# Patient Record
Sex: FEMALE | Race: WHITE | URBAN - METROPOLITAN AREA
[De-identification: names, ages, dates, MRNs, and addresses within clinical notes are randomized per-mention and may not be internally consistent; named-entity substitution may affect disease eponyms.]

---

## 2018-09-30 ENCOUNTER — EMERGENCY (EMERGENCY)
Facility: HOSPITAL | Age: 68
LOS: 0 days | Discharge: HOME | End: 2018-09-30
Attending: EMERGENCY MEDICINE | Admitting: EMERGENCY MEDICINE
Payer: MEDICARE

## 2018-09-30 VITALS
HEIGHT: 68 IN | OXYGEN SATURATION: 97 % | RESPIRATION RATE: 18 BRPM | TEMPERATURE: 98 F | HEART RATE: 82 BPM | WEIGHT: 291.89 LBS

## 2018-09-30 DIAGNOSIS — Z88.1 ALLERGY STATUS TO OTHER ANTIBIOTIC AGENTS STATUS: ICD-10-CM

## 2018-09-30 DIAGNOSIS — L03.032 CELLULITIS OF LEFT TOE: ICD-10-CM

## 2018-09-30 DIAGNOSIS — M79.676 PAIN IN UNSPECIFIED TOE(S): ICD-10-CM

## 2018-09-30 PROCEDURE — 99283 EMERGENCY DEPT VISIT LOW MDM: CPT

## 2018-09-30 RX ADMIN — Medication 300 MILLIGRAM(S): at 15:15

## 2018-09-30 NOTE — ED ADULT NURSE NOTE - NSIMPLEMENTINTERV_GEN_ALL_ED
Implemented All Universal Safety Interventions:  [place] to call system. Call bell, personal items and telephone within reach. Instruct patient to call for assistance. Room bathroom lighting operational. Non-slip footwear when patient is off stretcher. Physically safe environment: no spills, clutter or unnecessary equipment. Stretcher in lowest position, wheels locked, appropriate side rails in place.

## 2018-09-30 NOTE — ED STATDOCS - OBJECTIVE STATEMENT
66 yo pt with pain to left big toe. 66 yo pt with pain to left big toe.  Pt with pain 68 yo pt with pain to left big toe.  Pt with pain started several days ago.  pt noticed white head on toe.  No travel, no scik contact.  Pt has follow up with Dr. Dejesus tomorrow. PMH for cancer, no hx diabetes

## 2020-01-09 ENCOUNTER — NON-APPOINTMENT (OUTPATIENT)
Age: 70
End: 2020-01-09

## 2021-08-31 ENCOUNTER — APPOINTMENT (OUTPATIENT)
Dept: INFECTIOUS DISEASE | Facility: CLINIC | Age: 71
End: 2021-08-31
Payer: MEDICARE

## 2021-08-31 VITALS
HEIGHT: 68 IN | OXYGEN SATURATION: 98 % | TEMPERATURE: 98.7 F | SYSTOLIC BLOOD PRESSURE: 160 MMHG | HEART RATE: 90 BPM | DIASTOLIC BLOOD PRESSURE: 73 MMHG | RESPIRATION RATE: 16 BRPM

## 2021-08-31 PROCEDURE — 99203 OFFICE O/P NEW LOW 30 MIN: CPT

## 2021-09-02 NOTE — ASSESSMENT
[FreeTextEntry1] : 70 year old female PMH colon cancer and lung cancer s/p remote chemotherapy and chronic venous stasis who presents to ID office for evaluation of possible cellulitis. \par \par Lower extremity exam at this point most suggestive of chronic venous stasis\par I advised patient to stop her Doxycycline\par I advised her to continue her treatments for lymphedema\par I advised her to try leg elevation and wrapping to combat her edema\par I gave her a copy of my card and advised her to contact he if she develops chills, fevers or rapid (especially asymmetric erythema of her lower extremities, which would be more suggestive of cellulitis)\par \par Followup: PRN

## 2021-09-02 NOTE — HISTORY OF PRESENT ILLNESS
[FreeTextEntry1] : 70 year old female PMH colon cancer and lung cancer s/p remote chemotherapy and chronic venous stasis who presents to ID office for evaluation of possible cellulitis. Patient notes that > 2 weeks ago she develops bilateral LE redness and her physician initiated her on Doxycycline. Did not have any chills or fevers at the time. No significant pain with the erythema. Symptoms did not emily so her antibiotic was renewed after taking it for 2 weeks. At present denies pain at her LE. Denies chills or fevers. No N/V/D.\par \par PMH: History of Lung Cancer and Colon Cancer s/p remote chemotherapy, LE Lymphedema\par PSH: Resection for lung cancer\par Family History: Noncontributory\par Social Hx: Not current smoker. No EtOH use. No Drug use\par Allergies: Keflex (however, tolerated penicillins)\par

## 2021-09-02 NOTE — PHYSICAL EXAM
[General Appearance - Alert] : alert [General Appearance - In No Acute Distress] : in no acute distress [Sclera] : the sclera and conjunctiva were normal [Outer Ear] : the ears and nose were normal in appearance [Oropharynx] : the oropharynx was normal with no thrush [Neck Appearance] : the appearance of the neck was normal [Auscultation Breath Sounds / Voice Sounds] : lungs were clear to auscultation bilaterally [Heart Rate And Rhythm] : heart rate was normal and rhythm regular [Heart Sounds] : normal S1 and S2 [Heart Sounds Gallop] : no gallops [Murmurs] : no murmurs [Heart Sounds Pericardial Friction Rub] : no pericardial rub [Bowel Sounds] : normal bowel sounds [Abdomen Soft] : soft [Abdomen Tenderness] : non-tender [] : no hepato-splenomegaly [Abdomen Mass (___ Cm)] : no abdominal mass palpated [Musculoskeletal - Swelling] : no joint swelling [Range of Motion to Joints] : range of motion to joints [Nail Clubbing] : no clubbing  or cyanosis of the fingernails [Skin Color & Pigmentation] : normal skin color and pigmentation [No Focal Deficits] : no focal deficits [Affect] : the affect was normal [FreeTextEntry1] : +LE changes as above

## 2021-12-14 ENCOUNTER — OUTPATIENT (OUTPATIENT)
Dept: OUTPATIENT SERVICES | Facility: HOSPITAL | Age: 71
LOS: 1 days | End: 2021-12-14
Payer: MEDICARE

## 2021-12-14 ENCOUNTER — RESULT REVIEW (OUTPATIENT)
Age: 71
End: 2021-12-14

## 2021-12-14 DIAGNOSIS — L97.512 NON-PRESSURE CHRONIC ULCER OF OTHER PART OF RIGHT FOOT WITH FAT LAYER EXPOSED: ICD-10-CM

## 2021-12-14 DIAGNOSIS — Q82.0 HEREDITARY LYMPHEDEMA: ICD-10-CM

## 2021-12-14 DIAGNOSIS — L89.619 PRESSURE ULCER OF RIGHT HEEL, UNSPECIFIED STAGE: ICD-10-CM

## 2021-12-14 PROCEDURE — 99203 OFFICE O/P NEW LOW 30 MIN: CPT

## 2021-12-14 PROCEDURE — 29580 STRAPPING UNNA BOOT: CPT | Mod: 50

## 2021-12-14 PROCEDURE — 93923 UPR/LXTR ART STDY 3+ LVLS: CPT

## 2021-12-14 PROCEDURE — 93923 UPR/LXTR ART STDY 3+ LVLS: CPT | Mod: 26

## 2021-12-14 PROCEDURE — G0463: CPT | Mod: 25

## 2021-12-21 ENCOUNTER — OUTPATIENT (OUTPATIENT)
Dept: OUTPATIENT SERVICES | Facility: HOSPITAL | Age: 71
LOS: 1 days | End: 2021-12-21
Payer: MEDICARE

## 2021-12-21 DIAGNOSIS — L97.512 NON-PRESSURE CHRONIC ULCER OF OTHER PART OF RIGHT FOOT WITH FAT LAYER EXPOSED: ICD-10-CM

## 2021-12-21 DIAGNOSIS — Q82.0 HEREDITARY LYMPHEDEMA: ICD-10-CM

## 2021-12-21 PROCEDURE — G0463: CPT

## 2021-12-21 PROCEDURE — 99213 OFFICE O/P EST LOW 20 MIN: CPT

## 2021-12-28 ENCOUNTER — OUTPATIENT (OUTPATIENT)
Dept: OUTPATIENT SERVICES | Facility: HOSPITAL | Age: 71
LOS: 1 days | End: 2021-12-28
Payer: MEDICARE

## 2021-12-28 DIAGNOSIS — Q82.0 HEREDITARY LYMPHEDEMA: ICD-10-CM

## 2021-12-28 DIAGNOSIS — L97.512 NON-PRESSURE CHRONIC ULCER OF OTHER PART OF RIGHT FOOT WITH FAT LAYER EXPOSED: ICD-10-CM

## 2021-12-28 PROCEDURE — 99213 OFFICE O/P EST LOW 20 MIN: CPT

## 2021-12-28 PROCEDURE — G0463: CPT

## 2022-01-04 ENCOUNTER — OUTPATIENT (OUTPATIENT)
Dept: OUTPATIENT SERVICES | Facility: HOSPITAL | Age: 72
LOS: 1 days | End: 2022-01-04
Payer: MEDICARE

## 2022-01-04 DIAGNOSIS — L97.512 NON-PRESSURE CHRONIC ULCER OF OTHER PART OF RIGHT FOOT WITH FAT LAYER EXPOSED: ICD-10-CM

## 2022-01-04 DIAGNOSIS — Q82.0 HEREDITARY LYMPHEDEMA: ICD-10-CM

## 2022-01-04 PROCEDURE — 29580 STRAPPING UNNA BOOT: CPT | Mod: 50

## 2022-01-04 PROCEDURE — 99213 OFFICE O/P EST LOW 20 MIN: CPT

## 2022-01-04 PROCEDURE — G0463: CPT | Mod: 25

## 2022-01-11 ENCOUNTER — OUTPATIENT (OUTPATIENT)
Dept: OUTPATIENT SERVICES | Facility: HOSPITAL | Age: 72
LOS: 1 days | End: 2022-01-11
Payer: MEDICARE

## 2022-01-11 DIAGNOSIS — Q82.0 HEREDITARY LYMPHEDEMA: ICD-10-CM

## 2022-01-11 DIAGNOSIS — L81.9 DISORDER OF PIGMENTATION, UNSPECIFIED: ICD-10-CM

## 2022-01-11 DIAGNOSIS — L97.512 NON-PRESSURE CHRONIC ULCER OF OTHER PART OF RIGHT FOOT WITH FAT LAYER EXPOSED: ICD-10-CM

## 2022-01-11 DIAGNOSIS — I10 ESSENTIAL (PRIMARY) HYPERTENSION: ICD-10-CM

## 2022-01-11 DIAGNOSIS — I89.0 LYMPHEDEMA, NOT ELSEWHERE CLASSIFIED: ICD-10-CM

## 2022-01-11 DIAGNOSIS — G60.9 HEREDITARY AND IDIOPATHIC NEUROPATHY, UNSPECIFIED: ICD-10-CM

## 2022-01-11 PROCEDURE — 99213 OFFICE O/P EST LOW 20 MIN: CPT

## 2022-01-11 PROCEDURE — 29580 STRAPPING UNNA BOOT: CPT | Mod: RT

## 2022-01-14 ENCOUNTER — OUTPATIENT (OUTPATIENT)
Dept: OUTPATIENT SERVICES | Facility: HOSPITAL | Age: 72
LOS: 1 days | End: 2022-01-14
Payer: MEDICARE

## 2022-01-14 DIAGNOSIS — L97.512 NON-PRESSURE CHRONIC ULCER OF OTHER PART OF RIGHT FOOT WITH FAT LAYER EXPOSED: ICD-10-CM

## 2022-01-14 PROCEDURE — 29580 STRAPPING UNNA BOOT: CPT | Mod: LT

## 2022-01-18 ENCOUNTER — OUTPATIENT (OUTPATIENT)
Dept: OUTPATIENT SERVICES | Facility: HOSPITAL | Age: 72
LOS: 1 days | End: 2022-01-18
Payer: MEDICARE

## 2022-01-18 DIAGNOSIS — L97.512 NON-PRESSURE CHRONIC ULCER OF OTHER PART OF RIGHT FOOT WITH FAT LAYER EXPOSED: ICD-10-CM

## 2022-01-18 DIAGNOSIS — L81.9 DISORDER OF PIGMENTATION, UNSPECIFIED: ICD-10-CM

## 2022-01-18 DIAGNOSIS — I10 ESSENTIAL (PRIMARY) HYPERTENSION: ICD-10-CM

## 2022-01-18 DIAGNOSIS — Q82.0 HEREDITARY LYMPHEDEMA: ICD-10-CM

## 2022-01-18 DIAGNOSIS — G60.9 HEREDITARY AND IDIOPATHIC NEUROPATHY, UNSPECIFIED: ICD-10-CM

## 2022-01-18 PROCEDURE — G0463: CPT

## 2022-01-18 PROCEDURE — 99213 OFFICE O/P EST LOW 20 MIN: CPT

## 2022-01-18 PROCEDURE — 29580 STRAPPING UNNA BOOT: CPT | Mod: RT

## 2022-01-21 ENCOUNTER — OUTPATIENT (OUTPATIENT)
Dept: OUTPATIENT SERVICES | Facility: HOSPITAL | Age: 72
LOS: 1 days | End: 2022-01-21
Payer: MEDICARE

## 2022-01-21 DIAGNOSIS — L97.512 NON-PRESSURE CHRONIC ULCER OF OTHER PART OF RIGHT FOOT WITH FAT LAYER EXPOSED: ICD-10-CM

## 2022-01-21 PROCEDURE — 99212 OFFICE O/P EST SF 10 MIN: CPT

## 2022-01-21 PROCEDURE — G0463: CPT | Mod: 25

## 2022-01-21 PROCEDURE — 29580 STRAPPING UNNA BOOT: CPT | Mod: 50

## 2022-01-25 ENCOUNTER — OUTPATIENT (OUTPATIENT)
Dept: OUTPATIENT SERVICES | Facility: HOSPITAL | Age: 72
LOS: 1 days | End: 2022-01-25
Payer: MEDICARE

## 2022-01-25 DIAGNOSIS — L97.512 NON-PRESSURE CHRONIC ULCER OF OTHER PART OF RIGHT FOOT WITH FAT LAYER EXPOSED: ICD-10-CM

## 2022-01-25 DIAGNOSIS — Q82.0 HEREDITARY LYMPHEDEMA: ICD-10-CM

## 2022-01-25 PROCEDURE — 29580 STRAPPING UNNA BOOT: CPT | Mod: 50

## 2022-01-25 PROCEDURE — 99213 OFFICE O/P EST LOW 20 MIN: CPT

## 2022-01-26 ENCOUNTER — OUTPATIENT (OUTPATIENT)
Dept: OUTPATIENT SERVICES | Facility: HOSPITAL | Age: 72
LOS: 1 days | End: 2022-01-26
Payer: MEDICARE

## 2022-01-26 DIAGNOSIS — L97.512 NON-PRESSURE CHRONIC ULCER OF OTHER PART OF RIGHT FOOT WITH FAT LAYER EXPOSED: ICD-10-CM

## 2022-01-26 DIAGNOSIS — Q82.0 HEREDITARY LYMPHEDEMA: ICD-10-CM

## 2022-01-26 PROCEDURE — 29580 STRAPPING UNNA BOOT: CPT | Mod: 50

## 2022-01-26 PROCEDURE — G0463: CPT | Mod: 25

## 2022-02-01 ENCOUNTER — OUTPATIENT (OUTPATIENT)
Dept: OUTPATIENT SERVICES | Facility: HOSPITAL | Age: 72
LOS: 1 days | End: 2022-02-01
Payer: MEDICARE

## 2022-02-01 ENCOUNTER — TRANSCRIPTION ENCOUNTER (OUTPATIENT)
Age: 72
End: 2022-02-01

## 2022-02-01 DIAGNOSIS — Q82.0 HEREDITARY LYMPHEDEMA: ICD-10-CM

## 2022-02-01 DIAGNOSIS — L97.512 NON-PRESSURE CHRONIC ULCER OF OTHER PART OF RIGHT FOOT WITH FAT LAYER EXPOSED: ICD-10-CM

## 2022-02-01 PROCEDURE — G0463: CPT

## 2022-02-01 PROCEDURE — 99213 OFFICE O/P EST LOW 20 MIN: CPT

## 2022-02-04 ENCOUNTER — OUTPATIENT (OUTPATIENT)
Dept: OUTPATIENT SERVICES | Facility: HOSPITAL | Age: 72
LOS: 1 days | End: 2022-02-04
Payer: MEDICARE

## 2022-02-04 DIAGNOSIS — L97.512 NON-PRESSURE CHRONIC ULCER OF OTHER PART OF RIGHT FOOT WITH FAT LAYER EXPOSED: ICD-10-CM

## 2022-02-04 DIAGNOSIS — Q82.0 HEREDITARY LYMPHEDEMA: ICD-10-CM

## 2022-02-04 PROCEDURE — G0463: CPT | Mod: 25

## 2022-02-04 PROCEDURE — 29580 STRAPPING UNNA BOOT: CPT | Mod: 50

## 2022-02-08 ENCOUNTER — OUTPATIENT (OUTPATIENT)
Dept: OUTPATIENT SERVICES | Facility: HOSPITAL | Age: 72
LOS: 1 days | End: 2022-02-08
Payer: MEDICARE

## 2022-02-08 ENCOUNTER — APPOINTMENT (OUTPATIENT)
Dept: FAMILY MEDICINE | Facility: CLINIC | Age: 72
End: 2022-02-08
Payer: MEDICARE

## 2022-02-08 ENCOUNTER — NON-APPOINTMENT (OUTPATIENT)
Age: 72
End: 2022-02-08

## 2022-02-08 VITALS
HEIGHT: 68 IN | TEMPERATURE: 97.6 F | HEART RATE: 95 BPM | DIASTOLIC BLOOD PRESSURE: 92 MMHG | OXYGEN SATURATION: 98 % | SYSTOLIC BLOOD PRESSURE: 162 MMHG

## 2022-02-08 VITALS
WEIGHT: 293 LBS | DIASTOLIC BLOOD PRESSURE: 49 MMHG | SYSTOLIC BLOOD PRESSURE: 162 MMHG | HEIGHT: 68 IN | BODY MASS INDEX: 44.41 KG/M2

## 2022-02-08 DIAGNOSIS — Z78.9 OTHER SPECIFIED HEALTH STATUS: ICD-10-CM

## 2022-02-08 DIAGNOSIS — L97.512 NON-PRESSURE CHRONIC ULCER OF OTHER PART OF RIGHT FOOT WITH FAT LAYER EXPOSED: ICD-10-CM

## 2022-02-08 DIAGNOSIS — Q82.0 HEREDITARY LYMPHEDEMA: ICD-10-CM

## 2022-02-08 DIAGNOSIS — Z82.49 FAMILY HISTORY OF ISCHEMIC HEART DISEASE AND OTHER DISEASES OF THE CIRCULATORY SYSTEM: ICD-10-CM

## 2022-02-08 PROCEDURE — 99213 OFFICE O/P EST LOW 20 MIN: CPT

## 2022-02-08 PROCEDURE — 99204 OFFICE O/P NEW MOD 45 MIN: CPT

## 2022-02-08 PROCEDURE — 29580 STRAPPING UNNA BOOT: CPT

## 2022-02-08 RX ORDER — DOXYCYCLINE 100 MG/1
100 CAPSULE ORAL
Qty: 28 | Refills: 0 | Status: DISCONTINUED | COMMUNITY
Start: 2021-11-15

## 2022-02-08 RX ORDER — CLOTRIMAZOLE AND BETAMETHASONE DIPROPIONATE 10; .5 MG/G; MG/G
1-0.05 CREAM TOPICAL
Qty: 45 | Refills: 0 | Status: DISCONTINUED | COMMUNITY
Start: 2021-03-15

## 2022-02-08 RX ORDER — CHLORHEXIDINE GLUCONATE 213 G/1000ML
4 SOLUTION TOPICAL
Qty: 946 | Refills: 0 | Status: DISCONTINUED | COMMUNITY
Start: 2021-11-15

## 2022-02-08 RX ORDER — DOXYCYCLINE HYCLATE 100 MG/1
100 CAPSULE ORAL
Qty: 20 | Refills: 0 | Status: DISCONTINUED | COMMUNITY
Start: 2022-01-22

## 2022-02-08 RX ORDER — NYSTATIN 100000 [USP'U]/G
100000 POWDER TOPICAL
Qty: 60 | Refills: 0 | Status: DISCONTINUED | COMMUNITY
Start: 2021-09-01

## 2022-02-08 NOTE — HEALTH RISK ASSESSMENT
[Never] : Never [0-4] : 0-4 [1 or 2 (0 pts)] : 1 or 2 (0 points) [Never (0 pts)] : Never (0 points) [No] : In the past 12 months have you used drugs other than those required for medical reasons? No [No falls in past year] : Patient reported no falls in the past year [0] : 2) Feeling down, depressed, or hopeless: Not at all (0) [Audit-CScore] : 0 [[id number]] : 0

## 2022-02-08 NOTE — HISTORY OF PRESENT ILLNESS
[FreeTextEntry8] : New pt is here for med rx \par Going for lymphadema therapy. They are applying brandon boots

## 2022-02-11 ENCOUNTER — OUTPATIENT (OUTPATIENT)
Dept: OUTPATIENT SERVICES | Facility: HOSPITAL | Age: 72
LOS: 1 days | End: 2022-02-11
Payer: MEDICARE

## 2022-02-11 DIAGNOSIS — L97.512 NON-PRESSURE CHRONIC ULCER OF OTHER PART OF RIGHT FOOT WITH FAT LAYER EXPOSED: ICD-10-CM

## 2022-02-11 DIAGNOSIS — Q82.0 HEREDITARY LYMPHEDEMA: ICD-10-CM

## 2022-02-11 PROCEDURE — G0463: CPT

## 2022-02-11 PROCEDURE — 29580 STRAPPING UNNA BOOT: CPT | Mod: 50

## 2022-02-15 ENCOUNTER — OUTPATIENT (OUTPATIENT)
Dept: OUTPATIENT SERVICES | Facility: HOSPITAL | Age: 72
LOS: 1 days | End: 2022-02-15
Payer: MEDICARE

## 2022-02-15 DIAGNOSIS — L97.512 NON-PRESSURE CHRONIC ULCER OF OTHER PART OF RIGHT FOOT WITH FAT LAYER EXPOSED: ICD-10-CM

## 2022-02-15 PROCEDURE — G0463: CPT

## 2022-02-16 ENCOUNTER — LABORATORY RESULT (OUTPATIENT)
Age: 72
End: 2022-02-16

## 2022-02-16 ENCOUNTER — APPOINTMENT (OUTPATIENT)
Dept: FAMILY MEDICINE | Facility: CLINIC | Age: 72
End: 2022-02-16
Payer: MEDICARE

## 2022-02-16 VITALS
SYSTOLIC BLOOD PRESSURE: 169 MMHG | WEIGHT: 293 LBS | HEART RATE: 84 BPM | DIASTOLIC BLOOD PRESSURE: 71 MMHG | HEIGHT: 68 IN | OXYGEN SATURATION: 98 % | BODY MASS INDEX: 44.41 KG/M2 | TEMPERATURE: 97.9 F

## 2022-02-16 DIAGNOSIS — Q82.0 HEREDITARY LYMPHEDEMA: ICD-10-CM

## 2022-02-16 PROCEDURE — 36415 COLL VENOUS BLD VENIPUNCTURE: CPT

## 2022-02-16 PROCEDURE — 99212 OFFICE O/P EST SF 10 MIN: CPT | Mod: 25

## 2022-02-17 LAB
BASOPHILS # BLD AUTO: 0.04 K/UL
BASOPHILS NFR BLD AUTO: 1 %
CHOLEST SERPL-MCNC: 183 MG/DL
EOSINOPHIL # BLD AUTO: 0.14 K/UL
EOSINOPHIL NFR BLD AUTO: 3.4 %
HCT VFR BLD CALC: 42.3 %
HDLC SERPL-MCNC: 69 MG/DL
HGB BLD-MCNC: 13.5 G/DL
IMM GRANULOCYTES NFR BLD AUTO: 0.2 %
LDLC SERPL CALC-MCNC: 92 MG/DL
LYMPHOCYTES # BLD AUTO: 0.96 K/UL
LYMPHOCYTES NFR BLD AUTO: 23.1 %
MAN DIFF?: NORMAL
MCHC RBC-ENTMCNC: 27.8 PG
MCHC RBC-ENTMCNC: 31.9 GM/DL
MCV RBC AUTO: 87.2 FL
MONOCYTES # BLD AUTO: 0.51 K/UL
MONOCYTES NFR BLD AUTO: 12.3 %
NEUTROPHILS # BLD AUTO: 2.5 K/UL
NEUTROPHILS NFR BLD AUTO: 60 %
NONHDLC SERPL-MCNC: 114 MG/DL
PLATELET # BLD AUTO: 77 K/UL
RBC # BLD: 4.85 M/UL
RBC # FLD: 15.1 %
TRIGL SERPL-MCNC: 109 MG/DL
WBC # FLD AUTO: 4.16 K/UL

## 2022-02-18 ENCOUNTER — OUTPATIENT (OUTPATIENT)
Dept: OUTPATIENT SERVICES | Facility: HOSPITAL | Age: 72
LOS: 1 days | End: 2022-02-18
Payer: MEDICARE

## 2022-02-18 DIAGNOSIS — L97.512 NON-PRESSURE CHRONIC ULCER OF OTHER PART OF RIGHT FOOT WITH FAT LAYER EXPOSED: ICD-10-CM

## 2022-02-18 PROCEDURE — G0463: CPT

## 2022-02-22 ENCOUNTER — OUTPATIENT (OUTPATIENT)
Dept: OUTPATIENT SERVICES | Facility: HOSPITAL | Age: 72
LOS: 1 days | End: 2022-02-22
Payer: MEDICARE

## 2022-02-22 DIAGNOSIS — L97.512 NON-PRESSURE CHRONIC ULCER OF OTHER PART OF RIGHT FOOT WITH FAT LAYER EXPOSED: ICD-10-CM

## 2022-02-22 DIAGNOSIS — I89.0 LYMPHEDEMA, NOT ELSEWHERE CLASSIFIED: ICD-10-CM

## 2022-02-22 PROCEDURE — 29580 STRAPPING UNNA BOOT: CPT | Mod: 50

## 2022-02-22 PROCEDURE — G0463: CPT

## 2022-02-23 LAB
ALBUMIN SERPL ELPH-MCNC: 4.1 G/DL
ALP BLD-CCNC: 76 U/L
ALT SERPL-CCNC: 24 U/L
ANION GAP SERPL CALC-SCNC: 15 MMOL/L
AST SERPL-CCNC: 38 U/L
BILIRUB SERPL-MCNC: 1 MG/DL
BUN SERPL-MCNC: 7 MG/DL
CALCIUM SERPL-MCNC: 9.5 MG/DL
CHLORIDE SERPL-SCNC: 99 MMOL/L
CO2 SERPL-SCNC: 24 MMOL/L
CREAT SERPL-MCNC: 0.75 MG/DL
ESTIMATED AVERAGE GLUCOSE: 171 MG/DL
GLUCOSE SERPL-MCNC: 181 MG/DL
HBA1C MFR BLD HPLC: 7.6 %
POTASSIUM SERPL-SCNC: 4.1 MMOL/L
PROT SERPL-MCNC: 6.8 G/DL
SODIUM SERPL-SCNC: 139 MMOL/L

## 2022-02-25 ENCOUNTER — OUTPATIENT (OUTPATIENT)
Dept: OUTPATIENT SERVICES | Facility: HOSPITAL | Age: 72
LOS: 1 days | End: 2022-02-25
Payer: MEDICARE

## 2022-02-25 DIAGNOSIS — L97.512 NON-PRESSURE CHRONIC ULCER OF OTHER PART OF RIGHT FOOT WITH FAT LAYER EXPOSED: ICD-10-CM

## 2022-02-25 DIAGNOSIS — Q82.0 HEREDITARY LYMPHEDEMA: ICD-10-CM

## 2022-02-25 PROCEDURE — G0463: CPT

## 2022-02-25 PROCEDURE — 29580 STRAPPING UNNA BOOT: CPT | Mod: 50

## 2022-03-01 ENCOUNTER — OUTPATIENT (OUTPATIENT)
Dept: OUTPATIENT SERVICES | Facility: HOSPITAL | Age: 72
LOS: 1 days | End: 2022-03-01
Payer: MEDICARE

## 2022-03-01 DIAGNOSIS — L97.512 NON-PRESSURE CHRONIC ULCER OF OTHER PART OF RIGHT FOOT WITH FAT LAYER EXPOSED: ICD-10-CM

## 2022-03-01 DIAGNOSIS — Q82.0 HEREDITARY LYMPHEDEMA: ICD-10-CM

## 2022-03-01 PROCEDURE — 99213 OFFICE O/P EST LOW 20 MIN: CPT

## 2022-03-01 PROCEDURE — G0463: CPT | Mod: 25

## 2022-03-01 PROCEDURE — 29580 STRAPPING UNNA BOOT: CPT | Mod: 50

## 2022-03-04 ENCOUNTER — OUTPATIENT (OUTPATIENT)
Dept: OUTPATIENT SERVICES | Facility: HOSPITAL | Age: 72
LOS: 1 days | End: 2022-03-04
Payer: MEDICARE

## 2022-03-04 DIAGNOSIS — L97.512 NON-PRESSURE CHRONIC ULCER OF OTHER PART OF RIGHT FOOT WITH FAT LAYER EXPOSED: ICD-10-CM

## 2022-03-04 PROCEDURE — G0463: CPT

## 2022-03-08 ENCOUNTER — OUTPATIENT (OUTPATIENT)
Dept: OUTPATIENT SERVICES | Facility: HOSPITAL | Age: 72
LOS: 1 days | End: 2022-03-08
Payer: MEDICARE

## 2022-03-08 DIAGNOSIS — L97.512 NON-PRESSURE CHRONIC ULCER OF OTHER PART OF RIGHT FOOT WITH FAT LAYER EXPOSED: ICD-10-CM

## 2022-03-08 DIAGNOSIS — Q82.0 HEREDITARY LYMPHEDEMA: ICD-10-CM

## 2022-03-08 PROCEDURE — G0463: CPT

## 2022-03-08 PROCEDURE — 29580 STRAPPING UNNA BOOT: CPT | Mod: 50

## 2022-03-11 ENCOUNTER — OUTPATIENT (OUTPATIENT)
Dept: OUTPATIENT SERVICES | Facility: HOSPITAL | Age: 72
LOS: 1 days | End: 2022-03-11
Payer: MEDICARE

## 2022-03-11 DIAGNOSIS — L97.512 NON-PRESSURE CHRONIC ULCER OF OTHER PART OF RIGHT FOOT WITH FAT LAYER EXPOSED: ICD-10-CM

## 2022-03-11 PROCEDURE — 29580 STRAPPING UNNA BOOT: CPT | Mod: 50

## 2022-03-15 ENCOUNTER — OUTPATIENT (OUTPATIENT)
Dept: OUTPATIENT SERVICES | Facility: HOSPITAL | Age: 72
LOS: 1 days | End: 2022-03-15
Payer: MEDICARE

## 2022-03-15 DIAGNOSIS — L97.512 NON-PRESSURE CHRONIC ULCER OF OTHER PART OF RIGHT FOOT WITH FAT LAYER EXPOSED: ICD-10-CM

## 2022-03-15 PROCEDURE — 29580 STRAPPING UNNA BOOT: CPT | Mod: 50

## 2022-03-22 ENCOUNTER — OUTPATIENT (OUTPATIENT)
Dept: OUTPATIENT SERVICES | Facility: HOSPITAL | Age: 72
LOS: 1 days | End: 2022-03-22
Payer: MEDICARE

## 2022-03-22 DIAGNOSIS — L97.512 NON-PRESSURE CHRONIC ULCER OF OTHER PART OF RIGHT FOOT WITH FAT LAYER EXPOSED: ICD-10-CM

## 2022-03-22 PROCEDURE — 99213 OFFICE O/P EST LOW 20 MIN: CPT

## 2022-03-22 PROCEDURE — 29580 STRAPPING UNNA BOOT: CPT | Mod: 50

## 2022-03-29 ENCOUNTER — OUTPATIENT (OUTPATIENT)
Dept: OUTPATIENT SERVICES | Facility: HOSPITAL | Age: 72
LOS: 1 days | End: 2022-03-29
Payer: MEDICARE

## 2022-03-29 DIAGNOSIS — Q82.0 HEREDITARY LYMPHEDEMA: ICD-10-CM

## 2022-03-29 DIAGNOSIS — L97.512 NON-PRESSURE CHRONIC ULCER OF OTHER PART OF RIGHT FOOT WITH FAT LAYER EXPOSED: ICD-10-CM

## 2022-03-29 PROCEDURE — 29580 STRAPPING UNNA BOOT: CPT | Mod: 50

## 2022-03-29 PROCEDURE — 99213 OFFICE O/P EST LOW 20 MIN: CPT

## 2022-04-05 ENCOUNTER — OUTPATIENT (OUTPATIENT)
Dept: OUTPATIENT SERVICES | Facility: HOSPITAL | Age: 72
LOS: 1 days | End: 2022-04-05
Payer: MEDICARE

## 2022-04-05 DIAGNOSIS — L97.512 NON-PRESSURE CHRONIC ULCER OF OTHER PART OF RIGHT FOOT WITH FAT LAYER EXPOSED: ICD-10-CM

## 2022-04-05 PROCEDURE — 29580 STRAPPING UNNA BOOT: CPT

## 2022-04-05 PROCEDURE — G0463: CPT

## 2022-04-12 ENCOUNTER — OUTPATIENT (OUTPATIENT)
Dept: OUTPATIENT SERVICES | Facility: HOSPITAL | Age: 72
LOS: 1 days | End: 2022-04-12
Payer: MEDICARE

## 2022-04-12 DIAGNOSIS — Q82.0 HEREDITARY LYMPHEDEMA: ICD-10-CM

## 2022-04-12 DIAGNOSIS — L98.8 OTHER SPECIFIED DISORDERS OF THE SKIN AND SUBCUTANEOUS TISSUE: ICD-10-CM

## 2022-04-12 DIAGNOSIS — L97.509 NON-PRESSURE CHRONIC ULCER OF OTHER PART OF UNSPECIFIED FOOT WITH UNSPECIFIED SEVERITY: ICD-10-CM

## 2022-04-12 DIAGNOSIS — L97.512 NON-PRESSURE CHRONIC ULCER OF OTHER PART OF RIGHT FOOT WITH FAT LAYER EXPOSED: ICD-10-CM

## 2022-04-12 DIAGNOSIS — I89.0 LYMPHEDEMA, NOT ELSEWHERE CLASSIFIED: ICD-10-CM

## 2022-04-12 PROCEDURE — 29580 STRAPPING UNNA BOOT: CPT | Mod: 50

## 2022-04-12 PROCEDURE — G0463: CPT

## 2022-04-19 ENCOUNTER — OUTPATIENT (OUTPATIENT)
Dept: OUTPATIENT SERVICES | Facility: HOSPITAL | Age: 72
LOS: 1 days | End: 2022-04-19
Payer: MEDICARE

## 2022-04-19 DIAGNOSIS — L97.512 NON-PRESSURE CHRONIC ULCER OF OTHER PART OF RIGHT FOOT WITH FAT LAYER EXPOSED: ICD-10-CM

## 2022-04-19 DIAGNOSIS — L97.509 NON-PRESSURE CHRONIC ULCER OF OTHER PART OF UNSPECIFIED FOOT WITH UNSPECIFIED SEVERITY: ICD-10-CM

## 2022-04-19 DIAGNOSIS — Q82.0 HEREDITARY LYMPHEDEMA: ICD-10-CM

## 2022-04-19 PROCEDURE — 29580 STRAPPING UNNA BOOT: CPT | Mod: 50

## 2022-04-19 PROCEDURE — 99213 OFFICE O/P EST LOW 20 MIN: CPT

## 2022-04-26 ENCOUNTER — OUTPATIENT (OUTPATIENT)
Dept: OUTPATIENT SERVICES | Facility: HOSPITAL | Age: 72
LOS: 1 days | End: 2022-04-26
Payer: MEDICARE

## 2022-04-26 DIAGNOSIS — Q82.0 HEREDITARY LYMPHEDEMA: ICD-10-CM

## 2022-04-26 DIAGNOSIS — L97.509 NON-PRESSURE CHRONIC ULCER OF OTHER PART OF UNSPECIFIED FOOT WITH UNSPECIFIED SEVERITY: ICD-10-CM

## 2022-04-26 DIAGNOSIS — L97.512 NON-PRESSURE CHRONIC ULCER OF OTHER PART OF RIGHT FOOT WITH FAT LAYER EXPOSED: ICD-10-CM

## 2022-04-26 PROCEDURE — 29580 STRAPPING UNNA BOOT: CPT | Mod: 50

## 2022-04-26 PROCEDURE — 99213 OFFICE O/P EST LOW 20 MIN: CPT

## 2022-05-05 ENCOUNTER — OUTPATIENT (OUTPATIENT)
Dept: OUTPATIENT SERVICES | Facility: HOSPITAL | Age: 72
LOS: 1 days | End: 2022-05-05
Payer: MEDICARE

## 2022-05-05 DIAGNOSIS — L97.509 NON-PRESSURE CHRONIC ULCER OF OTHER PART OF UNSPECIFIED FOOT WITH UNSPECIFIED SEVERITY: ICD-10-CM

## 2022-05-05 DIAGNOSIS — Q82.0 HEREDITARY LYMPHEDEMA: ICD-10-CM

## 2022-05-05 DIAGNOSIS — I89.0 LYMPHEDEMA, NOT ELSEWHERE CLASSIFIED: ICD-10-CM

## 2022-05-05 DIAGNOSIS — L97.512 NON-PRESSURE CHRONIC ULCER OF OTHER PART OF RIGHT FOOT WITH FAT LAYER EXPOSED: ICD-10-CM

## 2022-05-05 DIAGNOSIS — L98.8 OTHER SPECIFIED DISORDERS OF THE SKIN AND SUBCUTANEOUS TISSUE: ICD-10-CM

## 2022-05-05 PROCEDURE — 99213 OFFICE O/P EST LOW 20 MIN: CPT

## 2022-05-05 PROCEDURE — 29580 STRAPPING UNNA BOOT: CPT | Mod: 50

## 2022-05-05 PROCEDURE — G0463: CPT

## 2022-05-10 ENCOUNTER — OUTPATIENT (OUTPATIENT)
Dept: OUTPATIENT SERVICES | Facility: HOSPITAL | Age: 72
LOS: 1 days | End: 2022-05-10
Payer: MEDICARE

## 2022-05-10 DIAGNOSIS — Q82.0 HEREDITARY LYMPHEDEMA: ICD-10-CM

## 2022-05-10 DIAGNOSIS — L97.512 NON-PRESSURE CHRONIC ULCER OF OTHER PART OF RIGHT FOOT WITH FAT LAYER EXPOSED: ICD-10-CM

## 2022-05-10 DIAGNOSIS — L97.509 NON-PRESSURE CHRONIC ULCER OF OTHER PART OF UNSPECIFIED FOOT WITH UNSPECIFIED SEVERITY: ICD-10-CM

## 2022-05-10 PROCEDURE — 29580 STRAPPING UNNA BOOT: CPT

## 2022-05-17 ENCOUNTER — OUTPATIENT (OUTPATIENT)
Dept: OUTPATIENT SERVICES | Facility: HOSPITAL | Age: 72
LOS: 1 days | End: 2022-05-17
Payer: MEDICARE

## 2022-05-17 DIAGNOSIS — Q82.0 HEREDITARY LYMPHEDEMA: ICD-10-CM

## 2022-05-17 DIAGNOSIS — L97.512 NON-PRESSURE CHRONIC ULCER OF OTHER PART OF RIGHT FOOT WITH FAT LAYER EXPOSED: ICD-10-CM

## 2022-05-17 DIAGNOSIS — L97.509 NON-PRESSURE CHRONIC ULCER OF OTHER PART OF UNSPECIFIED FOOT WITH UNSPECIFIED SEVERITY: ICD-10-CM

## 2022-05-17 PROCEDURE — 29580 STRAPPING UNNA BOOT: CPT | Mod: 50

## 2022-05-17 PROCEDURE — 99213 OFFICE O/P EST LOW 20 MIN: CPT

## 2022-05-20 DIAGNOSIS — L97.512 NON-PRESSURE CHRONIC ULCER OF OTHER PART OF RIGHT FOOT WITH FAT LAYER EXPOSED: ICD-10-CM

## 2022-05-26 ENCOUNTER — OUTPATIENT (OUTPATIENT)
Dept: OUTPATIENT SERVICES | Facility: HOSPITAL | Age: 72
LOS: 1 days | End: 2022-05-26
Payer: MEDICARE

## 2022-05-26 DIAGNOSIS — L97.509 NON-PRESSURE CHRONIC ULCER OF OTHER PART OF UNSPECIFIED FOOT WITH UNSPECIFIED SEVERITY: ICD-10-CM

## 2022-05-26 DIAGNOSIS — L97.512 NON-PRESSURE CHRONIC ULCER OF OTHER PART OF RIGHT FOOT WITH FAT LAYER EXPOSED: ICD-10-CM

## 2022-05-26 DIAGNOSIS — Q82.0 HEREDITARY LYMPHEDEMA: ICD-10-CM

## 2022-05-26 PROCEDURE — 29580 STRAPPING UNNA BOOT: CPT | Mod: 50

## 2022-05-26 PROCEDURE — 99212 OFFICE O/P EST SF 10 MIN: CPT

## 2022-05-31 ENCOUNTER — OUTPATIENT (OUTPATIENT)
Dept: OUTPATIENT SERVICES | Facility: HOSPITAL | Age: 72
LOS: 1 days | End: 2022-05-31
Payer: MEDICARE

## 2022-05-31 DIAGNOSIS — L97.512 NON-PRESSURE CHRONIC ULCER OF OTHER PART OF RIGHT FOOT WITH FAT LAYER EXPOSED: ICD-10-CM

## 2022-05-31 DIAGNOSIS — Q82.0 HEREDITARY LYMPHEDEMA: ICD-10-CM

## 2022-05-31 DIAGNOSIS — L97.509 NON-PRESSURE CHRONIC ULCER OF OTHER PART OF UNSPECIFIED FOOT WITH UNSPECIFIED SEVERITY: ICD-10-CM

## 2022-05-31 PROCEDURE — 29580 STRAPPING UNNA BOOT: CPT | Mod: 50

## 2022-05-31 PROCEDURE — 99213 OFFICE O/P EST LOW 20 MIN: CPT

## 2022-06-07 ENCOUNTER — OUTPATIENT (OUTPATIENT)
Dept: OUTPATIENT SERVICES | Facility: HOSPITAL | Age: 72
LOS: 1 days | End: 2022-06-07
Payer: MEDICARE

## 2022-06-07 DIAGNOSIS — I89.0 LYMPHEDEMA, NOT ELSEWHERE CLASSIFIED: ICD-10-CM

## 2022-06-07 DIAGNOSIS — L97.512 NON-PRESSURE CHRONIC ULCER OF OTHER PART OF RIGHT FOOT WITH FAT LAYER EXPOSED: ICD-10-CM

## 2022-06-07 DIAGNOSIS — L98.8 OTHER SPECIFIED DISORDERS OF THE SKIN AND SUBCUTANEOUS TISSUE: ICD-10-CM

## 2022-06-07 DIAGNOSIS — L97.509 NON-PRESSURE CHRONIC ULCER OF OTHER PART OF UNSPECIFIED FOOT WITH UNSPECIFIED SEVERITY: ICD-10-CM

## 2022-06-07 DIAGNOSIS — Q82.0 HEREDITARY LYMPHEDEMA: ICD-10-CM

## 2022-06-07 PROCEDURE — 29580 STRAPPING UNNA BOOT: CPT

## 2022-06-09 ENCOUNTER — APPOINTMENT (OUTPATIENT)
Dept: FAMILY MEDICINE | Facility: CLINIC | Age: 72
End: 2022-06-09
Payer: MEDICARE

## 2022-06-09 VITALS
TEMPERATURE: 97.2 F | SYSTOLIC BLOOD PRESSURE: 135 MMHG | OXYGEN SATURATION: 96 % | WEIGHT: 293 LBS | DIASTOLIC BLOOD PRESSURE: 64 MMHG | HEIGHT: 68 IN | BODY MASS INDEX: 44.41 KG/M2 | HEART RATE: 85 BPM

## 2022-06-09 PROCEDURE — 81003 URINALYSIS AUTO W/O SCOPE: CPT | Mod: QW

## 2022-06-09 PROCEDURE — 36415 COLL VENOUS BLD VENIPUNCTURE: CPT

## 2022-06-09 PROCEDURE — 99215 OFFICE O/P EST HI 40 MIN: CPT | Mod: 25

## 2022-06-09 NOTE — PLAN
[FreeTextEntry1] : Advised at length\par Follow diet\par Start jardiance\par f/u hematology for liver\par Continue metoprolol

## 2022-06-09 NOTE — PHYSICAL EXAM
[Normal] : normal rate, regular rhythm, normal S1 and S2 and no murmur heard [de-identified] : 5+ edema

## 2022-06-09 NOTE — HISTORY OF PRESENT ILLNESS
[FreeTextEntry8] : pt is here for Med consult and weight consult. Went to [facility] and saw hepatologist. No cancer but some lesions they are watching. Sent to [facility]. Needs an EGD\par Pt with chronic lymphedema which is interfering in quality of life\par Eating less and taking the metformin and glipizide for diabetes Last A1C was 7.6

## 2022-06-10 LAB
ALBUMIN SERPL ELPH-MCNC: 3.8 G/DL
ALP BLD-CCNC: 79 U/L
ALT SERPL-CCNC: 23 U/L
ANION GAP SERPL CALC-SCNC: 13 MMOL/L
AST SERPL-CCNC: 36 U/L
BILIRUB SERPL-MCNC: 0.7 MG/DL
BUN SERPL-MCNC: 13 MG/DL
CALCIUM SERPL-MCNC: 9.8 MG/DL
CHLORIDE SERPL-SCNC: 102 MMOL/L
CHOLEST SERPL-MCNC: 158 MG/DL
CO2 SERPL-SCNC: 25 MMOL/L
CREAT SERPL-MCNC: 0.71 MG/DL
EGFR: 91 ML/MIN/1.73M2
ESTIMATED AVERAGE GLUCOSE: 148 MG/DL
GLUCOSE SERPL-MCNC: 131 MG/DL
HBA1C MFR BLD HPLC: 6.8 %
HDLC SERPL-MCNC: 55 MG/DL
LDLC SERPL CALC-MCNC: 82 MG/DL
NONHDLC SERPL-MCNC: 103 MG/DL
POTASSIUM SERPL-SCNC: 3.9 MMOL/L
PROT SERPL-MCNC: 6.6 G/DL
SODIUM SERPL-SCNC: 140 MMOL/L
TRIGL SERPL-MCNC: 106 MG/DL

## 2022-06-14 ENCOUNTER — OUTPATIENT (OUTPATIENT)
Dept: OUTPATIENT SERVICES | Facility: HOSPITAL | Age: 72
LOS: 1 days | End: 2022-06-14
Payer: MEDICARE

## 2022-06-14 DIAGNOSIS — Q82.0 HEREDITARY LYMPHEDEMA: ICD-10-CM

## 2022-06-14 DIAGNOSIS — L97.512 NON-PRESSURE CHRONIC ULCER OF OTHER PART OF RIGHT FOOT WITH FAT LAYER EXPOSED: ICD-10-CM

## 2022-06-14 DIAGNOSIS — L97.509 NON-PRESSURE CHRONIC ULCER OF OTHER PART OF UNSPECIFIED FOOT WITH UNSPECIFIED SEVERITY: ICD-10-CM

## 2022-06-14 PROCEDURE — 29580 STRAPPING UNNA BOOT: CPT

## 2022-06-21 ENCOUNTER — OUTPATIENT (OUTPATIENT)
Dept: OUTPATIENT SERVICES | Facility: HOSPITAL | Age: 72
LOS: 1 days | End: 2022-06-21
Payer: MEDICARE

## 2022-06-21 DIAGNOSIS — L97.512 NON-PRESSURE CHRONIC ULCER OF OTHER PART OF RIGHT FOOT WITH FAT LAYER EXPOSED: ICD-10-CM

## 2022-06-21 DIAGNOSIS — L97.509 NON-PRESSURE CHRONIC ULCER OF OTHER PART OF UNSPECIFIED FOOT WITH UNSPECIFIED SEVERITY: ICD-10-CM

## 2022-06-21 DIAGNOSIS — Q82.0 HEREDITARY LYMPHEDEMA: ICD-10-CM

## 2022-06-21 PROCEDURE — 29580 STRAPPING UNNA BOOT: CPT

## 2022-06-21 PROCEDURE — 99213 OFFICE O/P EST LOW 20 MIN: CPT

## 2022-06-24 ENCOUNTER — APPOINTMENT (OUTPATIENT)
Dept: VASCULAR SURGERY | Facility: CLINIC | Age: 72
End: 2022-06-24
Payer: MEDICARE

## 2022-06-24 VITALS — BODY MASS INDEX: 44.41 KG/M2 | HEIGHT: 68 IN | OXYGEN SATURATION: 96 % | HEART RATE: 84 BPM | WEIGHT: 293 LBS

## 2022-06-24 PROCEDURE — 99213 OFFICE O/P EST LOW 20 MIN: CPT

## 2022-06-24 NOTE — HISTORY OF PRESENT ILLNESS
[FreeTextEntry1] : 70 yo female PMHx HTN, presents from wound care clinic for UNNA boot change. She has been having bilateral UNNA boots for the past year at [facility]. Her leg swelling has improved but recently she had increased weeping from her legs bilaterally. She is on abx and feels it has improved slightly. She denies any fever or chills. She has been complaint with the lymphedema massage pump.

## 2022-06-24 NOTE — ASSESSMENT
[FreeTextEntry1] : 70 yo female with severe lymphedema and weeping from legs bilaterally\par \par BL UNNA boots applied \par Pt counseled to continue to use lymphedema massage pumps 1 hour per day \par RTC in 1 week for bandage change \par \par A total of 30 minutes was spent with patient and coordinating care\par

## 2022-06-24 NOTE — PHYSICAL EXAM
[Ankle Swelling (On Exam)] : present [Varicose Veins Of Lower Extremities] : not present [] : present [Ankle Swelling Bilaterally] : severe [Skin Ulcer] : ulcer [Alert] : alert [Oriented to Person] : oriented to person [Oriented to Place] : oriented to place [Oriented to Time] : oriented to time [de-identified] : NAD. well appearing  [FreeTextEntry1] : DP and PT non-palpable due to severe edema and lipodermatosclerosis \par several areas on calves bilaterally wemirta

## 2022-06-27 ENCOUNTER — OUTPATIENT (OUTPATIENT)
Dept: OUTPATIENT SERVICES | Facility: HOSPITAL | Age: 72
LOS: 1 days | End: 2022-06-27
Payer: MEDICARE

## 2022-06-27 DIAGNOSIS — L97.509 NON-PRESSURE CHRONIC ULCER OF OTHER PART OF UNSPECIFIED FOOT WITH UNSPECIFIED SEVERITY: ICD-10-CM

## 2022-06-27 DIAGNOSIS — L97.512 NON-PRESSURE CHRONIC ULCER OF OTHER PART OF RIGHT FOOT WITH FAT LAYER EXPOSED: ICD-10-CM

## 2022-06-27 DIAGNOSIS — Q82.0 HEREDITARY LYMPHEDEMA: ICD-10-CM

## 2022-06-27 PROCEDURE — 29580 STRAPPING UNNA BOOT: CPT

## 2022-07-01 ENCOUNTER — APPOINTMENT (OUTPATIENT)
Dept: VASCULAR SURGERY | Facility: CLINIC | Age: 72
End: 2022-07-01

## 2022-07-01 VITALS
OXYGEN SATURATION: 95 % | DIASTOLIC BLOOD PRESSURE: 75 MMHG | BODY MASS INDEX: 44.41 KG/M2 | SYSTOLIC BLOOD PRESSURE: 169 MMHG | HEIGHT: 68 IN | HEART RATE: 83 BPM | WEIGHT: 293 LBS

## 2022-07-01 PROCEDURE — 99213 OFFICE O/P EST LOW 20 MIN: CPT

## 2022-07-01 NOTE — ASSESSMENT
[FreeTextEntry1] : 70 yo female with severe lymphedema and weeping from legs bilaterally\par \par BL UNNA boots applied \par Pt counseled to continue to use lymphedema massage pumps 1 hour per day \par Pt counseled to resume hydrochlorothiazide \par RTC in 1 week for bandage change \par \par A total of 20 minutes was spent with patient and coordinating care\par

## 2022-07-01 NOTE — PHYSICAL EXAM
[Ankle Swelling (On Exam)] : present [] : present [Ankle Swelling Bilaterally] : severe [Skin Ulcer] : ulcer [Alert] : alert [Oriented to Person] : oriented to person [Oriented to Place] : oriented to place [Oriented to Time] : oriented to time [Varicose Veins Of Lower Extremities] : not present [de-identified] : NAD. well appearing  [FreeTextEntry1] : DP and PT non-palpable due to severe edema and lipodermatosclerosis \par several areas on calves bilaterally wemirta

## 2022-07-01 NOTE — HISTORY OF PRESENT ILLNESS
[FreeTextEntry1] : 6/24/22: 72 yo female PMHx HTN, presents from wound care clinic for UNNA boot change. She has been having bilateral UNNA boots for the past year at [facility]. Her leg swelling has improved but recently she had increased weeping from her legs bilaterally. She is on abx and feels it has improved slightly. She denies any fever or chills. She has been complaint with the lymphedema massage pump. \par \par 7/1/22: Pt had UNNA boot change Monday at wound care [place]. She stopped her hydrochlorothiazide a few days ago. She has increased drainage from her legs bilaterally. She is still on abx. She denies any fever or chills. She feels the pump increases her drainage. She has not used the pump in the past 3 days.

## 2022-07-05 ENCOUNTER — OUTPATIENT (OUTPATIENT)
Dept: OUTPATIENT SERVICES | Facility: HOSPITAL | Age: 72
LOS: 1 days | End: 2022-07-05
Payer: MEDICARE

## 2022-07-05 DIAGNOSIS — L97.509 NON-PRESSURE CHRONIC ULCER OF OTHER PART OF UNSPECIFIED FOOT WITH UNSPECIFIED SEVERITY: ICD-10-CM

## 2022-07-05 DIAGNOSIS — L97.512 NON-PRESSURE CHRONIC ULCER OF OTHER PART OF RIGHT FOOT WITH FAT LAYER EXPOSED: ICD-10-CM

## 2022-07-05 PROCEDURE — 29580 STRAPPING UNNA BOOT: CPT

## 2022-07-08 ENCOUNTER — APPOINTMENT (OUTPATIENT)
Dept: VASCULAR SURGERY | Facility: CLINIC | Age: 72
End: 2022-07-08

## 2022-07-08 PROCEDURE — 99213 OFFICE O/P EST LOW 20 MIN: CPT

## 2022-07-08 NOTE — PHYSICAL EXAM
[Ankle Swelling (On Exam)] : present [] : present [Ankle Swelling Bilaterally] : severe [Skin Ulcer] : ulcer [Alert] : alert [Oriented to Person] : oriented to person [Oriented to Place] : oriented to place [Oriented to Time] : oriented to time [Varicose Veins Of Lower Extremities] : not present [de-identified] : NAD. well appearing  [FreeTextEntry1] : DP and PT non-palpable due to severe edema and lipodermatosclerosis \par several areas on calves bilaterally wemirta

## 2022-07-08 NOTE — HISTORY OF PRESENT ILLNESS
[FreeTextEntry1] : 6/24/22: 72 yo female PMHx HTN, presents from wound care clinic for UNNA boot change. She has been having bilateral UNNA boots for the past year at [facility]. Her leg swelling has improved but recently she had increased weeping from her legs bilaterally. She is on abx and feels it has improved slightly. She denies any fever or chills. She has been complaint with the lymphedema massage pump. \par \par 7/1/22: Pt had UNNA boot change Monday at wound care [place]. She stopped her hydrochlorothiazide a few days ago. She has increased drainage from her legs bilaterally. She is still on abx. She denies any fever or chills. She feels the pump increases her drainage. She has not used the pump in the past 3 days. \par \par 7/8/22: Pt doing well since last visit. She had UNNA boot changed Tuesday. She is doing well. She denies any fever or chills.

## 2022-07-12 ENCOUNTER — OUTPATIENT (OUTPATIENT)
Dept: OUTPATIENT SERVICES | Facility: HOSPITAL | Age: 72
LOS: 1 days | End: 2022-07-12
Payer: MEDICARE

## 2022-07-12 DIAGNOSIS — L97.509 NON-PRESSURE CHRONIC ULCER OF OTHER PART OF UNSPECIFIED FOOT WITH UNSPECIFIED SEVERITY: ICD-10-CM

## 2022-07-12 DIAGNOSIS — L97.512 NON-PRESSURE CHRONIC ULCER OF OTHER PART OF RIGHT FOOT WITH FAT LAYER EXPOSED: ICD-10-CM

## 2022-07-12 DIAGNOSIS — Q82.0 HEREDITARY LYMPHEDEMA: ICD-10-CM

## 2022-07-12 PROCEDURE — 29580 STRAPPING UNNA BOOT: CPT | Mod: 50

## 2022-07-15 ENCOUNTER — APPOINTMENT (OUTPATIENT)
Dept: VASCULAR SURGERY | Facility: CLINIC | Age: 72
End: 2022-07-15

## 2022-07-15 VITALS
OXYGEN SATURATION: 96 % | HEART RATE: 81 BPM | BODY MASS INDEX: 44.41 KG/M2 | HEIGHT: 68 IN | WEIGHT: 293 LBS | DIASTOLIC BLOOD PRESSURE: 68 MMHG | SYSTOLIC BLOOD PRESSURE: 167 MMHG

## 2022-07-15 PROCEDURE — 99213 OFFICE O/P EST LOW 20 MIN: CPT

## 2022-07-19 ENCOUNTER — OUTPATIENT (OUTPATIENT)
Dept: OUTPATIENT SERVICES | Facility: HOSPITAL | Age: 72
LOS: 1 days | End: 2022-07-19
Payer: MEDICARE

## 2022-07-19 DIAGNOSIS — L97.512 NON-PRESSURE CHRONIC ULCER OF OTHER PART OF RIGHT FOOT WITH FAT LAYER EXPOSED: ICD-10-CM

## 2022-07-19 DIAGNOSIS — L97.509 NON-PRESSURE CHRONIC ULCER OF OTHER PART OF UNSPECIFIED FOOT WITH UNSPECIFIED SEVERITY: ICD-10-CM

## 2022-07-19 PROCEDURE — 29580 STRAPPING UNNA BOOT: CPT | Mod: 50

## 2022-07-22 ENCOUNTER — APPOINTMENT (OUTPATIENT)
Dept: VASCULAR SURGERY | Facility: CLINIC | Age: 72
End: 2022-07-22

## 2022-07-22 VITALS
HEIGHT: 68 IN | DIASTOLIC BLOOD PRESSURE: 67 MMHG | WEIGHT: 293 LBS | OXYGEN SATURATION: 92 % | HEART RATE: 88 BPM | BODY MASS INDEX: 44.41 KG/M2 | SYSTOLIC BLOOD PRESSURE: 165 MMHG

## 2022-07-22 PROCEDURE — 99213 OFFICE O/P EST LOW 20 MIN: CPT

## 2022-07-22 NOTE — ASSESSMENT
[FreeTextEntry1] : 72 yo female with severe lymphedema and weeping from legs bilaterally\par \par BL UNNA boots applied \par Pt counseled to continue to use lymphedema massage pumps 1 hour per day \par Pt counseled to resume hydrochlorothiazide \par RTC in 1 week for bandage change \par \par A total of 20 minutes was spent with patient and coordinating care\par

## 2022-07-22 NOTE — PHYSICAL EXAM
[Ankle Swelling (On Exam)] : present [] : present [Ankle Swelling Bilaterally] : severe [Skin Ulcer] : ulcer [Alert] : alert [Oriented to Person] : oriented to person [Oriented to Place] : oriented to place [Oriented to Time] : oriented to time [Varicose Veins Of Lower Extremities] : not present [de-identified] : NAD. well appearing  [FreeTextEntry1] : DP and PT non-palpable due to severe edema and lipodermatosclerosis \par several areas on calves bilaterally wemirta

## 2022-07-22 NOTE — HISTORY OF PRESENT ILLNESS
[FreeTextEntry1] : 6/24/22: 70 yo female PMHx HTN, presents from wound care clinic for UNNA boot change. She has been having bilateral UNNA boots for the past year at [facility]. Her leg swelling has improved but recently she had increased weeping from her legs bilaterally. She is on abx and feels it has improved slightly. She denies any fever or chills. She has been complaint with the lymphedema massage pump. \par \par 7/1/22: Pt had UNNA boot change Monday at wound [facility]. She stopped her hydrochlorothiazide a few days ago. She has increased drainage from her legs bilaterally. She is still on abx. She denies any fever or chills. She feels the pump increases her drainage. She has not used the pump in the past 3 days. \par \par 7/8/22: Pt doing well since last visit. She had UNNA boot changed Tuesday. She is doing well. She denies any fever or chills. \par \par 7/22/22:  Pt doing well since last visit. She had UNNA boot changed Tuesday. She is doing well. She denies any fever or chills.

## 2022-07-26 ENCOUNTER — OUTPATIENT (OUTPATIENT)
Dept: OUTPATIENT SERVICES | Facility: HOSPITAL | Age: 72
LOS: 1 days | End: 2022-07-26
Payer: MEDICARE

## 2022-07-26 DIAGNOSIS — Q82.0 HEREDITARY LYMPHEDEMA: ICD-10-CM

## 2022-07-26 DIAGNOSIS — L97.509 NON-PRESSURE CHRONIC ULCER OF OTHER PART OF UNSPECIFIED FOOT WITH UNSPECIFIED SEVERITY: ICD-10-CM

## 2022-07-26 DIAGNOSIS — L97.512 NON-PRESSURE CHRONIC ULCER OF OTHER PART OF RIGHT FOOT WITH FAT LAYER EXPOSED: ICD-10-CM

## 2022-07-26 PROCEDURE — 29580 STRAPPING UNNA BOOT: CPT | Mod: 50

## 2022-07-29 ENCOUNTER — APPOINTMENT (OUTPATIENT)
Dept: VASCULAR SURGERY | Facility: CLINIC | Age: 72
End: 2022-07-29

## 2022-07-29 ENCOUNTER — APPOINTMENT (OUTPATIENT)
Dept: HEPATOLOGY | Facility: CLINIC | Age: 72
End: 2022-07-29

## 2022-07-29 PROCEDURE — 99213 OFFICE O/P EST LOW 20 MIN: CPT

## 2022-07-29 NOTE — PHYSICAL EXAM
[Ankle Swelling (On Exam)] : present [] : present [Ankle Swelling Bilaterally] : severe [Skin Ulcer] : ulcer [Alert] : alert [Oriented to Person] : oriented to person [Oriented to Place] : oriented to place [Oriented to Time] : oriented to time [Varicose Veins Of Lower Extremities] : not present [de-identified] : NAD. well appearing  [FreeTextEntry1] : DP and PT non-palpable due to severe edema and lipodermatosclerosis \par several areas on calves bilaterally wemirta

## 2022-07-29 NOTE — HISTORY OF PRESENT ILLNESS
[FreeTextEntry1] : 6/24/22: 70 yo female PMHx HTN, presents from wound care clinic for UNNA boot change. She has been having bilateral UNNA boots for the past year at [facility]. Her leg swelling has improved but recently she had increased weeping from her legs bilaterally. She is on abx and feels it has improved slightly. She denies any fever or chills. She has been complaint with the lymphedema massage pump. \par \par 7/1/22: Pt had UNNA boot change Monday at wound care [place]. She stopped her hydrochlorothiazide a few days ago. She has increased drainage from her legs bilaterally. She is still on abx. She denies any fever or chills. She feels the pump increases her drainage. She has not used the pump in the past 3 days. \par \par 7/8/22: Pt doing well since last visit. She had UNNA boot changed Tuesday. She is doing well. She denies any fever or chills. \par \par 7/22/22:  Pt doing well since last visit. She had UNNA boot changed Tuesday. She is doing well. She denies any fever or chills. \par \par 7/29/22: Pt doing well since last visit. She had UNNA boot changed Tuesday. She is doing well. She denies any fever or chills.

## 2022-08-02 ENCOUNTER — OUTPATIENT (OUTPATIENT)
Dept: OUTPATIENT SERVICES | Facility: HOSPITAL | Age: 72
LOS: 1 days | End: 2022-08-02
Payer: MEDICARE

## 2022-08-02 DIAGNOSIS — L97.512 NON-PRESSURE CHRONIC ULCER OF OTHER PART OF RIGHT FOOT WITH FAT LAYER EXPOSED: ICD-10-CM

## 2022-08-02 DIAGNOSIS — L97.509 NON-PRESSURE CHRONIC ULCER OF OTHER PART OF UNSPECIFIED FOOT WITH UNSPECIFIED SEVERITY: ICD-10-CM

## 2022-08-02 DIAGNOSIS — Q82.0 HEREDITARY LYMPHEDEMA: ICD-10-CM

## 2022-08-02 PROCEDURE — 99213 OFFICE O/P EST LOW 20 MIN: CPT

## 2022-08-02 PROCEDURE — 29580 STRAPPING UNNA BOOT: CPT

## 2022-08-05 ENCOUNTER — APPOINTMENT (OUTPATIENT)
Dept: VASCULAR SURGERY | Facility: CLINIC | Age: 72
End: 2022-08-05

## 2022-08-05 VITALS
HEIGHT: 68 IN | SYSTOLIC BLOOD PRESSURE: 145 MMHG | WEIGHT: 293 LBS | DIASTOLIC BLOOD PRESSURE: 74 MMHG | HEART RATE: 75 BPM | BODY MASS INDEX: 44.41 KG/M2 | OXYGEN SATURATION: 97 %

## 2022-08-05 PROCEDURE — 99213 OFFICE O/P EST LOW 20 MIN: CPT

## 2022-08-09 ENCOUNTER — OUTPATIENT (OUTPATIENT)
Dept: OUTPATIENT SERVICES | Facility: HOSPITAL | Age: 72
LOS: 1 days | End: 2022-08-09
Payer: MEDICARE

## 2022-08-09 DIAGNOSIS — L97.509 NON-PRESSURE CHRONIC ULCER OF OTHER PART OF UNSPECIFIED FOOT WITH UNSPECIFIED SEVERITY: ICD-10-CM

## 2022-08-09 DIAGNOSIS — Q82.0 HEREDITARY LYMPHEDEMA: ICD-10-CM

## 2022-08-09 PROCEDURE — 99213 OFFICE O/P EST LOW 20 MIN: CPT

## 2022-08-09 PROCEDURE — 29580 STRAPPING UNNA BOOT: CPT | Mod: 50

## 2022-08-12 ENCOUNTER — APPOINTMENT (OUTPATIENT)
Dept: VASCULAR SURGERY | Facility: CLINIC | Age: 72
End: 2022-08-12

## 2022-08-12 VITALS
HEIGHT: 68 IN | HEART RATE: 89 BPM | SYSTOLIC BLOOD PRESSURE: 164 MMHG | OXYGEN SATURATION: 94 % | DIASTOLIC BLOOD PRESSURE: 79 MMHG

## 2022-08-12 PROCEDURE — 99213 OFFICE O/P EST LOW 20 MIN: CPT

## 2022-08-12 NOTE — HISTORY OF PRESENT ILLNESS
[FreeTextEntry1] : 6/24/22: 70 yo female PMHx HTN, presents from wound care clinic for UNNA boot change. She has been having bilateral UNNA boots for the past year at [facility]. Her leg swelling has improved but recently she had increased weeping from her legs bilaterally. She is on abx and feels it has improved slightly. She denies any fever or chills. She has been complaint with the lymphedema massage pump. \par \par 7/1/22: Pt had UNNA boot change Monday at wound care [place]. She stopped her hydrochlorothiazide a few days ago. She has increased drainage from her legs bilaterally. She is still on abx. She denies any fever or chills. She feels the pump increases her drainage. She has not used the pump in the past 3 days. \par \par 7/8/22: Pt doing well since last visit. She had UNNA boot changed Tuesday. She is doing well. She denies any fever or chills. \par \par 7/22/22:  Pt doing well since last visit. She had UNNA boot changed Tuesday. She is doing well. She denies any fever or chills. \par \par 7/29/22: Pt doing well since last visit. She had UNNA boot changed Tuesday. She is doing well. She denies any fever or chills. \par \par 8/12/22: Pt doing well since last visit. She had UNNA boot changed Tuesday. She is doing well. She denies any fever or chills.

## 2022-08-12 NOTE — PHYSICAL EXAM
[Ankle Swelling (On Exam)] : present [] : present [Ankle Swelling Bilaterally] : severe [Skin Ulcer] : ulcer [Alert] : alert [Oriented to Person] : oriented to person [Oriented to Place] : oriented to place [Oriented to Time] : oriented to time [Varicose Veins Of Lower Extremities] : not present [de-identified] : NAD. well appearing  [FreeTextEntry1] : DP and PT non-palpable due to severe edema and lipodermatosclerosis \par several areas on calves bilaterally wemirta

## 2022-08-16 ENCOUNTER — OUTPATIENT (OUTPATIENT)
Dept: OUTPATIENT SERVICES | Facility: HOSPITAL | Age: 72
LOS: 1 days | End: 2022-08-16
Payer: MEDICARE

## 2022-08-16 DIAGNOSIS — Q82.0 HEREDITARY LYMPHEDEMA: ICD-10-CM

## 2022-08-16 DIAGNOSIS — L97.509 NON-PRESSURE CHRONIC ULCER OF OTHER PART OF UNSPECIFIED FOOT WITH UNSPECIFIED SEVERITY: ICD-10-CM

## 2022-08-16 PROCEDURE — 99213 OFFICE O/P EST LOW 20 MIN: CPT

## 2022-08-16 PROCEDURE — 29580 STRAPPING UNNA BOOT: CPT | Mod: 50

## 2022-08-19 ENCOUNTER — APPOINTMENT (OUTPATIENT)
Dept: VASCULAR SURGERY | Facility: CLINIC | Age: 72
End: 2022-08-19

## 2022-08-19 PROCEDURE — 99213 OFFICE O/P EST LOW 20 MIN: CPT

## 2022-08-19 NOTE — HISTORY OF PRESENT ILLNESS
[FreeTextEntry1] : 6/24/22: 72 yo female PMHx HTN, presents from wound care clinic for UNNA boot change. She has been having bilateral UNNA boots for the past year at [facility]. Her leg swelling has improved but recently she had increased weeping from her legs bilaterally. She is on abx and feels it has improved slightly. She denies any fever or chills. She has been complaint with the lymphedema massage pump. \par \par 7/1/22: Pt had UNNA boot change Monday at wound [facility]. She stopped her hydrochlorothiazide a few days ago. She has increased drainage from her legs bilaterally. She is still on abx. She denies any fever or chills. She feels the pump increases her drainage. She has not used the pump in the past 3 days. \par \par 7/8/22: Pt doing well since last visit. She had UNNA boot changed Tuesday. She is doing well. She denies any fever or chills. \par \par 7/22/22:  Pt doing well since last visit. She had UNNA boot changed Tuesday. She is doing well. She denies any fever or chills. \par \par 7/29/22: Pt doing well since last visit. She had UNNA boot changed Tuesday. She is doing well. She denies any fever or chills. \par \par 8/12/22: Pt doing well since last visit. She had UNNA boot changed Tuesday. She is doing well. She denies any fever or chills. \par \par 8/19/22: Pt doing well since last visit. She had UNNA boot changed Tuesday. She is doing well. She denies any fever or chills.

## 2022-08-19 NOTE — PHYSICAL EXAM
[Ankle Swelling (On Exam)] : present [] : present [Ankle Swelling Bilaterally] : severe [Skin Ulcer] : ulcer [Alert] : alert [Oriented to Person] : oriented to person [Oriented to Place] : oriented to place [Oriented to Time] : oriented to time [Varicose Veins Of Lower Extremities] : not present [de-identified] : NAD. well appearing  [FreeTextEntry1] : DP and PT non-palpable due to severe edema and lipodermatosclerosis \par several areas on calves bilaterally wemirta

## 2022-08-23 ENCOUNTER — OUTPATIENT (OUTPATIENT)
Dept: OUTPATIENT SERVICES | Facility: HOSPITAL | Age: 72
LOS: 1 days | End: 2022-08-23
Payer: MEDICARE

## 2022-08-23 DIAGNOSIS — L97.509 NON-PRESSURE CHRONIC ULCER OF OTHER PART OF UNSPECIFIED FOOT WITH UNSPECIFIED SEVERITY: ICD-10-CM

## 2022-08-23 DIAGNOSIS — Q82.0 HEREDITARY LYMPHEDEMA: ICD-10-CM

## 2022-08-23 PROCEDURE — 29580 STRAPPING UNNA BOOT: CPT | Mod: 50

## 2022-08-23 PROCEDURE — 99213 OFFICE O/P EST LOW 20 MIN: CPT

## 2022-08-26 ENCOUNTER — APPOINTMENT (OUTPATIENT)
Dept: VASCULAR SURGERY | Facility: CLINIC | Age: 72
End: 2022-08-26

## 2022-08-26 PROCEDURE — ZZZZZ: CPT

## 2022-08-30 ENCOUNTER — OUTPATIENT (OUTPATIENT)
Dept: OUTPATIENT SERVICES | Facility: HOSPITAL | Age: 72
LOS: 1 days | End: 2022-08-30
Payer: MEDICARE

## 2022-08-30 DIAGNOSIS — Q82.0 HEREDITARY LYMPHEDEMA: ICD-10-CM

## 2022-08-30 DIAGNOSIS — L97.509 NON-PRESSURE CHRONIC ULCER OF OTHER PART OF UNSPECIFIED FOOT WITH UNSPECIFIED SEVERITY: ICD-10-CM

## 2022-08-30 PROCEDURE — 99213 OFFICE O/P EST LOW 20 MIN: CPT

## 2022-08-30 PROCEDURE — 29580 STRAPPING UNNA BOOT: CPT | Mod: 50

## 2022-09-01 RX ORDER — CLOBETASOL PROPIONATE 0.5 MG/G
0.05 CREAM TOPICAL TWICE DAILY
Qty: 1 | Refills: 2 | Status: ACTIVE | COMMUNITY
Start: 2022-09-01 | End: 1900-01-01

## 2022-09-02 ENCOUNTER — APPOINTMENT (OUTPATIENT)
Dept: VASCULAR SURGERY | Facility: CLINIC | Age: 72
End: 2022-09-02

## 2022-09-02 VITALS
HEART RATE: 85 BPM | SYSTOLIC BLOOD PRESSURE: 123 MMHG | RESPIRATION RATE: 16 BRPM | HEIGHT: 68 IN | DIASTOLIC BLOOD PRESSURE: 76 MMHG | BODY MASS INDEX: 44.41 KG/M2 | WEIGHT: 293 LBS | OXYGEN SATURATION: 97 %

## 2022-09-02 PROCEDURE — 99213 OFFICE O/P EST LOW 20 MIN: CPT

## 2022-09-02 NOTE — HISTORY OF PRESENT ILLNESS
[FreeTextEntry1] : 6/24/22: 70 yo female PMHx HTN, presents from wound care clinic for UNNA boot change. She has been having bilateral UNNA boots for the past year at [facility]. Her leg swelling has improved but recently she had increased weeping from her legs bilaterally. She is on abx and feels it has improved slightly. She denies any fever or chills. She has been complaint with the lymphedema massage pump. \par \par 7/1/22: Pt had UNNA boot change Monday at wound care [place]. She stopped her hydrochlorothiazide a few days ago. She has increased drainage from her legs bilaterally. She is still on abx. She denies any fever or chills. She feels the pump increases her drainage. She has not used the pump in the past 3 days. \par \par 7/8/22: Pt doing well since last visit. She had UNNA boot changed Tuesday. She is doing well. She denies any fever or chills. \par \par 7/22/22:  Pt doing well since last visit. She had UNNA boot changed Tuesday. She is doing well. She denies any fever or chills. \par \par 7/29/22: Pt doing well since last visit. She had UNNA boot changed Tuesday. She is doing well. She denies any fever or chills. \par \par 8/12/22: Pt doing well since last visit. She had UNNA boot changed Tuesday. She is doing well. She denies any fever or chills. \par \par 8/19/22: Pt doing well since last visit. She had UNNA boot changed Tuesday. She is doing well. She denies any fever or chills. \par \par 9/2/22: Pt doing well since last visit. She had UNNA boot changed Tuesday. She was having pain in her right anterior shin but it has resolved. She is doing well. She denies any fever or chills.

## 2022-09-02 NOTE — PHYSICAL EXAM
[Ankle Swelling (On Exam)] : present [Varicose Veins Of Lower Extremities] : not present [] : present [Ankle Swelling Bilaterally] : severe [Skin Ulcer] : ulcer [Alert] : alert [Oriented to Person] : oriented to person [Oriented to Place] : oriented to place [Oriented to Time] : oriented to time [de-identified] : NAD. well appearing  [FreeTextEntry1] : DP and PT non-palpable due to severe edema and lipodermatosclerosis \par several areas on calves bilaterally wemirta

## 2022-09-06 ENCOUNTER — OUTPATIENT (OUTPATIENT)
Dept: OUTPATIENT SERVICES | Facility: HOSPITAL | Age: 72
LOS: 1 days | End: 2022-09-06
Payer: MEDICARE

## 2022-09-06 DIAGNOSIS — Q82.0 HEREDITARY LYMPHEDEMA: ICD-10-CM

## 2022-09-06 DIAGNOSIS — L97.509 NON-PRESSURE CHRONIC ULCER OF OTHER PART OF UNSPECIFIED FOOT WITH UNSPECIFIED SEVERITY: ICD-10-CM

## 2022-09-06 PROCEDURE — 99213 OFFICE O/P EST LOW 20 MIN: CPT

## 2022-09-06 PROCEDURE — 29580 STRAPPING UNNA BOOT: CPT | Mod: 50

## 2022-09-09 ENCOUNTER — APPOINTMENT (OUTPATIENT)
Dept: VASCULAR SURGERY | Facility: CLINIC | Age: 72
End: 2022-09-09

## 2022-09-09 ENCOUNTER — OUTPATIENT (OUTPATIENT)
Dept: OUTPATIENT SERVICES | Facility: HOSPITAL | Age: 72
LOS: 1 days | End: 2022-09-09
Payer: MEDICARE

## 2022-09-09 DIAGNOSIS — L97.509 NON-PRESSURE CHRONIC ULCER OF OTHER PART OF UNSPECIFIED FOOT WITH UNSPECIFIED SEVERITY: ICD-10-CM

## 2022-09-09 DIAGNOSIS — Q82.0 HEREDITARY LYMPHEDEMA: ICD-10-CM

## 2022-09-09 PROCEDURE — 29580 STRAPPING UNNA BOOT: CPT | Mod: 50

## 2022-09-13 ENCOUNTER — OUTPATIENT (OUTPATIENT)
Dept: OUTPATIENT SERVICES | Facility: HOSPITAL | Age: 72
LOS: 1 days | End: 2022-09-13
Payer: MEDICARE

## 2022-09-13 DIAGNOSIS — L97.509 NON-PRESSURE CHRONIC ULCER OF OTHER PART OF UNSPECIFIED FOOT WITH UNSPECIFIED SEVERITY: ICD-10-CM

## 2022-09-13 DIAGNOSIS — Q82.0 HEREDITARY LYMPHEDEMA: ICD-10-CM

## 2022-09-13 PROCEDURE — 99213 OFFICE O/P EST LOW 20 MIN: CPT

## 2022-09-13 PROCEDURE — 29580 STRAPPING UNNA BOOT: CPT | Mod: 50

## 2022-09-16 ENCOUNTER — APPOINTMENT (OUTPATIENT)
Dept: VASCULAR SURGERY | Facility: CLINIC | Age: 72
End: 2022-09-16

## 2022-09-16 VITALS
RESPIRATION RATE: 16 BRPM | BODY MASS INDEX: 44.41 KG/M2 | HEART RATE: 82 BPM | DIASTOLIC BLOOD PRESSURE: 67 MMHG | OXYGEN SATURATION: 97 % | WEIGHT: 293 LBS | SYSTOLIC BLOOD PRESSURE: 124 MMHG | HEIGHT: 68 IN

## 2022-09-16 PROCEDURE — ZZZZZ: CPT

## 2022-09-20 ENCOUNTER — OUTPATIENT (OUTPATIENT)
Dept: OUTPATIENT SERVICES | Facility: HOSPITAL | Age: 72
LOS: 1 days | End: 2022-09-20
Payer: MEDICARE

## 2022-09-20 DIAGNOSIS — L97.509 NON-PRESSURE CHRONIC ULCER OF OTHER PART OF UNSPECIFIED FOOT WITH UNSPECIFIED SEVERITY: ICD-10-CM

## 2022-09-20 DIAGNOSIS — Q82.0 HEREDITARY LYMPHEDEMA: ICD-10-CM

## 2022-09-20 PROCEDURE — 29580 STRAPPING UNNA BOOT: CPT | Mod: 50

## 2022-09-20 PROCEDURE — 99213 OFFICE O/P EST LOW 20 MIN: CPT

## 2022-09-23 ENCOUNTER — APPOINTMENT (OUTPATIENT)
Dept: VASCULAR SURGERY | Facility: CLINIC | Age: 72
End: 2022-09-23

## 2022-09-23 PROCEDURE — 99213 OFFICE O/P EST LOW 20 MIN: CPT

## 2022-09-23 NOTE — HISTORY OF PRESENT ILLNESS
[FreeTextEntry1] : 6/24/22: 70 yo female PMHx HTN, presents from wound care clinic for UNNA boot change. She has been having bilateral UNNA boots for the past year at [facility]. Her leg swelling has improved but recently she had increased weeping from her legs bilaterally. She is on abx and feels it has improved slightly. She denies any fever or chills. She has been complaint with the lymphedema massage pump. \par \par 7/1/22: Pt had UNNA boot change Monday at wound care [place]. She stopped her hydrochlorothiazide a few days ago. She has increased drainage from her legs bilaterally. She is still on abx. She denies any fever or chills. She feels the pump increases her drainage. She has not used the pump in the past 3 days. \par \par 7/8/22: Pt doing well since last visit. She had UNNA boot changed Tuesday. She is doing well. She denies any fever or chills. \par \par 7/22/22:  Pt doing well since last visit. She had UNNA boot changed Tuesday. She is doing well. She denies any fever or chills. \par \par 7/29/22: Pt doing well since last visit. She had UNNA boot changed Tuesday. She is doing well. She denies any fever or chills. \par \par 8/12/22: Pt doing well since last visit. She had UNNA boot changed Tuesday. She is doing well. She denies any fever or chills. \par \par 8/19/22: Pt doing well since last visit. She had UNNA boot changed Tuesday. She is doing well. She denies any fever or chills. \par \par 9/2/22: Pt doing well since last visit. She had UNNA boot changed Tuesday. She was having pain in her right anterior shin but it has resolved. She is doing well. She denies any fever or chills. \par \par 9/23/22: Pt doing well since last visit. She had UNNA boot changed Tuesday. She was having pain in her right anterior shin but it has resolved. She is doing well. She denies any fever or chills.

## 2022-09-23 NOTE — ASSESSMENT
[FreeTextEntry1] : 72 yo female with severe lymphedema and weeping from legs bilaterally\par \par Pt did not want UNNA boots today, Will take a week off of UNNA boot treatment \par Pt counseled to continue to use lymphedema massage pumps 1 hour per day \par RTC in 1 week to monitor wounds \par \par A total of 20 minutes was spent with patient and coordinating care\par

## 2022-09-23 NOTE — PHYSICAL EXAM
[Ankle Swelling (On Exam)] : present [] : present [Ankle Swelling Bilaterally] : severe [Skin Ulcer] : ulcer [Alert] : alert [Oriented to Person] : oriented to person [Oriented to Place] : oriented to place [Oriented to Time] : oriented to time [Varicose Veins Of Lower Extremities] : not present [de-identified] : NAD. well appearing  [FreeTextEntry1] : DP and PT non-palpable due to severe edema and lipodermatosclerosis \par several areas on calves bilaterally wemirta

## 2022-09-24 ENCOUNTER — INPATIENT (INPATIENT)
Facility: HOSPITAL | Age: 72
LOS: 3 days | Discharge: NURSING, REHAB OR LONG-TERM CARE | DRG: 683 | End: 2022-09-28
Attending: STUDENT IN AN ORGANIZED HEALTH CARE EDUCATION/TRAINING PROGRAM | Admitting: INTERNAL MEDICINE
Payer: MEDICARE

## 2022-09-24 VITALS
OXYGEN SATURATION: 99 % | HEIGHT: 68 IN | HEART RATE: 98 BPM | SYSTOLIC BLOOD PRESSURE: 156 MMHG | DIASTOLIC BLOOD PRESSURE: 63 MMHG | RESPIRATION RATE: 20 BRPM | TEMPERATURE: 98 F | WEIGHT: 279.99 LBS

## 2022-09-24 DIAGNOSIS — E87.5 HYPERKALEMIA: ICD-10-CM

## 2022-09-24 LAB
ALBUMIN SERPL ELPH-MCNC: 3.4 G/DL — SIGNIFICANT CHANGE UP (ref 3.3–5)
ALBUMIN SERPL ELPH-MCNC: 3.4 G/DL — SIGNIFICANT CHANGE UP (ref 3.3–5)
ALP SERPL-CCNC: 102 U/L — SIGNIFICANT CHANGE UP (ref 40–120)
ALP SERPL-CCNC: 104 U/L — SIGNIFICANT CHANGE UP (ref 40–120)
ALT FLD-CCNC: 45 U/L — SIGNIFICANT CHANGE UP (ref 12–78)
ALT FLD-CCNC: 46 U/L — SIGNIFICANT CHANGE UP (ref 12–78)
ANION GAP SERPL CALC-SCNC: 12 MMOL/L — SIGNIFICANT CHANGE UP (ref 5–17)
ANION GAP SERPL CALC-SCNC: 16 MMOL/L — SIGNIFICANT CHANGE UP (ref 5–17)
ANION GAP SERPL CALC-SCNC: 17 MMOL/L — SIGNIFICANT CHANGE UP (ref 5–17)
ANION GAP SERPL CALC-SCNC: 9 MMOL/L — SIGNIFICANT CHANGE UP (ref 5–17)
APPEARANCE UR: ABNORMAL
APTT BLD: 30.8 SEC — SIGNIFICANT CHANGE UP (ref 27.5–35.5)
AST SERPL-CCNC: 43 U/L — HIGH (ref 15–37)
AST SERPL-CCNC: 51 U/L — HIGH (ref 15–37)
BASE EXCESS BLDA CALC-SCNC: -15.5 MMOL/L — LOW (ref -2–3)
BASOPHILS # BLD AUTO: 0.04 K/UL — SIGNIFICANT CHANGE UP (ref 0–0.2)
BASOPHILS NFR BLD AUTO: 0.2 % — SIGNIFICANT CHANGE UP (ref 0–2)
BILIRUB SERPL-MCNC: 1.5 MG/DL — HIGH (ref 0.2–1.2)
BILIRUB SERPL-MCNC: 1.6 MG/DL — HIGH (ref 0.2–1.2)
BILIRUB UR-MCNC: NEGATIVE — SIGNIFICANT CHANGE UP
BUN SERPL-MCNC: 77 MG/DL — HIGH (ref 7–23)
BUN SERPL-MCNC: 79 MG/DL — HIGH (ref 7–23)
BUN SERPL-MCNC: 81 MG/DL — HIGH (ref 7–23)
BUN SERPL-MCNC: 82 MG/DL — HIGH (ref 7–23)
CALCIUM SERPL-MCNC: 11.2 MG/DL — HIGH (ref 8.5–10.1)
CALCIUM SERPL-MCNC: 12 MG/DL — HIGH (ref 8.5–10.1)
CALCIUM SERPL-MCNC: 12.1 MG/DL — HIGH (ref 8.5–10.1)
CALCIUM SERPL-MCNC: 12.1 MG/DL — HIGH (ref 8.5–10.1)
CHLORIDE SERPL-SCNC: 93 MMOL/L — LOW (ref 96–108)
CHLORIDE SERPL-SCNC: 94 MMOL/L — LOW (ref 96–108)
CHLORIDE SERPL-SCNC: 97 MMOL/L — SIGNIFICANT CHANGE UP (ref 96–108)
CHLORIDE SERPL-SCNC: 98 MMOL/L — SIGNIFICANT CHANGE UP (ref 96–108)
CO2 BLDA-SCNC: 9 MMOL/L — LOW (ref 19–24)
CO2 SERPL-SCNC: 10 MMOL/L — CRITICAL LOW (ref 22–31)
CO2 SERPL-SCNC: 11 MMOL/L — LOW (ref 22–31)
CO2 SERPL-SCNC: 14 MMOL/L — LOW (ref 22–31)
CO2 SERPL-SCNC: 16 MMOL/L — LOW (ref 22–31)
COLOR SPEC: YELLOW — SIGNIFICANT CHANGE UP
CREAT SERPL-MCNC: 2.78 MG/DL — HIGH (ref 0.5–1.3)
CREAT SERPL-MCNC: 3.19 MG/DL — HIGH (ref 0.5–1.3)
CREAT SERPL-MCNC: 3.24 MG/DL — HIGH (ref 0.5–1.3)
CREAT SERPL-MCNC: 3.32 MG/DL — HIGH (ref 0.5–1.3)
DIFF PNL FLD: ABNORMAL
EGFR: 14 ML/MIN/1.73M2 — LOW
EGFR: 15 ML/MIN/1.73M2 — LOW
EGFR: 15 ML/MIN/1.73M2 — LOW
EGFR: 18 ML/MIN/1.73M2 — LOW
EOSINOPHIL # BLD AUTO: 0.02 K/UL — SIGNIFICANT CHANGE UP (ref 0–0.5)
EOSINOPHIL NFR BLD AUTO: 0.1 % — SIGNIFICANT CHANGE UP (ref 0–6)
GAS PNL BLDA: SIGNIFICANT CHANGE UP
GLUCOSE SERPL-MCNC: 194 MG/DL — HIGH (ref 70–99)
GLUCOSE SERPL-MCNC: 232 MG/DL — HIGH (ref 70–99)
GLUCOSE SERPL-MCNC: 234 MG/DL — HIGH (ref 70–99)
GLUCOSE SERPL-MCNC: 236 MG/DL — HIGH (ref 70–99)
GLUCOSE UR QL: NEGATIVE — SIGNIFICANT CHANGE UP
HCO3 BLDA-SCNC: 9 MMOL/L — CRITICAL LOW (ref 21–28)
HCT VFR BLD CALC: 49.7 % — HIGH (ref 34.5–45)
HGB BLD-MCNC: 16.8 G/DL — HIGH (ref 11.5–15.5)
IMM GRANULOCYTES NFR BLD AUTO: 1 % — HIGH (ref 0–0.9)
INR BLD: 1.27 RATIO — HIGH (ref 0.88–1.16)
KETONES UR-MCNC: ABNORMAL
LEUKOCYTE ESTERASE UR-ACNC: ABNORMAL
LYMPHOCYTES # BLD AUTO: 0.99 K/UL — LOW (ref 1–3.3)
LYMPHOCYTES # BLD AUTO: 5.7 % — LOW (ref 13–44)
MCHC RBC-ENTMCNC: 28.1 PG — SIGNIFICANT CHANGE UP (ref 27–34)
MCHC RBC-ENTMCNC: 33.8 GM/DL — SIGNIFICANT CHANGE UP (ref 32–36)
MCV RBC AUTO: 83.1 FL — SIGNIFICANT CHANGE UP (ref 80–100)
MONOCYTES # BLD AUTO: 1.13 K/UL — HIGH (ref 0–0.9)
MONOCYTES NFR BLD AUTO: 6.5 % — SIGNIFICANT CHANGE UP (ref 2–14)
NEUTROPHILS # BLD AUTO: 15.15 K/UL — HIGH (ref 1.8–7.4)
NEUTROPHILS NFR BLD AUTO: 86.5 % — HIGH (ref 43–77)
NITRITE UR-MCNC: POSITIVE
NT-PROBNP SERPL-SCNC: 473 PG/ML — HIGH (ref 0–125)
PCO2 BLDA: 18 MMHG — LOW (ref 32–35)
PH BLDA: 7.29 — LOW (ref 7.35–7.45)
PH UR: 8 — SIGNIFICANT CHANGE UP (ref 5–8)
PLATELET # BLD AUTO: 290 K/UL — SIGNIFICANT CHANGE UP (ref 150–400)
PO2 BLDA: 129 MMHG — HIGH (ref 83–108)
POTASSIUM SERPL-MCNC: 6.8 MMOL/L — CRITICAL HIGH (ref 3.5–5.3)
POTASSIUM SERPL-MCNC: 7.9 MMOL/L — CRITICAL HIGH (ref 3.5–5.3)
POTASSIUM SERPL-MCNC: 8.3 MMOL/L — CRITICAL HIGH (ref 3.5–5.3)
POTASSIUM SERPL-MCNC: 8.4 MMOL/L — CRITICAL HIGH (ref 3.5–5.3)
POTASSIUM SERPL-SCNC: 6.8 MMOL/L — CRITICAL HIGH (ref 3.5–5.3)
POTASSIUM SERPL-SCNC: 7.9 MMOL/L — CRITICAL HIGH (ref 3.5–5.3)
POTASSIUM SERPL-SCNC: 8.3 MMOL/L — CRITICAL HIGH (ref 3.5–5.3)
POTASSIUM SERPL-SCNC: 8.4 MMOL/L — CRITICAL HIGH (ref 3.5–5.3)
PROT SERPL-MCNC: 8.1 GM/DL — SIGNIFICANT CHANGE UP (ref 6–8.3)
PROT SERPL-MCNC: 8.2 GM/DL — SIGNIFICANT CHANGE UP (ref 6–8.3)
PROT UR-MCNC: 30 MG/DL
PROTHROM AB SERPL-ACNC: 14.8 SEC — HIGH (ref 10.5–13.4)
RBC # BLD: 5.98 M/UL — HIGH (ref 3.8–5.2)
RBC # FLD: 15.8 % — HIGH (ref 10.3–14.5)
SAO2 % BLDA: 100 % — SIGNIFICANT CHANGE UP
SODIUM SERPL-SCNC: 120 MMOL/L — CRITICAL LOW (ref 135–145)
SODIUM SERPL-SCNC: 121 MMOL/L — LOW (ref 135–145)
SODIUM SERPL-SCNC: 123 MMOL/L — LOW (ref 135–145)
SODIUM SERPL-SCNC: 123 MMOL/L — LOW (ref 135–145)
SP GR SPEC: 1.01 — SIGNIFICANT CHANGE UP (ref 1.01–1.02)
TROPONIN I, HIGH SENSITIVITY RESULT: 134.51 NG/L — HIGH
UROBILINOGEN FLD QL: NEGATIVE — SIGNIFICANT CHANGE UP
WBC # BLD: 17.5 K/UL — HIGH (ref 3.8–10.5)
WBC # FLD AUTO: 17.5 K/UL — HIGH (ref 3.8–10.5)

## 2022-09-24 PROCEDURE — 87086 URINE CULTURE/COLONY COUNT: CPT

## 2022-09-24 PROCEDURE — 82803 BLOOD GASES ANY COMBINATION: CPT

## 2022-09-24 PROCEDURE — 87186 SC STD MICRODIL/AGAR DIL: CPT

## 2022-09-24 PROCEDURE — 81001 URINALYSIS AUTO W/SCOPE: CPT

## 2022-09-24 PROCEDURE — 99285 EMERGENCY DEPT VISIT HI MDM: CPT

## 2022-09-24 PROCEDURE — 97116 GAIT TRAINING THERAPY: CPT | Mod: GP

## 2022-09-24 PROCEDURE — 86803 HEPATITIS C AB TEST: CPT

## 2022-09-24 PROCEDURE — 74176 CT ABD & PELVIS W/O CONTRAST: CPT | Mod: 26

## 2022-09-24 PROCEDURE — 93970 EXTREMITY STUDY: CPT | Mod: 26

## 2022-09-24 PROCEDURE — 87077 CULTURE AEROBIC IDENTIFY: CPT

## 2022-09-24 PROCEDURE — 74176 CT ABD & PELVIS W/O CONTRAST: CPT | Mod: MB

## 2022-09-24 PROCEDURE — 85027 COMPLETE CBC AUTOMATED: CPT

## 2022-09-24 PROCEDURE — 80048 BASIC METABOLIC PNL TOTAL CA: CPT

## 2022-09-24 PROCEDURE — 83735 ASSAY OF MAGNESIUM: CPT

## 2022-09-24 PROCEDURE — 97530 THERAPEUTIC ACTIVITIES: CPT | Mod: GP

## 2022-09-24 PROCEDURE — 82962 GLUCOSE BLOOD TEST: CPT

## 2022-09-24 PROCEDURE — 71045 X-RAY EXAM CHEST 1 VIEW: CPT | Mod: 26

## 2022-09-24 PROCEDURE — 84550 ASSAY OF BLOOD/URIC ACID: CPT

## 2022-09-24 PROCEDURE — 36600 WITHDRAWAL OF ARTERIAL BLOOD: CPT

## 2022-09-24 PROCEDURE — 82550 ASSAY OF CK (CPK): CPT

## 2022-09-24 PROCEDURE — 84156 ASSAY OF PROTEIN URINE: CPT

## 2022-09-24 PROCEDURE — 99291 CRITICAL CARE FIRST HOUR: CPT

## 2022-09-24 PROCEDURE — 93010 ELECTROCARDIOGRAM REPORT: CPT

## 2022-09-24 PROCEDURE — 36415 COLL VENOUS BLD VENIPUNCTURE: CPT

## 2022-09-24 PROCEDURE — 99292 CRITICAL CARE ADDL 30 MIN: CPT

## 2022-09-24 PROCEDURE — 87635 SARS-COV-2 COVID-19 AMP PRB: CPT

## 2022-09-24 PROCEDURE — 83036 HEMOGLOBIN GLYCOSYLATED A1C: CPT

## 2022-09-24 PROCEDURE — 87040 BLOOD CULTURE FOR BACTERIA: CPT

## 2022-09-24 PROCEDURE — 86334 IMMUNOFIX E-PHORESIS SERUM: CPT

## 2022-09-24 PROCEDURE — 84100 ASSAY OF PHOSPHORUS: CPT

## 2022-09-24 PROCEDURE — 93970 EXTREMITY STUDY: CPT

## 2022-09-24 PROCEDURE — 83521 IG LIGHT CHAINS FREE EACH: CPT

## 2022-09-24 PROCEDURE — 82570 ASSAY OF URINE CREATININE: CPT

## 2022-09-24 PROCEDURE — 80053 COMPREHEN METABOLIC PANEL: CPT

## 2022-09-24 RX ORDER — ALBUTEROL 90 UG/1
10 AEROSOL, METERED ORAL ONCE
Refills: 0 | Status: DISCONTINUED | OUTPATIENT
Start: 2022-09-24 | End: 2022-09-24

## 2022-09-24 RX ORDER — DEXTROSE 50 % IN WATER 50 %
50 SYRINGE (ML) INTRAVENOUS ONCE
Refills: 0 | Status: COMPLETED | OUTPATIENT
Start: 2022-09-24 | End: 2022-09-24

## 2022-09-24 RX ORDER — HEPARIN SODIUM 5000 [USP'U]/ML
5000 INJECTION INTRAVENOUS; SUBCUTANEOUS EVERY 8 HOURS
Refills: 0 | Status: DISCONTINUED | OUTPATIENT
Start: 2022-09-24 | End: 2022-09-28

## 2022-09-24 RX ORDER — CIPROFLOXACIN LACTATE 400MG/40ML
200 VIAL (ML) INTRAVENOUS EVERY 12 HOURS
Refills: 0 | Status: DISCONTINUED | OUTPATIENT
Start: 2022-09-24 | End: 2022-09-28

## 2022-09-24 RX ORDER — INSULIN LISPRO 100/ML
VIAL (ML) SUBCUTANEOUS
Refills: 0 | Status: DISCONTINUED | OUTPATIENT
Start: 2022-09-24 | End: 2022-09-28

## 2022-09-24 RX ORDER — INFLUENZA VIRUS VACCINE 15; 15; 15; 15 UG/.5ML; UG/.5ML; UG/.5ML; UG/.5ML
0.7 SUSPENSION INTRAMUSCULAR ONCE
Refills: 0 | Status: DISCONTINUED | OUTPATIENT
Start: 2022-09-24 | End: 2022-09-28

## 2022-09-24 RX ORDER — SODIUM ZIRCONIUM CYCLOSILICATE 10 G/10G
10 POWDER, FOR SUSPENSION ORAL ONCE
Refills: 0 | Status: COMPLETED | OUTPATIENT
Start: 2022-09-24 | End: 2022-09-24

## 2022-09-24 RX ORDER — INSULIN GLARGINE 100 [IU]/ML
10 INJECTION, SOLUTION SUBCUTANEOUS AT BEDTIME
Refills: 0 | Status: DISCONTINUED | OUTPATIENT
Start: 2022-09-24 | End: 2022-09-24

## 2022-09-24 RX ORDER — SODIUM CHLORIDE 9 MG/ML
1000 INJECTION INTRAMUSCULAR; INTRAVENOUS; SUBCUTANEOUS
Refills: 0 | Status: DISCONTINUED | OUTPATIENT
Start: 2022-09-24 | End: 2022-09-24

## 2022-09-24 RX ORDER — DEXTROSE 50 % IN WATER 50 %
15 SYRINGE (ML) INTRAVENOUS ONCE
Refills: 0 | Status: DISCONTINUED | OUTPATIENT
Start: 2022-09-24 | End: 2022-09-28

## 2022-09-24 RX ORDER — DEXTROSE 50 % IN WATER 50 %
12.5 SYRINGE (ML) INTRAVENOUS ONCE
Refills: 0 | Status: DISCONTINUED | OUTPATIENT
Start: 2022-09-24 | End: 2022-09-28

## 2022-09-24 RX ORDER — SODIUM CHLORIDE 9 MG/ML
1000 INJECTION INTRAMUSCULAR; INTRAVENOUS; SUBCUTANEOUS ONCE
Refills: 0 | Status: COMPLETED | OUTPATIENT
Start: 2022-09-24 | End: 2022-09-24

## 2022-09-24 RX ORDER — INSULIN HUMAN 100 [IU]/ML
10 INJECTION, SOLUTION SUBCUTANEOUS ONCE
Refills: 0 | Status: COMPLETED | OUTPATIENT
Start: 2022-09-24 | End: 2022-09-24

## 2022-09-24 RX ORDER — DEXTROSE 50 % IN WATER 50 %
25 SYRINGE (ML) INTRAVENOUS ONCE
Refills: 0 | Status: DISCONTINUED | OUTPATIENT
Start: 2022-09-24 | End: 2022-09-28

## 2022-09-24 RX ORDER — CHLORHEXIDINE GLUCONATE 213 G/1000ML
1 SOLUTION TOPICAL
Refills: 0 | Status: DISCONTINUED | OUTPATIENT
Start: 2022-09-24 | End: 2022-09-28

## 2022-09-24 RX ORDER — SODIUM CHLORIDE 9 MG/ML
1000 INJECTION, SOLUTION INTRAVENOUS
Refills: 0 | Status: DISCONTINUED | OUTPATIENT
Start: 2022-09-24 | End: 2022-09-28

## 2022-09-24 RX ORDER — CALCIUM GLUCONATE 100 MG/ML
1 VIAL (ML) INTRAVENOUS ONCE
Refills: 0 | Status: COMPLETED | OUTPATIENT
Start: 2022-09-24 | End: 2022-09-24

## 2022-09-24 RX ORDER — SODIUM BICARBONATE 1 MEQ/ML
0.09 SYRINGE (ML) INTRAVENOUS
Qty: 150 | Refills: 0 | Status: DISCONTINUED | OUTPATIENT
Start: 2022-09-24 | End: 2022-09-25

## 2022-09-24 RX ORDER — INSULIN LISPRO 100/ML
10 VIAL (ML) SUBCUTANEOUS ONCE
Refills: 0 | Status: COMPLETED | OUTPATIENT
Start: 2022-09-24 | End: 2022-09-24

## 2022-09-24 RX ORDER — ACETAMINOPHEN 500 MG
650 TABLET ORAL ONCE
Refills: 0 | Status: COMPLETED | OUTPATIENT
Start: 2022-09-24 | End: 2022-09-24

## 2022-09-24 RX ORDER — GLUCAGON INJECTION, SOLUTION 0.5 MG/.1ML
1 INJECTION, SOLUTION SUBCUTANEOUS ONCE
Refills: 0 | Status: DISCONTINUED | OUTPATIENT
Start: 2022-09-24 | End: 2022-09-28

## 2022-09-24 RX ADMIN — HEPARIN SODIUM 5000 UNIT(S): 5000 INJECTION INTRAVENOUS; SUBCUTANEOUS at 16:01

## 2022-09-24 RX ADMIN — Medication 10 UNIT(S): at 23:17

## 2022-09-24 RX ADMIN — SODIUM ZIRCONIUM CYCLOSILICATE 10 GRAM(S): 10 POWDER, FOR SUSPENSION ORAL at 12:39

## 2022-09-24 RX ADMIN — Medication 50 GRAM(S): at 08:59

## 2022-09-24 RX ADMIN — SODIUM CHLORIDE 1000 MILLILITER(S): 9 INJECTION INTRAMUSCULAR; INTRAVENOUS; SUBCUTANEOUS at 09:25

## 2022-09-24 RX ADMIN — Medication 650 MILLIGRAM(S): at 11:09

## 2022-09-24 RX ADMIN — INSULIN HUMAN 10 UNIT(S): 100 INJECTION, SOLUTION SUBCUTANEOUS at 08:44

## 2022-09-24 RX ADMIN — Medication 75 MEQ/KG/HR: at 23:19

## 2022-09-24 RX ADMIN — HEPARIN SODIUM 5000 UNIT(S): 5000 INJECTION INTRAVENOUS; SUBCUTANEOUS at 22:03

## 2022-09-24 RX ADMIN — Medication 75 MEQ/KG/HR: at 12:02

## 2022-09-24 RX ADMIN — Medication 50 MILLILITER(S): at 08:45

## 2022-09-24 RX ADMIN — Medication 100 MILLIGRAM(S): at 22:05

## 2022-09-24 RX ADMIN — CHLORHEXIDINE GLUCONATE 1 APPLICATION(S): 213 SOLUTION TOPICAL at 17:50

## 2022-09-24 RX ADMIN — Medication 100 MILLIGRAM(S): at 17:50

## 2022-09-24 RX ADMIN — Medication 2: at 17:55

## 2022-09-24 RX ADMIN — SODIUM ZIRCONIUM CYCLOSILICATE 10 GRAM(S): 10 POWDER, FOR SUSPENSION ORAL at 23:18

## 2022-09-24 RX ADMIN — Medication 50 MILLILITER(S): at 23:18

## 2022-09-24 RX ADMIN — Medication 2: at 22:26

## 2022-09-24 NOTE — ED ADULT TRIAGE NOTE - CHIEF COMPLAINT QUOTE
Lung CA patient with chronic SOB and B/L LE edema. Seen by  wound care center. Noted worsened leg edema with discharge, saw wound care MD yesterday. States in the middle of the night she was unable to ambulate due to B/L leg pains, normally ambulates with cane. VS stable at triage, labored breathing noted but patient states it is baseline.

## 2022-09-24 NOTE — H&P ADULT - NSHPREVIEWOFSYSTEMS_GEN_ALL_CORE
Review of Systems:    CONSTITUTIONAL: No fever, chills, or fatigue.  EYES: No eye pain, visual disturbances, or discharge.  ENMT:  No difficulty hearing, tinnitus, or vertigo. No sinus or throat pain.  NECK: No pain or stiffness.  RESPIRATORY: No shortness of breath, cough, or wheezing.  CARDIOVASCULAR: No chest pain, palpitations, dizziness, or leg swelling.  GASTROINTESTINAL: No abdominal or epigastric pain. No nausea, vomiting,  diarrhea, or constipation. No hematemesis, melena, or hematochezia.  GENITOURINARY: No dysuria, frequency, hematuria, or incontinence.  NEUROLOGICAL: No headaches, memory loss, loss of strength, numbness, or tremors.  SKIN: No itching, burning, rashes, or lesions.  MUSCULOSKELETAL: No joint pain, BLLE weakness/ swelling; No muscle, back, or extremity pain.  PSYCHIATRIC: No depression, anxiety, mood swings, or difficulty sleeping.

## 2022-09-24 NOTE — H&P ADULT - NSHPLABSRESULTS_GEN_ALL_CORE
< from: Xray Chest 1 View-PORTABLE IMMEDIATE (Xray Chest 1 View-PORTABLE IMMEDIATE .) (09.24.22 @ 07:40) >    C: [id number] EXAM:  XR CHEST PORTABLE IMMED 1V                          PROCEDURE DATE:  09/24/2022          INTERPRETATION:  AP chest.    CLINICAL INDICATION: Leg swelling. Respiratory abnormality.    IMPRESSION: There is a Mediport appropriatelypositioned. The heart is   normal in size. Lungs are clear. There is degenerative change of the   bones.    --- End of Report ---            NADEGE WATT MD; Attending Radiologist  This document has been electronically signed. Sep 24 2022  9:19AM    < end of copied text >

## 2022-09-24 NOTE — H&P ADULT - HISTORY OF PRESENT ILLNESS
70 yo Female Pmhx HTN, DM2, Lung Ca/Colon Ca (9 years ago) presenting to  for difficulty walking 2/2 BLLE edema 2/2 chronic lymphedema.

## 2022-09-24 NOTE — PATIENT PROFILE ADULT - FALL HARM RISK - HARM RISK INTERVENTIONS

## 2022-09-24 NOTE — H&P ADULT - ASSESSMENT
70 yo Female Pmhx HTN, DM2, Lung Ca/Colon Ca (9 years ago) presenting to  for difficulty walking 2/2 BLLE edema 2/2 chronic lymphedema.    1. Hyperkalemia  2. Acute Renal Failure  3. Hyponatremia  4. Lymphedema    Neuro:  - Avoid Neuro Deliriogenic / sedative medications    CV:  - Maintain MAP >65    Pulm:  - Supplemental oxygen as needed to maintain SPO2>94%                  GI:  - Cont IV Protonix 40mg IVP Daily,   - NPO    Renal:  - Continue to monitor Bun/Cr and UOP, insert rasmussen, pt states sh has not urinated since yesterday  - Replacing electrolytes as needed with Goal K> 4, PO> 3, Mg> 2               - Strict I&O's  - Avoid Nephro toxic medication  - Renally dose meds  - Cr 6/10 0.71 now 3.2, Nephrology consulted   - K: 8.4 s/p insulin/dextrose, Calcium gluconate in ED. Repeat K: 8.3 STAT Insulin/Dextrose, Albuterol, Lokelma  - ABG showing HCO3 9: Bicarb 150meq gtt at 75  - CT A/P    Heme:  - Heparin for DVT prophylaxis    ID:  - WBC 17.50,  remains afebrile with no antipyretics administered   - Microbiology and Radiology reviewed   - trend CBC with diff, CMP  and fever curve    Endo:  - ISS for aggressive glycemic control to limit FS glucose to < 180mg/dl.    Critical Care Time (EXCLUSIVE of any non bundled procedures) :  40 minutes were spent assessing the patient's presenting problems of acute illness that pose a high probability of life threatening  deterioration or end organ damage / dysfunction.  Medical desicion making includes initiation / continuation of plan or care review data/ labwork/ radiographic study, direct patient care bedside ,  discussions with  consultants regarding care,  evaluation and interpretation of vital signs, any necessary ventilator management,   NIV or BIPAP changes  or initiation,    discussions with multidisipliary team,  am or pm rounds, discussions of goals of care with patient and family all non-inclusive of procedures.    Plan discussed with Dr Justice 70 yo Female Pmhx HTN, DM2, Lung Ca/Colon Ca (9 years ago) presenting to  for difficulty walking 2/2 BLLE edema 2/2 chronic lymphedema.    1. Hyperkalemia  2. Acute Renal Failure  3. Hyponatremia  4. Lymphedema  5. NAGMA    Neuro:  - Avoid Neuro Deliriogenic / sedative medications    CV:  - Maintain MAP >65    Pulm:  - Supplemental oxygen as needed to maintain SPO2>94%                  GI:  - Cont IV Protonix 40mg IVP Daily,   - NPO    Renal:  - Continue to monitor Bun/Cr and UOP, insert rasmussen, pt states sh has not urinated since yesterday  - Replacing electrolytes as needed with Goal K> 4, PO> 3, Mg> 2               - Strict I&O's  - Avoid Nephro toxic medication  - Renally dose meds  - Cr 6/10 0.71 now 3.2, Nephrology consulted   - K: 8.4 s/p insulin/dextrose, Calcium gluconate in ED. Repeat K: 8.3 STAT Insulin/Dextrose, Albuterol, Lokelma  - ABG showing HCO3 9: Bicarb 150meq gtt at 75  - CT A/P    Heme:  - Heparin for DVT prophylaxis    ID:  - WBC 17.50,  remains afebrile with no antipyretics administered   - Microbiology and Radiology reviewed   - trend CBC with diff, CMP  and fever curve    Endo:  - ISS for aggressive glycemic control to limit FS glucose to < 180mg/dl.    Critical Care Time (EXCLUSIVE of any non bundled procedures) :  40 minutes were spent assessing the patient's presenting problems of acute illness that pose a high probability of life threatening  deterioration or end organ damage / dysfunction.  Medical desicion making includes initiation / continuation of plan or care review data/ labwork/ radiographic study, direct patient care bedside ,  discussions with  consultants regarding care,  evaluation and interpretation of vital signs, any necessary ventilator management,   NIV or BIPAP changes  or initiation,    discussions with multidisipliary team,  am or pm rounds, discussions of goals of care with patient and family all non-inclusive of procedures.    Plan discussed with Dr Justice

## 2022-09-24 NOTE — ED PROVIDER NOTE - MUSCULOSKELETAL, MLM
Spine appears normal, range of motion is not limited, no muscle or joint tenderness .grossly swollen legs bilat, lymphedema

## 2022-09-24 NOTE — PROGRESS NOTE ADULT - SUBJECTIVE AND OBJECTIVE BOX
CC:  Patient seen in the ICU    HPI:  70 yo Female Pmhx HTN, DM2, Lung Ca/Colon Ca (9 years ago) presenting to  for difficulty walking 2/2 BLLE edema 2/2 chronic lymphedema. In the ED she was noted to be severly hyperkalemic.  Patient had not urinated since the prior evening.  In the E she recieved, 1L NS, Ca++ gluconate, Insulin, Dextrose, and a binding resin.            PAST MEDICAL & SURGICAL HISTORY:      FAMILY HISTORY:  No pertinent family history in first degree relatives        Social Hx:    Allergies    Keflex (Unknown)    Intolerances        Height (cm): 172.7 ( @ 06:34)  Weight (kg): 132.2 ( @ 11:45)  BMI (kg/m2): 44.3 ( @ 11:45)    ICU Vital Signs Last 24 Hrs  T(C): 36 (24 Sep 2022 11:45), Max: 36.8 (24 Sep 2022 06:34)  T(F): 96.8 (24 Sep 2022 11:45), Max: 98.3 (24 Sep 2022 06:34)  HR: 116 (24 Sep 2022 09:59) (98 - 116)  BP: 159/61 (24 Sep 2022 11:45) (135/63 - 159/61)  BP(mean): 80 (24 Sep 2022 11:45) (73 - 80)  ABP: --  ABP(mean): --  RR: 16 (24 Sep 2022 11:45) (16 - 20)  SpO2: 100% (24 Sep 2022 11:45) (97% - 100%)    O2 Parameters below as of 24 Sep 2022 09:59  Patient On (Oxygen Delivery Method): room air                I&O's Summary                            16.8   17.50 )-----------( 290      ( 24 Sep 2022 07:17 )             49.7       09-24    125<L>  |  98  |  84<H>  ----------------------------<  211<H>  7.6<HH>   |  17<L>  |  2.86<H>    Ca    11.4<H>      24 Sep 2022 14:44    TPro  8.1  /  Alb  3.4  /  TBili  1.5<H>  /  DBili  x   /  AST  43<H>  /  ALT  45  /  AlkPhos  104  09-            ABG - ( 24 Sep 2022 09:44 )  pH, Arterial: 7.29  pH, Blood: x     /  pCO2: 18    /  pO2: 129   / HCO3: 9     / Base Excess: -15.5 /  SaO2: 100                 Urinalysis Basic - ( 24 Sep 2022 09:53 )    Color: Yellow / Appearance: very cloudy / S.010 / pH: x  Gluc: x / Ketone: Trace  / Bili: Negative / Urobili: Negative   Blood: x / Protein: 30 mg/dL / Nitrite: Positive   Leuk Esterase: Moderate / RBC: 6-10 /HPF / WBC 6-10   Sq Epi: x / Non Sq Epi: Negative / Bacteria: Moderate        MEDICATIONS  (STANDING):  chlorhexidine 4% Liquid 1 Application(s) Topical <User Schedule>  ciprofloxacin   IVPB 200 milliGRAM(s) IV Intermittent every 12 hours  dextrose 5%. 1000 milliLiter(s) (50 mL/Hr) IV Continuous <Continuous>  dextrose 5%. 1000 milliLiter(s) (100 mL/Hr) IV Continuous <Continuous>  dextrose 50% Injectable 25 Gram(s) IV Push once  dextrose 50% Injectable 12.5 Gram(s) IV Push once  dextrose 50% Injectable 25 Gram(s) IV Push once  glucagon  Injectable 1 milliGRAM(s) IntraMuscular once  heparin   Injectable 5000 Unit(s) SubCutaneous every 8 hours  insulin lispro (ADMELOG) corrective regimen sliding scale   SubCutaneous Before meals and at bedtime  sodium bicarbonate  Infusion 0.089 mEq/kG/Hr (75 mL/Hr) IV Continuous <Continuous>    MEDICATIONS  (PRN):  dextrose Oral Gel 15 Gram(s) Oral once PRN Blood Glucose LESS THAN 70 milliGRAM(s)/deciliter      DVT Prophylaxis:    Advanced Directives:  Discussed with:    Visit Information: 90 min    ** Time is exclusive of billed procedures and/or teaching and/or routine family updates.

## 2022-09-24 NOTE — H&P ADULT - NSHPPHYSICALEXAM_GEN_ALL_CORE
General: Well appearing, lying in bed in NAD.    HEENT: Pupils equal, reactive to light. Symmetric. No scleral icterus or injection.    PULM: Clear to auscultation B/L. No wheezes, rales, or rhonchi appreciated. No significant sputum production or increased respiratory effort.    NECK: Supple, no lymphadenopathy, trachea midline.    CVS: Regular rate and rhythm, no murmurs appreciated, +s1/s2.    ABD: Soft, nondistended, nontender, normoactive bowel sounds.    EXT: BLLE edema, nontender.    SKIN: Warm and well perfused, no rashes noted.    NEURO: Alert, oriented, interactive, nonfocal.

## 2022-09-24 NOTE — ED PROVIDER NOTE - OBJECTIVE STATEMENT
Pt w leg swelling, worsening today, hx of cancer, saw her Doctor yesterday. No fever, no sweats, no chills, no cp.

## 2022-09-24 NOTE — ED ADULT NURSE NOTE - OBJECTIVE STATEMENT
Pt reports that she has had chronic B/L LE lymphedema with sores that she is following outpt. Pt reports that she has felt increasing weakness in B/L LE over weeks. Pt reports that normally she walks with a walker and lives alone.  Pt finding ambulation harder.  Denies fever at home. Denies urinary symptoms.  Denies any new injury to legs.

## 2022-09-25 LAB
A1C WITH ESTIMATED AVERAGE GLUCOSE RESULT: 7.3 % — HIGH (ref 4–5.6)
ADD ON TEST-SPECIMEN IN LAB: SIGNIFICANT CHANGE UP
ADD ON TEST-SPECIMEN IN LAB: SIGNIFICANT CHANGE UP
ANION GAP SERPL CALC-SCNC: 7 MMOL/L — SIGNIFICANT CHANGE UP (ref 5–17)
ANION GAP SERPL CALC-SCNC: 8 MMOL/L — SIGNIFICANT CHANGE UP (ref 5–17)
BUN SERPL-MCNC: 78 MG/DL — HIGH (ref 7–23)
BUN SERPL-MCNC: 78 MG/DL — HIGH (ref 7–23)
CALCIUM SERPL-MCNC: 10.6 MG/DL — HIGH (ref 8.5–10.1)
CALCIUM SERPL-MCNC: 10.7 MG/DL — HIGH (ref 8.5–10.1)
CHLORIDE SERPL-SCNC: 97 MMOL/L — SIGNIFICANT CHANGE UP (ref 96–108)
CHLORIDE SERPL-SCNC: 97 MMOL/L — SIGNIFICANT CHANGE UP (ref 96–108)
CO2 SERPL-SCNC: 21 MMOL/L — LOW (ref 22–31)
CO2 SERPL-SCNC: 23 MMOL/L — SIGNIFICANT CHANGE UP (ref 22–31)
CREAT SERPL-MCNC: 2.26 MG/DL — HIGH (ref 0.5–1.3)
CREAT SERPL-MCNC: 2.55 MG/DL — HIGH (ref 0.5–1.3)
EGFR: 20 ML/MIN/1.73M2 — LOW
EGFR: 23 ML/MIN/1.73M2 — LOW
ESTIMATED AVERAGE GLUCOSE: 163 MG/DL — HIGH (ref 68–114)
GLUCOSE SERPL-MCNC: 186 MG/DL — HIGH (ref 70–99)
GLUCOSE SERPL-MCNC: 221 MG/DL — HIGH (ref 70–99)
HCT VFR BLD CALC: 41.5 % — SIGNIFICANT CHANGE UP (ref 34.5–45)
HCV AB S/CO SERPL IA: 0.17 S/CO — SIGNIFICANT CHANGE UP (ref 0–0.99)
HCV AB SERPL-IMP: SIGNIFICANT CHANGE UP
HGB BLD-MCNC: 14.5 G/DL — SIGNIFICANT CHANGE UP (ref 11.5–15.5)
MAGNESIUM SERPL-MCNC: 2.7 MG/DL — HIGH (ref 1.6–2.6)
MCHC RBC-ENTMCNC: 28.5 PG — SIGNIFICANT CHANGE UP (ref 27–34)
MCHC RBC-ENTMCNC: 34.9 GM/DL — SIGNIFICANT CHANGE UP (ref 32–36)
MCV RBC AUTO: 81.5 FL — SIGNIFICANT CHANGE UP (ref 80–100)
PHOSPHATE SERPL-MCNC: 5.4 MG/DL — HIGH (ref 2.5–4.5)
PLATELET # BLD AUTO: 158 K/UL — SIGNIFICANT CHANGE UP (ref 150–400)
POTASSIUM SERPL-MCNC: 5.5 MMOL/L — HIGH (ref 3.5–5.3)
POTASSIUM SERPL-MCNC: 6 MMOL/L — HIGH (ref 3.5–5.3)
POTASSIUM SERPL-SCNC: 5.5 MMOL/L — HIGH (ref 3.5–5.3)
POTASSIUM SERPL-SCNC: 6 MMOL/L — HIGH (ref 3.5–5.3)
RBC # BLD: 5.09 M/UL — SIGNIFICANT CHANGE UP (ref 3.8–5.2)
RBC # FLD: 15.3 % — HIGH (ref 10.3–14.5)
SODIUM SERPL-SCNC: 125 MMOL/L — LOW (ref 135–145)
SODIUM SERPL-SCNC: 128 MMOL/L — LOW (ref 135–145)
WBC # BLD: 13.17 K/UL — HIGH (ref 3.8–10.5)
WBC # FLD AUTO: 13.17 K/UL — HIGH (ref 3.8–10.5)

## 2022-09-25 PROCEDURE — 99291 CRITICAL CARE FIRST HOUR: CPT

## 2022-09-25 RX ORDER — NYSTATIN CREAM 100000 [USP'U]/G
1 CREAM TOPICAL
Refills: 0 | Status: DISCONTINUED | OUTPATIENT
Start: 2022-09-25 | End: 2022-09-28

## 2022-09-25 RX ORDER — SODIUM CHLORIDE 9 MG/ML
1000 INJECTION INTRAMUSCULAR; INTRAVENOUS; SUBCUTANEOUS
Refills: 0 | Status: DISCONTINUED | OUTPATIENT
Start: 2022-09-25 | End: 2022-09-26

## 2022-09-25 RX ADMIN — Medication 4: at 13:28

## 2022-09-25 RX ADMIN — HEPARIN SODIUM 5000 UNIT(S): 5000 INJECTION INTRAVENOUS; SUBCUTANEOUS at 07:06

## 2022-09-25 RX ADMIN — Medication 4: at 17:58

## 2022-09-25 RX ADMIN — HEPARIN SODIUM 5000 UNIT(S): 5000 INJECTION INTRAVENOUS; SUBCUTANEOUS at 14:25

## 2022-09-25 RX ADMIN — Medication 100 MILLIGRAM(S): at 22:00

## 2022-09-25 RX ADMIN — Medication 2: at 08:24

## 2022-09-25 RX ADMIN — Medication 2: at 22:00

## 2022-09-25 RX ADMIN — HEPARIN SODIUM 5000 UNIT(S): 5000 INJECTION INTRAVENOUS; SUBCUTANEOUS at 22:00

## 2022-09-25 RX ADMIN — Medication 100 MILLIGRAM(S): at 11:15

## 2022-09-25 RX ADMIN — SODIUM CHLORIDE 100 MILLILITER(S): 9 INJECTION INTRAMUSCULAR; INTRAVENOUS; SUBCUTANEOUS at 11:15

## 2022-09-25 RX ADMIN — SODIUM CHLORIDE 100 MILLILITER(S): 9 INJECTION INTRAMUSCULAR; INTRAVENOUS; SUBCUTANEOUS at 22:04

## 2022-09-25 RX ADMIN — CHLORHEXIDINE GLUCONATE 1 APPLICATION(S): 213 SOLUTION TOPICAL at 08:24

## 2022-09-25 NOTE — CONSULT NOTE ADULT - ASSESSMENT
72 yo Female Pmhx HTN, DM2, Lung Ca/Colon Ca (9 years ago) presenting to  for difficulty walking due to progressive edema, renal evaluation of ADRIAN and Hyperkalemia.     ADRIAN/Hyperkalemia  -Multiple lines elevated (hgb, wbc, ca) suspect pre-renal state with poor intake  -IVF aggressive hydration, she is non oliguric  -Serial labs/lytes  -Long d/c with patient regarding HD, risks/benefit and alternatives. Consent obtained for HD and she is agreeable if K can not be controlled    Hyperkalemia  -Has not recieved a binder yet, lokelma when available   -Can repeat doses to further improve K  -Optimize bowel movements/bowel regimen  -BIcarb based IVF to antagonize K    Acidosis  -Bicarb gtt    thanks, will follow with you    d/c with RN staff, Dr Justice and ER team
70 yo Female Pmhx HTN, DM2, Lung Ca/Colon Ca (9 years ago) presenting to  for difficulty walking 2/2 BLE edema 2/2 chronic lymphedema. UA pyuria, concern for UTI raised given ciprofloxacin.     1. pyuria. possible UTI. keflex allergy. leukocytosis. b/l LE stasis dermatitis. chronic lymphedema  - imaging reviewed  - f/u urine cx, blood cx  - on ciprofloxacin [id number]   - continue with abx coverage  - tolerating abx well so far; no side effects noted  - reason for abx use and side effects reviewed with patient  - supportive care  - fu cbc    2. other issues - care per medicine

## 2022-09-25 NOTE — PROGRESS NOTE ADULT - SUBJECTIVE AND OBJECTIVE BOX
Patient is a 71y Female who reports no complaints as new, feels well. Good uop reported and + BMS         MEDICATIONS  (STANDING):  chlorhexidine 4% Liquid 1 Application(s) Topical <User Schedule>  ciprofloxacin   IVPB 200 milliGRAM(s) IV Intermittent every 12 hours  dextrose 5%. 1000 milliLiter(s) (50 mL/Hr) IV Continuous <Continuous>  dextrose 5%. 1000 milliLiter(s) (100 mL/Hr) IV Continuous <Continuous>  dextrose 50% Injectable 25 Gram(s) IV Push once  dextrose 50% Injectable 12.5 Gram(s) IV Push once  dextrose 50% Injectable 25 Gram(s) IV Push once  glucagon  Injectable 1 milliGRAM(s) IntraMuscular once  heparin   Injectable 5000 Unit(s) SubCutaneous every 8 hours  influenza  Vaccine (HIGH DOSE) 0.7 milliLiter(s) IntraMuscular once  insulin lispro (ADMELOG) corrective regimen sliding scale   SubCutaneous Before meals and at bedtime  sodium chloride 0.9%. 1000 milliLiter(s) (100 mL/Hr) IV Continuous <Continuous>    MEDICATIONS  (PRN):  dextrose Oral Gel 15 Gram(s) Oral once PRN Blood Glucose LESS THAN 70 milliGRAM(s)/deciliter        T(C): , Max: 36.9 (22 @ 22:00)  T(F): , Max: 98.4 (22 @ 22:00)  HR: 102 (22 @ 09:00)  BP: 105/45 (22 @ 09:00)  BP(mean): 59 (22 @ 09:00)  RR: 17 (22 @ 09:00)  SpO2: 99% (22 @ 09:00)  Wt(kg): --     @ 07:01  -   @ 07:00  --------------------------------------------------------  IN: 1991 mL / OUT: 1150 mL / NET: 841 mL        Weight (kg): 132.2 ( @ 11:45)    PHYSICAL EXAM:    Constitutional: NAD,    HEENT:  MM  dist  Cardiovascular: S1 and S2   Extremities: chr peripheral edema  Neurological: A/O x 3      Skin: Le chronic changes  Access: Not applicable        LABS:                        14.5   13.17 )-----------( 158      ( 25 Sep 2022 05:22 )             41.5     25 Sep 2022 05:22    125    |  97     |  78     ----------------------------<  186    6.0     |  21     |  2.55   24 Sep 2022 20:57    123    |  98     |  81     ----------------------------<  194    6.8     |  16     |  2.78   24 Sep 2022 14:44    125    |  98     |  84     ----------------------------<  211    7.6     |  17     |  2.86   24 Sep 2022 11:05    123    |  97     |  82     ----------------------------<  232    7.9     |  14     |  3.19   24 Sep 2022 08:19    121    |  94     |  79     ----------------------------<  236    8.3     |  11     |  3.32     Ca    10.7       25 Sep 2022 05:22  Ca    11.2       24 Sep 2022 20:57  Ca    11.4       24 Sep 2022 14:44  Ca    12.1       24 Sep 2022 11:05  Ca    12.1       24 Sep 2022 08:19  Phos  5.4       25 Sep 2022 05:22  Mg     2.7       25 Sep 2022 05:22    TPro  8.1    /  Alb  3.4    /  TBili  1.5    /  DBili  x      /  AST  43     /  ALT  45     /  AlkPhos  104    24 Sep 2022 08:19  TPro  8.2    /  Alb  3.4    /  TBili  1.6    /  DBili  x      /  AST  51     /  ALT  46     /  AlkPhos  102    24 Sep 2022 07:17      Uric Acid, Serum: 11.5 mg/dL *H* [2.5 - 7.0] ( @ 05:22)  Creatine Kinase, Serum: 57 U/L [26 - 192] ( @ 05:22)      Urine Studies:  Urinalysis Basic - ( 24 Sep 2022 09:53 )    Color: Yellow / Appearance: very cloudy / S.010 / pH: x  Gluc: x / Ketone: Trace  / Bili: Negative / Urobili: Negative   Blood: x / Protein: 30 mg/dL / Nitrite: Positive   Leuk Esterase: Moderate / RBC: 6-10 /HPF / WBC 6-10   Sq Epi: x / Non Sq Epi: Negative / Bacteria: Moderate            RADIOLOGY & ADDITIONAL STUDIES:

## 2022-09-25 NOTE — CONSULT NOTE ADULT - SUBJECTIVE AND OBJECTIVE BOX
72 yo Female Pmhx HTN, DM2, Lung Ca/Colon Ca (9 years ago) presenting to  for difficulty walking due to progressive edema. Patient reports seeing Dr Boyd at [facility] for LE chronic edema. Reports diuretics and potassium she held a few weeks ago due to inreased wheeping of legs with these therapies?. Reports recent 20 lb weight loss and poor intake but presents to ed due to increased leg swelling and more trouble walking. Reports BMs have been small recently.     PAST MEDICAL & SURGICAL HISTORY:      MEDICATIONS  (STANDING):  ALBUTerol    0.083% 10 milliGRAM(s) Nebulizer once  chlorhexidine 4% Liquid 1 Application(s) Topical <User Schedule>  dextrose 50% Injectable 50 milliLiter(s) IV Push once  heparin   Injectable 5000 Unit(s) SubCutaneous every 8 hours  insulin regular  human recombinant 10 Unit(s) IV Push once  sodium bicarbonate  Infusion 0.089 mEq/kG/Hr (75 mL/Hr) IV Continuous <Continuous>  sodium chloride 0.9%. 1000 milliLiter(s) (100 mL/Hr) IV Continuous <Continuous>    MEDICATIONS  (PRN):      Allergies    Keflex (Unknown)    Intolerances        SOCIAL HISTORY:    FAMILY HISTORY:  No pertinent family history in first degree relatives        REVIEW OF SYSTEMS:    CONSTITUTIONAL: stable weakness, no fevers or chills  EYES/ENT: No visual changes;  No vertigo or throat pain   NECK: No pain or stiffness  RESPIRATORY: No cough, wheezing, hemoptysis; No shortness of breath  CARDIOVASCULAR: No chest pain or palpitations  GASTROINTESTINAL: No abdominal or epigastric pain. No nausea, vomiting, or hematemesis; No diarrhea or constipation. No melena or hematochezia.  GENITOURINARY: No dysuria, frequency or hematuria  NEUROLOGICAL: No numbness or weakness  SKIN: No itching, burning, rashes, or lesions   All other review of systems is negative unless indicated above.      T(C): , Max: 36.8 (22 @ 06:34)  T(F): , Max: 98.3 (22 @ 06:34)  HR: 116 (22 @ 09:59)  BP: 159/61 (22 @ 11:45)  BP(mean): 80 (22 @ 11:45)  RR: 16 (22 @ 11:45)  SpO2: 100% (22 @ 11:45)  Wt(kg): --    Height (cm): 172.7 ( @ 06:34)  Weight (kg): 132.2 ( @ 11:45)  BMI (kg/m2): 44.3 ( @ 11:45)  BSA (m2): 2.4 ( @ 11:45)    PHYSICAL EXAM:    Constitutional: NAD, w   HEENT: Dry MM  Neck: No LAD, No JVD  Respiratory: dist  Cardiovascular: S1 and S2  Gastrointestinal: morbidly obese  Extremities: chronic peripheral edema  Neurological: A/O x 3, no focal deficits  Psychiatric: Normal mood, normal affect  : grady, 200cc        LABS:                        16.8   17.50 )-----------( 290      ( 24 Sep 2022 07:17 )             49.7     24 Sep 2022 11:05    123    |  97     |  82     ----------------------------<  232    7.9     |  14     |  3.19   24 Sep 2022 08:19    121    |  94     |  79     ----------------------------<  236    8.3     |  11     |  3.32   24 Sep 2022 07:17    120    |  93     |  77     ----------------------------<  234    8.4     |  10     |  3.24     Ca    12.1       24 Sep 2022 11:05  Ca    12.1       24 Sep 2022 08:19  Ca    12.0       24 Sep 2022 07:17    TPro  8.1    /  Alb  3.4    /  TBili  1.5    /  DBili  x      /  AST  43     /  ALT  45     /  AlkPhos  104    24 Sep 2022 08:19  TPro  8.2    /  Alb  3.4    /  TBili  1.6    /  DBili  x      /  AST  51     /  ALT  46     /  AlkPhos  102    24 Sep 2022 07:17          Urine Studies:  Urinalysis Basic - ( 24 Sep 2022 09:53 )    Color: Yellow / Appearance: very cloudy / S.010 / pH: x  Gluc: x / Ketone: Trace  / Bili: Negative / Urobili: Negative   Blood: x / Protein: 30 mg/dL / Nitrite: Positive   Leuk Esterase: Moderate / RBC: 6-10 /HPF / WBC 6-10   Sq Epi: x / Non Sq Epi: Negative / Bacteria: Moderate            RADIOLOGY & ADDITIONAL STUDIES:                
  HPI:  70 yo Female Pmhx HTN, DM2, Lung Ca/Colon Ca (9 years ago) presenting to  for difficulty walking 2/2 BLE edema 2/2 chronic lymphedema. UA pyuria, concern for UTI raised given ciprofloxacin.       PMH: as above  PSH: as above  Meds: per reconciliation sheet, noted below  MEDICATIONS  (STANDING):  chlorhexidine 4% Liquid 1 Application(s) Topical <User Schedule>  ciprofloxacin   IVPB 200 milliGRAM(s) IV Intermittent every 12 hours  dextrose 5%. 1000 milliLiter(s) (50 mL/Hr) IV Continuous <Continuous>  dextrose 5%. 1000 milliLiter(s) (100 mL/Hr) IV Continuous <Continuous>  dextrose 50% Injectable 25 Gram(s) IV Push once  dextrose 50% Injectable 12.5 Gram(s) IV Push once  dextrose 50% Injectable 25 Gram(s) IV Push once  glucagon  Injectable 1 milliGRAM(s) IntraMuscular once  heparin   Injectable 5000 Unit(s) SubCutaneous every 8 hours  influenza  Vaccine (HIGH DOSE) 0.7 milliLiter(s) IntraMuscular once  insulin lispro (ADMELOG) corrective regimen sliding scale   SubCutaneous Before meals and at bedtime  sodium chloride 0.9%. 1000 milliLiter(s) (100 mL/Hr) IV Continuous <Continuous>        Allergies    Keflex (Unknown)    Intolerances      Social: no smoking, no alcohol, no illegal drugs; no recent travel, no exposure to TB  FAMILY HISTORY:  No pertinent family history in first degree relatives       no history of premature cardiovascular disease in first degree relatives    ROS: the patient denies fever, no chills, no HA, no dizziness, no sore throat, no blurry vision, no CP, no palpitations, no SOB, no cough, no abdominal pain, no diarrhea, no N/V, no leg pain, no claudication, no rash, no joint aches, no rectal pain or bleeding, no night sweats    All other systems reviewed and are negative    Vital Signs Last 24 Hrs  T(C): 36.7 (25 Sep 2022 05:00), Max: 36.9 (24 Sep 2022 22:00)  T(F): 98 (25 Sep 2022 05:00), Max: 98.4 (24 Sep 2022 22:00)  HR: 102 (25 Sep 2022 09:00) (92 - 111)  BP: 105/45 (25 Sep 2022 09:00) (88/66 - 159/61)  BP(mean): 59 (25 Sep 2022 09:00) (51 - 84)  RR: 17 (25 Sep 2022 09:00) (12 - 21)  SpO2: 99% (25 Sep 2022 09:00) (95% - 100%)    Parameters below as of 25 Sep 2022 09:00  Patient On (Oxygen Delivery Method): nasal cannula  O2 Flow (L/min): 2    Daily     Daily     PE:  Constitutional: NAD   HEENT: NC/AT, EOMI, PERRLA, conjunctivae clear; ears and nose atraumatic; pharynx benign  Neck: supple; thyroid not palpable  Back: no tenderness  Respiratory: respiratory effort normal; clear to auscultation  Cardiovascular: S1S2 regular, no murmurs  Abdomen: soft, not tender, not distended, positive BS; liver and spleen WNL  Genitourinary: no suprapubic tenderness  Lymphatic: no LN palpable  Musculoskeletal: no muscle tenderness, no joint swelling or tenderness  Extremities:  b/l LE lymphedema, stasis dermatitis   Neurological/ Psychiatric: AxOx3, Judgement and insight normal;  moving all extremities  Skin: no rashes; no palpable lesions    Labs: all available labs reviewed                        14.5   13.17 )-----------( 158      ( 25 Sep 2022 05:22 )             41.5         125<L>  |  97  |  78<H>  ----------------------------<  186<H>  6.0<H>   |  21<L>  |  2.55<H>    Ca    10.7<H>      25 Sep 2022 05:22  Phos  5.4       Mg     2.7         TPro  8.1  /  Alb  3.4  /  TBili  1.5<H>  /  DBili  x   /  AST  43<H>  /  ALT  45  /  AlkPhos  104       LIVER FUNCTIONS - ( 24 Sep 2022 08:19 )  Alb: 3.4 g/dL / Pro: 8.1 gm/dL / ALK PHOS: 104 U/L / ALT: 45 U/L / AST: 43 U/L / GGT: x           Urinalysis Basic - ( 24 Sep 2022 09:53 )    Color: Yellow / Appearance: very cloudy / S.010 / pH: x  Gluc: x / Ketone: Trace  / Bili: Negative / Urobili: Negative   Blood: x / Protein: 30 mg/dL / Nitrite: Positive   Leuk Esterase: Moderate / RBC: 6-10 /HPF / WBC 6-10   Sq Epi: x / Non Sq Epi: Negative / Bacteria: Moderate          Radiology: all available radiological tests reviewed  < from: US Duplex Venous Lower Ext Complete, Bilateral (22 @ 10:17) >  ACC: [id number] EXAM:  US DPLX LWR EXT VEINS COMPL BI                          PROCEDURE DATE:  2022          INTERPRETATION:  CLINICAL INFORMATION: Lower extremity swelling and edema   bilaterally    COMPARISON: None available.    TECHNIQUE: Duplex sonography of the BILATERAL LOWER extremity veins with   color and spectral Doppler, with and without compression.    FINDINGS:    RIGHT:  Normal compressibility of the RIGHT common femoral, femoral and popliteal   veins.  Doppler examination shows normal spontaneous and phasic flow.  Limited evaluation of the calf veins due to edema. Gastrocnemius vein is   patent.    LEFT:  Normal compressibility of the LEFT common femoral, femoral and popliteal   veins.  Doppler examination shows normal spontaneous and phasic flow.  Limited evaluation of the calf veins due to edema. Gastrocnemius vein is   patent.    IMPRESSION:  No visualized femoropopliteal deep venous thrombosis in either lower   extremity. Limited evaluation of the calf veins. Consider short-term   repeat evaluation if clinical concern persists.        Advanced directives addressed: full resuscitation

## 2022-09-26 LAB
ANION GAP SERPL CALC-SCNC: 6 MMOL/L — SIGNIFICANT CHANGE UP (ref 5–17)
BUN SERPL-MCNC: 68 MG/DL — HIGH (ref 7–23)
CALCIUM SERPL-MCNC: 9.3 MG/DL — SIGNIFICANT CHANGE UP (ref 8.5–10.1)
CHLORIDE SERPL-SCNC: 103 MMOL/L — SIGNIFICANT CHANGE UP (ref 96–108)
CO2 SERPL-SCNC: 23 MMOL/L — SIGNIFICANT CHANGE UP (ref 22–31)
CREAT ?TM UR-MCNC: 90 MG/DL — SIGNIFICANT CHANGE UP
CREAT SERPL-MCNC: 1.61 MG/DL — HIGH (ref 0.5–1.3)
EGFR: 34 ML/MIN/1.73M2 — LOW
GLUCOSE BLDC GLUCOMTR-MCNC: 182 MG/DL — HIGH (ref 70–99)
GLUCOSE BLDC GLUCOMTR-MCNC: 212 MG/DL — HIGH (ref 70–99)
GLUCOSE SERPL-MCNC: 149 MG/DL — HIGH (ref 70–99)
HCT VFR BLD CALC: 36.9 % — SIGNIFICANT CHANGE UP (ref 34.5–45)
HGB BLD-MCNC: 12.5 G/DL — SIGNIFICANT CHANGE UP (ref 11.5–15.5)
KAPPA LC SER QL IFE: 2.54 MG/DL — HIGH (ref 0.33–1.94)
KAPPA/LAMBDA FREE LIGHT CHAIN RATIO, SERUM: 1.01 RATIO — SIGNIFICANT CHANGE UP (ref 0.26–1.65)
LAMBDA LC SER QL IFE: 2.51 MG/DL — SIGNIFICANT CHANGE UP (ref 0.57–2.63)
MAGNESIUM SERPL-MCNC: 2.4 MG/DL — SIGNIFICANT CHANGE UP (ref 1.6–2.6)
MCHC RBC-ENTMCNC: 28.3 PG — SIGNIFICANT CHANGE UP (ref 27–34)
MCHC RBC-ENTMCNC: 33.9 GM/DL — SIGNIFICANT CHANGE UP (ref 32–36)
MCV RBC AUTO: 83.7 FL — SIGNIFICANT CHANGE UP (ref 80–100)
PHOSPHATE SERPL-MCNC: 3.5 MG/DL — SIGNIFICANT CHANGE UP (ref 2.5–4.5)
PLATELET # BLD AUTO: 121 K/UL — LOW (ref 150–400)
POTASSIUM SERPL-MCNC: 4.9 MMOL/L — SIGNIFICANT CHANGE UP (ref 3.5–5.3)
POTASSIUM SERPL-SCNC: 4.9 MMOL/L — SIGNIFICANT CHANGE UP (ref 3.5–5.3)
PROT ?TM UR-MCNC: 18 MG/DL — HIGH (ref 0–12)
PROT/CREAT UR-RTO: 0.2 RATIO — SIGNIFICANT CHANGE UP (ref 0–0.2)
RBC # BLD: 4.41 M/UL — SIGNIFICANT CHANGE UP (ref 3.8–5.2)
RBC # FLD: 15.2 % — HIGH (ref 10.3–14.5)
SODIUM SERPL-SCNC: 132 MMOL/L — LOW (ref 135–145)
WBC # BLD: 7.08 K/UL — SIGNIFICANT CHANGE UP (ref 3.8–10.5)
WBC # FLD AUTO: 7.08 K/UL — SIGNIFICANT CHANGE UP (ref 3.8–10.5)

## 2022-09-26 PROCEDURE — 99233 SBSQ HOSP IP/OBS HIGH 50: CPT

## 2022-09-26 RX ADMIN — HEPARIN SODIUM 5000 UNIT(S): 5000 INJECTION INTRAVENOUS; SUBCUTANEOUS at 05:39

## 2022-09-26 RX ADMIN — Medication 4: at 17:41

## 2022-09-26 RX ADMIN — Medication 2: at 21:39

## 2022-09-26 RX ADMIN — HEPARIN SODIUM 5000 UNIT(S): 5000 INJECTION INTRAVENOUS; SUBCUTANEOUS at 15:40

## 2022-09-26 RX ADMIN — NYSTATIN CREAM 1 APPLICATION(S): 100000 CREAM TOPICAL at 00:49

## 2022-09-26 RX ADMIN — Medication 100 MILLIGRAM(S): at 10:56

## 2022-09-26 RX ADMIN — NYSTATIN CREAM 1 APPLICATION(S): 100000 CREAM TOPICAL at 21:39

## 2022-09-26 RX ADMIN — Medication 100 MILLIGRAM(S): at 21:38

## 2022-09-26 RX ADMIN — Medication 2: at 12:36

## 2022-09-26 RX ADMIN — NYSTATIN CREAM 1 APPLICATION(S): 100000 CREAM TOPICAL at 10:58

## 2022-09-26 RX ADMIN — CHLORHEXIDINE GLUCONATE 1 APPLICATION(S): 213 SOLUTION TOPICAL at 05:39

## 2022-09-26 RX ADMIN — HEPARIN SODIUM 5000 UNIT(S): 5000 INJECTION INTRAVENOUS; SUBCUTANEOUS at 21:38

## 2022-09-26 NOTE — PHYSICAL THERAPY INITIAL EVALUATION ADULT - MANUAL MUSCLE TESTING RESULTS, REHAB EVAL
Ear Star Wedge Flap Text: The defect edges were debeveled with a #15 blade scalpel.  Given the location of the defect and the proximity to free margins (helical rim) an ear star wedge flap was deemed most appropriate.  Using a sterile surgical marker, the appropriate flap was drawn incorporating the defect and placing the expected incisions between the helical rim and antihelix where possible.  The area thus outlined was incised through and through with a #15 scalpel blade. Strength is grossly at least 3+/5 throughout BUE, 3-/5 BLE./grossly assessed due to

## 2022-09-26 NOTE — PROGRESS NOTE ADULT - SUBJECTIVE AND OBJECTIVE BOX
Patient is a 71y Female who reports no c/o today. No cp, or sob.     PAST MEDICAL & SURGICAL HISTORY:      MEDICATIONS  (STANDING):  chlorhexidine 4% Liquid 1 Application(s) Topical <User Schedule>  ciprofloxacin   IVPB 200 milliGRAM(s) IV Intermittent every 12 hours  dextrose 5%. 1000 milliLiter(s) (50 mL/Hr) IV Continuous <Continuous>  dextrose 5%. 1000 milliLiter(s) (100 mL/Hr) IV Continuous <Continuous>  dextrose 50% Injectable 25 Gram(s) IV Push once  dextrose 50% Injectable 12.5 Gram(s) IV Push once  dextrose 50% Injectable 25 Gram(s) IV Push once  glucagon  Injectable 1 milliGRAM(s) IntraMuscular once  heparin   Injectable 5000 Unit(s) SubCutaneous every 8 hours  influenza  Vaccine (HIGH DOSE) 0.7 milliLiter(s) IntraMuscular once  insulin lispro (ADMELOG) corrective regimen sliding scale   SubCutaneous Before meals and at bedtime  nystatin Powder 1 Application(s) Topical two times a day    MEDICATIONS  (PRN):  dextrose Oral Gel 15 Gram(s) Oral once PRN Blood Glucose LESS THAN 70 milliGRAM(s)/deciliter      Allergies    Keflex (Unknown)    Intolerances        SOCIAL HISTORY:  no etoh/cigg    FAMILY HISTORY:  No pertinent family history in first degree relatives        REVIEW OF SYSTEMS:    CONSTITUTIONAL: No weakness, fevers or chills  EYES/ENT: No visual changes;  No vertigo or throat pain   NECK: No pain or stiffness  RESPIRATORY: No cough, wheezing, hemoptysis; No shortness of breath  CARDIOVASCULAR: No chest pain or palpitations  GASTROINTESTINAL: No abdominal or epigastric pain. No nausea, vomiting, or hematemesis; No diarrhea or constipation. No melena or hematochezia.  GENITOURINARY: No dysuria, frequency or hematuria  NEUROLOGICAL: No numbness or weakness  SKIN: No itching, burning, rashes, or lesions   All other review of systems is negative unless indicated above.      T(C): , Max: 37.3 (09-26-22 @ 05:00)  T(F): , Max: 99.2 (09-26-22 @ 05:00)  HR: 94 (09-26-22 @ 20:00)  BP: 126/48 (09-26-22 @ 20:00)  BP(mean): 68 (09-26-22 @ 20:00)  RR: 17 (09-26-22 @ 20:00)  SpO2: 97% (09-26-22 @ 20:00)  Wt(kg): --    09-25 @ 07:01  -  09-26 @ 07:00  --------------------------------------------------------  IN: 2677 mL / OUT: 1250 mL / NET: 1427 mL    09-26 @ 07:01  -  09-26 @ 20:04  --------------------------------------------------------  IN: 0 mL / OUT: 850 mL / NET: -850 mL          PHYSICAL EXAM:    Constitutional: Well-groomed, well-developed  HEENT: MM  dist  Cardiovascular: S1 and S2  Extremities: No peripheral edema  Neurological: A/O x 3          LABS:                        12.5   7.08  )-----------( 121      ( 26 Sep 2022 05:37 )             36.9     26 Sep 2022 05:37    132    |  103    |  68     ----------------------------<  149    4.9     |  23     |  1.61   25 Sep 2022 12:46    128    |  97     |  78     ----------------------------<  221    5.5     |  23     |  2.26   25 Sep 2022 05:22    125    |  97     |  78     ----------------------------<  186    6.0     |  21     |  2.55   24 Sep 2022 20:57    123    |  98     |  81     ----------------------------<  194    6.8     |  16     |  2.78   24 Sep 2022 14:44    125    |  98     |  84     ----------------------------<  211    7.6     |  17     |  2.86     Ca    9.3        26 Sep 2022 05:37  Ca    10.6       25 Sep 2022 12:46  Ca    10.7       25 Sep 2022 05:22  Ca    11.2       24 Sep 2022 20:57  Ca    11.4       24 Sep 2022 14:44  Phos  3.5       26 Sep 2022 05:37  Phos  5.4       25 Sep 2022 05:22  Mg     2.4       26 Sep 2022 05:37  Mg     2.7       25 Sep 2022 05:22    TPro  8.1    /  Alb  3.4    /  TBili  1.5    /  DBili  x      /  AST  43     /  ALT  45     /  AlkPhos  104    24 Sep 2022 08:19  TPro  8.2    /  Alb  3.4    /  TBili  1.6    /  DBili  x      /  AST  51     /  ALT  46     /  AlkPhos  102    24 Sep 2022 07:17          Urine Studies:    Creatinine, Random Urine: 90 mg/dL (09-26 @ 17:45)  Protein/Creatinine Ratio Calculation: 0.2 Ratio (09-26 @ 17:45)        RADIOLOGY & ADDITIONAL STUDIES:

## 2022-09-26 NOTE — DIETITIAN NUTRITION RISK NOTIFICATION - FINDINGS BASED ON COMPREHENSIVE NUTRITION ASSESSMENT, CONSULTATION PERFORMED ON
Detail Level: Zone Initiate Treatment: Apply thin layer of eucrisa twice a day to scrotum and penis 26-Sep-2022 Plan: Discussed to avoid touching the area as much as possible to avoid further irritation.

## 2022-09-26 NOTE — DIETITIAN INITIAL EVALUATION ADULT - PERTINENT MEDS FT
MEDICATIONS  (STANDING):  chlorhexidine 4% Liquid 1 Application(s) Topical <User Schedule>  ciprofloxacin   IVPB 200 milliGRAM(s) IV Intermittent every 12 hours  dextrose 5%. 1000 milliLiter(s) (50 mL/Hr) IV Continuous <Continuous>  dextrose 5%. 1000 milliLiter(s) (100 mL/Hr) IV Continuous <Continuous>  dextrose 50% Injectable 25 Gram(s) IV Push once  dextrose 50% Injectable 12.5 Gram(s) IV Push once  dextrose 50% Injectable 25 Gram(s) IV Push once  glucagon  Injectable 1 milliGRAM(s) IntraMuscular once  heparin   Injectable 5000 Unit(s) SubCutaneous every 8 hours  influenza  Vaccine (HIGH DOSE) 0.7 milliLiter(s) IntraMuscular once  insulin lispro (ADMELOG) corrective regimen sliding scale   SubCutaneous Before meals and at bedtime  nystatin Powder 1 Application(s) Topical two times a day    MEDICATIONS  (PRN):  dextrose Oral Gel 15 Gram(s) Oral once PRN Blood Glucose LESS THAN 70 milliGRAM(s)/deciliter

## 2022-09-26 NOTE — PROGRESS NOTE ADULT - SUBJECTIVE AND OBJECTIVE BOX
Date of service: 22 @ 10:42    pt seen and examined  feels better  laying in bed, nad  afebrile    ROS: no fever or chills; denies dizziness, no HA, no SOB or cough, no abdominal pain, no diarrhea or constipation; no legs pain, no rashes    MEDICATIONS  (STANDING):  chlorhexidine 4% Liquid 1 Application(s) Topical <User Schedule>  ciprofloxacin   IVPB 200 milliGRAM(s) IV Intermittent every 12 hours  dextrose 5%. 1000 milliLiter(s) (50 mL/Hr) IV Continuous <Continuous>  dextrose 5%. 1000 milliLiter(s) (100 mL/Hr) IV Continuous <Continuous>  dextrose 50% Injectable 25 Gram(s) IV Push once  dextrose 50% Injectable 12.5 Gram(s) IV Push once  dextrose 50% Injectable 25 Gram(s) IV Push once  glucagon  Injectable 1 milliGRAM(s) IntraMuscular once  heparin   Injectable 5000 Unit(s) SubCutaneous every 8 hours  influenza  Vaccine (HIGH DOSE) 0.7 milliLiter(s) IntraMuscular once  insulin lispro (ADMELOG) corrective regimen sliding scale   SubCutaneous Before meals and at bedtime  nystatin Powder 1 Application(s) Topical two times a day    Vital Signs Last 24 Hrs  T(C): 37.2 (26 Sep 2022 09:00), Max: 37.4 (25 Sep 2022 20:00)  T(F): 98.9 (26 Sep 2022 09:00), Max: 99.4 (25 Sep 2022 20:00)  HR: 102 (26 Sep 2022 10:00) (92 - 112)  BP: 112/48 (26 Sep 2022 10:00) (93/43 - 125/54)  BP(mean): 64 (26 Sep 2022 10:00) (55 - 80)  RR: 20 (26 Sep 2022 10:00) (13 - 21)  SpO2: 98% (26 Sep 2022 10:00) (93% - 100%)    Parameters below as of 26 Sep 2022 10:00  Patient On (Oxygen Delivery Method): nasal cannula  O2 Flow (L/min): 2          PE:  Constitutional: NAD   HEENT: NC/AT, EOMI, PERRLA, conjunctivae clear; ears and nose atraumatic; pharynx benign  Neck: supple; thyroid not palpable  Back: no tenderness  Respiratory: respiratory effort normal; clear to auscultation  Cardiovascular: S1S2 regular, no murmurs  Abdomen: soft, not tender, not distended, positive BS; liver and spleen WNL  Genitourinary: no suprapubic tenderness  Lymphatic: no LN palpable  Musculoskeletal: no muscle tenderness, no joint swelling or tenderness  Extremities:  b/l LE lymphedema, stasis dermatitis   Neurological/ Psychiatric: AxOx3, Judgement and insight normal;  moving all extremities  Skin: no rashes; no palpable lesions    Labs: all available labs reviewed                        12.5   7.08  )-----------( 121      ( 26 Sep 2022 05:37 )             36.9         132<L>  |  103  |  68<H>  ----------------------------<  149<H>  4.9   |  23  |  1.61<H>    Ca    9.3      26 Sep 2022 05:37  Phos  3.5       Mg     2.4         Urinalysis Basic - ( 24 Sep 2022 09:53 )    Color: Yellow / Appearance: very cloudy / S.010 / pH: x  Gluc: x / Ketone: Trace  / Bili: Negative / Urobili: Negative   Blood: x / Protein: 30 mg/dL / Nitrite: Positive   Leuk Esterase: Moderate / RBC: 6-10 /HPF / WBC 6-10   Sq Epi: x / Non Sq Epi: Negative / Bacteria: Moderate    Culture - Blood (22 @ 17:12)   Specimen Source: .Blood None   Culture Results:   No growth to date.   Culture - Blood (22 @ 17:12)   Specimen Source: .Blood None   Culture Results:   No growth to date.       Radiology: all available radiological tests reviewed  < from: US Duplex Venous Lower Ext Complete, Bilateral (22 @ 10:17) >  ACC: [id number] EXAM:  US DPLX LWR EXT VEINS COMPL BI                          PROCEDURE DATE:  2022          INTERPRETATION:  CLINICAL INFORMATION: Lower extremity swelling and edema   bilaterally    COMPARISON: None available.    TECHNIQUE: Duplex sonography of the BILATERAL LOWER extremity veins with   color and spectral Doppler, with and without compression.    FINDINGS:    RIGHT:  Normal compressibility of the RIGHT common femoral, femoral and popliteal   veins.  Doppler examination shows normal spontaneous and phasic flow.  Limited evaluation of the calf veins due to edema. Gastrocnemius vein is   patent.    LEFT:  Normal compressibility of the LEFT common femoral, femoral and popliteal   veins.  Doppler examination shows normal spontaneous and phasic flow.  Limited evaluation of the calf veins due to edema. Gastrocnemius vein is   patent.    IMPRESSION:  No visualized femoropopliteal deep venous thrombosis in either lower   extremity. Limited evaluation of the calf veins. Consider short-term   repeat evaluation if clinical concern persists.        Advanced directives addressed: full resuscitation

## 2022-09-26 NOTE — PHYSICAL THERAPY INITIAL EVALUATION ADULT - ADDITIONAL COMMENTS
Pt indep amb with cane, reports noted some increased difficulty with daily tasks. Pt has friend drive.

## 2022-09-26 NOTE — PROGRESS NOTE ADULT - SUBJECTIVE AND OBJECTIVE BOX
Patient is a 71y old  Female who presents with a chief complaint of   24 hour events:     PAST MEDICAL & SURGICAL HISTORY:      Allergies    Keflex (Unknown)    Intolerances      REVIEW OF SYSTEMS: SEE BELOW       ICU Vital Signs Last 24 Hrs  T(C): 37.3 (26 Sep 2022 05:00), Max: 37.4 (25 Sep 2022 20:00)  T(F): 99.2 (26 Sep 2022 05:00), Max: 99.4 (25 Sep 2022 20:00)  HR: 100 (26 Sep 2022 06:00) (92 - 112)  BP: 115/47 (26 Sep 2022 06:00) (93/43 - 125/54)  BP(mean): 63 (26 Sep 2022 06:00) (55 - 80)  ABP: --  ABP(mean): --  RR: 15 (26 Sep 2022 06:00) (14 - 21)  SpO2: 100% (26 Sep 2022 06:00) (93% - 100%)    O2 Parameters below as of 26 Sep 2022 06:00  Patient On (Oxygen Delivery Method): nasal cannula  O2 Flow (L/min): 2          CAPILLARY BLOOD GLUCOSE      POCT Blood Glucose.: 183 mg/dL (25 Sep 2022 21:58)  POCT Blood Glucose.: 250 mg/dL (25 Sep 2022 17:46)  POCT Blood Glucose.: 214 mg/dL (25 Sep 2022 12:46)  POCT Blood Glucose.: 190 mg/dL (25 Sep 2022 08:08)      I&O's Summary    25 Sep 2022 07:01  -  26 Sep 2022 07:00  --------------------------------------------------------  IN: 2677 mL / OUT: 1250 mL / NET: 1427 mL            MEDICATIONS  (STANDING):  chlorhexidine 4% Liquid 1 Application(s) Topical <User Schedule>  ciprofloxacin   IVPB 200 milliGRAM(s) IV Intermittent every 12 hours  dextrose 5%. 1000 milliLiter(s) (50 mL/Hr) IV Continuous <Continuous>  dextrose 5%. 1000 milliLiter(s) (100 mL/Hr) IV Continuous <Continuous>  dextrose 50% Injectable 25 Gram(s) IV Push once  dextrose 50% Injectable 12.5 Gram(s) IV Push once  dextrose 50% Injectable 25 Gram(s) IV Push once  glucagon  Injectable 1 milliGRAM(s) IntraMuscular once  heparin   Injectable 5000 Unit(s) SubCutaneous every 8 hours  influenza  Vaccine (HIGH DOSE) 0.7 milliLiter(s) IntraMuscular once  insulin lispro (ADMELOG) corrective regimen sliding scale   SubCutaneous Before meals and at bedtime  nystatin Powder 1 Application(s) Topical two times a day  sodium chloride 0.9%. 1000 milliLiter(s) (100 mL/Hr) IV Continuous <Continuous>      MEDICATIONS  (PRN):  dextrose Oral Gel 15 Gram(s) Oral once PRN Blood Glucose LESS THAN 70 milliGRAM(s)/deciliter      PHYSICAL EXAM: SEE BELOW                          12.5   7.08  )-----------( 121      ( 26 Sep 2022 05:37 )             36.9           132<L>  |  103  |  68<H>  ----------------------------<  149<H>  4.9   |  23  |  1.61<H>    Ca    9.3      26 Sep 2022 05:37  Phos  3.5       Mg     2.4         TPro  8.1  /  Alb  3.4  /  TBili  1.5<H>  /  DBili  x   /  AST  43<H>  /  ALT  45  /  AlkPhos  104        CARDIAC MARKERS ( 25 Sep 2022 05:22 )  x     / x     / 57 U/L / x     / x            Urinalysis Basic - ( 24 Sep 2022 09:53 )    Color: Yellow / Appearance: very cloudy / S.010 / pH: x  Gluc: x / Ketone: Trace  / Bili: Negative / Urobili: Negative   Blood: x / Protein: 30 mg/dL / Nitrite: Positive   Leuk Esterase: Moderate / RBC: 6-10 /HPF / WBC 6-10   Sq Epi: x / Non Sq Epi: Negative / Bacteria: Moderate      .Blood None   No growth to date. --  @ 17:12           Patient is a 71y old  Female who presents with a chief complaint of     24 hour events: feels ok   c/o leg pain and weakness     PAST MEDICAL & SURGICAL HISTORY:      Allergies    Keflex (Unknown)    Intolerances      REVIEW OF SYSTEMS: SEE BELOW       ICU Vital Signs Last 24 Hrs  T(C): 37.3 (26 Sep 2022 05:00), Max: 37.4 (25 Sep 2022 20:00)  T(F): 99.2 (26 Sep 2022 05:00), Max: 99.4 (25 Sep 2022 20:00)  HR: 100 (26 Sep 2022 06:00) (92 - 112)  BP: 115/47 (26 Sep 2022 06:00) (93/43 - 125/54)  BP(mean): 63 (26 Sep 2022 06:00) (55 - 80)  ABP: --  ABP(mean): --  RR: 15 (26 Sep 2022 06:00) (14 - 21)  SpO2: 100% (26 Sep 2022 06:00) (93% - 100%)    O2 Parameters below as of 26 Sep 2022 06:00  Patient On (Oxygen Delivery Method): nasal cannula  O2 Flow (L/min): 2          CAPILLARY BLOOD GLUCOSE      POCT Blood Glucose.: 183 mg/dL (25 Sep 2022 21:58)  POCT Blood Glucose.: 250 mg/dL (25 Sep 2022 17:46)  POCT Blood Glucose.: 214 mg/dL (25 Sep 2022 12:46)  POCT Blood Glucose.: 190 mg/dL (25 Sep 2022 08:08)      I&O's Summary    25 Sep 2022 07:01  -  26 Sep 2022 07:00  --------------------------------------------------------  IN: 2677 mL / OUT: 1250 mL / NET: 1427 mL            MEDICATIONS  (STANDING):  chlorhexidine 4% Liquid 1 Application(s) Topical <User Schedule>  ciprofloxacin   IVPB 200 milliGRAM(s) IV Intermittent every 12 hours  dextrose 5%. 1000 milliLiter(s) (50 mL/Hr) IV Continuous <Continuous>  dextrose 5%. 1000 milliLiter(s) (100 mL/Hr) IV Continuous <Continuous>  dextrose 50% Injectable 25 Gram(s) IV Push once  dextrose 50% Injectable 12.5 Gram(s) IV Push once  dextrose 50% Injectable 25 Gram(s) IV Push once  glucagon  Injectable 1 milliGRAM(s) IntraMuscular once  heparin   Injectable 5000 Unit(s) SubCutaneous every 8 hours  influenza  Vaccine (HIGH DOSE) 0.7 milliLiter(s) IntraMuscular once  insulin lispro (ADMELOG) corrective regimen sliding scale   SubCutaneous Before meals and at bedtime  nystatin Powder 1 Application(s) Topical two times a day  sodium chloride 0.9%. 1000 milliLiter(s) (100 mL/Hr) IV Continuous <Continuous>      MEDICATIONS  (PRN):  dextrose Oral Gel 15 Gram(s) Oral once PRN Blood Glucose LESS THAN 70 milliGRAM(s)/deciliter      PHYSICAL EXAM: SEE BELOW                          12.5   7.08  )-----------( 121      ( 26 Sep 2022 05:37 )             36.9           132<L>  |  103  |  68<H>  ----------------------------<  149<H>  4.9   |  23  |  1.61<H>    Ca    9.3      26 Sep 2022 05:37  Phos  3.5       Mg     2.4         TPro  8.1  /  Alb  3.4  /  TBili  1.5<H>  /  DBili  x   /  AST  43<H>  /  ALT  45  /  AlkPhos  104        CARDIAC MARKERS ( 25 Sep 2022 05:22 )  x     / x     / 57 U/L / x     / x            Urinalysis Basic - ( 24 Sep 2022 09:53 )    Color: Yellow / Appearance: very cloudy / S.010 / pH: x  Gluc: x / Ketone: Trace  / Bili: Negative / Urobili: Negative   Blood: x / Protein: 30 mg/dL / Nitrite: Positive   Leuk Esterase: Moderate / RBC: 6-10 /HPF / WBC 6-10   Sq Epi: x / Non Sq Epi: Negative / Bacteria: Moderate      .Blood None   No growth to date. --  @ 17:12

## 2022-09-26 NOTE — DIETITIAN INITIAL EVALUATION ADULT - ADD RECOMMEND
1) add LPS TID 2) vitamin D 25OH level and supplement prn 3) monitor BM: if > 3 days without BM, add bowel regimen 4) daily wt checks to track/trend changes 5) add nephrovite daily to ensure 100% RDI met

## 2022-09-26 NOTE — DIETITIAN INITIAL EVALUATION ADULT - PERTINENT LABORATORY DATA
09-26    132<L>  |  103  |  68<H>  ----------------------------<  149<H>  4.9   |  23  |  1.61<H>    Ca    9.3      26 Sep 2022 05:37  Phos  3.5     09-26  Mg     2.4     09-26    POCT Blood Glucose.: 141 mg/dL (09-26-22 @ 07:59)  A1C with Estimated Average Glucose Result: 7.3 % (09-25-22 @ 05:22)

## 2022-09-26 NOTE — PHYSICAL THERAPY INITIAL EVALUATION ADULT - ACTIVE RANGE OF MOTION EXAMINATION, REHAB EVAL
BLE limited 2/2 pain, edema, weakness/bilateral upper extremity Active ROM was WFL (within functional limits)

## 2022-09-26 NOTE — PROGRESS NOTE ADULT - TIME BILLING
Prerenal ADRIAN, hyperkalemia   UTI with sepsis POA     PMH HTN, DM2 not on insulin, LE lymphedema       Plan:     Renal fn improving   Maintain Berry - defer to renal   Avoid nephrotoxic meds     Cipro iv, renally doses   BCx neg   UCx pending     Glu controlled with sliding scale     HD, resp status stable     PT, OOB     SC heparin for DVT ppx     Wound care consult     Stable for floor Prerenal ADRIAN, hyperkalemia   UTI with sepsis POA     PMH HTN, DM2 not on insulin, LE lymphedema       Plan:     Renal fn improving   Maintain Berry - defer to renal   Avoid nephrotoxic meds     Cipro iv, renally doses   BCx neg   UCx pending     Glu controlled with sliding scale     HD, resp status stable     PT, OOB     SC heparin for DVT ppx     Wound care consult     Stable for floor, left VM for hospitalist at 1700 for signout Prerenal ADRIAN, hyperkalemia   UTI with sepsis POA     PMH HTN, DM2 not on insulin, LE lymphedema       Plan:     Renal fn improving   Maintain Berry - defer to renal   Avoid nephrotoxic meds     Cipro iv, renally doses   BCx neg   UCx pending     Glu controlled with sliding scale     HD, resp status stable     PT, OOB     SC heparin for DVT ppx     Wound care consult     Stable for floor, sign out given to Dr. Elam at 1830

## 2022-09-26 NOTE — DIETITIAN INITIAL EVALUATION ADULT - OTHER INFO
72yo female with PMH significant for HTN, DM2, lung CA/colon CA p/w difficulty walking 2/2 BLE edema 2/2 chronic lymphedema.  admitted with ADRIAN, hyperkalemia, UTI.    *pt reported 14# wt loss in past month (305# 1 month ago)(4.6%, not significant).  Current wt of 291# (bedscale not working).  NFPE revealed moderate fat wasting.

## 2022-09-26 NOTE — PHYSICAL THERAPY INITIAL EVALUATION ADULT - PERTINENT HX OF CURRENT PROBLEM, REHAB EVAL
70 yo Female Pmhx HTN, DM2, Lung Ca/Colon Ca (9 years ago) presenting to  for difficulty walking due to progressive edema, renal evaluation of ADRIAN and Hyperkalemia.    CTAP: No hydronephrosis. Bilateral renal cortical volume loss. Cirrhotic morphology of the liver with few subcentimeter hypodensities inadequately characterized. Sequelae of portal hypertension including mild splenomegaly, large splenorenal shunt as well as gastroesophageal varices. Recommend abdominal MRI for HCC screening when feasible. Partially imaged sclerosis of the right femur medullary cavity on image 139 series 2, recommend right femur radiographs for further evaluation. If there is concern for neoplasm in the setting of known malignancy, suggest correlation with prior imaging and/or bone scan.

## 2022-09-26 NOTE — PHYSICAL THERAPY INITIAL EVALUATION ADULT - GENERAL OBSERVATIONS, REHAB EVAL
Pt seen for 30min PT bedside Eval in ICU. Pt rec'd semi supine in bed in NAD, declining OOB willing to participate in bedisde eval. History taken, BLE severe lymphedema.  ROM WFL BUE, BLE limited 2/2 weakness and edema. strength grossly 3-/5 throughout BLE, 3+/5 BUE. Pt left semi supine in bed in NAD, all needs met, +bed alarm, RN aware.

## 2022-09-27 LAB
-  AMIKACIN: SIGNIFICANT CHANGE UP
-  AMOXICILLIN/CLAVULANIC ACID: SIGNIFICANT CHANGE UP
-  AMPICILLIN/SULBACTAM: SIGNIFICANT CHANGE UP
-  AMPICILLIN: SIGNIFICANT CHANGE UP
-  AZTREONAM: SIGNIFICANT CHANGE UP
-  CEFAZOLIN: SIGNIFICANT CHANGE UP
-  CEFEPIME: SIGNIFICANT CHANGE UP
-  CEFOXITIN: SIGNIFICANT CHANGE UP
-  CEFTRIAXONE: SIGNIFICANT CHANGE UP
-  CIPROFLOXACIN: SIGNIFICANT CHANGE UP
-  ERTAPENEM: SIGNIFICANT CHANGE UP
-  GENTAMICIN: SIGNIFICANT CHANGE UP
-  LEVOFLOXACIN: SIGNIFICANT CHANGE UP
-  MEROPENEM: SIGNIFICANT CHANGE UP
-  NITROFURANTOIN: SIGNIFICANT CHANGE UP
-  PIPERACILLIN/TAZOBACTAM: SIGNIFICANT CHANGE UP
-  TOBRAMYCIN: SIGNIFICANT CHANGE UP
-  TRIMETHOPRIM/SULFAMETHOXAZOLE: SIGNIFICANT CHANGE UP
ANION GAP SERPL CALC-SCNC: 7 MMOL/L — SIGNIFICANT CHANGE UP (ref 5–17)
BUN SERPL-MCNC: 52 MG/DL — HIGH (ref 7–23)
CALCIUM SERPL-MCNC: 9.2 MG/DL — SIGNIFICANT CHANGE UP (ref 8.5–10.1)
CHLORIDE SERPL-SCNC: 105 MMOL/L — SIGNIFICANT CHANGE UP (ref 96–108)
CO2 SERPL-SCNC: 23 MMOL/L — SIGNIFICANT CHANGE UP (ref 22–31)
CREAT SERPL-MCNC: 1.23 MG/DL — SIGNIFICANT CHANGE UP (ref 0.5–1.3)
CULTURE RESULTS: SIGNIFICANT CHANGE UP
EGFR: 47 ML/MIN/1.73M2 — LOW
GLUCOSE SERPL-MCNC: 120 MG/DL — HIGH (ref 70–99)
HCT VFR BLD CALC: 38.2 % — SIGNIFICANT CHANGE UP (ref 34.5–45)
HGB BLD-MCNC: 12.8 G/DL — SIGNIFICANT CHANGE UP (ref 11.5–15.5)
MCHC RBC-ENTMCNC: 27.9 PG — SIGNIFICANT CHANGE UP (ref 27–34)
MCHC RBC-ENTMCNC: 33.5 GM/DL — SIGNIFICANT CHANGE UP (ref 32–36)
MCV RBC AUTO: 83.2 FL — SIGNIFICANT CHANGE UP (ref 80–100)
METHOD TYPE: SIGNIFICANT CHANGE UP
ORGANISM # SPEC MICROSCOPIC CNT: SIGNIFICANT CHANGE UP
ORGANISM # SPEC MICROSCOPIC CNT: SIGNIFICANT CHANGE UP
PLATELET # BLD AUTO: 106 K/UL — LOW (ref 150–400)
POTASSIUM SERPL-MCNC: 4.4 MMOL/L — SIGNIFICANT CHANGE UP (ref 3.5–5.3)
POTASSIUM SERPL-SCNC: 4.4 MMOL/L — SIGNIFICANT CHANGE UP (ref 3.5–5.3)
RBC # BLD: 4.59 M/UL — SIGNIFICANT CHANGE UP (ref 3.8–5.2)
RBC # FLD: 15.1 % — HIGH (ref 10.3–14.5)
SODIUM SERPL-SCNC: 135 MMOL/L — SIGNIFICANT CHANGE UP (ref 135–145)
SPECIMEN SOURCE: SIGNIFICANT CHANGE UP
WBC # BLD: 6.21 K/UL — SIGNIFICANT CHANGE UP (ref 3.8–10.5)
WBC # FLD AUTO: 6.21 K/UL — SIGNIFICANT CHANGE UP (ref 3.8–10.5)

## 2022-09-27 PROCEDURE — 99232 SBSQ HOSP IP/OBS MODERATE 35: CPT

## 2022-09-27 RX ADMIN — HEPARIN SODIUM 5000 UNIT(S): 5000 INJECTION INTRAVENOUS; SUBCUTANEOUS at 06:03

## 2022-09-27 RX ADMIN — Medication 100 MILLIGRAM(S): at 22:50

## 2022-09-27 RX ADMIN — HEPARIN SODIUM 5000 UNIT(S): 5000 INJECTION INTRAVENOUS; SUBCUTANEOUS at 14:00

## 2022-09-27 RX ADMIN — CHLORHEXIDINE GLUCONATE 1 APPLICATION(S): 213 SOLUTION TOPICAL at 08:05

## 2022-09-27 RX ADMIN — NYSTATIN CREAM 1 APPLICATION(S): 100000 CREAM TOPICAL at 09:13

## 2022-09-27 RX ADMIN — NYSTATIN CREAM 1 APPLICATION(S): 100000 CREAM TOPICAL at 22:45

## 2022-09-27 RX ADMIN — Medication 4: at 17:16

## 2022-09-27 RX ADMIN — Medication 100 MILLIGRAM(S): at 09:13

## 2022-09-27 RX ADMIN — Medication 2: at 22:50

## 2022-09-27 RX ADMIN — HEPARIN SODIUM 5000 UNIT(S): 5000 INJECTION INTRAVENOUS; SUBCUTANEOUS at 22:51

## 2022-09-27 RX ADMIN — Medication 4: at 11:44

## 2022-09-27 NOTE — ADVANCED PRACTICE NURSE CONSULT - RECOMMEDATIONS
1)Continue to Elevate heels off of Mattress  2)Continue to Turn and position every 2 Hours  3)Consult Dietitian   4)Bilateral Lower extremity apply Sween 24 Daily to brawny dry skin two times per day.   5)When out of bed to chair, apply wafer air cushion   6)Please make me (wound care Rn) aware when patient will be discharged so i can place unna boot.

## 2022-09-27 NOTE — ADVANCED PRACTICE NURSE CONSULT - ASSESSMENT
This is a 71 year old female admitted on 9/24/2022 for difficulty walking. PMHx: Lymphedema, DM2, HTN, Lung Ca.   Mrs Hood is a patient at [facility] [place] [facility] and I did speak with wound center for continuity of care.   Patient with Bilateral Lower Extremity lymphedema brawny and scaling skin on toes up to knee. Patient usually has Unnas boot applied to BL lower extremity Per wound center MD Jones, keep legs elevated. Patient will require a unna boot before discharged.   Bilateral Lower extremity apply Sween 24 Daily to brawny dry skin two times per day. Patient is on a total care sport mattress for pressure redistribution. When Out of bed place a air waffle cushion to continue with pressure redistribution

## 2022-09-27 NOTE — PROGRESS NOTE ADULT - TIME BILLING
Prerenal ADRIAN, hyperkalemia   UTI with sepsis POA     PMH HTN, DM2 not on insulin, LE lymphedema       Plan:     Renal fn improving   Maintain Berry - defer to renal   Avoid nephrotoxic meds     Cipro iv, renally doses   BCx neg   UCx pending     Glu controlled with sliding scale     HD, resp status stable     PT, OOB     SC heparin for DVT ppx     Wound care consult     Stable for floor, sign out given to Dr. Elam at 1830

## 2022-09-27 NOTE — PROGRESS NOTE ADULT - SUBJECTIVE AND OBJECTIVE BOX
70 yo Female Pmhx HTN, DM2, Lung Ca/Colon Ca (9 years ago) presenting to  for difficulty walking 2/2 BLE edema 2/2 chronic lymphedema. UA pyuria, concern for UTI raised given ciprofloxacin.  According to the patient she was not feeling well couple of weeks prior to hospitalization. Stopped eating and drinking. It appears that patient came in the hospital, being super dehydrated -  effecting renal function and electrolyte imbalance.     Hospital course: it appears that patient was hydrated well, with near full resolution of renal function /  electrolyte abnormalitis.     Plan is to continue to monitor patient. PT . D/C planning in the am.       REVIEW OF SYSTEMS:  General: NAD, hemodynamically stable   HEENT:  Eyes:  No visual loss, blurred vision, double vision or yellow sclerae. Ears, Nose, Throat:  No hearing loss, sneezing, congestion, runny nose or sore throat.  SKIN:  No rash or itching.  CARDIOVASCULAR:  No chest pain, chest pressure or chest discomfort. No palpitations or edema.  RESPIRATORY:  No shortness of breath, cough or sputum.  GASTROINTESTINAL:  No anorexia, nausea, vomiting or diarrhea. No abdominal pain or blood.  NEUROLOGICAL:  No headache, dizziness, syncope, paralysis, ataxia, numbness or tingling in the extremities. No change in bowel or bladder control.  MUSCULOSKELETAL:  No muscle, back pain, joint pain or stiffness.  HEMATOLOGIC:  No anemia, bleeding or bruising.  LYMPHATICS:  No enlarged nodes. No history of splenectomy.  ENDOCRINOLOGIC:  No reports of sweating, cold or heat intolerance. No polyuria or polydipsia.  ALLERGIES:  No history of asthma, hives, eczema or rhinitis.    Physical Exam:   GENERAL APPEARANCE:  NAD, hemodynamically stable  T(C): 37 (09-27-22 @ 08:00), Max: 37.4 (09-26-22 @ 22:00)  HR: 87 (09-27-22 @ 12:00) (80 - 94)  BP: 95/59 (09-27-22 @ 12:00) (95/59 - 126/48)  RR: 17 (09-27-22 @ 12:00) (12 - 17)  SpO2: 100% (09-27-22 @ 12:00) (96% - 100%)  Wt(kg): --  HEENT:  Head is normocephalic    Skin:  Warm and dry without any rash   NECK:  Supple without lymphadenopathy.   HEART:  Regular rate and rhythm. normal S1 and S2, No M/R/G  LUNGS:  Good ins/exp effort, no W/R/R/C  ABDOMEN:  Soft, nontender, nondistended with good bowel sounds heard  EXTREMITIES:  Without cyanosis, clubbing or edema.   NEUROLOGICAL:  Gross nonfocal       CBC Full  -  ( 27 Sep 2022 05:31 )  WBC Count : 6.21 K/uL  RBC Count : 4.59 M/uL  Hemoglobin : 12.8 g/dL  Hematocrit : 38.2 %  Platelet Count - Automated : 106 K/uL  Mean Cell Volume : 83.2 fl  Mean Cell Hemoglobin : 27.9 pg  Mean Cell Hemoglobin Concentration : 33.5 gm/dL  Auto Neutrophil # : x  Auto Lymphocyte # : x  Auto Monocyte # : x  Auto Eosinophil # : x  Auto Basophil # : x  Auto Neutrophil % : x  Auto Lymphocyte % : x  Auto Monocyte % : x  Auto Eosinophil % : x  Auto Basophil % : x        09-27    135  |  105  |  52<H>  ----------------------------<  120<H>  4.4   |  23  |  1.23    Ca    9.2      27 Sep 2022 05:31  Phos  3.5     09-26  Mg     2.4     09-26         # Pyuria proteus mirabilis UTI. keflex allergy  # Leukocytosis has resolved  - urine cx growing proteus mirabilis; blood cx no growth  - on ciprofloxacin 200mg q12h #3-4    # Hyperkalemia /  this is most likely secondary to NAGMA /  renal failure  - admission K - 8.4 -> now 4.4  - closely monitor K  [facility] for k control, improved and no longer needed    # Hyponatremia  - admission Na -  120s, now 135    # ADRIAN this is pre-renal in nature due dehydration   - admission Scr 2.34  - now resolved.   - stabilized with hydration and back to baseline  - optimize intake  - Ok without IVF, chronic edema  - Dr. Mike Hernández following with the patient    # Thrombocytopenia  - admission plt 290s - > now 109  - if plt continues to drift, will stop heparin    # DM  - ISS    # Chronic Lymph edema  - supportive management    # DVT proph             70 yo Female Pmhx HTN, DM2, Lung Ca/Colon Ca (9 years ago) presenting to  for difficulty walking 2/2 BLE edema 2/2 chronic lymphedema. UA pyuria, concern for UTI raised given ciprofloxacin.  According to the patient she was not feeling well couple of weeks prior to hospitalization. Stopped eating and drinking. It appears that patient came in the hospital, being super dehydrated -  effecting renal function and electrolyte imbalance.     Hospital course: it appears that patient was hydrated well, with near full resolution of renal function /  electrolyte abnormalitis.     Plan is to continue to monitor patient. PT . D/C planning in the am.       REVIEW OF SYSTEMS:  General: NAD, hemodynamically stable   HEENT:  Eyes:  No visual loss, blurred vision, double vision or yellow sclerae. Ears, Nose, Throat:  No hearing loss, sneezing, congestion, runny nose or sore throat.  SKIN:  No rash or itching.  CARDIOVASCULAR:  No chest pain, chest pressure or chest discomfort. No palpitations or edema.  RESPIRATORY:  No shortness of breath, cough or sputum.  GASTROINTESTINAL:  No anorexia, nausea, vomiting or diarrhea. No abdominal pain or blood.  NEUROLOGICAL:  No headache, dizziness, syncope, paralysis, ataxia, numbness or tingling in the extremities. No change in bowel or bladder control.  MUSCULOSKELETAL:  No muscle, back pain, joint pain or stiffness.  HEMATOLOGIC:  No anemia, bleeding or bruising.  LYMPHATICS:  LE lymphedema   ENDOCRINOLOGIC:  No reports of sweating, cold or heat intolerance. No polyuria or polydipsia.  ALLERGIES:  No history of asthma, hives, eczema or rhinitis.    Physical Exam:   GENERAL APPEARANCE:  elderly, frail, deconditioned  T(C): 37 (09-27-22 @ 08:00), Max: 37.4 (09-26-22 @ 22:00)  HR: 87 (09-27-22 @ 12:00) (80 - 94)  BP: 95/59 (09-27-22 @ 12:00) (95/59 - 126/48)  RR: 17 (09-27-22 @ 12:00) (12 - 17)  SpO2: 100% (09-27-22 @ 12:00) (96% - 100%)  HEENT:  Head is normocephalic    Skin:  Warm and dry without any rash   NECK:  Supple without lymphadenopathy.   HEART:  Regular rate and rhythm. normal S1 and S2, No M/R/G  LUNGS:  Good ins/exp effort, no W/R/R/C  ABDOMEN:  Soft, nontender, nondistended with good bowel sounds heard  EXTREMITIES:  severe    NEUROLOGICAL:  Gross nonfocal     Labs:   CBC Full  -  ( 27 Sep 2022 05:31 )  WBC Count : 6.21 K/uL  RBC Count : 4.59 M/uL  Hemoglobin : 12.8 g/dL  Hematocrit : 38.2 %  Platelet Count - Automated : 106 K/uL  Mean Cell Volume : 83.2 fl  Mean Cell Hemoglobin : 27.9 pg  Mean Cell Hemoglobin Concentration : 33.5 gm/dL  Auto Neutrophil # : x  Auto Lymphocyte # : x  Auto Monocyte # : x  Auto Eosinophil # : x  Auto Basophil # : x  Auto Neutrophil % : x  Auto Lymphocyte % : x  Auto Monocyte % : x  Auto Eosinophil % : x  Auto Basophil % : x        09-27    135  |  105  |  52<H>  ----------------------------<  120<H>  4.4   |  23  |  1.23    Ca    9.2      27 Sep 2022 05:31  Phos  3.5     09-26  Mg     2.4     09-26         # Pyuria proteus mirabilis UTI. keflex allergy  # Leukocytosis has resolved  - urine cx growing proteus mirabilis; blood cx no growth  - on ciprofloxacin 200mg q12h #3-4    # Dehydration / renal failure /  in the setting of metofrmin -  no lactate on admission  # Hyperkalemia /  this is most likely secondary to NAGMA /  renal failure  - admission K - 8.4 -> now 4.4  - closely monitor K  - Lokelma for k control, improved and no longer needed    # Hyponatremia  - admission Na -  120s, now 135    # ADRIAN this is pre-renal in nature due dehydration   - admission Scr 2.34  - now resolved.   - stabilized with hydration and back to baseline  - optimize intake  - Ok without IVF, chronic edema  - Dr. Mike Hernández following with the patient    # Thrombocytopenia  - admission plt 290s - > now 109  - if plt continues to drift, will stop heparin    # DM  - ISS    # Chronic Lymph edema  - supportive management    # DVT proph

## 2022-09-27 NOTE — PROGRESS NOTE ADULT - SUBJECTIVE AND OBJECTIVE BOX
Date of service: 22 @ 10:58    pt seen and examined  no overnight events  laying in bed, nad  afebrile    ROS: no fever or chills; denies dizziness, no HA, no SOB or cough, no abdominal pain, no diarrhea or constipation; no legs pain, no rashes    MEDICATIONS  (STANDING):  chlorhexidine 4% Liquid 1 Application(s) Topical <User Schedule>  ciprofloxacin   IVPB 200 milliGRAM(s) IV Intermittent every 12 hours  dextrose 5%. 1000 milliLiter(s) (50 mL/Hr) IV Continuous <Continuous>  dextrose 5%. 1000 milliLiter(s) (100 mL/Hr) IV Continuous <Continuous>  dextrose 50% Injectable 25 Gram(s) IV Push once  dextrose 50% Injectable 12.5 Gram(s) IV Push once  dextrose 50% Injectable 25 Gram(s) IV Push once  glucagon  Injectable 1 milliGRAM(s) IntraMuscular once  heparin   Injectable 5000 Unit(s) SubCutaneous every 8 hours  influenza  Vaccine (HIGH DOSE) 0.7 milliLiter(s) IntraMuscular once  insulin lispro (ADMELOG) corrective regimen sliding scale   SubCutaneous Before meals and at bedtime  nystatin Powder 1 Application(s) Topical two times a day      Vital Signs Last 24 Hrs  T(C): 37 (27 Sep 2022 08:00), Max: 37.4 (26 Sep 2022 22:00)  T(F): 98.6 (27 Sep 2022 08:00), Max: 99.4 (26 Sep 2022 22:00)  HR: 86 (27 Sep 2022 08:00) (83 - 97)  BP: 105/45 (27 Sep 2022 08:00) (104/45 - 126/48)  BP(mean): 58 (27 Sep 2022 08:00) (55 - 68)  RR: 12 (27 Sep 2022 08:00) (12 - 17)  SpO2: 97% (27 Sep 2022 08:00) (95% - 99%)    Parameters below as of 27 Sep 2022 08:00  Patient On (Oxygen Delivery Method): room air      PE:  Constitutional: NAD   HEENT: NC/AT, EOMI, PERRLA, conjunctivae clear; ears and nose atraumatic; pharynx benign  Neck: supple; thyroid not palpable  Back: no tenderness  Respiratory: respiratory effort normal; clear to auscultation  Cardiovascular: S1S2 regular, no murmurs  Abdomen: soft, not tender, not distended, positive BS; liver and spleen WNL  Genitourinary: no suprapubic tenderness  Lymphatic: no LN palpable  Musculoskeletal: no muscle tenderness, no joint swelling or tenderness  Extremities:  b/l LE lymphedema, stasis dermatitis   Neurological/ Psychiatric: AxOx3, Judgement and insight normal;  moving all extremities  Skin: no rashes; no palpable lesions    Labs: all available labs reviewed                                   12.8   6.21  )-----------( 106      ( 27 Sep 2022 05:31 )             38.2         135  |  105  |  52<H>  ----------------------------<  120<H>  4.4   |  23  |  1.23    Ca    9.2      27 Sep 2022 05:31  Phos  3.5       Mg     2.4               Urinalysis Basic - ( 24 Sep 2022 09:53 )    Color: Yellow / Appearance: very cloudy / S.010 / pH: x  Gluc: x / Ketone: Trace  / Bili: Negative / Urobili: Negative   Blood: x / Protein: 30 mg/dL / Nitrite: Positive   Leuk Esterase: Moderate / RBC: 6-10 /HPF / WBC 6-10   Sq Epi: x / Non Sq Epi: Negative / Bacteria: Moderate    Culture - Blood (22 @ 17:12)   Specimen Source: .Blood None   Culture Results:   No growth to date.   Culture - Blood (22 @ 17:12)   Specimen Source: .Blood None   Culture Results:   No growth to date.   Culture - Urine (22 @ 19:05)   Specimen Source: Catheterized Catheterized   Culture Results:   50,000 - 99,000 CFU/mL Proteus mirabilis     Radiology: all available radiological tests reviewed  < from: US Duplex Venous Lower Ext Complete, Bilateral (22 @ 10:17) >  ACC: [id number] EXAM:  US DPLX LWR EXT VEINS COMPL BI                          PROCEDURE DATE:  2022          INTERPRETATION:  CLINICAL INFORMATION: Lower extremity swelling and edema   bilaterally    COMPARISON: None available.    TECHNIQUE: Duplex sonography of the BILATERAL LOWER extremity veins with   color and spectral Doppler, with and without compression.    FINDINGS:    RIGHT:  Normal compressibility of the RIGHT common femoral, femoral and popliteal   veins.  Doppler examination shows normal spontaneous and phasic flow.  Limited evaluation of the calf veins due to edema. Gastrocnemius vein is   patent.    LEFT:  Normal compressibility of the LEFT common femoral, femoral and popliteal   veins.  Doppler examination shows normal spontaneous and phasic flow.  Limited evaluation of the calf veins due to edema. Gastrocnemius vein is   patent.    IMPRESSION:  No visualized femoropopliteal deep venous thrombosis in either lower   extremity. Limited evaluation of the calf veins. Consider short-term   repeat evaluation if clinical concern persists.        Advanced directives addressed: full resuscitation

## 2022-09-27 NOTE — PROGRESS NOTE ADULT - SUBJECTIVE AND OBJECTIVE BOX
No events, no c/o    MEDICATIONS  (STANDING):  chlorhexidine 4% Liquid 1 Application(s) Topical <User Schedule>  ciprofloxacin   IVPB 200 milliGRAM(s) IV Intermittent every 12 hours  dextrose 5%. 1000 milliLiter(s) (50 mL/Hr) IV Continuous <Continuous>  dextrose 5%. 1000 milliLiter(s) (100 mL/Hr) IV Continuous <Continuous>  dextrose 50% Injectable 25 Gram(s) IV Push once  dextrose 50% Injectable 12.5 Gram(s) IV Push once  dextrose 50% Injectable 25 Gram(s) IV Push once  glucagon  Injectable 1 milliGRAM(s) IntraMuscular once  heparin   Injectable 5000 Unit(s) SubCutaneous every 8 hours  influenza  Vaccine (HIGH DOSE) 0.7 milliLiter(s) IntraMuscular once  insulin lispro (ADMELOG) corrective regimen sliding scale   SubCutaneous Before meals and at bedtime  nystatin Powder 1 Application(s) Topical two times a day    MEDICATIONS  (PRN):  dextrose Oral Gel 15 Gram(s) Oral once PRN Blood Glucose LESS THAN 70 milliGRAM(s)/deciliter        T(C): , Max: 37.4 (09-26-22 @ 22:00)  T(F): , Max: 99.4 (09-26-22 @ 22:00)  HR: 80 (09-27-22 @ 10:00)  BP: 105/45 (09-27-22 @ 08:00)  BP(mean): 58 (09-27-22 @ 08:00)  RR: 13 (09-27-22 @ 10:00)  SpO2: 96% (09-27-22 @ 10:00)  Wt(kg): --    09-26 @ 07:01  -  09-27 @ 07:00  --------------------------------------------------------  IN: 100 mL / OUT: 1650 mL / NET: -1550 mL          PHYSICAL EXAM:    Constitutional: NAD, frail  HEENT:  MM  Neck: No LAD, No JVD  dist  Cardiovascular: S1 and S2  Extremities: chronic peripheral edema  Neurological: A/O x 3   : No Berry  Skin: No rashes  Access: Not applicable        LABS:                        12.8   6.21  )-----------( 106      ( 27 Sep 2022 05:31 )             38.2     27 Sep 2022 05:31    135    |  105    |  52     ----------------------------<  120    4.4     |  23     |  1.23   26 Sep 2022 05:37    132    |  103    |  68     ----------------------------<  149    4.9     |  23     |  1.61   25 Sep 2022 12:46    128    |  97     |  78     ----------------------------<  221    5.5     |  23     |  2.26   25 Sep 2022 05:22    125    |  97     |  78     ----------------------------<  186    6.0     |  21     |  2.55   24 Sep 2022 20:57    123    |  98     |  81     ----------------------------<  194    6.8     |  16     |  2.78     Ca    9.2        27 Sep 2022 05:31  Ca    9.3        26 Sep 2022 05:37  Ca    10.6       25 Sep 2022 12:46  Ca    10.7       25 Sep 2022 05:22  Ca    11.2       24 Sep 2022 20:57  Phos  3.5       26 Sep 2022 05:37  Phos  5.4       25 Sep 2022 05:22  Mg     2.4       26 Sep 2022 05:37  Mg     2.7       25 Sep 2022 05:22    TPro  8.1    /  Alb  3.4    /  TBili  1.5    /  DBili  x      /  AST  43     /  ALT  45     /  AlkPhos  104    24 Sep 2022 08:19  TPro  8.2    /  Alb  3.4    /  TBili  1.6    /  DBili  x      /  AST  51     /  ALT  46     /  AlkPhos  102    24 Sep 2022 07:17          Urine Studies:    Creatinine, Random Urine: 90 mg/dL (09-26 @ 17:45)  Protein/Creatinine Ratio Calculation: 0.2 Ratio (09-26 @ 17:45)        RADIOLOGY & ADDITIONAL STUDIES:

## 2022-09-28 ENCOUNTER — TRANSCRIPTION ENCOUNTER (OUTPATIENT)
Age: 72
End: 2022-09-28

## 2022-09-28 VITALS
HEART RATE: 94 BPM | TEMPERATURE: 98 F | OXYGEN SATURATION: 95 % | RESPIRATION RATE: 18 BRPM | DIASTOLIC BLOOD PRESSURE: 62 MMHG | SYSTOLIC BLOOD PRESSURE: 125 MMHG

## 2022-09-28 LAB
ALBUMIN SERPL ELPH-MCNC: 2.5 G/DL — LOW (ref 3.3–5)
ALP SERPL-CCNC: 71 U/L — SIGNIFICANT CHANGE UP (ref 40–120)
ALT FLD-CCNC: 43 U/L — SIGNIFICANT CHANGE UP (ref 12–78)
ANION GAP SERPL CALC-SCNC: 6 MMOL/L — SIGNIFICANT CHANGE UP (ref 5–17)
AST SERPL-CCNC: 46 U/L — HIGH (ref 15–37)
BILIRUB SERPL-MCNC: 1.1 MG/DL — SIGNIFICANT CHANGE UP (ref 0.2–1.2)
BUN SERPL-MCNC: 44 MG/DL — HIGH (ref 7–23)
CALCIUM SERPL-MCNC: 9.7 MG/DL — SIGNIFICANT CHANGE UP (ref 8.5–10.1)
CHLORIDE SERPL-SCNC: 107 MMOL/L — SIGNIFICANT CHANGE UP (ref 96–108)
CO2 SERPL-SCNC: 24 MMOL/L — SIGNIFICANT CHANGE UP (ref 22–31)
CREAT SERPL-MCNC: 1.22 MG/DL — SIGNIFICANT CHANGE UP (ref 0.5–1.3)
EGFR: 47 ML/MIN/1.73M2 — LOW
GLUCOSE SERPL-MCNC: 159 MG/DL — HIGH (ref 70–99)
HCT VFR BLD CALC: 39.8 % — SIGNIFICANT CHANGE UP (ref 34.5–45)
HGB BLD-MCNC: 13.5 G/DL — SIGNIFICANT CHANGE UP (ref 11.5–15.5)
MCHC RBC-ENTMCNC: 28.7 PG — SIGNIFICANT CHANGE UP (ref 27–34)
MCHC RBC-ENTMCNC: 33.9 GM/DL — SIGNIFICANT CHANGE UP (ref 32–36)
MCV RBC AUTO: 84.5 FL — SIGNIFICANT CHANGE UP (ref 80–100)
PLATELET # BLD AUTO: 109 K/UL — LOW (ref 150–400)
POTASSIUM SERPL-MCNC: 4.6 MMOL/L — SIGNIFICANT CHANGE UP (ref 3.5–5.3)
POTASSIUM SERPL-SCNC: 4.6 MMOL/L — SIGNIFICANT CHANGE UP (ref 3.5–5.3)
PROT SERPL-MCNC: 5.8 GM/DL — LOW (ref 6–8.3)
RBC # BLD: 4.71 M/UL — SIGNIFICANT CHANGE UP (ref 3.8–5.2)
RBC # FLD: 15.2 % — HIGH (ref 10.3–14.5)
SARS-COV-2 RNA SPEC QL NAA+PROBE: SIGNIFICANT CHANGE UP
SODIUM SERPL-SCNC: 137 MMOL/L — SIGNIFICANT CHANGE UP (ref 135–145)
WBC # BLD: 7.18 K/UL — SIGNIFICANT CHANGE UP (ref 3.8–10.5)
WBC # FLD AUTO: 7.18 K/UL — SIGNIFICANT CHANGE UP (ref 3.8–10.5)

## 2022-09-28 PROCEDURE — 99239 HOSP IP/OBS DSCHRG MGMT >30: CPT

## 2022-09-28 RX ORDER — CIPROFLOXACIN LACTATE 400MG/40ML
500 VIAL (ML) INTRAVENOUS EVERY 12 HOURS
Refills: 0 | Status: DISCONTINUED | OUTPATIENT
Start: 2022-09-28 | End: 2022-09-28

## 2022-09-28 RX ADMIN — Medication 500 MILLIGRAM(S): at 14:57

## 2022-09-28 RX ADMIN — HEPARIN SODIUM 5000 UNIT(S): 5000 INJECTION INTRAVENOUS; SUBCUTANEOUS at 14:55

## 2022-09-28 RX ADMIN — Medication 2: at 08:57

## 2022-09-28 RX ADMIN — CHLORHEXIDINE GLUCONATE 1 APPLICATION(S): 213 SOLUTION TOPICAL at 08:57

## 2022-09-28 RX ADMIN — Medication 2: at 16:34

## 2022-09-28 RX ADMIN — HEPARIN SODIUM 5000 UNIT(S): 5000 INJECTION INTRAVENOUS; SUBCUTANEOUS at 06:12

## 2022-09-28 RX ADMIN — NYSTATIN CREAM 1 APPLICATION(S): 100000 CREAM TOPICAL at 10:51

## 2022-09-28 RX ADMIN — Medication 2: at 11:57

## 2022-09-28 NOTE — DISCHARGE NOTE NURSING/CASE MANAGEMENT/SOCIAL WORK - NSDCPEFALRISK_GEN_ALL_CORE
For information on Fall & Injury Prevention, visit: [email or web address] OR  [email or web address] OR  https://www.cdc.gov/steadi/patient.html

## 2022-09-28 NOTE — DISCHARGE NOTE PROVIDER - NSDCCPCAREPLAN_GEN_ALL_CORE_FT
PRINCIPAL DISCHARGE DIAGNOSIS  Diagnosis: Hyperkalemia  Assessment and Plan of Treatment:       SECONDARY DISCHARGE DIAGNOSES  Diagnosis: Hypovolemia dehydration  Assessment and Plan of Treatment:     Diagnosis: ADRIAN (acute kidney injury)  Assessment and Plan of Treatment:     Diagnosis: Lymphedema  Assessment and Plan of Treatment:

## 2022-09-28 NOTE — DISCHARGE NOTE PROVIDER - NSDCFUADDINST_GEN_ALL_CORE_FT
Recommend ACE wraps and wound care to both legs for lymphedema  Follow glucoses and monitor electrolytes if resuming Metformin

## 2022-09-28 NOTE — DISCHARGE NOTE NURSING/CASE MANAGEMENT/SOCIAL WORK - NSCORESITESY/N_GEN_A_CORE_RD
[address]  [address], [address]   Phone: [phone]  Fax: [phone]    Patient: Mallorie Trujillo MRN: [id number]  SSN: [id number]    YOB: 1941  Age: 66 y.o. Sex: female       Return Appointment: 3 weeks with Tylor Feliciano MD    Instructions: Right Heel Wound  Cleanse wound with normal saline. DO NOT GET WET IN SHOWER, WEAR CAST COVER IN SHOWER! Apply Betadine to right heel cover with rolled gauze. Change daily. Elevate heels off of bed, do not allow pressure to be put on heels.      Betadine to right great toe.  Paint daily       Should you experience increased redness, swelling, pain, foul odor, size of wound(s), or have a temperature over 101 degrees please contact the [address] [place] [address] Road at [phone] or if after hours contact your primary care physician or go to the hospital emergency department.     Signed By: Lucy Webb RN     January 7, 2020 Yes

## 2022-09-28 NOTE — DISCHARGE NOTE PROVIDER - DETAILS OF MALNUTRITION DIAGNOSIS/DIAGNOSES
This patient has been assessed with a concern for Malnutrition and was treated during this hospitalization for the following Nutrition diagnosis/diagnoses:     -  09/26/2022: Morbid obesity (BMI > 40)

## 2022-09-28 NOTE — DISCHARGE NOTE PROVIDER - NSDCFUSCHEDAPPT_GEN_ALL_CORE_FT
[facility]  VASCULAR [address]  Scheduled Appointment: 09/30/2022     Moises Schrader  [facility]  HEPATOLOGY [address] [place] [address]   Scheduled Appointment: 01/05/2023

## 2022-09-28 NOTE — PROGRESS NOTE ADULT - RESPIRATORY
clear to auscultation bilaterally/no wheezes/no rales
clear to auscultation bilaterally/no wheezes/no respiratory distress/breath sounds equal/respirations non-labored
normal/clear to auscultation bilaterally/no wheezes/no rales/no rhonchi

## 2022-09-28 NOTE — DISCHARGE NOTE PROVIDER - NSDCMRMEDTOKEN_GEN_ALL_CORE_FT
glipiZIDE 5 mg oral tablet, extended release: 1 tab(s) orally once a day  metFORMIN 500 mg oral tablet: 2 tab(s) orally 2 times a day  metoprolol succinate 25 mg oral tablet, extended release: 1 tab(s) orally once a day  triamterene-hydrochlorothiazide 37.5 mg-25 mg oral capsule: 1 cap(s) orally once a day

## 2022-09-28 NOTE — DISCHARGE NOTE PROVIDER - CARE PROVIDER_API CALL
Elgin Dean)  Family Medicine  [address], [address], [place] [address]  Phone: [phone]  Fax: [phone]  Follow Up Time:

## 2022-09-28 NOTE — PROGRESS NOTE ADULT - SUBJECTIVE AND OBJECTIVE BOX
SUBJECTIVE:    CHIEF COMPLAINT:  Patient is a 71y old  Female who presents with a chief complaint of Hyperkalemia     (26 Sep 2022 10:43)      HPI:  72 yo Female Pmhx HTN, DM2, Lung Ca/Colon Ca (9 years ago) presenting to  for difficulty walking 2/2 BLLE edema 2/2 chronic lymphedema. (24 Sep 2022 09:48)      Interval HPI and Overnight Events: Pt feeling weak. Difficulty ambulating. Requesting rehab placement.    REVIEW OF SYSTEMS:  CONSTITUTIONAL: No weakness, fevers or chills  EYES/ENT: No visual changes;  No vertigo or throat pain   NECK: No pain or stiffness  RESPIRATORY: No cough, wheezing, hemoptysis; No shortness of breath  CARDIOVASCULAR: No chest pain or palpitations  GASTROINTESTINAL: No abdominal or epigastric pain. No nausea, vomiting, or hematemesis; No diarrhea or constipation. No melena or hematochezia.  GENITOURINARY: No dysuria, frequency or hematuria  NEUROLOGICAL: No numbness or weakness  SKIN: Severe lymphedema  All other review of systems is negative unless indicated above    OBJECTIVE    Vital Signs Last 24 Hrs  T(C): 36.7 (28 Sep 2022 08:25), Max: 37 (27 Sep 2022 21:28)  T(F): 98.1 (28 Sep 2022 08:25), Max: 98.6 (27 Sep 2022 21:28)  HR: 95 (28 Sep 2022 08:25) (80 - 95)  BP: 138/61 (28 Sep 2022 08:25) (95/59 - 138/61)  BP(mean): 78 (28 Sep 2022 05:49) (67 - 78)  RR: 19 (28 Sep 2022 08:25) (13 - 19)  SpO2: 98% (28 Sep 2022 08:25) (96% - 100%)    Parameters below as of 28 Sep 2022 08:25  Patient On (Oxygen Delivery Method): room air        MEDICATIONS  (STANDING):  chlorhexidine 4% Liquid 1 Application(s) Topical <User Schedule>  ciprofloxacin   IVPB 200 milliGRAM(s) IV Intermittent every 12 hours  dextrose 5%. 1000 milliLiter(s) (50 mL/Hr) IV Continuous <Continuous>  dextrose 5%. 1000 milliLiter(s) (100 mL/Hr) IV Continuous <Continuous>  dextrose 50% Injectable 25 Gram(s) IV Push once  dextrose 50% Injectable 12.5 Gram(s) IV Push once  dextrose 50% Injectable 25 Gram(s) IV Push once  glucagon  Injectable 1 milliGRAM(s) IntraMuscular once  heparin   Injectable 5000 Unit(s) SubCutaneous every 8 hours  influenza  Vaccine (HIGH DOSE) 0.7 milliLiter(s) IntraMuscular once  insulin lispro (ADMELOG) corrective regimen sliding scale   SubCutaneous Before meals and at bedtime  nystatin Powder 1 Application(s) Topical two times a day      LABS:                         13.5   7.18  )-----------( 109      ( 28 Sep 2022 06:44 )             39.8     09-28    137  |  107  |  44<H>  ----------------------------<  159<H>  4.6   |  24  |  1.22    Ca    9.7      28 Sep 2022 06:44    TPro  5.8<L>  /  Alb  2.5<L>  /  TBili  1.1  /  DBili  x   /  AST  46<H>  /  ALT  43  /  AlkPhos  71  09-28      Specimen Source Catheterized Catheterized   09-24 @ 19:05  Culture Results  50,000 - 99,000 CFU/mL Proteus mirabilis  Specimen Source .Blood None   09-24 @ 17:12  Culture Results  No growth to date.    CAPILLARY BLOOD GLUCOSE  POCT Blood Glucose.: 169 mg/dL (28 Sep 2022 08:21)  POCT Blood Glucose.: 143 mg/dL (28 Sep 2022 06:11)  POCT Blood Glucose.: 167 mg/dL (27 Sep 2022 22:49)  POCT Blood Glucose.: 216 mg/dL (27 Sep 2022 17:12)  POCT Blood Glucose.: 203 mg/dL (27 Sep 2022 11:41)

## 2022-09-28 NOTE — PROGRESS NOTE ADULT - ASSESSMENT
70 yo Female Pmhx HTN, DM2, Lung Ca/Colon Ca (9 years ago) presenting to  for difficulty walking due to progressive edema, renal evaluation of ADRIAN and Hyperkalemia.     ADRIAN/Hyperkalemia  -pre renal, stabilized with hydration and back to baseline  -optimize intake  -Ok without IVF, chronic edema    Hyperkalemia  -optimize bowels  -Lokelma for k control, improved and no longer needed    d/c with RN staff  Will sign off, call with any new questions or issues
70 yo Female Pmhx HTN, DM2, Lung Ca/Colon Ca (9 years ago) presenting to  for difficulty walking 2/2 BLE edema 2/2 chronic lymphedema. UA pyuria, concern for UTI raised given ciprofloxacin.     1. pyuria. possible UTI. keflex allergy. leukocytosis. b/l LE stasis dermatitis. chronic lymphedema  - imaging reviewed  - f/u urine cx, blood cx no growth  - on ciprofloxacin [id number] #2-3  - continue with abx coverage  - tolerating abx well so far; no side effects noted  - reason for abx use and side effects reviewed with patient  - supportive care  - fu cbc    2. other issues - care per medicine 
72 yo Female Pmhx HTN, DM2, Lung Ca/Colon Ca (9 years ago) presenting to  for difficulty walking 2/2 BLE edema 2/2 chronic lymphedema. UA pyuria, concern for UTI raised given ciprofloxacin.     1. pyuria. proteus mirabilis UTI. keflex allergy. b/l LE stasis dermatitis. chronic lymphedema  - imaging reviewed  - urine cx growing proteus mirabilis; blood cx no growth  - on ciprofloxacin [id number] #3-4  - continue with abx coverage  - 3-5 day course  - tolerating abx well so far; no side effects noted  - reason for abx use and side effects reviewed with patient  - supportive care  - fu cbc    2. other issues - care per medicine 
A/P: 71 female presenting with ADRIAN with hyperkalemia and acidosis  DM  Obesity  Chronic Lymph edema  HTN    Plan:  Admit to the ICU  Currently the hyperkalemia and acidosis are slowly improving  Continue Bicarb drip  Serial BMPs and the next one at 8PM  Start Lantus and RISS  Renal Diet  SQh DVT Prophylaxis  Start Cipro for a UTI  Send UC and BC  ID Consult  PT
    72 yo Female Pmhx HTN, DM2, Lung Ca/Colon Ca (9 years ago) presenting to  for difficulty walking due to progressive edema, renal evaluation of ADRIAN and Hyperkalemia.     ADRIAN/Hyperkalemia  -Multiple lines elevated on admit (hgb, wbc, ca) suspect pre-renal state with poor intake, stabilizing with IVF  -IVF hydration when IV line back in place  -Serial labs/lytes ongoing  -No acute need for HD  -Sent RACHEL, light chains with fabiola ct findings  -Urine studies    Hyperkalemia  -optimize bowels  -Lokelma for k control    d/c with RN staff, Dr Justice 
ADRIAN, hyperkalemia   UTI     PT, OOB   Cipro IV   UCx pending   Maintain Berry   IVF   Improved Cr, K  Didn't need dialysis   SC heparin   Wound care consult     Stable for floor 
    72 yo Female Pmhx HTN, DM2, Lung Ca/Colon Ca (9 years ago) presenting to  for difficulty walking due to progressive edema, renal evaluation of ADRIAN and Hyperkalemia.     ADRIAN/Hyperkalemia  -pre renal, stabilized with hydratoin  -Serial labs/lytes ongoing  -Sent RACHEL, light chains with fabiola ct findings  -stop ivf and monitor, chronic edema    Hyperkalemia  -optimize bowels  -Lokelma for k control, improved    d/c with RN staff, Dr Justice 
  # Pyuria proteus mirabilis UTI. keflex allergy  # Leukocytosis has resolved  - urine cx grew proteus mirabilis; blood cx no growth  - on ciprofloxacin 200mg q12h #4-5    # Dehydration / renal failure /  in the setting of metofrmin -  no lactate on admission  # Hyperkalemia /  this is most likely secondary to NAGMA /  renal failure  - admission K - 8.4 -> now 4.4  - closely monitor K  - Lokelma for k control, improved and no longer needed    # Hyponatremia  - admission Na -  120s, now 135    # ADRIAN this is pre-renal in nature due dehydration   - admission Scr 2.34  - now resolved.   - stabilized with hydration and back to baseline  - optimize intake  - Ok without IVF, chronic edema  - Discharge to rehab when bed available    # Thrombocytopenia  - admission plt 290s - > now 109  - if plt continues to drift, will stop heparin    # DM  - ISS    # Chronic Lymph edema  - supportive management  - start ACE Wraps

## 2022-09-28 NOTE — PROGRESS NOTE ADULT - NUTRITIONAL ASSESSMENT
This patient has been assessed with a concern for Malnutrition and has been determined to have a diagnosis/diagnoses of Morbid obesity (BMI > 40).    This patient is being managed with:   Diet Consistent Carbohydrate Renal w/Evening Snack-  Entered: Sep 24 2022  3:56PM    
This patient has been assessed with a concern for Malnutrition and has been determined to have a diagnosis/diagnoses of Morbid obesity (BMI > 40).    This patient is being managed with:   Diet Consistent Carbohydrate Renal w/Evening Snack-  Entered: Sep 24 2022  3:56PM

## 2022-09-28 NOTE — DISCHARGE NOTE PROVIDER - HOSPITAL COURSE
72 yo Female Pmhx HTN, DM2, Lung Ca/Colon Ca (9 years ago) presenting to  for difficulty walking 2/2 BLE edema 2/2 chronic lymphedema. UA pyuria, concern for UTI raised given ciprofloxacin.  According to the patient she was not feeling well couple of weeks prior to hospitalization. Stopped eating and drinking. It appears that patient came in the hospital, being super dehydrated -  effecting renal function and electrolyte imbalance.     Hospital course: Pt admitted to ICU. Seen by CCM, ID, and Nephrology. it appears that patient was hydrated well, with near full resolution of renal function /  electrolyte abnormalities.     Plan is to continue to monitor patient. PT and for transfer to SNF rehab 72 yo Female Pmhx HTN, DM2, Lung Ca/Colon Ca (9 years ago) presenting to  for difficulty walking 2/2 BLE edema 2/2 chronic lymphedema. UA pyuria, concern for UTI raised given ciprofloxacin.  According to the patient she was not feeling well couple of weeks prior to hospitalization. Stopped eating and drinking. It appears that patient came in the hospital, being super dehydrated -  effecting renal function and electrolyte imbalance.     Hospital course: Pt admitted to ICU. Seen by CCM, ID, and Nephrology. it appears that patient was hydrated well, with near full resolution of renal function /  electrolyte abnormalities.  No evidence  of sepsis on admission    Plan is to continue to monitor patient. PT and for transfer to SNF rehab

## 2022-09-28 NOTE — DISCHARGE NOTE NURSING/CASE MANAGEMENT/SOCIAL WORK - PATIENT PORTAL LINK FT
You can access the FollowMyHealth Patient Portal offered by [facility] by registering at the following website: [email or web address]. By joining [employer]’s FollowMyHealth portal, you will also be able to view your health information using other applications (apps) compatible with our system.

## 2022-09-28 NOTE — PROGRESS NOTE ADULT - PROVIDER SPECIALTY LIST ADULT
Hospitalist
Critical Care
Nephrology
Infectious Disease
Infectious Disease
Family Medicine
Critical Care

## 2022-09-29 ENCOUNTER — NON-APPOINTMENT (OUTPATIENT)
Age: 72
End: 2022-09-29

## 2022-09-30 ENCOUNTER — APPOINTMENT (OUTPATIENT)
Dept: VASCULAR SURGERY | Facility: CLINIC | Age: 72
End: 2022-09-30

## 2022-09-30 LAB
CULTURE RESULTS: SIGNIFICANT CHANGE UP
CULTURE RESULTS: SIGNIFICANT CHANGE UP
SPECIMEN SOURCE: SIGNIFICANT CHANGE UP
SPECIMEN SOURCE: SIGNIFICANT CHANGE UP

## 2022-10-03 LAB — INTERPRETATION SERPL IFE-IMP: SIGNIFICANT CHANGE UP

## 2022-10-04 NOTE — CDI QUERY NOTE - NSCDIOTHERTXTBX_GEN_ALL_CORE_HH
This patient is documented to have Sepsis.  (Medicine 9/27)    This patient does  appear to meet [facility]'s sepsis criteria using SIRS (Temp. >101F or <96.8F, HR>90bpm, RR>20/min or PaCO2<32mmHg, WBC>12,000 or <4000 or Bands>10% Unexplained altered mental status).    Can you please clarify the status of the sepsis, after review?    -Sepsis POA  ruled in  -Sepsis ruled out  -Other (please specify)  -Unable to determine    Chart Documentation/Evidence: VS POA -HR 98, RR 20  T 98.3 WBC 17     Medicine note 9/27-Prerenal ADRIAN, hyperkalemia-UTI with sepsis POA     DC summary-72 yo Female Pmhx HTN, DM2, Lung Ca/Colon Ca (9 years ago) presenting to  for difficulty walking 2/2 BLE edema 2/2 chronic lymphedema. UA pyuria, concern for UTI raised given ciprofloxacin.According to the patient she was not feeling well couple of weeks prior to hospitalization. Stopped eating and drinking. It appears that patient came in the hospital, being super dehydrated -  effecting renal function and electrolyte imbalance.   Hospital course: Pt admitted to ICU. Seen by CCM, ID, and Nephrology. it appears that patient was hydrated well, with near full resolution of renal function /  electrolyte abnormalities.   Discharge Diagnoses, Assessment and Plan of TreatmentPRINCIPAL DISCHARGE DIAGNOSISDiagnosis: Hyperkalemia  SECONDARY DISCHARGE DIAGNOSESDiagnosis: Hypovolemia dehydration ADRIAN (acute kidney injury)Lymphedema

## 2022-10-06 ENCOUNTER — NON-APPOINTMENT (OUTPATIENT)
Age: 72
End: 2022-10-06

## 2022-10-13 DIAGNOSIS — N17.9 ACUTE KIDNEY FAILURE, UNSPECIFIED: ICD-10-CM

## 2022-10-13 DIAGNOSIS — D69.6 THROMBOCYTOPENIA, UNSPECIFIED: ICD-10-CM

## 2022-10-13 DIAGNOSIS — E87.5 HYPERKALEMIA: ICD-10-CM

## 2022-10-13 DIAGNOSIS — Z88.1 ALLERGY STATUS TO OTHER ANTIBIOTIC AGENTS STATUS: ICD-10-CM

## 2022-10-13 DIAGNOSIS — B96.4 PROTEUS (MIRABILIS) (MORGANII) AS THE CAUSE OF DISEASES CLASSIFIED ELSEWHERE: ICD-10-CM

## 2022-10-13 DIAGNOSIS — I10 ESSENTIAL (PRIMARY) HYPERTENSION: ICD-10-CM

## 2022-10-13 DIAGNOSIS — E86.1 HYPOVOLEMIA: ICD-10-CM

## 2022-10-13 DIAGNOSIS — E86.0 DEHYDRATION: ICD-10-CM

## 2022-10-13 DIAGNOSIS — I89.0 LYMPHEDEMA, NOT ELSEWHERE CLASSIFIED: ICD-10-CM

## 2022-10-13 DIAGNOSIS — E87.1 HYPO-OSMOLALITY AND HYPONATREMIA: ICD-10-CM

## 2022-10-13 DIAGNOSIS — N39.0 URINARY TRACT INFECTION, SITE NOT SPECIFIED: ICD-10-CM

## 2022-10-13 DIAGNOSIS — I87.2 VENOUS INSUFFICIENCY (CHRONIC) (PERIPHERAL): ICD-10-CM

## 2022-10-13 DIAGNOSIS — Z85.038 PERSONAL HISTORY OF OTHER MALIGNANT NEOPLASM OF LARGE INTESTINE: ICD-10-CM

## 2022-10-13 DIAGNOSIS — Z85.118 PERSONAL HISTORY OF OTHER MALIGNANT NEOPLASM OF BRONCHUS AND LUNG: ICD-10-CM

## 2022-10-13 DIAGNOSIS — E87.2 ACIDOSIS: ICD-10-CM

## 2022-10-13 DIAGNOSIS — R82.81 PYURIA: ICD-10-CM

## 2022-10-13 DIAGNOSIS — E66.01 MORBID (SEVERE) OBESITY DUE TO EXCESS CALORIES: ICD-10-CM

## 2022-10-13 DIAGNOSIS — E11.9 TYPE 2 DIABETES MELLITUS WITHOUT COMPLICATIONS: ICD-10-CM

## 2022-10-27 NOTE — HISTORY OF PRESENT ILLNESS
[FreeTextEntry1] : 6/24/22: 72 yo female PMHx HTN, presents from wound care clinic for UNNA boot change. She has been having bilateral UNNA boots for the past year at [facility]. Her leg swelling has improved but recently she had increased weeping from her legs bilaterally. She is on abx and feels it has improved slightly. She denies any fever or chills. She has been complaint with the lymphedema massage pump. \par \par 7/1/22: Pt had UNNA boot change Monday at wound care [place]. She stopped her hydrochlorothiazide a few days ago. She has increased drainage from her legs bilaterally. She is still on abx. She denies any fever or chills. She feels the pump increases her drainage. She has not used the pump in the past 3 days. \par \par 7/8/22: Pt doing well since last visit. She had UNNA boot changed Tuesday. She is doing well. She denies any fever or chills.

## 2022-10-27 NOTE — PHYSICAL EXAM
[Ankle Swelling (On Exam)] : present [Varicose Veins Of Lower Extremities] : not present [] : present [Ankle Swelling Bilaterally] : severe [Skin Ulcer] : ulcer [Alert] : alert [Oriented to Person] : oriented to person [Oriented to Place] : oriented to place [Oriented to Time] : oriented to time [de-identified] : NAD. well appearing  [FreeTextEntry1] : DP and PT non-palpable due to severe edema and lipodermatosclerosis \par several areas on calves bilaterally wemirta

## 2022-10-28 NOTE — ASSESSMENT
[FreeTextEntry1] : 70 yo female with severe lymphedema and weeping from legs bilaterally\par \par Pt did not want UNNA boots today, Will take a week off of UNNA boot treatment \par Pt counseled to continue to use lymphedema massage pumps 1 hour per day \par RTC in 1 week to monitor wounds \par \par A total of 20 minutes was spent with patient and coordinating care\par

## 2022-10-28 NOTE — HISTORY OF PRESENT ILLNESS
[FreeTextEntry1] : 6/24/22: 70 yo female PMHx HTN, presents from wound care clinic for UNNA boot change. She has been having bilateral UNNA boots for the past year at [facility]. Her leg swelling has improved but recently she had increased weeping from her legs bilaterally. She is on abx and feels it has improved slightly. She denies any fever or chills. She has been complaint with the lymphedema massage pump. \par \par 7/1/22: Pt had UNNA boot change Monday at wound care [place]. She stopped her hydrochlorothiazide a few days ago. She has increased drainage from her legs bilaterally. She is still on abx. She denies any fever or chills. She feels the pump increases her drainage. She has not used the pump in the past 3 days. \par \par 7/8/22: Pt doing well since last visit. She had UNNA boot changed Tuesday. She is doing well. She denies any fever or chills. \par \par 7/22/22:  Pt doing well since last visit. She had UNNA boot changed Tuesday. She is doing well. She denies any fever or chills. \par \par 7/29/22: Pt doing well since last visit. She had UNNA boot changed Tuesday. She is doing well. She denies any fever or chills. \par \par 8/12/22: Pt doing well since last visit. She had UNNA boot changed Tuesday. She is doing well. She denies any fever or chills. \par \par 8/19/22: Pt doing well since last visit. She had UNNA boot changed Tuesday. She is doing well. She denies any fever or chills. \par \par 9/2/22: Pt doing well since last visit. She had UNNA boot changed Tuesday. She was having pain in her right anterior shin but it has resolved. She is doing well. She denies any fever or chills. \par \par 9/23/22: Pt doing well since last visit. She had UNNA boot changed Tuesday. She was having pain in her right anterior shin but it has resolved. She is doing well. She denies any fever or chills.

## 2022-10-28 NOTE — PHYSICAL EXAM
[Ankle Swelling (On Exam)] : present [Varicose Veins Of Lower Extremities] : not present [] : present [Ankle Swelling Bilaterally] : severe [Skin Ulcer] : ulcer [Alert] : alert [Oriented to Person] : oriented to person [Oriented to Place] : oriented to place [Oriented to Time] : oriented to time [de-identified] : NAD. well appearing  [FreeTextEntry1] : DP and PT non-palpable due to severe edema and lipodermatosclerosis \par several areas on calves bilaterally wemirta

## 2022-11-01 ENCOUNTER — OUTPATIENT (OUTPATIENT)
Dept: OUTPATIENT SERVICES | Facility: HOSPITAL | Age: 72
LOS: 1 days | End: 2022-11-01
Payer: MEDICARE

## 2022-11-01 DIAGNOSIS — L97.509 NON-PRESSURE CHRONIC ULCER OF OTHER PART OF UNSPECIFIED FOOT WITH UNSPECIFIED SEVERITY: ICD-10-CM

## 2022-11-01 DIAGNOSIS — I89.0 LYMPHEDEMA, NOT ELSEWHERE CLASSIFIED: ICD-10-CM

## 2022-11-01 DIAGNOSIS — E11.69 TYPE 2 DIABETES MELLITUS WITH OTHER SPECIFIED COMPLICATION: ICD-10-CM

## 2022-11-01 DIAGNOSIS — I73.9 PERIPHERAL VASCULAR DISEASE, UNSPECIFIED: ICD-10-CM

## 2022-11-01 PROCEDURE — 99213 OFFICE O/P EST LOW 20 MIN: CPT

## 2022-11-08 ENCOUNTER — APPOINTMENT (OUTPATIENT)
Dept: FAMILY MEDICINE | Facility: CLINIC | Age: 72
End: 2022-11-08

## 2022-11-08 VITALS
HEIGHT: 68 IN | TEMPERATURE: 97.2 F | SYSTOLIC BLOOD PRESSURE: 130 MMHG | HEART RATE: 88 BPM | DIASTOLIC BLOOD PRESSURE: 40 MMHG | OXYGEN SATURATION: 95 % | BODY MASS INDEX: 44.41 KG/M2 | WEIGHT: 293 LBS

## 2022-11-08 PROCEDURE — 36415 COLL VENOUS BLD VENIPUNCTURE: CPT

## 2022-11-08 PROCEDURE — 99213 OFFICE O/P EST LOW 20 MIN: CPT | Mod: 25

## 2022-11-11 ENCOUNTER — NON-APPOINTMENT (OUTPATIENT)
Age: 72
End: 2022-11-11

## 2022-11-13 LAB
ALBUMIN SERPL ELPH-MCNC: 3.5 G/DL
ALP BLD-CCNC: 81 U/L
ALT SERPL-CCNC: 17 U/L
ANION GAP SERPL CALC-SCNC: 14 MMOL/L
AST SERPL-CCNC: 32 U/L
BILIRUB SERPL-MCNC: 0.6 MG/DL
BUN SERPL-MCNC: 14 MG/DL
CALCIUM SERPL-MCNC: 9.5 MG/DL
CHLORIDE SERPL-SCNC: 105 MMOL/L
CO2 SERPL-SCNC: 24 MMOL/L
CREAT SERPL-MCNC: 0.91 MG/DL
EGFR: 67 ML/MIN/1.73M2
ESTIMATED AVERAGE GLUCOSE: 131 MG/DL
GLUCOSE SERPL-MCNC: 96 MG/DL
HBA1C MFR BLD HPLC: 6.2 %
POTASSIUM SERPL-SCNC: 4.1 MMOL/L
PROT SERPL-MCNC: 5.9 G/DL
SODIUM SERPL-SCNC: 142 MMOL/L

## 2022-11-15 ENCOUNTER — OUTPATIENT (OUTPATIENT)
Dept: OUTPATIENT SERVICES | Facility: HOSPITAL | Age: 72
LOS: 1 days | End: 2022-11-15
Payer: MEDICARE

## 2022-11-15 DIAGNOSIS — L97.509 NON-PRESSURE CHRONIC ULCER OF OTHER PART OF UNSPECIFIED FOOT WITH UNSPECIFIED SEVERITY: ICD-10-CM

## 2022-11-15 DIAGNOSIS — E11.69 TYPE 2 DIABETES MELLITUS WITH OTHER SPECIFIED COMPLICATION: ICD-10-CM

## 2022-11-15 DIAGNOSIS — I73.9 PERIPHERAL VASCULAR DISEASE, UNSPECIFIED: ICD-10-CM

## 2022-11-15 DIAGNOSIS — I89.0 LYMPHEDEMA, NOT ELSEWHERE CLASSIFIED: ICD-10-CM

## 2022-11-15 PROCEDURE — 99213 OFFICE O/P EST LOW 20 MIN: CPT

## 2022-11-27 NOTE — PLAN
[FreeTextEntry1] : Follow up for routine medical management. \par Chart/meds reviewed. Meds renewed. \par Blood work done in office today. Will follow up on results with patient.\par

## 2022-11-27 NOTE — HISTORY OF PRESENT ILLNESS
[Post-hospitalization from ___ Hospital] : Post-hospitalization from [unfilled] Hospital [Admitted on: ___] : The patient was admitted on [unfilled] [Discharged on ___] : discharged on [unfilled] [Discharge Summary] : discharge summary [Pertinent Labs] : pertinent labs [Radiology Findings] : radiology findings [Discharge Med List] : discharge medication list [Med Reconciliation] : medication reconciliation has been completed [Patient Contacted By: ____] : and contacted by [unfilled] [FreeTextEntry2] : VIKA NOBLE is a 72 year old female with PMH of HTN, DM2, Lung Ca/Colon Ca (9 years ago) presenting for hospital f/u. She presented to the ER due to difficulty walking 2/2 BLE edema 2/2 chronic lymphedema. She was admitted with dehydration, electrolyte imbalance, possible UTI. Treated with Cipro. Transferred to SNF once stable.

## 2022-11-27 NOTE — PHYSICAL EXAM
[Normal] : normal rate, regular rhythm, normal S1 and S2 and no murmur heard [de-identified] : lymphedema BLE

## 2022-12-06 ENCOUNTER — OUTPATIENT (OUTPATIENT)
Dept: OUTPATIENT SERVICES | Facility: HOSPITAL | Age: 72
LOS: 1 days | End: 2022-12-06
Payer: MEDICARE

## 2022-12-06 DIAGNOSIS — M79.605 PAIN IN LEFT LEG: ICD-10-CM

## 2022-12-06 PROCEDURE — 29580 STRAPPING UNNA BOOT: CPT | Mod: 50

## 2022-12-06 PROCEDURE — 99213 OFFICE O/P EST LOW 20 MIN: CPT

## 2022-12-07 ENCOUNTER — APPOINTMENT (OUTPATIENT)
Dept: FAMILY MEDICINE | Facility: CLINIC | Age: 72
End: 2022-12-07
Payer: MEDICARE

## 2022-12-07 VITALS
OXYGEN SATURATION: 94 % | HEART RATE: 92 BPM | SYSTOLIC BLOOD PRESSURE: 170 MMHG | WEIGHT: 293 LBS | DIASTOLIC BLOOD PRESSURE: 60 MMHG | HEIGHT: 68 IN | BODY MASS INDEX: 44.41 KG/M2 | TEMPERATURE: 98.1 F

## 2022-12-07 PROCEDURE — 99214 OFFICE O/P EST MOD 30 MIN: CPT

## 2022-12-07 RX ORDER — CLOBETASOL PROPIONATE 0.5 MG/G
0.05 CREAM TOPICAL TWICE DAILY
Qty: 1 | Refills: 2 | Status: DISCONTINUED | COMMUNITY
Start: 2022-08-02 | End: 2022-12-07

## 2022-12-07 RX ORDER — CLOBETASOL PROPIONATE 0.5 MG/G
0.05 CREAM TOPICAL TWICE DAILY
Qty: 1 | Refills: 2 | Status: DISCONTINUED | COMMUNITY
Start: 2022-04-12 | End: 2022-12-07

## 2022-12-07 RX ORDER — DOXYCYCLINE HYCLATE 100 MG/1
100 CAPSULE ORAL
Qty: 60 | Refills: 0 | Status: DISCONTINUED | COMMUNITY
Start: 2022-06-14 | End: 2022-12-07

## 2022-12-07 RX ORDER — EMPAGLIFLOZIN 10 MG/1
10 TABLET, FILM COATED ORAL DAILY
Qty: 90 | Refills: 0 | Status: DISCONTINUED | COMMUNITY
Start: 2022-06-09 | End: 2022-12-07

## 2022-12-07 RX ORDER — CLOBETASOL PROPIONATE 0.5 MG/G
0.05 CREAM TOPICAL TWICE DAILY
Qty: 1 | Refills: 2 | Status: DISCONTINUED | COMMUNITY
Start: 2022-02-22 | End: 2022-12-07

## 2022-12-07 RX ORDER — CLOBETASOL PROPIONATE 0.5 MG/G
0.05 CREAM TOPICAL TWICE DAILY
Qty: 60 | Refills: 2 | Status: DISCONTINUED | COMMUNITY
Start: 2021-12-29 | End: 2022-12-07

## 2022-12-07 RX ORDER — DOXYCYCLINE HYCLATE 100 MG/1
100 CAPSULE ORAL
Qty: 60 | Refills: 0 | Status: DISCONTINUED | COMMUNITY
Start: 2022-08-02 | End: 2022-12-07

## 2022-12-07 RX ORDER — TERBINAFINE HYDROCHLORIDE 250 MG/1
250 TABLET ORAL DAILY
Qty: 30 | Refills: 1 | Status: DISCONTINUED | COMMUNITY
Start: 2022-05-26 | End: 2022-12-07

## 2022-12-07 RX ORDER — GLIPIZIDE 2.5 MG/1
2.5 TABLET, FILM COATED, EXTENDED RELEASE ORAL
Qty: 90 | Refills: 0 | Status: DISCONTINUED | COMMUNITY
Start: 2022-02-08 | End: 2022-12-07

## 2022-12-07 NOTE — PLAN
[FreeTextEntry1] : Advised at length\par May hold off on the jardiance for now since last A 1 C was good\par Recheck BW in Feb and if needed will add back the jardiance\par \par

## 2022-12-07 NOTE — HISTORY OF PRESENT ILLNESS
[FreeTextEntry1] : Pt is here for one month follow up.\par Had elevatated potassium . Was in rehab [de-identified] : Pt has not been taking the jardiance but her last A1A level was normal\par

## 2022-12-13 ENCOUNTER — OUTPATIENT (OUTPATIENT)
Dept: OUTPATIENT SERVICES | Facility: HOSPITAL | Age: 72
LOS: 1 days | End: 2022-12-13
Payer: MEDICARE

## 2022-12-13 DIAGNOSIS — M79.605 PAIN IN LEFT LEG: ICD-10-CM

## 2022-12-13 PROCEDURE — 29580 STRAPPING UNNA BOOT: CPT | Mod: 50

## 2022-12-13 PROCEDURE — 29580 STRAPPING UNNA BOOT: CPT

## 2022-12-15 DIAGNOSIS — E11.69 TYPE 2 DIABETES MELLITUS WITH OTHER SPECIFIED COMPLICATION: ICD-10-CM

## 2022-12-15 DIAGNOSIS — I73.9 PERIPHERAL VASCULAR DISEASE, UNSPECIFIED: ICD-10-CM

## 2022-12-15 DIAGNOSIS — I89.0 LYMPHEDEMA, NOT ELSEWHERE CLASSIFIED: ICD-10-CM

## 2022-12-16 DIAGNOSIS — E11.69 TYPE 2 DIABETES MELLITUS WITH OTHER SPECIFIED COMPLICATION: ICD-10-CM

## 2022-12-16 DIAGNOSIS — I89.0 LYMPHEDEMA, NOT ELSEWHERE CLASSIFIED: ICD-10-CM

## 2022-12-16 DIAGNOSIS — I73.9 PERIPHERAL VASCULAR DISEASE, UNSPECIFIED: ICD-10-CM

## 2022-12-20 ENCOUNTER — OUTPATIENT (OUTPATIENT)
Dept: OUTPATIENT SERVICES | Facility: HOSPITAL | Age: 72
LOS: 1 days | End: 2022-12-20
Payer: MEDICARE

## 2022-12-20 DIAGNOSIS — L97.509 NON-PRESSURE CHRONIC ULCER OF OTHER PART OF UNSPECIFIED FOOT WITH UNSPECIFIED SEVERITY: ICD-10-CM

## 2022-12-20 PROCEDURE — 29580 STRAPPING UNNA BOOT: CPT | Mod: 50

## 2022-12-20 PROCEDURE — 29580 STRAPPING UNNA BOOT: CPT

## 2022-12-23 DIAGNOSIS — I89.0 LYMPHEDEMA, NOT ELSEWHERE CLASSIFIED: ICD-10-CM

## 2022-12-23 DIAGNOSIS — E11.69 TYPE 2 DIABETES MELLITUS WITH OTHER SPECIFIED COMPLICATION: ICD-10-CM

## 2022-12-23 DIAGNOSIS — I73.9 PERIPHERAL VASCULAR DISEASE, UNSPECIFIED: ICD-10-CM

## 2022-12-27 RX ORDER — LEVOFLOXACIN 5 MG/ML
1 INJECTION, SOLUTION INTRAVENOUS
Qty: 0 | Refills: 0 | DISCHARGE
Start: 2022-12-27

## 2022-12-31 ENCOUNTER — INPATIENT (INPATIENT)
Facility: HOSPITAL | Age: 72
LOS: 7 days | Discharge: HOME HEALTH | DRG: 603 | End: 2023-01-08
Attending: INTERNAL MEDICINE | Admitting: INTERNAL MEDICINE
Payer: MEDICARE

## 2022-12-31 VITALS
RESPIRATION RATE: 20 BRPM | TEMPERATURE: 99 F | HEIGHT: 68 IN | SYSTOLIC BLOOD PRESSURE: 127 MMHG | HEART RATE: 94 BPM | DIASTOLIC BLOOD PRESSURE: 50 MMHG | WEIGHT: 293 LBS | OXYGEN SATURATION: 100 %

## 2022-12-31 DIAGNOSIS — L03.115 CELLULITIS OF RIGHT LOWER LIMB: ICD-10-CM

## 2022-12-31 LAB
ADD ON TEST-SPECIMEN IN LAB: SIGNIFICANT CHANGE UP
ALBUMIN SERPL ELPH-MCNC: 2.9 G/DL — LOW (ref 3.3–5)
ALP SERPL-CCNC: 83 U/L — SIGNIFICANT CHANGE UP (ref 40–120)
ALT FLD-CCNC: 29 U/L — SIGNIFICANT CHANGE UP (ref 12–78)
ANION GAP SERPL CALC-SCNC: 12 MMOL/L — SIGNIFICANT CHANGE UP (ref 5–17)
APPEARANCE UR: CLEAR — SIGNIFICANT CHANGE UP
APTT BLD: 29.1 SEC — SIGNIFICANT CHANGE UP (ref 27.5–35.5)
AST SERPL-CCNC: 45 U/L — HIGH (ref 15–37)
BACTERIA # UR AUTO: ABNORMAL
BASOPHILS # BLD AUTO: 0.07 K/UL — SIGNIFICANT CHANGE UP (ref 0–0.2)
BASOPHILS NFR BLD AUTO: 0.8 % — SIGNIFICANT CHANGE UP (ref 0–2)
BILIRUB SERPL-MCNC: 0.7 MG/DL — SIGNIFICANT CHANGE UP (ref 0.2–1.2)
BILIRUB UR-MCNC: NEGATIVE — SIGNIFICANT CHANGE UP
BUN SERPL-MCNC: 41 MG/DL — HIGH (ref 7–23)
CALCIUM SERPL-MCNC: 9.5 MG/DL — SIGNIFICANT CHANGE UP (ref 8.5–10.1)
CHLORIDE SERPL-SCNC: 101 MMOL/L — SIGNIFICANT CHANGE UP (ref 96–108)
CO2 SERPL-SCNC: 24 MMOL/L — SIGNIFICANT CHANGE UP (ref 22–31)
COLOR SPEC: YELLOW — SIGNIFICANT CHANGE UP
CREAT SERPL-MCNC: 1.73 MG/DL — HIGH (ref 0.5–1.3)
DIFF PNL FLD: NEGATIVE — SIGNIFICANT CHANGE UP
EGFR: 31 ML/MIN/1.73M2 — LOW
EOSINOPHIL # BLD AUTO: 0.15 K/UL — SIGNIFICANT CHANGE UP (ref 0–0.5)
EOSINOPHIL NFR BLD AUTO: 1.7 % — SIGNIFICANT CHANGE UP (ref 0–6)
EPI CELLS # UR: SIGNIFICANT CHANGE UP
FLUAV AG NPH QL: SIGNIFICANT CHANGE UP
FLUBV AG NPH QL: SIGNIFICANT CHANGE UP
GLUCOSE BLDC GLUCOMTR-MCNC: 75 MG/DL — SIGNIFICANT CHANGE UP (ref 70–99)
GLUCOSE SERPL-MCNC: 87 MG/DL — SIGNIFICANT CHANGE UP (ref 70–99)
GLUCOSE UR QL: NEGATIVE — SIGNIFICANT CHANGE UP
HCT VFR BLD CALC: 38.7 % — SIGNIFICANT CHANGE UP (ref 34.5–45)
HGB BLD-MCNC: 13 G/DL — SIGNIFICANT CHANGE UP (ref 11.5–15.5)
HYALINE CASTS # UR AUTO: ABNORMAL /LPF
IMM GRANULOCYTES NFR BLD AUTO: 1 % — HIGH (ref 0–0.9)
INR BLD: 1.32 RATIO — HIGH (ref 0.88–1.16)
KETONES UR-MCNC: NEGATIVE — SIGNIFICANT CHANGE UP
LACTATE SERPL-SCNC: 3.7 MMOL/L — HIGH (ref 0.7–2)
LACTATE SERPL-SCNC: 5 MMOL/L — CRITICAL HIGH (ref 0.7–2)
LEUKOCYTE ESTERASE UR-ACNC: ABNORMAL
LYMPHOCYTES # BLD AUTO: 1.59 K/UL — SIGNIFICANT CHANGE UP (ref 1–3.3)
LYMPHOCYTES # BLD AUTO: 18.2 % — SIGNIFICANT CHANGE UP (ref 13–44)
MAGNESIUM SERPL-MCNC: 2.4 MG/DL — SIGNIFICANT CHANGE UP (ref 1.6–2.6)
MCHC RBC-ENTMCNC: 27.9 PG — SIGNIFICANT CHANGE UP (ref 27–34)
MCHC RBC-ENTMCNC: 33.6 GM/DL — SIGNIFICANT CHANGE UP (ref 32–36)
MCV RBC AUTO: 83 FL — SIGNIFICANT CHANGE UP (ref 80–100)
MONOCYTES # BLD AUTO: 0.94 K/UL — HIGH (ref 0–0.9)
MONOCYTES NFR BLD AUTO: 10.7 % — SIGNIFICANT CHANGE UP (ref 2–14)
NEUTROPHILS # BLD AUTO: 5.91 K/UL — SIGNIFICANT CHANGE UP (ref 1.8–7.4)
NEUTROPHILS NFR BLD AUTO: 67.6 % — SIGNIFICANT CHANGE UP (ref 43–77)
NITRITE UR-MCNC: NEGATIVE — SIGNIFICANT CHANGE UP
NT-PROBNP SERPL-SCNC: 117 PG/ML — SIGNIFICANT CHANGE UP (ref 0–125)
PH UR: 5 — SIGNIFICANT CHANGE UP (ref 5–8)
PLATELET # BLD AUTO: 170 K/UL — SIGNIFICANT CHANGE UP (ref 150–400)
POTASSIUM SERPL-MCNC: 3.3 MMOL/L — LOW (ref 3.5–5.3)
POTASSIUM SERPL-SCNC: 3.3 MMOL/L — LOW (ref 3.5–5.3)
PROT SERPL-MCNC: 7.5 GM/DL — SIGNIFICANT CHANGE UP (ref 6–8.3)
PROT UR-MCNC: NEGATIVE — SIGNIFICANT CHANGE UP
PROTHROM AB SERPL-ACNC: 15.4 SEC — HIGH (ref 10.5–13.4)
RBC # BLD: 4.66 M/UL — SIGNIFICANT CHANGE UP (ref 3.8–5.2)
RBC # FLD: 14.2 % — SIGNIFICANT CHANGE UP (ref 10.3–14.5)
RBC CASTS # UR COMP ASSIST: NEGATIVE /HPF — SIGNIFICANT CHANGE UP (ref 0–4)
RSV RNA NPH QL NAA+NON-PROBE: SIGNIFICANT CHANGE UP
SARS-COV-2 RNA SPEC QL NAA+PROBE: SIGNIFICANT CHANGE UP
SODIUM SERPL-SCNC: 137 MMOL/L — SIGNIFICANT CHANGE UP (ref 135–145)
SP GR SPEC: 1.02 — SIGNIFICANT CHANGE UP (ref 1.01–1.02)
UROBILINOGEN FLD QL: NEGATIVE — SIGNIFICANT CHANGE UP
WBC # BLD: 8.75 K/UL — SIGNIFICANT CHANGE UP (ref 3.8–10.5)
WBC # FLD AUTO: 8.75 K/UL — SIGNIFICANT CHANGE UP (ref 3.8–10.5)
WBC UR QL: SIGNIFICANT CHANGE UP /HPF (ref 0–5)

## 2022-12-31 PROCEDURE — 81001 URINALYSIS AUTO W/SCOPE: CPT

## 2022-12-31 PROCEDURE — 80053 COMPREHEN METABOLIC PANEL: CPT

## 2022-12-31 PROCEDURE — 83036 HEMOGLOBIN GLYCOSYLATED A1C: CPT

## 2022-12-31 PROCEDURE — 97162 PT EVAL MOD COMPLEX 30 MIN: CPT | Mod: GP

## 2022-12-31 PROCEDURE — 86022 PLATELET ANTIBODIES: CPT

## 2022-12-31 PROCEDURE — 87086 URINE CULTURE/COLONY COUNT: CPT

## 2022-12-31 PROCEDURE — 99223 1ST HOSP IP/OBS HIGH 75: CPT

## 2022-12-31 PROCEDURE — 36415 COLL VENOUS BLD VENIPUNCTURE: CPT

## 2022-12-31 PROCEDURE — 97116 GAIT TRAINING THERAPY: CPT | Mod: GP

## 2022-12-31 PROCEDURE — 97530 THERAPEUTIC ACTIVITIES: CPT | Mod: GP

## 2022-12-31 PROCEDURE — 99285 EMERGENCY DEPT VISIT HI MDM: CPT

## 2022-12-31 PROCEDURE — 71045 X-RAY EXAM CHEST 1 VIEW: CPT | Mod: 26

## 2022-12-31 PROCEDURE — 80048 BASIC METABOLIC PNL TOTAL CA: CPT

## 2022-12-31 PROCEDURE — 85027 COMPLETE CBC AUTOMATED: CPT

## 2022-12-31 PROCEDURE — 85049 AUTOMATED PLATELET COUNT: CPT

## 2022-12-31 PROCEDURE — 82962 GLUCOSE BLOOD TEST: CPT

## 2022-12-31 RX ORDER — ACETAMINOPHEN 500 MG
650 TABLET ORAL EVERY 6 HOURS
Refills: 0 | Status: DISCONTINUED | OUTPATIENT
Start: 2022-12-31 | End: 2023-01-03

## 2022-12-31 RX ORDER — GLUCAGON INJECTION, SOLUTION 0.5 MG/.1ML
1 INJECTION, SOLUTION SUBCUTANEOUS ONCE
Refills: 0 | Status: DISCONTINUED | OUTPATIENT
Start: 2022-12-31 | End: 2023-01-08

## 2022-12-31 RX ORDER — SODIUM CHLORIDE 9 MG/ML
1000 INJECTION, SOLUTION INTRAVENOUS
Refills: 0 | Status: DISCONTINUED | OUTPATIENT
Start: 2022-12-31 | End: 2023-01-08

## 2022-12-31 RX ORDER — METOPROLOL TARTRATE 50 MG
25 TABLET ORAL DAILY
Refills: 0 | Status: DISCONTINUED | OUTPATIENT
Start: 2022-12-31 | End: 2023-01-08

## 2022-12-31 RX ORDER — INSULIN LISPRO 100/ML
VIAL (ML) SUBCUTANEOUS
Refills: 0 | Status: DISCONTINUED | OUTPATIENT
Start: 2022-12-31 | End: 2023-01-01

## 2022-12-31 RX ORDER — DEXTROSE 50 % IN WATER 50 %
25 SYRINGE (ML) INTRAVENOUS ONCE
Refills: 0 | Status: DISCONTINUED | OUTPATIENT
Start: 2022-12-31 | End: 2023-01-08

## 2022-12-31 RX ORDER — SODIUM CHLORIDE 9 MG/ML
3 INJECTION INTRAMUSCULAR; INTRAVENOUS; SUBCUTANEOUS ONCE
Refills: 0 | Status: COMPLETED | OUTPATIENT
Start: 2022-12-31 | End: 2022-12-31

## 2022-12-31 RX ORDER — POTASSIUM CHLORIDE 20 MEQ
40 PACKET (EA) ORAL ONCE
Refills: 0 | Status: COMPLETED | OUTPATIENT
Start: 2022-12-31 | End: 2022-12-31

## 2022-12-31 RX ORDER — LANOLIN ALCOHOL/MO/W.PET/CERES
5 CREAM (GRAM) TOPICAL AT BEDTIME
Refills: 0 | Status: DISCONTINUED | OUTPATIENT
Start: 2022-12-31 | End: 2023-01-08

## 2022-12-31 RX ORDER — HEPARIN SODIUM 5000 [USP'U]/ML
5000 INJECTION INTRAVENOUS; SUBCUTANEOUS EVERY 8 HOURS
Refills: 0 | Status: DISCONTINUED | OUTPATIENT
Start: 2022-12-31 | End: 2023-01-06

## 2022-12-31 RX ORDER — DEXTROSE 50 % IN WATER 50 %
12.5 SYRINGE (ML) INTRAVENOUS ONCE
Refills: 0 | Status: DISCONTINUED | OUTPATIENT
Start: 2022-12-31 | End: 2023-01-08

## 2022-12-31 RX ORDER — VANCOMYCIN HCL 1 G
2000 VIAL (EA) INTRAVENOUS ONCE
Refills: 0 | Status: COMPLETED | OUTPATIENT
Start: 2022-12-31 | End: 2022-12-31

## 2022-12-31 RX ORDER — SODIUM CHLORIDE 9 MG/ML
1000 INJECTION, SOLUTION INTRAVENOUS
Refills: 0 | Status: DISCONTINUED | OUTPATIENT
Start: 2022-12-31 | End: 2023-01-01

## 2022-12-31 RX ORDER — ONDANSETRON 8 MG/1
4 TABLET, FILM COATED ORAL EVERY 8 HOURS
Refills: 0 | Status: DISCONTINUED | OUTPATIENT
Start: 2022-12-31 | End: 2023-01-08

## 2022-12-31 RX ORDER — DEXTROSE 50 % IN WATER 50 %
15 SYRINGE (ML) INTRAVENOUS ONCE
Refills: 0 | Status: DISCONTINUED | OUTPATIENT
Start: 2022-12-31 | End: 2023-01-08

## 2022-12-31 RX ORDER — INSULIN LISPRO 100/ML
VIAL (ML) SUBCUTANEOUS AT BEDTIME
Refills: 0 | Status: DISCONTINUED | OUTPATIENT
Start: 2022-12-31 | End: 2023-01-08

## 2022-12-31 RX ORDER — ACETAMINOPHEN 500 MG
650 TABLET ORAL ONCE
Refills: 0 | Status: COMPLETED | OUTPATIENT
Start: 2022-12-31 | End: 2022-12-31

## 2022-12-31 RX ORDER — SODIUM CHLORIDE 9 MG/ML
2000 INJECTION INTRAMUSCULAR; INTRAVENOUS; SUBCUTANEOUS ONCE
Refills: 0 | Status: COMPLETED | OUTPATIENT
Start: 2022-12-31 | End: 2022-12-31

## 2022-12-31 RX ADMIN — Medication 40 MILLIEQUIVALENT(S): at 17:37

## 2022-12-31 RX ADMIN — Medication 650 MILLIGRAM(S): at 23:32

## 2022-12-31 RX ADMIN — SODIUM CHLORIDE 75 MILLILITER(S): 9 INJECTION, SOLUTION INTRAVENOUS at 23:45

## 2022-12-31 RX ADMIN — SODIUM CHLORIDE 1333.33 MILLILITER(S): 9 INJECTION INTRAMUSCULAR; INTRAVENOUS; SUBCUTANEOUS at 17:34

## 2022-12-31 RX ADMIN — SODIUM CHLORIDE 3 MILLILITER(S): 9 INJECTION INTRAMUSCULAR; INTRAVENOUS; SUBCUTANEOUS at 15:09

## 2022-12-31 NOTE — CONSULT NOTE ADULT - PROBLEM/RECOMMENDATION-1
Reason for Endocrinology Consult: Diabetes    HPI: 70y Female            PAST MEDICAL & SURGICAL HISTORY:    FAMILY HISTORY:      Home Medications:      Current (Non-Endocrine) Meds:  aspirin  chewable 81 milliGRAM(s) Oral daily  chlorhexidine 4% Liquid 1 Application(s) Topical <User Schedule>  dextrose 5%. 1000 milliLiter(s) IV Continuous <Continuous>  dextrose 5%. 1000 milliLiter(s) IV Continuous <Continuous>  heparin   Injectable 5000 Unit(s) SubCutaneous every 12 hours  lisinopril 10 milliGRAM(s) Oral daily  metoclopramide 10 milliGRAM(s) Oral every 6 hours PRN  pantoprazole    Tablet 40 milliGRAM(s) Oral before breakfast  traMADol 25 milliGRAM(s) Oral four times a day PRN      Current Endocrine Meds:   dextrose 40% Gel 15 Gram(s) Oral once  dextrose 50% Injectable 25 Gram(s) IV Push once  dextrose 50% Injectable 12.5 Gram(s) IV Push once  dextrose 50% Injectable 25 Gram(s) IV Push once  glucagon  Injectable 1 milliGRAM(s) IntraMuscular once  insulin glargine Injectable (LANTUS) 9 Unit(s) SubCutaneous every morning  insulin lispro (ADMELOG) corrective regimen sliding scale   SubCutaneous three times a day before meals  insulin lispro Injectable (ADMELOG) 3 Unit(s) SubCutaneous three times a day before meals      Allergies:  No Known Allergies      ROS:  Denies the following except as indicated.    General: weight loss/weight gain, decreased appetite, fatigue, fever  Eyes: blurry vision, double vision  ENT: neck swelling, dysphagia, voice changes   CV: palpitations, SOB, chest pain, cough  GI: nausea, vomiting, diarrhea, constipation, abdominal pain  : nocturia,  polyuria, dysuria  Endo: decreased libido, heat/cold intolerance, jitteriness  MSK: arthralgias, myalgias  Skin: rash, dryness, diaphoresis  Neuro: pedal numbness,pedal paresthesias, pedal pain    Height (cm): 160 (09-03 @ 23:46)  Weight (kg): 89.811 (09-03 @ 23:46)  BMI (kg/m2): 35.1 (09-03 @ 23:46)    Vital Signs Last 24 Hrs  T(C): 36.2 (06 Sep 2021 05:38), Max: 36.5 (05 Sep 2021 20:19)  T(F): 97.2 (06 Sep 2021 05:38), Max: 97.7 (05 Sep 2021 20:19)  HR: 57 (06 Sep 2021 05:38) (50 - 57)  BP: 130/63 (06 Sep 2021 05:38) (130/63 - 164/72)  BP(mean): --  RR: 18 (06 Sep 2021 05:38) (18 - 18)  SpO2: --  Constitutional: WN/WD in NAD.   Neck: no thyromegaly or palpable thyroid nodules   Respiratory: lungs CTAB.  Cardiovascular: regular rate and rhythm, normal S1 and S2, no audible murmurs  GI: soft, NT/ND, no masses/HSM appreciated.  Ext: no edema, no ulcers, pedal pulses palpable bilaterally  Neurology: no tremor, monofilament sensation intact in feet  Psychiatric: A&O x 3, normal affect/mood.        LABS:                        15.0   9.04  )-----------( 254      ( 06 Sep 2021 05:25 )             45.4     09-06    137  |  98  |  13  ----------------------------<  166<H>  4.5   |  28  |  0.6<L>    Ca    9.0      06 Sep 2021 05:25  Mg     2.1     09-06    TPro  6.5  /  Alb  3.9  /  TBili  8.2<H>  /  DBili  x   /  AST  343<H>  /  ALT  587<H>  /  AlkPhos  444<H>  09-06                                       RADIOLOGY & ADDITIONAL STUDIES:    A/P:70yFemale    1.  DM  For now:  Glargine-    units at bedtime  Lispro-    units three times a day before meals   Will continue to monitor     At discharge, recommend:      Pt can follow up at discharge with:    Above discussed with resident. DISPLAY PLAN FREE TEXT

## 2022-12-31 NOTE — H&P ADULT - NSHPREVIEWOFSYSTEMS_GEN_ALL_CORE
REVIEW OF SYSTEMS:  CONSTITUTIONAL: (+) generalize malaise, (-) fevers, (-) chills  EYES/ENT: (-) visual changes,  (-) vertigo,  (-) throat pain   NECK:  (-) pain, (-) stiffness  RESPIRATORY:  (-) shortness of breath, (-) cough,  (-) wheezing,  (-) hemoptysis   CARDIOVASCULAR:  (-) chest pain, (-) palpitations  GASTROINTESTINAL:  (-) abdominal or epigastric pain, (-) nausea, (-) vomiting, (-) diarrhea, (-) constipation, (-) melena,  (-) hematemesis,  (-) hematochezia  GENITOURINARY: (-) dysuria, (-) frequency, (-) hematuria  NEUROLOGICAL: (-) numbness, (-) weakness  SKIN: (+) itching, (+)redness

## 2022-12-31 NOTE — ED ADULT NURSE NOTE - OBJECTIVE STATEMENT
Patient presents to the emergency room with complaints of wound. Patient sent in by Dr. Jones for worsening bilateral lower extremity edema. Patient with lymphedema, wrapped in ace bandages with orthopedic shoes. Patient states her legs have been reddened and she has pus draining from wound and pus from toes. Patient complains of 3-4/10 pain and states she is on Levaquin but did not take it yet today. Patient with history of colon and lung ca with right chest wall port not accessed.

## 2022-12-31 NOTE — ED PROVIDER NOTE - NS ED MD DISPO ADMITTING SERVICE
Staging Info: By selecting yes to the question above you will include information on AJCC 8 tumor staging in your Mohs note. Information on tumor staging will be automatically added for SCCs on the head and neck. AJCC 8 includes tumor size, tumor depth, perineural involvement and bone invasion. MED

## 2022-12-31 NOTE — ED PROVIDER NOTE - CLINICAL SUMMARY MEDICAL DECISION MAKING FREE TEXT BOX
71 yo white female multiple PMHx including b/l LE lymphedema with weeping edema recently started on PO Levaquin x4days for concern of cellulitis BIBA from home after home visiting nurse noted right lower lef lesion worse than 2 days earlier with open wound discharge concern for worsening infection and not responding to PO therapy. Pt does not meet sepsis criteria. Plan: infection workup: labs including pan culture, lactate, wound swabs of active discharge, EKG, CXR, vascular consult via surgery resident, observe, reassess. 71 yo white female multiple PMHx including b/l LE lymphedema with weeping edema recently started on PO Levaquin x4days for concern of cellulitis BIBA from home after home visiting nurse noted right lower lef lesion worse than 2 days earlier with open wound discharge concern for worsening infection and not responding to PO therapy. Pt does not meet sepsis criteria.   Plan: infection workup: labs including pan culture, lactate, wound swabs of active discharge, EKG, CXR, vascular consult via surgery resident, observe, reassess.    Addendum:  IV Abx started.  Surgery resident aware of vascular consult.  Wound swab of R lower leg ulcer drainage site sent for C+S.  Dr. oLzano aware of admission.

## 2022-12-31 NOTE — CONSULT NOTE ADULT - ATTENDING COMMENTS
Patient evaluated at bedside  Severe lymphedema with chronic skin changes and lymphorrhea  Foul odor and blanching erythema over both legs from knees to toes  No purulent discharge  Maceration of skin  Tender to palpation  Pt on IV abx   Cultures sent and pending    Continue IV abx  Local wound care  When pt tolerates, re-wrap legs with kerlex and ACE wrap versus Unna boot if infection resolves  Pt may ambulate with rolling walker No purulent discharge  Maceration of skin  Tender to palpation  Pt on IV abx   Cultures sent and pending    Continue IV abx  Local wound care  When pt tolerates, re-wrap legs with kerlex and ACE wrap versus Unna boot if infection resolves

## 2022-12-31 NOTE — CONSULT NOTE ADULT - SUBJECTIVE AND OBJECTIVE BOX
71 yo female w/PMHx of HTN, DM2, Lung Ca/Colon Ca (9 years ago) presents to the ED sent from OP wound center by Dr. Jones for worsening b/l leg edema. Pt is currently on PO Levaquin 750mg once a day x4days ago for leg weeping wounds, denies any fevers. Pt has a home health aid to help with wrapping of the wounds 3x a week. This morning, her aid called the doctor because she said the wound was worse from Thursday on the right leg on the inner side calf, +weeping. Pt endorses discomfort at the side, achy and occasional sharp pain. Pt usually ambulates with a walker. Pt has generalized weakness gradually worsening over the past week. Denies CP, cough, SOB. Pt reports KEILA but attributes that to Levaquin. Pt is allergic to keflex : anaphylactic shock.   Vascular surgery called to evaluate the patient.  Pt seen and examined at bedside. Denies any complaints other than feeling weak and bilateral lower extremity swelling/seeping from wounds.    Vital Signs Last 24 Hrs  T(C): 37.1 (31 Dec 2022 12:23), Max: 37.1 (31 Dec 2022 12:23)  T(F): 98.8 (31 Dec 2022 12:23), Max: 98.8 (31 Dec 2022 12:23)  HR: 94 (31 Dec 2022 12:23) (94 - 94)  BP: 127/50 (31 Dec 2022 12:23) (127/50 - 127/50)  BP(mean): --  RR: 20 (31 Dec 2022 12:23) (20 - 20)  SpO2: 100% (31 Dec 2022 12:23) (100% - 100%)    Parameters below as of 31 Dec 2022 12:23  Patient On (Oxygen Delivery Method): room air                            13.0   8.75  )-----------( 170      ( 31 Dec 2022 15:04 )             38.7     12-31    137  |  101  |  41<H>  ----------------------------<  87  3.3<L>   |  24  |  1.73<H>    Ca    9.5      31 Dec 2022 15:04    TPro  7.5  /  Alb  2.9<L>  /  TBili  0.7  /  DBili  x   /  AST  45<H>  /  ALT  29  /  AlkPhos  83  12-31    I&O's Summary      PHYSICAL EXAM:   · CONSTITUTIONAL: Elderly, white female, alert, nontoxic, no respiratory distress  · ENMT: oropharynx clear, mucous membranes mildly dry  · EYES: Clear bilaterally, pupils equal, round and reactive to light. EOMI  · CARDIAC: Normal rate, regular rhythm.  Heart sounds S1, S2.  No murmurs, rubs or gallops. normal radial pulse  · RESPIRATORY: Breath sounds clear and equal bilaterally. normal respirations  · GASTROINTESTINAL: Abdomen soft, non-tender, no guarding. +BS, hypoactive, normal pitch  · MUSCULOSKELETAL: Spine appears normal, range of motion is not limited, no muscle or joint tenderness. b/l arms nontender, full rom. b/l LE severe lymphedema with weeping wounds and dressings in place, sensorimotor intact  · NEUROLOGICAL: Alert and oriented, no focal deficits, no motor or sensory deficits.  · SKIN: Skin normal color for race, warm, dry and intact. R lower leg + macerated skin, + erythema.  R anteromedial aspect + open cutaneous ulceration with + active mild drainage.

## 2022-12-31 NOTE — ED ADULT NURSE REASSESSMENT NOTE - NS ED NURSE REASSESS COMMENT FT1
LVM on admit phone requesting pain medication for pt. No interventions ordered at this time. Ongoing assessment.

## 2022-12-31 NOTE — ED PROVIDER NOTE - PROGRESS NOTE DETAILS
JANES Zambrano MD:  Surgery resident aware of vascular consult.  Wound swab of R lower leg ulcer drainage site sent for C+S.  Dr. Lozano aware of admission.

## 2022-12-31 NOTE — ED PROVIDER NOTE - OBJECTIVE STATEMENT
73 yo female w/PMHx of HTN, DM2, Lung Ca/Colon Ca (9 years ago) presents to the ED sent from [facility] wound center by Dr. Jones for worsening b/l leg edema. Pt is currently on PO Levaquin 750mg once a day x4days ago for leg weeping wounds, denies any fevers. Pt has a home health aid to help with wrapping of the wounds 3x a week. This morning, her aid called the doctor because she said the wound was worse from Thursday on the right leg on the inner side calf, +weeping. Pt endorses discomfort at the side, achy and occasional sharp pain. Pt usually ambulates with a walker. Pt has generalized weakness gradually worsening over the past week. Denies CP, cough, SOB. Pt reports KEILA but attributes that to Levaquin. Pt is allergic to keflex : anaphylactic shock.   Vascular: Dr. Jones  Nephrologist: Dr. Hernández 71 yo female w/PMHx of HTN, DM2, Lung Ca/Colon Ca (9 years ago) presents to the ED sent from OP wound center by Dr. Jones for worsening b/l leg edema. Pt is currently on PO Levaquin 750mg once a day x 4 days ago for leg weeping wounds, denies any fevers. Pt has a home health aid to help with wrapping of the wounds 3x a week. This morning, her aid called the doctor because she said the wound was worse from Thursday on the right leg on the inner side calf, +weeping. Pt endorses discomfort at the side, achy and occasional sharp pain. Pt usually ambulates with a walker. Pt has generalized weakness gradually worsening over the past week. Denies CP, cough, SOB. Pt reports KEILA but attributes that to Levaquin. Pt is allergic to keflex : anaphylactic shock.   Vascular: Dr. Jones  Nephrologist: Dr. Hernández 73 yo female w/PMHx of HTN, DM2, Lung Ca/Colon Ca (9 years ago) presents to the ED sent from [facility] wound center by Dr. Jones for worsening b/l leg edema. Pt is currently on PO Levaquin 750mg once a day x 4 days ago for R leg weeping wounds, denies any fevers. Pt has a home health aid to help with wrapping of the wounds 3x a week. This morning, her aid called the doctor because she said the wound was worse from Thursday on the right leg on the inner side calf, +weeping. Pt endorses discomfort at the side, achy and occasional sharp pain. Pt usually ambulates with a walker. Pt has generalized weakness gradually worsening over the past week. Denies CP, cough, SOB. Pt reports KEILA but attributes that to Levaquin. Pt is allergic to keflex : anaphylactic shock.   Vascular: Dr. Jones  Nephrologist: Dr. Hernández

## 2022-12-31 NOTE — ED PROVIDER NOTE - MUSCULOSKELETAL, MLM
Spine appears normal, range of motion is not limited, no muscle or joint tenderness. b/l arms nontender move well. b/l LE severe lymphedema, not able to SLR but can move the leg

## 2022-12-31 NOTE — ED PROVIDER NOTE - SKIN, MLM
Skin normal color for race, warm, dry and intact. R loweeer leg + macerated skin, + erythema.  R anteromedial aspect + open cutaneous ulceration with + active mild drainage. Skin normal color for race, warm, dry and intact. R lower leg + macerated skin, + erythema.  R anteromedial aspect + open cutaneous ulceration with + active mild drainage.

## 2022-12-31 NOTE — ED PROVIDER NOTE - CARE PLAN
Principal Discharge DX:	Cellulitis of right anterior lower leg  Secondary Diagnosis:	Sepsis, unspecified organism  Secondary Diagnosis:	Lymphedema of leg   1

## 2022-12-31 NOTE — H&P ADULT - HISTORY OF PRESENT ILLNESS
71 yo female w/PMHx of HTN, DM2, Lung Ca/Colon Ca (9 years ago), chronic lymphedema presents to  for worsening RLE redness. Patient states she has home visiting nurse who came in today and noticed that wound has been worsening since 2 days ago. Patient reports of increased right leg redness, reports feeling weak. States she was started on Levaquin PO 750mg x14 days and has been taking it for 4 days. Denies Hx of MRSA, trauma. Walks with aid of walker.      PMHx   HTN  DM   Hx of Lung and colon cancer   chronic lymphadema       Surgical History  History of Cholecystectomy  History of Hysterectomy   History of Laminectomy Lumbar  History of Lung lobectomy  History of Partial Colectomy  History of Rotator Cuff Repair    Family History  Family history of Bladder carcinoma : Mother  Family history of coronary artery disease (V17.3) (Z82.49) : Father    Social History  Denies alcohol consumption   Does not use illicit drugs   Never smoked cigarettes       Allergies   -Cephalosporins (specifically Keflex: reports anaphylaxis, approx 35 yrs ago)  71 yo female w/PMHx of HTN, DM2, Lung Ca/Colon Ca (9 years ago), chronic lymphedema presents to  for worsening RLE redness. Patient states she has home visiting nurse who came in today and noticed that wound has been worsening since 2 days ago. Patient reports of increased weeping from the wound, reports of generalized malaise for few days.  States she was started on Levaquin PO 750mg x14 days and has been taking it for 4 days. Denies Hx of MRSA, trauma. Walks with aid of walker.      PMHx   HTN  DM   Hx of Lung and colon cancer   chronic lymphedema       Surgical History  History of Cholecystectomy  History of Hysterectomy   History of Laminectomy Lumbar  History of Lung lobectomy  History of Partial Colectomy  History of Rotator Cuff Repair    Family History  Family history of Bladder carcinoma : Mother  Family history of coronary artery disease (V17.3) (Z82.49) : Father    Social History  Denies alcohol consumption   Does not use illicit drugs   Never smoked cigarettes       Allergies   -Cephalosporins (specifically Keflex: reports anaphylaxis, approx 35 yrs ago)

## 2022-12-31 NOTE — H&P ADULT - ASSESSMENT
71 yo female w/PMHx of HTN, DM2, Lung Ca/Colon Ca (9 years ago), chronic lymphedema presents to  for worsening RLE redness.    #ADMIT: MED/SURG     #RLE Cellulitis   -Start Vanc + Levaquin   -reports anaphylaxis to cephalosporins (Keflex: 35 yrs ago)   -pain management   -duplex LE   -wound care consult   -ID consult   -vascular surgery consult and recommendations noted     #ADRIAN on CKD   -hold Triamterene and HCTZ  -baseline Cr: 1.2  -gentle IV hydration   -strict i/o's  -hold nephrotoxic medications    #HTN  -BP well controlled   -continue to hold Triamterene and HCTZ, reassess in AM     #DM   -on Metformin 1000mg BID and Glipizide 5  -hold both   -ISS       #Lactic Acidosis   -likely Type B as pt is currently on Metformin 1000mg BID with GFR of 31  -will likely need dose adjusted based on renal function   -SIRS criteria: 0      #DVT Prophylaxis   -Heparin SubQ        71 yo female w/PMHx of HTN, DM2, Lung Ca/Colon Ca (9 years ago), chronic lymphedema presents to  for worsening RLE redness.    #ADMIT: MED/SURG     #RLE Cellulitis   -on Levaquin 750mg PO as outpt, will continue for now  -given 1x dose of Vanc in ED, will defer further abx to ID   -reports anaphylaxis to cephalosporins (Keflex: 35 yrs ago)   -pain management   -duplex LE   -wound care consult   -ID consult   -vascular surgery consult and recommendations noted     #Lactic Acidosis   -SIRS criteria: 0  -likely Type B as pt is currently on Metformin 1000mg BID with GFR of 31  -will likely need dose adjusted based on renal function       #ADRIAN on CKD   -hold Triamterene and HCTZ  -baseline Cr: 1.2  -gentle IV hydration   -strict i/o's  -hold nephrotoxic medications    #HTN  -BP well controlled for now  -continue to hold Triamterene and HCTZ, due to ADRIAN, reassess in AM     #DM   -on Metformin 1000mg BID and Glipizide 5  -hold both   -ISS     #DVT Prophylaxis   -Heparin SubQ        73 yo female w/PMHx of HTN, DM2, Lung Ca/Colon Ca (9 years ago), chronic lymphedema presents to  for worsening RLE redness.    #ADMIT: MED/SURG     #RLE Cellulitis   -on Levaquin 750mg PO as outpt, will continue for now  -given 1x dose of Vanc in ED, will defer further abx to ID   -reports anaphylaxis to cephalosporins (Keflex: 35 yrs ago)   -pain management   -duplex LE   -wound care consult   -ID consult   -vascular surgery consult and recommendations noted     #Lactic Acidosis   -SIRS criteria: 0  -likely Type B as pt is currently on Metformin 1000mg BID with GFR of 31  -will likely need dose adjusted based on renal function       #ADRIAN on CKD   -hold Triamterene and HCTZ  -baseline Cr: 1.2  -gentle IV hydration   -strict i/o's  -hold nephrotoxic medications    #Hypokalemia   -check Mg2+  -replace and trend     #Transaminitis   -mild   -trend       #HTN  -BP well controlled for now  -continue to hold Triamterene and HCTZ, due to ADRIAN, reassess in AM     #DM   -on Metformin 1000mg BID and Glipizide 5mg   -hold both   -ISS     #Protein-Calorie Malnutrition   -Nutrition eval     #DVT Prophylaxis   -Heparin SubQ        73 yo female w/PMHx of HTN, DM2, Lung Ca/Colon Ca (9 years ago), chronic lymphedema presents to  for worsening RLE redness.    #ADMIT: MED/SURG     #RLE Cellulitis   -on Levaquin 750mg PO as outpt  -given 1x dose of Vanc and 1x dose of Levaquin in ED   -reports anaphylaxis to cephalosporins (Keflex: 35 yrs ago)   -will defer further abx to ID as pt w/o leukocytosis, fever   -pain management   -duplex LE   -wound care consult   -ID consult   -vascular surgery consult and recommendations noted     #Lactic Acidosis   -SIRS criteria: 0  -likely Type B as pt is currently on Metformin 1000mg BID with GFR of 31  -will likely need dose adjusted based on renal function     #Generalized Malaise   -?due to Metformin toxicity, ?due fluoroquinolone use   -monitor     #ADRIAN on CKD   -hold Triamterene and HCTZ  -baseline Cr: 1.2  -gentle IV hydration   -strict i/o's  -hold nephrotoxic medications    #Hypokalemia   -check Mg2+  -replace and trend     #Transaminitis   -mild   -trend       #HTN  -BP well controlled for now  -continue to hold Triamterene and HCTZ, due to ADRIAN, reassess in AM     #DM   -on Metformin 1000mg BID and Glipizide 5mg   -hold both   -ISS     #Protein-Calorie Malnutrition   -Nutrition eval     #DVT Prophylaxis   -Heparin SubQ        73 yo female w/PMHx of HTN, DM2, Lung Ca/Colon Ca (9 years ago), chronic lymphedema presents to  for worsening RLE redness.    #ADMIT: MED/SURG     #RLE Wound, Hx of lymphedema, chronic severe venous stasis, ?Cellulitis    -on Levaquin 750mg PO as outpt. Took 4 days  -given 1x dose of Vanc and 1x dose of Levaquin in ED   -reports anaphylaxis to cephalosporins (Keflex: 35 yrs ago)   -will defer further abx to ID as pt w/o leukocytosis, fever   -pain management   -f/u on wound cultures (taken in ED) and blood cultures   -duplex LE   -wound care consult   -ID consult   -vascular surgery consult and recommendations noted     #Lactic Acidosis   -SIRS criteria: 0  -likely Type B as pt is currently on Metformin 1000mg BID with GFR of 31  -will likely need dose adjusted based on renal function     #Generalized Malaise   -?due to Metformin toxicity, ?due fluoroquinolone use   -monitor     #ADRIAN on CKD   -hold Triamterene and HCTZ  -baseline Cr: 1.2  -gentle IV hydration   -strict i/o's  -hold nephrotoxic medications    #Hypokalemia   -check Mg2+  -replace and trend     #Transaminitis   -mild   -trend       #HTN  -BP well controlled for now  -continue to hold Triamterene and HCTZ, due to ADRIAN, reassess in AM     #DM   -on Metformin 1000mg BID and Glipizide 5mg   -hold both   -ISS     #Protein-Calorie Malnutrition   -Nutrition eval     #DVT Prophylaxis   -Heparin SubQ        71 yo female w/PMHx of HTN, DM2, Lung Ca/Colon Ca (9 years ago), chronic lymphedema presents to  for worsening RLE redness.    #ADMIT: MED/SURG     #RLE Wound, Hx of lymphedema, chronic severe venous stasis, Cellulitis    -on Levaquin 750mg PO as outpt. Took 4 days  -given 1x dose of Vanc and 1x dose of Levaquin in ED   -reports anaphylaxis to cephalosporins (Keflex: 35 yrs ago)   -will defer further abx to ID as pt w/o leukocytosis, fever   -pain management   -f/u on wound cultures (taken in ED) and blood cultures   -duplex LE   -wound care consult   -ID consult   -vascular surgery consult and recommendations noted     #Lactic Acidosis   -SIRS criteria: 0  -likely Type B as pt is currently on Metformin 1000mg BID with GFR of 31  -will likely need dose adjusted based on renal function     #Generalized Malaise   -?due to Metformin toxicity, ?due fluoroquinolone use   -monitor     #ADRIAN on CKD   -hold Triamterene and HCTZ  -baseline Cr: 1.2  -gentle IV hydration   -strict i/o's  -hold nephrotoxic medications    #Hypokalemia   -check Mg2+  -replace and trend     #Transaminitis   -mild   -trend       #HTN  -BP well controlled for now  -continue to hold Triamterene and HCTZ, due to ADRIAN, reassess in AM     #DM   -on Metformin 1000mg BID and Glipizide 5mg   -hold both   -ISS     #Protein-Calorie Malnutrition   -Nutrition eval     #DVT Prophylaxis   -Heparin SubQ

## 2022-12-31 NOTE — ED ADULT TRIAGE NOTE - CHIEF COMPLAINT QUOTE
Pt sent to ED by Dr. Jones from wound center for worsening b/l LE edema. On Levaquin for chronic weeping wounds. Denies fevers.

## 2022-12-31 NOTE — ED ADULT NURSE NOTE - NSIMPLEMENTINTERV_GEN_ALL_ED
Implemented All Fall with Harm Risk Interventions:  [place] to call system. Call bell, personal items and telephone within reach. Instruct patient to call for assistance. Room bathroom lighting operational. Non-slip footwear when patient is off stretcher. Physically safe environment: no spills, clutter or unnecessary equipment. Stretcher in lowest position, wheels locked, appropriate side rails in place. Provide visual cue, wrist band, yellow gown, etc. Monitor gait and stability. Monitor for mental status changes and reorient to person, place, and time. Review medications for side effects contributing to fall risk. Reinforce activity limits and safety measures with patient and family. Provide visual clues: red socks.

## 2022-12-31 NOTE — CONSULT NOTE ADULT - ASSESSMENT
72F with bilateral LE lymphedema with weeping wounds  new onset ADRIAN/weakness  lactic acidosis    recommend medical admission for IV abx and workup/mgmt of ADRIAN/new onset lethargy/weakness  local wound care for bilateral LE  trend lactate, fluid resuscitation  wound care nurse consult in AM  please have patient follow up in wound care clinic as an outpatient after discharge  no vascular surgery intervention at this time, please reconsult if necessary    Plan discussed with Dr. Mayer

## 2022-12-31 NOTE — H&P ADULT - NSHPPHYSICALEXAM_GEN_ALL_CORE
Vitals  T(F): 98.1 (12-31-22 @ 19:10), Max: 98.8 (12-31-22 @ 12:23)  HR: 87 (12-31-22 @ 19:10) (87 - 94)  BP: 132/50 (12-31-22 @ 19:10) (127/50 - 132/50)  RR: 20 (12-31-22 @ 19:10) (20 - 20)  SpO2: 100% (12-31-22 @ 19:10) (100% - 100%)    PHYSICAL EXAM   Gen: NAD, comfortable, AA&Ox4  HEENT: head atrumatic and normocephalic, PEERLA, EOMI,  no gross abnormalities of ears, mucous membranes moist, no oral lesions, neck supple without masses/goiter/lymphadenopathy, no JVD  CVS: +s1, s2, regular rate and rhythm, no murmurs, rubs or gallops, no thrill, normal PMI  Pulmonary: normal respiratory effort, clear to auscultation b/l, no wheezes/crackles/rhonchi  Abdomen: soft, non-tender, non-distended, +bowel sounds in all 4 quadrants, no masses noted, no guarding or rigidity   Back: no scoliosis, lordosis, or kyphosis, no point tenderness, no CVA tenderness   Extremities: no pedal edema, pedal pulses palpable, <2 sec. cap refill   Skin: nl warm and dry, no wounds   Neuro: answering questions appropriately, face symmetric, sensation equal bilaterally in face, tongue midline, no dysarthria, 5/5 strength in upper and lower extremities bilaterally, sensation intact in upper and lower extremities bilaterally, nl finger to nose, and nl heel to shin Vitals  T(F): 98.1 (12-31-22 @ 19:10), Max: 98.8 (12-31-22 @ 12:23)  HR: 87 (12-31-22 @ 19:10) (87 - 94)  BP: 132/50 (12-31-22 @ 19:10) (127/50 - 132/50)  RR: 20 (12-31-22 @ 19:10) (20 - 20)  SpO2: 100% (12-31-22 @ 19:10) (100% - 100%)    PHYSICAL EXAM   Gen: NAD, comfortable, AA&Ox4  HEENT: head atraumatic and normocephalic, PEERLA, EOMI,  no gross abnormalities of ears, mucous membranes moist, no oral lesions, neck supple without masses/goiter/lymphadenopathy, no JVD  CVS: +s1, s2, regular rate and rhythm, no murmurs, rubs or gallops, no thrill, normal PMI  Pulmonary: normal respiratory effort, clear to auscultation b/l, no wheezes/crackles/rhonchi  Abdomen: soft, non-tender, non-distended, +bowel sounds in all 4 quadrants, no masses noted, no guarding or rigidity   Back: no scoliosis, lordosis, or kyphosis, no point tenderness, no CVA tenderness   Extremities: no pedal edema, pedal pulses palpable, <2 sec. cap refill   Skin: nl warm and dry, no wounds   Neuro: answering questions appropriately, face symmetric, sensation equal bilaterally in face, tongue midline, no dysarthria, 5/5 strength in upper and lower extremities bilaterally, sensation intact in upper and lower extremities bilaterally, nl finger to nose, and nl heel to shin Vitals  T(F): 98.1 (12-31-22 @ 19:10), Max: 98.8 (12-31-22 @ 12:23)  HR: 87 (12-31-22 @ 19:10) (87 - 94)  BP: 132/50 (12-31-22 @ 19:10) (127/50 - 132/50)  RR: 20 (12-31-22 @ 19:10) (20 - 20)  SpO2: 100% (12-31-22 @ 19:10) (100% - 100%)    PHYSICAL EXAM   Gen: NAD, AA&Ox4, +morbidly obese   HEENT: head atraumatic and normocephalic, PEERLA, EOMI,  no gross abnormalities of ears, mucous membranes moist, no oral lesions, neck supple without masses/goiter/lymphadenopathy, no JVD  CVS: +s1, s2, regular rate and rhythm, no murmurs, rubs or gallops, no thrill, normal PMI  Pulmonary: normal respiratory effort, clear to auscultation b/l, no wheezes/crackles/rhonchi  Abdomen: soft, non-tender, non-distended, +bowel sounds in all 4 quadrants, no masses noted, no guarding or rigidity   Skin: +chronic lymphedema with chronic hyperkeratotic changes, LLE: +in ACE, RLE: +war, +erythematous, +macerated, +open cutaneous ulceration over the anteromedial aspect with foul smelling drainage Vitals  T(F): 98.1 (12-31-22 @ 19:10), Max: 98.8 (12-31-22 @ 12:23)  HR: 87 (12-31-22 @ 19:10) (87 - 94)  BP: 132/50 (12-31-22 @ 19:10) (127/50 - 132/50)  RR: 20 (12-31-22 @ 19:10) (20 - 20)  SpO2: 100% (12-31-22 @ 19:10) (100% - 100%)    PHYSICAL EXAM   Gen: NAD, AA&Ox4, +morbidly obese   HEENT: head atraumatic and normocephalic, PEERLA, EOMI,  no gross abnormalities of ears, mucous membranes moist, no oral lesions, neck supple without masses/goiter/lymphadenopathy, no JVD  CVS: +s1, s2, regular rate and rhythm, no murmurs, rubs or gallops, no thrill, normal PMI  Pulmonary: normal respiratory effort, clear to auscultation b/l, no wheezes/crackles/rhonchi  Abdomen: soft, non-tender, non-distended, +bowel sounds in all 4 quadrants, no masses noted, no guarding or rigidity   Skin: +chronic lymphedema with chronic hyperkeratotic changes, LLE: +in ACE, RLE:+mild erythema, +macerated, +open cutaneous ulceration over the anteromedial aspect with discharge

## 2023-01-01 DIAGNOSIS — L03.90 CELLULITIS, UNSPECIFIED: ICD-10-CM

## 2023-01-01 LAB
ALBUMIN SERPL ELPH-MCNC: 2.6 G/DL — LOW (ref 3.3–5)
ALP SERPL-CCNC: 67 U/L — SIGNIFICANT CHANGE UP (ref 40–120)
ALT FLD-CCNC: 24 U/L — SIGNIFICANT CHANGE UP (ref 12–78)
ANION GAP SERPL CALC-SCNC: 8 MMOL/L — SIGNIFICANT CHANGE UP (ref 5–17)
AST SERPL-CCNC: 37 U/L — SIGNIFICANT CHANGE UP (ref 15–37)
BILIRUB SERPL-MCNC: 0.8 MG/DL — SIGNIFICANT CHANGE UP (ref 0.2–1.2)
BUN SERPL-MCNC: 36 MG/DL — HIGH (ref 7–23)
CALCIUM SERPL-MCNC: 9.1 MG/DL — SIGNIFICANT CHANGE UP (ref 8.5–10.1)
CHLORIDE SERPL-SCNC: 105 MMOL/L — SIGNIFICANT CHANGE UP (ref 96–108)
CO2 SERPL-SCNC: 25 MMOL/L — SIGNIFICANT CHANGE UP (ref 22–31)
CREAT SERPL-MCNC: 1.37 MG/DL — HIGH (ref 0.5–1.3)
CULTURE RESULTS: SIGNIFICANT CHANGE UP
EGFR: 41 ML/MIN/1.73M2 — LOW
GLUCOSE BLDC GLUCOMTR-MCNC: 133 MG/DL — HIGH (ref 70–99)
GLUCOSE BLDC GLUCOMTR-MCNC: 73 MG/DL — SIGNIFICANT CHANGE UP (ref 70–99)
GLUCOSE BLDC GLUCOMTR-MCNC: 78 MG/DL — SIGNIFICANT CHANGE UP (ref 70–99)
GLUCOSE SERPL-MCNC: 75 MG/DL — SIGNIFICANT CHANGE UP (ref 70–99)
HCT VFR BLD CALC: 35 % — SIGNIFICANT CHANGE UP (ref 34.5–45)
HGB BLD-MCNC: 11.3 G/DL — LOW (ref 11.5–15.5)
MCHC RBC-ENTMCNC: 27.4 PG — SIGNIFICANT CHANGE UP (ref 27–34)
MCHC RBC-ENTMCNC: 32.3 GM/DL — SIGNIFICANT CHANGE UP (ref 32–36)
MCV RBC AUTO: 85 FL — SIGNIFICANT CHANGE UP (ref 80–100)
PLATELET # BLD AUTO: 125 K/UL — LOW (ref 150–400)
POTASSIUM SERPL-MCNC: 3.9 MMOL/L — SIGNIFICANT CHANGE UP (ref 3.5–5.3)
POTASSIUM SERPL-SCNC: 3.9 MMOL/L — SIGNIFICANT CHANGE UP (ref 3.5–5.3)
PROT SERPL-MCNC: 6.4 GM/DL — SIGNIFICANT CHANGE UP (ref 6–8.3)
RBC # BLD: 4.12 M/UL — SIGNIFICANT CHANGE UP (ref 3.8–5.2)
RBC # FLD: 14.4 % — SIGNIFICANT CHANGE UP (ref 10.3–14.5)
SODIUM SERPL-SCNC: 138 MMOL/L — SIGNIFICANT CHANGE UP (ref 135–145)
SPECIMEN SOURCE: SIGNIFICANT CHANGE UP
WBC # BLD: 5.7 K/UL — SIGNIFICANT CHANGE UP (ref 3.8–10.5)
WBC # FLD AUTO: 5.7 K/UL — SIGNIFICANT CHANGE UP (ref 3.8–10.5)

## 2023-01-01 PROCEDURE — 99221 1ST HOSP IP/OBS SF/LOW 40: CPT

## 2023-01-01 PROCEDURE — 99233 SBSQ HOSP IP/OBS HIGH 50: CPT

## 2023-01-01 RX ORDER — TIGECYCLINE 50 MG/5ML
100 INJECTION, POWDER, LYOPHILIZED, FOR SOLUTION INTRAVENOUS ONCE
Refills: 0 | Status: COMPLETED | OUTPATIENT
Start: 2023-01-01 | End: 2023-01-01

## 2023-01-01 RX ORDER — TIGECYCLINE 50 MG/5ML
50 INJECTION, POWDER, LYOPHILIZED, FOR SOLUTION INTRAVENOUS EVERY 12 HOURS
Refills: 0 | Status: DISCONTINUED | OUTPATIENT
Start: 2023-01-01 | End: 2023-01-08

## 2023-01-01 RX ORDER — TIGECYCLINE 50 MG/5ML
INJECTION, POWDER, LYOPHILIZED, FOR SOLUTION INTRAVENOUS
Refills: 0 | Status: DISCONTINUED | OUTPATIENT
Start: 2023-01-01 | End: 2023-01-08

## 2023-01-01 RX ADMIN — HEPARIN SODIUM 5000 UNIT(S): 5000 INJECTION INTRAVENOUS; SUBCUTANEOUS at 05:28

## 2023-01-01 RX ADMIN — TIGECYCLINE 110 MILLIGRAM(S): 50 INJECTION, POWDER, LYOPHILIZED, FOR SOLUTION INTRAVENOUS at 14:04

## 2023-01-01 RX ADMIN — Medication 650 MILLIGRAM(S): at 20:22

## 2023-01-01 RX ADMIN — Medication 650 MILLIGRAM(S): at 00:40

## 2023-01-01 RX ADMIN — HEPARIN SODIUM 5000 UNIT(S): 5000 INJECTION INTRAVENOUS; SUBCUTANEOUS at 14:03

## 2023-01-01 RX ADMIN — HEPARIN SODIUM 5000 UNIT(S): 5000 INJECTION INTRAVENOUS; SUBCUTANEOUS at 21:02

## 2023-01-01 RX ADMIN — SODIUM CHLORIDE 75 MILLILITER(S): 9 INJECTION, SOLUTION INTRAVENOUS at 14:04

## 2023-01-01 RX ADMIN — Medication 250 MILLIGRAM(S): at 00:47

## 2023-01-01 RX ADMIN — Medication 650 MILLIGRAM(S): at 19:52

## 2023-01-01 RX ADMIN — TIGECYCLINE 105 MILLIGRAM(S): 50 INJECTION, POWDER, LYOPHILIZED, FOR SOLUTION INTRAVENOUS at 20:51

## 2023-01-01 NOTE — CONSULT NOTE ADULT - ASSESSMENT
71 y/o female with h/o HTN, DM type 2, Lung Ca/Colon Ca (9 years ago), chronic lymphedema with lower leg wounds was admitted on 12/31 for worsening RLE redness. Patient states she has home visiting nurse who came in on the day of admission and noticed that wound has been worsening since 2 days PTA. Patient reports of increased weeping from the wound, reports of generalized malaise for few days. States she was started on Levaquin PO 750mg x last 4 days. Denies Hx of MRSA, trauma. Walks with aid of walker. In ER she received levofloxacin.     1. RLE cellulitis. B/l LE chronic stasis dermatitis. CRF stage 4. Allergy to keflex with anaphylaxis  -obtain BC x 2     71 y/o female with h/o HTN, DM type 2, Lung Ca/Colon Ca (9 years ago), chronic lymphedema with lower leg wounds was admitted on 12/31 for worsening RLE redness. Patient states she has home visiting nurse who came in on the day of admission and noticed that wound has been worsening since 2 days PTA. Patient reports of increased weeping from the wound, reports of generalized malaise for few days. States she was started on Levaquin PO 750mg x last 4 days. Denies Hx of MRSA, trauma. Walks with aid of walker. In ER she received levofloxacin and vancomycin IV.     1. RLE cellulitis. B/l LE chronic stasis dermatitis. CRF stage 3. Allergy to keflex with anaphylaxis  -obtain BC x 2  -s/p vancomycin IV in ER  -abx choices d/w patient in tram of prior abx anaphylactic reaction  -start tigecycline 100 mgIV x 1, then 50 mg IV q12h  -reason for abx use and side effects reviewed with patient; monitor BMP   -monitor closely in tram of PCN allergy history  -vascular evaluation appreciated  -local wounds care  -old chart reviewed to assess prior cultures  -monitor temps  -f/u CBC  -supportive care  2. Other issues:   -care per medicine

## 2023-01-01 NOTE — PATIENT PROFILE ADULT - FALL HARM RISK - HARM RISK INTERVENTIONS

## 2023-01-01 NOTE — PROGRESS NOTE ADULT - ASSESSMENT
72F with severe bilateral lower extremity lymphedema with overlying cellulitis on RLE    recommend c/w IV abx  pain control prn  elevate bilateral LE while in bed  wound care consult for dressing changes    Plan discussed with

## 2023-01-01 NOTE — CONSULT NOTE ADULT - SUBJECTIVE AND OBJECTIVE BOX
Patient is a 72y old  Female who presents with a chief complaint of sent in by MD    HPI:  71 y/o female with h/o HTN, DM type 2, Lung Ca/Colon Ca (9 years ago), chronic lymphedema with lower leg wounds was admitted on  for worsening RLE redness. Patient states she has home visiting nurse who came in on the day of admission and noticed that wound has been worsening since 2 days PTA. Patient reports of increased weeping from the wound, reports of generalized malaise for few days. States she was started on Levaquin PO 750mg x last 4 days. Denies Hx of MRSA, trauma. Walks with aid of walker. In ER she received levofloxacin.     PMHx   HTN  DM   Hx of Lung and colon cancer   chronic lymphedema       Surgical History  History of Cholecystectomy  History of Hysterectomy   History of Laminectomy Lumbar  History of Lung lobectomy  History of Partial Colectomy  History of Rotator Cuff Repair    Allergy -Cephalosporins (specifically Keflex: reports anaphylaxis, approx 35 yrs ago)     Meds: per reconciliation sheet, noted below  MEDICATIONS  (STANDING):  dextrose 5%. 1000 milliLiter(s) (50 mL/Hr) IV Continuous <Continuous>  dextrose 5%. 1000 milliLiter(s) (100 mL/Hr) IV Continuous <Continuous>  dextrose 50% Injectable 25 Gram(s) IV Push once  dextrose 50% Injectable 12.5 Gram(s) IV Push once  dextrose 50% Injectable 25 Gram(s) IV Push once  glucagon  Injectable 1 milliGRAM(s) IntraMuscular once  heparin   Injectable 5000 Unit(s) SubCutaneous every 8 hours  insulin lispro (ADMELOG) corrective regimen sliding scale   SubCutaneous three times a day before meals  insulin lispro (ADMELOG) corrective regimen sliding scale   SubCutaneous at bedtime  lactated ringers. 1000 milliLiter(s) (75 mL/Hr) IV Continuous <Continuous>  metoprolol succinate ER 25 milliGRAM(s) Oral daily    MEDICATIONS  (PRN):  acetaminophen     Tablet .. 650 milliGRAM(s) Oral every 6 hours PRN Temp greater or equal to 38C (100.4F), Mild Pain (1 - 3)  aluminum hydroxide/magnesium hydroxide/simethicone Suspension 30 milliLiter(s) Oral every 4 hours PRN Dyspepsia  dextrose Oral Gel 15 Gram(s) Oral once PRN Blood Glucose LESS THAN 70 milliGRAM(s)/deciliter  melatonin 5 milliGRAM(s) Oral at bedtime PRN Insomnia  ondansetron Injectable 4 milliGRAM(s) IV Push every 8 hours PRN Nausea and/or Vomiting    Allergies    Keflex (Anaphylaxis)    Intolerances      Social: no smoking, no alcohol, no illegal drugs; no recent travel, no exposure to TB  FAMILY HISTORY:    no history of premature cardiovascular disease in first degree relatives    ROS: the patient denies fever, no chills, no HA, no seizures, no dizziness, no sore throat, no nasal congestion, no blurry vision, no CP, no palpitations, no SOB, no cough, no abdominal pain, no diarrhea, no N/V, no dysuria, has lower leg pain and redness with open wound, no claudication, no other rash, no joint aches, no rectal pain or bleeding, no night sweats  All other systems reviewed and are negative    Vital Signs Last 24 Hrs  T(C): 36.7 (2023 07:49), Max: 37.1 (31 Dec 2022 12:23)  T(F): 98 (2023 07:49), Max: 98.8 (31 Dec 2022 12:23)  HR: 81 (2023 07:49) (81 - 94)  BP: 107/45 (2023 07:49) (107/45 - 132/50)  BP(mean): 64 (31 Dec 2022 19:10) (64 - 64)  RR: 18 (2023 07:49) (18 - 20)  SpO2: 99% (2023 07:49) (99% - 100%)    Parameters below as of 31 Dec 2022 22:53  Patient On (Oxygen Delivery Method): room air      Daily Height in cm: 172.72 (31 Dec 2022 12:23)    Daily     PE:    Constitutional:  No acute distress  HEENT: NC/AT, EOMI, PERRLA, conjunctivae clear; ears and nose atraumatic; pharynx benign  Neck: supple; thyroid not palpable  Back: no tenderness  Respiratory: respiratory effort normal; clear to auscultation  Cardiovascular: S1S2 regular, no murmurs  Abdomen: soft, not tender, not distended, positive BS; no liver or spleen organomegaly  Genitourinary: no suprapubic tenderness  Lymphatic: no LN palpable  Musculoskeletal: no muscle tenderness, no joint swelling or tenderness  Extremities: no pedal edema  RLE erythema, edema, warmth and tenderness  open wound  Neurological/ Psychiatric: AxOx3, judgement and insight normal; moving all extremities  Skin: no rashes; no palpable lesions    Labs: all available labs reviewed                        11.3   5.70  )-----------( 125      ( 2023 05:47 )             35.0         138  |  105  |  36<H>  ----------------------------<  75  3.9   |  25  |  1.37<H>    Ca    9.1      2023 05:47  Mg     2.4     12-31    TPro  6.4  /  Alb  2.6<L>  /  TBili  0.8  /  DBili  x   /  AST  37  /  ALT  24  /  AlkPhos  67       LIVER FUNCTIONS - ( 2023 05:47 )  Alb: 2.6 g/dL / Pro: 6.4 gm/dL / ALK PHOS: 67 U/L / ALT: 24 U/L / AST: 37 U/L / GGT: x           Urinalysis Basic - ( 31 Dec 2022 19:45 )    Color: Yellow / Appearance: Clear / S.020 / pH: x  Gluc: x / Ketone: Negative  / Bili: Negative / Urobili: Negative   Blood: x / Protein: Negative / Nitrite: Negative   Leuk Esterase: Trace / RBC: Negative /HPF / WBC 3-5 /HPF   Sq Epi: x / Non Sq Epi: Few / Bacteria: Occasional    Radiology: all available radiological tests reviewed    Advanced directives addressed: full resuscitation Patient is a 72y old  Female who presents with a chief complaint of sent in by MD    HPI:  73 y/o female with h/o HTN, DM type 2, Lung Ca/Colon Ca (9 years ago), chronic lymphedema with lower leg wounds was admitted on  for worsening RLE redness. Patient states she has home visiting nurse who came in on the day of admission and noticed that wound has been worsening since 2 days PTA. Patient reports of increased weeping from the wound, reports of generalized malaise for few days. States she was started on Levaquin PO 750mg x last 4 days. Denies Hx of MRSA, trauma. Walks with aid of walker. In ER she received levofloxacin.     PMHx   HTN  DM   Hx of Lung and colon cancer   chronic lymphedema       Surgical History  History of Cholecystectomy  History of Hysterectomy   History of Laminectomy Lumbar  History of Lung lobectomy  History of Partial Colectomy  History of Rotator Cuff Repair    Allergy -Cephalosporins (specifically Keflex: reports anaphylaxis, approx 35 yrs ago)     Meds: per reconciliation sheet, noted below  MEDICATIONS  (STANDING):  dextrose 5%. 1000 milliLiter(s) (50 mL/Hr) IV Continuous <Continuous>  dextrose 5%. 1000 milliLiter(s) (100 mL/Hr) IV Continuous <Continuous>  dextrose 50% Injectable 25 Gram(s) IV Push once  dextrose 50% Injectable 12.5 Gram(s) IV Push once  dextrose 50% Injectable 25 Gram(s) IV Push once  glucagon  Injectable 1 milliGRAM(s) IntraMuscular once  heparin   Injectable 5000 Unit(s) SubCutaneous every 8 hours  insulin lispro (ADMELOG) corrective regimen sliding scale   SubCutaneous three times a day before meals  insulin lispro (ADMELOG) corrective regimen sliding scale   SubCutaneous at bedtime  lactated ringers. 1000 milliLiter(s) (75 mL/Hr) IV Continuous <Continuous>  metoprolol succinate ER 25 milliGRAM(s) Oral daily    MEDICATIONS  (PRN):  acetaminophen     Tablet .. 650 milliGRAM(s) Oral every 6 hours PRN Temp greater or equal to 38C (100.4F), Mild Pain (1 - 3)  aluminum hydroxide/magnesium hydroxide/simethicone Suspension 30 milliLiter(s) Oral every 4 hours PRN Dyspepsia  dextrose Oral Gel 15 Gram(s) Oral once PRN Blood Glucose LESS THAN 70 milliGRAM(s)/deciliter  melatonin 5 milliGRAM(s) Oral at bedtime PRN Insomnia  ondansetron Injectable 4 milliGRAM(s) IV Push every 8 hours PRN Nausea and/or Vomiting    Allergies    Keflex (Anaphylaxis)    Intolerances      Social: no smoking, no alcohol, no illegal drugs; no recent travel, no exposure to TB  FAMILY HISTORY:    no history of premature cardiovascular disease in first degree relatives    ROS: the patient denies fever, no chills, no HA, no seizures, no dizziness, no sore throat, no nasal congestion, no blurry vision, no CP, no palpitations, no SOB, no cough, no abdominal pain, no diarrhea, no N/V, no dysuria, has lower leg pain and redness with open wound, no claudication, no other rash, no joint aches, no rectal pain or bleeding, no night sweats  All other systems reviewed and are negative    Vital Signs Last 24 Hrs  T(C): 36.7 (2023 07:49), Max: 37.1 (31 Dec 2022 12:23)  T(F): 98 (2023 07:49), Max: 98.8 (31 Dec 2022 12:23)  HR: 81 (2023 07:49) (81 - 94)  BP: 107/45 (2023 07:49) (107/45 - 132/50)  BP(mean): 64 (31 Dec 2022 19:10) (64 - 64)  RR: 18 (2023 07:49) (18 - 20)  SpO2: 99% (2023 07:49) (99% - 100%)    Parameters below as of 31 Dec 2022 22:53  Patient On (Oxygen Delivery Method): room air      Daily Height in cm: 172.72 (31 Dec 2022 12:23)    Daily     PE:    Constitutional:  No acute distress  HEENT: NC/AT, EOMI, PERRLA, conjunctivae clear; ears and nose atraumatic; pharynx benign  Neck: supple; thyroid not palpable  Back: no tenderness  Respiratory: respiratory effort normal; clear to auscultation  Cardiovascular: S1S2 regular, no murmurs  Abdomen: soft, not tender, not distended, positive BS; no liver or spleen organomegaly  Genitourinary: no suprapubic tenderness  Lymphatic: no LN palpable  Musculoskeletal: no muscle tenderness, no joint swelling or tenderness  Extremities: 2+ pedal edema with chronic lower legs skin changes  RLE erythema, edema, warmth and tenderness; open wound with serous discharge   open wound  Neurological/ Psychiatric: AxOx3, judgement and insight normal; moving all extremities  Skin: no rashes; no palpable lesions    Labs: all available labs reviewed                        11.3   5.70  )-----------( 125      ( 2023 05:47 )             35.0         138  |  105  |  36<H>  ----------------------------<  75  3.9   |  25  |  1.37<H>    Ca    9.1      2023 05:47  Mg     2.4     12-    TPro  6.4  /  Alb  2.6<L>  /  TBili  0.8  /  DBili  x   /  AST  37  /  ALT  24  /  AlkPhos  67       LIVER FUNCTIONS - ( 2023 05:47 )  Alb: 2.6 g/dL / Pro: 6.4 gm/dL / ALK PHOS: 67 U/L / ALT: 24 U/L / AST: 37 U/L / GGT: x           Urinalysis Basic - ( 31 Dec 2022 19:45 )    Color: Yellow / Appearance: Clear / S.020 / pH: x  Gluc: x / Ketone: Negative  / Bili: Negative / Urobili: Negative   Blood: x / Protein: Negative / Nitrite: Negative   Leuk Esterase: Trace / RBC: Negative /HPF / WBC 3-5 /HPF   Sq Epi: x / Non Sq Epi: Few / Bacteria: Occasional    Radiology: all available radiological tests reviewed    Advanced directives addressed: full resuscitation

## 2023-01-01 NOTE — PATIENT PROFILE ADULT - FUNCTIONAL ASSESSMENT - BASIC MOBILITY 6.
2-calculated by average/Not able to assess (calculate score using [facility] averaging method)

## 2023-01-01 NOTE — PROGRESS NOTE ADULT - ASSESSMENT
71 yo female w/PMHx of HTN, DM2, Lung Ca/Colon Ca (9 years ago), chronic lymphedema presents to  for worsening RLE redness.    #Cellulitis/ bilateral LE wounds with hx of chronic severe lymphedema/ venous stasis:    Cont IV ABX.    Appreciate ID input.  Tigacycline.    Foul smelling.    F/u blood cultures.    S/p vanco in ER.    No hx of MRSA.    Wound care.    Vascular f/u.      #DM:    BGMs low -- 70's.    Stop insulin.    Was on orals at home.    Follow.    Check A1C.      #Lactic Acidosis without sepsis:    May have been related to metformin use.    Trending down.      #ADRIAN:    Cr on admit was 1.7-- down to 1.3 with IVF.    Trend.    Hx of CKD stage III.    Hold diuretics.    Stop further IVF for now.      #DVT Prophylaxis   -Heparin SubQ

## 2023-01-02 LAB
A1C WITH ESTIMATED AVERAGE GLUCOSE RESULT: 5.9 % — HIGH (ref 4–5.6)
ANION GAP SERPL CALC-SCNC: 9 MMOL/L — SIGNIFICANT CHANGE UP (ref 5–17)
BUN SERPL-MCNC: 31 MG/DL — HIGH (ref 7–23)
CALCIUM SERPL-MCNC: 8.7 MG/DL — SIGNIFICANT CHANGE UP (ref 8.5–10.1)
CHLORIDE SERPL-SCNC: 107 MMOL/L — SIGNIFICANT CHANGE UP (ref 96–108)
CO2 SERPL-SCNC: 25 MMOL/L — SIGNIFICANT CHANGE UP (ref 22–31)
CREAT SERPL-MCNC: 1.08 MG/DL — SIGNIFICANT CHANGE UP (ref 0.5–1.3)
EGFR: 55 ML/MIN/1.73M2 — LOW
ESTIMATED AVERAGE GLUCOSE: 123 MG/DL — HIGH (ref 68–114)
GLUCOSE BLDC GLUCOMTR-MCNC: 107 MG/DL — HIGH (ref 70–99)
GLUCOSE BLDC GLUCOMTR-MCNC: 209 MG/DL — HIGH (ref 70–99)
GLUCOSE SERPL-MCNC: 106 MG/DL — HIGH (ref 70–99)
POTASSIUM SERPL-MCNC: 3.1 MMOL/L — LOW (ref 3.5–5.3)
POTASSIUM SERPL-SCNC: 3.1 MMOL/L — LOW (ref 3.5–5.3)
SODIUM SERPL-SCNC: 141 MMOL/L — SIGNIFICANT CHANGE UP (ref 135–145)

## 2023-01-02 PROCEDURE — 99232 SBSQ HOSP IP/OBS MODERATE 35: CPT

## 2023-01-02 RX ORDER — POLYETHYLENE GLYCOL 3350 17 G/17G
17 POWDER, FOR SOLUTION ORAL DAILY
Refills: 0 | Status: DISCONTINUED | OUTPATIENT
Start: 2023-01-02 | End: 2023-01-08

## 2023-01-02 RX ORDER — POTASSIUM CHLORIDE 20 MEQ
40 PACKET (EA) ORAL EVERY 4 HOURS
Refills: 0 | Status: COMPLETED | OUTPATIENT
Start: 2023-01-02 | End: 2023-01-02

## 2023-01-02 RX ADMIN — HEPARIN SODIUM 5000 UNIT(S): 5000 INJECTION INTRAVENOUS; SUBCUTANEOUS at 22:22

## 2023-01-02 RX ADMIN — TIGECYCLINE 105 MILLIGRAM(S): 50 INJECTION, POWDER, LYOPHILIZED, FOR SOLUTION INTRAVENOUS at 20:18

## 2023-01-02 RX ADMIN — TIGECYCLINE 105 MILLIGRAM(S): 50 INJECTION, POWDER, LYOPHILIZED, FOR SOLUTION INTRAVENOUS at 10:11

## 2023-01-02 RX ADMIN — HEPARIN SODIUM 5000 UNIT(S): 5000 INJECTION INTRAVENOUS; SUBCUTANEOUS at 04:44

## 2023-01-02 RX ADMIN — Medication 25 MILLIGRAM(S): at 10:12

## 2023-01-02 RX ADMIN — HEPARIN SODIUM 5000 UNIT(S): 5000 INJECTION INTRAVENOUS; SUBCUTANEOUS at 13:58

## 2023-01-02 RX ADMIN — Medication 40 MILLIEQUIVALENT(S): at 13:58

## 2023-01-02 RX ADMIN — Medication 40 MILLIEQUIVALENT(S): at 10:12

## 2023-01-02 RX ADMIN — POLYETHYLENE GLYCOL 3350 17 GRAM(S): 17 POWDER, FOR SOLUTION ORAL at 22:21

## 2023-01-02 NOTE — PROGRESS NOTE ADULT - ASSESSMENT
71 y/o female with h/o HTN, DM type 2, Lung Ca/Colon Ca (9 years ago), chronic lymphedema with lower leg wounds was admitted on 12/31 for worsening RLE redness. Patient states she has home visiting nurse who came in on the day of admission and noticed that wound has been worsening since 2 days PTA. Patient reports of increased weeping from the wound, reports of generalized malaise for few days. States she was started on Levaquin PO 750mg x last 4 days. Denies Hx of MRSA, trauma. Walks with aid of walker. In ER she received levofloxacin and vancomycin IV.     1. RLE cellulitis. B/l LE chronic stasis dermatitis. CRF stage 3. Allergy to keflex with anaphylaxis  -obtain BC x 2  -s/p vancomycin IV in ER  -abx choices d/w patient in tram of prior abx anaphylactic reaction  -on tigecycline 50 mg IV q12h # 2  -tolerating abx well so far; no side effects noted   -monitor closely in tram of PCN allergy history  -vascular evaluation appreciated  -local wounds care  -elevate legs  -continue abx coverage   -monitor temps  -f/u CBC  -supportive care  2. Other issues:   -care per medicine

## 2023-01-02 NOTE — PROGRESS NOTE ADULT - ASSESSMENT
73 yo female w/PMHx of HTN, DM2, Lung Ca/Colon Ca (9 years ago), chronic lymphedema presents to  for worsening RLE redness.    #Cellulitis/ bilateral LE wounds with hx of chronic severe lymphedema/ venous stasis:    Cont IV ABX.    Appreciate ID input.  Tigacycline.  Day #2.    Wound cx with Alcaligenes faecalis.    Blood cultures negative.    Foul smelling.    S/p vanco in ER.    CEPH allergy.    No hx of MRSA.    Wound care.    Vascular f/u.      #DM:    BGMs low -- 70's.    Stop insulin.    Was on orals at home.    Follow.    A1c low-- stop orals @ d/c.      #Lactic Acidosis without sepsis:    May have been related to metformin use.    Trending down.      #ADRIAN:    Cr on admit was 1.7.  Improved to 1.0.    Trend.    Hx of CKD stage III.    Hold diuretics.    Stop further IVF for now.      #DVT Prophylaxis   -Heparin SubQ        73 yo female w/PMHx of HTN, DM2, Lung Ca/Colon Ca (9 years ago), chronic lymphedema presents to  for worsening RLE redness.    #Cellulitis/ bilateral LE wounds with hx of chronic severe lymphedema/ venous stasis:    Cont IV ABX.    Appreciate ID input.  Tigacycline.  Day #2.    Wound cx with Alcaligenes faecalis-- f/u sensitivities.      Blood cultures negative.    Foul smelling.    CEPH allergy.    Wound care.    Vascular f/u.      #Hypokalemia:    Replete 40 PO BID.   Repeat labs in am.      #DM:    BGMs low -- 70's.    Stop insulin.    Was on orals at home.    Likely d/c sulfonylurea @ d/c.      #Lactic Acidosis without sepsis:    May have been related to metformin use.    Trending down.      #ADRIAN:    Cr on admit was 1.7.  Improved to 1.0.    Hx of CKD stage III.    Hold diuretics for today.      #DVT Prophylaxis   -Heparin SubQ

## 2023-01-03 LAB
-  AMIKACIN: SIGNIFICANT CHANGE UP
-  AZTREONAM: SIGNIFICANT CHANGE UP
-  CEFEPIME: SIGNIFICANT CHANGE UP
-  CEFTRIAXONE: SIGNIFICANT CHANGE UP
-  CIPROFLOXACIN: SIGNIFICANT CHANGE UP
-  GENTAMICIN: SIGNIFICANT CHANGE UP
-  LEVOFLOXACIN: SIGNIFICANT CHANGE UP
-  MEROPENEM: SIGNIFICANT CHANGE UP
-  PIPERACILLIN/TAZOBACTAM: SIGNIFICANT CHANGE UP
-  TOBRAMYCIN: SIGNIFICANT CHANGE UP
-  TRIMETHOPRIM/SULFAMETHOXAZOLE: SIGNIFICANT CHANGE UP
ANION GAP SERPL CALC-SCNC: 7 MMOL/L — SIGNIFICANT CHANGE UP (ref 5–17)
BUN SERPL-MCNC: 33 MG/DL — HIGH (ref 7–23)
CALCIUM SERPL-MCNC: 9 MG/DL — SIGNIFICANT CHANGE UP (ref 8.5–10.1)
CHLORIDE SERPL-SCNC: 109 MMOL/L — HIGH (ref 96–108)
CO2 SERPL-SCNC: 25 MMOL/L — SIGNIFICANT CHANGE UP (ref 22–31)
CREAT SERPL-MCNC: 1 MG/DL — SIGNIFICANT CHANGE UP (ref 0.5–1.3)
EGFR: 60 ML/MIN/1.73M2 — SIGNIFICANT CHANGE UP
GLUCOSE BLDC GLUCOMTR-MCNC: 101 MG/DL — HIGH (ref 70–99)
GLUCOSE BLDC GLUCOMTR-MCNC: 120 MG/DL — HIGH (ref 70–99)
GLUCOSE BLDC GLUCOMTR-MCNC: 151 MG/DL — HIGH (ref 70–99)
GLUCOSE BLDC GLUCOMTR-MCNC: 157 MG/DL — HIGH (ref 70–99)
GLUCOSE SERPL-MCNC: 116 MG/DL — HIGH (ref 70–99)
METHOD TYPE: SIGNIFICANT CHANGE UP
POTASSIUM SERPL-MCNC: 3.6 MMOL/L — SIGNIFICANT CHANGE UP (ref 3.5–5.3)
POTASSIUM SERPL-SCNC: 3.6 MMOL/L — SIGNIFICANT CHANGE UP (ref 3.5–5.3)
SODIUM SERPL-SCNC: 141 MMOL/L — SIGNIFICANT CHANGE UP (ref 135–145)

## 2023-01-03 PROCEDURE — 99232 SBSQ HOSP IP/OBS MODERATE 35: CPT

## 2023-01-03 RX ORDER — ACETAMINOPHEN 500 MG
650 TABLET ORAL EVERY 4 HOURS
Refills: 0 | Status: DISCONTINUED | OUTPATIENT
Start: 2023-01-03 | End: 2023-01-08

## 2023-01-03 RX ADMIN — HEPARIN SODIUM 5000 UNIT(S): 5000 INJECTION INTRAVENOUS; SUBCUTANEOUS at 13:45

## 2023-01-03 RX ADMIN — HEPARIN SODIUM 5000 UNIT(S): 5000 INJECTION INTRAVENOUS; SUBCUTANEOUS at 21:50

## 2023-01-03 RX ADMIN — HEPARIN SODIUM 5000 UNIT(S): 5000 INJECTION INTRAVENOUS; SUBCUTANEOUS at 06:25

## 2023-01-03 RX ADMIN — TIGECYCLINE 105 MILLIGRAM(S): 50 INJECTION, POWDER, LYOPHILIZED, FOR SOLUTION INTRAVENOUS at 09:47

## 2023-01-03 RX ADMIN — TIGECYCLINE 105 MILLIGRAM(S): 50 INJECTION, POWDER, LYOPHILIZED, FOR SOLUTION INTRAVENOUS at 22:41

## 2023-01-03 NOTE — PROGRESS NOTE ADULT - ASSESSMENT
73 yo female w/PMHx of HTN, DM2, Lung Ca/Colon Ca (9 years ago), chronic lymphedema presents to  for worsening RLE redness.    #Cellulitis/ bilateral LE wounds with hx of chronic severe lymphedema/ venous stasis:    Cont IV ABX.    Appreciate ID input.  Tigacycline.  Day #2.    Wound cx with Alcaligenes faecalis-- f/u sensitivities.      Blood cultures negative.    Foul smelling.    CEPH allergy.    Wound care.    Vascular f/u.      #Hypokalemia:    Replete 40 PO BID.   Repeat labs in am.      #DM:    BGMs low -- 70's.    Stop insulin.    Was on orals at home.    Likely d/c sulfonylurea @ d/c.      #Lactic Acidosis without sepsis:    May have been related to metformin use.    Trending down.      #ADRIAN:    Cr on admit was 1.7.  Improved to 1.0.    Hx of CKD stage III.    Hold diuretics for today.      #DVT Prophylaxis   -Heparin SubQ

## 2023-01-03 NOTE — PROGRESS NOTE ADULT - ASSESSMENT
71 y/o female with h/o HTN, DM type 2, Lung Ca/Colon Ca (9 years ago), chronic lymphedema with lower leg wounds was admitted on 12/31 for worsening RLE redness. Patient states she has home visiting nurse who came in on the day of admission and noticed that wound has been worsening since 2 days PTA. Patient reports of increased weeping from the wound, reports of generalized malaise for few days. States she was started on Levaquin PO 750mg x last 4 days. Denies Hx of MRSA, trauma. Walks with aid of walker. In ER she received levofloxacin and vancomycin IV.     1. RLE cellulitis with CHINEDU. B/l LE chronic stasis dermatitis. CRF stage 3. Allergy to keflex with anaphylaxis  -BC x 2 noted  -would c/s noted  -s/p vancomycin IV in ER  -abx choices d/w patient in tram of prior abx anaphylactic reaction  -on tigecycline 50 mg IV q12h # 3  -tolerating abx well so far; no side effects noted   -monitor closely in tram of PCN allergy history  -vascular evaluation appreciated  -f/u cultures  -local wounds care  -elevate legs  -continue abx coverage   -monitor temps  -f/u CBC  -supportive care  2. Other issues:   -care per medicine

## 2023-01-04 LAB
-  AMPICILLIN: SIGNIFICANT CHANGE UP
-  TETRACYCLINE: SIGNIFICANT CHANGE UP
-  VANCOMYCIN: SIGNIFICANT CHANGE UP
CULTURE RESULTS: SIGNIFICANT CHANGE UP
GLUCOSE BLDC GLUCOMTR-MCNC: 138 MG/DL — HIGH (ref 70–99)
METHOD TYPE: SIGNIFICANT CHANGE UP
ORGANISM # SPEC MICROSCOPIC CNT: SIGNIFICANT CHANGE UP
SPECIMEN SOURCE: SIGNIFICANT CHANGE UP

## 2023-01-04 PROCEDURE — 99232 SBSQ HOSP IP/OBS MODERATE 35: CPT

## 2023-01-04 RX ADMIN — HEPARIN SODIUM 5000 UNIT(S): 5000 INJECTION INTRAVENOUS; SUBCUTANEOUS at 22:43

## 2023-01-04 RX ADMIN — HEPARIN SODIUM 5000 UNIT(S): 5000 INJECTION INTRAVENOUS; SUBCUTANEOUS at 05:59

## 2023-01-04 RX ADMIN — Medication 650 MILLIGRAM(S): at 23:10

## 2023-01-04 RX ADMIN — HEPARIN SODIUM 5000 UNIT(S): 5000 INJECTION INTRAVENOUS; SUBCUTANEOUS at 15:12

## 2023-01-04 RX ADMIN — Medication 650 MILLIGRAM(S): at 23:30

## 2023-01-04 RX ADMIN — TIGECYCLINE 105 MILLIGRAM(S): 50 INJECTION, POWDER, LYOPHILIZED, FOR SOLUTION INTRAVENOUS at 10:18

## 2023-01-04 RX ADMIN — TIGECYCLINE 105 MILLIGRAM(S): 50 INJECTION, POWDER, LYOPHILIZED, FOR SOLUTION INTRAVENOUS at 22:43

## 2023-01-04 NOTE — CDI QUERY NOTE - NSCDIOTHERTXTBX_GEN_ALL_CORE_HH
Please document the relationship between the following conditions:  - Cellulitis is related to diabetes  - Cellulitis is not related to diabetes  - Unable to determine  - Other ( Please specify)     SUPPORTING DOCUMENTATION AND/OR CLINICAL EVIDENCE:    Hospitalist progress note on 1/4/2023:    #Cellulitis/ bilateral LE wounds with hx of chronic severe lymphedema/ venous stasis:      #DM:    BGMs low -- 70's.    Stop insulin.    Was on orals at home.    Likely d/c sulfonylurea @ d/c.

## 2023-01-04 NOTE — ADVANCED PRACTICE NURSE CONSULT - RECOMMEDATIONS
1)Continue to Elevate heels off of Mattress  2)Continue to Turn and position every 2 Hours  3)Consult Dietitian   5)When out of bed to chair, apply waffle air cushion   6)Right Lower extremity medial: Small open wound Apply SilverCel to wound Daily, periwound apply Triad cream and cover with a Foam or dry dressing.   7)Bilateral Lower Extremities: irrigate lower extremities with Chlorhexidine (Use a 1/2 cap of chlorhexidine in peribottle and irrigated bilateral lower legs) after irrigating clean off with saline daily. Then Pat dry and apply Sween 24 to brawny dry skin two times per day.   8)Follow up with Dr Jones for out patient care.

## 2023-01-04 NOTE — PROGRESS NOTE ADULT - ASSESSMENT
71 y/o female with h/o HTN, DM type 2, Lung Ca/Colon Ca (9 years ago), chronic lymphedema with lower leg wounds was admitted on 12/31 for worsening RLE redness. Patient states she has home visiting nurse who came in on the day of admission and noticed that wound has been worsening since 2 days PTA. Patient reports of increased weeping from the wound, reports of generalized malaise for few days. States she was started on Levaquin PO 750mg x last 4 days. Denies Hx of MRSA, trauma. Walks with aid of walker. In ER she received levofloxacin and vancomycin IV.     1. RLE cellulitis with CHINEDU and ENFA. B/l LE chronic stasis dermatitis. CRF stage 3. Allergy to keflex with anaphylaxis  -leg is improving slowly  -BC x 2 noted  -would c/s noted  -s/p vancomycin IV in ER  -abx choices d/w patient in tram of prior abx anaphylactic reaction  -on tigecycline 50 mg IV q12h # 4  -tolerating abx well so far; no side effects noted   -monitor closely in tram of PCN allergy history  -vascular evaluation appreciated  -f/u cultures  -local wounds care  -elevate legs  -continue abx coverage   -monitor temps  -f/u CBC  -supportive care  2. Other issues:   -care per medicine

## 2023-01-04 NOTE — ADVANCED PRACTICE NURSE CONSULT - ASSESSMENT
This is a 71 year old female admitted on  12/31/2022 increase in drainage and edema to lower extremity .PMHx: Lymphedema, DM2, HTN, Lung Ca.   Mrs Hood is a patient at [facility] and a patient of Dr Jones.   Patient with Bilateral Lower Extremity lymphedema brawny and scaling skin on toes up to knee. Patient usually has Unnas boot applied to BL lower extremity Per wound center MD Jones.     Noted to the right Lower extremity medial with a 0.3cm x 0.4cm x 0.2cm with 100% slough and clear drainage noted. Applied Silver Aruna to wound, periwound with Triad cream and cover with dry dressing. Keep legs elevated.   Bilateral Lower extremity irrigate with Chlorhexidine (Use a 1/2 cap of chlorhexidine in peribottle and irrigated bilateral lower legs) after irrigating clean off with saline. Pat dry and   apply Sween 24 Daily to brawny dry skin two times per day. Patient is on [facility] Low air loss t mattress for pressure redistribution. When Out of bed place a air waffle cushion to continue with pressure redistribution

## 2023-01-05 ENCOUNTER — APPOINTMENT (OUTPATIENT)
Dept: HEPATOLOGY | Facility: CLINIC | Age: 73
End: 2023-01-05

## 2023-01-05 LAB
GLUCOSE BLDC GLUCOMTR-MCNC: 114 MG/DL — HIGH (ref 70–99)
GLUCOSE BLDC GLUCOMTR-MCNC: 121 MG/DL — HIGH (ref 70–99)
GLUCOSE BLDC GLUCOMTR-MCNC: 166 MG/DL — HIGH (ref 70–99)
GLUCOSE BLDC GLUCOMTR-MCNC: 177 MG/DL — HIGH (ref 70–99)
HCT VFR BLD CALC: 36 % — SIGNIFICANT CHANGE UP (ref 34.5–45)
HGB BLD-MCNC: 11.7 G/DL — SIGNIFICANT CHANGE UP (ref 11.5–15.5)
MCHC RBC-ENTMCNC: 27.9 PG — SIGNIFICANT CHANGE UP (ref 27–34)
MCHC RBC-ENTMCNC: 32.5 GM/DL — SIGNIFICANT CHANGE UP (ref 32–36)
MCV RBC AUTO: 85.7 FL — SIGNIFICANT CHANGE UP (ref 80–100)
PLATELET # BLD AUTO: 109 K/UL — LOW (ref 150–400)
RBC # BLD: 4.2 M/UL — SIGNIFICANT CHANGE UP (ref 3.8–5.2)
RBC # FLD: 14.2 % — SIGNIFICANT CHANGE UP (ref 10.3–14.5)
WBC # BLD: 4.49 K/UL — SIGNIFICANT CHANGE UP (ref 3.8–10.5)
WBC # FLD AUTO: 4.49 K/UL — SIGNIFICANT CHANGE UP (ref 3.8–10.5)

## 2023-01-05 PROCEDURE — 99232 SBSQ HOSP IP/OBS MODERATE 35: CPT

## 2023-01-05 RX ADMIN — Medication 25 MILLIGRAM(S): at 09:21

## 2023-01-05 RX ADMIN — Medication 650 MILLIGRAM(S): at 21:24

## 2023-01-05 RX ADMIN — HEPARIN SODIUM 5000 UNIT(S): 5000 INJECTION INTRAVENOUS; SUBCUTANEOUS at 21:24

## 2023-01-05 RX ADMIN — TIGECYCLINE 105 MILLIGRAM(S): 50 INJECTION, POWDER, LYOPHILIZED, FOR SOLUTION INTRAVENOUS at 09:21

## 2023-01-05 RX ADMIN — HEPARIN SODIUM 5000 UNIT(S): 5000 INJECTION INTRAVENOUS; SUBCUTANEOUS at 13:26

## 2023-01-05 RX ADMIN — HEPARIN SODIUM 5000 UNIT(S): 5000 INJECTION INTRAVENOUS; SUBCUTANEOUS at 05:50

## 2023-01-05 RX ADMIN — TIGECYCLINE 105 MILLIGRAM(S): 50 INJECTION, POWDER, LYOPHILIZED, FOR SOLUTION INTRAVENOUS at 20:18

## 2023-01-05 RX ADMIN — Medication 650 MILLIGRAM(S): at 22:54

## 2023-01-05 NOTE — PROGRESS NOTE ADULT - ASSESSMENT
73 yo female w/PMHx of HTN, DM2, Lung Ca/Colon Ca (9 years ago), chronic lymphedema presents to  for worsening RLE redness.    #Cellulitis/ bilateral LE wounds with hx of chronic severe lymphedema/ venous stasis:    Cont IV ABX.    Appreciate ID input.  Tigacycline.  Day #3   Wound cx with Alcaligenes faecalis-- f/u sensitivities.      Blood cultures negative.    Foul smelling.    CEPH allergy.    Wound care.    Vascular f/u.        #DM:    BGMs low -- 70's.    Stop insulin.    Was on orals at home.    Likely d/c sulfonylurea @ d/c.      #Lactic Acidosis without sepsis:    May have been related to metformin use.    Trending down.      #ADRIAN:    Cr on admit was 1.7.  Improved to 1.0.    Hx of CKD stage III.    Hold diuretics for today.      #DVT Prophylaxis   -Heparin SubQ     prepare for dc

## 2023-01-05 NOTE — PROGRESS NOTE ADULT - ASSESSMENT
73 y/o female with h/o HTN, DM type 2, Lung Ca/Colon Ca (9 years ago), chronic lymphedema with lower leg wounds was admitted on 12/31 for worsening RLE redness. Patient states she has home visiting nurse who came in on the day of admission and noticed that wound has been worsening since 2 days PTA. Patient reports of increased weeping from the wound, reports of generalized malaise for few days. States she was started on Levaquin PO 750mg x last 4 days. Denies Hx of MRSA, trauma. Walks with aid of walker. In ER she received levofloxacin and vancomycin IV.     1. RLE cellulitis with CHINEDU and ENFA. B/l LE chronic stasis dermatitis. CRF stage 3. Allergy to keflex with anaphylaxis  -leg is improving slowly  -BC x 2 noted  -would c/s noted  -abx choices d/w patient in tram of prior abx anaphylactic reaction  -on tigecycline 50 mg IV q12h # 5  -tolerating abx well so far; no side effects noted   -monitor closely in tram of PCN allergy history  -vascular evaluation appreciated  -f/u cultures  -local wounds care  -elevate legs  -continue abx coverage   -plan to change to oral doxy when she is ready for discharge   -monitor temps  -f/u CBC  -supportive care  2. Other issues:   -care per medicine

## 2023-01-06 LAB
CULTURE RESULTS: SIGNIFICANT CHANGE UP
CULTURE RESULTS: SIGNIFICANT CHANGE UP
GLUCOSE BLDC GLUCOMTR-MCNC: 128 MG/DL — HIGH (ref 70–99)
GLUCOSE BLDC GLUCOMTR-MCNC: 155 MG/DL — HIGH (ref 70–99)
GLUCOSE BLDC GLUCOMTR-MCNC: 179 MG/DL — HIGH (ref 70–99)
HCT VFR BLD CALC: 38.8 % — SIGNIFICANT CHANGE UP (ref 34.5–45)
HEPARIN-PF4 AB RESULT: <0.6 U/ML — SIGNIFICANT CHANGE UP (ref 0–0.9)
HGB BLD-MCNC: 12.7 G/DL — SIGNIFICANT CHANGE UP (ref 11.5–15.5)
MCHC RBC-ENTMCNC: 28.2 PG — SIGNIFICANT CHANGE UP (ref 27–34)
MCHC RBC-ENTMCNC: 32.7 GM/DL — SIGNIFICANT CHANGE UP (ref 32–36)
MCV RBC AUTO: 86 FL — SIGNIFICANT CHANGE UP (ref 80–100)
PF4 HEPARIN CMPLX AB SER-ACNC: NEGATIVE — SIGNIFICANT CHANGE UP
PLATELET # BLD AUTO: 120 K/UL — LOW (ref 150–400)
PLATELET # BLD AUTO: 94 K/UL — LOW (ref 150–400)
RBC # BLD: 4.51 M/UL — SIGNIFICANT CHANGE UP (ref 3.8–5.2)
RBC # FLD: 14.2 % — SIGNIFICANT CHANGE UP (ref 10.3–14.5)
SPECIMEN SOURCE: SIGNIFICANT CHANGE UP
SPECIMEN SOURCE: SIGNIFICANT CHANGE UP
WBC # BLD: 4.65 K/UL — SIGNIFICANT CHANGE UP (ref 3.8–10.5)
WBC # FLD AUTO: 4.65 K/UL — SIGNIFICANT CHANGE UP (ref 3.8–10.5)

## 2023-01-06 PROCEDURE — 99232 SBSQ HOSP IP/OBS MODERATE 35: CPT

## 2023-01-06 RX ORDER — FONDAPARINUX SODIUM 2.5 MG/.5ML
10 INJECTION, SOLUTION SUBCUTANEOUS DAILY
Refills: 0 | Status: DISCONTINUED | OUTPATIENT
Start: 2023-01-06 | End: 2023-01-06

## 2023-01-06 RX ORDER — ENOXAPARIN SODIUM 100 MG/ML
40 INJECTION SUBCUTANEOUS EVERY 24 HOURS
Refills: 0 | Status: DISCONTINUED | OUTPATIENT
Start: 2023-01-06 | End: 2023-01-06

## 2023-01-06 RX ORDER — ENOXAPARIN SODIUM 100 MG/ML
40 INJECTION SUBCUTANEOUS EVERY 24 HOURS
Refills: 0 | Status: DISCONTINUED | OUTPATIENT
Start: 2023-01-07 | End: 2023-01-08

## 2023-01-06 RX ADMIN — HEPARIN SODIUM 5000 UNIT(S): 5000 INJECTION INTRAVENOUS; SUBCUTANEOUS at 05:11

## 2023-01-06 RX ADMIN — TIGECYCLINE 105 MILLIGRAM(S): 50 INJECTION, POWDER, LYOPHILIZED, FOR SOLUTION INTRAVENOUS at 21:18

## 2023-01-06 RX ADMIN — TIGECYCLINE 105 MILLIGRAM(S): 50 INJECTION, POWDER, LYOPHILIZED, FOR SOLUTION INTRAVENOUS at 08:50

## 2023-01-06 NOTE — PHYSICAL THERAPY INITIAL EVALUATION ADULT - GENERAL OBSERVATIONS, REHAB EVAL
Pt seen for 45min PT Eval. Pt rec'd semi supine in bed in NAD, b/l edema. use of surgical shoes for amb. Pt requires SB-CGA for all mobility, Pt amb 75ft with RW. Pt dem dec step length, dec endurance. Pt left sitting at EOB, OK as per RN, all needs met, RN aware.

## 2023-01-06 NOTE — PROGRESS NOTE ADULT - ASSESSMENT
71 y/o female with h/o HTN, DM type 2, Lung Ca/Colon Ca (9 years ago), chronic lymphedema with lower leg wounds was admitted on 12/31 for worsening RLE redness. Patient states she has home visiting nurse who came in on the day of admission and noticed that wound has been worsening since 2 days PTA. Patient reports of increased weeping from the wound, reports of generalized malaise for few days. States she was started on Levaquin PO 750mg x last 4 days. Denies Hx of MRSA, trauma. Walks with aid of walker. In ER she received levofloxacin and vancomycin IV.     1. RLE cellulitis with CHINEDU and ENFA. B/l LE chronic stasis dermatitis. CRF stage 3. Allergy to keflex with anaphylaxis  -leg is improving slowly  -BC x 2 noted  -would c/s noted  -abx choices d/w patient in tram of prior abx anaphylactic reaction  -on tigecycline 50 mg IV q12h # 6  -tolerating abx well so far; no side effects noted   -monitor closely in tram of PCN allergy history  -vascular evaluation appreciated  -leg is improving on tigecycline  -may change abx to doxycycline 100 mg PO q12h when ready for discharge   -f/u cultures  -local wounds care  -elevate legs  -continue abx coverage   -plan to change to oral doxy when she is ready for discharge   -monitor temps  -f/u CBC  -supportive care  2. Other issues:   -care per medicine

## 2023-01-06 NOTE — CONSULT NOTE ADULT - ASSESSMENT
71 y/o F w/PMHx of HTN, DM2, Lung Ca/Colon Ca (9 years ago), chronic lymphedema presents to  for worsening RLE redness. Heme/Onc consulted for developing thrombocytopenia.      # Thrombocytopenia    - Currently being treated with RLE cellulitis with abx  - Was exposed to Heparin 12/31/2022 which was d/c'ed today 01/06/2023  - Both of these factors likely contributory, can check Heparin induced platelet antibodies   - Plt down to 94K as of today, down from 170K (12/31/2022)  - Continue supportive measures  - Continue to trend serial CBCs  - Transfuse Plts if < 20K or evidence of bleeding 73 y/o F w/PMHx of HTN, DM2, Lung Ca/Colon Ca (9 years ago), chronic lymphedema presents to  for worsening RLE redness. Heme/Onc consulted for developing thrombocytopenia.      # Thrombocytopenia    - Currently being treated with RLE cellulitis with abx ---> consumption  - Was exposed to Heparin 12/31/2022 which was d/c'ed today 01/06/2023 ---> HIT  - Both of these factors likely contributory, can check Heparin induced platelet antibodies   - Plt down to 94K as of today, down from 170K (12/31/2022)  - Continue supportive measures  - Continue to trend serial CBCs  - Transfuse Plts if < 20K or evidence of bleeding

## 2023-01-06 NOTE — PHYSICAL THERAPY INITIAL EVALUATION ADULT - ADDITIONAL COMMENTS
Pt indep amb with cane, reports noted some increased difficulty with daily tasks. Pt has friend drive. Pt indep amb with andrew RAMON, reports noted some increased difficulty with daily tasks. Pt has friend drive.    Pt plans on hiring private [facility]HA upon DC.

## 2023-01-06 NOTE — PROGRESS NOTE ADULT - ASSESSMENT
71 yo female w/PMHx of HTN, DM2, Lung Ca/Colon Ca (9 years ago), chronic lymphedema presents to  for worsening RLE redness.    #Cellulitis/ bilateral LE wounds with hx of chronic severe lymphedema/ venous stasis:    Cont IV ABX.    Appreciate ID input.  Tigacycline.  Day #3   Wound cx with Alcaligenes faecalis-- f/u sensitivities.      Blood cultures negative.    Foul smelling.    CEPH allergy.    Wound care.    Vascular f/u.      # Thrombocytopenia  dc UFH  consult Heme    #DM:    BGMs low -- 70's.    Stop insulin.    Was on orals at home.    Likely d/c sulfonylurea @ d/c.      #Lactic Acidosis without sepsis:    May have been related to metformin use.    Trending down.      #ADRIAN:    Cr on admit was 1.7.  Improved to 1.0.    Hx of CKD stage III.    Hold diuretics for today.      #DVT Prophylaxis   -Heparin SubQ     prepare for dc

## 2023-01-06 NOTE — PHYSICAL THERAPY INITIAL EVALUATION ADULT - PERTINENT HX OF CURRENT PROBLEM, REHAB EVAL
73 y/o female with h/o HTN, DM type 2, Lung Ca/Colon Ca (9 years ago), chronic lymphedema with lower leg wounds was admitted on 12/31 for worsening RLE redness.

## 2023-01-06 NOTE — CONSULT NOTE ADULT - SUBJECTIVE AND OBJECTIVE BOX
HPI:    71 y/o F w/PMHx of HTN, DM2, Lung Ca/Colon Ca (9 years ago), chronic lymphedema presents to  for worsening RLE redness. Patient states she has home visiting nurse who came in today and noticed that wound has been worsening since 2 days ago. Patient reports of increased weeping from the wound, reports of generalized malaise for few days.  States she was started on Levaquin PO 750mg x14 days and has been taking it for 4 days. Denies Hx of MRSA, trauma. Walks with aid of walker. (31 Dec 2022 22:03)      01/06/2023:      PAST MEDICAL & SURGICAL HISTORY:    HTN  DM   Hx of Lung and colon cancer   chronic lymphedema     History of Cholecystectomy  History of Hysterectomy   History of Laminectomy Lumbar  History of Lung lobectomy  History of Partial Colectomy  History of Rotator Cuff Repair        MEDICATIONS  (STANDING):  dextrose 5%. 1000 milliLiter(s) (100 mL/Hr) IV Continuous <Continuous>  dextrose 5%. 1000 milliLiter(s) (50 mL/Hr) IV Continuous <Continuous>  dextrose 50% Injectable 25 Gram(s) IV Push once  dextrose 50% Injectable 12.5 Gram(s) IV Push once  dextrose 50% Injectable 25 Gram(s) IV Push once  glucagon  Injectable 1 milliGRAM(s) IntraMuscular once  insulin lispro (ADMELOG) corrective regimen sliding scale   SubCutaneous at bedtime  metoprolol succinate ER 25 milliGRAM(s) Oral daily  polyethylene glycol 3350 17 Gram(s) Oral daily  tigecycline IVPB 50 milliGRAM(s) IV Intermittent every 12 hours  tigecycline IVPB          MEDICATIONS  (PRN):    acetaminophen     Tablet .. 650 milliGRAM(s) Oral every 4 hours PRN Temp greater or equal to 38.5C (101.3F), Moderate Pain (4 - 6)  aluminum hydroxide/magnesium hydroxide/simethicone Suspension 30 milliLiter(s) Oral every 4 hours PRN Dyspepsia  dextrose Oral Gel 15 Gram(s) Oral once PRN Blood Glucose LESS THAN 70 milliGRAM(s)/deciliter  melatonin 5 milliGRAM(s) Oral at bedtime PRN Insomnia  ondansetron Injectable 4 milliGRAM(s) IV Push every 8 hours PRN Nausea and/or Vomiting      ALLERGIES:    Keflex (Anaphylaxis)        FAMILY HISTORY:    Bladder carcinoma : Mother  coronary artery disease (V17.3) (Z82.49) : Father    SOCIAL HISTORY:    No smoking/EtOH/illicit drugs      REVIEW OF SYSTEMS:    Constitutional, Eyes, ENT, Cardiovascular, Respiratory, Gastrointestinal, Genitourinary, Musculoskeletal, Integumentary, Neurological, Psychiatric, Endocrine, Heme/Lymph and Allergic/Immunologic review of systems are otherwise negative except as noted in HPI.       VITALS:    T(C): 36.7 (06 Jan 2023 08:02), Max: 36.9 (05 Jan 2023 15:33)  T(F): 98 (06 Jan 2023 08:02), Max: 98.4 (05 Jan 2023 15:33)  HR: 73 (06 Jan 2023 08:57) (71 - 74)  BP: 133/54 (06 Jan 2023 08:57) (127/51 - 135/43)  BP(mean): --  RR: 17 (06 Jan 2023 08:57) (16 - 17)  SpO2: 98% (06 Jan 2023 08:57) (98% - 99%)    Parameters below as of 06 Jan 2023 08:02  Patient On (Oxygen Delivery Method): room air        PHYSICAL EXAM: (pending)    Constitutional: no acute distress  Eyes: PERRL, EOMI, no conjunctival infection, anicteric.   ENT: pharynx is unremarkable, moist mucus membrane, no oral lesions.   Neck: supple without JVD, no thyromegaly or masses appreciated.   Pulmonary: clear to auscultation bilaterally, no dullness, no wheezing.   Cardiac: RRR, normal S1S2, no murmurs, rubs, gallops.   Vascular: no JVD, no calf tenderness, venous stasis changes, varices.   Abdomen: normoactive bowel sounds, soft and nontender, no hepatosplenomegaly or masses appreciated.   Lymphatic: no peripheral adenopathy appreciated.   Musculoskeletal: full range of motion and no deformities appreciated.   Skin: normal appearance, no rash, nodules, vesicles, ulcers, erythema.   Neurology: CNII - XII intact with no obvious deficits  Psychiatric: affect/mood appropriate, A&Ox3 (person, place, time)      LABS:    CBC Full  -  ( 06 Jan 2023 06:14 )  WBC Count : 4.65 K/uL  RBC Count : 4.51 M/uL  Hemoglobin : 12.7 g/dL  Hematocrit : 38.8 %  Platelet Count - Automated : 94 K/uL  Mean Cell Volume : 86.0 fl  Mean Cell Hemoglobin : 28.2 pg  Mean Cell Hemoglobin Concentration : 32.7 gm/dL  Auto Neutrophil # : x  Auto Lymphocyte # : x  Auto Monocyte # : x  Auto Eosinophil # : x  Auto Basophil # : x  Auto Neutrophil % : x  Auto Lymphocyte % : x  Auto Monocyte % : x  Auto Eosinophil % : x  Auto Basophil % : x                          12.7   4.65  )-----------( 94       ( 06 Jan 2023 06:14 )             38.8            RADIOLOGY & ADDITIONAL STUDIES:      Xray Chest 1 View- PORTABLE-Urgent (Xray Chest 1 View- PORTABLE-Urgent .) (12.31.22 @ 14:19)    INTERPRETATION:  AP chest on December 31, 2022 at 2:06 PM. Patient is   admitted with right leg cellulitis. There is history of lung and colon   cancer.    Heart magnified by technique. Right MediPort again noted.    Slight hazy density in the lingular area is again seen.    Chest is similar to September 24 this year.    IMPRESSION: Stable findings as above.

## 2023-01-07 LAB
ADD ON TEST-SPECIMEN IN LAB: SIGNIFICANT CHANGE UP
ANION GAP SERPL CALC-SCNC: 7 MMOL/L — SIGNIFICANT CHANGE UP (ref 5–17)
BUN SERPL-MCNC: 31 MG/DL — HIGH (ref 7–23)
CALCIUM SERPL-MCNC: 9.2 MG/DL — SIGNIFICANT CHANGE UP (ref 8.5–10.1)
CHLORIDE SERPL-SCNC: 110 MMOL/L — HIGH (ref 96–108)
CO2 SERPL-SCNC: 22 MMOL/L — SIGNIFICANT CHANGE UP (ref 22–31)
CREAT SERPL-MCNC: 0.86 MG/DL — SIGNIFICANT CHANGE UP (ref 0.5–1.3)
EGFR: 72 ML/MIN/1.73M2 — SIGNIFICANT CHANGE UP
GLUCOSE BLDC GLUCOMTR-MCNC: 111 MG/DL — HIGH (ref 70–99)
GLUCOSE BLDC GLUCOMTR-MCNC: 143 MG/DL — HIGH (ref 70–99)
GLUCOSE BLDC GLUCOMTR-MCNC: 165 MG/DL — HIGH (ref 70–99)
GLUCOSE BLDC GLUCOMTR-MCNC: 178 MG/DL — HIGH (ref 70–99)
GLUCOSE SERPL-MCNC: 115 MG/DL — HIGH (ref 70–99)
HCT VFR BLD CALC: 39.9 % — SIGNIFICANT CHANGE UP (ref 34.5–45)
HGB BLD-MCNC: 13.3 G/DL — SIGNIFICANT CHANGE UP (ref 11.5–15.5)
MCHC RBC-ENTMCNC: 28.4 PG — SIGNIFICANT CHANGE UP (ref 27–34)
MCHC RBC-ENTMCNC: 33.3 GM/DL — SIGNIFICANT CHANGE UP (ref 32–36)
MCV RBC AUTO: 85.1 FL — SIGNIFICANT CHANGE UP (ref 80–100)
PLATELET # BLD AUTO: 100 K/UL — LOW (ref 150–400)
POTASSIUM SERPL-MCNC: 3.7 MMOL/L — SIGNIFICANT CHANGE UP (ref 3.5–5.3)
POTASSIUM SERPL-SCNC: 3.7 MMOL/L — SIGNIFICANT CHANGE UP (ref 3.5–5.3)
RBC # BLD: 4.69 M/UL — SIGNIFICANT CHANGE UP (ref 3.8–5.2)
RBC # FLD: 14.3 % — SIGNIFICANT CHANGE UP (ref 10.3–14.5)
SODIUM SERPL-SCNC: 139 MMOL/L — SIGNIFICANT CHANGE UP (ref 135–145)
WBC # BLD: 6.88 K/UL — SIGNIFICANT CHANGE UP (ref 3.8–10.5)
WBC # FLD AUTO: 6.88 K/UL — SIGNIFICANT CHANGE UP (ref 3.8–10.5)

## 2023-01-07 PROCEDURE — 99232 SBSQ HOSP IP/OBS MODERATE 35: CPT

## 2023-01-07 RX ADMIN — TIGECYCLINE 105 MILLIGRAM(S): 50 INJECTION, POWDER, LYOPHILIZED, FOR SOLUTION INTRAVENOUS at 20:30

## 2023-01-07 RX ADMIN — Medication 650 MILLIGRAM(S): at 22:07

## 2023-01-07 RX ADMIN — Medication 25 MILLIGRAM(S): at 09:21

## 2023-01-07 RX ADMIN — TIGECYCLINE 105 MILLIGRAM(S): 50 INJECTION, POWDER, LYOPHILIZED, FOR SOLUTION INTRAVENOUS at 09:21

## 2023-01-07 NOTE — PROGRESS NOTE ADULT - ASSESSMENT
73 yo female w/PMHx of HTN, DM2, Lung Ca/Colon Ca (9 years ago), chronic lymphedema presents to  for worsening RLE redness.    #Cellulitis/ bilateral LE wounds with hx of chronic severe lymphedema/ venous stasis:    Cont IV ABX.    Appreciate ID input.  Tigacycline.    Wound cx with Alcaligenes faecalis--   Blood cultures negative.    Foul smelling.    CEPH allergy.    Wound care.    Vascular f/u.      # Thrombocytopenia  dc UFH  improving   bl wk results pending doubt HIT    #DM:    BGMs low -- 70's.    Stop insulin.    Was on orals at home.    Likely d/c sulfonylurea @ d/c.      #Lactic Acidosis without sepsis:    May have been related to metformin use.    Trending down.      #ADRIAN:    Cr on admit was 1.7.  Improved to 1.0.    Hx of CKD stage III.    Hold diuretics for today.      #DVT Prophylaxis   -Heparin SubQ     prepare for dc 1/8  Left message for brother [phone]

## 2023-01-07 NOTE — PROGRESS NOTE ADULT - PROVIDER SPECIALTY LIST ADULT
Hospitalist
Hospitalist
Infectious Disease
Hospitalist
Infectious Disease
Infectious Disease
Vascular Surgery
Hospitalist

## 2023-01-07 NOTE — PROGRESS NOTE ADULT - REASON FOR ADMISSION
sent in by MD

## 2023-01-07 NOTE — PROGRESS NOTE ADULT - SUBJECTIVE AND OBJECTIVE BOX
Date of service: 23 @ 10:36    Lying in bed in NAD  Her right lower leg feels less tender  No fever  Scant discharge from one small ulcer on RLE    ROS: no fever or chills; denies dizziness, no HA, no SOB or cough, no abdominal pain, no diarrhea or constipation; no dysuria    MEDICATIONS  (STANDING):  dextrose 5%. 1000 milliLiter(s) (50 mL/Hr) IV Continuous <Continuous>  dextrose 5%. 1000 milliLiter(s) (100 mL/Hr) IV Continuous <Continuous>  dextrose 50% Injectable 25 Gram(s) IV Push once  dextrose 50% Injectable 12.5 Gram(s) IV Push once  dextrose 50% Injectable 25 Gram(s) IV Push once  glucagon  Injectable 1 milliGRAM(s) IntraMuscular once  heparin   Injectable 5000 Unit(s) SubCutaneous every 8 hours  insulin lispro (ADMELOG) corrective regimen sliding scale   SubCutaneous at bedtime  metoprolol succinate ER 25 milliGRAM(s) Oral daily  polyethylene glycol 3350 17 Gram(s) Oral daily  tigecycline IVPB      tigecycline IVPB 50 milliGRAM(s) IV Intermittent every 12 hours    Vital Signs Last 24 Hrs  T(C): 36.6 (2023 07:29), Max: 36.9 (2023 15:17)  T(F): 97.8 (2023 07:29), Max: 98.4 (2023 15:17)  HR: 82 (2023 07:29) (72 - 82)  BP: 138/42 (2023 07:29) (123/47 - 138/42)  BP(mean): --  RR: 18 (2023 07:29) (18 - 19)  SpO2: 100% (2023 07:29) (100% - 100%)    Parameters below as of 2023 07:29  Patient On (Oxygen Delivery Method): room air     Physical exam:    Constitutional:  No acute distress  HEENT: NC/AT, EOMI, PERRLA, conjunctivae clear; ears and nose atraumatic  Neck: supple; thyroid not palpable  Back: no tenderness  Respiratory: respiratory effort normal; clear to auscultation  Cardiovascular: S1S2 regular, no murmurs  Abdomen: soft, not tender, not distended, positive B  Genitourinary: no suprapubic tenderness  Lymphatic: no LN palpable  Musculoskeletal: no muscle tenderness, no joint swelling or tenderness  Extremities: 2+ pedal edema with chronic lower legs skin changes  RLE erythema, edema, warmth and tenderness; open wound with serous discharge - improving slowly  small open wound with scant serous discharge  Neurological/ Psychiatric: AxOx3, judgement and insight normal; moving all extremities  Skin: no rashes; no palpable lesions    Labs: reviewed        141  |  109<H>  |  33<H>  ----------------------------<  116<H>  3.6   |  25  |  1.00    Ca    9.0      2023 06:55                        11.3   5.70  )-----------( 125      ( 2023 05:47 )             35.0         141  |  107  |  31<H>  ----------------------------<  106<H>  3.1<L>   |  25  |  1.08    Ca    8.7      2023 05:25  Mg     2.4     -    TPro  6.4  /  Alb  2.6<L>  /  TBili  0.8  /  DBili  x   /  AST  37  /  ALT  24  /  AlkPhos  67                  LIVER FUNCTIONS - ( 2023 05:47 )  Alb: 2.6 g/dL / Pro: 6.4 gm/dL / ALK PHOS: 67 U/L / ALT: 24 U/L / AST: 37 U/L / GGT: x           Urinalysis Basic - ( 31 Dec 2022 19:45 )    Color: Yellow / Appearance: Clear / S.020 / pH: x  Gluc: x / Ketone: Negative  / Bili: Negative / Urobili: Negative   Blood: x / Protein: Negative / Nitrite: Negative   Leuk Esterase: Trace / RBC: Negative /HPF / WBC 3-5 /HPF   Sq Epi: x / Non Sq Epi: Few / Bacteria: Occasional      Culture - Urine (collected 31 Dec 2022 19:45)  Source: Clean Catch None  Final Report (2023 20:50):    <10,000 CFU/mL Normal Urogenital Jamarcus    Culture - Other (collected 31 Dec 2022 17:41)  Source: Wound None  Preliminary Report (2023 13:45):    Numerous Alcaligenes faecalis    Numerous Enterococcus faecalis Susceptibility to follow.    Normal jamarcus isolated  Organism: Alcaligenes faecalis (2023 10:28)  Organism: Alcaligenes faecalis (2023 10:28)      -  Amikacin: S <=16      -  Aztreonam: S 8      -  Cefepime: S 4      -  Ceftriaxone: S <=1      -  Ciprofloxacin: R >2      -  Gentamicin: S <=2      -  Levofloxacin: R >4      -  Meropenem: S <=1      -  Piperacillin/Tazobactam: S <=8      -  Tobramycin: S <=2      -  Trimethoprim/Sulfamethoxazole: S <=0.5/9.5      Method Type: SANNA    Culture - Blood (collected 31 Dec 2022 15:15)  Source: .Blood None  Preliminary Report (2023 02:03):    No growth to date.    Culture - Blood (collected 31 Dec 2022 15:04)  Source: .Blood None  Preliminary Report (2023 02:03):    No growth to date.        Radiology: all available radiological tests reviewed    Advanced directives addressed: full resuscitation
72y old  Female who presents with a chief complaint of leg wounds    HPI:  71 yo female w/PMHx of HTN, DM2, Lung Ca/Colon Ca (9 years ago), chronic lymphedema presents to  for worsening RLE redness and weeping. Patient states she has home visiting nurse who came in today and noticed that wound has been worsening since 2 days ago. Patient reports of increased weeping from the wound, reports of generalized malaise for few days.  States she was started on Levaquin PO 750mg x14 days and has been taking it for 4 days. Denies Hx of MRSA, trauma. Walks with aid of walker.      :  Pt seen.  No new complaints.  Ongoing weeping of legs.  No fever.    :  Pt seen.  No new issues.  Less pain in legs.      Review of system- All 10 systems reviewed and is as per HPI otherwise negative.     Vital Signs Last 24 Hrs  T(C): 37.1 (2023 16:26), Max: 37.1 (2023 16:26)  T(F): 98.8 (2023 16:26), Max: 98.8 (2023 16:26)  HR: 76 (2023 16:26) (76 - 88)  BP: 127/43 (2023 16:26) (106/77 - 137/48)  BP(mean): --  RR: 18 (2023 16:26) (18 - 18)  SpO2: 99% (2023 16:26) (99% - 99%)    Parameters below as of 2023 16:26  Patient On (Oxygen Delivery Method): room air    PHYSICAL EXAM:  GENERAL: Comfortable, no acute distress  HEAD:  Atraumatic, Normocephalic  EYES: EOMI, PERRLA  HEENT: Moist mucous membranes  NECK: Supple, No JVD  NERVOUS SYSTEM:  Alert & Oriented X3, Motor Strength 5/5 B/L upper and lower extremities  CHEST/LUNG: Clear to auscultation bilaterally  HEART: Regular rate and rhythm; No murmurs, rubs, or gallops  ABDOMEN: Soft, Nontender, Nondistended; Bowel sounds present  GENITOURINARY- Voiding, no palpable bladder  EXTREMITIES: severe lymphedema bilateral legs.  weeping of multiple wounds b/l lower legs.  Left ankle.  Right shin.    MUSCULOSKELTAL- No muscle tenderness, No joint tenderness  SKIN as above    Culture Results:   Numerous Alcaligenes faecalis (12.31.22 @ 17:41)         141  |  107  |  31<H>  ----------------------------<  106<H>  3.1<L>   |  25  |  1.08    Ca    8.7      2023 05:25    TPro  6.4  /  Alb  2.6<L>  /  TBili  0.8  /  DBili  x   /  AST  37  /  ALT  24  /  AlkPhos  67                          11.3   5.70  )-----------( 125      ( 2023 05:47 )             35.0       LIVER FUNCTIONS - ( 2023 05:47 )  Alb: 2.6 g/dL / Pro: 6.4 gm/dL / ALK PHOS: 67 U/L / ALT: 24 U/L / AST: 37 U/L / GGT: x           Urinalysis Basic - ( 31 Dec 2022 19:45 )  Color: Yellow / Appearance: Clear / S.020 / pH: x  Gluc: x / Ketone: Negative  / Bili: Negative / Urobili: Negative   Blood: x / Protein: Negative / Nitrite: Negative   Leuk Esterase: Trace / RBC: Negative /HPF / WBC 3-5 /HPF   Sq Epi: x / Non Sq Epi: Few / Bacteria: Occasional      MEDS  acetaminophen     Tablet .. 650 milliGRAM(s) Oral every 6 hours PRN  aluminum hydroxide/magnesium hydroxide/simethicone Suspension 30 milliLiter(s) Oral every 4 hours PRN  dextrose 5%. 1000 milliLiter(s) IV Continuous <Continuous>  dextrose 5%. 1000 milliLiter(s) IV Continuous <Continuous>  dextrose 50% Injectable 25 Gram(s) IV Push once  dextrose 50% Injectable 12.5 Gram(s) IV Push once  dextrose 50% Injectable 25 Gram(s) IV Push once  dextrose Oral Gel 15 Gram(s) Oral once PRN  glucagon  Injectable 1 milliGRAM(s) IntraMuscular once  heparin   Injectable 5000 Unit(s) SubCutaneous every 8 hours  insulin lispro (ADMELOG) corrective regimen sliding scale   SubCutaneous at bedtime  lactated ringers. 1000 milliLiter(s) IV Continuous <Continuous>  melatonin 5 milliGRAM(s) Oral at bedtime PRN  metoprolol succinate ER 25 milliGRAM(s) Oral daily  ondansetron Injectable 4 milliGRAM(s) IV Push every 8 hours PRN  tigecycline IVPB      tigecycline IVPB 50 milliGRAM(s) IV Intermittent every 12 hours              
72y old  Female who presents with a chief complaint of leg wounds    HPI:  71 yo female w/PMHx of HTN, DM2, Lung Ca/Colon Ca (9 years ago), chronic lymphedema presents to  for worsening RLE redness and weeping. Patient states she has home visiting nurse who came in today and noticed that wound has been worsening since 2 days ago. Patient reports of increased weeping from the wound, reports of generalized malaise for few days.  States she was started on Levaquin PO 750mg x14 days and has been taking it for 4 days. Denies Hx of MRSA, trauma. Walks with aid of walker.      Vital Signs Last 24 Hrs  T(C): 36.9 (2023 09:43), Max: 37.1 (2023 16:26)  T(F): 98.5 (2023 09:43), Max: 98.8 (2023 16:26)  HR: 74 (2023 09:43) (74 - 76)  BP: 139/49 (2023 09:43) (127/43 - 139/49)  BP(mean): --  RR: 18 (2023 09:43) (18 - 18)  SpO2: 99% (2023 09:43) (99% - 99%)    Parameters below as of 2023 09:43  Patient On (Oxygen Delivery Method): room air        PHYSICAL EXAM:  GENERAL: Comfortable, no acute distress  HEAD:  Atraumatic, Normocephalic  EYES: EOMI, PERRLA  HEENT: Moist mucous membranes  NECK: Supple, No JVD  NERVOUS SYSTEM:  Alert & Oriented X3, Motor Strength 5/5 B/L upper and lower extremities  CHEST/LUNG: Clear to auscultation bilaterally  HEART: Regular rate and rhythm; No murmurs, rubs, or gallops  ABDOMEN: Soft, Nontender, Nondistended; Bowel sounds present  GENITOURINARY- Voiding, no palpable bladder  EXTREMITIES: severe lymphedema bilateral legs.  weeping of multiple wounds b/l lower legs.  Left ankle.  Right shin.    MUSCULOSKELTAL- No muscle tenderness, No joint tenderness  SKIN as above    Culture Results:   Numerous Alcaligenes faecalis (12.31.22 @ 17:41)     -    141  |  107  |  31<H>  ----------------------------<  106<H>  3.1<L>   |  25  |  1.08    Ca    8.7      2023 05:25    TPro  6.4  /  Alb  2.6<L>  /  TBili  0.8  /  DBili  x   /  AST  37  /  ALT  24  /  AlkPhos  67  -                        11.3   5.70  )-----------( 125      ( 2023 05:47 )             35.0       LIVER FUNCTIONS - ( 2023 05:47 )  Alb: 2.6 g/dL / Pro: 6.4 gm/dL / ALK PHOS: 67 U/L / ALT: 24 U/L / AST: 37 U/L / GGT: x           Urinalysis Basic - ( 31 Dec 2022 19:45 )  Color: Yellow / Appearance: Clear / S.020 / pH: x  Gluc: x / Ketone: Negative  / Bili: Negative / Urobili: Negative   Blood: x / Protein: Negative / Nitrite: Negative   Leuk Esterase: Trace / RBC: Negative /HPF / WBC 3-5 /HPF   Sq Epi: x / Non Sq Epi: Few / Bacteria: Occasional    MEDICATIONS  (STANDING):  dextrose 5%. 1000 milliLiter(s) (50 mL/Hr) IV Continuous <Continuous>  dextrose 5%. 1000 milliLiter(s) (100 mL/Hr) IV Continuous <Continuous>  dextrose 50% Injectable 25 Gram(s) IV Push once  dextrose 50% Injectable 12.5 Gram(s) IV Push once  dextrose 50% Injectable 25 Gram(s) IV Push once  glucagon  Injectable 1 milliGRAM(s) IntraMuscular once  heparin   Injectable 5000 Unit(s) SubCutaneous every 8 hours  insulin lispro (ADMELOG) corrective regimen sliding scale   SubCutaneous at bedtime  metoprolol succinate ER 25 milliGRAM(s) Oral daily  polyethylene glycol 3350 17 Gram(s) Oral daily  tigecycline IVPB      tigecycline IVPB 50 milliGRAM(s) IV Intermittent every 12 hours                
72y old  Female who presents with a chief complaint of leg wounds    HPI:  73 yo female w/PMHx of HTN, DM2, Lung Ca/Colon Ca (9 years ago), chronic lymphedema presents to  for worsening RLE redness and weeping. Patient states she has home visiting nurse who came in today and noticed that wound has been worsening since 2 days ago. Patient reports of increased weeping from the wound, reports of generalized malaise for few days.  States she was started on Levaquin PO 750mg x14 days and has been taking it for 4 days. Denies Hx of MRSA, trauma. Walks with aid of walker.      :  Pt seen.  No new complaints.  Ongoing weeping of legs.  No fever.      Review of system- All 10 systems reviewed and is as per HPI otherwise negative.     T(C): 37.2 (23 @ 16:39), Max: 37.2 (23 @ 16:39)  HR: 84 (23 @ 16:39) (81 - 87)  BP: 131/41 (23 @ 16:39) (107/45 - 131/41)  RR: 19 (23 @ 16:39) (17 - 20)  SpO2: 98% (23 @ 16:39) (98% - 100%)    PHYSICAL EXAM:  GENERAL: Comfortable, no acute distress  HEAD:  Atraumatic, Normocephalic  EYES: EOMI, PERRLA  HEENT: Moist mucous membranes  NECK: Supple, No JVD  NERVOUS SYSTEM:  Alert & Oriented X3, Motor Strength 5/5 B/L upper and lower extremities  CHEST/LUNG: Clear to auscultation bilaterally  HEART: Regular rate and rhythm; No murmurs, rubs, or gallops  ABDOMEN: Soft, Nontender, Nondistended; Bowel sounds present  GENITOURINARY- Voiding, no palpable bladder  EXTREMITIES: severe lymphedema bilateral legs.  weeping of multiple wounds b/l lower legs.  Left ankle.  Right shin.    MUSCULOSKELTAL- No muscle tenderness, No joint tenderness  SKIN as above      LABS:                        11.3   5.70  )-----------( 125      ( 2023 05:47 )             35.0         138  |  105  |  36<H>  ----------------------------<  75  3.9   |  25  |  1.37<H>    Ca    9.1      2023 05:47  Mg     2.4     12-    TPro  6.4  /  Alb  2.6<L>  /  TBili  0.8  /  DBili  x   /  AST  37  /  ALT  24  /  AlkPhos  67      PT/INR - ( 31 Dec 2022 15:04 )   PT: 15.4 sec;   INR: 1.32 ratio         PTT - ( 31 Dec 2022 15:04 )  PTT:29.1 sec  Urinalysis Basic - ( 31 Dec 2022 19:45 )    Color: Yellow / Appearance: Clear / S.020 / pH: x  Gluc: x / Ketone: Negative  / Bili: Negative / Urobili: Negative   Blood: x / Protein: Negative / Nitrite: Negative   Leuk Esterase: Trace / RBC: Negative /HPF / WBC 3-5 /HPF   Sq Epi: x / Non Sq Epi: Few / Bacteria: Occasional      MEDS  acetaminophen     Tablet .. 650 milliGRAM(s) Oral every 6 hours PRN  aluminum hydroxide/magnesium hydroxide/simethicone Suspension 30 milliLiter(s) Oral every 4 hours PRN  dextrose 5%. 1000 milliLiter(s) IV Continuous <Continuous>  dextrose 5%. 1000 milliLiter(s) IV Continuous <Continuous>  dextrose 50% Injectable 25 Gram(s) IV Push once  dextrose 50% Injectable 12.5 Gram(s) IV Push once  dextrose 50% Injectable 25 Gram(s) IV Push once  dextrose Oral Gel 15 Gram(s) Oral once PRN  glucagon  Injectable 1 milliGRAM(s) IntraMuscular once  heparin   Injectable 5000 Unit(s) SubCutaneous every 8 hours  insulin lispro (ADMELOG) corrective regimen sliding scale   SubCutaneous at bedtime  lactated ringers. 1000 milliLiter(s) IV Continuous <Continuous>  melatonin 5 milliGRAM(s) Oral at bedtime PRN  metoprolol succinate ER 25 milliGRAM(s) Oral daily  ondansetron Injectable 4 milliGRAM(s) IV Push every 8 hours PRN  tigecycline IVPB      tigecycline IVPB 50 milliGRAM(s) IV Intermittent every 12 hours              
time spent 35min plus  reviewing chart, labs, consultant    72y old  Female who presents with a chief complaint of leg wounds    HPI:  73 yo female w/PMHx of HTN, DM2, Lung Ca/Colon Ca (9 years ago), chronic lymphedema presents to  for worsening RLE redness and weeping. Patient states she has home visiting nurse who came in today and noticed that wound has been worsening since 2 days ago. Patient reports of increased weeping from the wound, reports of generalized malaise for few days.  States she was started on Levaquin PO 750mg x14 days and has been taking it for 4 days. Denies Hx of MRSA, trauma. Walks with aid of walker.      No change in her status, legs with cellulitis, rough skin, some oozing    Vital Signs Last 24 Hrs  T(C): 36.8 (2023 08:11), Max: 37.6 (2023 21:57)  T(F): 98.2 (2023 08:11), Max: 99.7 (2023 21:57)  HR: 83 (2023 08:11) (76 - 86)  BP: 140/60 (2023 08:11) (126/46 - 140/60)  BP(mean): --  RR: 16 (2023 08:11) (16 - 19)  SpO2: 98% (2023 08:11) (96% - 99%)    Parameters below as of 2023 08:11  Patient On (Oxygen Delivery Method): room air            PHYSICAL EXAM:  GENERAL: Comfortable, no acute distress  HEAD:  Atraumatic, Normocephalic  EYES: EOMI, PERRLA  HEENT: Moist mucous membranes  NECK: Supple, No JVD  NERVOUS SYSTEM:  Alert & Oriented X3, Motor Strength 5/5 B/L upper and lower extremities  CHEST/LUNG: Clear to auscultation bilaterally  HEART: Regular rate and rhythm; No murmurs, rubs, or gallops  ABDOMEN: Soft, Nontender, Nondistended; Bowel sounds present  GENITOURINARY- Voiding, no palpable bladder  EXTREMITIES: severe lymphedema bilateral legs.   Weeping of multiple wounds b/l lower legs.  Left ankle.  Right shin.    MUSCULOSKELTAL- No muscle tenderness, No joint tenderness  SKIN as above    Culture Results:   Numerous Alcaligenes faecalis (12.31.22 @ 17:41)         141  |  107  |  31<H>  ----------------------------<  106<H>  3.1<L>   |  25  |  1.08    Ca    8.7      2023 05:25    TPro  6.4  /  Alb  2.6<L>  /  TBili  0.8  /  DBili  x   /  AST  37  /  ALT  24  /  AlkPhos  67                          11.3   5.70  )-----------( 125      ( 2023 05:47 )             35.0       LIVER FUNCTIONS - ( 2023 05:47 )  Alb: 2.6 g/dL / Pro: 6.4 gm/dL / ALK PHOS: 67 U/L / ALT: 24 U/L / AST: 37 U/L / GGT: x           Urinalysis Basic - ( 31 Dec 2022 19:45 )  Color: Yellow / Appearance: Clear / S.020 / pH: x  Gluc: x / Ketone: Negative  / Bili: Negative / Urobili: Negative   Blood: x / Protein: Negative / Nitrite: Negative   Leuk Esterase: Trace / RBC: Negative /HPF / WBC 3-5 /HPF   Sq Epi: x / Non Sq Epi: Few / Bacteria: Occasional    MEDICATIONS  (STANDING):  dextrose 5%. 1000 milliLiter(s) (50 mL/Hr) IV Continuous <Continuous>  dextrose 5%. 1000 milliLiter(s) (100 mL/Hr) IV Continuous <Continuous>  dextrose 50% Injectable 25 Gram(s) IV Push once  dextrose 50% Injectable 12.5 Gram(s) IV Push once  dextrose 50% Injectable 25 Gram(s) IV Push once  glucagon  Injectable 1 milliGRAM(s) IntraMuscular once  heparin   Injectable 5000 Unit(s) SubCutaneous every 8 hours  insulin lispro (ADMELOG) corrective regimen sliding scale   SubCutaneous at bedtime  metoprolol succinate ER 25 milliGRAM(s) Oral daily  polyethylene glycol 3350 17 Gram(s) Oral daily  tigecycline IVPB      tigecycline IVPB 50 milliGRAM(s) IV Intermittent every 12 hours                
Date of service: 23 @ 09:28    Lying in bed in NAD  Her right leg feels less swollen  Able to move leg better  Has local tenderness    ROS: no fever or chills; denies dizziness, no HA, no SOB or cough, no abdominal pain, no diarrhea or constipation; no dysuria    MEDICATIONS  (STANDING):  dextrose 5%. 1000 milliLiter(s) (50 mL/Hr) IV Continuous <Continuous>  dextrose 5%. 1000 milliLiter(s) (100 mL/Hr) IV Continuous <Continuous>  dextrose 50% Injectable 25 Gram(s) IV Push once  dextrose 50% Injectable 12.5 Gram(s) IV Push once  dextrose 50% Injectable 25 Gram(s) IV Push once  glucagon  Injectable 1 milliGRAM(s) IntraMuscular once  heparin   Injectable 5000 Unit(s) SubCutaneous every 8 hours  insulin lispro (ADMELOG) corrective regimen sliding scale   SubCutaneous at bedtime  metoprolol succinate ER 25 milliGRAM(s) Oral daily  polyethylene glycol 3350 17 Gram(s) Oral daily  tigecycline IVPB      tigecycline IVPB 50 milliGRAM(s) IV Intermittent every 12 hours    Vital Signs Last 24 Hrs  T(C): 37.1 (2023 16:26), Max: 37.1 (2023 16:26)  T(F): 98.8 (2023 16:26), Max: 98.8 (2023 16:26)  HR: 76 (2023 16:26) (76 - 76)  BP: 127/43 (2023 16:26) (127/43 - 127/43)  BP(mean): --  RR: 18 (2023 16:26) (18 - 18)  SpO2: 99% (2023 16:26) (99% - 99%)    Parameters below as of 2023 16:26  Patient On (Oxygen Delivery Method): room air     Physical exam:    Constitutional:  No acute distress  HEENT: NC/AT, EOMI, PERRLA, conjunctivae clear; ears and nose atraumatic  Neck: supple; thyroid not palpable  Back: no tenderness  Respiratory: respiratory effort normal; clear to auscultation  Cardiovascular: S1S2 regular, no murmurs  Abdomen: soft, not tender, not distended, positive B  Genitourinary: no suprapubic tenderness  Lymphatic: no LN palpable  Musculoskeletal: no muscle tenderness, no joint swelling or tenderness  Extremities: 2+ pedal edema with chronic lower legs skin changes  RLE erythema, edema, warmth and tenderness; open wound with serous discharge - improving slowly  open wound  Neurological/ Psychiatric: AxOx3, judgement and insight normal; moving all extremities  Skin: no rashes; no palpable lesions    Labs: reviewed        141  |  109<H>  |  33<H>  ----------------------------<  116<H>  3.6   |  25  |  1.00    Ca    9.0      2023 06:55                        11.3   5.70  )-----------( 125      ( 2023 05:47 )             35.0         141  |  107  |  31<H>  ----------------------------<  106<H>  3.1<L>   |  25  |  1.08    Ca    8.7      2023 05:25  Mg     2.4         TPro  6.4  /  Alb  2.6<L>  /  TBili  0.8  /  DBili  x   /  AST  37  /  ALT  24  /  AlkPhos  67                  LIVER FUNCTIONS - ( 2023 05:47 )  Alb: 2.6 g/dL / Pro: 6.4 gm/dL / ALK PHOS: 67 U/L / ALT: 24 U/L / AST: 37 U/L / GGT: x           Urinalysis Basic - ( 31 Dec 2022 19:45 )    Color: Yellow / Appearance: Clear / S.020 / pH: x  Gluc: x / Ketone: Negative  / Bili: Negative / Urobili: Negative   Blood: x / Protein: Negative / Nitrite: Negative   Leuk Esterase: Trace / RBC: Negative /HPF / WBC 3-5 /HPF   Sq Epi: x / Non Sq Epi: Few / Bacteria: Occasional      Culture - Urine (collected 31 Dec 2022 19:45)  Source: Clean Catch None  Final Report (2023 20:50):    <10,000 CFU/mL Normal Urogenital Christine    Culture - Other (collected 31 Dec 2022 17:41)  Source: Wound None  Preliminary Report (2023 13:09):    Numerous Alcaligenes faecalis    Culture - Blood (collected 31 Dec 2022 15:15)  Source: .Blood None  Preliminary Report (2023 02:03):    No growth to date.    Culture - Blood (collected 31 Dec 2022 15:04)  Source: .Blood None  Preliminary Report (2023 02:03):    No growth to date.    Radiology: all available radiological tests reviewed    Advanced directives addressed: full resuscitation
Date of service: 23 @ 09:46    Lying in bed in NAD  Lower legs are swollen  Scant serous discharge from right leg    ROS: no fever or chills; denies dizziness, no HA, no SOB or cough, no abdominal pain, no diarrhea or constipation; no dysuria,    MEDICATIONS  (STANDING):  dextrose 5%. 1000 milliLiter(s) (100 mL/Hr) IV Continuous <Continuous>  dextrose 5%. 1000 milliLiter(s) (50 mL/Hr) IV Continuous <Continuous>  dextrose 50% Injectable 25 Gram(s) IV Push once  dextrose 50% Injectable 12.5 Gram(s) IV Push once  dextrose 50% Injectable 25 Gram(s) IV Push once  glucagon  Injectable 1 milliGRAM(s) IntraMuscular once  heparin   Injectable 5000 Unit(s) SubCutaneous every 8 hours  insulin lispro (ADMELOG) corrective regimen sliding scale   SubCutaneous at bedtime  metoprolol succinate ER 25 milliGRAM(s) Oral daily  polyethylene glycol 3350 17 Gram(s) Oral daily  tigecycline IVPB 50 milliGRAM(s) IV Intermittent every 12 hours  tigecycline IVPB        Vital Signs Last 24 Hrs  T(C): 36.7 (2023 08:02), Max: 36.9 (2023 15:33)  T(F): 98 (2023 08:02), Max: 98.4 (2023 15:33)  HR: 73 (2023 08:57) (71 - 74)  BP: 133/54 (2023 08:57) (127/51 - 135/43)  BP(mean): --  RR: 17 (2023 08:57) (16 - 17)  SpO2: 98% (2023 08:57) (98% - 99%)    Parameters below as of 2023 08:02  Patient On (Oxygen Delivery Method): room air     Physical exam:    Constitutional:  No acute distress  HEENT: NC/AT, EOMI, PERRLA, conjunctivae clear; ears and nose atraumatic  Neck: supple; thyroid not palpable  Back: no tenderness  Respiratory: respiratory effort normal; clear to auscultation  Cardiovascular: S1S2 regular, no murmurs  Abdomen: soft, not tender, not distended, positive B  Genitourinary: no suprapubic tenderness  Lymphatic: no LN palpable  Musculoskeletal: no muscle tenderness, no joint swelling or tenderness  Extremities: 2+ pedal edema with chronic lower legs skin changes  RLE erythema, edema, warmth and tenderness; open wound with serous discharge - improving slowly  small open wound with scant serous discharge is persistent  Neurological/ Psychiatric: AxOx3, judgement and insight normal; moving all extremities  Skin: no rashes; no palpable lesions    Labs: reviewed                        12.7   4.65  )-----------( 94       ( 2023 06:14 )             38.8                         11.7   4.49  )-----------( 109      ( 2023 05:36 )             36.0     01-    141  |  109<H>  |  33<H>  ----------------------------<  116<H>  3.6   |  25  |  1.00    Ca    9.0      2023 06:55                        11.3   5.70  )-----------( 125      ( 2023 05:47 )             35.0     01-    141  |  107  |  31<H>  ----------------------------<  106<H>  3.1<L>   |  25  |  1.08    Ca    8.7      2023 05:25  Mg     2.4         TPro  6.4  /  Alb  2.6<L>  /  TBili  0.8  /  DBili  x   /  AST  37  /  ALT  24  /  AlkPhos  67  01-01                LIVER FUNCTIONS - ( 2023 05:47 )  Alb: 2.6 g/dL / Pro: 6.4 gm/dL / ALK PHOS: 67 U/L / ALT: 24 U/L / AST: 37 U/L / GGT: x           Urinalysis Basic - ( 31 Dec 2022 19:45 )    Color: Yellow / Appearance: Clear / S.020 / pH: x  Gluc: x / Ketone: Negative  / Bili: Negative / Urobili: Negative   Blood: x / Protein: Negative / Nitrite: Negative   Leuk Esterase: Trace / RBC: Negative /HPF / WBC 3-5 /HPF   Sq Epi: x / Non Sq Epi: Few / Bacteria: Occasional      Culture - Urine (collected 31 Dec 2022 19:45)  Source: Clean Catch None  Final Report (2023 20:50):    <10,000 CFU/mL Normal Urogenital Jamarcus    Culture - Other (collected 31 Dec 2022 17:41)  Source: Wound None  Preliminary Report (2023 13:45):    Numerous Alcaligenes faecalis    Numerous Enterococcus faecalis Susceptibility to follow.    Normal jamarcus isolated  Organism: Alcaligenes faecalis (2023 10:28)  Organism: Alcaligenes faecalis (2023 10:28)      -  Amikacin: S <=16      -  Aztreonam: S 8      -  Cefepime: S 4      -  Ceftriaxone: S <=1      -  Ciprofloxacin: R >2      -  Gentamicin: S <=2      -  Levofloxacin: R >4      -  Meropenem: S <=1      -  Piperacillin/Tazobactam: S <=8      -  Tobramycin: S <=2      -  Trimethoprim/Sulfamethoxazole: S <=0.5/9.5      Method Type: SANNA    Culture - Blood (collected 31 Dec 2022 15:15)  Source: .Blood None  Preliminary Report (2023 02:03):    No growth to date.    Culture - Blood (collected 31 Dec 2022 15:04)  Source: .Blood None  Preliminary Report (2023 02:03):    No growth to date.        Radiology: all available radiological tests reviewed    Advanced directives addressed: full resuscitation
Date of service: 23 @ 10:32    Lying in bed in NAD  Has right lower serous discharge  Has low grade fever    ROS: denies dizziness, no HA, no SOB or cough, no abdominal pain, no diarrhea or constipation; no dysuria    MEDICATIONS  (STANDING):  dextrose 5%. 1000 milliLiter(s) (100 mL/Hr) IV Continuous <Continuous>  dextrose 5%. 1000 milliLiter(s) (50 mL/Hr) IV Continuous <Continuous>  dextrose 50% Injectable 25 Gram(s) IV Push once  dextrose 50% Injectable 12.5 Gram(s) IV Push once  dextrose 50% Injectable 25 Gram(s) IV Push once  glucagon  Injectable 1 milliGRAM(s) IntraMuscular once  heparin   Injectable 5000 Unit(s) SubCutaneous every 8 hours  insulin lispro (ADMELOG) corrective regimen sliding scale   SubCutaneous at bedtime  metoprolol succinate ER 25 milliGRAM(s) Oral daily  polyethylene glycol 3350 17 Gram(s) Oral daily  tigecycline IVPB 50 milliGRAM(s) IV Intermittent every 12 hours  tigecycline IVPB        Vital Signs Last 24 Hrs  T(C): 36.8 (2023 08:11), Max: 37.6 (2023 21:57)  T(F): 98.2 (2023 08:11), Max: 99.7 (2023 21:57)  HR: 83 (2023 08:11) (76 - 86)  BP: 140/60 (2023 08:11) (126/46 - 140/60)  BP(mean): --  RR: 16 (2023 08:11) (16 - 19)  SpO2: 98% (2023 08:11) (96% - 99%)    Parameters below as of 2023 08:11  Patient On (Oxygen Delivery Method): room air     Physical exam:    Constitutional:  No acute distress  HEENT: NC/AT, EOMI, PERRLA, conjunctivae clear; ears and nose atraumatic  Neck: supple; thyroid not palpable  Back: no tenderness  Respiratory: respiratory effort normal; clear to auscultation  Cardiovascular: S1S2 regular, no murmurs  Abdomen: soft, not tender, not distended, positive B  Genitourinary: no suprapubic tenderness  Lymphatic: no LN palpable  Musculoskeletal: no muscle tenderness, no joint swelling or tenderness  Extremities: 2+ pedal edema with chronic lower legs skin changes  RLE erythema, edema, warmth and tenderness; open wound with serous discharge - improving slowly  small open wound with scant serous discharge is persistent  Neurological/ Psychiatric: AxOx3, judgement and insight normal; moving all extremities  Skin: no rashes; no palpable lesions    Labs: reviewed                        11.7   4.49  )-----------( 109      ( 2023 05:36 )             36.0     01-    141  |  109<H>  |  33<H>  ----------------------------<  116<H>  3.6   |  25  |  1.00    Ca    9.0      2023 06:55                        11.3   5.70  )-----------( 125      ( 2023 05:47 )             35.0     01-02    141  |  107  |  31<H>  ----------------------------<  106<H>  3.1<L>   |  25  |  1.08    Ca    8.7      2023 05:25  Mg     2.4     12-    TPro  6.4  /  Alb  2.6<L>  /  TBili  0.8  /  DBili  x   /  AST  37  /  ALT  24  /  AlkPhos  67  01-                LIVER FUNCTIONS - ( 2023 05:47 )  Alb: 2.6 g/dL / Pro: 6.4 gm/dL / ALK PHOS: 67 U/L / ALT: 24 U/L / AST: 37 U/L / GGT: x           Urinalysis Basic - ( 31 Dec 2022 19:45 )    Color: Yellow / Appearance: Clear / S.020 / pH: x  Gluc: x / Ketone: Negative  / Bili: Negative / Urobili: Negative   Blood: x / Protein: Negative / Nitrite: Negative   Leuk Esterase: Trace / RBC: Negative /HPF / WBC 3-5 /HPF   Sq Epi: x / Non Sq Epi: Few / Bacteria: Occasional      Culture - Urine (collected 31 Dec 2022 19:45)  Source: Clean Catch None  Final Report (2023 20:50):    <10,000 CFU/mL Normal Urogenital Jamarcus    Culture - Other (collected 31 Dec 2022 17:41)  Source: Wound None  Preliminary Report (2023 13:45):    Numerous Alcaligenes faecalis    Numerous Enterococcus faecalis Susceptibility to follow.    Normal jamarcus isolated  Organism: Alcaligenes faecalis (2023 10:28)  Organism: Alcaligenes faecalis (2023 10:28)      -  Amikacin: S <=16      -  Aztreonam: S 8      -  Cefepime: S 4      -  Ceftriaxone: S <=1      -  Ciprofloxacin: R >2      -  Gentamicin: S <=2      -  Levofloxacin: R >4      -  Meropenem: S <=1      -  Piperacillin/Tazobactam: S <=8      -  Tobramycin: S <=2      -  Trimethoprim/Sulfamethoxazole: S <=0.5/9.5      Method Type: SANNA    Culture - Blood (collected 31 Dec 2022 15:15)  Source: .Blood None  Preliminary Report (2023 02:03):    No growth to date.    Culture - Blood (collected 31 Dec 2022 15:04)  Source: .Blood None  Preliminary Report (2023 02:03):    No growth to date.        Radiology: all available radiological tests reviewed    Advanced directives addressed: full resuscitation
Date of service: 23 @ 10:58    Lying in bed in NAD  Has lower legs tenderness  Has right lower leg redness and swelling    ROS: no fever or chills; denies dizziness, no HA, no SOB or cough, no abdominal pain, no diarrhea or constipation; no dysuria    MEDICATIONS  (STANDING):  dextrose 5%. 1000 milliLiter(s) (50 mL/Hr) IV Continuous <Continuous>  dextrose 5%. 1000 milliLiter(s) (100 mL/Hr) IV Continuous <Continuous>  dextrose 50% Injectable 25 Gram(s) IV Push once  dextrose 50% Injectable 12.5 Gram(s) IV Push once  dextrose 50% Injectable 25 Gram(s) IV Push once  glucagon  Injectable 1 milliGRAM(s) IntraMuscular once  heparin   Injectable 5000 Unit(s) SubCutaneous every 8 hours  insulin lispro (ADMELOG) corrective regimen sliding scale   SubCutaneous at bedtime  metoprolol succinate ER 25 milliGRAM(s) Oral daily  potassium chloride    Tablet ER 40 milliEquivalent(s) Oral every 4 hours  tigecycline IVPB      tigecycline IVPB 50 milliGRAM(s) IV Intermittent every 12 hours    Vital Signs Last 24 Hrs  T(C): 36.8 (2023 08:12), Max: 37.2 (2023 16:39)  T(F): 98.2 (2023 08:12), Max: 98.9 (2023 16:39)  HR: 83 (2023 08:12) (83 - 88)  BP: 106/77 (2023 08:12) (106/77 - 137/48)  BP(mean): --  RR: 18 (2023 08:12) (18 - 19)  SpO2: 99% (2023 08:12) (98% - 99%)    Parameters below as of 2023 08:12  Patient On (Oxygen Delivery Method): room air     Physical exam:    Constitutional:  No acute distress  HEENT: NC/AT, EOMI, PERRLA, conjunctivae clear; ears and nose atraumatic  Neck: supple; thyroid not palpable  Back: no tenderness  Respiratory: respiratory effort normal; clear to auscultation  Cardiovascular: S1S2 regular, no murmurs  Abdomen: soft, not tender, not distended, positive B  Genitourinary: no suprapubic tenderness  Lymphatic: no LN palpable  Musculoskeletal: no muscle tenderness, no joint swelling or tenderness  Extremities: 2+ pedal edema with chronic lower legs skin changes  RLE erythema, edema, warmth and tenderness; open wound with serous discharge - slightly improved  open wound  Neurological/ Psychiatric: AxOx3, judgement and insight normal; moving all extremities  Skin: no rashes; no palpable lesions    Labs: reviewed                        11.3   5.70  )-----------( 125      ( 2023 05:47 )             35.0         141  |  107  |  31<H>  ----------------------------<  106<H>  3.1<L>   |  25  |  1.08    Ca    8.7      2023 05:25  Mg     2.4         TPro  6.4  /  Alb  2.6<L>  /  TBili  0.8  /  DBili  x   /  AST  37  /  ALT  24  /  AlkPhos  67                  LIVER FUNCTIONS - ( 2023 05:47 )  Alb: 2.6 g/dL / Pro: 6.4 gm/dL / ALK PHOS: 67 U/L / ALT: 24 U/L / AST: 37 U/L / GGT: x           Urinalysis Basic - ( 31 Dec 2022 19:45 )    Color: Yellow / Appearance: Clear / S.020 / pH: x  Gluc: x / Ketone: Negative  / Bili: Negative / Urobili: Negative   Blood: x / Protein: Negative / Nitrite: Negative   Leuk Esterase: Trace / RBC: Negative /HPF / WBC 3-5 /HPF   Sq Epi: x / Non Sq Epi: Few / Bacteria: Occasional    Radiology: all available radiological tests reviewed    Advanced directives addressed: full resuscitation
Pt seen and examined at bedside, no acute events. Pt had no complaints. Denied fever, chills, nausea, vomiting or SOB overnight.     Vital Signs Last 24 Hrs  T(C): 36.7 (01 Jan 2023 07:49), Max: 37.1 (31 Dec 2022 12:23)  T(F): 98 (01 Jan 2023 07:49), Max: 98.8 (31 Dec 2022 12:23)  HR: 81 (01 Jan 2023 07:49) (81 - 94)  BP: 107/45 (01 Jan 2023 07:49) (107/45 - 132/50)  BP(mean): 64 (31 Dec 2022 19:10) (64 - 64)  RR: 18 (01 Jan 2023 07:49) (18 - 20)  SpO2: 99% (01 Jan 2023 07:49) (99% - 100%)    Parameters below as of 31 Dec 2022 22:53  Patient On (Oxygen Delivery Method): room air                            11.3   5.70  )-----------( 125      ( 01 Jan 2023 05:47 )             35.0     01-01    138  |  105  |  36<H>  ----------------------------<  75  3.9   |  25  |  1.37<H>    Ca    9.1      01 Jan 2023 05:47  Mg     2.4     12-31    TPro  6.4  /  Alb  2.6<L>  /  TBili  0.8  /  DBili  x   /  AST  37  /  ALT  24  /  AlkPhos  67  01-01    I&O's Summary        Physical Exam:  Pt is AAOx3  General: Well developed, in no acute distress.   Chest: Lungs clear, no rales, no rhonchi, no wheezes.   Heart: RR, no murmurs, no rubs, no gallops.   Abdomen: Soft, no tenderness, nondistended, no masses, BS normal.    Back: Normal curvature, no tenderness.   Neuro: Physiological, no localizing findings.   Skin: Normal, no rashes, no lesions noted.   Extremities: Warm, well perfused, bilateral LE severe lymphedema, cellulitis, warmth, mild tenderness present on RLE with weeping of serous fluid. no discrete ulcerations noted
time spent 35min plus  reviewing chart, labs, consultant    72y old  Female who presents with a chief complaint of leg wounds    HPI:  71 yo female w/PMHx of HTN, DM2, Lung Ca/Colon Ca (9 years ago), chronic lymphedema presents to  for worsening RLE redness and weeping. Patient states she has home visiting nurse who came in today and noticed that wound has been worsening since 2 days ago. Patient reports of increased weeping from the wound, reports of generalized malaise for few days.  States she was started on Levaquin PO 750mg x14 days and has been taking it for 4 days. Denies Hx of MRSA, trauma. Walks with aid of walker.      No change in her status, legs with cellulitis, rough skin, some oozing    Vital Signs Last 24 Hrs  T(C): 36.6 (2023 07:29), Max: 36.9 (2023 15:17)  T(F): 97.8 (2023 07:29), Max: 98.4 (2023 15:17)  HR: 82 (2023 07:29) (72 - 82)  BP: 138/42 (2023 07:29) (123/47 - 138/42)  BP(mean): --  RR: 18 (2023 07:29) (18 - 19)  SpO2: 100% (2023 07:29) (100% - 100%)    Parameters below as of 2023 07:29  Patient On (Oxygen Delivery Method): room air        PHYSICAL EXAM:  GENERAL: Comfortable, no acute distress  HEAD:  Atraumatic, Normocephalic  EYES: EOMI, PERRLA  HEENT: Moist mucous membranes  NECK: Supple, No JVD  NERVOUS SYSTEM:  Alert & Oriented X3, Motor Strength 5/5 B/L upper and lower extremities  CHEST/LUNG: Clear to auscultation bilaterally  HEART: Regular rate and rhythm; No murmurs, rubs, or gallops  ABDOMEN: Soft, Nontender, Nondistended; Bowel sounds present  GENITOURINARY- Voiding, no palpable bladder  EXTREMITIES: severe lymphedema bilateral legs.   Weeping of multiple wounds b/l lower legs.  Left ankle.  Right shin.    MUSCULOSKELTAL- No muscle tenderness, No joint tenderness  SKIN as above    Culture Results:   Numerous Alcaligenes faecalis (12.31.22 @ 17:41)         141  |  107  |  31<H>  ----------------------------<  106<H>  3.1<L>   |  25  |  1.08    Ca    8.7      2023 05:25    TPro  6.4  /  Alb  2.6<L>  /  TBili  0.8  /  DBili  x   /  AST  37  /  ALT  24  /  AlkPhos  67                          11.3   5.70  )-----------( 125      ( 2023 05:47 )             35.0       LIVER FUNCTIONS - ( 2023 05:47 )  Alb: 2.6 g/dL / Pro: 6.4 gm/dL / ALK PHOS: 67 U/L / ALT: 24 U/L / AST: 37 U/L / GGT: x           Urinalysis Basic - ( 31 Dec 2022 19:45 )  Color: Yellow / Appearance: Clear / S.020 / pH: x  Gluc: x / Ketone: Negative  / Bili: Negative / Urobili: Negative   Blood: x / Protein: Negative / Nitrite: Negative   Leuk Esterase: Trace / RBC: Negative /HPF / WBC 3-5 /HPF   Sq Epi: x / Non Sq Epi: Few / Bacteria: Occasional    MEDICATIONS  (STANDING):  dextrose 5%. 1000 milliLiter(s) (50 mL/Hr) IV Continuous <Continuous>  dextrose 5%. 1000 milliLiter(s) (100 mL/Hr) IV Continuous <Continuous>  dextrose 50% Injectable 25 Gram(s) IV Push once  dextrose 50% Injectable 12.5 Gram(s) IV Push once  dextrose 50% Injectable 25 Gram(s) IV Push once  glucagon  Injectable 1 milliGRAM(s) IntraMuscular once  heparin   Injectable 5000 Unit(s) SubCutaneous every 8 hours  insulin lispro (ADMELOG) corrective regimen sliding scale   SubCutaneous at bedtime  metoprolol succinate ER 25 milliGRAM(s) Oral daily  polyethylene glycol 3350 17 Gram(s) Oral daily  tigecycline IVPB      tigecycline IVPB 50 milliGRAM(s) IV Intermittent every 12 hours                
time spent 35min plus  reviewing chart, labs, consultant    72y old  Female who presents with a chief complaint of leg wounds    HPI:  71 yo female w/PMHx of HTN, DM2, Lung Ca/Colon Ca (9 years ago), chronic lymphedema presents to  for worsening RLE redness and weeping. Patient states she has home visiting nurse who came in today and noticed that wound has been worsening since 2 days ago. Patient reports of increased weeping from the wound, reports of generalized malaise for few days.  States she was started on Levaquin PO 750mg x14 days and has been taking it for 4 days. Denies Hx of MRSA, trauma. Walks with aid of walker.      No change in her status, legs with cellulitis, rough skin, some oozing  Platelets improving slowly       Vital Signs Last 24 Hrs  T(C): 36.5 (2023 08:28), Max: 37.1 (2023 21:32)  T(F): 97.7 (2023 08:28), Max: 98.8 (2023 21:32)  HR: 82 (2023 08:28) (77 - 82)  BP: 129/47 (2023 08:28) (118/65 - 129/47)  BP(mean): --  RR: 16 (2023 08:28) (16 - 17)  SpO2: 99% (2023 08:28) (99% - 99%)    Parameters below as of 2023 08:28  Patient On (Oxygen Delivery Method): room air          PHYSICAL EXAM:  GENERAL: Comfortable, no acute distress  HEAD:  Atraumatic, Normocephalic  EYES: EOMI, PERRLA  HEENT: Moist mucous membranes  NECK: Supple, No JVD  NERVOUS SYSTEM:  Alert & Oriented X3, Motor Strength 5/5 B/L upper and lower extremities  CHEST/LUNG: Clear to auscultation bilaterally  HEART: Regular rate and rhythm; No murmurs, rubs, or gallops  ABDOMEN: Soft, Nontender, Nondistended; Bowel sounds present  GENITOURINARY- Voiding, no palpable bladder  EXTREMITIES: severe lymphedema bilateral legs.   Weeping of multiple wounds b/l lower legs.  Left ankle.  Right shin.    MUSCULOSKELTAL- No muscle tenderness, No joint tenderness  SKIN as above    Culture Results:   Numerous Alcaligenes faecalis (12.31.22 @ 17:41)                                      13.3   6.88  )-----------( 100      ( 2023 06:03 )             39.9       LIVER FUNCTIONS - ( 2023 05:47 )  Alb: 2.6 g/dL / Pro: 6.4 gm/dL / ALK PHOS: 67 U/L / ALT: 24 U/L / AST: 37 U/L / GGT: x           Urinalysis Basic - ( 31 Dec 2022 19:45 )  Color: Yellow / Appearance: Clear / S.020 / pH: x  Gluc: x / Ketone: Negative  / Bili: Negative / Urobili: Negative   Blood: x / Protein: Negative / Nitrite: Negative   Leuk Esterase: Trace / RBC: Negative /HPF / WBC 3-5 /HPF   Sq Epi: x / Non Sq Epi: Few / Bacteria: Occasional    MEDICATIONS  (STANDING):  dextrose 5%. 1000 milliLiter(s) (50 mL/Hr) IV Continuous <Continuous>  dextrose 5%. 1000 milliLiter(s) (100 mL/Hr) IV Continuous <Continuous>  dextrose 50% Injectable 25 Gram(s) IV Push once  dextrose 50% Injectable 12.5 Gram(s) IV Push once  dextrose 50% Injectable 25 Gram(s) IV Push once  glucagon  Injectable 1 milliGRAM(s) IntraMuscular once  heparin   Injectable 5000 Unit(s) SubCutaneous every 8 hours  insulin lispro (ADMELOG) corrective regimen sliding scale   SubCutaneous at bedtime  metoprolol succinate ER 25 milliGRAM(s) Oral daily  polyethylene glycol 3350 17 Gram(s) Oral daily  tigecycline IVPB      tigecycline IVPB 50 milliGRAM(s) IV Intermittent every 12 hours                
time spent 35min plus  reviewing chart, labs, consultant    72y old  Female who presents with a chief complaint of leg wounds    HPI:  73 yo female w/PMHx of HTN, DM2, Lung Ca/Colon Ca (9 years ago), chronic lymphedema presents to  for worsening RLE redness and weeping. Patient states she has home visiting nurse who came in today and noticed that wound has been worsening since 2 days ago. Patient reports of increased weeping from the wound, reports of generalized malaise for few days.  States she was started on Levaquin PO 750mg x14 days and has been taking it for 4 days. Denies Hx of MRSA, trauma. Walks with aid of walker.      No change in her status, legs with cellulitis, rough skin, some oozing  Platelets dropped      Vital Signs Last 24 Hrs  T(C): 36.7 (2023 08:02), Max: 36.9 (2023 15:33)  T(F): 98 (2023 08:02), Max: 98.4 (2023 15:33)  HR: 73 (2023 08:57) (71 - 74)  BP: 133/54 (2023 08:57) (127/51 - 135/43)  BP(mean): --  RR: 17 (2023 08:57) (16 - 17)  SpO2: 98% (2023 08:57) (98% - 99%)    Parameters below as of 2023 08:02  Patient On (Oxygen Delivery Method): room air          PHYSICAL EXAM:  GENERAL: Comfortable, no acute distress  HEAD:  Atraumatic, Normocephalic  EYES: EOMI, PERRLA  HEENT: Moist mucous membranes  NECK: Supple, No JVD  NERVOUS SYSTEM:  Alert & Oriented X3, Motor Strength 5/5 B/L upper and lower extremities  CHEST/LUNG: Clear to auscultation bilaterally  HEART: Regular rate and rhythm; No murmurs, rubs, or gallops  ABDOMEN: Soft, Nontender, Nondistended; Bowel sounds present  GENITOURINARY- Voiding, no palpable bladder  EXTREMITIES: severe lymphedema bilateral legs.   Weeping of multiple wounds b/l lower legs.  Left ankle.  Right shin.    MUSCULOSKELTAL- No muscle tenderness, No joint tenderness  SKIN as above    Culture Results:   Numerous Alcaligenes faecalis (12.31.22 @ 17:41)                           12.7   4.65  )-----------( 94       ( 2023 06:14 )             38.8                35.0       LIVER FUNCTIONS - ( 2023 05:47 )  Alb: 2.6 g/dL / Pro: 6.4 gm/dL / ALK PHOS: 67 U/L / ALT: 24 U/L / AST: 37 U/L / GGT: x           Urinalysis Basic - ( 31 Dec 2022 19:45 )  Color: Yellow / Appearance: Clear / S.020 / pH: x  Gluc: x / Ketone: Negative  / Bili: Negative / Urobili: Negative   Blood: x / Protein: Negative / Nitrite: Negative   Leuk Esterase: Trace / RBC: Negative /HPF / WBC 3-5 /HPF   Sq Epi: x / Non Sq Epi: Few / Bacteria: Occasional    MEDICATIONS  (STANDING):  dextrose 5%. 1000 milliLiter(s) (50 mL/Hr) IV Continuous <Continuous>  dextrose 5%. 1000 milliLiter(s) (100 mL/Hr) IV Continuous <Continuous>  dextrose 50% Injectable 25 Gram(s) IV Push once  dextrose 50% Injectable 12.5 Gram(s) IV Push once  dextrose 50% Injectable 25 Gram(s) IV Push once  glucagon  Injectable 1 milliGRAM(s) IntraMuscular once  heparin   Injectable 5000 Unit(s) SubCutaneous every 8 hours  insulin lispro (ADMELOG) corrective regimen sliding scale   SubCutaneous at bedtime  metoprolol succinate ER 25 milliGRAM(s) Oral daily  polyethylene glycol 3350 17 Gram(s) Oral daily  tigecycline IVPB      tigecycline IVPB 50 milliGRAM(s) IV Intermittent every 12 hours

## 2023-01-08 ENCOUNTER — TRANSCRIPTION ENCOUNTER (OUTPATIENT)
Age: 73
End: 2023-01-08

## 2023-01-08 VITALS
DIASTOLIC BLOOD PRESSURE: 47 MMHG | OXYGEN SATURATION: 99 % | SYSTOLIC BLOOD PRESSURE: 124 MMHG | RESPIRATION RATE: 18 BRPM | HEART RATE: 71 BPM | TEMPERATURE: 98 F

## 2023-01-08 LAB
GLUCOSE BLDC GLUCOMTR-MCNC: 102 MG/DL — HIGH (ref 70–99)
GLUCOSE BLDC GLUCOMTR-MCNC: 166 MG/DL — HIGH (ref 70–99)
HCT VFR BLD CALC: 38 % — SIGNIFICANT CHANGE UP (ref 34.5–45)
HGB BLD-MCNC: 12.3 G/DL — SIGNIFICANT CHANGE UP (ref 11.5–15.5)
MCHC RBC-ENTMCNC: 28 PG — SIGNIFICANT CHANGE UP (ref 27–34)
MCHC RBC-ENTMCNC: 32.4 GM/DL — SIGNIFICANT CHANGE UP (ref 32–36)
MCV RBC AUTO: 86.4 FL — SIGNIFICANT CHANGE UP (ref 80–100)
PLATELET # BLD AUTO: 82 K/UL — LOW (ref 150–400)
RBC # BLD: 4.4 M/UL — SIGNIFICANT CHANGE UP (ref 3.8–5.2)
RBC # FLD: 14.3 % — SIGNIFICANT CHANGE UP (ref 10.3–14.5)
WBC # BLD: 4.68 K/UL — SIGNIFICANT CHANGE UP (ref 3.8–10.5)
WBC # FLD AUTO: 4.68 K/UL — SIGNIFICANT CHANGE UP (ref 3.8–10.5)

## 2023-01-08 PROCEDURE — 99239 HOSP IP/OBS DSCHRG MGMT >30: CPT

## 2023-01-08 RX ORDER — TRIAMTERENE/HYDROCHLOROTHIAZID 75 MG-50MG
1 TABLET ORAL
Qty: 0 | Refills: 0 | DISCHARGE

## 2023-01-08 RX ORDER — METFORMIN HYDROCHLORIDE 850 MG/1
2 TABLET ORAL
Qty: 0 | Refills: 0 | DISCHARGE

## 2023-01-08 RX ADMIN — Medication 25 MILLIGRAM(S): at 09:24

## 2023-01-08 RX ADMIN — TIGECYCLINE 105 MILLIGRAM(S): 50 INJECTION, POWDER, LYOPHILIZED, FOR SOLUTION INTRAVENOUS at 09:24

## 2023-01-08 NOTE — DISCHARGE NOTE NURSING/CASE MANAGEMENT/SOCIAL WORK - PATIENT PORTAL LINK FT
You can access the FollowMyHealth Patient Portal offered by [facility] by registering at the following website: [email or web address]. By joining [facility]’s FollowMyHealth portal, you will also be able to view your health information using other applications (apps) compatible with our system.

## 2023-01-08 NOTE — DISCHARGE NOTE PROVIDER - NSDCMRMEDTOKEN_GEN_ALL_CORE_FT
doxycycline hyclate 100 mg oral tablet: 1 tab(s) orally 2 times a day   metoprolol succinate 25 mg oral tablet, extended release: 1 tab(s) orally once a day

## 2023-01-08 NOTE — DISCHARGE NOTE PROVIDER - CARE PROVIDER_API CALL
Elgin Dean)  Family Medicine  [address], [address] [address]  Phone: [phone]  Fax: [phone]  Follow Up Time: 1-3 days

## 2023-01-08 NOTE — DISCHARGE NOTE PROVIDER - NSDCCPCAREPLAN_GEN_ALL_CORE_FT
PRINCIPAL DISCHARGE DIAGNOSIS  Diagnosis: Cellulitis of right anterior lower leg  Assessment and Plan of Treatment: take Doxy; platelet count will be f/up at [facility] or by Dr Dean- aware. Stop diabetic pills and triamterene. Dr Dean wants you to bring discharge papers when you see him- very important      SECONDARY DISCHARGE DIAGNOSES  Diagnosis: Sepsis, unspecified organism  Assessment and Plan of Treatment:     Diagnosis: Lymphedema of leg  Assessment and Plan of Treatment:

## 2023-01-08 NOTE — DISCHARGE NOTE PROVIDER - HOSPITAL COURSE
73 yo female w/PMHx of HTN, DM2, Lung Ca/Colon Ca (9 years ago), chronic lymphedema presents to  for worsening RLE redness and weeping. Patient states she has home visiting nurse who came in today and noticed that wound has been worsening since 2 days ago. Patient reports of increased weeping from the wound, reports of generalized malaise for few days.  States she was started on Levaquin PO 750mg x14 days and has been taking it for 4 days. Denies Hx of MRSA, trauma. Walks with aid of walker.      No change in her status, legs with cellulitis, rough skin, some oozing  Platelets at 82 today    Vital Signs Last 24 Hrs  T(C): 36.7 (08 Jan 2023 08:41), Max: 36.9 (07 Jan 2023 15:19)  T(F): 98 (08 Jan 2023 08:41), Max: 98.5 (07 Jan 2023 15:19)  HR: 67 (08 Jan 2023 08:41) (67 - 75)  BP: 129/61 (08 Jan 2023 08:41) (127/59 - 129/61)  BP(mean): --  RR: 18 (08 Jan 2023 08:41) (18 - 19)  SpO2: 99% (08 Jan 2023 08:41) (98% - 99%)    Parameters below as of 08 Jan 2023 08:41  Patient On (Oxygen Delivery Method): room air      Cellulitis/ bilateral LE wounds with hx of chronic severe lymphedema/ venous stasis:    Cont IV ABX.    Appreciate ID input.  Tigacycline.    Wound cx with Alcaligenes faecalis--       # Thrombocytopenia  neg HIT suspect Tigacyl causing it    #DM:    BGMs low -- 70's.    Stop insulin.    Was on orals at home.    Likely d/c sulfonylurea @ d/c.        prepare for dc 1/8  Left message for brother [phone]    PCP Dr Dean aware; pt aware she needs f/up blood work for platelets

## 2023-01-10 ENCOUNTER — NON-APPOINTMENT (OUTPATIENT)
Age: 73
End: 2023-01-10

## 2023-01-10 ENCOUNTER — OUTPATIENT (OUTPATIENT)
Dept: OUTPATIENT SERVICES | Facility: HOSPITAL | Age: 73
LOS: 1 days | End: 2023-01-10
Payer: MEDICARE

## 2023-01-10 DIAGNOSIS — E11.69 TYPE 2 DIABETES MELLITUS WITH OTHER SPECIFIED COMPLICATION: ICD-10-CM

## 2023-01-10 DIAGNOSIS — I89.0 LYMPHEDEMA, NOT ELSEWHERE CLASSIFIED: ICD-10-CM

## 2023-01-10 DIAGNOSIS — L97.509 NON-PRESSURE CHRONIC ULCER OF OTHER PART OF UNSPECIFIED FOOT WITH UNSPECIFIED SEVERITY: ICD-10-CM

## 2023-01-10 DIAGNOSIS — I73.9 PERIPHERAL VASCULAR DISEASE, UNSPECIFIED: ICD-10-CM

## 2023-01-10 PROCEDURE — 29580 STRAPPING UNNA BOOT: CPT

## 2023-01-10 PROCEDURE — 29580 STRAPPING UNNA BOOT: CPT | Mod: 50

## 2023-01-10 PROCEDURE — 99203 OFFICE O/P NEW LOW 30 MIN: CPT

## 2023-01-11 ENCOUNTER — APPOINTMENT (OUTPATIENT)
Dept: FAMILY MEDICINE | Facility: CLINIC | Age: 73
End: 2023-01-11
Payer: MEDICARE

## 2023-01-11 VITALS
HEIGHT: 68 IN | HEART RATE: 84 BPM | WEIGHT: 293 LBS | SYSTOLIC BLOOD PRESSURE: 120 MMHG | BODY MASS INDEX: 44.41 KG/M2 | DIASTOLIC BLOOD PRESSURE: 60 MMHG | OXYGEN SATURATION: 96 % | TEMPERATURE: 96.6 F

## 2023-01-11 PROCEDURE — 36415 COLL VENOUS BLD VENIPUNCTURE: CPT

## 2023-01-11 PROCEDURE — 99496 TRANSJ CARE MGMT HIGH F2F 7D: CPT | Mod: 25

## 2023-01-11 RX ORDER — TRIAMTERENE AND HYDROCHLOROTHIAZIDE 37.5; 25 MG/1; MG/1
37.5-25 CAPSULE ORAL
Qty: 90 | Refills: 0 | Status: DISCONTINUED | COMMUNITY
Start: 2021-11-08 | End: 2023-01-11

## 2023-01-11 RX ORDER — LEVOFLOXACIN 750 MG/1
750 TABLET, FILM COATED ORAL DAILY
Qty: 14 | Refills: 0 | Status: DISCONTINUED | COMMUNITY
Start: 2022-12-20 | End: 2023-01-11

## 2023-01-11 RX ORDER — GLIPIZIDE 5 MG/1
5 TABLET, FILM COATED, EXTENDED RELEASE ORAL
Qty: 90 | Refills: 0 | Status: DISCONTINUED | COMMUNITY
Start: 2021-11-08 | End: 2023-01-11

## 2023-01-11 NOTE — PHYSICAL EXAM
[Normal] : soft, non-tender, non-distended, no masses palpated, no HSM and normal bowel sounds [de-identified] : Legs wrapped at wound center yesterday

## 2023-01-11 NOTE — PLAN
[FreeTextEntry1] : Reviewed records, hospital labs. Last plt 82\par continue antibiotics\par Await lab results from today

## 2023-01-11 NOTE — HISTORY OF PRESENT ILLNESS
[Post-hospitalization from ___ Hospital] : Post-hospitalization from [unfilled] Hospital [Admitted on: ___] : The patient was admitted on [unfilled] [Discharged on ___] : discharged on [unfilled] [Discharge Summary] : discharge summary [Pertinent Labs] : pertinent labs [Discharge Med List] : discharge medication list [Med Reconciliation] : medication reconciliation has been completed [FreeTextEntry2] : Pt is here for a follow up for cellulitis of right lower leg.\par Treated with IV antibiotics, then discharge home on Doxycycline for 7 daus\par Pt was on heparin. Mild thrombocytopenia\par They held triampterine, glipizide, and metformin\par Glucoses in the hospital were fine.

## 2023-01-12 DIAGNOSIS — I12.9 HYPERTENSIVE CHRONIC KIDNEY DISEASE WITH STAGE 1 THROUGH STAGE 4 CHRONIC KIDNEY DISEASE, OR UNSPECIFIED CHRONIC KIDNEY DISEASE: ICD-10-CM

## 2023-01-12 DIAGNOSIS — Z20.822 CONTACT WITH AND (SUSPECTED) EXPOSURE TO COVID-19: ICD-10-CM

## 2023-01-12 DIAGNOSIS — I89.0 LYMPHEDEMA, NOT ELSEWHERE CLASSIFIED: ICD-10-CM

## 2023-01-12 DIAGNOSIS — Z85.118 PERSONAL HISTORY OF OTHER MALIGNANT NEOPLASM OF BRONCHUS AND LUNG: ICD-10-CM

## 2023-01-12 DIAGNOSIS — Z79.84 LONG TERM (CURRENT) USE OF ORAL HYPOGLYCEMIC DRUGS: ICD-10-CM

## 2023-01-12 DIAGNOSIS — R53.81 OTHER MALAISE: ICD-10-CM

## 2023-01-12 DIAGNOSIS — R74.01 ELEVATION OF LEVELS OF LIVER TRANSAMINASE LEVELS: ICD-10-CM

## 2023-01-12 DIAGNOSIS — I87.2 VENOUS INSUFFICIENCY (CHRONIC) (PERIPHERAL): ICD-10-CM

## 2023-01-12 DIAGNOSIS — E87.20 ACIDOSIS, UNSPECIFIED: ICD-10-CM

## 2023-01-12 DIAGNOSIS — Z90.710 ACQUIRED ABSENCE OF BOTH CERVIX AND UTERUS: ICD-10-CM

## 2023-01-12 DIAGNOSIS — Z85.038 PERSONAL HISTORY OF OTHER MALIGNANT NEOPLASM OF LARGE INTESTINE: ICD-10-CM

## 2023-01-12 DIAGNOSIS — Z90.2 ACQUIRED ABSENCE OF LUNG [PART OF]: ICD-10-CM

## 2023-01-12 DIAGNOSIS — L03.115 CELLULITIS OF RIGHT LOWER LIMB: ICD-10-CM

## 2023-01-12 DIAGNOSIS — D69.6 THROMBOCYTOPENIA, UNSPECIFIED: ICD-10-CM

## 2023-01-12 DIAGNOSIS — Z90.49 ACQUIRED ABSENCE OF OTHER SPECIFIED PARTS OF DIGESTIVE TRACT: ICD-10-CM

## 2023-01-12 DIAGNOSIS — B96.89 OTHER SPECIFIED BACTERIAL AGENTS AS THE CAUSE OF DISEASES CLASSIFIED ELSEWHERE: ICD-10-CM

## 2023-01-12 DIAGNOSIS — L03.116 CELLULITIS OF LEFT LOWER LIMB: ICD-10-CM

## 2023-01-12 DIAGNOSIS — Z79.899 OTHER LONG TERM (CURRENT) DRUG THERAPY: ICD-10-CM

## 2023-01-12 DIAGNOSIS — N17.9 ACUTE KIDNEY FAILURE, UNSPECIFIED: ICD-10-CM

## 2023-01-12 DIAGNOSIS — N18.30 CHRONIC KIDNEY DISEASE, STAGE 3 UNSPECIFIED: ICD-10-CM

## 2023-01-12 DIAGNOSIS — Z88.1 ALLERGY STATUS TO OTHER ANTIBIOTIC AGENTS STATUS: ICD-10-CM

## 2023-01-12 DIAGNOSIS — I87.8 OTHER SPECIFIED DISORDERS OF VEINS: ICD-10-CM

## 2023-01-12 DIAGNOSIS — E87.6 HYPOKALEMIA: ICD-10-CM

## 2023-01-12 DIAGNOSIS — E11.22 TYPE 2 DIABETES MELLITUS WITH DIABETIC CHRONIC KIDNEY DISEASE: ICD-10-CM

## 2023-01-12 LAB
ALBUMIN SERPL ELPH-MCNC: 3.4 G/DL
ALP BLD-CCNC: 104 U/L
ALT SERPL-CCNC: 25 U/L
ANION GAP SERPL CALC-SCNC: 16 MMOL/L
AST SERPL-CCNC: 55 U/L
BASOPHILS # BLD AUTO: 0.08 K/UL
BASOPHILS NFR BLD AUTO: 1.4 %
BILIRUB SERPL-MCNC: 1.1 MG/DL
BUN SERPL-MCNC: 21 MG/DL
CALCIUM SERPL-MCNC: 9.5 MG/DL
CHLORIDE SERPL-SCNC: 104 MMOL/L
CO2 SERPL-SCNC: 19 MMOL/L
CREAT SERPL-MCNC: 1 MG/DL
EGFR: 60 ML/MIN/1.73M2
EOSINOPHIL # BLD AUTO: 0.16 K/UL
EOSINOPHIL NFR BLD AUTO: 2.8 %
ESTIMATED AVERAGE GLUCOSE: 123 MG/DL
GLUCOSE SERPL-MCNC: 147 MG/DL
HBA1C MFR BLD HPLC: 5.9 %
HCT VFR BLD CALC: 41.3 %
HGB BLD-MCNC: 13.3 G/DL
IMM GRANULOCYTES NFR BLD AUTO: 0.2 %
LYMPHOCYTES # BLD AUTO: 1.42 K/UL
LYMPHOCYTES NFR BLD AUTO: 25 %
MAN DIFF?: NORMAL
MCHC RBC-ENTMCNC: 28.4 PG
MCHC RBC-ENTMCNC: 32.2 GM/DL
MCV RBC AUTO: 88.1 FL
MONOCYTES # BLD AUTO: 0.5 K/UL
MONOCYTES NFR BLD AUTO: 8.8 %
NEUTROPHILS # BLD AUTO: 3.52 K/UL
NEUTROPHILS NFR BLD AUTO: 61.8 %
PLATELET # BLD AUTO: 106 K/UL
POTASSIUM SERPL-SCNC: 4.2 MMOL/L
PROT SERPL-MCNC: 6.3 G/DL
RBC # BLD: 4.69 M/UL
RBC # FLD: 14.8 %
SODIUM SERPL-SCNC: 139 MMOL/L
WBC # FLD AUTO: 5.69 K/UL

## 2023-01-13 NOTE — ED PROVIDER NOTE - CHPI ED SYMPTOMS POS
Alyce Hernandez (proxy for David, Jorge WALTER)  [facility] Nurse Msg Pool (supporting Gena Perez MD) 30 minutes ago (9:15 AM)     This message is being sent by Alyce Hernandez on behalf of Jorge Hernandez.     Leon,  Thank you for the information and talking with Dr. Perez.    On a different note, can I get a refill for my son Abad's albuertol inhaler? I can not find it at our house and like to have it on hand just in case. Thank you      DRAINAGE

## 2023-01-17 ENCOUNTER — OUTPATIENT (OUTPATIENT)
Dept: OUTPATIENT SERVICES | Facility: HOSPITAL | Age: 73
LOS: 1 days | End: 2023-01-17
Payer: MEDICARE

## 2023-01-17 DIAGNOSIS — I89.0 LYMPHEDEMA, NOT ELSEWHERE CLASSIFIED: ICD-10-CM

## 2023-01-17 DIAGNOSIS — E11.69 TYPE 2 DIABETES MELLITUS WITH OTHER SPECIFIED COMPLICATION: ICD-10-CM

## 2023-01-17 DIAGNOSIS — L97.509 NON-PRESSURE CHRONIC ULCER OF OTHER PART OF UNSPECIFIED FOOT WITH UNSPECIFIED SEVERITY: ICD-10-CM

## 2023-01-17 DIAGNOSIS — I73.9 PERIPHERAL VASCULAR DISEASE, UNSPECIFIED: ICD-10-CM

## 2023-01-17 PROCEDURE — 29580 STRAPPING UNNA BOOT: CPT

## 2023-01-17 PROCEDURE — 29580 STRAPPING UNNA BOOT: CPT | Mod: 50

## 2023-01-24 ENCOUNTER — OUTPATIENT (OUTPATIENT)
Dept: OUTPATIENT SERVICES | Facility: HOSPITAL | Age: 73
LOS: 1 days | End: 2023-01-24
Payer: MEDICARE

## 2023-01-24 DIAGNOSIS — L97.509 NON-PRESSURE CHRONIC ULCER OF OTHER PART OF UNSPECIFIED FOOT WITH UNSPECIFIED SEVERITY: ICD-10-CM

## 2023-01-24 DIAGNOSIS — I89.0 LYMPHEDEMA, NOT ELSEWHERE CLASSIFIED: ICD-10-CM

## 2023-01-24 DIAGNOSIS — I73.9 PERIPHERAL VASCULAR DISEASE, UNSPECIFIED: ICD-10-CM

## 2023-01-24 DIAGNOSIS — E11.69 TYPE 2 DIABETES MELLITUS WITH OTHER SPECIFIED COMPLICATION: ICD-10-CM

## 2023-01-24 PROCEDURE — 29580 STRAPPING UNNA BOOT: CPT

## 2023-01-24 PROCEDURE — 29580 STRAPPING UNNA BOOT: CPT | Mod: 50

## 2023-01-31 ENCOUNTER — OUTPATIENT (OUTPATIENT)
Dept: OUTPATIENT SERVICES | Facility: HOSPITAL | Age: 73
LOS: 1 days | End: 2023-01-31
Payer: MEDICARE

## 2023-01-31 DIAGNOSIS — I89.0 LYMPHEDEMA, NOT ELSEWHERE CLASSIFIED: ICD-10-CM

## 2023-01-31 DIAGNOSIS — E11.69 TYPE 2 DIABETES MELLITUS WITH OTHER SPECIFIED COMPLICATION: ICD-10-CM

## 2023-01-31 DIAGNOSIS — I73.9 PERIPHERAL VASCULAR DISEASE, UNSPECIFIED: ICD-10-CM

## 2023-01-31 DIAGNOSIS — L97.509 NON-PRESSURE CHRONIC ULCER OF OTHER PART OF UNSPECIFIED FOOT WITH UNSPECIFIED SEVERITY: ICD-10-CM

## 2023-01-31 PROCEDURE — 29580 STRAPPING UNNA BOOT: CPT | Mod: 50

## 2023-01-31 PROCEDURE — 99212 OFFICE O/P EST SF 10 MIN: CPT

## 2023-02-07 ENCOUNTER — OUTPATIENT (OUTPATIENT)
Dept: OUTPATIENT SERVICES | Facility: HOSPITAL | Age: 73
LOS: 1 days | End: 2023-02-07
Payer: MEDICARE

## 2023-02-07 DIAGNOSIS — I73.9 PERIPHERAL VASCULAR DISEASE, UNSPECIFIED: ICD-10-CM

## 2023-02-07 DIAGNOSIS — L97.509 NON-PRESSURE CHRONIC ULCER OF OTHER PART OF UNSPECIFIED FOOT WITH UNSPECIFIED SEVERITY: ICD-10-CM

## 2023-02-07 DIAGNOSIS — E11.69 TYPE 2 DIABETES MELLITUS WITH OTHER SPECIFIED COMPLICATION: ICD-10-CM

## 2023-02-07 DIAGNOSIS — I89.0 LYMPHEDEMA, NOT ELSEWHERE CLASSIFIED: ICD-10-CM

## 2023-02-07 PROCEDURE — 29580 STRAPPING UNNA BOOT: CPT | Mod: 50

## 2023-02-07 PROCEDURE — 99212 OFFICE O/P EST SF 10 MIN: CPT

## 2023-02-07 PROCEDURE — 99214 OFFICE O/P EST MOD 30 MIN: CPT

## 2023-02-08 ENCOUNTER — APPOINTMENT (OUTPATIENT)
Dept: FAMILY MEDICINE | Facility: CLINIC | Age: 73
End: 2023-02-08
Payer: MEDICARE

## 2023-02-08 VITALS
TEMPERATURE: 98.6 F | OXYGEN SATURATION: 98 % | SYSTOLIC BLOOD PRESSURE: 122 MMHG | HEART RATE: 100 BPM | HEIGHT: 68 IN | BODY MASS INDEX: 44.41 KG/M2 | DIASTOLIC BLOOD PRESSURE: 50 MMHG | WEIGHT: 293 LBS

## 2023-02-08 DIAGNOSIS — L03.115 CELLULITIS OF RIGHT LOWER LIMB: ICD-10-CM

## 2023-02-08 DIAGNOSIS — C18.9 MALIGNANT NEOPLASM OF COLON, UNSPECIFIED: ICD-10-CM

## 2023-02-08 DIAGNOSIS — C34.12 MALIGNANT NEOPLASM OF UPPER LOBE, LEFT BRONCHUS OR LUNG: ICD-10-CM

## 2023-02-08 LAB
BILIRUB UR QL STRIP: ABNORMAL
CLARITY UR: CLEAR
COLLECTION METHOD: NORMAL
GLUCOSE UR-MCNC: NORMAL
HCG UR QL: 0.2 EU/DL
HGB UR QL STRIP.AUTO: NORMAL
KETONES UR-MCNC: ABNORMAL
LEUKOCYTE ESTERASE UR QL STRIP: ABNORMAL
NITRITE UR QL STRIP: NORMAL
PH UR STRIP: 5
PROT UR STRIP-MCNC: ABNORMAL
SP GR UR STRIP: 1.03

## 2023-02-08 PROCEDURE — 36415 COLL VENOUS BLD VENIPUNCTURE: CPT

## 2023-02-08 PROCEDURE — 81003 URINALYSIS AUTO W/O SCOPE: CPT | Mod: QW

## 2023-02-08 PROCEDURE — G0009: CPT

## 2023-02-08 PROCEDURE — 99214 OFFICE O/P EST MOD 30 MIN: CPT | Mod: 25

## 2023-02-08 PROCEDURE — 90677 PCV20 VACCINE IM: CPT

## 2023-02-08 PROCEDURE — 90715 TDAP VACCINE 7 YRS/> IM: CPT | Mod: GY

## 2023-02-08 PROCEDURE — 90472 IMMUNIZATION ADMIN EACH ADD: CPT

## 2023-02-09 LAB
ALBUMIN SERPL ELPH-MCNC: 3.8 G/DL
ALP BLD-CCNC: 91 U/L
ALT SERPL-CCNC: 19 U/L
ANION GAP SERPL CALC-SCNC: 11 MMOL/L
AST SERPL-CCNC: 36 U/L
BILIRUB SERPL-MCNC: 0.8 MG/DL
BUN SERPL-MCNC: 15 MG/DL
CALCIUM SERPL-MCNC: 9.9 MG/DL
CHLORIDE SERPL-SCNC: 106 MMOL/L
CO2 SERPL-SCNC: 21 MMOL/L
CREAT SERPL-MCNC: 1.04 MG/DL
EGFR: 57 ML/MIN/1.73M2
GLUCOSE SERPL-MCNC: 161 MG/DL
POTASSIUM SERPL-SCNC: 4.7 MMOL/L
PROT SERPL-MCNC: 6.6 G/DL
SODIUM SERPL-SCNC: 138 MMOL/L

## 2023-02-10 NOTE — PLAN
[FreeTextEntry1] : Advised at length\par Pt requires bariatric wheelchair due to severe lymphedema which interferes with MRADL\par Can not be achieved with a walker and has sufficient upper body strength to propel \par \par 1. Prevents the patient from accomplishing an MRADL entirely, or within a reasonable period of time and the use \par of the wheelchair will significantly improve the patient’s ability to participate in “MRADLs” in the home. \par 2. There is a reasonable risk of morbity or mortality secondary to attempts to perform MRADLs.\par 3. Patient home provides adequate access for the use of the chair in the home \par 4. The patient’s mobility limitation cannot be resolved by use of a fitted walker or walker \par 5. The patient has sufficient upper extremity function and other physical and mental capabilities needed to safely \par self-propel the manual wheelchair that is provided in the home during a typical day. Limitations of strength, \par endurance, range of motion, coordination, and presence of pain, deformity or absence of one or both upper \par extremities are relevant to the assessment of upper extremity function. \par 9. The patient can self-propel in wheelchair while engaging in frequent activities in the home that cannot be \par performed in a standard or lightweight wheelchair\par 10. The patient requires a seat width, depth, or height that cannot be accommodated in a standard, lightweight or \par judie-height wheelchair, and spends at least 2 hours a day in the wheelchair

## 2023-02-10 NOTE — HISTORY OF PRESENT ILLNESS
Writer called patient and conveyed message from PCP. Pt aware refills have been sent. Pt to return call with new, worsening or persistent symptoms. Patient verbalizes understanding and has no further questions at this time. [Diabetes Mellitus] : Diabetes Mellitus [No episodes] : No hypoglycemic episodes since the last visit. [Understanding of foot care] : Patient expressed understanding of foot care [Most Recent A1C: ___] : Most recent A1C was [unfilled] [Target A1C:  ___] : Target A1C is [unfilled] [Does not check BP] : The patient is not checking blood pressure [<140/90] : Target blood pressure is <140/90 [Target goal met] : BP target goal met [FreeTextEntry6] : pt is here for dm check. [EyeExamDate] : 1/11/23

## 2023-02-14 ENCOUNTER — OUTPATIENT (OUTPATIENT)
Dept: OUTPATIENT SERVICES | Facility: HOSPITAL | Age: 73
LOS: 1 days | End: 2023-02-14
Payer: MEDICARE
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
## 2023-02-14 DIAGNOSIS — L97.509 NON-PRESSURE CHRONIC ULCER OF OTHER PART OF UNSPECIFIED FOOT WITH UNSPECIFIED SEVERITY: ICD-10-CM
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
## 2023-02-14 PROCEDURE — 99212 OFFICE O/P EST SF 10 MIN: CPT

## 2023-02-14 PROCEDURE — 29580 STRAPPING UNNA BOOT: CPT | Mod: 50

## 2023-02-16 ENCOUNTER — NON-APPOINTMENT (OUTPATIENT)
Age: 73
End: 2023-02-16

## 2023-02-16 DIAGNOSIS — I89.0 LYMPHEDEMA, NOT ELSEWHERE CLASSIFIED: ICD-10-CM

## 2023-02-16 DIAGNOSIS — S81.802A UNSPECIFIED OPEN WOUND, LEFT LOWER LEG, INITIAL ENCOUNTER: ICD-10-CM

## 2023-02-16 DIAGNOSIS — I73.9 PERIPHERAL VASCULAR DISEASE, UNSPECIFIED: ICD-10-CM
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
## 2023-02-16 DIAGNOSIS — S81.801A UNSPECIFIED OPEN WOUND, RIGHT LOWER LEG, INITIAL ENCOUNTER: ICD-10-CM

## 2023-02-16 LAB
ESTIMATED AVERAGE GLUCOSE: 157 MG/DL
HBA1C MFR BLD HPLC: 7.1 %

## 2023-02-21 ENCOUNTER — OUTPATIENT (OUTPATIENT)
Dept: OUTPATIENT SERVICES | Facility: HOSPITAL | Age: 73
LOS: 1 days | End: 2023-02-21
Payer: MEDICARE

## 2023-02-21 DIAGNOSIS — S81.802A UNSPECIFIED OPEN WOUND, LEFT LOWER LEG, INITIAL ENCOUNTER: ICD-10-CM
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
## 2023-02-21 DIAGNOSIS — I73.9 PERIPHERAL VASCULAR DISEASE, UNSPECIFIED: ICD-10-CM
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
## 2023-02-21 DIAGNOSIS — I10 ESSENTIAL (PRIMARY) HYPERTENSION: ICD-10-CM
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
## 2023-02-21 DIAGNOSIS — S81.801A UNSPECIFIED OPEN WOUND, RIGHT LOWER LEG, INITIAL ENCOUNTER: ICD-10-CM

## 2023-02-21 DIAGNOSIS — E11.69 TYPE 2 DIABETES MELLITUS WITH OTHER SPECIFIED COMPLICATION: ICD-10-CM

## 2023-02-21 PROCEDURE — 99212 OFFICE O/P EST SF 10 MIN: CPT

## 2023-02-21 PROCEDURE — 29580 STRAPPING UNNA BOOT: CPT | Mod: 50

## 2023-02-28 ENCOUNTER — OUTPATIENT (OUTPATIENT)
Dept: OUTPATIENT SERVICES | Facility: HOSPITAL | Age: 73
LOS: 1 days | End: 2023-02-28
Payer: MEDICARE

## 2023-02-28 DIAGNOSIS — S81.802A UNSPECIFIED OPEN WOUND, LEFT LOWER LEG, INITIAL ENCOUNTER: ICD-10-CM

## 2023-02-28 PROCEDURE — 99212 OFFICE O/P EST SF 10 MIN: CPT

## 2023-02-28 PROCEDURE — 29580 STRAPPING UNNA BOOT: CPT | Mod: 50

## 2023-03-09 ENCOUNTER — OUTPATIENT (OUTPATIENT)
Dept: OUTPATIENT SERVICES | Facility: HOSPITAL | Age: 73
LOS: 1 days | End: 2023-03-09
Payer: MEDICARE

## 2023-03-09 DIAGNOSIS — S81.802A UNSPECIFIED OPEN WOUND, LEFT LOWER LEG, INITIAL ENCOUNTER: ICD-10-CM

## 2023-03-09 PROCEDURE — 99215 OFFICE O/P EST HI 40 MIN: CPT

## 2023-03-09 PROCEDURE — 99213 OFFICE O/P EST LOW 20 MIN: CPT

## 2023-03-13 DIAGNOSIS — I89.0 LYMPHEDEMA, NOT ELSEWHERE CLASSIFIED: ICD-10-CM

## 2023-03-13 DIAGNOSIS — I73.9 PERIPHERAL VASCULAR DISEASE, UNSPECIFIED: ICD-10-CM

## 2023-03-13 DIAGNOSIS — E11.69 TYPE 2 DIABETES MELLITUS WITH OTHER SPECIFIED COMPLICATION: ICD-10-CM

## 2023-03-13 NOTE — PHYSICAL THERAPY INITIAL EVALUATION ADULT - DID THE PATIENT HAVE SURGERY?
Rosetta at 's office wants to know why her Metformin was discontinued by  at last office visit. Seen in the office today and her Hgb A1c was 11.3. Please advise.  
Rosetta notified of message above from .   
n/a

## 2023-03-16 ENCOUNTER — OUTPATIENT (OUTPATIENT)
Dept: OUTPATIENT SERVICES | Facility: HOSPITAL | Age: 73
LOS: 1 days | End: 2023-03-16
Payer: MEDICARE

## 2023-03-16 DIAGNOSIS — C34.12 MALIGNANT NEOPLASM OF UPPER LOBE, LEFT BRONCHUS OR LUNG: ICD-10-CM

## 2023-03-16 DIAGNOSIS — I89.0 LYMPHEDEMA, NOT ELSEWHERE CLASSIFIED: ICD-10-CM

## 2023-03-16 DIAGNOSIS — S81.802A UNSPECIFIED OPEN WOUND, LEFT LOWER LEG, INITIAL ENCOUNTER: ICD-10-CM

## 2023-03-16 DIAGNOSIS — I73.9 PERIPHERAL VASCULAR DISEASE, UNSPECIFIED: ICD-10-CM

## 2023-03-16 DIAGNOSIS — E11.69 TYPE 2 DIABETES MELLITUS WITH OTHER SPECIFIED COMPLICATION: ICD-10-CM

## 2023-03-16 PROCEDURE — 29580 STRAPPING UNNA BOOT: CPT | Mod: 50

## 2023-03-16 PROCEDURE — 99214 OFFICE O/P EST MOD 30 MIN: CPT

## 2023-03-17 DIAGNOSIS — I10 ESSENTIAL (PRIMARY) HYPERTENSION: ICD-10-CM

## 2023-03-17 DIAGNOSIS — E11.69 TYPE 2 DIABETES MELLITUS WITH OTHER SPECIFIED COMPLICATION: ICD-10-CM

## 2023-03-17 DIAGNOSIS — I89.0 LYMPHEDEMA, NOT ELSEWHERE CLASSIFIED: ICD-10-CM

## 2023-03-17 DIAGNOSIS — I73.9 PERIPHERAL VASCULAR DISEASE, UNSPECIFIED: ICD-10-CM

## 2023-03-23 ENCOUNTER — OUTPATIENT (OUTPATIENT)
Dept: OUTPATIENT SERVICES | Facility: HOSPITAL | Age: 73
LOS: 1 days | End: 2023-03-23
Payer: MEDICARE

## 2023-03-23 DIAGNOSIS — I89.0 LYMPHEDEMA, NOT ELSEWHERE CLASSIFIED: ICD-10-CM

## 2023-03-23 PROCEDURE — 29580 STRAPPING UNNA BOOT: CPT | Mod: 50

## 2023-03-23 PROCEDURE — 99214 OFFICE O/P EST MOD 30 MIN: CPT

## 2023-03-24 DIAGNOSIS — I73.9 PERIPHERAL VASCULAR DISEASE, UNSPECIFIED: ICD-10-CM

## 2023-03-24 DIAGNOSIS — E11.9 TYPE 2 DIABETES MELLITUS WITHOUT COMPLICATIONS: ICD-10-CM

## 2023-03-24 DIAGNOSIS — I10 ESSENTIAL (PRIMARY) HYPERTENSION: ICD-10-CM

## 2023-03-24 DIAGNOSIS — I89.0 LYMPHEDEMA, NOT ELSEWHERE CLASSIFIED: ICD-10-CM

## 2023-03-24 DIAGNOSIS — E11.69 TYPE 2 DIABETES MELLITUS WITH OTHER SPECIFIED COMPLICATION: ICD-10-CM

## 2023-03-30 ENCOUNTER — OUTPATIENT (OUTPATIENT)
Dept: OUTPATIENT SERVICES | Facility: HOSPITAL | Age: 73
LOS: 1 days | End: 2023-03-30
Payer: MEDICARE

## 2023-03-30 DIAGNOSIS — E11.69 TYPE 2 DIABETES MELLITUS WITH OTHER SPECIFIED COMPLICATION: ICD-10-CM

## 2023-03-30 DIAGNOSIS — Z92.21 PERSONAL HISTORY OF ANTINEOPLASTIC CHEMOTHERAPY: ICD-10-CM

## 2023-03-30 DIAGNOSIS — I89.0 LYMPHEDEMA, NOT ELSEWHERE CLASSIFIED: ICD-10-CM

## 2023-03-30 DIAGNOSIS — I73.9 PERIPHERAL VASCULAR DISEASE, UNSPECIFIED: ICD-10-CM

## 2023-03-30 DIAGNOSIS — C34.12 MALIGNANT NEOPLASM OF UPPER LOBE, LEFT BRONCHUS OR LUNG: ICD-10-CM

## 2023-03-30 DIAGNOSIS — E11.9 TYPE 2 DIABETES MELLITUS WITHOUT COMPLICATIONS: ICD-10-CM

## 2023-03-30 DIAGNOSIS — I10 ESSENTIAL (PRIMARY) HYPERTENSION: ICD-10-CM

## 2023-03-30 PROCEDURE — 29580 STRAPPING UNNA BOOT: CPT | Mod: 50

## 2023-03-30 PROCEDURE — 99214 OFFICE O/P EST MOD 30 MIN: CPT

## 2023-04-02 ENCOUNTER — INPATIENT (INPATIENT)
Facility: HOSPITAL | Age: 73
LOS: 7 days | Discharge: NURSING, REHAB OR LONG-TERM CARE | DRG: 872 | End: 2023-04-10
Attending: FAMILY MEDICINE | Admitting: FAMILY MEDICINE
Payer: MEDICARE

## 2023-04-02 VITALS
RESPIRATION RATE: 18 BRPM | TEMPERATURE: 99 F | DIASTOLIC BLOOD PRESSURE: 105 MMHG | SYSTOLIC BLOOD PRESSURE: 152 MMHG | OXYGEN SATURATION: 100 % | HEART RATE: 99 BPM | WEIGHT: 289.91 LBS

## 2023-04-02 DIAGNOSIS — R06.00 DYSPNEA, UNSPECIFIED: ICD-10-CM

## 2023-04-02 LAB
ALBUMIN SERPL ELPH-MCNC: 3.1 G/DL — LOW (ref 3.3–5)
ALP SERPL-CCNC: 118 U/L — SIGNIFICANT CHANGE UP (ref 40–120)
ALT FLD-CCNC: 48 U/L — SIGNIFICANT CHANGE UP (ref 12–78)
ANION GAP SERPL CALC-SCNC: 10 MMOL/L — SIGNIFICANT CHANGE UP (ref 5–17)
ANION GAP SERPL CALC-SCNC: 7 MMOL/L — SIGNIFICANT CHANGE UP (ref 5–17)
APTT BLD: 30.1 SEC — SIGNIFICANT CHANGE UP (ref 27.5–35.5)
AST SERPL-CCNC: 245 U/L — HIGH (ref 15–37)
BASOPHILS # BLD AUTO: 0.05 K/UL — SIGNIFICANT CHANGE UP (ref 0–0.2)
BASOPHILS NFR BLD AUTO: 0.2 % — SIGNIFICANT CHANGE UP (ref 0–2)
BILIRUB SERPL-MCNC: 1.7 MG/DL — HIGH (ref 0.2–1.2)
BUN SERPL-MCNC: 42 MG/DL — HIGH (ref 7–23)
BUN SERPL-MCNC: 43 MG/DL — HIGH (ref 7–23)
CALCIUM SERPL-MCNC: 10 MG/DL — SIGNIFICANT CHANGE UP (ref 8.5–10.1)
CALCIUM SERPL-MCNC: 9.1 MG/DL — SIGNIFICANT CHANGE UP (ref 8.5–10.1)
CHLORIDE SERPL-SCNC: 101 MMOL/L — SIGNIFICANT CHANGE UP (ref 96–108)
CHLORIDE SERPL-SCNC: 98 MMOL/L — SIGNIFICANT CHANGE UP (ref 96–108)
CO2 SERPL-SCNC: 18 MMOL/L — LOW (ref 22–31)
CO2 SERPL-SCNC: 19 MMOL/L — LOW (ref 22–31)
CREAT SERPL-MCNC: 1.74 MG/DL — HIGH (ref 0.5–1.3)
CREAT SERPL-MCNC: 2.08 MG/DL — HIGH (ref 0.5–1.3)
EGFR: 25 ML/MIN/1.73M2 — LOW
EGFR: 31 ML/MIN/1.73M2 — LOW
EOSINOPHIL # BLD AUTO: 0.02 K/UL — SIGNIFICANT CHANGE UP (ref 0–0.5)
EOSINOPHIL NFR BLD AUTO: 0.1 % — SIGNIFICANT CHANGE UP (ref 0–6)
FLUAV AG NPH QL: SIGNIFICANT CHANGE UP
FLUBV AG NPH QL: SIGNIFICANT CHANGE UP
GLUCOSE BLDC GLUCOMTR-MCNC: 257 MG/DL — HIGH (ref 70–99)
GLUCOSE BLDC GLUCOMTR-MCNC: 308 MG/DL — HIGH (ref 70–99)
GLUCOSE BLDC GLUCOMTR-MCNC: 308 MG/DL — HIGH (ref 70–99)
GLUCOSE SERPL-MCNC: 352 MG/DL — HIGH (ref 70–99)
GLUCOSE SERPL-MCNC: 358 MG/DL — HIGH (ref 70–99)
HCT VFR BLD CALC: 38.5 % — SIGNIFICANT CHANGE UP (ref 34.5–45)
HGB BLD-MCNC: 13.4 G/DL — SIGNIFICANT CHANGE UP (ref 11.5–15.5)
IMM GRANULOCYTES NFR BLD AUTO: 1.4 % — HIGH (ref 0–0.9)
INR BLD: 1.31 RATIO — HIGH (ref 0.88–1.16)
LACTATE SERPL-SCNC: 2.8 MMOL/L — HIGH (ref 0.7–2)
LACTATE SERPL-SCNC: 4.3 MMOL/L — CRITICAL HIGH (ref 0.7–2)
LYMPHOCYTES # BLD AUTO: 0.81 K/UL — LOW (ref 1–3.3)
LYMPHOCYTES # BLD AUTO: 4 % — LOW (ref 13–44)
MCHC RBC-ENTMCNC: 28.2 PG — SIGNIFICANT CHANGE UP (ref 27–34)
MCHC RBC-ENTMCNC: 34.8 GM/DL — SIGNIFICANT CHANGE UP (ref 32–36)
MCV RBC AUTO: 80.9 FL — SIGNIFICANT CHANGE UP (ref 80–100)
MONOCYTES # BLD AUTO: 1.73 K/UL — HIGH (ref 0–0.9)
MONOCYTES NFR BLD AUTO: 8.5 % — SIGNIFICANT CHANGE UP (ref 2–14)
NEUTROPHILS # BLD AUTO: 17.45 K/UL — HIGH (ref 1.8–7.4)
NEUTROPHILS NFR BLD AUTO: 85.8 % — HIGH (ref 43–77)
PLATELET # BLD AUTO: 150 K/UL — SIGNIFICANT CHANGE UP (ref 150–400)
POTASSIUM SERPL-MCNC: 4.8 MMOL/L — SIGNIFICANT CHANGE UP (ref 3.5–5.3)
POTASSIUM SERPL-MCNC: 5.1 MMOL/L — SIGNIFICANT CHANGE UP (ref 3.5–5.3)
POTASSIUM SERPL-SCNC: 4.8 MMOL/L — SIGNIFICANT CHANGE UP (ref 3.5–5.3)
POTASSIUM SERPL-SCNC: 5.1 MMOL/L — SIGNIFICANT CHANGE UP (ref 3.5–5.3)
PROT SERPL-MCNC: 7.5 GM/DL — SIGNIFICANT CHANGE UP (ref 6–8.3)
PROTHROM AB SERPL-ACNC: 15.2 SEC — HIGH (ref 10.5–13.4)
RBC # BLD: 4.76 M/UL — SIGNIFICANT CHANGE UP (ref 3.8–5.2)
RBC # FLD: 14.3 % — SIGNIFICANT CHANGE UP (ref 10.3–14.5)
RSV RNA NPH QL NAA+NON-PROBE: SIGNIFICANT CHANGE UP
SARS-COV-2 RNA SPEC QL NAA+PROBE: SIGNIFICANT CHANGE UP
SODIUM SERPL-SCNC: 126 MMOL/L — LOW (ref 135–145)
SODIUM SERPL-SCNC: 127 MMOL/L — LOW (ref 135–145)
WBC # BLD: 20.35 K/UL — HIGH (ref 3.8–10.5)
WBC # FLD AUTO: 20.35 K/UL — HIGH (ref 3.8–10.5)

## 2023-04-02 PROCEDURE — 99285 EMERGENCY DEPT VISIT HI MDM: CPT

## 2023-04-02 PROCEDURE — 85027 COMPLETE CBC AUTOMATED: CPT

## 2023-04-02 PROCEDURE — 87040 BLOOD CULTURE FOR BACTERIA: CPT

## 2023-04-02 PROCEDURE — 83605 ASSAY OF LACTIC ACID: CPT

## 2023-04-02 PROCEDURE — 80048 BASIC METABOLIC PNL TOTAL CA: CPT

## 2023-04-02 PROCEDURE — 71045 X-RAY EXAM CHEST 1 VIEW: CPT | Mod: 26

## 2023-04-02 PROCEDURE — C1751: CPT

## 2023-04-02 PROCEDURE — 36415 COLL VENOUS BLD VENIPUNCTURE: CPT

## 2023-04-02 PROCEDURE — 93010 ELECTROCARDIOGRAM REPORT: CPT

## 2023-04-02 PROCEDURE — 97530 THERAPEUTIC ACTIVITIES: CPT | Mod: GP

## 2023-04-02 PROCEDURE — 80076 HEPATIC FUNCTION PANEL: CPT

## 2023-04-02 PROCEDURE — 82962 GLUCOSE BLOOD TEST: CPT

## 2023-04-02 PROCEDURE — 99223 1ST HOSP IP/OBS HIGH 75: CPT

## 2023-04-02 PROCEDURE — 87635 SARS-COV-2 COVID-19 AMP PRB: CPT

## 2023-04-02 PROCEDURE — 83036 HEMOGLOBIN GLYCOSYLATED A1C: CPT

## 2023-04-02 PROCEDURE — 97162 PT EVAL MOD COMPLEX 30 MIN: CPT | Mod: GP

## 2023-04-02 RX ORDER — ENOXAPARIN SODIUM 100 MG/ML
30 INJECTION SUBCUTANEOUS EVERY 12 HOURS
Refills: 0 | Status: DISCONTINUED | OUTPATIENT
Start: 2023-04-02 | End: 2023-04-03

## 2023-04-02 RX ORDER — TIGECYCLINE 50 MG/5ML
INJECTION, POWDER, LYOPHILIZED, FOR SOLUTION INTRAVENOUS
Refills: 0 | Status: DISCONTINUED | OUTPATIENT
Start: 2023-04-02 | End: 2023-04-03

## 2023-04-02 RX ORDER — ACETAMINOPHEN 500 MG
1000 TABLET ORAL EVERY 8 HOURS
Refills: 0 | Status: DISCONTINUED | OUTPATIENT
Start: 2023-04-02 | End: 2023-04-02

## 2023-04-02 RX ORDER — INSULIN GLARGINE 100 [IU]/ML
5 INJECTION, SOLUTION SUBCUTANEOUS AT BEDTIME
Refills: 0 | Status: DISCONTINUED | OUTPATIENT
Start: 2023-04-02 | End: 2023-04-05

## 2023-04-02 RX ORDER — DEXTROSE 50 % IN WATER 50 %
25 SYRINGE (ML) INTRAVENOUS ONCE
Refills: 0 | Status: DISCONTINUED | OUTPATIENT
Start: 2023-04-02 | End: 2023-04-10

## 2023-04-02 RX ORDER — INSULIN LISPRO 100/ML
3 VIAL (ML) SUBCUTANEOUS
Refills: 0 | Status: DISCONTINUED | OUTPATIENT
Start: 2023-04-02 | End: 2023-04-10

## 2023-04-02 RX ORDER — TIGECYCLINE 50 MG/5ML
50 INJECTION, POWDER, LYOPHILIZED, FOR SOLUTION INTRAVENOUS EVERY 12 HOURS
Refills: 0 | Status: DISCONTINUED | OUTPATIENT
Start: 2023-04-03 | End: 2023-04-03

## 2023-04-02 RX ORDER — VANCOMYCIN HCL 1 G
2000 VIAL (EA) INTRAVENOUS ONCE
Refills: 0 | Status: COMPLETED | OUTPATIENT
Start: 2023-04-02 | End: 2023-04-02

## 2023-04-02 RX ORDER — VANCOMYCIN HCL 1 G
1000 VIAL (EA) INTRAVENOUS ONCE
Refills: 0 | Status: DISCONTINUED | OUTPATIENT
Start: 2023-04-02 | End: 2023-04-02

## 2023-04-02 RX ORDER — ACETAMINOPHEN 500 MG
1000 TABLET ORAL EVERY 8 HOURS
Refills: 0 | Status: DISCONTINUED | OUTPATIENT
Start: 2023-04-02 | End: 2023-04-10

## 2023-04-02 RX ORDER — LANOLIN ALCOHOL/MO/W.PET/CERES
3 CREAM (GRAM) TOPICAL AT BEDTIME
Refills: 0 | Status: DISCONTINUED | OUTPATIENT
Start: 2023-04-02 | End: 2023-04-10

## 2023-04-02 RX ORDER — DEXTROSE 50 % IN WATER 50 %
12.5 SYRINGE (ML) INTRAVENOUS ONCE
Refills: 0 | Status: DISCONTINUED | OUTPATIENT
Start: 2023-04-02 | End: 2023-04-10

## 2023-04-02 RX ORDER — TRAMADOL HYDROCHLORIDE 50 MG/1
50 TABLET ORAL EVERY 6 HOURS
Refills: 0 | Status: DISCONTINUED | OUTPATIENT
Start: 2023-04-02 | End: 2023-04-09

## 2023-04-02 RX ORDER — METOPROLOL TARTRATE 50 MG
1 TABLET ORAL
Qty: 0 | Refills: 0 | DISCHARGE

## 2023-04-02 RX ORDER — TIGECYCLINE 50 MG/5ML
100 INJECTION, POWDER, LYOPHILIZED, FOR SOLUTION INTRAVENOUS ONCE
Refills: 0 | Status: COMPLETED | OUTPATIENT
Start: 2023-04-02 | End: 2023-04-02

## 2023-04-02 RX ORDER — SODIUM CHLORIDE 9 MG/ML
1950 INJECTION INTRAMUSCULAR; INTRAVENOUS; SUBCUTANEOUS ONCE
Refills: 0 | Status: COMPLETED | OUTPATIENT
Start: 2023-04-02 | End: 2023-04-02

## 2023-04-02 RX ORDER — INSULIN LISPRO 100/ML
VIAL (ML) SUBCUTANEOUS
Refills: 0 | Status: DISCONTINUED | OUTPATIENT
Start: 2023-04-02 | End: 2023-04-10

## 2023-04-02 RX ORDER — GLUCAGON INJECTION, SOLUTION 0.5 MG/.1ML
1 INJECTION, SOLUTION SUBCUTANEOUS ONCE
Refills: 0 | Status: DISCONTINUED | OUTPATIENT
Start: 2023-04-02 | End: 2023-04-10

## 2023-04-02 RX ORDER — SODIUM CHLORIDE 9 MG/ML
1000 INJECTION, SOLUTION INTRAVENOUS
Refills: 0 | Status: DISCONTINUED | OUTPATIENT
Start: 2023-04-02 | End: 2023-04-10

## 2023-04-02 RX ORDER — DEXTROSE 50 % IN WATER 50 %
15 SYRINGE (ML) INTRAVENOUS ONCE
Refills: 0 | Status: DISCONTINUED | OUTPATIENT
Start: 2023-04-02 | End: 2023-04-10

## 2023-04-02 RX ORDER — ACETAMINOPHEN 500 MG
650 TABLET ORAL EVERY 6 HOURS
Refills: 0 | Status: DISCONTINUED | OUTPATIENT
Start: 2023-04-02 | End: 2023-04-02

## 2023-04-02 RX ORDER — ONDANSETRON 8 MG/1
4 TABLET, FILM COATED ORAL EVERY 8 HOURS
Refills: 0 | Status: DISCONTINUED | OUTPATIENT
Start: 2023-04-02 | End: 2023-04-10

## 2023-04-02 RX ORDER — METOPROLOL TARTRATE 50 MG
25 TABLET ORAL DAILY
Refills: 0 | Status: DISCONTINUED | OUTPATIENT
Start: 2023-04-02 | End: 2023-04-10

## 2023-04-02 RX ADMIN — Medication 4: at 17:39

## 2023-04-02 RX ADMIN — Medication 250 MILLIGRAM(S): at 13:01

## 2023-04-02 RX ADMIN — Medication 1000 MILLIGRAM(S): at 18:16

## 2023-04-02 RX ADMIN — Medication 1000 MILLIGRAM(S): at 17:43

## 2023-04-02 RX ADMIN — SODIUM CHLORIDE 1950 MILLILITER(S): 9 INJECTION INTRAMUSCULAR; INTRAVENOUS; SUBCUTANEOUS at 11:23

## 2023-04-02 RX ADMIN — TIGECYCLINE 110 MILLIGRAM(S): 50 INJECTION, POWDER, LYOPHILIZED, FOR SOLUTION INTRAVENOUS at 17:45

## 2023-04-02 RX ADMIN — ENOXAPARIN SODIUM 30 MILLIGRAM(S): 100 INJECTION SUBCUTANEOUS at 21:43

## 2023-04-02 RX ADMIN — Medication 3 UNIT(S): at 17:40

## 2023-04-02 RX ADMIN — INSULIN GLARGINE 5 UNIT(S): 100 INJECTION, SOLUTION SUBCUTANEOUS at 21:43

## 2023-04-02 NOTE — H&P ADULT - HISTORY OF PRESENT ILLNESS
73 yo female w/PMHx of HTN, DM2, Lung Ca/Colon Ca (9 years ago), chronic lymphedema presents to  for worsening RLE redness. Patient states she has home visiting nurse who came in today and noticed that wound has been worsening since 2 days ago. Patient reports of increased weeping from the wound, reports of generalized malaise for few days.  Visited Dr. Garcia last Thursday and said that her legs looked fine. Pt has also been visited by home care that visits her weekly. States that she ambulated to the bathroom and sat on the toilet but was unable to get up afterwards. No fall or injuries. Denies fever or chills. Possible increased drainage.

## 2023-04-02 NOTE — CONSULT NOTE ADULT - ASSESSMENT
73 y/o female with h/o HTN, DM2, Lung Ca/Colon Ca (9 years ago), chronic lymphedema was admitted on 4/2 for worsening RLE redness. Patient states she has home visiting nurse who came in today and noticed that wound has been worsening since 2 days PTA. Patient reports of increased weeping from the wound, reports of generalized malaise for few days. Visited Dr. Garcia last Thursday and said that her legs looked fine. Pt has also been visited by home care that visits her weekly. States that she ambulated to the bathroom and sat on the toilet but was unable to get up afterwards. No fall or injuries. Denies fever or chills but has increased drainage. In ER she received levofloxacin.     1. Low grade fever. Leg cellulitis. LE chronic lymphedema. CRF stage 3. Allergy to keflex with anaphylaxis.   -leukocytosis   73 y/o female with h/o HTN, DM2, Lung Ca/Colon Ca (9 years ago), chronic lymphedema was admitted on 4/2 for worsening RLE redness. Patient states she has home visiting nurse who came in today and noticed that wound has been worsening since 2 days PTA. Patient reports of increased weeping from the wound, reports of generalized malaise for few days. Visited Dr. Garcia last Thursday and said that her legs looked fine. Pt has also been visited by home care that visits her weekly. States that she ambulated to the bathroom and sat on the toilet but was unable to get up afterwards. No fall or injuries. Denies fever or chills but has increased drainage. In ER she received levofloxacin.     1. Low grade fever. Left leg extensive cellulitis. LE chronic lymphedema. CRF stage 3. Allergy to keflex with anaphylaxis.   -leukocytosis  -obtain BC x 2  -start tigecycline 100 mg IV 1, then 50 mg IV q12h  -reason for abx use and side effects reviewed with patient; monitor BMP   -elevate legs  -local wounds care  -wound care consultation  -old chart reviewed to assess prior cultures  -monitor temps  -f/u CBC  -supportive care  2. Other issues:   -care per medicine

## 2023-04-02 NOTE — ED PROVIDER NOTE - PROGRESS NOTE DETAILS
WBC 20K, lactate 4. D/W Jose -- will see in hospital. Covering MD for Gross states admit hospitalist.  Given sepsis fluids, abx. Admit med/surg WBC 20K, lactate 4, creat 2.1 (new). D/W Jose -- will see in hospital. Covering MD for Gross states admit hospitalist.  Given sepsis fluids, abx. Admit med/surg

## 2023-04-02 NOTE — ED ADULT TRIAGE NOTE - CHIEF COMPLAINT QUOTE
pt presents to ED from home for generalized weakness. hx lymphedema bilateral legs. today ambulated with walker to bathroom and sat on toilet. was unable to get up. denies fall or injury.

## 2023-04-02 NOTE — H&P ADULT - NSHPPHYSICALEXAM_GEN_ALL_CORE
Vital Signs Last 24 Hrs  T(C): 37.4 (02 Apr 2023 10:46), Max: 37.4 (02 Apr 2023 10:46)  T(F): 99.3 (02 Apr 2023 10:46), Max: 99.3 (02 Apr 2023 10:46)  HR: 102 (02 Apr 2023 10:46) (99 - 102)  BP: 153/55 (02 Apr 2023 10:46) (152/105 - 153/55)  BP(mean): 82 (02 Apr 2023 10:46) (82 - 82)  RR: 20 (02 Apr 2023 10:46) (18 - 20)  SpO2: 100% (02 Apr 2023 10:46) (100% - 100%)    Parameters below as of 02 Apr 2023 10:46  Patient On (Oxygen Delivery Method): room air        PHYSICAL EXAM   Gen: NAD, AA&Ox4, +morbidly obese   HEENT: head atraumatic and normocephalic, PEERLA, EOMI,  no gross abnormalities of ears, mucous membranes moist, no oral lesions, neck supple without masses/goiter/lymphadenopathy, no JVD  CVS: +s1, s2, regular rate and rhythm, no murmurs, rubs or gallops, no thrill, normal PMI  Pulmonary: normal respiratory effort, clear to auscultation b/l, no wheezes/crackles/rhonchi  Abdomen: soft, non-tender, non-distended, +bowel sounds in all 4 quadrants, no masses noted, no guarding or rigidity   Skin: +chronic lymphedema with chronic hyperkeratotic changes, LLE: +in ACE, RLE:+mild erythema, +macerated, +open cutaneous ulceration over the anteromedial aspect with discharge

## 2023-04-02 NOTE — PATIENT PROFILE ADULT - FUNCTIONAL ASSESSMENT - BASIC MOBILITY 6.
2-calculated by average/Not able to assess (calculate score using [facility] averaging method)

## 2023-04-02 NOTE — H&P ADULT - ASSESSMENT
73 yo female w/PMHx of HTN, DM2, Lung Ca/Colon Ca (9 years ago), chronic lymphedema presents to  for worsening RLE redness.        #Sepsis sec to RLE Wound, Hx of lymphedema, chronic severe venous stasis, Cellulitis    -given 1x dose of Vanc and 1x dose of Levaquin in ED   -reports anaphylaxis to cephalosporins (Keflex: 35 yrs ago)   -will defer further abx to ID as pt w/o leukocytosis,  -pain management   -wound care consult   -vascular surgery consult     #Lactic Acidosis in the past without sepsis   pt is on Metformin; further f/up with Dr Dean    # Hyponatremia pt used to be on torsemide it is most likely diuretic related  repeat Na    #ADRIAN on CKD     # Elevated AST  repeat LFT    #DM uncontrolled  sliding scale    # Pt has a Port- should it be removed?  Med rec done by [facility] had only Metformin taking pt takes more than that I will resume prev hosp meds  Dr Dean aware will reume care in am

## 2023-04-02 NOTE — ED ADULT NURSE NOTE - OBJECTIVE STATEMENT
pt presents to ed for evaluation of worsening weakness in bilateral lower extremities and difficulty walking. pt has cellulitis to bilat lower extrem with edema and drainage. pt follows with wound center, afebrile, vss , a&ox4

## 2023-04-02 NOTE — ED PROVIDER NOTE - MUSCULOSKELETAL, MLM
Severe bilateral lower extremity lymphedema and erythema. No serious drainage. Dressing removed. No stuart puss.

## 2023-04-02 NOTE — ED PROVIDER NOTE - OBJECTIVE STATEMENT
73 y/o female with PMHx of HTN, DM, colon CA, chronic lymphedema being followed by Dr. Magnus Garcia (vascular) presents to the ED for increased difficulty walking and generalized weakness over the last 24 hours. Visited Dr. Garcia last Thursday and said that her legs looked fine. Pt has also been visited by home care that visits her weekly. States that she ambulated to the bathroom and sat on the toilet but was unable to get up afterwards. No fall or injuries. Denies fever or chills. Possible increased drainage.

## 2023-04-02 NOTE — ED PROVIDER NOTE - CONSTITUTIONAL, MLM
Chronically ill-appearing, awake, alert, oriented to person, place, time/situation and in no apparent distress. normal...

## 2023-04-02 NOTE — ED ADULT NURSE NOTE - BREATHING, MLM
LM for pt to call back and change apt  
Left VM notifyin patient that we need to switch his appointment to a telephone or video visit. To give us a call back.   
Spontaneous, unlabored and symmetrical

## 2023-04-02 NOTE — CONSULT NOTE ADULT - SUBJECTIVE AND OBJECTIVE BOX
Patient is a 72y old  Female who presents with a chief complaint of sent in by MD     HPI:  73 y/o female with h/o HTN, DM2, Lung Ca/Colon Ca (9 years ago), chronic lymphedema was admitted on 4/2 for worsening RLE redness. Patient states she has home visiting nurse who came in today and noticed that wound has been worsening since 2 days PTA. Patient reports of increased weeping from the wound, reports of generalized malaise for few days. Visited Dr. Garcia last Thursday and said that her legs looked fine. Pt has also been visited by home care that visits her weekly. States that she ambulated to the bathroom and sat on the toilet but was unable to get up afterwards. No fall or injuries. Denies fever or chills but has increased drainage. In ER she received levofloxacin.       PMH: as above  PSH: as above  Meds: per reconciliation sheet, noted below  MEDICATIONS  (STANDING):  dextrose 5%. 1000 milliLiter(s) (50 mL/Hr) IV Continuous <Continuous>  dextrose 5%. 1000 milliLiter(s) (100 mL/Hr) IV Continuous <Continuous>  dextrose 50% Injectable 25 Gram(s) IV Push once  dextrose 50% Injectable 12.5 Gram(s) IV Push once  dextrose 50% Injectable 25 Gram(s) IV Push once  enoxaparin Injectable 30 milliGRAM(s) SubCutaneous every 12 hours  glucagon  Injectable 1 milliGRAM(s) IntraMuscular once  insulin lispro (ADMELOG) corrective regimen sliding scale   SubCutaneous three times a day before meals  levoFLOXacin IVPB      metoprolol succinate ER 25 milliGRAM(s) Oral daily    MEDICATIONS  (PRN):  acetaminophen     Tablet .. 650 milliGRAM(s) Oral every 6 hours PRN Mild Pain (1 - 3)  aluminum hydroxide/magnesium hydroxide/simethicone Suspension 30 milliLiter(s) Oral every 4 hours PRN Dyspepsia  dextrose Oral Gel 15 Gram(s) Oral once PRN Blood Glucose LESS THAN 70 milliGRAM(s)/deciliter  melatonin 3 milliGRAM(s) Oral at bedtime PRN Insomnia  ondansetron Injectable 4 milliGRAM(s) IV Push every 8 hours PRN Nausea and/or Vomiting  traMADol 50 milliGRAM(s) Oral every 6 hours PRN Moderate Pain (4 - 6)    Allergies    Keflex (Anaphylaxis)    Intolerances    Social: no smoking, no alcohol, no illegal drugs; no recent travel, no exposure to TB  FAMILY HISTORY:    no history of premature cardiovascular disease in first degree relatives    ROS: the patient denies fever, no chills, no HA, no seizures, no dizziness, no sore throat, no nasal congestion, no blurry vision, no CP, no palpitations, no SOB, no cough, no abdominal pain, no diarrhea, no N/V, no dysuria, has leg swelling and pain and open wound, no claudication, no rash, no joint aches, no rectal pain or bleeding, no night sweats  All other systems reviewed and are negative    Vital Signs Last 24 Hrs  T(C): 37.2 (02 Apr 2023 14:26), Max: 37.4 (02 Apr 2023 10:46)  T(F): 99 (02 Apr 2023 14:26), Max: 99.3 (02 Apr 2023 10:46)  HR: 100 (02 Apr 2023 14:26) (99 - 102)  BP: 116/73 (02 Apr 2023 14:26) (116/73 - 153/55)  BP(mean): 82 (02 Apr 2023 14:26) (82 - 82)  RR: 20 (02 Apr 2023 14:26) (18 - 20)  SpO2: 99% (02 Apr 2023 14:26) (99% - 100%)    Parameters below as of 02 Apr 2023 14:26  Patient On (Oxygen Delivery Method): room air    PE:    Constitutional:  No acute distress  HEENT: NC/AT, EOMI, PERRLA, conjunctivae clear; ears and nose atraumatic; pharynx benign  Neck: supple; thyroid not palpable  Back: no tenderness  Respiratory: respiratory effort normal; clear to auscultation  Cardiovascular: S1S2 regular, no murmurs  Abdomen: soft, not tender, not distended, positive BS; no liver or spleen organomegaly  Genitourinary: no suprapubic tenderness  Lymphatic: no LN palpable  Musculoskeletal: no muscle tenderness, no joint swelling or tenderness  Extremities: no pedal edema  Neurological/ Psychiatric: AxOx3, judgement and insight normal; moving all extremities  Skin: no rashes; no palpable lesions    Labs: all available labs reviewed                        13.4   20.35 )-----------( 150      ( 02 Apr 2023 09:55 )             38.5     04-02    127<L>  |  101  |  43<H>  ----------------------------<  358<H>  5.1   |  19<L>  |  1.74<H>    Ca    9.1      02 Apr 2023 14:07    TPro  7.5  /  Alb  3.1<L>  /  TBili  1.7<H>  /  DBili  x   /  AST  245<H>  /  ALT  48  /  AlkPhos  118  04-02     LIVER FUNCTIONS - ( 02 Apr 2023 09:55 )  Alb: 3.1 g/dL / Pro: 7.5 gm/dL / ALK PHOS: 118 U/L / ALT: 48 U/L / AST: 245 U/L / GGT: x             Radiology: all available radiological tests reviewed    < from: Xray Chest 1 View- PORTABLE-Routine (Xray Chest 1 View- PORTABLE-Routine .) (04.02.23 @ 10:45) >  Lines and tubes as above.  Mild pulmonary vascular congestion.  < end of copied text >      Advanced directives addressed: full resuscitation Patient is a 72y old  Female who presents with a chief complaint of sent in by MD     HPI:  73 y/o female with h/o HTN, DM2, Lung Ca/Colon Ca (9 years ago), chronic lymphedema was admitted on 4/2 for worsening RLE redness. Patient states she has home visiting nurse who came in today and noticed that wound has been worsening since 2 days PTA. Patient reports of increased weeping from the wound, reports of generalized malaise for few days. Visited Dr. Garcia last Thursday and said that her legs looked fine. Pt has also been visited by home care that visits her weekly. States that she ambulated to the bathroom and sat on the toilet but was unable to get up afterwards. No fall or injuries. Denies fever or chills but has increased drainage. In ER she received levofloxacin.       PMH: as above  PSH: as above  Meds: per reconciliation sheet, noted below  MEDICATIONS  (STANDING):  dextrose 5%. 1000 milliLiter(s) (50 mL/Hr) IV Continuous <Continuous>  dextrose 5%. 1000 milliLiter(s) (100 mL/Hr) IV Continuous <Continuous>  dextrose 50% Injectable 25 Gram(s) IV Push once  dextrose 50% Injectable 12.5 Gram(s) IV Push once  dextrose 50% Injectable 25 Gram(s) IV Push once  enoxaparin Injectable 30 milliGRAM(s) SubCutaneous every 12 hours  glucagon  Injectable 1 milliGRAM(s) IntraMuscular once  insulin lispro (ADMELOG) corrective regimen sliding scale   SubCutaneous three times a day before meals  levoFLOXacin IVPB      metoprolol succinate ER 25 milliGRAM(s) Oral daily    MEDICATIONS  (PRN):  acetaminophen     Tablet .. 650 milliGRAM(s) Oral every 6 hours PRN Mild Pain (1 - 3)  aluminum hydroxide/magnesium hydroxide/simethicone Suspension 30 milliLiter(s) Oral every 4 hours PRN Dyspepsia  dextrose Oral Gel 15 Gram(s) Oral once PRN Blood Glucose LESS THAN 70 milliGRAM(s)/deciliter  melatonin 3 milliGRAM(s) Oral at bedtime PRN Insomnia  ondansetron Injectable 4 milliGRAM(s) IV Push every 8 hours PRN Nausea and/or Vomiting  traMADol 50 milliGRAM(s) Oral every 6 hours PRN Moderate Pain (4 - 6)    Allergies    Keflex (Anaphylaxis)    Intolerances    Social: no smoking, no alcohol, no illegal drugs; no recent travel, no exposure to TB  FAMILY HISTORY:    no history of premature cardiovascular disease in first degree relatives    ROS: the patient denies fever, no chills, no HA, no seizures, no dizziness, no sore throat, no nasal congestion, no blurry vision, no CP, no palpitations, no SOB, no cough, no abdominal pain, no diarrhea, no N/V, no dysuria, has leg swelling and pain and open wounds, no joint aches, no rectal pain or bleeding, no night sweats  All other systems reviewed and are negative    Vital Signs Last 24 Hrs  T(C): 37.2 (02 Apr 2023 14:26), Max: 37.4 (02 Apr 2023 10:46)  T(F): 99 (02 Apr 2023 14:26), Max: 99.3 (02 Apr 2023 10:46)  HR: 100 (02 Apr 2023 14:26) (99 - 102)  BP: 116/73 (02 Apr 2023 14:26) (116/73 - 153/55)  BP(mean): 82 (02 Apr 2023 14:26) (82 - 82)  RR: 20 (02 Apr 2023 14:26) (18 - 20)  SpO2: 99% (02 Apr 2023 14:26) (99% - 100%)    Parameters below as of 02 Apr 2023 14:26  Patient On (Oxygen Delivery Method): room air    PE:    Constitutional:  No acute distress  HEENT: NC/AT, EOMI, PERRLA, conjunctivae clear; ears and nose atraumatic; pharynx benign  Neck: supple; thyroid not palpable  Back: no tenderness  Respiratory: respiratory effort normal; clear to auscultation  Cardiovascular: S1S2 regular, no murmurs  Abdomen: soft, not tender, not distended, positive BS; no liver or spleen organomegaly  Genitourinary: no suprapubic tenderness  Lymphatic: no LN palpable  Musculoskeletal: no muscle tenderness, no joint swelling or tenderness  Extremities: 2+ pedal edema with chronic lower legs skin changes  LLE erythema, edema, warmth and tenderness; open wounds with scant serous discharge   Neurological/ Psychiatric: AxOx3, judgement and insight normal; moving all extremities  Skin: no rashes; no palpable lesions    Labs: all available labs reviewed                        13.4   20.35 )-----------( 150      ( 02 Apr 2023 09:55 )             38.5     04-02    127<L>  |  101  |  43<H>  ----------------------------<  358<H>  5.1   |  19<L>  |  1.74<H>    Ca    9.1      02 Apr 2023 14:07    TPro  7.5  /  Alb  3.1<L>  /  TBili  1.7<H>  /  DBili  x   /  AST  245<H>  /  ALT  48  /  AlkPhos  118  04-02     LIVER FUNCTIONS - ( 02 Apr 2023 09:55 )  Alb: 3.1 g/dL / Pro: 7.5 gm/dL / ALK PHOS: 118 U/L / ALT: 48 U/L / AST: 245 U/L / GGT: x             Radiology: all available radiological tests reviewed    < from: Xray Chest 1 View- PORTABLE-Routine (Xray Chest 1 View- PORTABLE-Routine .) (04.02.23 @ 10:45) >  Lines and tubes as above.  Mild pulmonary vascular congestion.  < end of copied text >      Advanced directives addressed: full resuscitation

## 2023-04-02 NOTE — H&P ADULT - NSHPLABSRESULTS_GEN_ALL_CORE
13.4   20.35 )-----------( 150      ( 02 Apr 2023 09:55 )             38.5   04-02    126<L>  |  98  |  42<H>  ----------------------------<  352<H>  4.8   |  18<L>  |  2.08<H>    Ca    10.0      02 Apr 2023 09:55    TPro  7.5  /  Alb  3.1<L>  /  TBili  1.7<H>  /  DBili  x   /  AST  245<H>  /  ALT  48  /  AlkPhos  118  04-02    Lactate 4    < from: Xray Chest 1 View- PORTABLE-Routine (Xray Chest 1 View- PORTABLE-Routine .) (04.02.23 @ 10:45) >    Lines and tubes as above.  Mild pulmonary vascular congestion.      < end of copied text >    < from: Xray Chest 1 View- PORTABLE-Routine (Xray Chest 1 View- PORTABLE-Routine .) (04.02.23 @ 10:45) >    Right-sided chest port is in place.

## 2023-04-03 LAB
A1C WITH ESTIMATED AVERAGE GLUCOSE RESULT: 8.8 % — HIGH (ref 4–5.6)
ALBUMIN SERPL ELPH-MCNC: 2.2 G/DL — LOW (ref 3.3–5)
ALP SERPL-CCNC: 91 U/L — SIGNIFICANT CHANGE UP (ref 40–120)
ALT FLD-CCNC: 40 U/L — SIGNIFICANT CHANGE UP (ref 12–78)
ANION GAP SERPL CALC-SCNC: 7 MMOL/L — SIGNIFICANT CHANGE UP (ref 5–17)
AST SERPL-CCNC: 150 U/L — HIGH (ref 15–37)
BILIRUB DIRECT SERPL-MCNC: 0.5 MG/DL — HIGH (ref 0–0.3)
BILIRUB INDIRECT FLD-MCNC: 0.7 MG/DL — SIGNIFICANT CHANGE UP (ref 0.2–1)
BILIRUB SERPL-MCNC: 1.2 MG/DL — SIGNIFICANT CHANGE UP (ref 0.2–1.2)
BUN SERPL-MCNC: 43 MG/DL — HIGH (ref 7–23)
CALCIUM SERPL-MCNC: 9.5 MG/DL — SIGNIFICANT CHANGE UP (ref 8.5–10.1)
CHLORIDE SERPL-SCNC: 107 MMOL/L — SIGNIFICANT CHANGE UP (ref 96–108)
CO2 SERPL-SCNC: 19 MMOL/L — LOW (ref 22–31)
CREAT SERPL-MCNC: 1.26 MG/DL — SIGNIFICANT CHANGE UP (ref 0.5–1.3)
EGFR: 45 ML/MIN/1.73M2 — LOW
ESTIMATED AVERAGE GLUCOSE: 206 MG/DL — HIGH (ref 68–114)
GLUCOSE BLDC GLUCOMTR-MCNC: 187 MG/DL — HIGH (ref 70–99)
GLUCOSE BLDC GLUCOMTR-MCNC: 189 MG/DL — HIGH (ref 70–99)
GLUCOSE BLDC GLUCOMTR-MCNC: 197 MG/DL — HIGH (ref 70–99)
GLUCOSE BLDC GLUCOMTR-MCNC: 205 MG/DL — HIGH (ref 70–99)
GLUCOSE SERPL-MCNC: 202 MG/DL — HIGH (ref 70–99)
GRAM STN FLD: SIGNIFICANT CHANGE UP
HCT VFR BLD CALC: 34.5 % — SIGNIFICANT CHANGE UP (ref 34.5–45)
HGB BLD-MCNC: 11.5 G/DL — SIGNIFICANT CHANGE UP (ref 11.5–15.5)
MCHC RBC-ENTMCNC: 27.7 PG — SIGNIFICANT CHANGE UP (ref 27–34)
MCHC RBC-ENTMCNC: 33.3 GM/DL — SIGNIFICANT CHANGE UP (ref 32–36)
MCV RBC AUTO: 83.1 FL — SIGNIFICANT CHANGE UP (ref 80–100)
METHOD TYPE: SIGNIFICANT CHANGE UP
P AERUGINOSA DNA BLD POS NAA+NON-PROBE: SIGNIFICANT CHANGE UP
PLATELET # BLD AUTO: 111 K/UL — LOW (ref 150–400)
POTASSIUM SERPL-MCNC: 4.7 MMOL/L — SIGNIFICANT CHANGE UP (ref 3.5–5.3)
POTASSIUM SERPL-SCNC: 4.7 MMOL/L — SIGNIFICANT CHANGE UP (ref 3.5–5.3)
PROT SERPL-MCNC: 6 GM/DL — SIGNIFICANT CHANGE UP (ref 6–8.3)
RBC # BLD: 4.15 M/UL — SIGNIFICANT CHANGE UP (ref 3.8–5.2)
RBC # FLD: 14.3 % — SIGNIFICANT CHANGE UP (ref 10.3–14.5)
SODIUM SERPL-SCNC: 133 MMOL/L — LOW (ref 135–145)
SPECIMEN SOURCE: SIGNIFICANT CHANGE UP
WBC # BLD: 11.82 K/UL — HIGH (ref 3.8–10.5)
WBC # FLD AUTO: 11.82 K/UL — HIGH (ref 3.8–10.5)

## 2023-04-03 PROCEDURE — 99221 1ST HOSP IP/OBS SF/LOW 40: CPT

## 2023-04-03 PROCEDURE — 99233 SBSQ HOSP IP/OBS HIGH 50: CPT

## 2023-04-03 RX ORDER — MEROPENEM 1 G/30ML
1000 INJECTION INTRAVENOUS EVERY 8 HOURS
Refills: 0 | Status: DISCONTINUED | OUTPATIENT
Start: 2023-04-03 | End: 2023-04-10

## 2023-04-03 RX ADMIN — Medication 1: at 11:50

## 2023-04-03 RX ADMIN — Medication 3 UNIT(S): at 17:09

## 2023-04-03 RX ADMIN — TIGECYCLINE 105 MILLIGRAM(S): 50 INJECTION, POWDER, LYOPHILIZED, FOR SOLUTION INTRAVENOUS at 05:35

## 2023-04-03 RX ADMIN — Medication 1: at 08:03

## 2023-04-03 RX ADMIN — Medication 1000 MILLIGRAM(S): at 18:30

## 2023-04-03 RX ADMIN — Medication 3 UNIT(S): at 11:51

## 2023-04-03 RX ADMIN — MEROPENEM 100 MILLIGRAM(S): 1 INJECTION INTRAVENOUS at 22:07

## 2023-04-03 RX ADMIN — Medication 2: at 17:09

## 2023-04-03 RX ADMIN — Medication 3 UNIT(S): at 08:04

## 2023-04-03 RX ADMIN — INSULIN GLARGINE 5 UNIT(S): 100 INJECTION, SOLUTION SUBCUTANEOUS at 21:53

## 2023-04-03 NOTE — PROGRESS NOTE ADULT - ASSESSMENT
73 yo female w/PMHx of HTN, DM2, Lung Ca/Colon Ca (9 years ago), chronic lymphedema presents to  for worsening RLE redness.    Assessment:  Sepsis sec to RLE Wound  lymphedema  chronic severe venous stasis dermatitis  Cellulitis    Gram Negative Bacteremia  Hyponatremia  Uncontrolled DM  Elevated LFT's  ADRIAN    Plan  given 1x dose of Vanc and 1x dose of Levaquin in ED  tigecycline 100 mg IV 1, then 50 mg IV q12h  ID Following  Await Culture ID  reports anaphylaxis to cephalosporins (Keflex: 35 yrs ago)   pain management   Vascular Consult appreciated  wound care consult   Hold Metformin  Insulin  repeat Na  repeat LFT  Pt has a Port- should it be removed?   71 yo female w/PMHx of HTN, DM2, Lung Ca/Colon Ca (9 years ago), chronic lymphedema presents to  for worsening RLE redness.    Assessment:  Sepsis sec to RLE Wound  lymphedema  chronic severe venous stasis dermatitis  Cellulitis    Gram Negative Bacteremia  Hyponatremia - improved  Uncontrolled DM  Elevated LFT's  ADRIAN    Plan  given 1x dose of Vanc and 1x dose of Levaquin in ED  tigecycline 100 mg IV 1, then 50 mg IV q12h  ID Following  Await Culture ID  reports anaphylaxis to cephalosporins (Keflex: 35 yrs ago)   pain management   Vascular Consult appreciated  wound care consult   Hold Metformin  Insulin  follow Na  1200 cc po fluid restriction]  Pt has a Port- should it be removed?

## 2023-04-03 NOTE — PROGRESS NOTE ADULT - SUBJECTIVE AND OBJECTIVE BOX
SUBJECTIVE:    CHIEF COMPLAINT:  Patient is a 72y old  Female who presents with a chief complaint of sent in by MD (03 Apr 2023 07:10)      HPI:  71 yo female w/PMHx of HTN, DM2, Lung Ca/Colon Ca (9 years ago), chronic lymphedema presents to  for worsening RLE redness. Patient states she has home visiting nurse who came in today and noticed that wound has been worsening since 2 days ago. Patient reports of increased weeping from the wound, reports of generalized malaise for few days.  Visited Dr. Garcia last Thursday and said that her legs looked fine. Pt has also been visited by home care that visits her weekly. States that she ambulated to the bathroom and sat on the toilet but was unable to get up afterwards. No fall or injuries. Denies fever or chills. Possible increased drainage. (02 Apr 2023 12:47)      Interval HPI and Overnight Events: Pt states legs getting a little better. Denies chills or fever. No N/V/D    REVIEW OF SYSTEMS:  CONSTITUTIONAL: No weakness, fevers or chills  EYES/ENT: No visual changes;  No vertigo or throat pain   NECK: No pain or stiffness  RESPIRATORY: No cough, wheezing, hemoptysis; No shortness of breath  CARDIOVASCULAR: No chest pain or palpitations  GASTROINTESTINAL: No abdominal or epigastric pain. No nausea, vomiting, or hematemesis; No diarrhea or constipation. No melena or hematochezia.  GENITOURINARY: No dysuria, frequency or hematuria  NEUROLOGICAL: No numbness or weakness  SKIN: No itching, burning, rashes, or lesions   All other review of systems is negative unless indicated above    OBJECTIVE    Vital Signs Last 24 Hrs  T(C): 36.6 (03 Apr 2023 08:52), Max: 37.4 (02 Apr 2023 10:46)  T(F): 97.8 (03 Apr 2023 08:52), Max: 99.3 (02 Apr 2023 10:46)  HR: 85 (03 Apr 2023 08:52) (85 - 102)  BP: 107/39 (03 Apr 2023 08:52) (107/39 - 153/55)  BP(mean): 82 (02 Apr 2023 14:26) (82 - 82)  RR: 18 (03 Apr 2023 08:52) (18 - 20)  SpO2: 100% (03 Apr 2023 08:52) (99% - 100%)    Parameters below as of 03 Apr 2023 08:52  Patient On (Oxygen Delivery Method): room air    MEDICATIONS  (STANDING):  dextrose 5%. 1000 milliLiter(s) (50 mL/Hr) IV Continuous <Continuous>  dextrose 5%. 1000 milliLiter(s) (100 mL/Hr) IV Continuous <Continuous>  dextrose 50% Injectable 25 Gram(s) IV Push once  dextrose 50% Injectable 12.5 Gram(s) IV Push once  dextrose 50% Injectable 25 Gram(s) IV Push once  enoxaparin Injectable 30 milliGRAM(s) SubCutaneous every 12 hours  glucagon  Injectable 1 milliGRAM(s) IntraMuscular once  insulin glargine Injectable (LANTUS) 5 Unit(s) SubCutaneous at bedtime  insulin lispro (ADMELOG) corrective regimen sliding scale   SubCutaneous three times a day before meals  insulin lispro Injectable (ADMELOG) 3 Unit(s) SubCutaneous three times a day before meals  metoprolol succinate ER 25 milliGRAM(s) Oral daily  tigecycline IVPB 50 milliGRAM(s) IV Intermittent every 12 hours  tigecycline IVPB          LABS:                         11.5   11.82 )-----------( 111      ( 03 Apr 2023 07:33 )             34.5       133<L>  |  107  |  43<H>  ----------------------------<  202<H>  4.7   |  19<L>  |  1.26    Ca    9.5      03 Apr 2023 07:33    TPro  6.0  /  Alb  2.2<L>  /  TBili  1.2  /  DBili  0.5<H>  /  AST  150<H>  /  ALT  40  /  AlkPhos  91  04-03    PT/INR - ( 02 Apr 2023 09:55 )   PT: 15.2 sec;   INR: 1.31 ratio    PTT - ( 02 Apr 2023 09:55 )  PTT:30.1 sec    laLactate, Blood: 2.8 mmol/L (04.02.23 @ 14:07)        Specimen Source .Blood Blood-Peripheral   04-02 @ 09:56  Culture Results  Growth in aerobic bottle: Gram Negative Rods  ***Blood Panel PCR results on this specimen are available  approximately 3 hours after the Gram stain result.***  Gram stain, PCR, and/or culture results may not always  correspond due to difference in methodologies.  ************************************************************  This PCR assay was performed by multiplex PCR. This  Assay tests for 66 bacterial and resistance gene targets.  Please refer to the [facility] test directory  at [email or web address] for details.    URINE CULTURE    04-02 @ 09:56    CULTURE RESULTS   Growth in aerobic bottle: Gram Negative Rods  ***Blood Panel PCR results on this specimen are available  approximately 3 hours after the Gram stain result.***  Gram stain, PCR, and/or culture results may not always  correspond due to difference in methodologies.  ************************************************************  This PCR assay was performed by multiplex PCR. This  Assay tests for 66 bacterial and resistance gene targets.  Please refer to the [facility] test directory  at [email or web address] for details.          CAPILLARY BLOOD GLUCOSE  POCT Blood Glucose.: 189 mg/dL (03 Apr 2023 07:55)  POCT Blood Glucose.: 257 mg/dL (02 Apr 2023 21:32)  POCT Blood Glucose.: 308 mg/dL (02 Apr 2023 17:32)  POCT Blood Glucose.: 308 mg/dL (02 Apr 2023 16:16)   SUBJECTIVE:    CHIEF COMPLAINT:  Patient is a 72y old  Female who presents with a chief complaint of sent in by MD (03 Apr 2023 07:10)      HPI:  73 yo female w/PMHx of HTN, DM2, Lung Ca/Colon Ca (9 years ago), chronic lymphedema presents to  for worsening RLE redness. Patient states she has home visiting nurse who came in today and noticed that wound has been worsening since 2 days ago. Patient reports of increased weeping from the wound, reports of generalized malaise for few days.  Visited Dr. Garcia last Thursday and said that her legs looked fine. Pt has also been visited by home care that visits her weekly. States that she ambulated to the bathroom and sat on the toilet but was unable to get up afterwards. No fall or injuries. Denies fever or chills. Possible increased drainage. (02 Apr 2023 12:47)      Interval HPI and Overnight Events: Pt states legs getting a little better. Denies chills or fever. No N/V/D    REVIEW OF SYSTEMS:  CONSTITUTIONAL: No weakness, fevers or chills  EYES/ENT: No visual changes;  No vertigo or throat pain   NECK: No pain or stiffness  RESPIRATORY: No cough, wheezing, hemoptysis; No shortness of breath  CARDIOVASCULAR: No chest pain or palpitations  GASTROINTESTINAL: No abdominal or epigastric pain. No nausea, vomiting, or hematemesis; No diarrhea or constipation. No melena or hematochezia.  GENITOURINARY: No dysuria, frequency or hematuria  NEUROLOGICAL: No numbness or weakness  SKIN: No itching, burning, rashes, or lesions   All other review of systems is negative unless indicated above    OBJECTIVE    Vital Signs Last 24 Hrs  T(C): 36.6 (03 Apr 2023 08:52), Max: 37.4 (02 Apr 2023 10:46)  T(F): 97.8 (03 Apr 2023 08:52), Max: 99.3 (02 Apr 2023 10:46)  HR: 85 (03 Apr 2023 08:52) (85 - 102)  BP: 107/39 (03 Apr 2023 08:52) (107/39 - 153/55)  BP(mean): 82 (02 Apr 2023 14:26) (82 - 82)  RR: 18 (03 Apr 2023 08:52) (18 - 20)  SpO2: 100% (03 Apr 2023 08:52) (99% - 100%)    Parameters below as of 03 Apr 2023 08:52  Patient On (Oxygen Delivery Method): room air    MEDICATIONS  (STANDING):  dextrose 5%. 1000 milliLiter(s) (50 mL/Hr) IV Continuous <Continuous>  dextrose 5%. 1000 milliLiter(s) (100 mL/Hr) IV Continuous <Continuous>  dextrose 50% Injectable 25 Gram(s) IV Push once  dextrose 50% Injectable 12.5 Gram(s) IV Push once  dextrose 50% Injectable 25 Gram(s) IV Push once  enoxaparin Injectable 30 milliGRAM(s) SubCutaneous every 12 hours  glucagon  Injectable 1 milliGRAM(s) IntraMuscular once  insulin glargine Injectable (LANTUS) 5 Unit(s) SubCutaneous at bedtime  insulin lispro (ADMELOG) corrective regimen sliding scale   SubCutaneous three times a day before meals  insulin lispro Injectable (ADMELOG) 3 Unit(s) SubCutaneous three times a day before meals  metoprolol succinate ER 25 milliGRAM(s) Oral daily  tigecycline IVPB 50 milliGRAM(s) IV Intermittent every 12 hours  tigecycline IVPB          LABS:                         11.5   11.82 )-----------( 111      ( 03 Apr 2023 07:33 )             34.5       133<L>  |  107  |  43<H>  ----------------------------<  202<H>  4.7   |  19<L>  |  1.26    Ca    9.5      03 Apr 2023 07:33    TPro  6.0  /  Alb  2.2<L>  /  TBili  1.2  /  DBili  0.5<H>  /  AST  150<H>  /  ALT  40  /  AlkPhos  91  04-03    PT/INR - ( 02 Apr 2023 09:55 )   PT: 15.2 sec;   INR: 1.31 ratio    PTT - ( 02 Apr 2023 09:55 )  PTT:30.1 sec    laLactate, Blood: 4.3 ---> 2.8 mmol/L (04.02.23 @ 14:07)        Specimen Source .Blood Blood-Peripheral   04-02 @ 09:56  Culture Results  Growth in aerobic bottle: Gram Negative Rods  ***Blood Panel PCR results on this specimen are available  approximately 3 hours after the Gram stain result.***  Gram stain, PCR, and/or culture results may not always  correspond due to difference in methodologies.  ************************************************************  This PCR assay was performed by multiplex PCR. This  Assay tests for 66 bacterial and resistance gene targets.  Please refer to the [facility] [employer] test directory  at [email or web address] for details.    URINE CULTURE    04-02 @ 09:56    CULTURE RESULTS   Growth in aerobic bottle: Gram Negative Rods  ***Blood Panel PCR results on this specimen are available  approximately 3 hours after the Gram stain result.***  Gram stain, PCR, and/or culture results may not always  correspond due to difference in methodologies.  ************************************************************  This PCR assay was performed by multiplex PCR. This  Assay tests for 66 bacterial and resistance gene targets.  Please refer to the [facility] [employer] test directory  at [email or web address] for details.          CAPILLARY BLOOD GLUCOSE  POCT Blood Glucose.: 189 mg/dL (03 Apr 2023 07:55)  POCT Blood Glucose.: 257 mg/dL (02 Apr 2023 21:32)  POCT Blood Glucose.: 308 mg/dL (02 Apr 2023 17:32)  POCT Blood Glucose.: 308 mg/dL (02 Apr 2023 16:16)

## 2023-04-03 NOTE — CONSULT NOTE ADULT - SUBJECTIVE AND OBJECTIVE BOX
History of Present Illness:  Reason for Admission: sent in by MD  History of Present Illness:   73 yo female w/PMHx of HTN, DM2, Lung Ca/Colon Ca (9 years ago), chronic lymphedema presents to  for worsening RLE redness. Patient states she has home visiting nurse who came in today and noticed that wound has been worsening since 2 days ago. Patient reports of increased weeping from the wound, reports of generalized malaise for few days.  Visited Dr. Garcia last Thursday and said that her legs looked fine. Pt has also been visited by home care that visits her weekly. States that she ambulated to the bathroom and sat on the toilet but was unable to get up afterwards. No fall or injuries. Denies fever or chills. Possible increased drainage.    [facility]  Her limbs were about the same: lymphangitis with ulcers but no cellulitis; and, no fever or chills.  She again denies symptoms of sepsis such as fever or chills but notes weakness.       Review of Systems:  Review of Systems: REVIEW OF SYSTEMS:  CONSTITUTIONAL: (+) generalize malaise, (-) fevers, (-) chills  EYES/ENT: (-) visual changes,  (-) vertigo,  (-) throat pain   NECK:  (-) pain, (-) stiffness  RESPIRATORY:  (-) shortness of breath, (-) cough,  (-) wheezing,  (-) hemoptysis   CARDIOVASCULAR:  (-) chest pain, (-) palpitations  GASTROINTESTINAL:  (-) abdominal or epigastric pain, (-) nausea, (-) vomiting, (-) diarrhea, (-) constipation, (-) melena,  (-) hematemesis,  (-) hematochezia  GENITOURINARY: (-) dysuria, (-) frequency, (-) hematuria  NEUROLOGICAL: (-) numbness, (-) weakness  SKIN: (+) itching, (+)redness      Allergies and Intolerances:        Allergies:  	Keflex: Drug, Anaphylaxis, Originally Entered as [Unknown] reaction to [KEFLEX]    Home Medications:   * Patient Currently Takes Medications as of 02-Apr-2023 10:57 documented in Structured Notes  · 	metFORMIN 500 mg oral tablet: Last Dose Taken:  , 1 tab(s) orally 2 times a day    .    Patient History:    Social History:  · Substance use	No     Tobacco Screening:  · Core Measure Site	Yes  · Has the patient used tobacco in the past 30 days?	No    Risk Assessment:    Present on Admission:  Deep Venous Thrombosis	no  Pulmonary Embolus	no     HIV Screening:  · In accordance with [place] State law, we offer every patient who comes to our ED an HIV test. Would you like to be tested today?	Opt out

## 2023-04-03 NOTE — PROGRESS NOTE ADULT - ASSESSMENT
71 y/o female with h/o HTN, DM2, Lung Ca/Colon Ca (9 years ago), chronic lymphedema was admitted on 4/2 for worsening RLE redness. Patient states she has home visiting nurse who came in today and noticed that wound has been worsening since 2 days PTA. Patient reports of increased weeping from the wound, reports of generalized malaise for few days. Visited Dr. Garcia last Thursday and said that her legs looked fine. Pt has also been visited by home care that visits her weekly. States that she ambulated to the bathroom and sat on the toilet but was unable to get up afterwards. No fall or injuries. Denies fever or chills but has increased drainage. In ER she received levofloxacin.     1. Sepsis with PSAE. Low grade fever improivng. Left leg extensive cellulitis. LE chronic lymphedema. CRF stage 3. Allergy to keflex with anaphylaxis.   -leukocytosis  -BC x 2 noted  -on tigecycline 100 mg IV 1, then 50 mg IV q12h # 1  -tolerating abx well so far; no side effects noted  -new bacteremia with PSAE  -abx options d/w in tram of Keflex allergy - she had anaphylaxis 32 years ago, risks and benefits addressed  -start meropenem 1 gm IV q8h  -reason for abx use and side effects reviewed with patient; monitor BMP   -monitor closely in tram of PCN allergy history  -elevate legs  -local wounds care  -wound care consultation  -continue abx therapy  -monitor temps  -f/u CBC  -supportive care  2. Other issues:   -care per medicine

## 2023-04-03 NOTE — PROGRESS NOTE ADULT - SUBJECTIVE AND OBJECTIVE BOX
Date of service: 04-03-23 @ 15:55    Lying in bed in NAD  Reported with new bacteremia  No fever    ROS: denies dizziness, no HA, no SOB or cough, no abdominal pain, no diarrhea or constipation; no dysuria, no legs pain, no rashes    MEDICATIONS  (STANDING):  dextrose 5%. 1000 milliLiter(s) (50 mL/Hr) IV Continuous <Continuous>  dextrose 5%. 1000 milliLiter(s) (100 mL/Hr) IV Continuous <Continuous>  dextrose 50% Injectable 25 Gram(s) IV Push once  dextrose 50% Injectable 12.5 Gram(s) IV Push once  dextrose 50% Injectable 25 Gram(s) IV Push once  glucagon  Injectable 1 milliGRAM(s) IntraMuscular once  insulin glargine Injectable (LANTUS) 5 Unit(s) SubCutaneous at bedtime  insulin lispro (ADMELOG) corrective regimen sliding scale   SubCutaneous three times a day before meals  insulin lispro Injectable (ADMELOG) 3 Unit(s) SubCutaneous three times a day before meals  meropenem  IVPB 1000 milliGRAM(s) IV Intermittent every 8 hours  metoprolol succinate ER 25 milliGRAM(s) Oral daily    Vital Signs Last 24 Hrs  T(C): 36.6 (03 Apr 2023 08:52), Max: 36.9 (02 Apr 2023 23:09)  T(F): 97.8 (03 Apr 2023 08:52), Max: 98.5 (02 Apr 2023 23:09)  HR: 85 (03 Apr 2023 08:52) (85 - 91)  BP: 107/39 (03 Apr 2023 08:52) (107/39 - 127/53)  BP(mean): --  RR: 18 (03 Apr 2023 08:52) (18 - 18)  SpO2: 100% (03 Apr 2023 08:52) (100% - 100%)    Parameters below as of 03 Apr 2023 08:52  Patient On (Oxygen Delivery Method): room air     Physical exam:    Constitutional:  No acute distress  HEENT: NC/AT, EOMI, PERRLA, conjunctivae clear; ears and nose atraumatic  Neck: supple; thyroid not palpable  Back: no tenderness  Respiratory: respiratory effort normal; clear to auscultation  Cardiovascular: S1S2 regular, no murmurs  Abdomen: soft, not tender, not distended, positive BS; no liver or spleen organomegaly  Genitourinary: no suprapubic tenderness  Lymphatic: no LN palpable  Musculoskeletal: no muscle tenderness, no joint swelling or tenderness  Extremities: 2+ pedal edema with chronic lower legs skin changes  LLE erythema, edema, warmth and tenderness; open wounds with scant serous discharge   Neurological/ Psychiatric: AxOx3, judgement and insight normal; moving all extremities  Skin: no rashes; no palpable lesions    Labs: reviewed                        11.5   11.82 )-----------( 111      ( 03 Apr 2023 07:33 )             34.5     04-03    133<L>  |  107  |  43<H>  ----------------------------<  202<H>  4.7   |  19<L>  |  1.26    Ca    9.5      03 Apr 2023 07:33    TPro  6.0  /  Alb  2.2<L>  /  TBili  1.2  /  DBili  0.5<H>  /  AST  150<H>  /  ALT  40  /  AlkPhos  91  04-03                        13.4   20.35 )-----------( 150      ( 02 Apr 2023 09:55 )             38.5     04-02    127<L>  |  101  |  43<H>  ----------------------------<  358<H>  5.1   |  19<L>  |  1.74<H>    Ca    9.1      02 Apr 2023 14:07    TPro  7.5  /  Alb  3.1<L>  /  TBili  1.7<H>  /  DBili  x   /  AST  245<H>  /  ALT  48  /  AlkPhos  118  04-02     LIVER FUNCTIONS - ( 02 Apr 2023 09:55 )  Alb: 3.1 g/dL / Pro: 7.5 gm/dL / ALK PHOS: 118 U/L / ALT: 48 U/L / AST: 245 U/L / GGT: x             Culture - Blood (collected 02 Apr 2023 09:56)  Source: .Blood Blood-Peripheral  Gram Stain (03 Apr 2023 08:27):    Growth in aerobic bottle: Gram Negative Rods  Preliminary Report (03 Apr 2023 08:28):    Growth in aerobic bottle: Gram Negative Rods  Organism: Blood Culture PCR (03 Apr 2023 09:58)      Method Type: PCR      -  Pseudomonas aeruginosa: Detec        Radiology: all available radiological tests reviewed    < from: Xray Chest 1 View- PORTABLE-Routine (Xray Chest 1 View- PORTABLE-Routine .) (04.02.23 @ 10:45) >  Lines and tubes as above.  Mild pulmonary vascular congestion.  < end of copied text >      Advanced directives addressed: full resuscitation

## 2023-04-03 NOTE — CONSULT NOTE ADULT - ASSESSMENT
Physical Exam:   Physical Exam: Vital Signs Last 24 Hrs  T(C): 37.4 (02 Apr 2023 10:46), Max: 37.4 (02 Apr 2023 10:46)  T(F): 99.3 (02 Apr 2023 10:46), Max: 99.3 (02 Apr 2023 10:46)  HR: 102 (02 Apr 2023 10:46) (99 - 102)  BP: 153/55 (02 Apr 2023 10:46) (152/105 - 153/55)  BP(mean): 82 (02 Apr 2023 10:46) (82 - 82)  RR: 20 (02 Apr 2023 10:46) (18 - 20)  SpO2: 100% (02 Apr 2023 10:46) (100% - 100%)    Parameters below as of 02 Apr 2023 10:46  Patient On (Oxygen Delivery Method): room air        PHYSICAL EXAM   Gen: NAD, AA&Ox4, +morbidly obese   HEENT: head atraumatic and normocephalic, PEERLA, EOMI,  no gross abnormalities of ears, mucous membranes moist, no oral lesions, neck supple without masses/goiter/lymphadenopathy, no JVD  CVS: +s1, s2, regular rate and rhythm, no murmurs, rubs or gallops, no thrill, normal PMI  Pulmonary: normal respiratory effort, clear to auscultation b/l, no wheezes/crackles/rhonchi  Abdomen: soft, non-tender, non-distended, +bowel sounds in all 4 quadrants, no masses noted, no guarding or rigidity   Skin: lymphedematous limbs with ulcerations bilaterally but no gross cellulitis or lymphangitis; no fluctuance       Labs and Results:  Labs, Radiology, Cardiology, and Other Results: 13.4   20.35 )-----------( 150      ( 02 Apr 2023 09:55 )             38.5   04-02    126<L>  |  98  |  42<H>  ----------------------------<  352<H>  4.8   |  18<L>  |  2.08<H>    Ca    10.0      02 Apr 2023 09:55    TPro  7.5  /  Alb  3.1<L>  /  TBili  1.7<H>  /  DBili  x   /  AST  245<H>  /  ALT  48  /  AlkPhos  118  04-02    Lactate 4    < from: Xray Chest 1 View- PORTABLE-Routine (Xray Chest 1 View- PORTABLE-Routine .) (04.02.23 @ 10:45) >    Lines and tubes as above.  Mild pulmonary vascular congestion.      < end of copied text >    < from: Xray Chest 1 View- PORTABLE-Routine (Xray Chest 1 View- PORTABLE-Routine .) (04.02.23 @ 10:45) >    Right-sided chest port is in place.    Assessment and Plan:   · VTE Risk Assessment	VTE Assessment already completed for this visit  · Completed VTE Risk Assessment(s)	Refer to the Assessment tab to view/cancel completed assessment.     Assessment:  · Assessment	  73 yo female w/PMHx of HTN, DM2, Lung Ca/Colon Ca (9 years ago), chronic lymphedema presents to  for worsening RLE redness.        #Sepsis sec to RLE Wound, Hx of lymphedema, chronic severe venous stasis, Cellulitis    -given 1x dose of Vanc and 1x dose of Levaquin in ED   -reports anaphylaxis to cephalosporins (Keflex: 35 yrs ago)   -will defer further abx to ID as pt w/o leukocytosis,  -pain management   -wound care consult   continue antibiotics as per ID  continue elevation  tomorrow begin local care with xeroform gauze, gauze, secure with ACE wrap once per day    #Lactic Acidosis in the past without sepsis   pt is on Metformin; further f/up with Dr Dean    # Hyponatremia pt used to be on torsemide it is most likely diuretic related  repeat Na    #ADRIAN on CKD     # Elevated AST  repeat LFT    #DM uncontrolled  sliding scale    # Pt has a Port- should it be removed?  Med rec done by [employer] had only Metformin taking pt takes more than that I will resume prev hosp meds  Dr Dean aware will reume care in am

## 2023-04-04 LAB
-  AMIKACIN: SIGNIFICANT CHANGE UP
-  AZTREONAM: SIGNIFICANT CHANGE UP
-  CEFEPIME: SIGNIFICANT CHANGE UP
-  CEFTAZIDIME: SIGNIFICANT CHANGE UP
-  CIPROFLOXACIN: SIGNIFICANT CHANGE UP
-  GENTAMICIN: SIGNIFICANT CHANGE UP
-  IMIPENEM: SIGNIFICANT CHANGE UP
-  LEVOFLOXACIN: SIGNIFICANT CHANGE UP
-  MEROPENEM: SIGNIFICANT CHANGE UP
-  PIPERACILLIN/TAZOBACTAM: SIGNIFICANT CHANGE UP
-  TOBRAMYCIN: SIGNIFICANT CHANGE UP
ANION GAP SERPL CALC-SCNC: 7 MMOL/L — SIGNIFICANT CHANGE UP (ref 5–17)
BUN SERPL-MCNC: 49 MG/DL — HIGH (ref 7–23)
CALCIUM SERPL-MCNC: 9.5 MG/DL — SIGNIFICANT CHANGE UP (ref 8.5–10.1)
CHLORIDE SERPL-SCNC: 109 MMOL/L — HIGH (ref 96–108)
CO2 SERPL-SCNC: 19 MMOL/L — LOW (ref 22–31)
CREAT SERPL-MCNC: 1.14 MG/DL — SIGNIFICANT CHANGE UP (ref 0.5–1.3)
CULTURE RESULTS: SIGNIFICANT CHANGE UP
EGFR: 51 ML/MIN/1.73M2 — LOW
GLUCOSE BLDC GLUCOMTR-MCNC: 151 MG/DL — HIGH (ref 70–99)
GLUCOSE BLDC GLUCOMTR-MCNC: 201 MG/DL — HIGH (ref 70–99)
GLUCOSE BLDC GLUCOMTR-MCNC: 210 MG/DL — HIGH (ref 70–99)
GLUCOSE BLDC GLUCOMTR-MCNC: 235 MG/DL — HIGH (ref 70–99)
GLUCOSE SERPL-MCNC: 164 MG/DL — HIGH (ref 70–99)
METHOD TYPE: SIGNIFICANT CHANGE UP
ORGANISM # SPEC MICROSCOPIC CNT: SIGNIFICANT CHANGE UP
POTASSIUM SERPL-MCNC: 4.7 MMOL/L — SIGNIFICANT CHANGE UP (ref 3.5–5.3)
POTASSIUM SERPL-SCNC: 4.7 MMOL/L — SIGNIFICANT CHANGE UP (ref 3.5–5.3)
SODIUM SERPL-SCNC: 135 MMOL/L — SIGNIFICANT CHANGE UP (ref 135–145)
SPECIMEN SOURCE: SIGNIFICANT CHANGE UP

## 2023-04-04 PROCEDURE — 99233 SBSQ HOSP IP/OBS HIGH 50: CPT

## 2023-04-04 RX ADMIN — Medication 1: at 08:16

## 2023-04-04 RX ADMIN — Medication 2: at 16:24

## 2023-04-04 RX ADMIN — MEROPENEM 100 MILLIGRAM(S): 1 INJECTION INTRAVENOUS at 21:55

## 2023-04-04 RX ADMIN — MEROPENEM 100 MILLIGRAM(S): 1 INJECTION INTRAVENOUS at 05:01

## 2023-04-04 RX ADMIN — Medication 3 UNIT(S): at 11:53

## 2023-04-04 RX ADMIN — Medication 1000 MILLIGRAM(S): at 21:57

## 2023-04-04 RX ADMIN — Medication 2: at 11:53

## 2023-04-04 RX ADMIN — Medication 3 MILLIGRAM(S): at 21:55

## 2023-04-04 RX ADMIN — Medication 3 UNIT(S): at 08:17

## 2023-04-04 RX ADMIN — INSULIN GLARGINE 5 UNIT(S): 100 INJECTION, SOLUTION SUBCUTANEOUS at 21:55

## 2023-04-04 RX ADMIN — Medication 1000 MILLIGRAM(S): at 09:20

## 2023-04-04 RX ADMIN — Medication 1000 MILLIGRAM(S): at 08:30

## 2023-04-04 RX ADMIN — MEROPENEM 100 MILLIGRAM(S): 1 INJECTION INTRAVENOUS at 14:56

## 2023-04-04 RX ADMIN — Medication 3 UNIT(S): at 16:25

## 2023-04-04 NOTE — PROGRESS NOTE ADULT - MUSCULOSKELETAL
severe lymphedema, statsis dermatitis, Mild erythema, legs oozing
Bilateral legs with swelling, edema, severe bilateral stasis dermatitis, lichenification, Less oozing, mild erythema extending beyond the stasis changes,

## 2023-04-04 NOTE — PROGRESS NOTE ADULT - ASSESSMENT
73 yo female w/PMHx of HTN, DM2, Lung Ca/Colon Ca (9 years ago), chronic lymphedema presents to  for worsening RLE redness.    Assessment:  Sepsis sec to Bilateral wounds  lymphedema  chronic severe venous stasis dermatitis  Cellulitis  Bilateral Right > Left  Pseudomonas Bacteremia  Hyponatremia - resolved  Uncontrolled DM - improved on Lantus and Admelog  Elevated LFT's  ADRIAN    Plan  S/p 1x dose of Vanco and 1x dose of Levaquin in ED, 1 day of tigecycline, and now on Meropenem   ID Following  Await Culture sensitivities  reports anaphylaxis to cephalosporins (Keflex: 35 yrs ago)   pain management   Vascular Surgery following   D/c Metformin  Lantus and Admelog  follow Na  1200 cc po fluid restriction]  Pt has a Port- should it be removed?  Add physical therapy

## 2023-04-04 NOTE — PROGRESS NOTE ADULT - SUBJECTIVE AND OBJECTIVE BOX
SUBJECTIVE:    CHIEF COMPLAINT:  Patient is a 72y old  Female who presents with a chief complaint of sent in by MD (03 Apr 2023 15:54)      HPI:  73 yo female w/PMHx of HTN, DM2, Lung Ca/Colon Ca (9 years ago), chronic lymphedema presents to  for worsening RLE redness. Patient states she has home visiting nurse who came in today and noticed that wound has been worsening since 2 days ago. Patient reports of increased weeping from the wound, reports of generalized malaise for few days.  Visited Dr. Garcia last Thursday and said that her legs looked fine. Pt has also been visited by home care that visits her weekly. States that she ambulated to the bathroom and sat on the toilet but was unable to get up afterwards. No fall or injuries. Denies fever or chills. Possible increased drainage. (02 Apr 2023 12:47)      Interval HPI and Overnight Events:    REVIEW OF SYSTEMS:  CONSTITUTIONAL: No weakness, fevers or chills  EYES/ENT: No visual changes;  No vertigo or throat pain   NECK: No pain or stiffness  RESPIRATORY: No cough, wheezing, hemoptysis; No shortness of breath  CARDIOVASCULAR: No chest pain or palpitations  GASTROINTESTINAL: No abdominal or epigastric pain. No nausea, vomiting, or hematemesis; No diarrhea or constipation. No melena or hematochezia.  GENITOURINARY: No dysuria, frequency or hematuria  NEUROLOGICAL: No numbness or weakness  SKIN: No itching, burning, rashes, or lesions   All other review of systems is negative unless indicated above    OBJECTIVE    Vital Signs Last 24 Hrs  T(C): 36.3 (04 Apr 2023 06:59), Max: 37.2 (03 Apr 2023 21:30)  T(F): 97.3 (04 Apr 2023 06:59), Max: 98.9 (03 Apr 2023 21:30)  HR: 92 (04 Apr 2023 06:59) (88 - 96)  BP: 143/48 (04 Apr 2023 06:59) (118/51 - 143/48)  BP(mean): 76 (04 Apr 2023 05:01) (76 - 76)  RR: 18 (04 Apr 2023 06:59) (18 - 18)  SpO2: 97% (04 Apr 2023 06:59) (97% - 99%)    Parameters below as of 04 Apr 2023 06:59  Patient On (Oxygen Delivery Method): room air        MEDICATIONS  (STANDING):  dextrose 5%. 1000 milliLiter(s) (100 mL/Hr) IV Continuous <Continuous>  dextrose 5%. 1000 milliLiter(s) (50 mL/Hr) IV Continuous <Continuous>  dextrose 50% Injectable 25 Gram(s) IV Push once  dextrose 50% Injectable 12.5 Gram(s) IV Push once  dextrose 50% Injectable 25 Gram(s) IV Push once  glucagon  Injectable 1 milliGRAM(s) IntraMuscular once  insulin glargine Injectable (LANTUS) 5 Unit(s) SubCutaneous at bedtime  insulin lispro (ADMELOG) corrective regimen sliding scale   SubCutaneous three times a day before meals  insulin lispro Injectable (ADMELOG) 3 Unit(s) SubCutaneous three times a day before meals  meropenem  IVPB 1000 milliGRAM(s) IV Intermittent every 8 hours  metoprolol succinate ER 25 milliGRAM(s) Oral daily      LABS:                         11.5   11.82 )-----------( 111      ( 03 Apr 2023 07:33 )             34.5     04-04    135  |  109<H>  |  49<H>  ----------------------------<  164<H>  4.7   |  19<L>  |  1.14    Ca    9.5      04 Apr 2023 07:34    TPro  6.0  /  Alb  2.2<L>  /  TBili  1.2  /  DBili  0.5<H>  /  AST  150<H>  /  ALT  40  /  AlkPhos  91  04-03    PT/INR - ( 02 Apr 2023 09:55 )   PT: 15.2 sec;   INR: 1.31 ratio    PTT - ( 02 Apr 2023 09:55 )  PTT:30.1 sec    Specimen Source .Blood Blood-Peripheral   04-02 @ 09:56  Culture Results  Growth in aerobic bottle: Pseudomonas aeruginosa  ***Blood Panel PCR results on this specimen are available  approximately 3 hours after the Gram stain result.***  Gram stain, PCR, and/or culture results may not always  correspond due to differencein methodologies.  ************************************************************  This PCR assay was performed by multiplex PCR. This  Assay tests for 66 bacterial and resistance gene targets.  Please refer to the [facility] test directory  at [email or web address] for details.    URINE CULTURE    04-02 @ 09:56    CULTURE RESULTS   Growth in aerobic bottle: Pseudomonas aeruginosa  ***Blood Panel PCR results on this specimen are available  approximately 3 hours after the Gram stain result.***  Gram stain, PCR, and/or culture results may not always  correspond due to differencein methodologies.  ************************************************************  This PCR assay was performed by multiplex PCR. This  Assay tests for 66 bacterial and resistance gene targets.  Please refer to the [facility] test directory  at [email or web address] for details.    CAPILLARY BLOOD GLUCOSE  POCT Blood Glucose.: 151 mg/dL (04 Apr 2023 07:59)  POCT Blood Glucose.: 197 mg/dL (03 Apr 2023 21:13)  POCT Blood Glucose.: 205 mg/dL (03 Apr 2023 16:23)  POCT Blood Glucose.: 187 mg/dL (03 Apr 2023 11:46)

## 2023-04-04 NOTE — PROGRESS NOTE ADULT - ASSESSMENT
71 y/o female with h/o HTN, DM2, Lung Ca/Colon Ca (9 years ago), chronic lymphedema was admitted on 4/2 for worsening RLE redness. Patient states she has home visiting nurse who came in today and noticed that wound has been worsening since 2 days PTA. Patient reports of increased weeping from the wound, reports of generalized malaise for few days. Visited Dr. Garcia last Thursday and said that her legs looked fine. Pt has also been visited by home care that visits her weekly. States that she ambulated to the bathroom and sat on the toilet but was unable to get up afterwards. No fall or injuries. Denies fever or chills but has increased drainage. In ER she received levofloxacin.     1. Sepsis with PSAE. Left leg extensive cellulitis. LE chronic lymphedema. CRF stage 3. Allergy to keflex with anaphylaxis.   -leukocytosis  -BC x 2 noted  -on tigecycline 100 mg IV 1, then 50 mg IV q12h # 1  -tolerating abx well so far; no side effects noted  -new bacteremia with PSAE  -on meropenem 1 gm IV q8h # 2  -tolerating abx well so far; no side effects noted  -monitor closely in tram of PCN allergy history  -elevate legs  -local wounds care  -f/u cultures  -continue abx therapy  -monitor temps  -f/u CBC  -supportive care  2. Other issues:   -care per medicine

## 2023-04-04 NOTE — PROGRESS NOTE ADULT - SUBJECTIVE AND OBJECTIVE BOX
Date of service: 04-04-23 @ 12:43    Lying in bed in NAD  Tolerated meropenem well overnight  Legs feel less swollen  Has local legs pain    ROS: no fever or chills; denies dizziness, no HA, no SOB or cough, no abdominal pain, no diarrhea or constipation; no dysuria    MEDICATIONS  (STANDING):  dextrose 5%. 1000 milliLiter(s) (50 mL/Hr) IV Continuous <Continuous>  dextrose 5%. 1000 milliLiter(s) (100 mL/Hr) IV Continuous <Continuous>  dextrose 50% Injectable 25 Gram(s) IV Push once  dextrose 50% Injectable 12.5 Gram(s) IV Push once  dextrose 50% Injectable 25 Gram(s) IV Push once  glucagon  Injectable 1 milliGRAM(s) IntraMuscular once  insulin glargine Injectable (LANTUS) 5 Unit(s) SubCutaneous at bedtime  insulin lispro (ADMELOG) corrective regimen sliding scale   SubCutaneous three times a day before meals  insulin lispro Injectable (ADMELOG) 3 Unit(s) SubCutaneous three times a day before meals  meropenem  IVPB 1000 milliGRAM(s) IV Intermittent every 8 hours  metoprolol succinate ER 25 milliGRAM(s) Oral daily    Vital Signs Last 24 Hrs  T(C): 36.3 (04 Apr 2023 06:59), Max: 37.2 (03 Apr 2023 21:30)  T(F): 97.3 (04 Apr 2023 06:59), Max: 98.9 (03 Apr 2023 21:30)  HR: 92 (04 Apr 2023 06:59) (88 - 96)  BP: 143/48 (04 Apr 2023 06:59) (118/51 - 143/48)  BP(mean): 76 (04 Apr 2023 05:01) (76 - 76)  RR: 18 (04 Apr 2023 06:59) (18 - 18)  SpO2: 97% (04 Apr 2023 06:59) (97% - 99%)    Parameters below as of 04 Apr 2023 06:59  Patient On (Oxygen Delivery Method): room air     Physical exam:    Constitutional:  No acute distress  HEENT: NC/AT, EOMI, PERRLA, conjunctivae clear; ears and nose atraumatic  Neck: supple; thyroid not palpable  Back: no tenderness  Respiratory: respiratory effort normal; clear to auscultation  Cardiovascular: S1S2 regular, no murmurs  Abdomen: soft, not tender, not distended, positive BS; no liver or spleen organomegaly  Genitourinary: no suprapubic tenderness  Lymphatic: no LN palpable  Musculoskeletal: no muscle tenderness, no joint swelling or tenderness  Extremities: 2+ pedal edema with chronic lower legs skin changes  LLE erythema, edema, warmth and tenderness; open wounds with scant serous discharge  - slightly better  Neurological/ Psychiatric: AxOx3, judgement and insight normal; moving all extremities  Skin: no rashes; no palpable lesions    Labs: reviewed                        11.5   11.82 )-----------( 111      ( 03 Apr 2023 07:33 )             34.5     04-03    133<L>  |  107  |  43<H>  ----------------------------<  202<H>  4.7   |  19<L>  |  1.26    Ca    9.5      03 Apr 2023 07:33    TPro  6.0  /  Alb  2.2<L>  /  TBili  1.2  /  DBili  0.5<H>  /  AST  150<H>  /  ALT  40  /  AlkPhos  91  04-03                        13.4   20.35 )-----------( 150      ( 02 Apr 2023 09:55 )             38.5     04-02    127<L>  |  101  |  43<H>  ----------------------------<  358<H>  5.1   |  19<L>  |  1.74<H>    Ca    9.1      02 Apr 2023 14:07    TPro  7.5  /  Alb  3.1<L>  /  TBili  1.7<H>  /  DBili  x   /  AST  245<H>  /  ALT  48  /  AlkPhos  118  04-02     LIVER FUNCTIONS - ( 02 Apr 2023 09:55 )  Alb: 3.1 g/dL / Pro: 7.5 gm/dL / ALK PHOS: 118 U/L / ALT: 48 U/L / AST: 245 U/L / GGT: x             Culture - Blood (collected 02 Apr 2023 09:56)  Source: .Blood Blood-Peripheral  Gram Stain (03 Apr 2023 08:27):    Growth in aerobic bottle: Gram Negative Rods  Preliminary Report (03 Apr 2023 08:28):    Growth in aerobic bottle: Gram Negative Rods  Organism: Blood Culture PCR (03 Apr 2023 09:58)      Method Type: PCR      -  Pseudomonas aeruginosa: Detec        Radiology: all available radiological tests reviewed    < from: Xray Chest 1 View- PORTABLE-Routine (Xray Chest 1 View- PORTABLE-Routine .) (04.02.23 @ 10:45) >  Lines and tubes as above.  Mild pulmonary vascular congestion.  < end of copied text >      Advanced directives addressed: full resuscitation

## 2023-04-05 LAB
GLUCOSE BLDC GLUCOMTR-MCNC: 157 MG/DL — HIGH (ref 70–99)
GLUCOSE BLDC GLUCOMTR-MCNC: 170 MG/DL — HIGH (ref 70–99)
GLUCOSE BLDC GLUCOMTR-MCNC: 200 MG/DL — HIGH (ref 70–99)
GLUCOSE BLDC GLUCOMTR-MCNC: 215 MG/DL — HIGH (ref 70–99)
HCT VFR BLD CALC: 34.6 % — SIGNIFICANT CHANGE UP (ref 34.5–45)
HGB BLD-MCNC: 11.4 G/DL — LOW (ref 11.5–15.5)
LACTATE SERPL-SCNC: 1.2 MMOL/L — SIGNIFICANT CHANGE UP (ref 0.7–2)
MCHC RBC-ENTMCNC: 27.7 PG — SIGNIFICANT CHANGE UP (ref 27–34)
MCHC RBC-ENTMCNC: 32.9 GM/DL — SIGNIFICANT CHANGE UP (ref 32–36)
MCV RBC AUTO: 84.2 FL — SIGNIFICANT CHANGE UP (ref 80–100)
PLATELET # BLD AUTO: 132 K/UL — LOW (ref 150–400)
RBC # BLD: 4.11 M/UL — SIGNIFICANT CHANGE UP (ref 3.8–5.2)
RBC # FLD: 14.5 % — SIGNIFICANT CHANGE UP (ref 10.3–14.5)
WBC # BLD: 7.45 K/UL — SIGNIFICANT CHANGE UP (ref 3.8–10.5)
WBC # FLD AUTO: 7.45 K/UL — SIGNIFICANT CHANGE UP (ref 3.8–10.5)

## 2023-04-05 PROCEDURE — 99233 SBSQ HOSP IP/OBS HIGH 50: CPT

## 2023-04-05 RX ORDER — INSULIN GLARGINE 100 [IU]/ML
6 INJECTION, SOLUTION SUBCUTANEOUS AT BEDTIME
Refills: 0 | Status: DISCONTINUED | OUTPATIENT
Start: 2023-04-05 | End: 2023-04-10

## 2023-04-05 RX ADMIN — MEROPENEM 100 MILLIGRAM(S): 1 INJECTION INTRAVENOUS at 05:42

## 2023-04-05 RX ADMIN — Medication 1000 MILLIGRAM(S): at 13:40

## 2023-04-05 RX ADMIN — MEROPENEM 100 MILLIGRAM(S): 1 INJECTION INTRAVENOUS at 21:08

## 2023-04-05 RX ADMIN — Medication 1: at 07:41

## 2023-04-05 RX ADMIN — Medication 3 UNIT(S): at 11:35

## 2023-04-05 RX ADMIN — INSULIN GLARGINE 6 UNIT(S): 100 INJECTION, SOLUTION SUBCUTANEOUS at 21:08

## 2023-04-05 RX ADMIN — TRAMADOL HYDROCHLORIDE 50 MILLIGRAM(S): 50 TABLET ORAL at 22:45

## 2023-04-05 RX ADMIN — TRAMADOL HYDROCHLORIDE 50 MILLIGRAM(S): 50 TABLET ORAL at 21:13

## 2023-04-05 RX ADMIN — Medication 1000 MILLIGRAM(S): at 13:07

## 2023-04-05 RX ADMIN — Medication 3 UNIT(S): at 07:41

## 2023-04-05 RX ADMIN — Medication 3 UNIT(S): at 16:39

## 2023-04-05 RX ADMIN — MEROPENEM 100 MILLIGRAM(S): 1 INJECTION INTRAVENOUS at 13:07

## 2023-04-05 RX ADMIN — Medication 1: at 11:34

## 2023-04-05 RX ADMIN — Medication 1: at 16:38

## 2023-04-05 NOTE — PROGRESS NOTE ADULT - SUBJECTIVE AND OBJECTIVE BOX
SUBJECTIVE:    CHIEF COMPLAINT:  Patient is a 72y old  Female who presents with a chief complaint of sent in by MD (04 Apr 2023 12:42)      HPI:  73 yo female w/PMHx of HTN, DM2, Lung Ca/Colon Ca (9 years ago), chronic lymphedema presents to  for worsening RLE redness. Patient states she has home visiting nurse who came in today and noticed that wound has been worsening since 2 days ago. Patient reports of increased weeping from the wound, reports of generalized malaise for few days.  Visited Dr. Garcia last Thursday and said that her legs looked fine. Pt has also been visited by home care that visits her weekly. States that she ambulated to the bathroom and sat on the toilet but was unable to get up afterwards. No fall or injuries. Denies fever or chills. Possible increased drainage. (02 Apr 2023 12:47)      Interval HPI and Overnight Events: Pt feeling better.    REVIEW OF SYSTEMS:  CONSTITUTIONAL: No weakness, fevers or chills  EYES/ENT: No visual changes;  No vertigo or throat pain   NECK: No pain or stiffness  RESPIRATORY: No cough, wheezing, hemoptysis; No shortness of breath  CARDIOVASCULAR: No chest pain or palpitations  GASTROINTESTINAL: No abdominal or epigastric pain. No nausea, vomiting, or hematemesis; No diarrhea or constipation. No melena or hematochezia.  GENITOURINARY: No dysuria, frequency or hematuria  NEUROLOGICAL: No numbness or weakness  SKIN: No itching, burning, rashes, or lesions   All other review of systems is negative unless indicated above    OBJECTIVE    Vital Signs Last 24 Hrs  T(C): 36.9 (05 Apr 2023 07:09), Max: 37.1 (04 Apr 2023 15:00)  T(F): 98.4 (05 Apr 2023 07:09), Max: 98.8 (04 Apr 2023 15:00)  HR: 81 (05 Apr 2023 07:09) (81 - 90)  BP: 108/39 (05 Apr 2023 07:09) (108/39 - 140/44)  BP(mean): --  RR: 18 (05 Apr 2023 07:09) (18 - 18)  SpO2: 98% (05 Apr 2023 07:09) (97% - 98%)    Parameters below as of 05 Apr 2023 07:09  Patient On (Oxygen Delivery Method): room air        MEDICATIONS  (STANDING):  dextrose 5%. 1000 milliLiter(s) (50 mL/Hr) IV Continuous <Continuous>  dextrose 5%. 1000 milliLiter(s) (100 mL/Hr) IV Continuous <Continuous>  dextrose 50% Injectable 25 Gram(s) IV Push once  dextrose 50% Injectable 12.5 Gram(s) IV Push once  dextrose 50% Injectable 25 Gram(s) IV Push once  glucagon  Injectable 1 milliGRAM(s) IntraMuscular once  insulin glargine Injectable (LANTUS) 6 Unit(s) SubCutaneous at bedtime  insulin lispro (ADMELOG) corrective regimen sliding scale   SubCutaneous three times a day before meals  insulin lispro Injectable (ADMELOG) 3 Unit(s) SubCutaneous three times a day before meals  meropenem  IVPB 1000 milliGRAM(s) IV Intermittent every 8 hours  metoprolol succinate ER 25 milliGRAM(s) Oral daily      LABS:     04-04    135  |  109<H>  |  49<H>  ----------------------------<  164<H>  4.7   |  19<L>  |  1.14    Ca    9.5      04 Apr 2023 07:34    Specimen Source .Blood Blood-Peripheral   04-02 @ 09:56  Culture Results  Growth in aerobic bottle: Pseudomonas aeruginosa  ***Blood Panel PCR results on this specimen are available  approximately 3 hours after the Gram stain result.***  Gram stain, PCR, and/or culture results may not always  correspond due to differencein methodologies.  ************************************************************  This PCR assay was performed by multiplex PCR. This  Assay tests for 66 bacterial and resistance gene targets.  Please refer to the [facility] test directory  at [email or web address] for details.    CAPILLARY BLOOD GLUCOSE  POCT Blood Glucose.: 157 mg/dL (05 Apr 2023 07:35)  POCT Blood Glucose.: 235 mg/dL (04 Apr 2023 21:18)  POCT Blood Glucose.: 201 mg/dL (04 Apr 2023 16:00)  POCT Blood Glucose.: 210 mg/dL (04 Apr 2023 11:27)

## 2023-04-05 NOTE — PROGRESS NOTE ADULT - ASSESSMENT
73 yo female w/PMHx of HTN, DM2, Lung Ca/Colon Ca (9 years ago), chronic lymphedema presents to  for worsening RLE redness.    Assessment:  Sepsis sec to Bilateral wounds improved  lymphedema  chronic severe venous stasis dermatitis  Cellulitis  Bilateral Right > Left - slightly better  Pseudomonas Bacteremia - sensitive to meropenem   Hyponatremia - resolved  Uncontrolled DM - improved on Lantus and Admelog  Elevated LFT's  ADRIAN  Thrombocytopenia    Plan  S/p 1x dose of Vanco and 1x dose of Levaquin in ED, 1 day of tigecycline, and now on Meropenem   ID Following  Await Culture sensitivities  reports anaphylaxis to cephalosporins (Keflex: 35 yrs ago)   pain management   Vascular Surgery following   Off Metformin  Increase dose of Lantus and continue Admelog  follow Na  1200 cc po fluid restriction]  Pt has a Port- ID states not need to remove at this time  Add physical therapy

## 2023-04-06 LAB
GLUCOSE BLDC GLUCOMTR-MCNC: 131 MG/DL — HIGH (ref 70–99)
GLUCOSE BLDC GLUCOMTR-MCNC: 137 MG/DL — HIGH (ref 70–99)
GLUCOSE BLDC GLUCOMTR-MCNC: 188 MG/DL — HIGH (ref 70–99)
GLUCOSE BLDC GLUCOMTR-MCNC: 210 MG/DL — HIGH (ref 70–99)

## 2023-04-06 PROCEDURE — 99233 SBSQ HOSP IP/OBS HIGH 50: CPT

## 2023-04-06 RX ADMIN — Medication 1000 MILLIGRAM(S): at 06:43

## 2023-04-06 RX ADMIN — Medication 1: at 11:32

## 2023-04-06 RX ADMIN — TRAMADOL HYDROCHLORIDE 50 MILLIGRAM(S): 50 TABLET ORAL at 21:03

## 2023-04-06 RX ADMIN — MEROPENEM 100 MILLIGRAM(S): 1 INJECTION INTRAVENOUS at 05:17

## 2023-04-06 RX ADMIN — Medication 3 UNIT(S): at 16:25

## 2023-04-06 RX ADMIN — Medication 3 UNIT(S): at 11:32

## 2023-04-06 RX ADMIN — INSULIN GLARGINE 6 UNIT(S): 100 INJECTION, SOLUTION SUBCUTANEOUS at 21:00

## 2023-04-06 RX ADMIN — Medication 1000 MILLIGRAM(S): at 06:08

## 2023-04-06 RX ADMIN — MEROPENEM 100 MILLIGRAM(S): 1 INJECTION INTRAVENOUS at 21:00

## 2023-04-06 RX ADMIN — MEROPENEM 100 MILLIGRAM(S): 1 INJECTION INTRAVENOUS at 13:10

## 2023-04-06 RX ADMIN — Medication 3 UNIT(S): at 08:33

## 2023-04-06 RX ADMIN — TRAMADOL HYDROCHLORIDE 50 MILLIGRAM(S): 50 TABLET ORAL at 22:09

## 2023-04-06 NOTE — PHYSICAL THERAPY INITIAL EVALUATION ADULT - RANGE OF MOTION EXAMINATION, REHAB EVAL
BLE slightly restricted d/t BLE edema/bilateral upper extremity ROM was WFL (within functional limits)/bilateral lower extremity ROM was WFL (within functional limits)

## 2023-04-06 NOTE — PHYSICAL THERAPY INITIAL EVALUATION ADULT - PATIENT PROFILE REVIEW, REHAB EVAL
PT evaluate and treat orders received: no formal activity orders. Consult with ALY OKEEFE, pt may participate in PT evaluation./yes

## 2023-04-06 NOTE — PROGRESS NOTE ADULT - ASSESSMENT
73 y/o female with h/o HTN, DM2, Lung Ca/Colon Ca (9 years ago), chronic lymphedema was admitted on 4/2 for worsening RLE redness. Patient states she has home visiting nurse who came in today and noticed that wound has been worsening since 2 days PTA. Patient reports of increased weeping from the wound, reports of generalized malaise for few days. Visited Dr. Garcia last Thursday and said that her legs looked fine. Pt has also been visited by home care that visits her weekly. States that she ambulated to the bathroom and sat on the toilet but was unable to get up afterwards. No fall or injuries. Denies fever or chills but has increased drainage. In ER she received levofloxacin.     1. Sepsis with PSAE. Left leg extensive cellulitis. LE chronic lymphedema. CRF stage 3. Allergy to keflex with anaphylaxis.   -leukocytosis  -BC x 2 noted  -s/p tigecycline 100 mg IV 1, then 50 mg IV q12h # 1  -on meropenem 1 gm IV q8h # 4  -tolerating abx well so far; no side effects noted  -monitor closely in tram of PCN allergy history  -repeat BC x 2  -elevate legs  -local wounds care  -f/u cultures  -continue abx therapy  -monitor temps  -f/u CBC  -supportive care  2. Other issues:   -care per medicine

## 2023-04-06 NOTE — PHYSICAL THERAPY INITIAL EVALUATION ADULT - GENERAL OBSERVATIONS, REHAB EVAL
Pt received semisupine in bed with +hovermat, +primafit, in NAD. Pt agreeable to participate in PT evaluation. Pt received semisupine in bed with +hovermat, +PIV, +primafit, in NAD. Pt agreeable to participate in PT evaluation.

## 2023-04-06 NOTE — PROGRESS NOTE ADULT - SUBJECTIVE AND OBJECTIVE BOX
SUBJECTIVE:    CHIEF COMPLAINT:  Patient is a 72y old  Female who presents with a chief complaint of sent in by MD (06 Apr 2023 11:17)      HPI:  71 yo female w/PMHx of HTN, DM2, Lung Ca/Colon Ca (9 years ago), chronic lymphedema presents to  for worsening RLE redness. Patient states she has home visiting nurse who came in today and noticed that wound has been worsening since 2 days ago. Patient reports of increased weeping from the wound, reports of generalized malaise for few days.  Visited Dr. Garcia last Thursday and said that her legs looked fine. Pt has also been visited by home care that visits her weekly. States that she ambulated to the bathroom and sat on the toilet but was unable to get up afterwards. No fall or injuries. Denies fever or chills. Possible increased drainage. (02 Apr 2023 12:47)      Interval HPI and Overnight Events: Pt doing well. Tolerating the antibiotics    REVIEW OF SYSTEMS:  CONSTITUTIONAL: No weakness, fevers or chills  EYES/ENT: No visual changes;  No vertigo or throat pain   NECK: No pain or stiffness  RESPIRATORY: No cough, wheezing, hemoptysis; No shortness of breath  CARDIOVASCULAR: No chest pain or palpitations  GASTROINTESTINAL: No abdominal or epigastric pain. No nausea, vomiting, or hematemesis; No diarrhea or constipation. No melena or hematochezia.  GENITOURINARY: No dysuria, frequency or hematuria  NEUROLOGICAL: No numbness or weakness  SKIN: No itching, burning, rashes, or lesions   All other review of systems is negative unless indicated above    OBJECTIVE    Vital Signs Last 24 Hrs  T(C): 36.6 (06 Apr 2023 09:35), Max: 36.9 (05 Apr 2023 23:02)  T(F): 97.9 (06 Apr 2023 09:35), Max: 98.5 (05 Apr 2023 23:02)  HR: 79 (06 Apr 2023 09:35) (79 - 85)  BP: 108/39 (06 Apr 2023 09:35) (108/39 - 136/49)  BP(mean): --  RR: 18 (06 Apr 2023 09:35) (16 - 18)  SpO2: 99% (06 Apr 2023 09:35) (97% - 99%)    Parameters below as of 06 Apr 2023 09:35  Patient On (Oxygen Delivery Method): room air        MEDICATIONS  (STANDING):  dextrose 5%. 1000 milliLiter(s) (50 mL/Hr) IV Continuous <Continuous>  dextrose 5%. 1000 milliLiter(s) (100 mL/Hr) IV Continuous <Continuous>  dextrose 50% Injectable 25 Gram(s) IV Push once  dextrose 50% Injectable 12.5 Gram(s) IV Push once  dextrose 50% Injectable 25 Gram(s) IV Push once  glucagon  Injectable 1 milliGRAM(s) IntraMuscular once  insulin glargine Injectable (LANTUS) 6 Unit(s) SubCutaneous at bedtime  insulin lispro (ADMELOG) corrective regimen sliding scale   SubCutaneous three times a day before meals  insulin lispro Injectable (ADMELOG) 3 Unit(s) SubCutaneous three times a day before meals  meropenem  IVPB 1000 milliGRAM(s) IV Intermittent every 8 hours  metoprolol succinate ER 25 milliGRAM(s) Oral daily      LABS:                         11.4   7.45  )-----------( 132      ( 05 Apr 2023 11:42 )             34.6     Specimen Source .Blood Blood-Peripheral   04-02 @ 09:56  Culture Results  Growth in aerobic bottle: Pseudomonas aeruginosa  ***Blood Panel PCR results on this specimen are available  approximately 3 hours after the Gram stain result.***  Gram stain, PCR, and/or culture results may not always  correspond due to differencein methodologies.  ************************************************************  This PCR assay was performed by multiplex PCR. This  Assay tests for 66 bacterial and resistance gene targets.  Please refer to the [facility] test directory  at [email or web address] for details.

## 2023-04-06 NOTE — PHYSICAL THERAPY INITIAL EVALUATION ADULT - PERTINENT HX OF CURRENT PROBLEM, REHAB EVAL
Pt is a 72 year old female presenting to  for worsening RLE redness. Admitted with sepsis, cellulitis. BLE chronic lymphedema. PMH listed below.

## 2023-04-06 NOTE — PROGRESS NOTE ADULT - SUBJECTIVE AND OBJECTIVE BOX
Date of service: 04-06-23 @ 11:18    Lying in bed in NAD  Legs feel less swollen  No fever  Legs feel tender    ROS: no fever or chills; denies dizziness, no HA, no SOB or cough, no abdominal pain, no diarrhea or constipation; no dysuria    MEDICATIONS  (STANDING):  dextrose 5%. 1000 milliLiter(s) (50 mL/Hr) IV Continuous <Continuous>  dextrose 5%. 1000 milliLiter(s) (100 mL/Hr) IV Continuous <Continuous>  dextrose 50% Injectable 25 Gram(s) IV Push once  dextrose 50% Injectable 12.5 Gram(s) IV Push once  dextrose 50% Injectable 25 Gram(s) IV Push once  glucagon  Injectable 1 milliGRAM(s) IntraMuscular once  insulin glargine Injectable (LANTUS) 6 Unit(s) SubCutaneous at bedtime  insulin lispro (ADMELOG) corrective regimen sliding scale   SubCutaneous three times a day before meals  insulin lispro Injectable (ADMELOG) 3 Unit(s) SubCutaneous three times a day before meals  meropenem  IVPB 1000 milliGRAM(s) IV Intermittent every 8 hours  metoprolol succinate ER 25 milliGRAM(s) Oral daily    Vital Signs Last 24 Hrs  T(C): 36.6 (06 Apr 2023 09:35), Max: 36.9 (05 Apr 2023 23:02)  T(F): 97.9 (06 Apr 2023 09:35), Max: 98.5 (05 Apr 2023 23:02)  HR: 79 (06 Apr 2023 09:35) (79 - 85)  BP: 108/39 (06 Apr 2023 09:35) (108/39 - 136/49)  BP(mean): --  RR: 18 (06 Apr 2023 09:35) (16 - 18)  SpO2: 99% (06 Apr 2023 09:35) (97% - 99%)    Parameters below as of 06 Apr 2023 09:35  Patient On (Oxygen Delivery Method): room air     Physical exam:    Constitutional:  No acute distress  HEENT: NC/AT, EOMI, PERRLA, conjunctivae clear; ears and nose atraumatic  Neck: supple; thyroid not palpable  Back: no tenderness  Respiratory: respiratory effort normal; clear to auscultation  Cardiovascular: S1S2 regular, no murmurs  Abdomen: soft, not tender, not distended, positive BS; no liver or spleen organomegaly  Genitourinary: no suprapubic tenderness  Lymphatic: no LN palpable  Musculoskeletal: no muscle tenderness, no joint swelling or tenderness  Extremities: 2+ pedal edema with chronic lower legs skin changes  LLE erythema, edema, warmth and tenderness; open wounds with scant serous discharge  - slightly better  Neurological/ Psychiatric: AxOx3, judgement and insight normal; moving all extremities  Skin: no rashes; no palpable lesions    Labs: reviewed                        11.4   7.45  )-----------( 132      ( 05 Apr 2023 11:42 )             34.6                         11.5   11.82 )-----------( 111      ( 03 Apr 2023 07:33 )             34.5     04-03    133<L>  |  107  |  43<H>  ----------------------------<  202<H>  4.7   |  19<L>  |  1.26    Ca    9.5      03 Apr 2023 07:33    TPro  6.0  /  Alb  2.2<L>  /  TBili  1.2  /  DBili  0.5<H>  /  AST  150<H>  /  ALT  40  /  AlkPhos  91  04-03                        13.4   20.35 )-----------( 150      ( 02 Apr 2023 09:55 )             38.5     04-02    127<L>  |  101  |  43<H>  ----------------------------<  358<H>  5.1   |  19<L>  |  1.74<H>    Ca    9.1      02 Apr 2023 14:07    TPro  7.5  /  Alb  3.1<L>  /  TBili  1.7<H>  /  DBili  x   /  AST  245<H>  /  ALT  48  /  AlkPhos  118  04-02     LIVER FUNCTIONS - ( 02 Apr 2023 09:55 )  Alb: 3.1 g/dL / Pro: 7.5 gm/dL / ALK PHOS: 118 U/L / ALT: 48 U/L / AST: 245 U/L / GGT: x             Culture - Blood (collected 02 Apr 2023 09:56)  Source: .Blood Blood-Peripheral  Gram Stain (03 Apr 2023 08:27):    Growth in aerobic bottle: Gram Negative Rods  Final Report (04 Apr 2023 15:46):    Growth in aerobic bottle: Pseudomonas aeruginosa  Organism: Blood Culture PCR  Pseudomonas aeruginosa (04 Apr 2023 15:46)  Organism: Pseudomonas aeruginosa (04 Apr 2023 15:46)      Method Type: SANNA      -  Amikacin: S <=16      -  Aztreonam: S 8      -  Cefepime: S <=2      -  Ceftazidime: S 4      -  Ciprofloxacin: S <=0.25      -  Gentamicin: S 4      -  Imipenem: S <=1      -  Levofloxacin: S <=0.5      -  Meropenem: S <=1      -  Piperacillin/Tazobactam: S <=8      -  Tobramycin: S <=2  Organism: Blood Culture PCR (04 Apr 2023 15:46)      Method Type: PCR      -  Pseudomonas aeruginosa: Detec        Radiology: all available radiological tests reviewed    < from: Xray Chest 1 View- PORTABLE-Routine (Xray Chest 1 View- PORTABLE-Routine .) (04.02.23 @ 10:45) >  Lines and tubes as above.  Mild pulmonary vascular congestion.  < end of copied text >      Advanced directives addressed: full resuscitation

## 2023-04-06 NOTE — PHYSICAL THERAPY INITIAL EVALUATION ADULT - ADDITIONAL COMMENTS
Pt lives in an apartment with 2 exterior steps to enter. Pt lives alone, has a home health aide 3x per week for 2-3 hours. At baseline pt endorses ambulating with a rolling walker or rollator independently. Pt carries out some ADLs independently such as showering (using a walk-in shower and grab bars). Otherwise, receives assist for ADLs.

## 2023-04-06 NOTE — PROGRESS NOTE ADULT - ASSESSMENT
71 yo female w/PMHx of HTN, DM2, Lung Ca/Colon Ca (9 years ago), chronic lymphedema presents to  for worsening RLE redness.    Assessment:  Sepsis sec to Bilateral wounds improved  lymphedema  chronic severe venous stasis dermatitis  Cellulitis  Bilateral Right > Left - slightly better  Pseudomonas Bacteremia - sensitive to meropenem   Hyponatremia - resolved  Uncontrolled DM - improved on Lantus and Admelog  Elevated LFT's  ADRIAN  Thrombocytopenia    Plan  S/p 1x dose of Vanco and 1x dose of Levaquin in ED, 1 day of tigecycline, and now on Meropenem   ID FollowingVascular Surgery following   Off Metformin  Increase dose of Lantus and continue Admelog  follow Na  1200 cc po fluid restriction]  Pt has a Port- ID states not need to remove at this time  Physical therapy

## 2023-04-06 NOTE — PHYSICAL THERAPY INITIAL EVALUATION ADULT - ORIENTATION, REHAB EVAL
Addended by: LESLEY PATEL on: 11/24/2020 03:31 PM     Modules accepted: Orders     oriented to person, place, time and situation

## 2023-04-07 LAB
GLUCOSE BLDC GLUCOMTR-MCNC: 132 MG/DL — HIGH (ref 70–99)
GLUCOSE BLDC GLUCOMTR-MCNC: 142 MG/DL — HIGH (ref 70–99)
GLUCOSE BLDC GLUCOMTR-MCNC: 178 MG/DL — HIGH (ref 70–99)
GLUCOSE BLDC GLUCOMTR-MCNC: 221 MG/DL — HIGH (ref 70–99)

## 2023-04-07 PROCEDURE — 99233 SBSQ HOSP IP/OBS HIGH 50: CPT

## 2023-04-07 RX ADMIN — Medication 3 UNIT(S): at 14:52

## 2023-04-07 RX ADMIN — TRAMADOL HYDROCHLORIDE 50 MILLIGRAM(S): 50 TABLET ORAL at 23:12

## 2023-04-07 RX ADMIN — MEROPENEM 100 MILLIGRAM(S): 1 INJECTION INTRAVENOUS at 18:30

## 2023-04-07 RX ADMIN — MEROPENEM 100 MILLIGRAM(S): 1 INJECTION INTRAVENOUS at 05:16

## 2023-04-07 RX ADMIN — TRAMADOL HYDROCHLORIDE 50 MILLIGRAM(S): 50 TABLET ORAL at 23:47

## 2023-04-07 RX ADMIN — TRAMADOL HYDROCHLORIDE 50 MILLIGRAM(S): 50 TABLET ORAL at 14:51

## 2023-04-07 RX ADMIN — Medication 3 UNIT(S): at 08:38

## 2023-04-07 RX ADMIN — Medication 3 UNIT(S): at 17:57

## 2023-04-07 RX ADMIN — Medication 1: at 17:57

## 2023-04-07 RX ADMIN — Medication 2: at 14:52

## 2023-04-07 RX ADMIN — INSULIN GLARGINE 6 UNIT(S): 100 INJECTION, SOLUTION SUBCUTANEOUS at 21:31

## 2023-04-07 RX ADMIN — TRAMADOL HYDROCHLORIDE 50 MILLIGRAM(S): 50 TABLET ORAL at 15:25

## 2023-04-07 RX ADMIN — Medication 1000 MILLIGRAM(S): at 10:57

## 2023-04-07 RX ADMIN — MEROPENEM 100 MILLIGRAM(S): 1 INJECTION INTRAVENOUS at 21:34

## 2023-04-07 NOTE — PROGRESS NOTE ADULT - SUBJECTIVE AND OBJECTIVE BOX
Date of service: 04-07-23 @ 11:43    Lying in bed in NAD  Fever is down  Has lower legs tenderness  legs feel less swollen    ROS: no fever or chills; denies dizziness, no HA, no SOB or cough, no abdominal pain, no diarrhea or constipation; no dysuria    MEDICATIONS  (STANDING):  dextrose 5%. 1000 milliLiter(s) (50 mL/Hr) IV Continuous <Continuous>  dextrose 5%. 1000 milliLiter(s) (100 mL/Hr) IV Continuous <Continuous>  dextrose 50% Injectable 25 Gram(s) IV Push once  dextrose 50% Injectable 12.5 Gram(s) IV Push once  dextrose 50% Injectable 25 Gram(s) IV Push once  glucagon  Injectable 1 milliGRAM(s) IntraMuscular once  insulin glargine Injectable (LANTUS) 6 Unit(s) SubCutaneous at bedtime  insulin lispro (ADMELOG) corrective regimen sliding scale   SubCutaneous three times a day before meals  insulin lispro Injectable (ADMELOG) 3 Unit(s) SubCutaneous three times a day before meals  meropenem  IVPB 1000 milliGRAM(s) IV Intermittent every 8 hours  metoprolol succinate ER 25 milliGRAM(s) Oral daily    Vital Signs Last 24 Hrs  T(C): 36.5 (07 Apr 2023 06:44), Max: 36.7 (06 Apr 2023 16:24)  T(F): 97.7 (07 Apr 2023 06:44), Max: 98.1 (06 Apr 2023 16:24)  HR: 80 (07 Apr 2023 06:44) (74 - 80)  BP: 132/49 (07 Apr 2023 06:44) (121/52 - 132/52)  BP(mean): --  RR: 18 (07 Apr 2023 06:44) (18 - 18)  SpO2: 100% (07 Apr 2023 06:44) (97% - 100%)    Parameters below as of 07 Apr 2023 06:44  Patient On (Oxygen Delivery Method): room air     Physical exam:    Constitutional:  No acute distress  HEENT: NC/AT, EOMI, PERRLA, conjunctivae clear; ears and nose atraumatic  Neck: supple; thyroid not palpable  Back: no tenderness  Respiratory: respiratory effort normal; clear to auscultation  Cardiovascular: S1S2 regular, no murmurs  Abdomen: soft, not tender, not distended, positive BS; no liver or spleen organomegaly  Genitourinary: no suprapubic tenderness  Lymphatic: no LN palpable  Musculoskeletal: no muscle tenderness, no joint swelling or tenderness  Extremities: 2+ pedal edema with chronic lower legs skin changes  LLE erythema, edema, warmth and tenderness; open wounds with scant serous discharge  - slightly better  Neurological/ Psychiatric: AxOx3, judgement and insight normal; moving all extremities  Skin: no rashes; no palpable lesions    Labs: reviewed                        11.4   7.45  )-----------( 132      ( 05 Apr 2023 11:42 )             34.6                         11.5   11.82 )-----------( 111      ( 03 Apr 2023 07:33 )             34.5     04-03    133<L>  |  107  |  43<H>  ----------------------------<  202<H>  4.7   |  19<L>  |  1.26    Ca    9.5      03 Apr 2023 07:33    TPro  6.0  /  Alb  2.2<L>  /  TBili  1.2  /  DBili  0.5<H>  /  AST  150<H>  /  ALT  40  /  AlkPhos  91  04-03                        13.4   20.35 )-----------( 150      ( 02 Apr 2023 09:55 )             38.5     04-02    127<L>  |  101  |  43<H>  ----------------------------<  358<H>  5.1   |  19<L>  |  1.74<H>    Ca    9.1      02 Apr 2023 14:07    TPro  7.5  /  Alb  3.1<L>  /  TBili  1.7<H>  /  DBili  x   /  AST  245<H>  /  ALT  48  /  AlkPhos  118  04-02     LIVER FUNCTIONS - ( 02 Apr 2023 09:55 )  Alb: 3.1 g/dL / Pro: 7.5 gm/dL / ALK PHOS: 118 U/L / ALT: 48 U/L / AST: 245 U/L / GGT: x             Culture - Blood (collected 02 Apr 2023 09:56)  Source: .Blood Blood-Peripheral  Gram Stain (03 Apr 2023 08:27):    Growth in aerobic bottle: Gram Negative Rods  Final Report (04 Apr 2023 15:46):    Growth in aerobic bottle: Pseudomonas aeruginosa  Organism: Blood Culture PCR  Pseudomonas aeruginosa (04 Apr 2023 15:46)  Organism: Pseudomonas aeruginosa (04 Apr 2023 15:46)      Method Type: SANNA      -  Amikacin: S <=16      -  Aztreonam: S 8      -  Cefepime: S <=2      -  Ceftazidime: S 4      -  Ciprofloxacin: S <=0.25      -  Gentamicin: S 4      -  Imipenem: S <=1      -  Levofloxacin: S <=0.5      -  Meropenem: S <=1      -  Piperacillin/Tazobactam: S <=8      -  Tobramycin: S <=2  Organism: Blood Culture PCR (04 Apr 2023 15:46)      Method Type: PCR      -  Pseudomonas aeruginosa: Detec        Radiology: all available radiological tests reviewed    < from: Xray Chest 1 View- PORTABLE-Routine (Xray Chest 1 View- PORTABLE-Routine .) (04.02.23 @ 10:45) >  Lines and tubes as above.  Mild pulmonary vascular congestion.  < end of copied text >      Advanced directives addressed: full resuscitation

## 2023-04-07 NOTE — PROGRESS NOTE ADULT - ASSESSMENT
71 yo female w/PMHx of HTN, DM2, Lung Ca/Colon Ca (9 years ago), chronic lymphedema presents to  for worsening RLE redness.    Assessment:  Sepsis sec to Bilateral wounds improved  lymphedema  chronic severe venous stasis dermatitis  Cellulitis  Bilateral Right > Left - slightly better  Pseudomonas Bacteremia - sensitive to meropenem   Hyponatremia - resolved  Uncontrolled DM - improved on Lantus and Admelog  Elevated LFT's  ADRIAN  Thrombocytopenia    Plan  S/p 1x dose of Vanco and 1x dose of Levaquin in ED, 1 day of tigecycline, and now on Meropenem   ID Following   Off Metformin  Tolerating Lantus and continue Admelog  1200 cc po fluid restriction]  Pt has a Port- ID states not need to remove at this time  Physical therapy  Ordered repeat blood cultures  Unable to tolerate heparin due to reactive thrombocytopenia, and unable to tolerate venodynes due to lymphedema and cellulitis

## 2023-04-07 NOTE — PROGRESS NOTE ADULT - ASSESSMENT
73 y/o female with h/o HTN, DM2, Lung Ca/Colon Ca (9 years ago), chronic lymphedema was admitted on 4/2 for worsening RLE redness. Patient states she has home visiting nurse who came in today and noticed that wound has been worsening since 2 days PTA. Patient reports of increased weeping from the wound, reports of generalized malaise for few days. Visited Dr. Garcia last Thursday and said that her legs looked fine. Pt has also been visited by home care that visits her weekly. States that she ambulated to the bathroom and sat on the toilet but was unable to get up afterwards. No fall or injuries. Denies fever or chills but has increased drainage. In ER she received levofloxacin.     1. Sepsis with PSAE. Left leg cellulitis. LE chronic lymphedema. CRF stage 3. Allergy to keflex with anaphylaxis.   -leg is improving slowly  -leukocytosis  -BC x 2 noted  -s/p tigecycline 100 mg IV 1, then 50 mg IV q12h # 1  -on meropenem 1 gm IV q8h # 5  -tolerating abx well so far; no side effects noted  -monitor closely in tram of PCN allergy history  -repeat BC x 2 collected  -elevate legs  -local wounds care  -f/u cultures  -continue abx therapy  -monitor temps  -f/u CBC  -supportive care  2. Other issues:   -care per medicine

## 2023-04-07 NOTE — PROGRESS NOTE ADULT - SUBJECTIVE AND OBJECTIVE BOX
---------------------  From: Kaylan Neville MD   To: [facility] Message [facility] ([id number] - [place]);     Sent: 1/21/2021 4:49:21 PM CST  Subject: General Message     please let patient know results of her estrogen are still too high, don't restart the estrogen yet, let's recheck in 1 more week        Results:  Date Result Name Ind Value   1/20/2021 12:59 PM Estradiol Level ((H)) 392 pg/mLPt notified and states understanding. Pt scheduled for recheck.   SUBJECTIVE:    CHIEF COMPLAINT:  Patient is a 72y old  Female who presents with a chief complaint of sent in by MD (06 Apr 2023 12:46)      HPI:  71 yo female w/PMHx of HTN, DM2, Lung Ca/Colon Ca (9 years ago), chronic lymphedema presents to  for worsening RLE redness. Patient states she has home visiting nurse who came in today and noticed that wound has been worsening since 2 days ago. Patient reports of increased weeping from the wound, reports of generalized malaise for few days.  Visited Dr. Garcia last Thursday and said that her legs looked fine. Pt has also been visited by home care that visits her weekly. States that she ambulated to the bathroom and sat on the toilet but was unable to get up afterwards. No fall or injuries. Denies fever or chills. Possible increased drainage. (02 Apr 2023 12:47)      Interval HPI and Overnight Events:    REVIEW OF SYSTEMS:  CONSTITUTIONAL: No weakness, fevers or chills  EYES/ENT: No visual changes;  No vertigo or throat pain   NECK: No pain or stiffness  RESPIRATORY: No cough, wheezing, hemoptysis; No shortness of breath  CARDIOVASCULAR: No chest pain or palpitations  GASTROINTESTINAL: No abdominal or epigastric pain. No nausea, vomiting, or hematemesis; No diarrhea or constipation. No melena or hematochezia.  GENITOURINARY: No dysuria, frequency or hematuria  NEUROLOGICAL: No numbness or weakness  SKIN: No itching, burning, rashes, or lesions   All other review of systems is negative unless indicated above    OBJECTIVE    Vital Signs Last 24 Hrs  T(C): 36.5 (07 Apr 2023 06:44), Max: 36.7 (06 Apr 2023 16:24)  T(F): 97.7 (07 Apr 2023 06:44), Max: 98.1 (06 Apr 2023 16:24)  HR: 80 (07 Apr 2023 06:44) (74 - 80)  BP: 132/49 (07 Apr 2023 06:44) (121/52 - 132/52)  BP(mean): --  RR: 18 (07 Apr 2023 06:44) (18 - 18)  SpO2: 100% (07 Apr 2023 06:44) (97% - 100%)    Parameters below as of 07 Apr 2023 06:44  Patient On (Oxygen Delivery Method): room air        MEDICATIONS  (STANDING):  dextrose 5%. 1000 milliLiter(s) (50 mL/Hr) IV Continuous <Continuous>  dextrose 5%. 1000 milliLiter(s) (100 mL/Hr) IV Continuous <Continuous>  dextrose 50% Injectable 25 Gram(s) IV Push once  dextrose 50% Injectable 12.5 Gram(s) IV Push once  dextrose 50% Injectable 25 Gram(s) IV Push once  glucagon  Injectable 1 milliGRAM(s) IntraMuscular once  insulin glargine Injectable (LANTUS) 6 Unit(s) SubCutaneous at bedtime  insulin lispro (ADMELOG) corrective regimen sliding scale   SubCutaneous three times a day before meals  insulin lispro Injectable (ADMELOG) 3 Unit(s) SubCutaneous three times a day before meals  meropenem  IVPB 1000 milliGRAM(s) IV Intermittent every 8 hours  metoprolol succinate ER 25 milliGRAM(s) Oral daily      LABS:                         11.4   7.45  )-----------( 132      ( 05 Apr 2023 11:42 )             34.6     CAPILLARY BLOOD GLUCOSE  POCT Blood Glucose.: 132 mg/dL (07 Apr 2023 08:13)  POCT Blood Glucose.: 210 mg/dL (06 Apr 2023 20:57)  POCT Blood Glucose.: 137 mg/dL (06 Apr 2023 16:18)  POCT Blood Glucose.: 188 mg/dL (06 Apr 2023 11:13)

## 2023-04-08 LAB
GLUCOSE BLDC GLUCOMTR-MCNC: 110 MG/DL — HIGH (ref 70–99)
GLUCOSE BLDC GLUCOMTR-MCNC: 132 MG/DL — HIGH (ref 70–99)
GLUCOSE BLDC GLUCOMTR-MCNC: 133 MG/DL — HIGH (ref 70–99)
GLUCOSE BLDC GLUCOMTR-MCNC: 222 MG/DL — HIGH (ref 70–99)

## 2023-04-08 PROCEDURE — 99232 SBSQ HOSP IP/OBS MODERATE 35: CPT

## 2023-04-08 RX ADMIN — Medication 3 UNIT(S): at 12:31

## 2023-04-08 RX ADMIN — TRAMADOL HYDROCHLORIDE 50 MILLIGRAM(S): 50 TABLET ORAL at 15:51

## 2023-04-08 RX ADMIN — Medication 3 UNIT(S): at 17:21

## 2023-04-08 RX ADMIN — MEROPENEM 100 MILLIGRAM(S): 1 INJECTION INTRAVENOUS at 14:38

## 2023-04-08 RX ADMIN — Medication 1000 MILLIGRAM(S): at 11:45

## 2023-04-08 RX ADMIN — Medication 3 UNIT(S): at 08:14

## 2023-04-08 RX ADMIN — INSULIN GLARGINE 6 UNIT(S): 100 INJECTION, SOLUTION SUBCUTANEOUS at 21:23

## 2023-04-08 RX ADMIN — TRAMADOL HYDROCHLORIDE 50 MILLIGRAM(S): 50 TABLET ORAL at 21:19

## 2023-04-08 RX ADMIN — TRAMADOL HYDROCHLORIDE 50 MILLIGRAM(S): 50 TABLET ORAL at 22:26

## 2023-04-08 RX ADMIN — MEROPENEM 100 MILLIGRAM(S): 1 INJECTION INTRAVENOUS at 05:23

## 2023-04-08 RX ADMIN — MEROPENEM 100 MILLIGRAM(S): 1 INJECTION INTRAVENOUS at 21:19

## 2023-04-08 NOTE — PROGRESS NOTE ADULT - SUBJECTIVE AND OBJECTIVE BOX
SUBJECTIVE:    CHIEF COMPLAINT:  Patient is a 72y old  Female who presents with a chief complaint of sent in by MD (07 Apr 2023 11:42)      HPI:  71 yo female w/PMHx of HTN, DM2, Lung Ca/Colon Ca (9 years ago), chronic lymphedema presents to  for worsening RLE redness. Patient states she has home visiting nurse who came in today and noticed that wound has been worsening since 2 days ago. Patient reports of increased weeping from the wound, reports of generalized malaise for few days.  Visited Dr. Garcia last Thursday and said that her legs looked fine. Pt has also been visited by home care that visits her weekly. States that she ambulated to the bathroom and sat on the toilet but was unable to get up afterwards. No fall or injuries. Denies fever or chills. Possible increased drainage. (02 Apr 2023 12:47)      Interval HPI and Overnight Events: Pt feels well    REVIEW OF SYSTEMS:  CONSTITUTIONAL: No weakness, fevers or chills  EYES/ENT: No visual changes;  No vertigo or throat pain   NECK: No pain or stiffness  RESPIRATORY: No cough, wheezing, hemoptysis; No shortness of breath  CARDIOVASCULAR: No chest pain or palpitations  GASTROINTESTINAL: No abdominal or epigastric pain. No nausea, vomiting, or hematemesis; No diarrhea or constipation. No melena or hematochezia.  GENITOURINARY: No dysuria, frequency or hematuria  NEUROLOGICAL: No numbness or weakness  SKIN: No itching, burning, rashes, or lesions   All other review of systems is negative unless indicated above    OBJECTIVE    Vital Signs Last 24 Hrs  T(C): 36.8 (08 Apr 2023 08:03), Max: 37 (07 Apr 2023 22:31)  T(F): 98.3 (08 Apr 2023 08:03), Max: 98.6 (07 Apr 2023 22:31)  HR: 70 (08 Apr 2023 08:03) (70 - 73)  BP: 120/52 (08 Apr 2023 08:03) (120/52 - 144/45)  BP(mean): --  RR: 18 (08 Apr 2023 08:03) (18 - 18)  SpO2: 99% (08 Apr 2023 08:03) (99% - 100%)    Parameters below as of 08 Apr 2023 08:03  Patient On (Oxygen Delivery Method): room air        MEDICATIONS  (STANDING):  dextrose 5%. 1000 milliLiter(s) (50 mL/Hr) IV Continuous <Continuous>  dextrose 5%. 1000 milliLiter(s) (100 mL/Hr) IV Continuous <Continuous>  dextrose 50% Injectable 25 Gram(s) IV Push once  dextrose 50% Injectable 12.5 Gram(s) IV Push once  dextrose 50% Injectable 25 Gram(s) IV Push once  glucagon  Injectable 1 milliGRAM(s) IntraMuscular once  insulin glargine Injectable (LANTUS) 6 Unit(s) SubCutaneous at bedtime  insulin lispro (ADMELOG) corrective regimen sliding scale   SubCutaneous three times a day before meals  insulin lispro Injectable (ADMELOG) 3 Unit(s) SubCutaneous three times a day before meals  meropenem  IVPB 1000 milliGRAM(s) IV Intermittent every 8 hours  metoprolol succinate ER 25 milliGRAM(s) Oral daily      LABS:   CAPILLARY BLOOD GLUCOSE  POCT Blood Glucose.: 110 mg/dL (08 Apr 2023 07:59)  POCT Blood Glucose.: 142 mg/dL (07 Apr 2023 21:22)  POCT Blood Glucose.: 178 mg/dL (07 Apr 2023 17:01)  POCT Blood Glucose.: 221 mg/dL (07 Apr 2023 14:47)

## 2023-04-08 NOTE — PROGRESS NOTE ADULT - SUBJECTIVE AND OBJECTIVE BOX
Date of service: 04-08-23 @ 11:47    Lying in bed in NAD  Lower legs feel less swollen  Has legs erythema    ROS: no fever or chills; denies dizziness, no HA, no SOB or cough, no abdominal pain, no diarrhea or constipation; no dysuria    MEDICATIONS  (STANDING):  dextrose 5%. 1000 milliLiter(s) (50 mL/Hr) IV Continuous <Continuous>  dextrose 5%. 1000 milliLiter(s) (100 mL/Hr) IV Continuous <Continuous>  dextrose 50% Injectable 25 Gram(s) IV Push once  dextrose 50% Injectable 12.5 Gram(s) IV Push once  dextrose 50% Injectable 25 Gram(s) IV Push once  glucagon  Injectable 1 milliGRAM(s) IntraMuscular once  insulin glargine Injectable (LANTUS) 6 Unit(s) SubCutaneous at bedtime  insulin lispro (ADMELOG) corrective regimen sliding scale   SubCutaneous three times a day before meals  insulin lispro Injectable (ADMELOG) 3 Unit(s) SubCutaneous three times a day before meals  meropenem  IVPB 1000 milliGRAM(s) IV Intermittent every 8 hours  metoprolol succinate ER 25 milliGRAM(s) Oral daily    Vital Signs Last 24 Hrs  T(C): 36.8 (08 Apr 2023 08:03), Max: 37 (07 Apr 2023 22:31)  T(F): 98.3 (08 Apr 2023 08:03), Max: 98.6 (07 Apr 2023 22:31)  HR: 70 (08 Apr 2023 08:03) (70 - 73)  BP: 120/52 (08 Apr 2023 08:03) (120/52 - 144/45)  BP(mean): --  RR: 18 (08 Apr 2023 08:03) (18 - 18)  SpO2: 99% (08 Apr 2023 08:03) (99% - 100%)    Parameters below as of 08 Apr 2023 08:03  Patient On (Oxygen Delivery Method): room air     Physical exam:    Constitutional:  No acute distress  HEENT: NC/AT, EOMI, PERRLA, conjunctivae clear; ears and nose atraumatic  Neck: supple; thyroid not palpable  Back: no tenderness  Respiratory: respiratory effort normal; clear to auscultation  Cardiovascular: S1S2 regular, no murmurs  Abdomen: soft, not tender, not distended, positive BS; no liver or spleen organomegaly  Genitourinary: no suprapubic tenderness  Lymphatic: no LN palpable  Musculoskeletal: no muscle tenderness, no joint swelling or tenderness  Extremities: 2+ pedal edema with chronic lower legs skin changes  LLE erythema, edema, warmth and tenderness; open wounds with scant serous discharge  - slightly better  Neurological/ Psychiatric: AxOx3, judgement and insight normal; moving all extremities  Skin: no rashes; no palpable lesions    Labs: reviewed                        11.4   7.45  )-----------( 132      ( 05 Apr 2023 11:42 )             34.6                         11.5   11.82 )-----------( 111      ( 03 Apr 2023 07:33 )             34.5     04-03    133<L>  |  107  |  43<H>  ----------------------------<  202<H>  4.7   |  19<L>  |  1.26    Ca    9.5      03 Apr 2023 07:33    TPro  6.0  /  Alb  2.2<L>  /  TBili  1.2  /  DBili  0.5<H>  /  AST  150<H>  /  ALT  40  /  AlkPhos  91  04-03                        13.4   20.35 )-----------( 150      ( 02 Apr 2023 09:55 )             38.5     04-02    127<L>  |  101  |  43<H>  ----------------------------<  358<H>  5.1   |  19<L>  |  1.74<H>    Ca    9.1      02 Apr 2023 14:07    TPro  7.5  /  Alb  3.1<L>  /  TBili  1.7<H>  /  DBili  x   /  AST  245<H>  /  ALT  48  /  AlkPhos  118  04-02     LIVER FUNCTIONS - ( 02 Apr 2023 09:55 )  Alb: 3.1 g/dL / Pro: 7.5 gm/dL / ALK PHOS: 118 U/L / ALT: 48 U/L / AST: 245 U/L / GGT: x             Culture - Blood (collected 02 Apr 2023 09:56)  Source: .Blood Blood-Peripheral  Gram Stain (03 Apr 2023 08:27):    Growth in aerobic bottle: Gram Negative Rods  Final Report (04 Apr 2023 15:46):    Growth in aerobic bottle: Pseudomonas aeruginosa  Organism: Blood Culture PCR  Pseudomonas aeruginosa (04 Apr 2023 15:46)  Organism: Pseudomonas aeruginosa (04 Apr 2023 15:46)      Method Type: SANNA      -  Amikacin: S <=16      -  Aztreonam: S 8      -  Cefepime: S <=2      -  Ceftazidime: S 4      -  Ciprofloxacin: S <=0.25      -  Gentamicin: S 4      -  Imipenem: S <=1      -  Levofloxacin: S <=0.5      -  Meropenem: S <=1      -  Piperacillin/Tazobactam: S <=8      -  Tobramycin: S <=2  Organism: Blood Culture PCR (04 Apr 2023 15:46)      Method Type: PCR      -  Pseudomonas aeruginosa: Detec    Radiology: all available radiological tests reviewed    < from: Xray Chest 1 View- PORTABLE-Routine (Xray Chest 1 View- PORTABLE-Routine .) (04.02.23 @ 10:45) >  Lines and tubes as above.  Mild pulmonary vascular congestion.  < end of copied text >      Advanced directives addressed: full resuscitation

## 2023-04-08 NOTE — PROGRESS NOTE ADULT - ASSESSMENT
73 y/o female with h/o HTN, DM2, Lung Ca/Colon Ca (9 years ago), chronic lymphedema was admitted on 4/2 for worsening RLE redness. Patient states she has home visiting nurse who came in today and noticed that wound has been worsening since 2 days PTA. Patient reports of increased weeping from the wound, reports of generalized malaise for few days. Visited Dr. Garcia last Thursday and said that her legs looked fine. Pt has also been visited by home care that visits her weekly. States that she ambulated to the bathroom and sat on the toilet but was unable to get up afterwards. No fall or injuries. Denies fever or chills but has increased drainage. In ER she received levofloxacin.     1. Sepsis with PSAE. Left leg cellulitis. LE chronic lymphedema. CRF stage 3. Allergy to keflex with anaphylaxis.   -leg is improving slowly  -leukocytosis  -BC x 2 noted  -s/p tigecycline 100 mg IV 1, then 50 mg IV q12h # 1  -on meropenem 1 gm IV q8h # 6  -tolerating abx well so far; no side effects noted  -monitor closely in tram of PCN allergy history  -repeat BC x 2 collected yesterday  -elevate legs  -local wounds care  -f/u cultures  -continue abx therapy  -monitor temps  -f/u CBC  -supportive care  2. Other issues:   -care per medicine

## 2023-04-08 NOTE — PROGRESS NOTE ADULT - ASSESSMENT
73 yo female w/PMHx of HTN, DM2, Lung Ca/Colon Ca (9 years ago), chronic lymphedema presents to  for worsening RLE redness.    Assessment:  Sepsis sec to Bilateral wounds improved  lymphedema  chronic severe venous stasis dermatitis  Cellulitis  Bilateral Right > Left - slowly resolving  Pseudomonas Bacteremia - sensitive to meropenem   Hyponatremia - resolved  Uncontrolled DM - improved on Lantus and Admelog  Elevated LFT's  ADRIAN  Thrombocytopenia    Plan  S/p 1x dose of Vanco and 1x dose of Levaquin in ED, 1 day of tigecycline, and now on Meropenem   ID Following   Off Metformin  Tolerating Lantus and continue Admelog  1200 cc po fluid restriction]  Pt has a Port- ID states not need to remove at this time  Physical therapy  Ordered repeat blood cultures - if neg, possible SNF rehab by Monday  Unable to tolerate heparin due to reactive thrombocytopenia, and unable to tolerate venodynes due to lymphedema and cellulitis

## 2023-04-09 LAB
GLUCOSE BLDC GLUCOMTR-MCNC: 133 MG/DL — HIGH (ref 70–99)
GLUCOSE BLDC GLUCOMTR-MCNC: 178 MG/DL — HIGH (ref 70–99)
GLUCOSE BLDC GLUCOMTR-MCNC: 200 MG/DL — HIGH (ref 70–99)
GLUCOSE BLDC GLUCOMTR-MCNC: 242 MG/DL — HIGH (ref 70–99)

## 2023-04-09 PROCEDURE — 99232 SBSQ HOSP IP/OBS MODERATE 35: CPT

## 2023-04-09 RX ORDER — POLYETHYLENE GLYCOL 3350 17 G/17G
17 POWDER, FOR SOLUTION ORAL DAILY
Refills: 0 | Status: DISCONTINUED | OUTPATIENT
Start: 2023-04-09 | End: 2023-04-10

## 2023-04-09 RX ADMIN — MEROPENEM 100 MILLIGRAM(S): 1 INJECTION INTRAVENOUS at 05:21

## 2023-04-09 RX ADMIN — Medication 3 UNIT(S): at 08:38

## 2023-04-09 RX ADMIN — Medication 2: at 19:04

## 2023-04-09 RX ADMIN — Medication 3 UNIT(S): at 11:56

## 2023-04-09 RX ADMIN — MEROPENEM 100 MILLIGRAM(S): 1 INJECTION INTRAVENOUS at 22:12

## 2023-04-09 RX ADMIN — TRAMADOL HYDROCHLORIDE 50 MILLIGRAM(S): 50 TABLET ORAL at 20:07

## 2023-04-09 RX ADMIN — POLYETHYLENE GLYCOL 3350 17 GRAM(S): 17 POWDER, FOR SOLUTION ORAL at 22:16

## 2023-04-09 RX ADMIN — TRAMADOL HYDROCHLORIDE 50 MILLIGRAM(S): 50 TABLET ORAL at 21:45

## 2023-04-09 RX ADMIN — MEROPENEM 100 MILLIGRAM(S): 1 INJECTION INTRAVENOUS at 15:05

## 2023-04-09 RX ADMIN — Medication 3 UNIT(S): at 19:04

## 2023-04-09 RX ADMIN — Medication 1: at 11:55

## 2023-04-09 RX ADMIN — INSULIN GLARGINE 6 UNIT(S): 100 INJECTION, SOLUTION SUBCUTANEOUS at 22:12

## 2023-04-09 NOTE — PROGRESS NOTE ADULT - ASSESSMENT
73 y/o female with h/o HTN, DM2, Lung Ca/Colon Ca (9 years ago), chronic lymphedema was admitted on 4/2 for worsening RLE redness. Patient states she has home visiting nurse who came in today and noticed that wound has been worsening since 2 days PTA. Patient reports of increased weeping from the wound, reports of generalized malaise for few days. Visited Dr. Garcia last Thursday and said that her legs looked fine. Pt has also been visited by home care that visits her weekly. States that she ambulated to the bathroom and sat on the toilet but was unable to get up afterwards. No fall or injuries. Denies fever or chills but has increased drainage. In ER she received levofloxacin.     1. Sepsis with PSAE resolving. Left leg cellulitis. LE chronic lymphedema. CRF stage 3. Allergy to keflex with anaphylaxis.   -leg is improving slowly  -leukocytosis  -BC x 2 noted  -s/p tigecycline 100 mg IV 1, then 50 mg IV q12h # 1  -on meropenem 1 gm IV q8h # 7  -tolerating abx well so far; no side effects noted  -monitor closely in tram of PCN allergy history  -repeat BC x 2 are negative t odate  -elevate legs  -local wounds care  -f/u cultures  -continue abx therapy  -plan for midline and outpatient abx therapy for one more week  -monitor temps  -f/u CBC  -supportive care  2. Other issues:   -care per medicine

## 2023-04-09 NOTE — PROGRESS NOTE ADULT - SUBJECTIVE AND OBJECTIVE BOX
Date of service: 04-09-23 @ 10:16    Lying in bed in NAD  Legs feel tender  Legs feel less swollen  No fever    ROS: no fever or chills; denies dizziness, no HA, no SOB or cough, no abdominal pain, no diarrhea or constipation; no dysuria    MEDICATIONS  (STANDING):  dextrose 5%. 1000 milliLiter(s) (50 mL/Hr) IV Continuous <Continuous>  dextrose 5%. 1000 milliLiter(s) (100 mL/Hr) IV Continuous <Continuous>  dextrose 50% Injectable 25 Gram(s) IV Push once  dextrose 50% Injectable 12.5 Gram(s) IV Push once  dextrose 50% Injectable 25 Gram(s) IV Push once  glucagon  Injectable 1 milliGRAM(s) IntraMuscular once  insulin glargine Injectable (LANTUS) 6 Unit(s) SubCutaneous at bedtime  insulin lispro (ADMELOG) corrective regimen sliding scale   SubCutaneous three times a day before meals  insulin lispro Injectable (ADMELOG) 3 Unit(s) SubCutaneous three times a day before meals  meropenem  IVPB 1000 milliGRAM(s) IV Intermittent every 8 hours  metoprolol succinate ER 25 milliGRAM(s) Oral daily    Vital Signs Last 24 Hrs  T(C): 36.8 (09 Apr 2023 08:29), Max: 37.1 (08 Apr 2023 15:45)  T(F): 98.2 (09 Apr 2023 08:29), Max: 98.8 (08 Apr 2023 15:45)  HR: 84 (09 Apr 2023 08:29) (75 - 84)  BP: 133/46 (09 Apr 2023 08:29) (123/56 - 134/50)  BP(mean): --  RR: 16 (08 Apr 2023 23:51) (16 - 18)  SpO2: 97% (09 Apr 2023 08:29) (94% - 98%)    Parameters below as of 09 Apr 2023 08:29  Patient On (Oxygen Delivery Method): room air     Physical exam:    Constitutional:  No acute distress  HEENT: NC/AT, EOMI, PERRLA, conjunctivae clear; ears and nose atraumatic  Neck: supple; thyroid not palpable  Back: no tenderness  Respiratory: respiratory effort normal; clear to auscultation  Cardiovascular: S1S2 regular, no murmurs  Abdomen: soft, not tender, not distended, positive BS; no liver or spleen organomegaly  Genitourinary: no suprapubic tenderness  Lymphatic: no LN palpable  Musculoskeletal: no muscle tenderness, no joint swelling or tenderness  Extremities: 2+ pedal edema with chronic lower legs skin changes  LLE erythema, edema, warmth and tenderness; open wounds with scant serous discharge - improving  Neurological/ Psychiatric: AxOx3, judgement and insight normal; moving all extremities  Skin: no rashes; no palpable lesions    Labs: reviewed                        11.4   7.45  )-----------( 132      ( 05 Apr 2023 11:42 )             34.6                         11.5   11.82 )-----------( 111      ( 03 Apr 2023 07:33 )             34.5     04-03    133<L>  |  107  |  43<H>  ----------------------------<  202<H>  4.7   |  19<L>  |  1.26    Ca    9.5      03 Apr 2023 07:33    TPro  6.0  /  Alb  2.2<L>  /  TBili  1.2  /  DBili  0.5<H>  /  AST  150<H>  /  ALT  40  /  AlkPhos  91  04-03                        13.4   20.35 )-----------( 150      ( 02 Apr 2023 09:55 )             38.5     04-02    127<L>  |  101  |  43<H>  ----------------------------<  358<H>  5.1   |  19<L>  |  1.74<H>    Ca    9.1      02 Apr 2023 14:07    TPro  7.5  /  Alb  3.1<L>  /  TBili  1.7<H>  /  DBili  x   /  AST  245<H>  /  ALT  48  /  AlkPhos  118  04-02     LIVER FUNCTIONS - ( 02 Apr 2023 09:55 )  Alb: 3.1 g/dL / Pro: 7.5 gm/dL / ALK PHOS: 118 U/L / ALT: 48 U/L / AST: 245 U/L / GGT: x             Culture - Blood (collected 02 Apr 2023 09:56)  Source: .Blood Blood-Peripheral  Gram Stain (03 Apr 2023 08:27):    Growth in aerobic bottle: Gram Negative Rods  Final Report (04 Apr 2023 15:46):    Growth in aerobic bottle: Pseudomonas aeruginosa  Organism: Blood Culture PCR  Pseudomonas aeruginosa (04 Apr 2023 15:46)  Organism: Pseudomonas aeruginosa (04 Apr 2023 15:46)      Method Type: SANNA      -  Amikacin: S <=16      -  Aztreonam: S 8      -  Cefepime: S <=2      -  Ceftazidime: S 4      -  Ciprofloxacin: S <=0.25      -  Gentamicin: S 4      -  Imipenem: S <=1      -  Levofloxacin: S <=0.5      -  Meropenem: S <=1      -  Piperacillin/Tazobactam: S <=8      -  Tobramycin: S <=2  Organism: Blood Culture PCR (04 Apr 2023 15:46)      Method Type: PCR      -  Pseudomonas aeruginosa: Detec    Culture - Blood (collected 07 Apr 2023 08:08)  Source: .Blood None  Preliminary Report (08 Apr 2023 12:01):    No growth to date.    Culture - Blood (collected 07 Apr 2023 08:08)  Source: .Blood None  Preliminary Report (08 Apr 2023 12:01):    No growth to date.    Radiology: all available radiological tests reviewed    < from: Xray Chest 1 View- PORTABLE-Routine (Xray Chest 1 View- PORTABLE-Routine .) (04.02.23 @ 10:45) >  Lines and tubes as above.  Mild pulmonary vascular congestion.  < end of copied text >      Advanced directives addressed: full resuscitation

## 2023-04-09 NOTE — PROGRESS NOTE ADULT - CARDIOVASCULAR
normal/regular rate and rhythm/S1 S2 present/no gallops/no rub/no murmur

## 2023-04-09 NOTE — PROGRESS NOTE ADULT - ASSESSMENT
71 yo female w/PMHx of HTN, DM2, Lung Ca/Colon Ca (9 years ago), chronic lymphedema presents to  for worsening RLE redness.    Assessment:  Sepsis sec to Bilateral wounds improved  lymphedema  chronic severe venous stasis dermatitis  Cellulitis  Bilateral Right > Left - slowly resolving  Pseudomonas Bacteremia - sensitive to meropenem   Hyponatremia - resolved  Uncontrolled DM - improved on Lantus and Admelog  Elevated LFT's  ADRIAN  Thrombocytopenia    Plan  S/p 1x dose of Vanco and 1x dose of Levaquin in ED, 1 day of tigecycline, and now on Meropenem   ID Following   Off Metformin  Tolerating Lantus and continue Admelog  1200 cc po fluid restriction]  Physical therapy  blood cultures - neg to date, possible SNF rehab tomorrow  Unable to tolerate heparin due to reactive thrombocytopenia, and unable to tolerate venodynes due to lymphedema and cellulitis

## 2023-04-09 NOTE — PROGRESS NOTE ADULT - SKIN
legs appear to be less swollen today
Legs with less ooze. Slightly less red
legs looking better. less swollen and less red. Severe stasis dermatitis persists/vesicles
Legs about the same today
no further oozing from legs, less red

## 2023-04-09 NOTE — PROGRESS NOTE ADULT - SUBJECTIVE AND OBJECTIVE BOX
SUBJECTIVE:    CHIEF COMPLAINT:  Patient is a 72y old  Female who presents with a chief complaint of sent in by MD (09 Apr 2023 10:15)      HPI:  71 yo female w/PMHx of HTN, DM2, Lung Ca/Colon Ca (9 years ago), chronic lymphedema presents to  for worsening RLE redness. Patient states she has home visiting nurse who came in today and noticed that wound has been worsening since 2 days ago. Patient reports of increased weeping from the wound, reports of generalized malaise for few days.  Visited Dr. Garcia last Thursday and said that her legs looked fine. Pt has also been visited by home care that visits her weekly. States that she ambulated to the bathroom and sat on the toilet but was unable to get up afterwards. No fall or injuries. Denies fever or chills. Possible increased drainage. (02 Apr 2023 12:47)      Interval HPI and Overnight Events: Pt feels well. Looking forward to rehab    REVIEW OF SYSTEMS:  CONSTITUTIONAL: No weakness, fevers or chills  EYES/ENT: No visual changes;  No vertigo or throat pain   NECK: No pain or stiffness  RESPIRATORY: No cough, wheezing, hemoptysis; No shortness of breath  CARDIOVASCULAR: No chest pain or palpitations  GASTROINTESTINAL: No abdominal or epigastric pain. No nausea, vomiting, or hematemesis; No diarrhea or constipation. No melena or hematochezia.  GENITOURINARY: No dysuria, frequency or hematuria  NEUROLOGICAL: No numbness or weakness  SKIN: No itching, burning, rashes, or lesions   All other review of systems is negative unless indicated above    OBJECTIVE    Vital Signs Last 24 Hrs  T(C): 36.8 (09 Apr 2023 08:29), Max: 37.1 (08 Apr 2023 15:45)  T(F): 98.2 (09 Apr 2023 08:29), Max: 98.8 (08 Apr 2023 15:45)  HR: 84 (09 Apr 2023 08:29) (75 - 84)  BP: 133/46 (09 Apr 2023 08:29) (123/56 - 134/50)  BP(mean): --  RR: 16 (08 Apr 2023 23:51) (16 - 18)  SpO2: 97% (09 Apr 2023 08:29) (94% - 98%)    Parameters below as of 09 Apr 2023 08:29  Patient On (Oxygen Delivery Method): room air        MEDICATIONS  (STANDING):  dextrose 5%. 1000 milliLiter(s) (50 mL/Hr) IV Continuous <Continuous>  dextrose 5%. 1000 milliLiter(s) (100 mL/Hr) IV Continuous <Continuous>  dextrose 50% Injectable 25 Gram(s) IV Push once  dextrose 50% Injectable 12.5 Gram(s) IV Push once  dextrose 50% Injectable 25 Gram(s) IV Push once  glucagon  Injectable 1 milliGRAM(s) IntraMuscular once  insulin glargine Injectable (LANTUS) 6 Unit(s) SubCutaneous at bedtime  insulin lispro (ADMELOG) corrective regimen sliding scale   SubCutaneous three times a day before meals  insulin lispro Injectable (ADMELOG) 3 Unit(s) SubCutaneous three times a day before meals  meropenem  IVPB 1000 milliGRAM(s) IV Intermittent every 8 hours  metoprolol succinate ER 25 milliGRAM(s) Oral daily      LABS:   Specimen Source .Blood None   04-07 @ 08:08  Culture Results  No growth to date.    CAPILLARY BLOOD GLUCOSE  POCT Blood Glucose.: 178 mg/dL (09 Apr 2023 11:52)  POCT Blood Glucose.: 133 mg/dL (09 Apr 2023 08:20)  POCT Blood Glucose.: 222 mg/dL (08 Apr 2023 21:19)  POCT Blood Glucose.: 132 mg/dL (08 Apr 2023 16:24)

## 2023-04-09 NOTE — PROGRESS NOTE ADULT - CONSTITUTIONAL
normal/well-groomed/no distress

## 2023-04-09 NOTE — PROGRESS NOTE ADULT - RESPIRATORY
normal/clear to auscultation bilaterally/no wheezes/no rales/no rhonchi

## 2023-04-10 ENCOUNTER — NON-APPOINTMENT (OUTPATIENT)
Age: 73
End: 2023-04-10

## 2023-04-10 ENCOUNTER — TRANSCRIPTION ENCOUNTER (OUTPATIENT)
Age: 73
End: 2023-04-10

## 2023-04-10 VITALS
SYSTOLIC BLOOD PRESSURE: 126 MMHG | OXYGEN SATURATION: 100 % | HEART RATE: 83 BPM | RESPIRATION RATE: 18 BRPM | DIASTOLIC BLOOD PRESSURE: 60 MMHG | TEMPERATURE: 99 F

## 2023-04-10 LAB
GLUCOSE BLDC GLUCOMTR-MCNC: 125 MG/DL — HIGH (ref 70–99)
GLUCOSE BLDC GLUCOMTR-MCNC: 182 MG/DL — HIGH (ref 70–99)
SARS-COV-2 RNA SPEC QL NAA+PROBE: SIGNIFICANT CHANGE UP

## 2023-04-10 PROCEDURE — 99239 HOSP IP/OBS DSCHRG MGMT >30: CPT

## 2023-04-10 RX ORDER — ACETAMINOPHEN 500 MG
2 TABLET ORAL
Qty: 0 | Refills: 0 | DISCHARGE
Start: 2023-04-10

## 2023-04-10 RX ORDER — METFORMIN HYDROCHLORIDE 850 MG/1
1 TABLET ORAL
Refills: 0 | DISCHARGE

## 2023-04-10 RX ORDER — MEROPENEM 1 G/30ML
1000 INJECTION INTRAVENOUS
Qty: 0 | Refills: 0 | DISCHARGE
Start: 2023-04-10

## 2023-04-10 RX ORDER — INSULIN GLARGINE 100 [IU]/ML
6 INJECTION, SOLUTION SUBCUTANEOUS
Qty: 0 | Refills: 0 | DISCHARGE
Start: 2023-04-10

## 2023-04-10 RX ORDER — INSULIN LISPRO 100/ML
1 VIAL (ML) SUBCUTANEOUS
Qty: 0 | Refills: 0 | DISCHARGE
Start: 2023-04-10

## 2023-04-10 RX ORDER — TRAMADOL HYDROCHLORIDE 50 MG/1
50 TABLET ORAL ONCE
Refills: 0 | Status: DISCONTINUED | OUTPATIENT
Start: 2023-04-10 | End: 2023-04-10

## 2023-04-10 RX ORDER — METOPROLOL TARTRATE 50 MG
1 TABLET ORAL
Qty: 0 | Refills: 0 | DISCHARGE
Start: 2023-04-10

## 2023-04-10 RX ADMIN — MEROPENEM 100 MILLIGRAM(S): 1 INJECTION INTRAVENOUS at 13:01

## 2023-04-10 RX ADMIN — Medication 3 UNIT(S): at 11:07

## 2023-04-10 RX ADMIN — MEROPENEM 100 MILLIGRAM(S): 1 INJECTION INTRAVENOUS at 05:14

## 2023-04-10 RX ADMIN — Medication 3 UNIT(S): at 07:58

## 2023-04-10 RX ADMIN — TRAMADOL HYDROCHLORIDE 50 MILLIGRAM(S): 50 TABLET ORAL at 15:43

## 2023-04-10 RX ADMIN — Medication 1: at 11:06

## 2023-04-10 NOTE — ADVANCED PRACTICE NURSE CONSULT - REASON FOR CONSULT
Patient received resting in bed, awake, alert, oriented x 4, pleasant and cooperative. Risks and benefits of midline catheter placement explained to patient, verbal consent obtained at bedside . Patient verbalized understanding via teach back method.  Time out and hand hygiene performed. Arrow Endurance Extended Dwell Peripheral Catheter System (LOT #[id number]) 18g, 8cm inserted to left brachial  vein via ultrasound guidance via sterile technique. Flushes well with 20cc of NS. Brisk blood return present. Dressing and end cap placed. Minimal blood loss. No chest x ray needed to verify placement. Report given to district RN.   Midline information/education given to patient.

## 2023-04-10 NOTE — PROGRESS NOTE ADULT - REASON FOR ADMISSION
sent in by MD

## 2023-04-10 NOTE — PROGRESS NOTE ADULT - ASSESSMENT
71 y/o female with h/o HTN, DM2, Lung Ca/Colon Ca (9 years ago), chronic lymphedema was admitted on 4/2 for worsening RLE redness. Patient states she has home visiting nurse who came in today and noticed that wound has been worsening since 2 days PTA. Patient reports of increased weeping from the wound, reports of generalized malaise for few days. Visited Dr. Garcia last Thursday and said that her legs looked fine. Pt has also been visited by home care that visits her weekly. States that she ambulated to the bathroom and sat on the toilet but was unable to get up afterwards. No fall or injuries. Denies fever or chills but has increased drainage. In ER she received levofloxacin.     1. Sepsis with PSAE resolved. Left leg cellulitis. LE chronic lymphedema. CRF stage 3. Allergy to keflex with anaphylaxis.   -leg is improving slowly  -leukocytosis  -BC x 2 noted  -s/p tigecycline 100 mg IV 1, then 50 mg IV q12h # 1  -on meropenem 1 gm IV q8h # 8  -tolerating abx well so far; no side effects noted  -monitor closely in tram of PCN allergy history  -repeat BC x 2 are negative to date  -elevate legs  -local wounds care  -f/u cultures  -midline  -plan for midline and outpatient abx therapy for one more week  -monitor temps  -f/u CBC  -supportive care  2. Other issues:   -care per medicine

## 2023-04-10 NOTE — PROGRESS NOTE ADULT - PROVIDER SPECIALTY LIST ADULT
Infectious Disease
Family Medicine
Family Medicine
Infectious Disease
Family Medicine
Family Medicine
Infectious Disease
Family Medicine

## 2023-04-10 NOTE — DISCHARGE NOTE NURSING/CASE MANAGEMENT/SOCIAL WORK - PATIENT PORTAL LINK FT
You can access the FollowMyHealth Patient Portal offered by [facility] by registering at the following website: [email or web address]. By joining [facility]’s FollowMyHealth portal, you will also be able to view your health information using other applications (apps) compatible with our system.

## 2023-04-10 NOTE — DISCHARGE NOTE PROVIDER - NSDCPNSUBOBJ_GEN_ALL_CORE
SUBJECTIVE:    CHIEF COMPLAINT:  Patient is a 72y old  Female who presents with a chief complaint of sent in by MD (09 Apr 2023 14:56)      HPI:  71 yo female w/PMHx of HTN, DM2, Lung Ca/Colon Ca (9 years ago), chronic lymphedema presents to  for worsening RLE redness. Patient states she has home visiting nurse who came in today and noticed that wound has been worsening since 2 days ago. Patient reports of increased weeping from the wound, reports of generalized malaise for few days.  Visited Dr. Garcia last Thursday and said that her legs looked fine. Pt has also been visited by home care that visits her weekly. States that she ambulated to the bathroom and sat on the toilet but was unable to get up afterwards. No fall or injuries. Denies fever or chills. Possible increased drainage. (02 Apr 2023 12:47)      Interval HPI and Overnight Events: Legs continue to improve. No fever, BC neg on repeat to date    REVIEW OF SYSTEMS:  CONSTITUTIONAL: No weakness, fevers or chills  EYES/ENT: No visual changes;  No vertigo or throat pain   NECK: No pain or stiffness  RESPIRATORY: No cough, wheezing, hemoptysis; No shortness of breath  CARDIOVASCULAR: No chest pain or palpitations  GASTROINTESTINAL: No abdominal or epigastric pain. No nausea, vomiting, or hematemesis; No diarrhea or constipation. No melena or hematochezia.  GENITOURINARY: No dysuria, frequency or hematuria  NEUROLOGICAL: No numbness or weakness  SKIN: No itching, burning, rashes, or lesions   All other review of systems is negative unless indicated above    OBJECTIVE    PHYSICAL EXAM:  Vital Signs Last 24 Hrs  T(C): 37 (10 Apr 2023 07:49), Max: 37.1 (09 Apr 2023 16:34)  T(F): 98.6 (10 Apr 2023 07:49), Max: 98.8 (09 Apr 2023 16:34)  HR: 74 (10 Apr 2023 07:49) (74 - 79)  BP: 126/56 (10 Apr 2023 07:49) (126/56 - 141/42)  BP(mean): --  RR: 18 (09 Apr 2023 23:32) (18 - 18)  SpO2: 97% (10 Apr 2023 07:49) (95% - 97%)    Parameters below as of 10 Apr 2023 07:49  Patient On (Oxygen Delivery Method): room air      Constitutional: NAD, awake and alert, well-developed  HEENT: PERR, EOMI, Normal Hearing, MMM  Neck: Soft and supple, No LAD, No JVD  Respiratory: Breath sounds are clear bilaterally, No wheezing, rales or rhonchi  Cardiovascular: S1 and S2, regular rate and rhythm, no Murmurs, gallops or rubs  Gastrointestinal: Bowel Sounds present, soft, nontender, nondistended, no guarding, no rebound  Vascular: 2+ peripheral pulses  Neurological: A/O x 3, no focal deficits  Skin: Severe lymphedema and stasis dermatitis.    MEDICATIONS  (STANDING):  dextrose 5%. 1000 milliLiter(s) (50 mL/Hr) IV Continuous <Continuous>  dextrose 5%. 1000 milliLiter(s) (100 mL/Hr) IV Continuous <Continuous>  dextrose 50% Injectable 25 Gram(s) IV Push once  dextrose 50% Injectable 12.5 Gram(s) IV Push once  dextrose 50% Injectable 25 Gram(s) IV Push once  glucagon  Injectable 1 milliGRAM(s) IntraMuscular once  insulin glargine Injectable (LANTUS) 6 Unit(s) SubCutaneous at bedtime  insulin lispro (ADMELOG) corrective regimen sliding scale   SubCutaneous three times a day before meals  insulin lispro Injectable (ADMELOG) 3 Unit(s) SubCutaneous three times a day before meals  meropenem  IVPB 1000 milliGRAM(s) IV Intermittent every 8 hours  metoprolol succinate ER 25 milliGRAM(s) Oral daily      LABS:                   Specimen Source .Blood None   04-07 @ 08:08  Culture Results  No growth to date.            CAPILLARY BLOOD GLUCOSE  POCT Blood Glucose.: 182 mg/dL (10 Apr 2023 10:48)  POCT Blood Glucose.: 125 mg/dL (10 Apr 2023 07:49)  POCT Blood Glucose.: 200 mg/dL (09 Apr 2023 21:36)  POCT Blood Glucose.: 242 mg/dL (09 Apr 2023 19:00)      Assessment and Plan:   · Assessment	  71 yo female w/PMHx of HTN, DM2, Lung Ca/Colon Ca (9 years ago), chronic lymphedema presents to  for worsening RLE redness.    Assessment:  Sepsis sec to Bilateral wounds improved  lymphedema  chronic severe venous stasis dermatitis  Cellulitis  Bilateral Right > Left - slowly resolving  Pseudomonas Bacteremia - sensitive to meropenem   Hyponatremia - resolved  Uncontrolled DM - improved on Lantus and Admelog  Elevated LFT's  ADRIAN  Thrombocytopenia    Plan  S/p 1x dose of Vanco and 1x dose of Levaquin in ED, 1 day of tigecycline, and now on Meropenem   ID Following   Off Metformin  Tolerating Lantus and continue Admelog  1200 cc po fluid restriction]  Physical therapy  blood cultures - neg to date,   SNF rehab today  Covid swab  Unable to tolerate heparin due to reactive thrombocytopenia, and unable to tolerate venodynes due to lymphedema and cellulitis

## 2023-04-10 NOTE — DISCHARGE NOTE PROVIDER - HOSPITAL COURSE
The patient was admitted with severe and long standing venous stasis dermatitis and chronic lymphedema and developed cellulitis. Blood cultures grew out pseudomonas. She was ultimately treated with IV meropenem. Follow up blood cultures are negative to day. She was seen by ID who felt she was stable to go to SNF rehab on an additional week of Iv meropenem. Her vascular surgeon is Dr. Garcia. She will ultimately require rewrapping of legs and compression devices for the lymphedema

## 2023-04-10 NOTE — DISCHARGE NOTE PROVIDER - CARE PROVIDER_API CALL
Elgin Dean)  Family Medicine  [address], [address] [address]  Phone: [phone]  Fax: [phone]  Follow Up Time:     Magnus Garcia)  Vascular Surgery  [address] [place] [address]  Phone: [phone]  Fax: [phone]  Follow Up Time:

## 2023-04-10 NOTE — DISCHARGE NOTE PROVIDER - NSDCCPCAREPLAN_GEN_ALL_CORE_FT
PRINCIPAL DISCHARGE DIAGNOSIS  Diagnosis: Cellulitis  Assessment and Plan of Treatment:       SECONDARY DISCHARGE DIAGNOSES  Diagnosis: Lymphedema  Assessment and Plan of Treatment:     Diagnosis: Diabetes mellitus, type 2  Assessment and Plan of Treatment:     Diagnosis: Stasis dermatitis  Assessment and Plan of Treatment:     Diagnosis: Hyponatremia  Assessment and Plan of Treatment:

## 2023-04-10 NOTE — PROGRESS NOTE ADULT - SUBJECTIVE AND OBJECTIVE BOX
Date of service: 04-10-23 @ 14:48    Lying in bed in NAD  Legs feel less tender  Serous dischange from right shin area from one small opening, no erythema    ROS: no fever or chills; denies dizziness, no HA, no SOB or cough, no abdominal pain, no diarrhea or constipation; no dysuria    MEDICATIONS  (STANDING):  dextrose 5%. 1000 milliLiter(s) (50 mL/Hr) IV Continuous <Continuous>  dextrose 5%. 1000 milliLiter(s) (100 mL/Hr) IV Continuous <Continuous>  dextrose 50% Injectable 25 Gram(s) IV Push once  dextrose 50% Injectable 12.5 Gram(s) IV Push once  dextrose 50% Injectable 25 Gram(s) IV Push once  glucagon  Injectable 1 milliGRAM(s) IntraMuscular once  insulin glargine Injectable (LANTUS) 6 Unit(s) SubCutaneous at bedtime  insulin lispro (ADMELOG) corrective regimen sliding scale   SubCutaneous three times a day before meals  insulin lispro Injectable (ADMELOG) 3 Unit(s) SubCutaneous three times a day before meals  meropenem  IVPB 1000 milliGRAM(s) IV Intermittent every 8 hours  metoprolol succinate ER 25 milliGRAM(s) Oral daily    Vital Signs Last 24 Hrs  T(C): 37 (10 Apr 2023 07:49), Max: 37.1 (09 Apr 2023 16:34)  T(F): 98.6 (10 Apr 2023 07:49), Max: 98.8 (09 Apr 2023 16:34)  HR: 74 (10 Apr 2023 07:49) (74 - 79)  BP: 126/56 (10 Apr 2023 07:49) (126/56 - 141/42)  BP(mean): --  RR: 18 (09 Apr 2023 23:32) (18 - 18)  SpO2: 97% (10 Apr 2023 07:49) (95% - 97%)    Parameters below as of 10 Apr 2023 07:49  Patient On (Oxygen Delivery Method): room air     Physical exam:    Constitutional:  No acute distress  HEENT: NC/AT, EOMI, PERRLA, conjunctivae clear; ears and nose atraumatic  Neck: supple; thyroid not palpable  Back: no tenderness  Respiratory: respiratory effort normal; clear to auscultation  Cardiovascular: S1S2 regular, no murmurs  Abdomen: soft, not tender, not distended, positive BS; no liver or spleen organomegaly  Genitourinary: no suprapubic tenderness  Lymphatic: no LN palpable  Musculoskeletal: no muscle tenderness, no joint swelling or tenderness  Extremities: 2+ pedal edema with chronic lower legs skin changes  LLE erythema, edema, warmth and tenderness; open wounds with scant serous discharge - improving  Neurological/ Psychiatric: AxOx3, judgement and insight normal; moving all extremities  Skin: no rashes; no palpable lesions    Labs: reviewed                        11.4   7.45  )-----------( 132      ( 05 Apr 2023 11:42 )             34.6                         11.5   11.82 )-----------( 111      ( 03 Apr 2023 07:33 )             34.5     04-03    133<L>  |  107  |  43<H>  ----------------------------<  202<H>  4.7   |  19<L>  |  1.26    Ca    9.5      03 Apr 2023 07:33    TPro  6.0  /  Alb  2.2<L>  /  TBili  1.2  /  DBili  0.5<H>  /  AST  150<H>  /  ALT  40  /  AlkPhos  91  04-03                        13.4   20.35 )-----------( 150      ( 02 Apr 2023 09:55 )             38.5     04-02    127<L>  |  101  |  43<H>  ----------------------------<  358<H>  5.1   |  19<L>  |  1.74<H>    Ca    9.1      02 Apr 2023 14:07    TPro  7.5  /  Alb  3.1<L>  /  TBili  1.7<H>  /  DBili  x   /  AST  245<H>  /  ALT  48  /  AlkPhos  118  04-02     LIVER FUNCTIONS - ( 02 Apr 2023 09:55 )  Alb: 3.1 g/dL / Pro: 7.5 gm/dL / ALK PHOS: 118 U/L / ALT: 48 U/L / AST: 245 U/L / GGT: x             Culture - Blood (collected 02 Apr 2023 09:56)  Source: .Blood Blood-Peripheral  Gram Stain (03 Apr 2023 08:27):    Growth in aerobic bottle: Gram Negative Rods  Final Report (04 Apr 2023 15:46):    Growth in aerobic bottle: Pseudomonas aeruginosa  Organism: Blood Culture PCR  Pseudomonas aeruginosa (04 Apr 2023 15:46)  Organism: Pseudomonas aeruginosa (04 Apr 2023 15:46)      Method Type: SANNA      -  Amikacin: S <=16      -  Aztreonam: S 8      -  Cefepime: S <=2      -  Ceftazidime: S 4      -  Ciprofloxacin: S <=0.25      -  Gentamicin: S 4      -  Imipenem: S <=1      -  Levofloxacin: S <=0.5      -  Meropenem: S <=1      -  Piperacillin/Tazobactam: S <=8      -  Tobramycin: S <=2  Organism: Blood Culture PCR (04 Apr 2023 15:46)      Method Type: PCR      -  Pseudomonas aeruginosa: Detec    Culture - Blood (collected 07 Apr 2023 08:08)  Source: .Blood None  Preliminary Report (08 Apr 2023 12:01):    No growth to date.    Culture - Blood (collected 07 Apr 2023 08:08)  Source: .Blood None  Preliminary Report (08 Apr 2023 12:01):    No growth to date.    Radiology: all available radiological tests reviewed    < from: Xray Chest 1 View- PORTABLE-Routine (Xray Chest 1 View- PORTABLE-Routine .) (04.02.23 @ 10:45) >  Lines and tubes as above.  Mild pulmonary vascular congestion.  < end of copied text >      Advanced directives addressed: full resuscitation

## 2023-04-17 DIAGNOSIS — E11.22 TYPE 2 DIABETES MELLITUS WITH DIABETIC CHRONIC KIDNEY DISEASE: ICD-10-CM

## 2023-04-17 DIAGNOSIS — L97.929 NON-PRESSURE CHRONIC ULCER OF UNSPECIFIED PART OF LEFT LOWER LEG WITH UNSPECIFIED SEVERITY: ICD-10-CM

## 2023-04-17 DIAGNOSIS — E11.622 TYPE 2 DIABETES MELLITUS WITH OTHER SKIN ULCER: ICD-10-CM

## 2023-04-17 DIAGNOSIS — I87.8 OTHER SPECIFIED DISORDERS OF VEINS: ICD-10-CM

## 2023-04-17 DIAGNOSIS — I12.9 HYPERTENSIVE CHRONIC KIDNEY DISEASE WITH STAGE 1 THROUGH STAGE 4 CHRONIC KIDNEY DISEASE, OR UNSPECIFIED CHRONIC KIDNEY DISEASE: ICD-10-CM

## 2023-04-17 DIAGNOSIS — L03.116 CELLULITIS OF LEFT LOWER LIMB: ICD-10-CM

## 2023-04-17 DIAGNOSIS — L03.115 CELLULITIS OF RIGHT LOWER LIMB: ICD-10-CM

## 2023-04-17 DIAGNOSIS — Z85.118 PERSONAL HISTORY OF OTHER MALIGNANT NEOPLASM OF BRONCHUS AND LUNG: ICD-10-CM

## 2023-04-17 DIAGNOSIS — E87.1 HYPO-OSMOLALITY AND HYPONATREMIA: ICD-10-CM

## 2023-04-17 DIAGNOSIS — Z95.828 PRESENCE OF OTHER VASCULAR IMPLANTS AND GRAFTS: ICD-10-CM

## 2023-04-17 DIAGNOSIS — Z85.038 PERSONAL HISTORY OF OTHER MALIGNANT NEOPLASM OF LARGE INTESTINE: ICD-10-CM

## 2023-04-17 DIAGNOSIS — E87.20 ACIDOSIS, UNSPECIFIED: ICD-10-CM

## 2023-04-17 DIAGNOSIS — N17.9 ACUTE KIDNEY FAILURE, UNSPECIFIED: ICD-10-CM

## 2023-04-17 DIAGNOSIS — Z79.4 LONG TERM (CURRENT) USE OF INSULIN: ICD-10-CM

## 2023-04-17 DIAGNOSIS — A41.52 SEPSIS DUE TO PSEUDOMONAS: ICD-10-CM

## 2023-04-17 DIAGNOSIS — Z20.822 CONTACT WITH AND (SUSPECTED) EXPOSURE TO COVID-19: ICD-10-CM

## 2023-04-17 DIAGNOSIS — L97.919 NON-PRESSURE CHRONIC ULCER OF UNSPECIFIED PART OF RIGHT LOWER LEG WITH UNSPECIFIED SEVERITY: ICD-10-CM

## 2023-04-17 DIAGNOSIS — Z88.1 ALLERGY STATUS TO OTHER ANTIBIOTIC AGENTS STATUS: ICD-10-CM

## 2023-04-17 DIAGNOSIS — D69.6 THROMBOCYTOPENIA, UNSPECIFIED: ICD-10-CM

## 2023-04-17 DIAGNOSIS — N18.30 CHRONIC KIDNEY DISEASE, STAGE 3 UNSPECIFIED: ICD-10-CM

## 2023-04-17 DIAGNOSIS — I89.0 LYMPHEDEMA, NOT ELSEWHERE CLASSIFIED: ICD-10-CM

## 2023-05-15 ENCOUNTER — APPOINTMENT (OUTPATIENT)
Dept: FAMILY MEDICINE | Facility: CLINIC | Age: 73
End: 2023-05-15
Payer: MEDICARE

## 2023-05-15 VITALS
BODY MASS INDEX: 47.14 KG/M2 | HEART RATE: 103 BPM | WEIGHT: 293 LBS | OXYGEN SATURATION: 97 % | TEMPERATURE: 98.2 F | SYSTOLIC BLOOD PRESSURE: 122 MMHG | DIASTOLIC BLOOD PRESSURE: 68 MMHG

## 2023-05-15 DIAGNOSIS — I89.0 LYMPHEDEMA, NOT ELSEWHERE CLASSIFIED: ICD-10-CM

## 2023-05-15 PROCEDURE — 36415 COLL VENOUS BLD VENIPUNCTURE: CPT

## 2023-05-15 PROCEDURE — 99496 TRANSJ CARE MGMT HIGH F2F 7D: CPT | Mod: 25

## 2023-05-15 NOTE — HISTORY OF PRESENT ILLNESS
[Post-hospitalization from ___ Hospital] : Post-hospitalization from [unfilled] Hospital [Admitted on: ___] : The patient was admitted on [unfilled] [Discharged on ___] : discharged on [unfilled] [FreeTextEntry2] : From  went directly to [facility]. Discharged on 5/11/23\par Admitted for cellulitis, venous insufficiency, lymphedema\par Saw wound care doctor. dressed wounds but no compression.\par Now walking with walker

## 2023-05-15 NOTE — HISTORY OF PRESENT ILLNESS
[Post-hospitalization from ___ Hospital] : Post-hospitalization from [unfilled] Hospital [Admitted on: ___] : The patient was admitted on [unfilled] [Discharged on ___] : discharged on [unfilled] [FreeTextEntry2] : From  went directly to [facility]. Discharged on 5/11/23\par Admitted for cellulitis, venous insufficiency, lymphedema\par Saw wound care doctor. dressed wounds but no compression.\par Now walking with walker

## 2023-05-15 NOTE — PHYSICAL EXAM
[Normal] : soft, non-tender, non-distended, no masses palpated, no HSM and normal bowel sounds [de-identified] : legs very large, stasis changes, not oozing. foul smelling [55992 - High Complexity requires an extensive number of possible diagnoses and/or the management options, extensive complexity of the medical data (tests, etc.) to be reviewed, and a high risk of significant complications, morbidity, and/or mortality as w] : High Complexity

## 2023-05-15 NOTE — PHYSICAL EXAM
[Normal] : soft, non-tender, non-distended, no masses palpated, no HSM and normal bowel sounds [de-identified] : legs very large, stasis changes, not oozing. foul smelling [[id number] - High Complexity requires an extensive number of possible diagnoses and/or the management options, extensive complexity of the medical data (tests, etc.) to be reviewed, and a high risk of significant complications, morbidity, and/or mortality as w] : High Complexity

## 2023-05-15 NOTE — PLAN
[FreeTextEntry1] : Advised\par Follow up with vascular surgery and ID\par Will need to begin compression ASAP

## 2023-05-16 LAB
ALBUMIN SERPL ELPH-MCNC: 3.8 G/DL
ALP BLD-CCNC: 90 U/L
ALT SERPL-CCNC: 18 U/L
ANION GAP SERPL CALC-SCNC: 14 MMOL/L
APTT BLD: 30.7 SEC
AST SERPL-CCNC: 31 U/L
BASOPHILS # BLD AUTO: 0.05 K/UL
BASOPHILS NFR BLD AUTO: 1.1 %
BILIRUB SERPL-MCNC: 0.7 MG/DL
BUN SERPL-MCNC: 15 MG/DL
CALCIUM SERPL-MCNC: 9.2 MG/DL
CHLORIDE SERPL-SCNC: 102 MMOL/L
CO2 SERPL-SCNC: 25 MMOL/L
CREAT SERPL-MCNC: 0.87 MG/DL
EGFR: 71 ML/MIN/1.73M2
EOSINOPHIL # BLD AUTO: 0.22 K/UL
EOSINOPHIL NFR BLD AUTO: 4.8 %
GLUCOSE SERPL-MCNC: 222 MG/DL
HCT VFR BLD CALC: 35.5 %
HGB BLD-MCNC: 11.4 G/DL
IMM GRANULOCYTES NFR BLD AUTO: 0 %
INR PPP: 1.14 RATIO
LYMPHOCYTES # BLD AUTO: 0.99 K/UL
LYMPHOCYTES NFR BLD AUTO: 21.7 %
MAN DIFF?: NORMAL
MCHC RBC-ENTMCNC: 27.7 PG
MCHC RBC-ENTMCNC: 32.1 GM/DL
MCV RBC AUTO: 86.4 FL
MONOCYTES # BLD AUTO: 0.51 K/UL
MONOCYTES NFR BLD AUTO: 11.2 %
NEUTROPHILS # BLD AUTO: 2.8 K/UL
NEUTROPHILS NFR BLD AUTO: 61.2 %
PLATELET # BLD AUTO: 80 K/UL
POTASSIUM SERPL-SCNC: 4 MMOL/L
PROT SERPL-MCNC: 6.4 G/DL
PT BLD: 13.2 SEC
RBC # BLD: 4.11 M/UL
RBC # FLD: 14.9 %
SODIUM SERPL-SCNC: 141 MMOL/L
WBC # FLD AUTO: 4.57 K/UL

## 2023-05-18 ENCOUNTER — OUTPATIENT (OUTPATIENT)
Dept: OUTPATIENT SERVICES | Facility: HOSPITAL | Age: 73
LOS: 1 days | End: 2023-05-18
Payer: MEDICARE

## 2023-05-18 DIAGNOSIS — E11.69 TYPE 2 DIABETES MELLITUS WITH OTHER SPECIFIED COMPLICATION: ICD-10-CM

## 2023-05-18 PROCEDURE — 99213 OFFICE O/P EST LOW 20 MIN: CPT

## 2023-05-23 ENCOUNTER — NON-APPOINTMENT (OUTPATIENT)
Age: 73
End: 2023-05-23

## 2023-05-25 ENCOUNTER — OUTPATIENT (OUTPATIENT)
Dept: OUTPATIENT SERVICES | Facility: HOSPITAL | Age: 73
LOS: 1 days | End: 2023-05-25
Payer: MEDICARE

## 2023-05-25 DIAGNOSIS — E11.69 TYPE 2 DIABETES MELLITUS WITH OTHER SPECIFIED COMPLICATION: ICD-10-CM

## 2023-05-25 DIAGNOSIS — I73.9 PERIPHERAL VASCULAR DISEASE, UNSPECIFIED: ICD-10-CM

## 2023-05-25 DIAGNOSIS — I89.0 LYMPHEDEMA, NOT ELSEWHERE CLASSIFIED: ICD-10-CM

## 2023-05-25 DIAGNOSIS — I10 ESSENTIAL (PRIMARY) HYPERTENSION: ICD-10-CM

## 2023-05-25 DIAGNOSIS — G60.9 HEREDITARY AND IDIOPATHIC NEUROPATHY, UNSPECIFIED: ICD-10-CM

## 2023-05-25 PROCEDURE — 99213 OFFICE O/P EST LOW 20 MIN: CPT

## 2023-05-25 PROCEDURE — 29580 STRAPPING UNNA BOOT: CPT | Mod: 50

## 2023-06-01 ENCOUNTER — OUTPATIENT (OUTPATIENT)
Dept: OUTPATIENT SERVICES | Facility: HOSPITAL | Age: 73
LOS: 1 days | End: 2023-06-01
Payer: MEDICARE

## 2023-06-01 DIAGNOSIS — E11.69 TYPE 2 DIABETES MELLITUS WITH OTHER SPECIFIED COMPLICATION: ICD-10-CM

## 2023-06-01 PROCEDURE — 29580 STRAPPING UNNA BOOT: CPT | Mod: 50

## 2023-06-01 PROCEDURE — 99213 OFFICE O/P EST LOW 20 MIN: CPT

## 2023-06-02 DIAGNOSIS — I89.0 LYMPHEDEMA, NOT ELSEWHERE CLASSIFIED: ICD-10-CM

## 2023-06-02 DIAGNOSIS — G60.9 HEREDITARY AND IDIOPATHIC NEUROPATHY, UNSPECIFIED: ICD-10-CM

## 2023-06-02 DIAGNOSIS — I73.9 PERIPHERAL VASCULAR DISEASE, UNSPECIFIED: ICD-10-CM

## 2023-06-02 DIAGNOSIS — I10 ESSENTIAL (PRIMARY) HYPERTENSION: ICD-10-CM

## 2023-06-08 ENCOUNTER — OUTPATIENT (OUTPATIENT)
Dept: OUTPATIENT SERVICES | Facility: HOSPITAL | Age: 73
LOS: 1 days | End: 2023-06-08
Payer: MEDICARE

## 2023-06-08 DIAGNOSIS — I73.9 PERIPHERAL VASCULAR DISEASE, UNSPECIFIED: ICD-10-CM

## 2023-06-08 PROCEDURE — 29580 STRAPPING UNNA BOOT: CPT | Mod: 50

## 2023-06-08 PROCEDURE — 99214 OFFICE O/P EST MOD 30 MIN: CPT

## 2023-06-15 ENCOUNTER — OUTPATIENT (OUTPATIENT)
Dept: OUTPATIENT SERVICES | Facility: HOSPITAL | Age: 73
LOS: 1 days | End: 2023-06-15
Payer: MEDICARE

## 2023-06-15 DIAGNOSIS — I73.9 PERIPHERAL VASCULAR DISEASE, UNSPECIFIED: ICD-10-CM

## 2023-06-15 PROCEDURE — 29580 STRAPPING UNNA BOOT: CPT | Mod: 50

## 2023-06-16 DIAGNOSIS — G60.9 HEREDITARY AND IDIOPATHIC NEUROPATHY, UNSPECIFIED: ICD-10-CM

## 2023-06-16 DIAGNOSIS — I73.9 PERIPHERAL VASCULAR DISEASE, UNSPECIFIED: ICD-10-CM

## 2023-06-16 DIAGNOSIS — I10 ESSENTIAL (PRIMARY) HYPERTENSION: ICD-10-CM

## 2023-06-16 DIAGNOSIS — I89.0 LYMPHEDEMA, NOT ELSEWHERE CLASSIFIED: ICD-10-CM

## 2023-06-22 ENCOUNTER — OUTPATIENT (OUTPATIENT)
Dept: OUTPATIENT SERVICES | Facility: HOSPITAL | Age: 73
LOS: 1 days | End: 2023-06-22
Payer: MEDICARE

## 2023-06-22 DIAGNOSIS — I73.9 PERIPHERAL VASCULAR DISEASE, UNSPECIFIED: ICD-10-CM

## 2023-06-22 PROCEDURE — 29580 STRAPPING UNNA BOOT: CPT | Mod: 50

## 2023-06-23 DIAGNOSIS — G60.9 HEREDITARY AND IDIOPATHIC NEUROPATHY, UNSPECIFIED: ICD-10-CM

## 2023-06-23 DIAGNOSIS — I89.0 LYMPHEDEMA, NOT ELSEWHERE CLASSIFIED: ICD-10-CM

## 2023-06-23 DIAGNOSIS — I10 ESSENTIAL (PRIMARY) HYPERTENSION: ICD-10-CM

## 2023-06-26 DIAGNOSIS — I89.0 LYMPHEDEMA, NOT ELSEWHERE CLASSIFIED: ICD-10-CM

## 2023-06-26 DIAGNOSIS — E11.69 TYPE 2 DIABETES MELLITUS WITH OTHER SPECIFIED COMPLICATION: ICD-10-CM

## 2023-06-26 DIAGNOSIS — E11.49 TYPE 2 DIABETES MELLITUS WITH OTHER DIABETIC NEUROLOGICAL COMPLICATION: ICD-10-CM

## 2023-06-26 DIAGNOSIS — I73.9 PERIPHERAL VASCULAR DISEASE, UNSPECIFIED: ICD-10-CM

## 2023-06-29 ENCOUNTER — OUTPATIENT (OUTPATIENT)
Dept: OUTPATIENT SERVICES | Facility: HOSPITAL | Age: 73
LOS: 1 days | End: 2023-06-29
Payer: MEDICARE

## 2023-06-29 DIAGNOSIS — I89.0 LYMPHEDEMA, NOT ELSEWHERE CLASSIFIED: ICD-10-CM

## 2023-06-29 PROCEDURE — 29580 STRAPPING UNNA BOOT: CPT | Mod: 50

## 2023-07-06 ENCOUNTER — OUTPATIENT (OUTPATIENT)
Dept: OUTPATIENT SERVICES | Facility: HOSPITAL | Age: 73
LOS: 1 days | End: 2023-07-06
Payer: MEDICARE

## 2023-07-06 DIAGNOSIS — L97.828 NON-PRESSURE CHRONIC ULCER OF OTHER PART OF LEFT LOWER LEG WITH OTHER SPECIFIED SEVERITY: ICD-10-CM

## 2023-07-06 DIAGNOSIS — G60.9 HEREDITARY AND IDIOPATHIC NEUROPATHY, UNSPECIFIED: ICD-10-CM

## 2023-07-06 DIAGNOSIS — I89.0 LYMPHEDEMA, NOT ELSEWHERE CLASSIFIED: ICD-10-CM

## 2023-07-06 DIAGNOSIS — L97.528 NON-PRESSURE CHRONIC ULCER OF OTHER PART OF LEFT FOOT WITH OTHER SPECIFIED SEVERITY: ICD-10-CM

## 2023-07-06 DIAGNOSIS — I73.9 PERIPHERAL VASCULAR DISEASE, UNSPECIFIED: ICD-10-CM

## 2023-07-06 DIAGNOSIS — I10 ESSENTIAL (PRIMARY) HYPERTENSION: ICD-10-CM

## 2023-07-06 PROCEDURE — 29580 STRAPPING UNNA BOOT: CPT | Mod: 50

## 2023-07-10 DIAGNOSIS — L97.528 NON-PRESSURE CHRONIC ULCER OF OTHER PART OF LEFT FOOT WITH OTHER SPECIFIED SEVERITY: ICD-10-CM

## 2023-07-10 DIAGNOSIS — I10 ESSENTIAL (PRIMARY) HYPERTENSION: ICD-10-CM

## 2023-07-10 DIAGNOSIS — L97.828 NON-PRESSURE CHRONIC ULCER OF OTHER PART OF LEFT LOWER LEG WITH OTHER SPECIFIED SEVERITY: ICD-10-CM

## 2023-07-10 DIAGNOSIS — I73.9 PERIPHERAL VASCULAR DISEASE, UNSPECIFIED: ICD-10-CM

## 2023-07-10 DIAGNOSIS — I89.0 LYMPHEDEMA, NOT ELSEWHERE CLASSIFIED: ICD-10-CM

## 2023-07-10 DIAGNOSIS — G60.9 HEREDITARY AND IDIOPATHIC NEUROPATHY, UNSPECIFIED: ICD-10-CM

## 2023-07-13 ENCOUNTER — OUTPATIENT (OUTPATIENT)
Dept: OUTPATIENT SERVICES | Facility: HOSPITAL | Age: 73
LOS: 1 days | End: 2023-07-13
Payer: MEDICARE

## 2023-07-13 DIAGNOSIS — I89.0 LYMPHEDEMA, NOT ELSEWHERE CLASSIFIED: ICD-10-CM

## 2023-07-13 PROCEDURE — 29580 STRAPPING UNNA BOOT: CPT | Mod: 50

## 2023-07-13 NOTE — PROGRESS NOTE ADULT - GASTROINTESTINAL
GI at bedside      Tamiko Katelin Mccoy  07/13/23 [id number]
normal/soft/nontender/nondistended/normal active bowel sounds

## 2023-07-15 NOTE — H&P ADULT - NSCORESITESY/N_GEN_A_CORE_RD
Yes - endorses poor fluid intake  - lab-work appears hemoconcentrated and patient w/ dry oral mucosa  - constipation resolved   - s/p IVF hydration   - encourage oral hydration  - f/u electrolytes

## 2023-07-17 DIAGNOSIS — L97.828 NON-PRESSURE CHRONIC ULCER OF OTHER PART OF LEFT LOWER LEG WITH OTHER SPECIFIED SEVERITY: ICD-10-CM

## 2023-07-17 DIAGNOSIS — L97.528 NON-PRESSURE CHRONIC ULCER OF OTHER PART OF LEFT FOOT WITH OTHER SPECIFIED SEVERITY: ICD-10-CM

## 2023-07-17 DIAGNOSIS — I73.9 PERIPHERAL VASCULAR DISEASE, UNSPECIFIED: ICD-10-CM

## 2023-07-17 DIAGNOSIS — I10 ESSENTIAL (PRIMARY) HYPERTENSION: ICD-10-CM

## 2023-07-17 DIAGNOSIS — I89.0 LYMPHEDEMA, NOT ELSEWHERE CLASSIFIED: ICD-10-CM

## 2023-07-17 DIAGNOSIS — L97.518 NON-PRESSURE CHRONIC ULCER OF OTHER PART OF RIGHT FOOT WITH OTHER SPECIFIED SEVERITY: ICD-10-CM

## 2023-07-17 DIAGNOSIS — G60.9 HEREDITARY AND IDIOPATHIC NEUROPATHY, UNSPECIFIED: ICD-10-CM

## 2023-07-27 ENCOUNTER — RX RENEWAL (OUTPATIENT)
Age: 73
End: 2023-07-27

## 2023-07-27 ENCOUNTER — OUTPATIENT (OUTPATIENT)
Dept: OUTPATIENT SERVICES | Facility: HOSPITAL | Age: 73
LOS: 1 days | End: 2023-07-27
Payer: MEDICARE

## 2023-07-27 DIAGNOSIS — I89.0 LYMPHEDEMA, NOT ELSEWHERE CLASSIFIED: ICD-10-CM

## 2023-07-27 PROCEDURE — 99213 OFFICE O/P EST LOW 20 MIN: CPT | Mod: 25

## 2023-07-27 PROCEDURE — 29580 STRAPPING UNNA BOOT: CPT | Mod: 59,50

## 2023-07-31 DIAGNOSIS — G60.9 HEREDITARY AND IDIOPATHIC NEUROPATHY, UNSPECIFIED: ICD-10-CM

## 2023-07-31 DIAGNOSIS — I10 ESSENTIAL (PRIMARY) HYPERTENSION: ICD-10-CM

## 2023-07-31 DIAGNOSIS — I73.9 PERIPHERAL VASCULAR DISEASE, UNSPECIFIED: ICD-10-CM

## 2023-07-31 DIAGNOSIS — L97.528 NON-PRESSURE CHRONIC ULCER OF OTHER PART OF LEFT FOOT WITH OTHER SPECIFIED SEVERITY: ICD-10-CM

## 2023-07-31 DIAGNOSIS — I89.0 LYMPHEDEMA, NOT ELSEWHERE CLASSIFIED: ICD-10-CM

## 2023-07-31 DIAGNOSIS — L97.828 NON-PRESSURE CHRONIC ULCER OF OTHER PART OF LEFT LOWER LEG WITH OTHER SPECIFIED SEVERITY: ICD-10-CM

## 2023-07-31 DIAGNOSIS — L97.518 NON-PRESSURE CHRONIC ULCER OF OTHER PART OF RIGHT FOOT WITH OTHER SPECIFIED SEVERITY: ICD-10-CM

## 2023-08-03 ENCOUNTER — OUTPATIENT (OUTPATIENT)
Dept: OUTPATIENT SERVICES | Facility: HOSPITAL | Age: 73
LOS: 1 days | End: 2023-08-03
Payer: MEDICARE

## 2023-08-03 DIAGNOSIS — I89.0 LYMPHEDEMA, NOT ELSEWHERE CLASSIFIED: ICD-10-CM

## 2023-08-03 PROCEDURE — 29580 STRAPPING UNNA BOOT: CPT | Mod: 59,LT

## 2023-08-03 PROCEDURE — 99212 OFFICE O/P EST SF 10 MIN: CPT | Mod: 25

## 2023-08-04 ENCOUNTER — APPOINTMENT (OUTPATIENT)
Dept: FAMILY MEDICINE | Facility: CLINIC | Age: 73
End: 2023-08-04
Payer: MEDICARE

## 2023-08-04 VITALS
HEIGHT: 68 IN | BODY MASS INDEX: 44.41 KG/M2 | OXYGEN SATURATION: 97 % | SYSTOLIC BLOOD PRESSURE: 124 MMHG | HEART RATE: 89 BPM | WEIGHT: 293 LBS | DIASTOLIC BLOOD PRESSURE: 50 MMHG | TEMPERATURE: 97.2 F

## 2023-08-04 PROCEDURE — 36415 COLL VENOUS BLD VENIPUNCTURE: CPT

## 2023-08-04 PROCEDURE — 99214 OFFICE O/P EST MOD 30 MIN: CPT | Mod: 25

## 2023-08-04 RX ORDER — TRAMADOL HYDROCHLORIDE 50 MG/1
50 TABLET, COATED ORAL TWICE DAILY
Qty: 14 | Refills: 0 | Status: DISCONTINUED | COMMUNITY
Start: 2023-05-18 | End: 2023-08-04

## 2023-08-04 RX ORDER — METOPROLOL SUCCINATE 25 MG/1
25 TABLET, EXTENDED RELEASE ORAL
Qty: 90 | Refills: 0 | Status: DISCONTINUED | COMMUNITY
Start: 2022-02-01 | End: 2023-08-04

## 2023-08-04 NOTE — HISTORY OF PRESENT ILLNESS
[Diabetes Mellitus] : Diabetes Mellitus [Hypertension] : Hypertension [FreeTextEntry6] : pt is here for blood work. and med recheck.

## 2023-08-05 ENCOUNTER — TRANSCRIPTION ENCOUNTER (OUTPATIENT)
Age: 73
End: 2023-08-05

## 2023-08-05 LAB
ALBUMIN SERPL ELPH-MCNC: 3.8 G/DL
ALP BLD-CCNC: 121 U/L
ALT SERPL-CCNC: 35 U/L
ANION GAP SERPL CALC-SCNC: 11 MMOL/L
AST SERPL-CCNC: 42 U/L
BILIRUB SERPL-MCNC: 0.6 MG/DL
BUN SERPL-MCNC: 51 MG/DL
CALCIUM SERPL-MCNC: 10.3 MG/DL
CHLORIDE SERPL-SCNC: 104 MMOL/L
CHOLEST SERPL-MCNC: 184 MG/DL
CO2 SERPL-SCNC: 17 MMOL/L
CREAT SERPL-MCNC: 1.29 MG/DL
EGFR: 44 ML/MIN/1.73M2
ESTIMATED AVERAGE GLUCOSE: 171 MG/DL
GLUCOSE SERPL-MCNC: 155 MG/DL
HBA1C MFR BLD HPLC: 7.6 %
HDLC SERPL-MCNC: 79 MG/DL
LDLC SERPL CALC-MCNC: 85 MG/DL
NONHDLC SERPL-MCNC: 106 MG/DL
POTASSIUM SERPL-SCNC: 7.1 MMOL/L
PROT SERPL-MCNC: 7.3 G/DL
SODIUM SERPL-SCNC: 132 MMOL/L
TRIGL SERPL-MCNC: 117 MG/DL

## 2023-08-09 ENCOUNTER — APPOINTMENT (OUTPATIENT)
Dept: FAMILY MEDICINE | Facility: CLINIC | Age: 73
End: 2023-08-09
Payer: MEDICARE

## 2023-08-09 ENCOUNTER — TRANSCRIPTION ENCOUNTER (OUTPATIENT)
Age: 73
End: 2023-08-09

## 2023-08-09 VITALS
BODY MASS INDEX: 44.41 KG/M2 | OXYGEN SATURATION: 98 % | SYSTOLIC BLOOD PRESSURE: 140 MMHG | WEIGHT: 293 LBS | HEIGHT: 68 IN | HEART RATE: 108 BPM | DIASTOLIC BLOOD PRESSURE: 60 MMHG | TEMPERATURE: 98.1 F

## 2023-08-09 DIAGNOSIS — S91.114A LACERATION WITHOUT FOREIGN BODY OF RIGHT LESSER TOE(S) WITHOUT DAMAGE TO NAIL, INITIAL ENCOUNTER: ICD-10-CM

## 2023-08-09 DIAGNOSIS — L97.828 NON-PRESSURE CHRONIC ULCER OF OTHER PART OF LEFT LOWER LEG WITH OTHER SPECIFIED SEVERITY: ICD-10-CM
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
## 2023-08-09 DIAGNOSIS — I73.9 PERIPHERAL VASCULAR DISEASE, UNSPECIFIED: ICD-10-CM
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
## 2023-08-09 DIAGNOSIS — G60.9 HEREDITARY AND IDIOPATHIC NEUROPATHY, UNSPECIFIED: ICD-10-CM
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
## 2023-08-09 DIAGNOSIS — I10 ESSENTIAL (PRIMARY) HYPERTENSION: ICD-10-CM

## 2023-08-09 DIAGNOSIS — L97.528 NON-PRESSURE CHRONIC ULCER OF OTHER PART OF LEFT FOOT WITH OTHER SPECIFIED SEVERITY: ICD-10-CM

## 2023-08-09 DIAGNOSIS — I89.0 LYMPHEDEMA, NOT ELSEWHERE CLASSIFIED: ICD-10-CM

## 2023-08-09 PROCEDURE — 93000 ELECTROCARDIOGRAM COMPLETE: CPT

## 2023-08-09 PROCEDURE — 81003 URINALYSIS AUTO W/O SCOPE: CPT | Mod: QW

## 2023-08-09 PROCEDURE — 99213 OFFICE O/P EST LOW 20 MIN: CPT | Mod: 25

## 2023-08-09 NOTE — HISTORY OF PRESENT ILLNESS
[FreeTextEntry6] : Pt is here for reapeat potassium levels and EKG. States she was drinking a lot of beet juice

## 2023-08-10 LAB
ANION GAP SERPL CALC-SCNC: 17 MMOL/L
BILIRUB UR QL STRIP: NORMAL
BUN SERPL-MCNC: 50 MG/DL
CALCIUM SERPL-MCNC: 10.3 MG/DL
CHLORIDE SERPL-SCNC: 102 MMOL/L
CLARITY UR: CLEAR
CO2 SERPL-SCNC: 13 MMOL/L
COLLECTION METHOD: NORMAL
CREAT SERPL-MCNC: 1.38 MG/DL
EGFR: 41 ML/MIN/1.73M2
GLUCOSE SERPL-MCNC: 151 MG/DL
GLUCOSE UR-MCNC: NORMAL
HCG UR QL: 0.2 EU/DL
HGB UR QL STRIP.AUTO: NORMAL
KETONES UR-MCNC: NORMAL
LEUKOCYTE ESTERASE UR QL STRIP: ABNORMAL
NITRITE UR QL STRIP: POSITIVE
PH UR STRIP: 5.5
POTASSIUM SERPL-SCNC: 6.2 MMOL/L
PROT UR STRIP-MCNC: NORMAL
SODIUM SERPL-SCNC: 132 MMOL/L
SP GR UR STRIP: 1.02

## 2023-08-12 ENCOUNTER — INPATIENT (INPATIENT)
Facility: HOSPITAL | Age: 73
LOS: 5 days | Discharge: HOME HEALTH | DRG: 872 | End: 2023-08-18
Attending: HOSPITALIST | Admitting: INTERNAL MEDICINE
Payer: MEDICARE

## 2023-08-12 VITALS
TEMPERATURE: 98 F | HEART RATE: 96 BPM | RESPIRATION RATE: 16 BRPM | OXYGEN SATURATION: 100 % | WEIGHT: 279.99 LBS | SYSTOLIC BLOOD PRESSURE: 145 MMHG | HEIGHT: 68 IN | DIASTOLIC BLOOD PRESSURE: 51 MMHG

## 2023-08-12 DIAGNOSIS — L03.90 CELLULITIS, UNSPECIFIED: ICD-10-CM

## 2023-08-12 LAB
BASOPHILS # BLD AUTO: 0.06 K/UL — SIGNIFICANT CHANGE UP (ref 0–0.2)
BASOPHILS NFR BLD AUTO: 0.4 % — SIGNIFICANT CHANGE UP (ref 0–2)
EOSINOPHIL # BLD AUTO: 0.05 K/UL — SIGNIFICANT CHANGE UP (ref 0–0.5)
EOSINOPHIL NFR BLD AUTO: 0.3 % — SIGNIFICANT CHANGE UP (ref 0–6)
GLUCOSE BLDC GLUCOMTR-MCNC: 194 MG/DL — HIGH (ref 70–99)
GLUCOSE BLDC GLUCOMTR-MCNC: 224 MG/DL — HIGH (ref 70–99)
HCT VFR BLD CALC: 42.3 % — SIGNIFICANT CHANGE UP (ref 34.5–45)
HGB BLD-MCNC: 14.6 G/DL — SIGNIFICANT CHANGE UP (ref 11.5–15.5)
IMM GRANULOCYTES NFR BLD AUTO: 0.4 % — SIGNIFICANT CHANGE UP (ref 0–0.9)
LACTATE SERPL-SCNC: 2.2 MMOL/L — HIGH (ref 0.7–2)
LYMPHOCYTES # BLD AUTO: 0.62 K/UL — LOW (ref 1–3.3)
LYMPHOCYTES # BLD AUTO: 4.1 % — LOW (ref 13–44)
MCHC RBC-ENTMCNC: 28 PG — SIGNIFICANT CHANGE UP (ref 27–34)
MCHC RBC-ENTMCNC: 34.5 GM/DL — SIGNIFICANT CHANGE UP (ref 32–36)
MCV RBC AUTO: 81 FL — SIGNIFICANT CHANGE UP (ref 80–100)
MONOCYTES # BLD AUTO: 0.9 K/UL — SIGNIFICANT CHANGE UP (ref 0–0.9)
MONOCYTES NFR BLD AUTO: 5.9 % — SIGNIFICANT CHANGE UP (ref 2–14)
NEUTROPHILS # BLD AUTO: 13.61 K/UL — HIGH (ref 1.8–7.4)
NEUTROPHILS NFR BLD AUTO: 88.9 % — HIGH (ref 43–77)
PLATELET # BLD AUTO: 160 K/UL — SIGNIFICANT CHANGE UP (ref 150–400)
RBC # BLD: 5.22 M/UL — HIGH (ref 3.8–5.2)
RBC # FLD: 14.4 % — SIGNIFICANT CHANGE UP (ref 10.3–14.5)
WBC # BLD: 15.3 K/UL — HIGH (ref 3.8–10.5)
WBC # FLD AUTO: 15.3 K/UL — HIGH (ref 3.8–10.5)

## 2023-08-12 PROCEDURE — 73590 X-RAY EXAM OF LOWER LEG: CPT | Mod: 26,RT

## 2023-08-12 PROCEDURE — 83605 ASSAY OF LACTIC ACID: CPT

## 2023-08-12 PROCEDURE — 73610 X-RAY EXAM OF ANKLE: CPT | Mod: 26,RT

## 2023-08-12 PROCEDURE — 82962 GLUCOSE BLOOD TEST: CPT

## 2023-08-12 PROCEDURE — 93010 ELECTROCARDIOGRAM REPORT: CPT

## 2023-08-12 PROCEDURE — 93970 EXTREMITY STUDY: CPT

## 2023-08-12 PROCEDURE — 85730 THROMBOPLASTIN TIME PARTIAL: CPT

## 2023-08-12 PROCEDURE — 80048 BASIC METABOLIC PNL TOTAL CA: CPT

## 2023-08-12 PROCEDURE — 85025 COMPLETE CBC W/AUTO DIFF WBC: CPT

## 2023-08-12 PROCEDURE — 80202 ASSAY OF VANCOMYCIN: CPT

## 2023-08-12 PROCEDURE — 99223 1ST HOSP IP/OBS HIGH 75: CPT

## 2023-08-12 PROCEDURE — 97116 GAIT TRAINING THERAPY: CPT | Mod: GP

## 2023-08-12 PROCEDURE — 80053 COMPREHEN METABOLIC PANEL: CPT

## 2023-08-12 PROCEDURE — 97162 PT EVAL MOD COMPLEX 30 MIN: CPT | Mod: GP

## 2023-08-12 PROCEDURE — 85610 PROTHROMBIN TIME: CPT

## 2023-08-12 PROCEDURE — 36415 COLL VENOUS BLD VENIPUNCTURE: CPT

## 2023-08-12 PROCEDURE — 87040 BLOOD CULTURE FOR BACTERIA: CPT

## 2023-08-12 PROCEDURE — 99285 EMERGENCY DEPT VISIT HI MDM: CPT

## 2023-08-12 PROCEDURE — 97530 THERAPEUTIC ACTIVITIES: CPT | Mod: GP

## 2023-08-12 PROCEDURE — 93005 ELECTROCARDIOGRAM TRACING: CPT

## 2023-08-12 PROCEDURE — 83036 HEMOGLOBIN GLYCOSYLATED A1C: CPT

## 2023-08-12 RX ORDER — ALBUTEROL 90 UG/1
2.5 AEROSOL, METERED ORAL ONCE
Refills: 0 | Status: COMPLETED | OUTPATIENT
Start: 2023-08-12 | End: 2023-08-12

## 2023-08-12 RX ORDER — VANCOMYCIN HCL 1 G
1250 VIAL (EA) INTRAVENOUS EVERY 24 HOURS
Refills: 0 | Status: DISCONTINUED | OUTPATIENT
Start: 2023-08-12 | End: 2023-08-15

## 2023-08-12 RX ORDER — GLUCAGON INJECTION, SOLUTION 0.5 MG/.1ML
1 INJECTION, SOLUTION SUBCUTANEOUS ONCE
Refills: 0 | Status: DISCONTINUED | OUTPATIENT
Start: 2023-08-12 | End: 2023-08-18

## 2023-08-12 RX ORDER — SODIUM CHLORIDE 9 MG/ML
1000 INJECTION, SOLUTION INTRAVENOUS
Refills: 0 | Status: DISCONTINUED | OUTPATIENT
Start: 2023-08-12 | End: 2023-08-15

## 2023-08-12 RX ORDER — SODIUM CHLORIDE 9 MG/ML
500 INJECTION INTRAMUSCULAR; INTRAVENOUS; SUBCUTANEOUS
Refills: 0 | Status: DISCONTINUED | OUTPATIENT
Start: 2023-08-12 | End: 2023-08-13

## 2023-08-12 RX ORDER — DEXTROSE 50 % IN WATER 50 %
25 SYRINGE (ML) INTRAVENOUS ONCE
Refills: 0 | Status: DISCONTINUED | OUTPATIENT
Start: 2023-08-12 | End: 2023-08-18

## 2023-08-12 RX ORDER — DEXTROSE 50 % IN WATER 50 %
12.5 SYRINGE (ML) INTRAVENOUS ONCE
Refills: 0 | Status: DISCONTINUED | OUTPATIENT
Start: 2023-08-12 | End: 2023-08-18

## 2023-08-12 RX ORDER — ACETAMINOPHEN 500 MG
650 TABLET ORAL EVERY 6 HOURS
Refills: 0 | Status: DISCONTINUED | OUTPATIENT
Start: 2023-08-12 | End: 2023-08-18

## 2023-08-12 RX ORDER — PIPERACILLIN AND TAZOBACTAM 4; .5 G/20ML; G/20ML
3.38 INJECTION, POWDER, LYOPHILIZED, FOR SOLUTION INTRAVENOUS ONCE
Refills: 0 | Status: COMPLETED | OUTPATIENT
Start: 2023-08-12 | End: 2023-08-12

## 2023-08-12 RX ORDER — VANCOMYCIN HCL 1 G
1000 VIAL (EA) INTRAVENOUS ONCE
Refills: 0 | Status: COMPLETED | OUTPATIENT
Start: 2023-08-12 | End: 2023-08-12

## 2023-08-12 RX ORDER — INSULIN LISPRO 100/ML
VIAL (ML) SUBCUTANEOUS AT BEDTIME
Refills: 0 | Status: DISCONTINUED | OUTPATIENT
Start: 2023-08-12 | End: 2023-08-18

## 2023-08-12 RX ORDER — PIPERACILLIN AND TAZOBACTAM 4; .5 G/20ML; G/20ML
3.38 INJECTION, POWDER, LYOPHILIZED, FOR SOLUTION INTRAVENOUS EVERY 8 HOURS
Refills: 0 | Status: DISCONTINUED | OUTPATIENT
Start: 2023-08-12 | End: 2023-08-14

## 2023-08-12 RX ORDER — DEXTROSE 50 % IN WATER 50 %
15 SYRINGE (ML) INTRAVENOUS ONCE
Refills: 0 | Status: DISCONTINUED | OUTPATIENT
Start: 2023-08-12 | End: 2023-08-18

## 2023-08-12 RX ORDER — INSULIN LISPRO 100/ML
VIAL (ML) SUBCUTANEOUS
Refills: 0 | Status: DISCONTINUED | OUTPATIENT
Start: 2023-08-12 | End: 2023-08-18

## 2023-08-12 RX ORDER — HEPARIN SODIUM 5000 [USP'U]/ML
5000 INJECTION INTRAVENOUS; SUBCUTANEOUS EVERY 12 HOURS
Refills: 0 | Status: DISCONTINUED | OUTPATIENT
Start: 2023-08-12 | End: 2023-08-13

## 2023-08-12 RX ORDER — INSULIN HUMAN 100 [IU]/ML
10 INJECTION, SOLUTION SUBCUTANEOUS ONCE
Refills: 0 | Status: DISCONTINUED | OUTPATIENT
Start: 2023-08-12 | End: 2023-08-12

## 2023-08-12 RX ORDER — SODIUM POLYSTYRENE SULFONATE 4.1 MEQ/G
15 POWDER, FOR SUSPENSION ORAL ONCE
Refills: 0 | Status: COMPLETED | OUTPATIENT
Start: 2023-08-12 | End: 2023-08-12

## 2023-08-12 RX ORDER — INSULIN HUMAN 100 [IU]/ML
10 INJECTION, SOLUTION SUBCUTANEOUS ONCE
Refills: 0 | Status: COMPLETED | OUTPATIENT
Start: 2023-08-12 | End: 2023-08-12

## 2023-08-12 RX ORDER — KETOROLAC TROMETHAMINE 30 MG/ML
15 SYRINGE (ML) INJECTION ONCE
Refills: 0 | Status: DISCONTINUED | OUTPATIENT
Start: 2023-08-12 | End: 2023-08-12

## 2023-08-12 RX ORDER — SODIUM ZIRCONIUM CYCLOSILICATE 10 G/10G
10 POWDER, FOR SUSPENSION ORAL ONCE
Refills: 0 | Status: COMPLETED | OUTPATIENT
Start: 2023-08-12 | End: 2023-08-12

## 2023-08-12 RX ADMIN — SODIUM POLYSTYRENE SULFONATE 15 GRAM(S): 4.1 POWDER, FOR SUSPENSION ORAL at 23:31

## 2023-08-12 RX ADMIN — INSULIN HUMAN 10 UNIT(S): 100 INJECTION, SOLUTION SUBCUTANEOUS at 17:01

## 2023-08-12 RX ADMIN — SODIUM ZIRCONIUM CYCLOSILICATE 10 GRAM(S): 10 POWDER, FOR SUSPENSION ORAL at 17:29

## 2023-08-12 RX ADMIN — ALBUTEROL 2.5 MILLIGRAM(S): 90 AEROSOL, METERED ORAL at 17:03

## 2023-08-12 RX ADMIN — Medication 15 MILLIGRAM(S): at 13:05

## 2023-08-12 RX ADMIN — PIPERACILLIN AND TAZOBACTAM 200 GRAM(S): 4; .5 INJECTION, POWDER, LYOPHILIZED, FOR SOLUTION INTRAVENOUS at 12:42

## 2023-08-12 RX ADMIN — Medication 250 MILLIGRAM(S): at 13:14

## 2023-08-12 RX ADMIN — ALBUTEROL 2.5 MILLIGRAM(S): 90 AEROSOL, METERED ORAL at 17:29

## 2023-08-12 RX ADMIN — SODIUM CHLORIDE 50 MILLILITER(S): 9 INJECTION INTRAMUSCULAR; INTRAVENOUS; SUBCUTANEOUS at 23:23

## 2023-08-12 RX ADMIN — ALBUTEROL 2.5 MILLIGRAM(S): 90 AEROSOL, METERED ORAL at 17:23

## 2023-08-12 NOTE — ED PROVIDER NOTE - CLINICAL SUMMARY MEDICAL DECISION MAKING FREE TEXT BOX
Patient with cellulitis in chronic lymphedema. Patient neurovascularly intact. No sxs symptoms. Elevated WBC count. Given IV abx in ED and dc with PO abx. patient has appointment with wound care on Wednesday. Ambulating independently in ED. Tolerating PO. Stable for dc. Patient understands return precautions and f/u. Patient with cellulitis in chronic lymphedema. Patient neurovascularly intact. No sxs symptoms. Given IV abx, blood cultures drawn. Elevated WBC count and hyperkalemia. Patient admitted to medicine in stable condition.

## 2023-08-12 NOTE — H&P ADULT - MUSCULOSKELETAL COMMENTS
+B/L LE lymphadema, RLE w/ superimposed cellulitis changes - erythema / warmth / drainage / tenderness

## 2023-08-12 NOTE — ED PROVIDER NOTE - OBJECTIVE STATEMENT
73 y/o female w/ a PMHx of chronic lymphedema and DM on Metformin presents to the ED c/o sudden onset RLE pain since last night. 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 Pt also c/o clear drainage from the left wound, denies pain to LLE. Pt endorses taking Tramadol at home w/ mild relief. Pt notes she is currently being worked up for elevated potassium levels. Pt goes wound care center every week. Pt normally ambulates No other complaints at this time. Allergies: Cephalexin, Keflex PCP: Dr. Dean.

## 2023-08-12 NOTE — ED ADULT NURSE NOTE - OBJECTIVE STATEMENT
71 y/o female presenting with right lower leg pain. pt has bilateral lower extremity swelling, discoloration and purulent malodorous drainage. endorsing RLE > LLE pain. follows with wound care and has [facility]

## 2023-08-12 NOTE — H&P ADULT - CONVERSATION DETAILS
Patient states HCP is her brother Suleiman. Patient denies having any limitations on resuscitation status for this admission

## 2023-08-12 NOTE — ED ADULT NURSE NOTE - NSFALLRISKINTERV_ED_ALL_ED
Assistance OOB with selected safe patient handling equipment if applicable/Assistance with ambulation/Communicate fall risk and risk factors to all staff, patient, and family/Monitor gait and stability/Provide visual cue: yellow wristband, yellow gown, etc/Reinforce activity limits and safety measures with patient and family/Call bell, personal items and telephone in reach/Instruct patient to call for assistance before getting out of bed/chair/stretcher/Non-slip footwear applied when patient is off stretcher/[place] to call system/Physically safe environment - no spills, clutter or unnecessary equipment/Purposeful Proactive Rounding/Room/bathroom lighting operational, light cord in reach

## 2023-08-12 NOTE — PATIENT PROFILE ADULT - FALL HARM RISK - HARM RISK INTERVENTIONS

## 2023-08-12 NOTE — H&P ADULT - HISTORY OF PRESENT ILLNESS
73 y/o F w/ PMH of chronic lymphadema, DM2, HTN, lung cancer / colon cancer, chronic lymphadema, p/w RLE pain. Patient states she has chronic lymphadema, but yesterday patient developed RLE pain and drainage from RLE. She walks with walker at baseline, and states that it was painful when bearing weight. Denies fever, chills, nausea, vomiting, abdominal pain, CP, SOB, abd pain, cough, runny nose, sore throat.     PSH: Hysterectomy     Social Hx: Denies tobacco / etoh / drugs     Family Hx: Denies

## 2023-08-12 NOTE — ED ADULT TRIAGE NOTE - CHIEF COMPLAINT QUOTE
Pt presents to ED c/p right lower leg pain- worse behind the knee since last night. Pt states "I have red wounds on both legs that a home health nurse dresses and cares for, she was going to change the dressing today but she wanted to make sure it wasn't infected. I took tramadol at home and that helped a bit with the pain."

## 2023-08-12 NOTE — ED PROVIDER NOTE - MUSCULOSKELETAL, MLM
Spine appears normal, range of motion is not limited, b/l LE edema, redness around right ankle, no puss.

## 2023-08-12 NOTE — PATIENT PROFILE ADULT - NSPROHMDIABETMGMTSTRAT_GEN_A_NUR
----- Message from Patti Roman NP sent at 11/19/2019 12:24 PM CST -----  [facility] patient-  Normal echo. Next visit in Dec.2019  
Informed pt of echo results. Will discuss event monitor at ov with dr matos 12-18-19. Pt verbalized understanding.  
medication therapy

## 2023-08-12 NOTE — H&P ADULT - ASSESSMENT
73 y/o F w/ PMH of chronic lymphadema, DM2, HTN, lung cancer / colon cancer, chronic lymphadema, p/w RLE pain    *Sepsis 2/2  RLE cellulitis w/ lymphadema   -Vanco / Zosyn  -F/u blood cx  -ID consult  -U/S of LEs  -Check lactate  -Gentle hydration 2/2 LE edema   -Xray: Patient has lymphedema and there is marked soft tissue swelling. No plain film evidence of osteomyelitis.    *Suspect ADRIAN + Hyperkalemia   -IVF and trend creatinine   -Avoid nephrotoxic agents   -Tele monitoring  -Nephro consult   -S/p IV insulin in ED  -Will give Kayexelate  -Recheck BMP  -EKG    *Hyponatremia  -Recheck in AM     *DM2  -Patient states she was taken off insulin, and states the only medication she currently takes at home is Metformin     *H/o HTN / lung / colon cancer   -C/w home meds    *DVT ppx   -Heparin SubQ      73 y/o F w/ PMH of chronic lymphadema, DM2, HTN, lung cancer / colon cancer, chronic lymphadema, p/w RLE pain    *Sepsis 2/2  RLE cellulitis w/ lymphadema   -Vanco / Zosyn  -F/u blood cx  -ID consult  -U/S of LEs  -Check lactate  -Gentle hydration 2/2 LE edema   -Xray: Patient has lymphedema and there is marked soft tissue swelling. No plain film evidence of osteomyelitis.  -Vascular consult     *Suspect ADRIAN + Hyperkalemia   -IVF and trend creatinine   -Avoid nephrotoxic agents   -Tele monitoring  -Nephro consult   -S/p IV insulin in ED  -Will give Kayexelate  -Recheck BMP  -EKG     *Hyponatremia  -Recheck in AM     *DM2  -Patient states she was taken off insulin, and states the only medication she currently takes at home is Metformin     *H/o HTN / lung / colon cancer   -C/w home meds    *DVT ppx   -Heparin SubQ

## 2023-08-12 NOTE — ED PROVIDER NOTE - NSFOLLOWUPINSTRUCTIONS_ED_ALL_ED_FT
1) Please follow-up with your primary care doctor in the next 5-7 days.  Please call tomorrow for an appointment.  If you cannot follow-up with your primary care doctor please return to the ED for any urgent issues.  2) You were given a copy of the tests performed today.  Please bring the results with you and review them with your primary care doctor.  3) If you have any worsening of symptoms or any other concerns please return to the ED immediately.  4) Please continue taking your home medications as directed.    Cellulitis    Cellulitis is a skin infection caused by bacteria. This condition occurs most often in the arms and lower legs but can occur anywhere over the body. Symptoms include redness, swelling, warm skin, tenderness, and chills/fever. If you were prescribed an antibiotic medicine, take it as told by your health care provider. Do not stop taking the antibiotic even if you start to feel better.    SEEK IMMEDIATE MEDICAL CARE IF YOU HAVE ANY OF THE FOLLOWING SYMPTOMS: worsening fever, red streaks coming from affected area, vomiting or diarrhea, or dizziness/lightheadedness.

## 2023-08-13 LAB
A1C WITH ESTIMATED AVERAGE GLUCOSE RESULT: 7.5 % — HIGH (ref 4–5.6)
ALBUMIN SERPL ELPH-MCNC: 2.5 G/DL — LOW (ref 3.3–5)
ALP SERPL-CCNC: 76 U/L — SIGNIFICANT CHANGE UP (ref 40–120)
ALT FLD-CCNC: 35 U/L — SIGNIFICANT CHANGE UP (ref 12–78)
ANION GAP SERPL CALC-SCNC: 7 MMOL/L — SIGNIFICANT CHANGE UP (ref 5–17)
APTT BLD: 33.1 SEC — SIGNIFICANT CHANGE UP (ref 24.5–35.6)
AST SERPL-CCNC: 34 U/L — SIGNIFICANT CHANGE UP (ref 15–37)
BASOPHILS # BLD AUTO: 0.04 K/UL — SIGNIFICANT CHANGE UP (ref 0–0.2)
BASOPHILS NFR BLD AUTO: 0.4 % — SIGNIFICANT CHANGE UP (ref 0–2)
BILIRUB SERPL-MCNC: 0.9 MG/DL — SIGNIFICANT CHANGE UP (ref 0.2–1.2)
BUN SERPL-MCNC: 59 MG/DL — HIGH (ref 7–23)
CALCIUM SERPL-MCNC: 9.4 MG/DL — SIGNIFICANT CHANGE UP (ref 8.5–10.1)
CHLORIDE SERPL-SCNC: 110 MMOL/L — HIGH (ref 96–108)
CO2 SERPL-SCNC: 16 MMOL/L — LOW (ref 22–31)
CREAT SERPL-MCNC: 1.38 MG/DL — HIGH (ref 0.5–1.3)
EGFR: 41 ML/MIN/1.73M2 — LOW
EOSINOPHIL # BLD AUTO: 0.17 K/UL — SIGNIFICANT CHANGE UP (ref 0–0.5)
EOSINOPHIL NFR BLD AUTO: 1.8 % — SIGNIFICANT CHANGE UP (ref 0–6)
ESTIMATED AVERAGE GLUCOSE: 169 MG/DL — HIGH (ref 68–114)
GLUCOSE BLDC GLUCOMTR-MCNC: 118 MG/DL — HIGH (ref 70–99)
GLUCOSE BLDC GLUCOMTR-MCNC: 159 MG/DL — HIGH (ref 70–99)
GLUCOSE BLDC GLUCOMTR-MCNC: 167 MG/DL — HIGH (ref 70–99)
GLUCOSE BLDC GLUCOMTR-MCNC: 186 MG/DL — HIGH (ref 70–99)
GLUCOSE SERPL-MCNC: 128 MG/DL — HIGH (ref 70–99)
HCT VFR BLD CALC: 36.8 % — SIGNIFICANT CHANGE UP (ref 34.5–45)
HGB BLD-MCNC: 12.3 G/DL — SIGNIFICANT CHANGE UP (ref 11.5–15.5)
IMM GRANULOCYTES NFR BLD AUTO: 0.3 % — SIGNIFICANT CHANGE UP (ref 0–0.9)
INR BLD: 1.17 RATIO — SIGNIFICANT CHANGE UP (ref 0.85–1.18)
LACTATE SERPL-SCNC: 2.1 MMOL/L — HIGH (ref 0.7–2)
LACTATE SERPL-SCNC: 2.3 MMOL/L — HIGH (ref 0.7–2)
LYMPHOCYTES # BLD AUTO: 0.86 K/UL — LOW (ref 1–3.3)
LYMPHOCYTES # BLD AUTO: 9 % — LOW (ref 13–44)
MCHC RBC-ENTMCNC: 28 PG — SIGNIFICANT CHANGE UP (ref 27–34)
MCHC RBC-ENTMCNC: 33.4 GM/DL — SIGNIFICANT CHANGE UP (ref 32–36)
MCV RBC AUTO: 83.6 FL — SIGNIFICANT CHANGE UP (ref 80–100)
MONOCYTES # BLD AUTO: 0.86 K/UL — SIGNIFICANT CHANGE UP (ref 0–0.9)
MONOCYTES NFR BLD AUTO: 9 % — SIGNIFICANT CHANGE UP (ref 2–14)
NEUTROPHILS # BLD AUTO: 7.61 K/UL — HIGH (ref 1.8–7.4)
NEUTROPHILS NFR BLD AUTO: 79.5 % — HIGH (ref 43–77)
PLATELET # BLD AUTO: 94 K/UL — LOW (ref 150–400)
POTASSIUM SERPL-MCNC: 5.2 MMOL/L — SIGNIFICANT CHANGE UP (ref 3.5–5.3)
POTASSIUM SERPL-SCNC: 5.2 MMOL/L — SIGNIFICANT CHANGE UP (ref 3.5–5.3)
PROT SERPL-MCNC: 6.2 GM/DL — SIGNIFICANT CHANGE UP (ref 6–8.3)
PROTHROM AB SERPL-ACNC: 13.2 SEC — HIGH (ref 9.5–13)
RBC # BLD: 4.4 M/UL — SIGNIFICANT CHANGE UP (ref 3.8–5.2)
RBC # FLD: 14.6 % — HIGH (ref 10.3–14.5)
SODIUM SERPL-SCNC: 133 MMOL/L — LOW (ref 135–145)
WBC # BLD: 9.57 K/UL — SIGNIFICANT CHANGE UP (ref 3.8–10.5)
WBC # FLD AUTO: 9.57 K/UL — SIGNIFICANT CHANGE UP (ref 3.8–10.5)

## 2023-08-13 PROCEDURE — 99233 SBSQ HOSP IP/OBS HIGH 50: CPT

## 2023-08-13 PROCEDURE — 99223 1ST HOSP IP/OBS HIGH 75: CPT

## 2023-08-13 PROCEDURE — 93970 EXTREMITY STUDY: CPT | Mod: 26

## 2023-08-13 PROCEDURE — 93010 ELECTROCARDIOGRAM REPORT: CPT

## 2023-08-13 RX ORDER — SODIUM CHLORIDE 9 MG/ML
1000 INJECTION, SOLUTION INTRAVENOUS
Refills: 0 | Status: DISCONTINUED | OUTPATIENT
Start: 2023-08-13 | End: 2023-08-14

## 2023-08-13 RX ORDER — SODIUM BICARBONATE 1 MEQ/ML
650 SYRINGE (ML) INTRAVENOUS THREE TIMES A DAY
Refills: 0 | Status: DISCONTINUED | OUTPATIENT
Start: 2023-08-13 | End: 2023-08-13

## 2023-08-13 RX ORDER — SODIUM CHLORIDE 9 MG/ML
1000 INJECTION INTRAMUSCULAR; INTRAVENOUS; SUBCUTANEOUS
Refills: 0 | Status: DISCONTINUED | OUTPATIENT
Start: 2023-08-13 | End: 2023-08-13

## 2023-08-13 RX ADMIN — Medication 650 MILLIGRAM(S): at 14:45

## 2023-08-13 RX ADMIN — PIPERACILLIN AND TAZOBACTAM 25 GRAM(S): 4; .5 INJECTION, POWDER, LYOPHILIZED, FOR SOLUTION INTRAVENOUS at 22:01

## 2023-08-13 RX ADMIN — PIPERACILLIN AND TAZOBACTAM 25 GRAM(S): 4; .5 INJECTION, POWDER, LYOPHILIZED, FOR SOLUTION INTRAVENOUS at 05:45

## 2023-08-13 RX ADMIN — PIPERACILLIN AND TAZOBACTAM 25 GRAM(S): 4; .5 INJECTION, POWDER, LYOPHILIZED, FOR SOLUTION INTRAVENOUS at 14:45

## 2023-08-13 RX ADMIN — Medication 1: at 17:14

## 2023-08-13 RX ADMIN — Medication 166.67 MILLIGRAM(S): at 00:04

## 2023-08-13 RX ADMIN — Medication 1: at 13:12

## 2023-08-13 NOTE — CONSULT NOTE ADULT - ASSESSMENT
73 y/o F w/ PMH of chronic lymphadema, DM2, HTN, lung cancer / colon cancer, chronic lymphadema, p/w RLE pain. Patient states she has chronic lymphadema, but yesterday patient developed RLE pain and drainage from RLE. She walks with walker at baseline, and states that it was painful when bearing weight. Denies fever, chills, nausea, vomiting, abdominal pain, CP, SOB, abd pain, cough, runny nose, sore throat.   Pt of Dr Hernández, admitted with LE edema and infection due to chronic lymphedema, increasing Pain  labs reviewed  on NS and PO bicarb  elevated K and low bicarb on admission improving  Will dc PO bicarb  Change IVF to include bicarb, w gentle hydration

## 2023-08-13 NOTE — CONSULT NOTE ADULT - ASSESSMENT
73 y/o F w/ PMH of chronic lymphadema, DM2, HTN, lung cancer / colon cancer, chronic lymphadema, p/w RLE pain. Patient states she has chronic lymphadema, but patient developed RLE pain and drainage from RLE. She walks with walker at baseline, and states that it was painful when bearing weight. Denies fever, chills, nausea, vomiting, abdominal pain, CP, SOB, abd pain, cough, runny nose, sore throat. Here noted with bilateral LE redness, swelling given IV vancomycin/zosyn for superimposed cellulitis.     1. Bilateral LE lymphedema. Stasis dermatitis. Superimposed cellulitis  - imaging reviewed  - agree with vancomycin [id number] check trough prior to 4th dose  - on zosyn [id number]  - continue with abx coverage  - fu cultures  - monitor temps  - tolerating abx well so far; no side effects noted  - reason for abx use and side effects reviewed with patient  - supportive care  - fu cbc    2. other issues - care per medicine

## 2023-08-13 NOTE — CONSULT NOTE ADULT - SUBJECTIVE AND OBJECTIVE BOX
VASCULAR SURGERY CONSULT NOTE  --------------------------------------------------------------------------------------------    Patient is a 72y old  Female who presents with a chief complaint of RLE pain (13 Aug 2023 11:24)      HPI:   73 yo F PMH of chronic lymphedema DM2, HTN, lung cancer / colon cancer is admitted to medical service for RLE pain and cellulitis. Vascular surgery consulted for b/l LE chronic lymphadema. Pt was seen at bedside, reports pain mostly Rt food with minimal drainage around her toes. Denies fever, chills, nausea, vomiting, abdominal pain, CP, SOB, abd pain, cough       ROS: 10-system review is otherwise negative except HPI above.      PAST MEDICAL & SURGICAL HISTORY:  No pertinent past medical history        FAMILY HISTORY:    [] Family history not pertinent as reviewed with the patient and family    SOCIAL HISTORY:  denies toxic     ALLERGIES: Keflex (Anaphylaxis)  cephalexin (Unknown)      HOME MEDICATIONS:  ***    CURRENT MEDICATIONS  MEDICATIONS (STANDING): dextrose 5%. 1000 milliLiter(s) IV Continuous <Continuous>  dextrose 5%. 1000 milliLiter(s) IV Continuous <Continuous>  dextrose 50% Injectable 12.5 Gram(s) IV Push once  dextrose 50% Injectable 25 Gram(s) IV Push once  dextrose 50% Injectable 25 Gram(s) IV Push once  glucagon  Injectable 1 milliGRAM(s) IntraMuscular once  insulin lispro (ADMELOG) corrective regimen sliding scale   SubCutaneous three times a day before meals  insulin lispro (ADMELOG) corrective regimen sliding scale   SubCutaneous at bedtime  piperacillin/tazobactam IVPB.. 3.375 Gram(s) IV Intermittent every 8 hours  sodium chloride 0.9%. 500 milliLiter(s) IV Continuous <Continuous>  vancomycin  IVPB 1250 milliGRAM(s) IV Intermittent every 24 hours    MEDICATIONS (PRN):acetaminophen     Tablet .. 650 milliGRAM(s) Oral every 6 hours PRN Temp greater or equal to 38C (100.4F), Mild Pain (1 - 3)  dextrose Oral Gel 15 Gram(s) Oral once PRN Blood Glucose LESS THAN 70 milliGRAM(s)/deciliter    --------------------------------------------------------------------------------------------    Vitals:   T(C): 36.5 (08-13-23 @ 08:33), Max: 36.8 (08-12-23 @ 23:08)  HR: 88 (08-13-23 @ 08:33) (88 - 100)  BP: 119/47 (08-13-23 @ 08:33) (101/52 - 119/47)  RR: 18 (08-13-23 @ 08:33) (18 - 18)  SpO2: 100% (08-13-23 @ 08:33) (98% - 100%)  CAPILLARY BLOOD GLUCOSE      POCT Blood Glucose.: 167 mg/dL (13 Aug 2023 12:43)  POCT Blood Glucose.: 118 mg/dL (13 Aug 2023 07:59)  POCT Blood Glucose.: 224 mg/dL (12 Aug 2023 23:14)  POCT Blood Glucose.: 194 mg/dL (12 Aug 2023 16:46)    CAPILLARY BLOOD GLUCOSE      POCT Blood Glucose.: 167 mg/dL (13 Aug 2023 12:43)  POCT Blood Glucose.: 118 mg/dL (13 Aug 2023 07:59)  POCT Blood Glucose.: 224 mg/dL (12 Aug 2023 23:14)  POCT Blood Glucose.: 194 mg/dL (12 Aug 2023 16:46)      Height (cm): 172.7 (08-12 @ 11:44)  Weight (kg): 127 (08-12 @ 11:44)  BMI (kg/m2): 42.6 (08-12 @ 11:44)  BSA (m2): 2.36 (08-12 @ 11:44)    PHYSICAL EXAM:  Pt is AAOx3  General: NAD, Lying in bed comfortably  Chest: Lungs clear, no rales, no rhonchi, no wheezes.   Heart: RR, no murmurs, no rubs, no gallops.   Abdomen: Soft, no tenderness, nondistended, no masses, BS normal.    Back: Normal curvature, no tenderness.   Neuro: Physiological, no localizing findings.   Skin: Normal, no rashes, no lesions noted.   Extremities: Warm, well perfused, bilateral LE severe lymphedema, cellulitis RLE, warmth, mild tenderness present on RLE with weeping of serous fluid. no discrete ulcerations noted    LABS  CBC (08-13 @ 05:16)                              12.3                           9.57    )----------------(  94<L>      79.5<H>% Neutrophils, 9.0<L>% Lymphocytes, ANC: 7.61<H>                              36.8    CBC (08-12 @ 12:39)                              14.6                           15.30<H>  )----------------(  160        88.9<H>% Neutrophils, 4.1<L>% Lymphocytes, ANC: 13.61<H>                              42.3      BMP (08-13 @ 05:16)             133<L>  |  110<H>  |  59<H> 		Ca++ --      Ca 9.4                ---------------------------------( 128<H>		Mg --                 5.2     |  16<L>   |  1.38<H>			Ph --      BMP (08-12 @ 13:53)             128<L>  |  105     |  59<H> 		Ca++ --      Ca 9.9                ---------------------------------( 208<H>		Mg --                 6.2<HH>  |  14<L>   |  1.58<H>			Ph --        LFTs (08-13 @ 05:16)      TPro 6.2 / Alb 2.5<L> / TBili 0.9 / DBili -- / AST 34 / ALT 35 / AlkPhos 76  LFTs (08-12 @ 13:53)      TPro 7.5 / Alb 3.1<L> / TBili 1.3<H> / DBili -- / AST 35 / ALT 44 / AlkPhos 94    Coags (08-13 @ 05:16)  aPTT 33.1 / INR 1.17 / PT 13.2<H>      ABG (08-13 @ 09:29)      /  /  /  /  / %     Lactate:  2.3<H>  ABG (08-13 @ 00:55)      /  /  /  /  / %     Lactate:  2.1<H>      --------------------------------------------------------------------------------------------    MICROBIOLOGY  Urinalysis (08-13 @ 05:16):     Color:  / Appearance:  / SG:  / pH:  / Gluc: 128<H> / Ketones:  / Bili:  / Urobili:  / Protein : / Nitrites:  / Leuk.Est:  / RBC:  / WBC:  / Sq Epi:  / Non Sq Epi:  / Bacteria          --------------------------------------------------------------------------------------------    IMAGING      US: neg for DVT
NEPHROLOGY CONSULT  HPI:  73 y/o F w/ PMH of chronic lymphadema, DM2, HTN, lung cancer / colon cancer, chronic lymphadema, p/w RLE pain. Patient states she has chronic lymphadema, but yesterday patient developed RLE pain and drainage from RLE. She walks with walker at baseline, and states that it was painful when bearing weight. Denies fever, chills, nausea, vomiting, abdominal pain, CP, SOB, abd pain, cough, runny nose, sore throat.     Nephrology:  Pt of Dr Hernández, admitted with LE edema and infection due to chronic lymphedema, increasing Pain  labs reviewed  on NS and PO bicarb  elevated K and low bicarb on admission improving      PSH: Hysterectomy     Social Hx: Denies tobacco / etoh / drugs     Family Hx: Denies    (12 Aug 2023 22:22)      PAST MEDICAL & SURGICAL HISTORY:  No pertinent past medical history          FAMILY HISTORY:      MEDICATIONS  (STANDING):  dextrose 5%. 1000 milliLiter(s) (100 mL/Hr) IV Continuous <Continuous>  dextrose 5%. 1000 milliLiter(s) (50 mL/Hr) IV Continuous <Continuous>  dextrose 50% Injectable 12.5 Gram(s) IV Push once  dextrose 50% Injectable 25 Gram(s) IV Push once  dextrose 50% Injectable 25 Gram(s) IV Push once  glucagon  Injectable 1 milliGRAM(s) IntraMuscular once  insulin lispro (ADMELOG) corrective regimen sliding scale   SubCutaneous at bedtime  insulin lispro (ADMELOG) corrective regimen sliding scale   SubCutaneous three times a day before meals  piperacillin/tazobactam IVPB.. 3.375 Gram(s) IV Intermittent every 8 hours  sodium bicarbonate 650 milliGRAM(s) Oral three times a day  sodium chloride 0.9%. 500 milliLiter(s) (50 mL/Hr) IV Continuous <Continuous>  sodium chloride 0.9%. 1000 milliLiter(s) (50 mL/Hr) IV Continuous <Continuous>  vancomycin  IVPB 1250 milliGRAM(s) IV Intermittent every 24 hours    MEDICATIONS  (PRN):  acetaminophen     Tablet .. 650 milliGRAM(s) Oral every 6 hours PRN Temp greater or equal to 38C (100.4F), Mild Pain (1 - 3)  dextrose Oral Gel 15 Gram(s) Oral once PRN Blood Glucose LESS THAN 70 milliGRAM(s)/deciliter      Allergies    Keflex (Anaphylaxis)  cephalexin (Unknown)    Intolerances        I&O's Summary        REVIEW OF SYSTEMS:      Vital Signs Last 24 Hrs  T(C): 37.1 (13 Aug 2023 15:35), Max: 37.1 (13 Aug 2023 15:35)  T(F): 98.7 (13 Aug 2023 15:35), Max: 98.7 (13 Aug 2023 15:35)  HR: 89 (13 Aug 2023 15:35) (88 - 100)  BP: 123/49 (13 Aug 2023 15:35) (101/52 - 123/49)  BP(mean): 763 (12 Aug 2023 17:44) (763 - 763)  RR: 18 (13 Aug 2023 15:35) (18 - 18)  SpO2: 100% (13 Aug 2023 15:35) (98% - 100%)    Parameters below as of 13 Aug 2023 15:35  Patient On (Oxygen Delivery Method): room air        Daily     Daily Weight in k (12 Aug 2023 18:08)    I&O's Summary      PHYSICAL EXAM:    General:  Alert, No acute distress.    Neuro:  Alert and oriented to person, place, and time. Able to communicate  well.      HEENT:  No JVD, no masses, Eyes anicteric, ,    Cardiovascular:  Regular rate and rhythm, with normal S1 and S2.    Lungs:  clear. no rales, no wheezing, .    Abdomen:  Normoactive bowel sounds. Soft, flat, non-tender, and non-distended.  positive bowel sounds    Skin:  Warm, dry    Extremities:  Chronic lymphedema    LABS:                        12.3   9.57  )-----------( 94       ( 13 Aug 2023 05:16 )             36.8       133    |  110    |  59     ----------------------------<  128       13 Aug 2023 05:16  5.2     |  16     |  1.38     128    |  105    |  59     ----------------------------<  208       12 Aug 2023 13:53  6.2     |  14     |  1.58     Ca    9.4        13 Aug 2023 05:16  Ca    9.9        12 Aug 2023 13:53            Color: x / Appearance: x / SG: x / pH: x  Gluc: 128 mg/dL / Ketone: x  / Bili: x / Urobili: x   Blood: x / Protein: x / Nitrite: x   Leuk Esterase: x / RBC: x / WBC x   Sq Epi: x / Non Sq Epi: x / Bacteria: x          
Patient is a 72y old  Female who presents with a chief complaint of RLE pain (12 Aug 2023 22:22)    HPI:  73 y/o F w/ PMH of chronic lymphadema, DM2, HTN, lung cancer / colon cancer, chronic lymphadema, p/w RLE pain. Patient states she has chronic lymphadema, but patient developed RLE pain and drainage from RLE. She walks with walker at baseline, and states that it was painful when bearing weight. Denies fever, chills, nausea, vomiting, abdominal pain, CP, SOB, abd pain, cough, runny nose, sore throat. Here noted with bilateral LE redness, swelling given IV vancomycin/zosyn for superimposed cellulitis.     PSH: Hysterectomy   PMH: as above    Meds: per reconciliation sheet, noted below  MEDICATIONS  (STANDING):  dextrose 5%. 1000 milliLiter(s) (100 mL/Hr) IV Continuous <Continuous>  dextrose 5%. 1000 milliLiter(s) (50 mL/Hr) IV Continuous <Continuous>  dextrose 50% Injectable 12.5 Gram(s) IV Push once  dextrose 50% Injectable 25 Gram(s) IV Push once  dextrose 50% Injectable 25 Gram(s) IV Push once  glucagon  Injectable 1 milliGRAM(s) IntraMuscular once  heparin   Injectable 5000 Unit(s) SubCutaneous every 12 hours  insulin lispro (ADMELOG) corrective regimen sliding scale   SubCutaneous three times a day before meals  insulin lispro (ADMELOG) corrective regimen sliding scale   SubCutaneous at bedtime  piperacillin/tazobactam IVPB.. 3.375 Gram(s) IV Intermittent every 8 hours  sodium chloride 0.9%. 500 milliLiter(s) (50 mL/Hr) IV Continuous <Continuous>  vancomycin  IVPB 1250 milliGRAM(s) IV Intermittent every 24 hours      Allergies    Keflex (Anaphylaxis)  cephalexin (Unknown)    Intolerances      Social: no smoking, no alcohol, no illegal drugs; no recent travel, no exposure to TB  FAMILY HISTORY:     no history of premature cardiovascular disease in first degree relatives    ROS: the patient denies fever, no chills, no HA, no dizziness, no sore throat, no blurry vision, no CP, no palpitations, no SOB, no cough, no abdominal pain, no diarrhea, no N/V, no dysuria, no leg pain, no claudication, no rash, no joint aches, no rectal pain or bleeding, no night sweats    All other systems reviewed and are negative    Vital Signs Last 24 Hrs  T(C): 36.5 (13 Aug 2023 08:33), Max: 36.8 (12 Aug 2023 23:08)  T(F): 97.7 (13 Aug 2023 08:33), Max: 98.2 (12 Aug 2023 23:08)  HR: 88 (13 Aug 2023 08:33) (88 - 100)  BP: 119/47 (13 Aug 2023 08:33) (101/52 - 145/51)  BP(mean): 763 (12 Aug 2023 17:44) (763 - 763)  RR: 18 (13 Aug 2023 08:33) (16 - 18)  SpO2: 100% (13 Aug 2023 08:33) (98% - 100%)    Parameters below as of 13 Aug 2023 08:33  Patient On (Oxygen Delivery Method): room air      Daily Height in cm: 172.72 (12 Aug 2023 11:44)    Daily Weight in k (12 Aug 2023 18:08)    PE:  Constitutional: frail looking  HEENT: NC/AT, EOMI, PERRLA, conjunctivae clear; ears and nose atraumatic; pharynx benign  Neck: supple; thyroid not palpable  Back: no tenderness  Respiratory: respiratory effort normal; clear to auscultation  Cardiovascular: S1S2 regular, no murmurs  Abdomen: soft, not tender, not distended, positive BS; liver and spleen WNL  Genitourinary: no suprapubic tenderness  Lymphatic: no LN palpable  Musculoskeletal: no muscle tenderness, no joint swelling or tenderness  Extremities: bilateral LE lymphedema, warmth, tenderness   Neurological/ Psychiatric:  moving all extremities  Skin: no rashes; no palpable lesions    Labs: all available labs reviewed                        12.3   9.57  )-----------( 94       ( 13 Aug 2023 05:16 )             36.8     08-13    133<L>  |  110<H>  |  59<H>  ----------------------------<  128<H>  5.2   |  16<L>  |  1.38<H>    Ca    9.4      13 Aug 2023 05:16    TPro  6.2  /  Alb  2.5<L>  /  TBili  0.9  /  DBili  x   /  AST  34  /  ALT  35  /  AlkPhos  76  08-13     LIVER FUNCTIONS - ( 13 Aug 2023 05:16 )  Alb: 2.5 g/dL / Pro: 6.2 gm/dL / ALK PHOS: 76 U/L / ALT: 35 U/L / AST: 34 U/L / GGT: x           Urinalysis Basic - ( 13 Aug 2023 05:16 )    Color: x / Appearance: x / SG: x / pH: x  Gluc: 128 mg/dL / Ketone: x  / Bili: x / Urobili: x   Blood: x / Protein: x / Nitrite: x   Leuk Esterase: x / RBC: x / WBC x   Sq Epi: x / Non Sq Epi: x / Bacteria: x          Radiology: all available radiological tests reviewed  < from: US Duplex Venous Lower Ext Complete, Bilateral (23 @ 09:09) >    ACC: [id number] EXAM:  US DPLX LWR EXT VEINS COMPL BI   ORDERED BY: TROY BROWN     PROCEDURE DATE:  2023          INTERPRETATION:  CLINICAL INFORMATION: Leg swelling.    COMPARISON: Venous Doppler dated 2022.    TECHNIQUE: Duplex sonography of the BILATERAL LOWER extremity veins with   color and spectral Doppler, with and without compression.    FINDINGS:    RIGHT:  Normal compressibility of the RIGHT common femoral, femoral and popliteal   veins.  Doppler examination shows normal spontaneous and phasic flow.  Limited visualized of the RIGHT calf veins. No RIGHT calf vein thrombosis   detected.    LEFT:  Normal compressibility of the LEFT common femoral, femoral and popliteal   veins.  Doppler examination shows normal spontaneous and phasic flow.  Limited visualized of the LEFT calf veins. No LEFT calf vein thrombosis   detected.    IMPRESSION:  No evidence of deep venous thrombosis in either lower extremity.    Limited visualization of the calf veins.        --- End of Report ---    < end of copied text >  < from: Xray Tibia + Fibula 2 Views, Right (23 @ 14:23) >  ACC: [id number] EXAM:  XR TIB FIB AP LAT 2 VIEWS RT   ORDERED BY: ISMAEL ESPANA     ACC: [id number] EXAM:  XR ANKLE COMP MIN 3 VIEWS RT   ORDERED BY: ISMAEL ESPANA     PROCEDURE DATE:  2023          INTERPRETATION:  XR ANKLE COMPLETE 3 VIEWS RIGHT, XR TIBIA FIBULA AP AND   LATERAL 2 VIEWS RIGHT    Clinical History: Infection    AP and crosstable lateral view of the right tibia and fibula.  AP, lateral and oblique view of the right ankle      FINDINGS:    Patient has lymphedema and there is marked soft tissue swelling. There is   no acute fracture, dislocation or radiopaque foreign body. No obvious   subcutaneous emphysema. No plain film evidence of osteomyelitis. Moderate   plantar and retrocalcaneal spurs. Calcification along the proximal   plantar fascia.    IMPRESSION:  1.  Patient has lymphedema and there is marked soft tissue swelling.  2.  No plain film evidence of osteomyelitis.      Advanced directives addressed: full resuscitation

## 2023-08-13 NOTE — CONSULT NOTE ADULT - ASSESSMENT
A/P:    72F with severe bilateral lower extremity lymphedema with overlying cellulitis on RLE    recommend c/w IV abx  pain control prn  elevate bilateral LE while in bed  wound care consult for dressing changes  Local wound care  When pt tolerates, re-wrap legs with kerlex and ACE wrap versus Unna boot if infection resolves  pt can f/u in the office after discharge    Plan discussed with

## 2023-08-14 LAB
ANION GAP SERPL CALC-SCNC: 4 MMOL/L — LOW (ref 5–17)
BUN SERPL-MCNC: 41 MG/DL — HIGH (ref 7–23)
CALCIUM SERPL-MCNC: 9.6 MG/DL — SIGNIFICANT CHANGE UP (ref 8.5–10.1)
CHLORIDE SERPL-SCNC: 112 MMOL/L — HIGH (ref 96–108)
CO2 SERPL-SCNC: 22 MMOL/L — SIGNIFICANT CHANGE UP (ref 22–31)
CREAT SERPL-MCNC: 1.25 MG/DL — SIGNIFICANT CHANGE UP (ref 0.5–1.3)
EGFR: 46 ML/MIN/1.73M2 — LOW
GLUCOSE BLDC GLUCOMTR-MCNC: 151 MG/DL — HIGH (ref 70–99)
GLUCOSE BLDC GLUCOMTR-MCNC: 177 MG/DL — HIGH (ref 70–99)
GLUCOSE BLDC GLUCOMTR-MCNC: 187 MG/DL — HIGH (ref 70–99)
GLUCOSE BLDC GLUCOMTR-MCNC: 228 MG/DL — HIGH (ref 70–99)
GLUCOSE SERPL-MCNC: 136 MG/DL — HIGH (ref 70–99)
POTASSIUM SERPL-MCNC: 4.8 MMOL/L — SIGNIFICANT CHANGE UP (ref 3.5–5.3)
POTASSIUM SERPL-SCNC: 4.8 MMOL/L — SIGNIFICANT CHANGE UP (ref 3.5–5.3)
SODIUM SERPL-SCNC: 138 MMOL/L — SIGNIFICANT CHANGE UP (ref 135–145)
VANCOMYCIN TROUGH SERPL-MCNC: 15.5 UG/ML — SIGNIFICANT CHANGE UP (ref 10–20)

## 2023-08-14 PROCEDURE — 99233 SBSQ HOSP IP/OBS HIGH 50: CPT

## 2023-08-14 RX ORDER — FONDAPARINUX SODIUM 2.5 MG/.5ML
2.5 INJECTION, SOLUTION SUBCUTANEOUS DAILY
Refills: 0 | Status: DISCONTINUED | OUTPATIENT
Start: 2023-08-14 | End: 2023-08-18

## 2023-08-14 RX ORDER — PIPERACILLIN AND TAZOBACTAM 4; .5 G/20ML; G/20ML
3.38 INJECTION, POWDER, LYOPHILIZED, FOR SOLUTION INTRAVENOUS EVERY 8 HOURS
Refills: 0 | Status: DISCONTINUED | OUTPATIENT
Start: 2023-08-14 | End: 2023-08-15

## 2023-08-14 RX ADMIN — PIPERACILLIN AND TAZOBACTAM 25 GRAM(S): 4; .5 INJECTION, POWDER, LYOPHILIZED, FOR SOLUTION INTRAVENOUS at 15:27

## 2023-08-14 RX ADMIN — Medication 1: at 18:25

## 2023-08-14 RX ADMIN — PIPERACILLIN AND TAZOBACTAM 25 GRAM(S): 4; .5 INJECTION, POWDER, LYOPHILIZED, FOR SOLUTION INTRAVENOUS at 22:22

## 2023-08-14 RX ADMIN — FONDAPARINUX SODIUM 2.5 MILLIGRAM(S): 2.5 INJECTION, SOLUTION SUBCUTANEOUS at 12:25

## 2023-08-14 RX ADMIN — Medication 1: at 08:25

## 2023-08-14 RX ADMIN — Medication 166.67 MILLIGRAM(S): at 01:56

## 2023-08-14 RX ADMIN — SODIUM CHLORIDE 60 MILLILITER(S): 9 INJECTION, SOLUTION INTRAVENOUS at 11:31

## 2023-08-14 RX ADMIN — PIPERACILLIN AND TAZOBACTAM 25 GRAM(S): 4; .5 INJECTION, POWDER, LYOPHILIZED, FOR SOLUTION INTRAVENOUS at 05:39

## 2023-08-14 RX ADMIN — Medication 2: at 13:26

## 2023-08-14 NOTE — DIETITIAN INITIAL EVALUATION ADULT - NSFNSGIIOFT_GEN_A_CORE
I&O's Detail    13 Aug 2023 07:01  -  14 Aug 2023 07:00  --------------------------------------------------------  IN:  Total IN: 0 mL    OUT:    Voided (mL): 1800 mL  Total OUT: 1800 mL    Total NET: -1800 mL

## 2023-08-14 NOTE — DIETITIAN INITIAL EVALUATION ADULT - OTHER INFO
73 y/o F w/ PMH of chronic lymphedema, DM2, HTN, lung cancer / colon cancer, chronic lymphedema, p/w RLE pain. Patient states she has chronic lymphedema, but yesterday patient developed RLE pain and drainage from RLE. She walks with walker at baseline, and states that it was painful when bearing weight.    Pt known to nutr services. On consistent CHO and DASH diet. K+ elevated however now downtrending. POCT variable since admission (224, 118, 167, 186, 159, 151), received 3 units sliding scale insulin; A1c 7.5% - good glycemic control for current condition, per chart pt takes metformin daily for DM. Denies changes in appetite since admission. Wt hx: 291# (EMR wt 8/14; severe edema noted). Bed scale wt of 255# taken by RD on 8/14 however severe edema is skewing current wt appearance. 36# wt loss/ 12.37% 11 months - no significant wt loss noted at this time. NFPE reveals no muscle/fat wasting at this time (severe edema is masking losses). Noted w/ chronic foot wound and stage II PI. Pt willing to trial bravo BID for wound healing; Bravo is intended to support tissue building and collagen formation in the dietary management of wounds, which has been clinically shown to support wound healing (including pressure injuries, diabetic foot ulcers, surgical incisions, burns, and other acute/chronic wounds) by enhancing collagen formation in as little as 2 weeks. See recommendations below.

## 2023-08-14 NOTE — DIETITIAN INITIAL EVALUATION ADULT - PERTINENT LABORATORY DATA
08-14    138  |  112<H>  |  41<H>  ----------------------------<  136<H>  4.8   |  22  |  1.25    Ca    9.6      14 Aug 2023 08:09    TPro  6.2  /  Alb  2.5<L>  /  TBili  0.9  /  DBili  x   /  AST  34  /  ALT  35  /  AlkPhos  76  08-13  POCT Blood Glucose.: 228 mg/dL (08-14-23 @ 12:49)  A1C with Estimated Average Glucose Result: 7.5 % (08-13-23 @ 05:16)  A1C with Estimated Average Glucose Result: 8.8 % (04-03-23 @ 07:33)  A1C with Estimated Average Glucose Result: 5.9 % (01-02-23 @ 05:25)

## 2023-08-14 NOTE — DIETITIAN INITIAL EVALUATION ADULT - NS FNS DIET ORDER
Diet, Regular:   Consistent Carbohydrate {Evening Snack} (CSTCHOSN)  DASH/TLC {Sodium & Cholesterol Restricted} (DASH) (08-12-23 @ 22:38) [Active]

## 2023-08-14 NOTE — DIETITIAN INITIAL EVALUATION ADULT - ORAL INTAKE PTA/DIET HISTORY
Lives at home by herself however has aides through [facility] to assist w/ ADLs and provide wound care. She also has neighbors come by and assist w/ other ADLs/ errands. Receives meals via "[employer] nutritional needs" meals get delivered and pt has someone put them away. Reports of good appetite PTA. Tries to consume foods rich in potassium as pt states her potassium is usually low.

## 2023-08-14 NOTE — DIETITIAN INITIAL EVALUATION ADULT - ADD RECOMMEND
1) C/w current PO diet. Encourage protein-rich foods, maximize food preferences   2) Will add Bravo BID (provides 90 kcal, 2.5 g collagen, 7 g L-Arginine, 7 g L-Glutamine/ 1 packet) to promote wound healing. Bravo is intended to support tissue building and collagen formation in the dietary management of wounds, which has been clinically shown to support wound healing (including pressure injuries, diabetic foot ulcers, surgical incisions, burns, and other acute/chronic wounds) by enhancing collagen formation in as little as 2 weeks.   3) Monitor bowel movements, if no BM for >3 days, consider implementing bowel regimen.   4) Recommend to add MVI w/minerals, Vit C 500 mg BID, add Zinc Sulfate 220 mg x 10 days to promote wound healing.   5) Monitor daily lytes/min and replete prn   6) Obtain weekly wt to track/trend changes   7) Confirm goals of care regarding nutrition support   RD will continue to monitor PO intake, labs, hydration, and wt prn.

## 2023-08-14 NOTE — DIETITIAN INITIAL EVALUATION ADULT - PERTINENT MEDS FT
MEDICATIONS  (STANDING):  dextrose 5%. 1000 milliLiter(s) (100 mL/Hr) IV Continuous <Continuous>  dextrose 5%. 1000 milliLiter(s) (50 mL/Hr) IV Continuous <Continuous>  dextrose 50% Injectable 12.5 Gram(s) IV Push once  dextrose 50% Injectable 25 Gram(s) IV Push once  dextrose 50% Injectable 25 Gram(s) IV Push once  fondaparinux Injectable 2.5 milliGRAM(s) SubCutaneous daily  glucagon  Injectable 1 milliGRAM(s) IntraMuscular once  insulin lispro (ADMELOG) corrective regimen sliding scale   SubCutaneous at bedtime  insulin lispro (ADMELOG) corrective regimen sliding scale   SubCutaneous three times a day before meals  piperacillin/tazobactam IVPB.. 3.375 Gram(s) IV Intermittent every 8 hours  sodium chloride 0.45% 1000 milliLiter(s) (60 mL/Hr) IV Continuous <Continuous>  vancomycin  IVPB 1250 milliGRAM(s) IV Intermittent every 24 hours    MEDICATIONS  (PRN):  acetaminophen     Tablet .. 650 milliGRAM(s) Oral every 6 hours PRN Temp greater or equal to 38C (100.4F), Mild Pain (1 - 3)  dextrose Oral Gel 15 Gram(s) Oral once PRN Blood Glucose LESS THAN 70 milliGRAM(s)/deciliter

## 2023-08-15 LAB
GLUCOSE BLDC GLUCOMTR-MCNC: 125 MG/DL — HIGH (ref 70–99)
GLUCOSE BLDC GLUCOMTR-MCNC: 170 MG/DL — HIGH (ref 70–99)
GLUCOSE BLDC GLUCOMTR-MCNC: 194 MG/DL — HIGH (ref 70–99)
GLUCOSE BLDC GLUCOMTR-MCNC: 202 MG/DL — HIGH (ref 70–99)

## 2023-08-15 PROCEDURE — 99233 SBSQ HOSP IP/OBS HIGH 50: CPT

## 2023-08-15 RX ADMIN — Medication 166.67 MILLIGRAM(S): at 02:39

## 2023-08-15 RX ADMIN — PIPERACILLIN AND TAZOBACTAM 25 GRAM(S): 4; .5 INJECTION, POWDER, LYOPHILIZED, FOR SOLUTION INTRAVENOUS at 05:47

## 2023-08-15 RX ADMIN — Medication 2: at 12:58

## 2023-08-15 RX ADMIN — Medication 1: at 17:57

## 2023-08-15 RX ADMIN — FONDAPARINUX SODIUM 2.5 MILLIGRAM(S): 2.5 INJECTION, SOLUTION SUBCUTANEOUS at 10:02

## 2023-08-15 RX ADMIN — Medication 100 MILLIGRAM(S): at 21:27

## 2023-08-15 NOTE — PHYSICAL THERAPY INITIAL EVALUATION ADULT - GAIT TRAINING, PT EVAL
Patient will be able to safely ambulate 300 feet with Rolling Walker by discharge from acute care setting.

## 2023-08-15 NOTE — PHYSICAL THERAPY INITIAL EVALUATION ADULT - NSACTIVITYREC_GEN_A_PT
Stair Training: Patient will be able to negotiate 4 steps with bilateral handrails independently by discharge.

## 2023-08-15 NOTE — PHARMACOTHERAPY INTERVENTION NOTE - COMMENTS
VTE prophylaxis recommended - 
Modified cephalexin allergy history to state that patient tolerated zosyn during this admission.

## 2023-08-15 NOTE — PHYSICAL THERAPY INITIAL EVALUATION ADULT - PERTINENT HX OF CURRENT PROBLEM, REHAB EVAL
73 y/o F w/ PMH of chronic lymphadema, DM2, HTN, lung cancer / colon cancer, chronic lymphadema, p/w RLE pain. Patient states she has chronic lymphadema, but patient developed RLE pain and drainage from RLE. She walks with walker at baseline, and states that it was painful when bearing weight. Denies fever, chills, nausea, vomiting, abdominal pain, CP, SOB, abd pain, cough, runny nose, sore throat. Here noted with bilateral LE redness, swelling given IV vancomycin/zosyn for superimposed cellulitis.

## 2023-08-15 NOTE — PHYSICAL THERAPY INITIAL EVALUATION ADULT - ADDITIONAL COMMENTS
RW   2 KRISHNA Pt has RW at home, 2 KRISHNA   Was receiving Home PT and Home Nursing services (3 times per week for wound care)

## 2023-08-15 NOTE — PHYSICAL THERAPY INITIAL EVALUATION ADULT - GENERAL OBSERVATIONS, REHAB EVAL
Pt found supine in bed on 3N +Primafit, +IV, in NAD, agreeable to participate in PT Evaluation. Pt was able to perform bed mobility and transfer with Julieth. Pt was able to safely ambulate 20 feet with RW and CGA to bathroom. Pt was able to perform all toileting activities independently and ambulate 20 feet with RW and CGA to return to chair. Pt left in bedside chair with chair alarm on, call bell and phone in reach, RN Chani is aware.

## 2023-08-15 NOTE — ADVANCED PRACTICE NURSE CONSULT - RECOMMEDATIONS
1)Continue to Elevate heels off of Mattress  2)Continue to Turn and position every 2 Hours  3)Consult Dietitian   5)When out of bed to chair, apply waffle air cushion   6)Right Lower extremity medial: Small open wound Apply Triad cream wound Daily and per Vascular Either Kerlix and Ace Wrap or Unna's boot when pain decreases.   7)Bilateral Lower Extremities: irrigate lower extremities with Chlorhexidine (Use a 1/2 cap of chlorhexidine in peribottle and irrigated bilateral lower legs) after irrigating clean off with saline daily. Then Pat dry and apply Sween 24 to brawny dry skin two times per day.   8)Follow up with [facility] for out patient care.     -Continue turning/positioning patient from side-to-side q2h while in bed, q1h when/if OOB chair, or in accordance w/ pt's plan of care. Utilize pillows and/or Spry positioner pillow to assist w/ turning/positioning. When/if OOB chair, utilize pillows or chair cushion to offload pressure.   -Continue to offload heels from bed surface with soft pillow under calfs or by applying offloading boots to BLEs.   -Continue utilizing one underpad underneath patient to contain incontinence episodes; change pad when saturated/soiled.   -Continue nutrition consult for optimal wound healing & nutritional status.   -Assess skin/wound qshift, report changes to primary provider.   -Reconsult Vascular team for Unna's Boot order if patient requires      Plan of Care: Primary RN made aware of above recs. No further needs/recs from [facility] service at this time. Staff RN to perform routine skin/wound assessment and manage wound care. Questions or concerns or if wound worsens and reconsult needed, please contact ON

## 2023-08-15 NOTE — PHYSICAL THERAPY INITIAL EVALUATION ADULT - RANGE OF MOTION EXAMINATION, REHAB EVAL
Right and left ankle DF ROM limited secondary to swelling/Left UE ROM was WNL (within normal limits)/Right UE ROM was WNL (within normal limits)/Left LE ROM was WNL (within normal limits)/Right LE ROM was WNL (within normal limits)

## 2023-08-15 NOTE — ADVANCED PRACTICE NURSE CONSULT - ASSESSMENT
This is a 71 year old female admitted on 8/12/2023 edema to lower extremity and pain to right .PMHx: Lymphedema, DM2, HTN, Lung Ca.   Mrs Hood is a patient at [place] [facility] and a patient of Dr Capellan.   Patient with Bilateral Lower Extremity lymphedema brawny and scaling skin on toes up to knee. Patient usually has Unnas boot applied to BL lower extremity Per wound center.     Vascular team did see patient and asked for Wound Care RN recommendation for dressing. Per Vascular note Dr Santos: "when patient tolerates, re-wrap with legs with kerlex and ACE wrap versus Unna boot if infection resolves"      Noted to the right Lower extremity medial with a 0.2cm x 0.2cm x 0.2cm minimal clear drainage noted. Apply Triad cream. Keep legs elevated. Patient reported pain on right lower extremity.   Bilateral Lower extremity irrigate with Chlorhexidine (Use a 1/2 cap of chlorhexidine in peribottle and irrigated bilateral lower legs) after irrigating clean off with saline. Pat dry and   apply Sween 24 Daily to brawny dry skin two times per day. Patient is on [facility] Low air loss t mattress for pressure redistribution. When Out of bed place a air waffle cushion to continue with pressure redistribution.   Agree with Vascular and if they would like Unna's Boot or Ace wrap patient will require their order.

## 2023-08-16 ENCOUNTER — TRANSCRIPTION ENCOUNTER (OUTPATIENT)
Age: 73
End: 2023-08-16

## 2023-08-16 LAB
ANION GAP SERPL CALC-SCNC: 3 MMOL/L — LOW (ref 5–17)
BUN SERPL-MCNC: 30 MG/DL — HIGH (ref 7–23)
CALCIUM SERPL-MCNC: 9.1 MG/DL — SIGNIFICANT CHANGE UP (ref 8.5–10.1)
CHLORIDE SERPL-SCNC: 116 MMOL/L — HIGH (ref 96–108)
CO2 SERPL-SCNC: 23 MMOL/L — SIGNIFICANT CHANGE UP (ref 22–31)
CREAT SERPL-MCNC: 0.89 MG/DL — SIGNIFICANT CHANGE UP (ref 0.5–1.3)
EGFR: 69 ML/MIN/1.73M2 — SIGNIFICANT CHANGE UP
GLUCOSE BLDC GLUCOMTR-MCNC: 120 MG/DL — HIGH (ref 70–99)
GLUCOSE BLDC GLUCOMTR-MCNC: 147 MG/DL — HIGH (ref 70–99)
GLUCOSE BLDC GLUCOMTR-MCNC: 213 MG/DL — HIGH (ref 70–99)
GLUCOSE BLDC GLUCOMTR-MCNC: 216 MG/DL — HIGH (ref 70–99)
GLUCOSE SERPL-MCNC: 119 MG/DL — HIGH (ref 70–99)
POTASSIUM SERPL-MCNC: 4.1 MMOL/L — SIGNIFICANT CHANGE UP (ref 3.5–5.3)
POTASSIUM SERPL-SCNC: 4.1 MMOL/L — SIGNIFICANT CHANGE UP (ref 3.5–5.3)
SODIUM SERPL-SCNC: 142 MMOL/L — SIGNIFICANT CHANGE UP (ref 135–145)

## 2023-08-16 PROCEDURE — 99233 SBSQ HOSP IP/OBS HIGH 50: CPT

## 2023-08-16 RX ADMIN — Medication 2: at 12:46

## 2023-08-16 RX ADMIN — Medication 100 MILLIGRAM(S): at 09:57

## 2023-08-16 RX ADMIN — Medication 2: at 17:31

## 2023-08-16 RX ADMIN — Medication 100 MILLIGRAM(S): at 21:05

## 2023-08-16 RX ADMIN — FONDAPARINUX SODIUM 2.5 MILLIGRAM(S): 2.5 INJECTION, SOLUTION SUBCUTANEOUS at 09:57

## 2023-08-16 NOTE — PROGRESS NOTE ADULT - SUBJECTIVE AND OBJECTIVE BOX
Patient is a 72y old  Female who presents with a chief complaint of RLE pain (13 Aug 2023 11:24)      SUBJECTIVE:   HPI:  73 y/o F w/ PMH of chronic lymphadema, DM2, HTN, lung cancer / colon cancer, chronic lymphadema, p/w RLE pain. Patient states she has chronic lymphadema, but yesterday patient developed RLE pain and drainage from RLE. She walks with walker at baseline, and states that it was painful when bearing weight. Denies fever, chills, nausea, vomiting, abdominal pain, CP, SOB, abd pain, cough, runny nose, sore throat.     sub: pleasant ; K improved. No tele events            ICU Vital Signs Last 24 Hrs  T(C): 36.5 (13 Aug 2023 08:33), Max: 36.8 (12 Aug 2023 23:08)  T(F): 97.7 (13 Aug 2023 08:33), Max: 98.2 (12 Aug 2023 23:08)  HR: 88 (13 Aug 2023 08:33) (88 - 100)  BP: 119/47 (13 Aug 2023 08:33) (101/52 - 119/47)  BP(mean): 763 (12 Aug 2023 17:44) (763 - 763)  ABP: --  ABP(mean): --  RR: 18 (13 Aug 2023 08:33) (18 - 18)  SpO2: 100% (13 Aug 2023 08:33) (98% - 100%)    O2 Parameters below as of 13 Aug 2023 08:33  Patient On (Oxygen Delivery Method): room air            PHYSICAL EXAM:    Constitutional: NAD, awake and alert,   HEENT: PERR, EOMI, Normal Hearing, MMM  Neck: Soft and supple, No LAD, No JVD  Respiratory: Breath sounds are clear bilaterally, No wheezing, rales or rhonchi  Cardiovascular: S1 and S2, regular rate and rhythm, no Murmurs, gallops or rubs  Gastrointestinal: Bowel Sounds present, soft, nontender, nondistended, no guarding, no rebound  Extremities: No peripheral edema  Vascular: 2+ peripheral pulses  Neurological: A/O x 3, no focal deficits  Musculoskeletal: 5/5 strength b/l upper and lower extremities  Skin: noteable b/l LE lymphedema, erythema and changes c/w dermatitis. ozzing and wheeping to dorsal surface of right foot    MEDICATIONS:  MEDICATIONS  (STANDING):  dextrose 5%. 1000 milliLiter(s) (100 mL/Hr) IV Continuous <Continuous>  dextrose 5%. 1000 milliLiter(s) (50 mL/Hr) IV Continuous <Continuous>  dextrose 50% Injectable 12.5 Gram(s) IV Push once  dextrose 50% Injectable 25 Gram(s) IV Push once  dextrose 50% Injectable 25 Gram(s) IV Push once  glucagon  Injectable 1 milliGRAM(s) IntraMuscular once  insulin lispro (ADMELOG) corrective regimen sliding scale   SubCutaneous three times a day before meals  insulin lispro (ADMELOG) corrective regimen sliding scale   SubCutaneous at bedtime  piperacillin/tazobactam IVPB.. 3.375 Gram(s) IV Intermittent every 8 hours  sodium chloride 0.9%. 500 milliLiter(s) (50 mL/Hr) IV Continuous <Continuous>  vancomycin  IVPB 1250 milliGRAM(s) IV Intermittent every 24 hours      LABS: All Labs Reviewed:                        12.3   9.57  )-----------( 94       ( 13 Aug 2023 05:16 )             36.8     08-13    133<L>  |  110<H>  |  59<H>  ----------------------------<  128<H>  5.2   |  16<L>  |  1.38<H>    Ca    9.4      13 Aug 2023 05:16    TPro  6.2  /  Alb  2.5<L>  /  TBili  0.9  /  DBili  x   /  AST  34  /  ALT  35  /  AlkPhos  76  08-13    PT/INR - ( 13 Aug 2023 05:16 )   PT: 13.2 sec;   INR: 1.17 ratio         PTT - ( 13 Aug 2023 05:16 )  PTT:33.1 sec          Blood Culture:     RADIOLOGY/EKG: reviewed        
SUBJECTIVE:    CHIEF COMPLAINT:  Patient is a 72y old  Female who presents with a chief complaint of RLE pain (13 Aug 2023 17:33)      HPI:  73 y/o F w/ PMH of chronic lymphadema, DM2, HTN, lung cancer / colon cancer, chronic lymphadema, p/w RLE pain. Patient states she has chronic lymphadema, day PTA patient developed RLE pain and drainage from RLE. She walks with walker at baseline, and states that it was painful when bearing weight. Denies fever, chills, nausea, vomiting, abdominal pain, CP, SOB, abd pain, cough, runny nose, sore throat.     PSH: Hysterectomy     Social Hx: Denies tobacco / etoh / drugs     Family Hx: Denies    (12 Aug 2023 22:22)      Interval HPI and Overnight Events: Legs less painful. Less discharge    REVIEW OF SYSTEMS:  CONSTITUTIONAL: No weakness, fevers or chills  EYES/ENT: No visual changes;  No vertigo or throat pain   NECK: No pain or stiffness  RESPIRATORY: No cough, wheezing, hemoptysis; No shortness of breath  CARDIOVASCULAR: No chest pain or palpitations  GASTROINTESTINAL: No abdominal or epigastric pain. No nausea, vomiting, or hematemesis; No diarrhea or constipation. No melena or hematochezia.  GENITOURINARY: No dysuria, frequency or hematuria  NEUROLOGICAL: No numbness or weakness     All other review of systems is negative unless indicated above    OBJECTIVE    Vital Signs Last 24 Hrs  T(C): 36.6 (14 Aug 2023 08:39), Max: 37.4 (13 Aug 2023 23:00)  T(F): 97.9 (14 Aug 2023 08:39), Max: 99.4 (13 Aug 2023 23:00)  HR: 90 (14 Aug 2023 08:39) (79 - 90)  BP: 104/49 (14 Aug 2023 08:39) (104/49 - 123/58)  BP(mean): --  RR: 18 (14 Aug 2023 08:39) (18 - 18)  SpO2: 99% (14 Aug 2023 08:39) (97% - 100%)    Parameters below as of 14 Aug 2023 08:39  Patient On (Oxygen Delivery Method): room air    MEDICATIONS  (STANDING):  dextrose 5%. 1000 milliLiter(s) (100 mL/Hr) IV Continuous <Continuous>  dextrose 5%. 1000 milliLiter(s) (50 mL/Hr) IV Continuous <Continuous>  dextrose 50% Injectable 12.5 Gram(s) IV Push once  dextrose 50% Injectable 25 Gram(s) IV Push once  dextrose 50% Injectable 25 Gram(s) IV Push once  glucagon  Injectable 1 milliGRAM(s) IntraMuscular once  insulin lispro (ADMELOG) corrective regimen sliding scale   SubCutaneous at bedtime  insulin lispro (ADMELOG) corrective regimen sliding scale   SubCutaneous three times a day before meals  piperacillin/tazobactam IVPB.. 3.375 Gram(s) IV Intermittent every 8 hours  sodium chloride 0.45% 1000 milliLiter(s) (60 mL/Hr) IV Continuous <Continuous>  vancomycin  IVPB 1250 milliGRAM(s) IV Intermittent every 24 hours      LABS:                         12.3   9.57  )-----------( 94       ( 13 Aug 2023 05:16 )             36.8     08-14    138  |  112<H>  |  41<H>  ----------------------------<  136<H>  4.8   |  22  |  1.25    Ca    9.6      14 Aug 2023 08:09    TPro  6.2  /  Alb  2.5<L>  /  TBili  0.9  /  DBili  x   /  AST  34  /  ALT  35  /  AlkPhos  76  08-13    Urinalysis Basic - ( 14 Aug 2023 08:09 )  Color: x / Appearance: x / SG: x / pH: x  Gluc: 136 mg/dL / Ketone: x  / Bili: x / Urobili: x   Blood: x / Protein: x / Nitrite: x   Leuk Esterase: x / RBC: x / WBC x   Sq Epi: x / Non Sq Epi: x / Bacteria: x    PT/INR - ( 13 Aug 2023 05:16 )   PT: 13.2 sec;   INR: 1.17 ratio    PTT - ( 13 Aug 2023 05:16 )  PTT:33.1 sec    Specimen Source .Blood None   08-12 @ 16:49  Culture Results  No growth at 24 hours    URINE CULTURE    08-12 @ 16:49    CULTURE RESULTS   No growth at 24 hours    CAPILLARY BLOOD GLUCOSE  POCT Blood Glucose.: 151 mg/dL (14 Aug 2023 08:22)  POCT Blood Glucose.: 159 mg/dL (13 Aug 2023 21:33)  POCT Blood Glucose.: 186 mg/dL (13 Aug 2023 17:06)  POCT Blood Glucose.: 167 mg/dL (13 Aug 2023 12:43)  
SUBJECTIVE:    CHIEF COMPLAINT:  Patient is a 72y old  Female who presents with a chief complaint of RLE pain (14 Aug 2023 19:26)      HPI:  71 y/o F w/ PMH of chronic lymphadema, DM2, HTN, lung cancer / colon cancer, chronic lymphadema, p/w RLE pain. Patient states she has chronic lymphadema, developed RLE pain and drainage from RLE. She walks with walker at baseline, and states that it was painful when bearing weight. Denies fever, chills, nausea, vomiting, abdominal pain, CP, SOB, abd pain, cough, runny nose, sore throat.     Interval HPI and Overnight Events: Doing better. Was OOB with PT. Legs less red and less oozing    REVIEW OF SYSTEMS:  CONSTITUTIONAL: No weakness, fevers or chills  EYES/ENT: No visual changes;  No vertigo or throat pain   NECK: No pain or stiffness  RESPIRATORY: No cough, wheezing, hemoptysis; No shortness of breath  CARDIOVASCULAR: No chest pain or palpitations  GASTROINTESTINAL: No abdominal or epigastric pain. No nausea, vomiting, or hematemesis; No diarrhea or constipation. No melena or hematochezia.  GENITOURINARY: No dysuria, frequency or hematuria  NEUROLOGICAL: No numbness or weakness  SKIN: No itching, burning, rashes, or lesions   All other review of systems is negative unless indicated above    OBJECTIVE    Vital Signs Last 24 Hrs  T(C): 36.8 (15 Aug 2023 08:50), Max: 36.9 (14 Aug 2023 21:27)  T(F): 98.2 (15 Aug 2023 08:50), Max: 98.5 (14 Aug 2023 21:27)  HR: 76 (15 Aug 2023 08:50) (66 - 90)  BP: 101/46 (15 Aug 2023 08:50) (101/46 - 121/47)  BP(mean): --  RR: 18 (15 Aug 2023 08:50) (18 - 18)  SpO2: 99% (15 Aug 2023 08:50) (99% - 100%)    Parameters below as of 15 Aug 2023 08:50  Patient On (Oxygen Delivery Method): room air    MEDICATIONS  (STANDING):  dextrose 5%. 1000 milliLiter(s) (50 mL/Hr) IV Continuous <Continuous>  dextrose 5%. 1000 milliLiter(s) (100 mL/Hr) IV Continuous <Continuous>  dextrose 50% Injectable 25 Gram(s) IV Push once  dextrose 50% Injectable 12.5 Gram(s) IV Push once  dextrose 50% Injectable 25 Gram(s) IV Push once  fondaparinux Injectable 2.5 milliGRAM(s) SubCutaneous daily  glucagon  Injectable 1 milliGRAM(s) IntraMuscular once  insulin lispro (ADMELOG) corrective regimen sliding scale   SubCutaneous three times a day before meals  insulin lispro (ADMELOG) corrective regimen sliding scale   SubCutaneous at bedtime  piperacillin/tazobactam IVPB.. 3.375 Gram(s) IV Intermittent every 8 hours  vancomycin  IVPB 1250 milliGRAM(s) IV Intermittent every 24 hours      LABS:     08-15    140  |  115<H>  |  32<H>  ----------------------------<  120<H>  3.9   |  20<L>  |  1.02    Ca    8.9      15 Aug 2023 07:43    TPro  5.6<L>  /  Alb  2.1<L>  /  TBili  0.6  /  DBili  x   /  AST  24  /  ALT  29  /  AlkPhos  65  08-15    Urinalysis Basic - ( 15 Aug 2023 07:43 )  Color: x / Appearance: x / SG: x / pH: x  Gluc: 120 mg/dL / Ketone: x  / Bili: x / Urobili: x   Blood: x / Protein: x / Nitrite: x   Leuk Esterase: x / RBC: x / WBC x   Sq Epi: x / Non Sq Epi: x / Bacteria: x    Specimen Source .Blood None   08-12 @ 16:49  Culture Results  No growth at 48 Hours    CAPILLARY BLOOD GLUCOSE  POCT Blood Glucose.: 125 mg/dL (15 Aug 2023 08:07)  POCT Blood Glucose.: 177 mg/dL (14 Aug 2023 22:18)  POCT Blood Glucose.: 187 mg/dL (14 Aug 2023 17:29)  POCT Blood Glucose.: 228 mg/dL (14 Aug 2023 12:49)    
  71 y/o F w/ PMH of chronic lymphadema, DM2, HTN, lung cancer / colon cancer, chronic lymphadema, p/w RLE pain. Patient states she has chronic lymphadema, but yesterday patient developed RLE pain and drainage from RLE. She walks with walker at baseline, and states that it was painful when bearing weight. Denies fever, chills, nausea, vomiting, abdominal pain, CP, SOB, abd pain, cough, runny nose, sore throat.     Nephrology:  Pt of Dr Hernández, admitted with LE edema and infection due to chronic lymphedema, increasing Pain  labs reviewed  on NS and PO bicarb  elevated K and low bicarb on admission improving      PSH: Hysterectomy     Social Hx: Denies tobacco / etoh / drugs     Family Hx: Denies    (12 Aug 2023 22:22)        Home Medications:  metFORMIN 500 mg oral tablet: 1 orally 2 times a day (12 Aug 2023 22:25)      MEDICATIONS  (STANDING):  dextrose 5%. 1000 milliLiter(s) (100 mL/Hr) IV Continuous <Continuous>  dextrose 5%. 1000 milliLiter(s) (50 mL/Hr) IV Continuous <Continuous>  dextrose 50% Injectable 12.5 Gram(s) IV Push once  dextrose 50% Injectable 25 Gram(s) IV Push once  dextrose 50% Injectable 25 Gram(s) IV Push once  fondaparinux Injectable 2.5 milliGRAM(s) SubCutaneous daily  glucagon  Injectable 1 milliGRAM(s) IntraMuscular once  insulin lispro (ADMELOG) corrective regimen sliding scale   SubCutaneous three times a day before meals  insulin lispro (ADMELOG) corrective regimen sliding scale   SubCutaneous at bedtime  piperacillin/tazobactam IVPB.. 3.375 Gram(s) IV Intermittent every 8 hours  sodium chloride 0.45% 1000 milliLiter(s) (60 mL/Hr) IV Continuous <Continuous>  vancomycin  IVPB 1250 milliGRAM(s) IV Intermittent every 24 hours      Allergies    Keflex (Anaphylaxis)  cephalexin (Unknown)    Intolerances        I&O's Summary    Vital Signs Last 24 Hrs  T(C): 36.9 (14 Aug 2023 21:27), Max: 37.4 (13 Aug 2023 23:00)  T(F): 98.5 (14 Aug 2023 21:27), Max: 99.4 (13 Aug 2023 23:00)  HR: 66 (14 Aug 2023 21:27) (66 - 90)  BP: 121/47 (14 Aug 2023 21:27) (104/49 - 123/58)  BP(mean): --  RR: 18 (14 Aug 2023 21:27) (18 - 18)  SpO2: 100% (14 Aug 2023 21:27) (97% - 100%)    Parameters below as of 14 Aug 2023 21:27  Patient On (Oxygen Delivery Method): room air    I&O's Detail    13 Aug 2023 07:01  -  14 Aug 2023 07:00  --------------------------------------------------------  IN:  Total IN: 0 mL    OUT:    Voided (mL): 1800 mL  Total OUT: 1800 mL    Total NET: -1800 mL            PHYSICAL EXAM:    General:  Alert, No acute distress.  Neuro: - moving all ext, AAO x 3  HEENT:No JVD, no masses, Eyes anicteric, ,  Cardiovascular: S1 S2   Lungs: CTA  Abdomen: soft, NT,   Extremities: ++  Chronic lymphedema    LABS:                            138    |  112    |  41     ----------------------------<  136       14 Aug 2023 08:09  4.8     |  22     |  1.25     133    |  110    |  59     ----------------------------<  128       13 Aug 2023 05:16  5.2     |  16     |  1.38     128    |  105    |  59     ----------------------------<  208       12 Aug 2023 13:53  6.2     |  14     |  1.58     Ca    9.6        14 Aug 2023 08:09  Ca    9.4        13 Aug 2023 05:16        TPro  6.2    /  Alb  2.5    /  TBili  0.9    /        13 Aug 2023 05:16  DBili  x      /  AST  34     /  ALT  35     /  AlkPhos  76       TPro  7.5    /  Alb  3.1    /  TBili  1.3    /        12 Aug 2023 13:53  DBili  x      /  AST  35     /  ALT  44     /  AlkPhos  94                    12.3   9.57  )-----------( 94       ( 13 Aug 2023 05:16 )             36.8   :53            Color: x / Appearance: x / SG: x / pH: x  Gluc: 128 mg/dL / Ketone: x  / Bili: x / Urobili: x   Blood: x / Protein: x / Nitrite: x   Leuk Esterase: x / RBC: x / WBC x   Sq Epi: x / Non Sq Epi: x / Bacteria: x          
Date of service: 08-15-23 @ 10:55    pt seen and examined   less leg discomfort  less redness, swelling  improving    ROS: no fever or chills; denies dizziness, no HA, no SOB or cough, no abdominal pain, no diarrhea or constipation; no dysuria, no urinary frequency    MEDICATIONS  (STANDING):  dextrose 50% Injectable 25 Gram(s) IV Push once  dextrose 50% Injectable 12.5 Gram(s) IV Push once  dextrose 50% Injectable 25 Gram(s) IV Push once  doxycycline monohydrate Capsule 100 milliGRAM(s) Oral every 12 hours  fondaparinux Injectable 2.5 milliGRAM(s) SubCutaneous daily  glucagon  Injectable 1 milliGRAM(s) IntraMuscular once  insulin lispro (ADMELOG) corrective regimen sliding scale   SubCutaneous three times a day before meals  insulin lispro (ADMELOG) corrective regimen sliding scale   SubCutaneous at bedtime    Vital Signs Last 24 Hrs  T(C): 36.8 (15 Aug 2023 08:50), Max: 36.9 (14 Aug 2023 21:27)  T(F): 98.2 (15 Aug 2023 08:50), Max: 98.5 (14 Aug 2023 21:27)  HR: 76 (15 Aug 2023 08:50) (66 - 90)  BP: 101/46 (15 Aug 2023 08:50) (101/46 - 121/47)  BP(mean): --  RR: 18 (15 Aug 2023 08:50) (18 - 18)  SpO2: 99% (15 Aug 2023 08:50) (99% - 100%)    Parameters below as of 15 Aug 2023 08:50  Patient On (Oxygen Delivery Method): room air          PE:  Constitutional: frail looking  HEENT: NC/AT, EOMI, PERRLA, conjunctivae clear; ears and nose atraumatic; pharynx benign  Neck: supple; thyroid not palpable  Back: no tenderness  Respiratory: respiratory effort normal; clear to auscultation  Cardiovascular: S1S2 regular, no murmurs  Abdomen: soft, not tender, not distended, positive BS; liver and spleen WNL  Genitourinary: no suprapubic tenderness  Lymphatic: no LN palpable  Musculoskeletal: no muscle tenderness, no joint swelling or tenderness  Extremities: bilateral LE lymphedema, warmth, tenderness   Neurological/ Psychiatric:  moving all extremities  Skin: no rashes; no palpable lesions    Labs: all available labs reviewed                          08-15    140  |  115<H>  |  32<H>  ----------------------------<  120<H>  3.9   |  20<L>  |  1.02    Ca    8.9      15 Aug 2023 07:43    TPro  5.6<L>  /  Alb  2.1<L>  /  TBili  0.6  /  DBili  x   /  AST  24  /  ALT  29  /  AlkPhos  65  08-15       Vancomycin Level, Trough: 15.5 ug/mL (08-14 @ 22:19)        Urinalysis Basic - ( 13 Aug 2023 05:16 )    Color: x / Appearance: x / SG: x / pH: x  Gluc: 128 mg/dL / Ketone: x  / Bili: x / Urobili: x   Blood: x / Protein: x / Nitrite: x   Leuk Esterase: x / RBC: x / WBC x   Sq Epi: x / Non Sq Epi: x / Bacteria: x    Culture - Blood (08.12.23 @ 16:49)   Specimen Source: .Blood None  Culture Results:   No growth at 24 hours  Culture - Blood (08.12.23 @ 16:49)   Specimen Source: .Blood None  Culture Results:   No growth at 24 hours    Radiology: all available radiological tests reviewed      ACC: [id number] EXAM:  US DPLX LWR EXT VEINS COMPL BI   ORDERED BY: TROY BROWN     PROCEDURE DATE:  08/13/2023          INTERPRETATION:  CLINICAL INFORMATION: Leg swelling.    COMPARISON: Venous Doppler dated 09/24/2022.    TECHNIQUE: Duplex sonography of the BILATERAL LOWER extremity veins with   color and spectral Doppler, with and without compression.    FINDINGS:    RIGHT:  Normal compressibility of the RIGHT common femoral, femoral and popliteal   veins.  Doppler examination shows normal spontaneous and phasic flow.  Limited visualized of the RIGHT calf veins. No RIGHT calf vein thrombosis   detected.    LEFT:  Normal compressibility of the LEFT common femoral, femoral and popliteal   veins.  Doppler examination shows normal spontaneous and phasic flow.  Limited visualized of the LEFT calf veins. No LEFT calf vein thrombosis   detected.    IMPRESSION:  No evidence of deep venous thrombosis in either lower extremity.    Limited visualization of the calf veins.        --- End of Report ---    < end of copied text >  < from: Xray Tibia + Fibula 2 Views, Right (08.12.23 @ 14:23) >  ACC: [id number] EXAM:  XR TIB FIB AP LAT 2 VIEWS RT   ORDERED BY: ISMAEL ESPANA     ACC: [id number] EXAM:  XR ANKLE COMP MIN 3 VIEWS RT   ORDERED BY: ISMAEL ESPANA     PROCEDURE DATE:  08/12/2023          INTERPRETATION:  XR ANKLE COMPLETE 3 VIEWS RIGHT, XR TIBIA FIBULA AP AND   LATERAL 2 VIEWS RIGHT    Clinical History: Infection    AP and crosstable lateral view of the right tibia and fibula.  AP, lateral and oblique view of the right ankle      FINDINGS:    Patient has lymphedema and there is marked soft tissue swelling. There is   no acute fracture, dislocation or radiopaque foreign body. No obvious   subcutaneous emphysema. No plain film evidence of osteomyelitis. Moderate   plantar and retrocalcaneal spurs. Calcification along the proximal   plantar fascia.    IMPRESSION:  1.  Patient has lymphedema and there is marked soft tissue swelling.  2.  No plain film evidence of osteomyelitis.      Advanced directives addressed: full resuscitation
Date of service: 08-14-23 @ 12:24    pt seen and examined   feels better  less leg discomfort  less redness, swelling    ROS: no fever or chills; denies dizziness, no HA, no SOB or cough, no abdominal pain, no diarrhea or constipation; no dysuria, no urinary frequency    MEDICATIONS  (STANDING):  dextrose 5%. 1000 milliLiter(s) (100 mL/Hr) IV Continuous <Continuous>  dextrose 5%. 1000 milliLiter(s) (50 mL/Hr) IV Continuous <Continuous>  dextrose 50% Injectable 12.5 Gram(s) IV Push once  dextrose 50% Injectable 25 Gram(s) IV Push once  dextrose 50% Injectable 25 Gram(s) IV Push once  fondaparinux Injectable 2.5 milliGRAM(s) SubCutaneous daily  glucagon  Injectable 1 milliGRAM(s) IntraMuscular once  insulin lispro (ADMELOG) corrective regimen sliding scale   SubCutaneous at bedtime  insulin lispro (ADMELOG) corrective regimen sliding scale   SubCutaneous three times a day before meals  piperacillin/tazobactam IVPB.. 3.375 Gram(s) IV Intermittent every 8 hours  sodium chloride 0.45% 1000 milliLiter(s) (60 mL/Hr) IV Continuous <Continuous>  vancomycin  IVPB 1250 milliGRAM(s) IV Intermittent every 24 hours    Vital Signs Last 24 Hrs  T(C): 36.6 (14 Aug 2023 08:39), Max: 37.4 (13 Aug 2023 23:00)  T(F): 97.9 (14 Aug 2023 08:39), Max: 99.4 (13 Aug 2023 23:00)  HR: 90 (14 Aug 2023 08:39) (79 - 90)  BP: 104/49 (14 Aug 2023 08:39) (104/49 - 123/58)  BP(mean): --  RR: 18 (14 Aug 2023 08:39) (18 - 18)  SpO2: 99% (14 Aug 2023 08:39) (97% - 100%)    Parameters below as of 14 Aug 2023 08:39  Patient On (Oxygen Delivery Method): room air      PE:  Constitutional: frail looking  HEENT: NC/AT, EOMI, PERRLA, conjunctivae clear; ears and nose atraumatic; pharynx benign  Neck: supple; thyroid not palpable  Back: no tenderness  Respiratory: respiratory effort normal; clear to auscultation  Cardiovascular: S1S2 regular, no murmurs  Abdomen: soft, not tender, not distended, positive BS; liver and spleen WNL  Genitourinary: no suprapubic tenderness  Lymphatic: no LN palpable  Musculoskeletal: no muscle tenderness, no joint swelling or tenderness  Extremities: bilateral LE lymphedema, warmth, tenderness   Neurological/ Psychiatric:  moving all extremities  Skin: no rashes; no palpable lesions    Labs: all available labs reviewed                                   12.3   9.57  )-----------( 94       ( 13 Aug 2023 05:16 )             36.8     08-14    138  |  112<H>  |  41<H>  ----------------------------<  136<H>  4.8   |  22  |  1.25    Ca    9.6      14 Aug 2023 08:09    TPro  6.2  /  Alb  2.5<L>  /  TBili  0.9  /  DBili  x   /  AST  34  /  ALT  35  /  AlkPhos  76  08-13             Urinalysis Basic - ( 13 Aug 2023 05:16 )    Color: x / Appearance: x / SG: x / pH: x  Gluc: 128 mg/dL / Ketone: x  / Bili: x / Urobili: x   Blood: x / Protein: x / Nitrite: x   Leuk Esterase: x / RBC: x / WBC x   Sq Epi: x / Non Sq Epi: x / Bacteria: x    Culture - Blood (08.12.23 @ 16:49)   Specimen Source: .Blood None  Culture Results:   No growth at 24 hours  Culture - Blood (08.12.23 @ 16:49)   Specimen Source: .Blood None  Culture Results:   No growth at 24 hours    Radiology: all available radiological tests reviewed      ACC: [id number] EXAM:  US DPLX LWR EXT VEINS COMPL BI   ORDERED BY: TROY BROWN     PROCEDURE DATE:  08/13/2023          INTERPRETATION:  CLINICAL INFORMATION: Leg swelling.    COMPARISON: Venous Doppler dated 09/24/2022.    TECHNIQUE: Duplex sonography of the BILATERAL LOWER extremity veins with   color and spectral Doppler, with and without compression.    FINDINGS:    RIGHT:  Normal compressibility of the RIGHT common femoral, femoral and popliteal   veins.  Doppler examination shows normal spontaneous and phasic flow.  Limited visualized of the RIGHT calf veins. No RIGHT calf vein thrombosis   detected.    LEFT:  Normal compressibility of the LEFT common femoral, femoral and popliteal   veins.  Doppler examination shows normal spontaneous and phasic flow.  Limited visualized of the LEFT calf veins. No LEFT calf vein thrombosis   detected.    IMPRESSION:  No evidence of deep venous thrombosis in either lower extremity.    Limited visualization of the calf veins.        --- End of Report ---    < end of copied text >  < from: Xray Tibia + Fibula 2 Views, Right (08.12.23 @ 14:23) >  ACC: [id number] EXAM:  XR TIB FIB AP LAT 2 VIEWS RT   ORDERED BY: ISMAEL ESPANA     ACC: [id number] EXAM:  XR ANKLE COMP MIN 3 VIEWS RT   ORDERED BY: ISMAEL ESPANA     PROCEDURE DATE:  08/12/2023          INTERPRETATION:  XR ANKLE COMPLETE 3 VIEWS RIGHT, XR TIBIA FIBULA AP AND   LATERAL 2 VIEWS RIGHT    Clinical History: Infection    AP and crosstable lateral view of the right tibia and fibula.  AP, lateral and oblique view of the right ankle      FINDINGS:    Patient has lymphedema and there is marked soft tissue swelling. There is   no acute fracture, dislocation or radiopaque foreign body. No obvious   subcutaneous emphysema. No plain film evidence of osteomyelitis. Moderate   plantar and retrocalcaneal spurs. Calcification along the proximal   plantar fascia.    IMPRESSION:  1.  Patient has lymphedema and there is marked soft tissue swelling.  2.  No plain film evidence of osteomyelitis.      Advanced directives addressed: full resuscitation
SUBJECTIVE:    CHIEF COMPLAINT:  Patient is a 72y old  Female who presents with a chief complaint of RLE pain (15 Aug 2023 10:54)    HPI:  73 y/o F w/ PMH of chronic lymphadema, DM2, HTN, lung cancer / colon cancer, chronic lymphadema, p/w RLE pain. Patient states she has chronic lymphadema, developed RLE pain and drainage from RLE. She walks with walker at baseline, and states that it was painful when bearing weight. Denies fever, chills, nausea, vomiting, abdominal pain, CP, SOB, abd pain, cough, runny nose, sore throat.     Interval HPI and Overnight Events: Pt tolerated PO antibiotics. OOB to chair yesterday. Legs better    REVIEW OF SYSTEMS:  CONSTITUTIONAL: No weakness, fevers or chills  EYES/ENT: No visual changes;  No vertigo or throat pain   NECK: No pain or stiffness  RESPIRATORY: No cough, wheezing, hemoptysis; No shortness of breath  CARDIOVASCULAR: No chest pain or palpitations  GASTROINTESTINAL: No abdominal or epigastric pain. No nausea, vomiting, or hematemesis; No diarrhea or constipation. No melena or hematochezia.  GENITOURINARY: No dysuria, frequency or hematuria  NEUROLOGICAL: No numbness or weakness  SKIN: No itching, burning, rashes, or lesions   All other review of systems is negative unless indicated above    OBJECTIVE    Vital Signs Last 24 Hrs  T(C): 36.6 (16 Aug 2023 08:35), Max: 36.8 (15 Aug 2023 21:27)  T(F): 97.9 (16 Aug 2023 08:35), Max: 98.3 (15 Aug 2023 21:27)  HR: 105 (16 Aug 2023 08:35) (83 - 105)  BP: 122/50 (16 Aug 2023 08:35) (107/40 - 122/50)  BP(mean): --  RR: 19 (16 Aug 2023 08:35) (18 - 19)  SpO2: 95% (16 Aug 2023 08:35) (95% - 100%)    Parameters below as of 16 Aug 2023 08:35  Patient On (Oxygen Delivery Method): room air        MEDICATIONS  (STANDING):  dextrose 50% Injectable 25 Gram(s) IV Push once  dextrose 50% Injectable 12.5 Gram(s) IV Push once  dextrose 50% Injectable 25 Gram(s) IV Push once  doxycycline monohydrate Capsule 100 milliGRAM(s) Oral every 12 hours  fondaparinux Injectable 2.5 milliGRAM(s) SubCutaneous daily  glucagon  Injectable 1 milliGRAM(s) IntraMuscular once  insulin lispro (ADMELOG) corrective regimen sliding scale   SubCutaneous three times a day before meals  insulin lispro (ADMELOG) corrective regimen sliding scale   SubCutaneous at bedtime      LABS:     08-16    142  |  116<H>  |  30<H>  ----------------------------<  119<H>  4.1   |  23  |  0.89    Ca    9.1      16 Aug 2023 08:07    TPro  5.6<L>  /  Alb  2.1<L>  /  TBili  0.6  /  DBili  x   /  AST  24  /  ALT  29  /  AlkPhos  65  08-15    Urinalysis Basic - ( 16 Aug 2023 08:07 )    Color: x / Appearance: x / SG: x / pH: x  Gluc: 119 mg/dL / Ketone: x  / Bili: x / Urobili: x   Blood: x / Protein: x / Nitrite: x   Leuk Esterase: x / RBC: x / WBC x   Sq Epi: x / Non Sq Epi: x / Bacteria: x    Specimen Source .Blood None   08-12 @ 16:49  Culture Results  No growth at 72 Hours    CAPILLARY BLOOD GLUCOSE  POCT Blood Glucose.: 120 mg/dL (16 Aug 2023 08:00)  POCT Blood Glucose.: 194 mg/dL (15 Aug 2023 21:25)  POCT Blood Glucose.: 170 mg/dL (15 Aug 2023 17:38)  POCT Blood Glucose.: 202 mg/dL (15 Aug 2023 12:36)

## 2023-08-16 NOTE — PROGRESS NOTE ADULT - ASSESSMENT
71 y/o F w/ PMH of chronic lymphadema, DM2, HTN, lung cancer / colon cancer, chronic lymphadema, p/w RLE pain    Assessment:  Sepsis 2/2  RLE cellulitisResolved  Chronic lymphedema  stasis dermatitis  Hyperkalemia resolve  Metabolic acidosis resolved  Hyponatremia - resolved  Type 2 DM    Plan:  Off of Vanco / Zosyn   Doxy Daty # 2 of 7  blood cx neg  ID following  Wrap legs today  PT today. Pt does not feel stable on feet for discharge today  If does well with PT home tomorrow, otherwise will need Rehab- Discussed with  today  Insulin Coverage - home on Metformin   DVT ppx - fondaparinux  
71 y/o F w/ PMH of chronic lymphadema, DM2, HTN, lung cancer / colon cancer, chronic lymphadema, p/w RLE pain. Patient states she has chronic lymphadema, but patient developed RLE pain and drainage from RLE. She walks with walker at baseline, and states that it was painful when bearing weight. Denies fever, chills, nausea, vomiting, abdominal pain, CP, SOB, abd pain, cough, runny nose, sore throat. Here noted with bilateral LE redness, swelling given IV vancomycin/zosyn for superimposed cellulitis.     1. Bilateral LE lymphedema. Stasis dermatitis. Superimposed cellulitis  - imaging reviewed  - on vancomycin [id number] check trough prior to 4th dose #3  - on zosyn 3.128enl6s #2-3  - continue with abx coverage  - fu cultures no growth  - continue with IV abx 1 more day switch to po doxycycline 100mg BID in am complete 7 day course   - wound care eval   - monitor temps  - tolerating abx well so far; no side effects noted  - reason for abx use and side effects reviewed with patient  - supportive care  - fu cbc    2. other issues - care per medicine 
  73 y/o F w/ PMH of chronic lymphadema, DM2, HTN, lung cancer / colon cancer, chronic lymphadema, p/w RLE pain    *Sepsis 2/2  RLE cellulitis w/ lymphedema / stasis dermatitis  -Vanco / Zosyn   -F/u blood cx  -ID consult appreciated  -U/S of LE pending  - cont gentle ivf  -Xray: Patient has lymphedema and there is marked soft tissue swelling. No plain film evidence of osteomyelitis.  -Vascular consult    *Suspect ADRIAN + Hyperkalemia / metabolic acidosis  -IVF and trend creatinine   -Avoid nephrotoxic agents   - potassium normalized  - start po bicarb  - no peaked t waves noted    *Hyponatremia  - improving    *DM2  -Patient states she was taken off insulin, and states the only medication she currently takes at home is Metformin     *H/o HTN / lung / colon cancer   -C/w home meds    *DVT ppx   -Heparin SubQ   
71 y/o F w/ PMH of chronic lymphadema, DM2, HTN, lung cancer / colon cancer, chronic lymphadema, p/w RLE pain. Patient states she has chronic lymphadema, but yesterday patient developed RLE pain and drainage from RLE. She walks with walker at baseline, and states that it was painful when bearing weight. Denies fever, chills, nausea, vomiting, abdominal pain, CP, SOB, abd pain, cough, runny nose, sore throat.   Pt of Dr Hernández, admitted with LE edema and infection due to chronic lymphedema, increasing Pain  labs reviewed    Hyperkalemia   resolved with IVF   low K diet      Met Acidosis   resolved   DC HCOI3 gtt    repeat labs in AM     * pt seen earlier, note now    Fup with Dr Hernández in office     
73 y/o F w/ PMH of chronic lymphadema, DM2, HTN, lung cancer / colon cancer, chronic lymphadema, p/w RLE pain. Patient states she has chronic lymphadema, but patient developed RLE pain and drainage from RLE. She walks with walker at baseline, and states that it was painful when bearing weight. Denies fever, chills, nausea, vomiting, abdominal pain, CP, SOB, abd pain, cough, runny nose, sore throat. Here noted with bilateral LE redness, swelling given IV vancomycin/zosyn for superimposed cellulitis.     1. Bilateral LE lymphedema. Stasis dermatitis. Superimposed cellulitis  - imaging reviewed, improving   - s/p vancomycin [id number] check trough prior to 4th dose #3  - s/p zosyn 3.418vms4r #2-3  - continue with abx coverage  - fu cultures no growth  - now on doxycycline 100mg BID-  complete 7 day course   - wound care eval   - monitor temps  - tolerating abx well so far; no side effects noted  - reason for abx use and side effects reviewed with patient  - supportive care  - fu cbc    2. other issues - care per medicine 
  71 y/o F w/ PMH of chronic lymphadema, DM2, HTN, lung cancer / colon cancer, chronic lymphadema, p/w RLE pain    *Sepsis 2/2  RLE cellulitis w/ lymphedema / stasis dermatitis  -Vanco / Zosyn  - D/c today   -Start PO Doxy today  -blood cx neg  -ID following  -D/c  ivf  -Vascular consult appreciated - wrap legs when cleared by ID    *Suspect ADRIAN + Hyperkalemia / metabolic acidosis - Resoled  -D/c IVF    -Avoid nephrotoxic agents   - potassium normalized with kayexalate and lokelma  - Hyponatremia - resolved  - If does well on Po Fuids, and po antibiotics, d/c tomorrow with  home PT    *DM2  -Patient states she was taken off insulin, and states the only medication she currently takes at home is Metformin   - Coverage in house and change back to metformin on d/c  *H/o HTN / lung / colon cancer   -C/w home meds      *DVT ppx   -Heparin SubQ   
  73 y/o F w/ PMH of chronic lymphadema, DM2, HTN, lung cancer / colon cancer, chronic lymphadema, p/w RLE pain    *Sepsis 2/2  RLE cellulitis w/ lymphedema / stasis dermatitis  -Vanco / Zosyn  - possible change to Rocephin today  -F/u blood cx  -ID following  - cont gentle ivf  -Xray: Patient has lymphedema and there is marked soft tissue swelling. No plain film evidence of osteomyelitis.  -Vascular consult appreciated - wrap legs when cleared by ID    *Suspect ADRIAN + Hyperkalemia / metabolic acidosis - Improved  -IVF and trend creatinine   -Avoid nephrotoxic agents   - potassium normalized with kayexalate and lokelma  - po bicarb  - no peaked t waves noted    *Hyponatremia - resolved    *DM2  -Patient states she was taken off insulin, and states the only medication she currently takes at home is Metformin     *H/o HTN / lung / colon cancer   -C/w home meds    *DVT ppx   -Heparin SubQ

## 2023-08-16 NOTE — DISCHARGE NOTE NURSING/CASE MANAGEMENT/SOCIAL WORK - PATIENT PORTAL LINK FT
You can access the FollowMyHealth Patient Portal offered by [facility] by registering at the following website: [email or web address]. By joining [facility]’s FollowMyHealth portal, you will also be able to view your health information using other applications (apps) compatible with our system.

## 2023-08-16 NOTE — PROGRESS NOTE ADULT - PROVIDER SPECIALTY LIST ADULT
Family Medicine
Hospitalist
Infectious Disease
Nephrology
Infectious Disease
Family Medicine
Family Medicine

## 2023-08-16 NOTE — PROGRESS NOTE ADULT - SKIN
Erythema to legs resolved. Now mostly stasis changes and lymphedema
Legs with severe chronic stasis changes. Less red. Still foul smelling. Minimal discharge

## 2023-08-17 ENCOUNTER — TRANSCRIPTION ENCOUNTER (OUTPATIENT)
Age: 73
End: 2023-08-17

## 2023-08-17 LAB
CULTURE RESULTS: SIGNIFICANT CHANGE UP
CULTURE RESULTS: SIGNIFICANT CHANGE UP
GLUCOSE BLDC GLUCOMTR-MCNC: 117 MG/DL — HIGH (ref 70–99)
GLUCOSE BLDC GLUCOMTR-MCNC: 182 MG/DL — HIGH (ref 70–99)
GLUCOSE BLDC GLUCOMTR-MCNC: 190 MG/DL — HIGH (ref 70–99)
GLUCOSE BLDC GLUCOMTR-MCNC: 194 MG/DL — HIGH (ref 70–99)
SPECIMEN SOURCE: SIGNIFICANT CHANGE UP
SPECIMEN SOURCE: SIGNIFICANT CHANGE UP

## 2023-08-17 RX ORDER — ACETAMINOPHEN 500 MG
2 TABLET ORAL
Qty: 0 | Refills: 0 | DISCHARGE
Start: 2023-08-17

## 2023-08-17 RX ADMIN — FONDAPARINUX SODIUM 2.5 MILLIGRAM(S): 2.5 INJECTION, SOLUTION SUBCUTANEOUS at 09:44

## 2023-08-17 RX ADMIN — Medication 100 MILLIGRAM(S): at 22:08

## 2023-08-17 RX ADMIN — Medication 100 MILLIGRAM(S): at 09:44

## 2023-08-17 RX ADMIN — Medication 1: at 16:53

## 2023-08-17 RX ADMIN — Medication 1: at 12:42

## 2023-08-17 NOTE — DISCHARGE NOTE PROVIDER - NSDCMRMEDTOKEN_GEN_ALL_CORE_FT
acetaminophen 325 mg oral tablet: 2 tab(s) orally every 6 hours As needed Temp greater or equal to 38C (100.4F), Mild Pain (1 - 3)  metFORMIN 500 mg oral tablet: 1 orally 2 times a day  Mondoxyne  mg oral capsule: 1 cap(s) orally 2 times a day

## 2023-08-17 NOTE — PROVIDER CONTACT NOTE (OTHER) - ASSESSMENT
Pt cleared for discharge by PT. PT recommends rehab, but pt adamantly refusing.  will set her up with home PT.

## 2023-08-17 NOTE — DISCHARGE NOTE PROVIDER - CARE PROVIDER_API CALL
Elgin Dean  [address], [address], [place] [address]  Phone: [phone]  Fax: [phone]  Follow Up Time:

## 2023-08-17 NOTE — DISCHARGE NOTE PROVIDER - NSDCCPCAREPLAN_GEN_ALL_CORE_FT
PRINCIPAL DISCHARGE DIAGNOSIS  Diagnosis: Cellulitis  Assessment and Plan of Treatment:       SECONDARY DISCHARGE DIAGNOSES  Diagnosis: Type 2 diabetes mellitus  Assessment and Plan of Treatment:     Diagnosis: Lymphedema, limb  Assessment and Plan of Treatment:     Diagnosis: Other sepsis  Assessment and Plan of Treatment:     Diagnosis: Hyperkalemia  Assessment and Plan of Treatment:

## 2023-08-17 NOTE — DISCHARGE NOTE PROVIDER - NSDCPNSUBOBJ_GEN_ALL_CORE
SUBJECTIVE:    CHIEF COMPLAINT:  Patient is a 72y old  Female who presents with a chief complaint of RLE pain (16 Aug 2023 09:54)    Interval HPI and Overnight Events: Did well with PT. Wound care completed and legs wrapped    REVIEW OF SYSTEMS:  CONSTITUTIONAL: No weakness, fevers or chills  EYES/ENT: No visual changes;  No vertigo or throat pain   NECK: No pain or stiffness  RESPIRATORY: No cough, wheezing, hemoptysis; No shortness of breath  CARDIOVASCULAR: No chest pain or palpitations  GASTROINTESTINAL: No abdominal or epigastric pain. No nausea, vomiting, or hematemesis; No diarrhea or constipation. No melena or hematochezia.  GENITOURINARY: No dysuria, frequency or hematuria  NEUROLOGICAL: No numbness or weakness  SKIN: No itching, burning, rashes, or lesions   All other review of systems is negative unless indicated above    OBJECTIVE    PHYSICAL EXAM:  Vital Signs Last 24 Hrs  T(C): 36.7 (17 Aug 2023 08:49), Max: 37 (16 Aug 2023 15:55)  T(F): 98.1 (17 Aug 2023 08:49), Max: 98.6 (16 Aug 2023 15:55)  HR: 84 (17 Aug 2023 08:49) (76 - 84)  BP: 103/53 (17 Aug 2023 08:49) (103/53 - 115/64)  BP(mean): --  RR: 17 (17 Aug 2023 08:49) (17 - 18)  SpO2: 99% (17 Aug 2023 08:49) (98% - 99%)    Parameters below as of 17 Aug 2023 08:49  Patient On (Oxygen Delivery Method): room air      Constitutional: NAD, awake and alert, well-developed  HEENT: PERR, EOMI, Normal Hearing, MMM  Neck: Soft and supple, No LAD, No JVD  Respiratory: Breath sounds are clear bilaterally, No wheezing, rales or rhonchi  Cardiovascular: S1 and S2, regular rate and rhythm, no Murmurs, gallops or rubs  Gastrointestinal: Bowel Sounds present, soft, nontender, nondistended, no guarding, no rebound  Extremities: Legs dressaed and ACE wrapped  Vascular: 2+ peripheral pulses  Neurological: A/O x 3, no focal deficits  Musculoskeletal: 5/5 strength b/l upper and lower extremities  Skin: No rashes    MEDICATIONS  (STANDING):  dextrose 50% Injectable 25 Gram(s) IV Push once  dextrose 50% Injectable 12.5 Gram(s) IV Push once  dextrose 50% Injectable 25 Gram(s) IV Push once  doxycycline monohydrate Capsule 100 milliGRAM(s) Oral every 12 hours  fondaparinux Injectable 2.5 milliGRAM(s) SubCutaneous daily  glucagon  Injectable 1 milliGRAM(s) IntraMuscular once  insulin lispro (ADMELOG) corrective regimen sliding scale   SubCutaneous three times a day before meals  insulin lispro (ADMELOG) corrective regimen sliding scale   SubCutaneous at bedtime      LABS:     08-16    142  |  116<H>  |  30<H>  ----------------------------<  119<H>  4.1   |  23  |  0.89    Ca    9.1      16 Aug 2023 08:07      Urinalysis Basic - ( 16 Aug 2023 08:07 )    Color: x / Appearance: x / SG: x / pH: x  Gluc: 119 mg/dL / Ketone: x  / Bili: x / Urobili: x   Blood: x / Protein: x / Nitrite: x   Leuk Esterase: x / RBC: x / WBC x   Sq Epi: x / Non Sq Epi: x / Bacteria: x    Specimen Source .Blood None   08-12 @ 16:49  Culture Results  No growth at 4 days    CAPILLARY BLOOD GLUCOSE    POCT Blood Glucose.: 190 mg/dL (17 Aug 2023 12:20)  POCT Blood Glucose.: 117 mg/dL (17 Aug 2023 08:24)  POCT Blood Glucose.: 147 mg/dL (16 Aug 2023 21:19)  POCT Blood Glucose.: 216 mg/dL (16 Aug 2023 17:18)

## 2023-08-17 NOTE — DISCHARGE NOTE PROVIDER - NSDCFUSCHEDAPPT_GEN_ALL_CORE_FT
[facility]  [facility] [address] [facility]  Scheduled Appointment: 11/03/2023

## 2023-08-17 NOTE — DISCHARGE NOTE PROVIDER - HOSPITAL COURSE
Pt admitted with Severe stasis dermaitits, lymphedema, cellulitis of both legs and Sepsis. Seen BY ID, vascular surgery, wound care. Treated with vanco and zosyn. Then changed to PO doxy. Wound care cntinued. Legs improved. Worked with PT. THey recommended rehab but pt. refused. Discharge to home with home PT

## 2023-08-18 VITALS
DIASTOLIC BLOOD PRESSURE: 55 MMHG | RESPIRATION RATE: 18 BRPM | OXYGEN SATURATION: 99 % | SYSTOLIC BLOOD PRESSURE: 125 MMHG | HEART RATE: 82 BPM | TEMPERATURE: 98 F

## 2023-08-18 LAB
GLUCOSE BLDC GLUCOMTR-MCNC: 141 MG/DL — HIGH (ref 70–99)
GLUCOSE BLDC GLUCOMTR-MCNC: 148 MG/DL — HIGH (ref 70–99)

## 2023-08-18 PROCEDURE — 99239 HOSP IP/OBS DSCHRG MGMT >30: CPT

## 2023-08-18 RX ADMIN — FONDAPARINUX SODIUM 2.5 MILLIGRAM(S): 2.5 INJECTION, SOLUTION SUBCUTANEOUS at 09:27

## 2023-08-18 RX ADMIN — Medication 100 MILLIGRAM(S): at 09:26

## 2023-08-21 ENCOUNTER — TRANSCRIPTION ENCOUNTER (OUTPATIENT)
Age: 73
End: 2023-08-21

## 2023-08-22 ENCOUNTER — NON-APPOINTMENT (OUTPATIENT)
Age: 73
End: 2023-08-22

## 2023-08-23 DIAGNOSIS — Z85.038 PERSONAL HISTORY OF OTHER MALIGNANT NEOPLASM OF LARGE INTESTINE: ICD-10-CM

## 2023-08-23 DIAGNOSIS — Z85.118 PERSONAL HISTORY OF OTHER MALIGNANT NEOPLASM OF BRONCHUS AND LUNG: ICD-10-CM

## 2023-08-23 DIAGNOSIS — E87.20 ACIDOSIS, UNSPECIFIED: ICD-10-CM

## 2023-08-23 DIAGNOSIS — A41.9 SEPSIS, UNSPECIFIED ORGANISM: ICD-10-CM

## 2023-08-23 DIAGNOSIS — I89.0 LYMPHEDEMA, NOT ELSEWHERE CLASSIFIED: ICD-10-CM

## 2023-08-23 DIAGNOSIS — L03.115 CELLULITIS OF RIGHT LOWER LIMB: ICD-10-CM

## 2023-08-23 DIAGNOSIS — E87.5 HYPERKALEMIA: ICD-10-CM

## 2023-08-23 DIAGNOSIS — Z79.84 LONG TERM (CURRENT) USE OF ORAL HYPOGLYCEMIC DRUGS: ICD-10-CM

## 2023-08-23 DIAGNOSIS — E11.9 TYPE 2 DIABETES MELLITUS WITHOUT COMPLICATIONS: ICD-10-CM

## 2023-08-23 DIAGNOSIS — Z86.79 PERSONAL HISTORY OF OTHER DISEASES OF THE CIRCULATORY SYSTEM: ICD-10-CM

## 2023-08-23 DIAGNOSIS — I87.2 VENOUS INSUFFICIENCY (CHRONIC) (PERIPHERAL): ICD-10-CM

## 2023-08-23 DIAGNOSIS — E87.1 HYPO-OSMOLALITY AND HYPONATREMIA: ICD-10-CM

## 2023-08-23 DIAGNOSIS — Z88.1 ALLERGY STATUS TO OTHER ANTIBIOTIC AGENTS STATUS: ICD-10-CM

## 2023-08-24 ENCOUNTER — OUTPATIENT (OUTPATIENT)
Dept: OUTPATIENT SERVICES | Facility: HOSPITAL | Age: 73
LOS: 1 days | End: 2023-08-24
Payer: MEDICARE

## 2023-08-24 DIAGNOSIS — I89.0 LYMPHEDEMA, NOT ELSEWHERE CLASSIFIED: ICD-10-CM

## 2023-08-24 DIAGNOSIS — S91.114A LACERATION WITHOUT FOREIGN BODY OF RIGHT LESSER TOE(S) WITHOUT DAMAGE TO NAIL, INITIAL ENCOUNTER: ICD-10-CM

## 2023-08-24 DIAGNOSIS — I73.9 PERIPHERAL VASCULAR DISEASE, UNSPECIFIED: ICD-10-CM
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
## 2023-08-24 DIAGNOSIS — L97.828 NON-PRESSURE CHRONIC ULCER OF OTHER PART OF LEFT LOWER LEG WITH OTHER SPECIFIED SEVERITY: ICD-10-CM

## 2023-08-24 DIAGNOSIS — G60.9 HEREDITARY AND IDIOPATHIC NEUROPATHY, UNSPECIFIED: ICD-10-CM
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
## 2023-08-24 DIAGNOSIS — I10 ESSENTIAL (PRIMARY) HYPERTENSION: ICD-10-CM
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
## 2023-08-24 DIAGNOSIS — L97.528 NON-PRESSURE CHRONIC ULCER OF OTHER PART OF LEFT FOOT WITH OTHER SPECIFIED SEVERITY: ICD-10-CM

## 2023-08-24 LAB — GLUCOSE BLDC GLUCOMTR-MCNC: 141 MG/DL — HIGH (ref 70–99)

## 2023-08-24 PROCEDURE — 82962 GLUCOSE BLOOD TEST: CPT

## 2023-08-24 PROCEDURE — 99215 OFFICE O/P EST HI 40 MIN: CPT

## 2023-08-25 ENCOUNTER — TRANSCRIPTION ENCOUNTER (OUTPATIENT)
Age: 73
End: 2023-08-25

## 2023-08-25 ENCOUNTER — INPATIENT (INPATIENT)
Facility: HOSPITAL | Age: 73
LOS: 2 days | Discharge: NURSING, REHAB OR LONG-TERM CARE | DRG: 683 | End: 2023-08-28
Attending: FAMILY MEDICINE | Admitting: FAMILY MEDICINE
Payer: MEDICARE

## 2023-08-25 VITALS
RESPIRATION RATE: 20 BRPM | HEART RATE: 86 BPM | HEIGHT: 68 IN | OXYGEN SATURATION: 97 % | TEMPERATURE: 98 F | SYSTOLIC BLOOD PRESSURE: 145 MMHG | DIASTOLIC BLOOD PRESSURE: 54 MMHG

## 2023-08-25 DIAGNOSIS — N17.9 ACUTE KIDNEY FAILURE, UNSPECIFIED: ICD-10-CM

## 2023-08-25 LAB
ALBUMIN SERPL ELPH-MCNC: 3.2 G/DL — LOW (ref 3.3–5)
ALP SERPL-CCNC: 88 U/L — SIGNIFICANT CHANGE UP (ref 40–120)
ALT FLD-CCNC: 36 U/L — SIGNIFICANT CHANGE UP (ref 12–78)
ANION GAP SERPL CALC-SCNC: 10 MMOL/L — SIGNIFICANT CHANGE UP (ref 5–17)
APPEARANCE UR: CLEAR — SIGNIFICANT CHANGE UP
APTT BLD: 34.5 SEC — SIGNIFICANT CHANGE UP (ref 24.5–35.6)
AST SERPL-CCNC: 29 U/L — SIGNIFICANT CHANGE UP (ref 15–37)
BACTERIA # UR AUTO: ABNORMAL
BASOPHILS # BLD AUTO: 0.08 K/UL — SIGNIFICANT CHANGE UP (ref 0–0.2)
BASOPHILS NFR BLD AUTO: 0.8 % — SIGNIFICANT CHANGE UP (ref 0–2)
BILIRUB SERPL-MCNC: 1 MG/DL — SIGNIFICANT CHANGE UP (ref 0.2–1.2)
BILIRUB UR-MCNC: NEGATIVE — SIGNIFICANT CHANGE UP
BUN SERPL-MCNC: 96 MG/DL — HIGH (ref 7–23)
CALCIUM SERPL-MCNC: 9.5 MG/DL — SIGNIFICANT CHANGE UP (ref 8.5–10.1)
CHLORIDE SERPL-SCNC: 104 MMOL/L — SIGNIFICANT CHANGE UP (ref 96–108)
CO2 SERPL-SCNC: 15 MMOL/L — LOW (ref 22–31)
COLOR SPEC: YELLOW — SIGNIFICANT CHANGE UP
CREAT SERPL-MCNC: 3.06 MG/DL — HIGH (ref 0.5–1.3)
DIFF PNL FLD: ABNORMAL
EGFR: 16 ML/MIN/1.73M2 — LOW
EOSINOPHIL # BLD AUTO: 0.2 K/UL — SIGNIFICANT CHANGE UP (ref 0–0.5)
EOSINOPHIL NFR BLD AUTO: 1.9 % — SIGNIFICANT CHANGE UP (ref 0–6)
EPI CELLS # UR: SIGNIFICANT CHANGE UP
GLUCOSE BLDC GLUCOMTR-MCNC: 120 MG/DL — HIGH (ref 70–99)
GLUCOSE SERPL-MCNC: 140 MG/DL — HIGH (ref 70–99)
GLUCOSE UR QL: NEGATIVE — SIGNIFICANT CHANGE UP
HCT VFR BLD CALC: 39.5 % — SIGNIFICANT CHANGE UP (ref 34.5–45)
HGB BLD-MCNC: 14.2 G/DL — SIGNIFICANT CHANGE UP (ref 11.5–15.5)
IMM GRANULOCYTES NFR BLD AUTO: 0.3 % — SIGNIFICANT CHANGE UP (ref 0–0.9)
INR BLD: 1.15 RATIO — SIGNIFICANT CHANGE UP (ref 0.85–1.18)
KETONES UR-MCNC: ABNORMAL
LACTATE SERPL-SCNC: 1.9 MMOL/L — SIGNIFICANT CHANGE UP (ref 0.7–2)
LACTATE SERPL-SCNC: 2.8 MMOL/L — HIGH (ref 0.7–2)
LEUKOCYTE ESTERASE UR-ACNC: ABNORMAL
LYMPHOCYTES # BLD AUTO: 1.66 K/UL — SIGNIFICANT CHANGE UP (ref 1–3.3)
LYMPHOCYTES # BLD AUTO: 15.6 % — SIGNIFICANT CHANGE UP (ref 13–44)
MCHC RBC-ENTMCNC: 28.4 PG — SIGNIFICANT CHANGE UP (ref 27–34)
MCHC RBC-ENTMCNC: 35.9 GM/DL — SIGNIFICANT CHANGE UP (ref 32–36)
MCV RBC AUTO: 79 FL — LOW (ref 80–100)
MONOCYTES # BLD AUTO: 0.98 K/UL — HIGH (ref 0–0.9)
MONOCYTES NFR BLD AUTO: 9.2 % — SIGNIFICANT CHANGE UP (ref 2–14)
NEUTROPHILS # BLD AUTO: 7.66 K/UL — HIGH (ref 1.8–7.4)
NEUTROPHILS NFR BLD AUTO: 72.2 % — SIGNIFICANT CHANGE UP (ref 43–77)
NITRITE UR-MCNC: POSITIVE
PH UR: 5 — SIGNIFICANT CHANGE UP (ref 5–8)
PLATELET # BLD AUTO: 193 K/UL — SIGNIFICANT CHANGE UP (ref 150–400)
POTASSIUM SERPL-MCNC: 4.4 MMOL/L — SIGNIFICANT CHANGE UP (ref 3.5–5.3)
POTASSIUM SERPL-SCNC: 4.4 MMOL/L — SIGNIFICANT CHANGE UP (ref 3.5–5.3)
PROT SERPL-MCNC: 7.2 GM/DL — SIGNIFICANT CHANGE UP (ref 6–8.3)
PROT UR-MCNC: 30 MG/DL
PROTHROM AB SERPL-ACNC: 12.9 SEC — SIGNIFICANT CHANGE UP (ref 9.5–13)
RBC # BLD: 5 M/UL — SIGNIFICANT CHANGE UP (ref 3.8–5.2)
RBC # FLD: 14.6 % — HIGH (ref 10.3–14.5)
RBC CASTS # UR COMP ASSIST: SIGNIFICANT CHANGE UP /HPF (ref 0–4)
SODIUM SERPL-SCNC: 129 MMOL/L — LOW (ref 135–145)
SP GR SPEC: 1.02 — SIGNIFICANT CHANGE UP (ref 1.01–1.02)
TROPONIN I, HIGH SENSITIVITY RESULT: 28 NG/L — SIGNIFICANT CHANGE UP
UROBILINOGEN FLD QL: NEGATIVE — SIGNIFICANT CHANGE UP
WBC # BLD: 10.61 K/UL — HIGH (ref 3.8–10.5)
WBC # FLD AUTO: 10.61 K/UL — HIGH (ref 3.8–10.5)
WBC UR QL: ABNORMAL /HPF (ref 0–5)

## 2023-08-25 PROCEDURE — 81001 URINALYSIS AUTO W/SCOPE: CPT

## 2023-08-25 PROCEDURE — 99223 1ST HOSP IP/OBS HIGH 75: CPT

## 2023-08-25 PROCEDURE — 97116 GAIT TRAINING THERAPY: CPT | Mod: GP

## 2023-08-25 PROCEDURE — 82962 GLUCOSE BLOOD TEST: CPT

## 2023-08-25 PROCEDURE — 99285 EMERGENCY DEPT VISIT HI MDM: CPT | Mod: FS

## 2023-08-25 PROCEDURE — 87077 CULTURE AEROBIC IDENTIFY: CPT

## 2023-08-25 PROCEDURE — 83605 ASSAY OF LACTIC ACID: CPT

## 2023-08-25 PROCEDURE — 85027 COMPLETE CBC AUTOMATED: CPT

## 2023-08-25 PROCEDURE — 71045 X-RAY EXAM CHEST 1 VIEW: CPT

## 2023-08-25 PROCEDURE — 97163 PT EVAL HIGH COMPLEX 45 MIN: CPT | Mod: GP

## 2023-08-25 PROCEDURE — 36415 COLL VENOUS BLD VENIPUNCTURE: CPT

## 2023-08-25 PROCEDURE — 87186 SC STD MICRODIL/AGAR DIL: CPT

## 2023-08-25 PROCEDURE — 87086 URINE CULTURE/COLONY COUNT: CPT

## 2023-08-25 PROCEDURE — 80048 BASIC METABOLIC PNL TOTAL CA: CPT

## 2023-08-25 PROCEDURE — 71045 X-RAY EXAM CHEST 1 VIEW: CPT | Mod: 26

## 2023-08-25 PROCEDURE — 93010 ELECTROCARDIOGRAM REPORT: CPT

## 2023-08-25 RX ORDER — TRAMADOL HYDROCHLORIDE 50 MG/1
50 TABLET ORAL EVERY 6 HOURS
Refills: 0 | Status: DISCONTINUED | OUTPATIENT
Start: 2023-08-25 | End: 2023-08-28

## 2023-08-25 RX ORDER — ACETAMINOPHEN 500 MG
650 TABLET ORAL EVERY 6 HOURS
Refills: 0 | Status: DISCONTINUED | OUTPATIENT
Start: 2023-08-25 | End: 2023-08-28

## 2023-08-25 RX ORDER — DEXTROSE 50 % IN WATER 50 %
15 SYRINGE (ML) INTRAVENOUS ONCE
Refills: 0 | Status: DISCONTINUED | OUTPATIENT
Start: 2023-08-25 | End: 2023-08-28

## 2023-08-25 RX ORDER — INSULIN LISPRO 100/ML
VIAL (ML) SUBCUTANEOUS
Refills: 0 | Status: DISCONTINUED | OUTPATIENT
Start: 2023-08-25 | End: 2023-08-28

## 2023-08-25 RX ORDER — SENNA PLUS 8.6 MG/1
2 TABLET ORAL AT BEDTIME
Refills: 0 | Status: DISCONTINUED | OUTPATIENT
Start: 2023-08-25 | End: 2023-08-28

## 2023-08-25 RX ORDER — SODIUM CHLORIDE 9 MG/ML
1000 INJECTION INTRAMUSCULAR; INTRAVENOUS; SUBCUTANEOUS
Refills: 0 | Status: DISCONTINUED | OUTPATIENT
Start: 2023-08-25 | End: 2023-08-27

## 2023-08-25 RX ORDER — SODIUM CHLORIDE 9 MG/ML
1000 INJECTION, SOLUTION INTRAVENOUS
Refills: 0 | Status: DISCONTINUED | OUTPATIENT
Start: 2023-08-25 | End: 2023-08-28

## 2023-08-25 RX ORDER — DEXTROSE 50 % IN WATER 50 %
25 SYRINGE (ML) INTRAVENOUS ONCE
Refills: 0 | Status: DISCONTINUED | OUTPATIENT
Start: 2023-08-25 | End: 2023-08-28

## 2023-08-25 RX ORDER — INSULIN GLARGINE 100 [IU]/ML
2 INJECTION, SOLUTION SUBCUTANEOUS AT BEDTIME
Refills: 0 | Status: DISCONTINUED | OUTPATIENT
Start: 2023-08-25 | End: 2023-08-28

## 2023-08-25 RX ORDER — LANOLIN ALCOHOL/MO/W.PET/CERES
3 CREAM (GRAM) TOPICAL AT BEDTIME
Refills: 0 | Status: DISCONTINUED | OUTPATIENT
Start: 2023-08-25 | End: 2023-08-28

## 2023-08-25 RX ORDER — DEXTROSE 50 % IN WATER 50 %
12.5 SYRINGE (ML) INTRAVENOUS ONCE
Refills: 0 | Status: DISCONTINUED | OUTPATIENT
Start: 2023-08-25 | End: 2023-08-28

## 2023-08-25 RX ORDER — ONDANSETRON 8 MG/1
4 TABLET, FILM COATED ORAL EVERY 8 HOURS
Refills: 0 | Status: DISCONTINUED | OUTPATIENT
Start: 2023-08-25 | End: 2023-08-28

## 2023-08-25 RX ORDER — FONDAPARINUX SODIUM 2.5 MG/.5ML
2.5 INJECTION, SOLUTION SUBCUTANEOUS DAILY
Refills: 0 | Status: DISCONTINUED | OUTPATIENT
Start: 2023-08-25 | End: 2023-08-28

## 2023-08-25 RX ORDER — CYCLOBENZAPRINE HYDROCHLORIDE 10 MG/1
5 TABLET, FILM COATED ORAL EVERY 8 HOURS
Refills: 0 | Status: DISCONTINUED | OUTPATIENT
Start: 2023-08-25 | End: 2023-08-28

## 2023-08-25 RX ORDER — SODIUM CHLORIDE 9 MG/ML
1000 INJECTION INTRAMUSCULAR; INTRAVENOUS; SUBCUTANEOUS ONCE
Refills: 0 | Status: DISCONTINUED | OUTPATIENT
Start: 2023-08-25 | End: 2023-08-25

## 2023-08-25 RX ORDER — GLUCAGON INJECTION, SOLUTION 0.5 MG/.1ML
1 INJECTION, SOLUTION SUBCUTANEOUS ONCE
Refills: 0 | Status: DISCONTINUED | OUTPATIENT
Start: 2023-08-25 | End: 2023-08-28

## 2023-08-25 RX ORDER — SODIUM CHLORIDE 9 MG/ML
1000 INJECTION INTRAMUSCULAR; INTRAVENOUS; SUBCUTANEOUS ONCE
Refills: 0 | Status: COMPLETED | OUTPATIENT
Start: 2023-08-25 | End: 2023-08-25

## 2023-08-25 RX ADMIN — INSULIN GLARGINE 2 UNIT(S): 100 INJECTION, SOLUTION SUBCUTANEOUS at 23:54

## 2023-08-25 RX ADMIN — SODIUM CHLORIDE 100 MILLILITER(S): 9 INJECTION INTRAMUSCULAR; INTRAVENOUS; SUBCUTANEOUS at 19:56

## 2023-08-25 RX ADMIN — TRAMADOL HYDROCHLORIDE 50 MILLIGRAM(S): 50 TABLET ORAL at 21:21

## 2023-08-25 RX ADMIN — TRAMADOL HYDROCHLORIDE 50 MILLIGRAM(S): 50 TABLET ORAL at 19:56

## 2023-08-25 RX ADMIN — SODIUM CHLORIDE 1000 MILLILITER(S): 9 INJECTION INTRAMUSCULAR; INTRAVENOUS; SUBCUTANEOUS at 15:47

## 2023-08-25 RX ADMIN — CYCLOBENZAPRINE HYDROCHLORIDE 5 MILLIGRAM(S): 10 TABLET, FILM COATED ORAL at 19:56

## 2023-08-25 NOTE — ED ADULT NURSE NOTE - OBJECTIVE STATEMENT
Pt arrives to ED from home complaining of generalized weakness, malaise, loose stools for a few days.  Pt just finished PO antibx for lower extremity wound cellulitis. pt also reports chills but denies known fevers.

## 2023-08-25 NOTE — ED ADULT NURSE REASSESSMENT NOTE - BP NONINVASIVE SYSTOLIC (MM HG)
Problem: Patient Care Overview  Goal: Plan of Care Review  Outcome: Ongoing (interventions implemented as appropriate)  Flowsheets  Taken 4/29/2020 2330  Progress: no change  Taken 4/30/2020 2001  Plan of Care Reviewed With: patient  Taken 4/30/2020 2111  Outcome Summary: Pt resting on this shift.  Vital signs stable at this time.  Pt reports back pain and headache as well as nausea.  PRN medication will be given.  Will continue to monitor this pt.      111

## 2023-08-25 NOTE — H&P ADULT - NSHPLABSRESULTS_GEN_ALL_CORE
.                     14.2   10.61 )-----------( 193      ( 25 Aug 2023 14:31 )             39.5       129<L>  |  104  |  96<H>  ----------------------------<  140<H>  4.4   |  15<L>  |  3.06<H>    Ca    9.5      25 Aug 2023 14:31    TPro  7.2  /  Alb  3.2<L>  /  TBili  1.0  /  DBili  x   /  AST  29  /  ALT  36  /  AlkPhos  88  08-25    Urinalysis Basic - ( 25 Aug 2023 14:31 )  Color: x / Appearance: x / SG: x / pH: x  Gluc: 140 mg/dL / Ketone: x  / Bili: x / Urobili: x   Blood: x / Protein: x / Nitrite: x   Leuk Esterase: x / RBC: x / WBC x   Sq Epi: x / Non Sq Epi: x / Bacteria: x    PT/INR - ( 25 Aug 2023 14:31 )   PT: 12.9 sec;   INR: 1.15 ratio    PTT - ( 25 Aug 2023 14:31 )  PTT:34.5 sec .                     14.2   10.61 )-----------( 193      ( 25 Aug 2023 14:31 )             39.5     Lactate, Blood (08.25.23 @ 14:31)   Lactate, Blood: 2.8 mmol/L    129<L>  |  104  |  96<H>  ----------------------------<  140<H>  4.4   |  15<L>  |  3.06<H>    Ca    9.5      25 Aug 2023 14:31    TPro  7.2  /  Alb  3.2<L>  /  TBili  1.0  /  DBili  x   /  AST  29  /  ALT  36  /  AlkPhos  88  08-25    Urinalysis Basic - ( 25 Aug 2023 14:31 )  Color: x / Appearance: x / SG: x / pH: x  Gluc: 140 mg/dL / Ketone: x  / Bili: x / Urobili: x   Blood: x / Protein: x / Nitrite: x   Leuk Esterase: x / RBC: x / WBC x   Sq Epi: x / Non Sq Epi: x / Bacteria: x    PT/INR - ( 25 Aug 2023 14:31 )   PT: 12.9 sec;   INR: 1.15 ratio    PTT - ( 25 Aug 2023 14:31 )  PTT:34.5 sec

## 2023-08-25 NOTE — ED ADULT TRIAGE NOTE - CHIEF COMPLAINT QUOTE
Pt arrives to ED from home complaining of generalized weakness, malaise, loose stools for a few days.  Pt just finished PO antibx for lower extremity wound.

## 2023-08-25 NOTE — H&P ADULT - HISTORY OF PRESENT ILLNESS
t arrives to ED from home complaining of generalized weakness, malaise, loose stools for a few days.  Pt just finished PO doxycycline  for lower extremity wound and cellulitis. Rehab was recommended bu pt. refused.       Active Problems  Chronic stasis dermatitis (454.1) (I87.2)  Colon cancer (153.9) (C18.9)  HTN (hypertension), benign (401.1) (I10)  Hyperkalemia (276.7) (E87.5)  Lymphedema of both lower extremities (457.1) (I89.0)  Malignant neoplasm of upper lobe of left lung (162.3) (C34.12)  Metabolic syndrome (277.7) (E88.81)  Obesity, morbid (more than 100 lbs over ideal weight or BMI > 40) (278.01) (E66.01)  Prophylactic vaccination against streptococcus pneumoniae and influenza (V06.6) (Z23)  Type 2 diabetes mellitus without complication, without long-term current use of insulin  (250.00) (E11.9)      Past Medical History  History of Cellulitis of right lower leg (682.6) (L03.115)           Surgical History  History of Cholecystectomy  History of Hysterectomy (V88.01)  History of Laminectomy Lumbar  History of Lung lobectomy  History of Partial Colectomy  History of Rotator Cuff Repair              Family History  Family history of Bladder carcinoma : Mother  Family history of coronary artery disease (V17.3) (Z82.49) : Father       Social History  Denies alcohol consumption (V49.89) (Z78.9)  Does not use illicit drugs (V49.89) (Z78.9)  Has no children  Never smoked cigarettes (V49.89) (Z78.9)  Single       Current Meds  Clobetasol Propionate 0.05 % External Cream; APPLY SPARINGLY TO AFFECTED  AREA(S) TWICE DAILY  metFORMIN HCl - 500 MG Oral Tablet; TAKE TWO TABLETS BY MOUTH TWICE A DAY           Allergies    Keflex TABS 71 y/o female with PMH chronic lymphadema, DM2, HTN, lung cancer / colon cancer, recent admission 8/12-8/17 for RLE cellulitis presents to ED c/o nonspecific, generalized weakness x 3 days. Stated she feels as if "my body is not connected." Denies any specific pain. Pt just finished PO doxycycline  for lower extremity wound and cellulitis. Discharged from . Rehab was recommended bu pt. refused.. Endorses loss of appetite and diarrhea since starting abx, still with loose stools. Daughter endorsed a mild productive cough in the morning. Pt denies fever, chills, cp ,sob, n/v/abd pain, dysuria, extremity numbness/tingling. No recent trauma or use of AC. Saw wound care specialist day PTA.       Active Problems  Chronic stasis dermatitis (454.1) (I87.2)  Colon cancer (153.9) (C18.9)  HTN (hypertension), benign (401.1) (I10)  Hyperkalemia (276.7) (E87.5)  Lymphedema of both lower extremities (457.1) (I89.0)  Malignant neoplasm of upper lobe of left lung (162.3) (C34.12)  Metabolic syndrome (277.7) (E88.81)  Obesity, morbid (more than 100 lbs over ideal weight or BMI > 40) (278.01) (E66.01)  Prophylactic vaccination against streptococcus pneumoniae and influenza (V06.6) (Z23)  Type 2 diabetes mellitus without complication, without long-term current use of insulin  (250.00) (E11.9)      Past Medical History  History of Cellulitis of right lower leg (682.6) (L03.115)           Surgical History  History of Cholecystectomy  History of Hysterectomy (V88.01)  History of Laminectomy Lumbar  History of Lung lobectomy  History of Partial Colectomy  History of Rotator Cuff Repair              Family History  Family history of Bladder carcinoma : Mother  Family history of coronary artery disease (V17.3) (Z82.49) : Father       Social History  Denies alcohol consumption (V49.89) (Z78.9)  Does not use illicit drugs (V49.89) (Z78.9)  Has no children  Never smoked cigarettes (V49.89) (Z78.9)  Single       Current Meds  Clobetasol Propionate 0.05 % External Cream; APPLY SPARINGLY TO AFFECTED  AREA(S) TWICE DAILY  metFORMIN HCl - 500 MG Oral Tablet; TAKE TWO TABLETS BY MOUTH TWICE A DAY           Allergies    Keflex TABS

## 2023-08-25 NOTE — H&P ADULT - ASSESSMENT
71 y/o female with PMH chronic lymphadema, DM2, HTN, lung cancer / colon cancer, recent admission 8/12-8/17 for RLE cellulitis presents to ED c/o nonspecific, generalized weakness x 3 days.     Assessment  ADRIAN  Pre-renal azotemia  Dehydration with lactic acidosis  Type 2 DM  HTN  Lymphedema  Severe Stasis Dermatitis  Hyponatremia    Plan:  Admit to medical bed  IVF  Wound care  follow lactate  Nephrology consult  Screen for c diff  PT  insulin coverage  D/c  metformin with tendency towards lactic acidosis  Will need rehab on d/c  fondaparinux for dvt prophylaxis  full code

## 2023-08-25 NOTE — ED PROVIDER NOTE - NSICDXPASTMEDICALHX_GEN_ALL_CORE_FT
PAST MEDICAL HISTORY:  Colon cancer     Hypertension     Lung cancer     Lymphatic edema     Type 2 diabetes mellitus

## 2023-08-25 NOTE — ED PROVIDER NOTE - CLINICAL SUMMARY MEDICAL DECISION MAKING FREE TEXT BOX
73 y/o recently admitted for cellulitis discharged 1 week ago presents with generalized weakness x 3 days a/w diarrhea and a mild cough. VSS, PE unremarkable. Plan for labs, EKG, CXR, urine, c-dif, will get in touch with PCP Dr. Dean. 71 y/o recently admitted for cellulitis discharged 1 week ago presents with generalized weakness x 3 days a/w diarrhea and a mild cough. VSS, PE unremarkable. Plan for labs, EKG, CXR, urine, c-dif, will get in touch with PCP Dr. Dean.    See Progress Notes for ED attdg MD note, case progression incl. labs review, d/w PCP & disposition

## 2023-08-25 NOTE — ED PROVIDER NOTE - OBJECTIVE STATEMENT
73 y/o female with PMH chronic lymphadema, DM2, HTN, lung cancer / colon cancer, recent admission 8/12-8/17 for RLE cellulitis presents to ED c/o nonspecific, generalized weakness x 3 days. States she feels as if "my body is not connected." Denies any specific pain. Today is her last day of PO doxycycline. Endorses loss of appetite and diarrhea since starting abx, still with loose stools. Daughter endorses a mild productive cough in the morning. Pt denies fever, chills, cp ,sob, n/v/abd pain, dysuria, extremity numbness/tingling. No recent trauma or use of AC. Saw wound care specialist yesterday.

## 2023-08-25 NOTE — PROVIDER CONTACT NOTE (CRITICAL VALUE NOTIFICATION) - NS PROVIDER READ BACK TO LAB
Pt's daughter, Beatris, was informed of pt's hb level and given lab appt for 2 weeks: 5/23/17 at 9am.    ----- Message from Tyson Arredondo MD sent at 5/9/2017 12:09 PM CDT -----  Hb is at an excellent level, 11.3. pls inform daughter    
yes

## 2023-08-25 NOTE — H&P ADULT - NSHPPHYSICALEXAM_GEN_ALL_CORE
Vital Signs Last 24 Hrs  T(C): 36.2 (25 Aug 2023 14:40), Max: 36.4 (25 Aug 2023 13:35)  T(F): 97.1 (25 Aug 2023 14:40), Max: 97.6 (25 Aug 2023 13:35)  HR: 86 (25 Aug 2023 13:35) (86 - 86)  BP: 145/54 (25 Aug 2023 13:35) (145/54 - 145/54)  BP(mean): --  RR: 20 (25 Aug 2023 13:35) (20 - 20)  SpO2: 97% (25 Aug 2023 13:35) (97% - 97%)    Parameters below as of 25 Aug 2023 13:35  Patient On (Oxygen Delivery Method): room air      Constitutional: NAD, awake and alert, well-developed  HEENT: PERRLA, EOMI, Pharynx Clear  Neck: Supple, No JVD, No Lymphadenopathy  Respiratory: Breath sounds are clear bilaterally, No wheezing, rales or rhonchi  Cardiovascular: S1 and S2, regular rate and rhythm, no Murmurs, rubs or gallops  Gastrointestinal: Bowel Sounds present, soft, nontender. No Hepatosplenomegaly. No masses  Extremities: severe lymphedema, stasis dermatitis   Vascular: 2+ peripheral pulses  Neurological: A/O x 3, no focal deficits  Musculoskeletal: FROM upper and lower extremities  Skin: No rashes

## 2023-08-25 NOTE — ED ADULT NURSE REASSESSMENT NOTE - NS ED NURSE REASSESS COMMENT FT1
pt placed on bed pan w/ 4 assist.
change of shift assessment. pt AAOX4, clear speech, VS stable, afebrile. pt reports 10/10 severe pain on bilateral lower extremities. B/L lower extremities +4 edema. L lower extremity wrapped with ACE band. R lower extremity reddened with wounds.

## 2023-08-25 NOTE — ED ADULT NURSE NOTE - NSFALLHARMRISKINTERV_ED_ALL_ED

## 2023-08-25 NOTE — ED PROVIDER NOTE - INPATIENT RECORD SUMMARY
recent  admission 8/12-8/17 for RLE cellulitis with sepsis treated with IV Vanco/Zosyn, d/c on po Doxycycline with outpatient wound care follow up

## 2023-08-25 NOTE — ED PROVIDER NOTE - PHYSICAL EXAMINATION
Constitutional: NAD AAOx3  Eyes: PERRLA EOMI  Head: Normocephalic atraumatic  Mouth: MMM  Cardiac: regular rate normal peripheral pulses no LE swelling  Resp: unlabored breathing clear b/l   GI: Abd s/nt/nd  MSK: dressings in place BLE  Neuro: grossly normal and intact  Skin: No visible rashes

## 2023-08-25 NOTE — ED PROVIDER NOTE - PROGRESS NOTE DETAILS
Zeb Hughes for attending Dr. Zambrano: 73 y/o female with a PMHx of colon cancer, DM2, HTN, lung cancer, lymphatic edema, recent  admission 8/12-8/17 for RLE cellulitis with sepsis treated with IV Vanco/Zosyn abd d/c with Doxycycline with outpatient wound care follow up presents to the ED c/o severe generalized weakness, failure to thrive. Pt stopped her Doxycyline on 8/22 which was 2 days prior to course completion due to nausea. Pt c/o malaise, generalized weakness and diarrhea. Pt with poor PO intake. Pt spoke with Dr. Dean and was advised to come to the ED for evaluation. On exam, mildly dry mucosa Cardiac: RRR, normal radial pulse. Respiratory: lungs clear, normal respirations. Abd: mild periumbilical tenderness, no guarding, rebound or mass. MSK: MAEx4, b/l LE wrapped in dressings (wound care eval yesterday pt informed ulcerations improving). Neuro intact. Skin: No tactile warmth. Labs reviewed, CXR pending. Slightly elevated WBC count, pt in prerenal ADRIAN with BUN 96, Creatinine 3.06 (from 0.89 on 8/16), elevated lactate 2.8. Fluids ordered, 2nd lactate ordered for 5:30pm. Discussed case with pt's PCP Dr. Dean. States that pt was supposed to go to rehab once discharged but refused. Pt needs rehab but should be admitted with ADRIAN and elevated lactate. Will come evaluate patient and admit. - Anu Martinez PA-C Zeb Hughes for attending Dr. Zambrano: 73 y/o female with a PMHx of colon cancer, DM2, HTN, lung cancer, lymphatic edema, recent  admission 8/12-8/17 for RLE cellulitis with sepsis treated with IV Vanco/Zosyn, d/c on po Doxycycline with outpatient wound care follow up, BIBA from home to the ED c/o severe generalized weakness, failure to thrive. Pt stopped her Doxycyline on 8/22 which was 2 days prior to course completion due to nausea. Pt c/o malaise, generalized weakness and diarrhea. Pt with poor PO intake. Pt spoke with PCP Dr. Dean and was advised to go to the ED for evaluation. On exam, + ill-appearing though non-toxic, no respiratory distress. ENT: O/P clear, mildly dry mucosa. Cardiac: RRR, normal radial pulse. Respiratory: lungs clear, normal respirations. Abd: mild periumbilical tenderness, no guarding, rebound nor mass. MSK: MAEx4, b/l LE wrapped in dressings (wound care eval yesterday: pt informed ulcerations improving). Neuro intact. Skin: No tactile warmth.

## 2023-08-25 NOTE — ED PROVIDER NOTE - NS ED ROS FT
Constitutional: No fever, no chills, +weakness  Eyes: No visual changes  HEENT: No throat pain  CV: No chest pain  Resp: No SOB, +cough  GI: No abdominal pain, no nausea, no vomiting  : No dysuria  MSK: No musculoskeletal pain  Skin: No rash  Neuro: No headache

## 2023-08-25 NOTE — ED PROVIDER NOTE - ATTENDING APP SHARED VISIT CONTRIBUTION OF CARE
JOAQUIM Zambrano MD, ED Attending physician:  This was a shared visit with GÓMEZ.  I reviewed and verified the documentation and independently performed the documented history/exam.

## 2023-08-26 LAB
ANION GAP SERPL CALC-SCNC: 10 MMOL/L — SIGNIFICANT CHANGE UP (ref 5–17)
BUN SERPL-MCNC: 88 MG/DL — HIGH (ref 7–23)
CALCIUM SERPL-MCNC: 8.5 MG/DL — SIGNIFICANT CHANGE UP (ref 8.5–10.1)
CHLORIDE SERPL-SCNC: 112 MMOL/L — HIGH (ref 96–108)
CO2 SERPL-SCNC: 14 MMOL/L — LOW (ref 22–31)
CREAT SERPL-MCNC: 2.2 MG/DL — HIGH (ref 0.5–1.3)
EGFR: 23 ML/MIN/1.73M2 — LOW
GLUCOSE BLDC GLUCOMTR-MCNC: 103 MG/DL — HIGH (ref 70–99)
GLUCOSE BLDC GLUCOMTR-MCNC: 135 MG/DL — HIGH (ref 70–99)
GLUCOSE BLDC GLUCOMTR-MCNC: 147 MG/DL — HIGH (ref 70–99)
GLUCOSE BLDC GLUCOMTR-MCNC: 158 MG/DL — HIGH (ref 70–99)
GLUCOSE SERPL-MCNC: 106 MG/DL — HIGH (ref 70–99)
HCT VFR BLD CALC: 32.3 % — LOW (ref 34.5–45)
HGB BLD-MCNC: 11.4 G/DL — LOW (ref 11.5–15.5)
MCHC RBC-ENTMCNC: 28.1 PG — SIGNIFICANT CHANGE UP (ref 27–34)
MCHC RBC-ENTMCNC: 35.3 GM/DL — SIGNIFICANT CHANGE UP (ref 32–36)
MCV RBC AUTO: 79.6 FL — LOW (ref 80–100)
PLATELET # BLD AUTO: 114 K/UL — LOW (ref 150–400)
POTASSIUM SERPL-MCNC: 3.7 MMOL/L — SIGNIFICANT CHANGE UP (ref 3.5–5.3)
POTASSIUM SERPL-SCNC: 3.7 MMOL/L — SIGNIFICANT CHANGE UP (ref 3.5–5.3)
RBC # BLD: 4.06 M/UL — SIGNIFICANT CHANGE UP (ref 3.8–5.2)
RBC # FLD: 14.5 % — SIGNIFICANT CHANGE UP (ref 10.3–14.5)
SODIUM SERPL-SCNC: 136 MMOL/L — SIGNIFICANT CHANGE UP (ref 135–145)
WBC # BLD: 6.5 K/UL — SIGNIFICANT CHANGE UP (ref 3.8–10.5)
WBC # FLD AUTO: 6.5 K/UL — SIGNIFICANT CHANGE UP (ref 3.8–10.5)

## 2023-08-26 PROCEDURE — 99233 SBSQ HOSP IP/OBS HIGH 50: CPT

## 2023-08-26 RX ADMIN — TRAMADOL HYDROCHLORIDE 50 MILLIGRAM(S): 50 TABLET ORAL at 11:27

## 2023-08-26 RX ADMIN — Medication 3 MILLIGRAM(S): at 21:10

## 2023-08-26 RX ADMIN — TRAMADOL HYDROCHLORIDE 50 MILLIGRAM(S): 50 TABLET ORAL at 11:12

## 2023-08-26 RX ADMIN — FONDAPARINUX SODIUM 2.5 MILLIGRAM(S): 2.5 INJECTION, SOLUTION SUBCUTANEOUS at 11:13

## 2023-08-26 RX ADMIN — SENNA PLUS 2 TABLET(S): 8.6 TABLET ORAL at 21:10

## 2023-08-26 RX ADMIN — INSULIN GLARGINE 2 UNIT(S): 100 INJECTION, SOLUTION SUBCUTANEOUS at 21:10

## 2023-08-26 RX ADMIN — Medication 1: at 16:44

## 2023-08-26 NOTE — CONSULT NOTE ADULT - ASSESSMENT
71 y/o F w/ PMH of chronic lymphadema, DM2, HTN, lung cancer / colon cancer, chronic lymphadema, p/w RLE pain. Patient states she has chronic lymphadema, but yesterday patient developed RLE pain and drainage from RLE. She walks with walker at baseline, and states that it was painful when bearing weight. Denies fever, chills, nausea, vomiting, abdominal pain, CP, SOB, abd pain, cough, runny nose, sore throat.   Pt of Dr Hernández, admitted with LE edema and infection due to chronic lymphedema, increasing Pain  labs reviewed    ADRIAN  -Appears to be pre renal w GI losses and poor intake, stabilizing w ivf  -Can convert to bicarb gtt w acidosis  -Keep even today  -Hold diuretics    Met Acidosis  IVF as above  BMP in AM    Diarrhea  -FU stool  consideration of GI    d/c with RN staff, team  Thanks, will follow

## 2023-08-26 NOTE — PATIENT PROFILE ADULT - FALL HARM RISK - HARM RISK INTERVENTIONS

## 2023-08-26 NOTE — PATIENT PROFILE ADULT - FUNCTIONAL ASSESSMENT - BASIC MOBILITY 6.
2-calculated by average/Not able to assess (calculate score using [facility] averaging method)

## 2023-08-26 NOTE — CONSULT NOTE ADULT - SUBJECTIVE AND OBJECTIVE BOX
72 chronic lymphadema, DM2, HTN, lung cancer / colon cancer, chronic lymphadema, p/w RLE pain with recent admit for RLE pain and drainage from RLE. She walks with walker at baseline, and states that it was painful when bearing weight. DC from here to home and now back with weakness and diarrhea. Reports feeling a little better today, reports completeing po abx she was dc with.    PAST MEDICAL & SURGICAL HISTORY:  Lymphatic edema      Hypertension      Type 2 diabetes mellitus      Lung cancer      Colon cancer          MEDICATIONS  (STANDING):  dextrose 5%. 1000 milliLiter(s) (50 mL/Hr) IV Continuous <Continuous>  dextrose 5%. 1000 milliLiter(s) (100 mL/Hr) IV Continuous <Continuous>  dextrose 50% Injectable 25 Gram(s) IV Push once  dextrose 50% Injectable 12.5 Gram(s) IV Push once  dextrose 50% Injectable 25 Gram(s) IV Push once  fondaparinux Injectable 2.5 milliGRAM(s) SubCutaneous daily  glucagon  Injectable 1 milliGRAM(s) IntraMuscular once  insulin glargine Injectable (LANTUS) 2 Unit(s) SubCutaneous at bedtime  insulin lispro (ADMELOG) corrective regimen sliding scale   SubCutaneous three times a day before meals  senna 2 Tablet(s) Oral at bedtime  sodium chloride 0.9%. 1000 milliLiter(s) (100 mL/Hr) IV Continuous <Continuous>    MEDICATIONS  (PRN):  acetaminophen     Tablet .. 650 milliGRAM(s) Oral every 6 hours PRN Temp greater or equal to 38C (100.4F), Mild Pain (1 - 3)  aluminum hydroxide/magnesium hydroxide/simethicone Suspension 30 milliLiter(s) Oral every 4 hours PRN Dyspepsia  cyclobenzaprine 5 milliGRAM(s) Oral every 8 hours PRN Spasm  dextrose Oral Gel 15 Gram(s) Oral once PRN Blood Glucose LESS THAN 70 milliGRAM(s)/deciliter  melatonin 3 milliGRAM(s) Oral at bedtime PRN Insomnia  ondansetron Injectable 4 milliGRAM(s) IV Push every 8 hours PRN Nausea and/or Vomiting  traMADol 50 milliGRAM(s) Oral every 6 hours PRN Moderate Pain (4 - 6)      Allergies    Keflex (Anaphylaxis)  cephalexin (Unknown)    Intolerances        SOCIAL HISTORY:  No etoh/cigg    FAMILY HISTORY:      REVIEW OF SYSTEMS:    CONSTITUTIONAL: stable weakness, fevers or chills  EYES/ENT: No visual changes;  No vertigo or throat pain   NECK: No pain or stiffness  RESPIRATORY: No cough, wheezing, hemoptysis; No shortness of breath  CARDIOVASCULAR: No chest pain or palpitations  GASTROINTESTINAL: No abdominal or epigastric pain. No nausea, vomiting, or hematemesis; No diarrhea or constipation. No melena or hematochezia.  GENITOURINARY: No dysuria, frequency or hematuria  NEUROLOGICAL: No numbness or weakness  SKIN: No itching, burning, rashes, or lesions   All other review of systems is negative unless indicated above.      T(C): , Max: 36.8 (08-25-23 @ 18:36)  T(F): , Max: 98.2 (08-25-23 @ 18:36)  HR: 77 (08-26-23 @ 08:00)  BP: 123/65 (08-26-23 @ 08:00)  BP(mean): 79 (08-26-23 @ 08:00)  RR: 16 (08-26-23 @ 08:00)  SpO2: 100% (08-26-23 @ 08:00)  Wt(kg): --    08-25 @ 07:01  -  08-26 @ 07:00  --------------------------------------------------------  IN: 0 mL / OUT: 500 mL / NET: -500 mL      Height (cm): 172.7 (08-25 @ 13:35)  Weight (kg): 113 (08-25 @ 18:36)  BMI (kg/m2): 37.9 (08-25 @ 18:36)  BSA (m2): 2.24 (08-25 @ 18:36)    PHYSICAL EXAM:    Constitutional: NAD, frail  HEENT:  MM  Neck: No LAD, No JVD  Respiratory: dist  Cardiovascular: S1 and S2  Extremities: LE Chronic changes, massive  Neurological: A/O x 3          LABS:                        11.4   6.50  )-----------( 114      ( 26 Aug 2023 05:40 )             32.3     26 Aug 2023 05:40    136    |  112    |  88     ----------------------------<  106    3.7     |  14     |  2.20   25 Aug 2023 14:31    129    |  104    |  96     ----------------------------<  140    4.4     |  15     |  3.06     Ca    8.5        26 Aug 2023 05:40  Ca    9.5        25 Aug 2023 14:31    TPro  7.2    /  Alb  3.2    /  TBili  1.0    /  DBili  x      /  AST  29     /  ALT  36     /  AlkPhos  88     25 Aug 2023 14:31          Urine Studies:  Urinalysis Basic - ( 26 Aug 2023 05:40 )    Color: x / Appearance: x / SG: x / pH: x  Gluc: 106 mg/dL / Ketone: x  / Bili: x / Urobili: x   Blood: x / Protein: x / Nitrite: x   Leuk Esterase: x / RBC: x / WBC x   Sq Epi: x / Non Sq Epi: x / Bacteria: x            RADIOLOGY & ADDITIONAL STUDIES:

## 2023-08-27 LAB
ANION GAP SERPL CALC-SCNC: 8 MMOL/L — SIGNIFICANT CHANGE UP (ref 5–17)
BUN SERPL-MCNC: 72 MG/DL — HIGH (ref 7–23)
CALCIUM SERPL-MCNC: 8.5 MG/DL — SIGNIFICANT CHANGE UP (ref 8.5–10.1)
CHLORIDE SERPL-SCNC: 118 MMOL/L — HIGH (ref 96–108)
CO2 SERPL-SCNC: 18 MMOL/L — LOW (ref 22–31)
CREAT SERPL-MCNC: 1.75 MG/DL — HIGH (ref 0.5–1.3)
EGFR: 31 ML/MIN/1.73M2 — LOW
GLUCOSE BLDC GLUCOMTR-MCNC: 128 MG/DL — HIGH (ref 70–99)
GLUCOSE BLDC GLUCOMTR-MCNC: 174 MG/DL — HIGH (ref 70–99)
GLUCOSE BLDC GLUCOMTR-MCNC: 175 MG/DL — HIGH (ref 70–99)
GLUCOSE BLDC GLUCOMTR-MCNC: 97 MG/DL — SIGNIFICANT CHANGE UP (ref 70–99)
GLUCOSE SERPL-MCNC: 107 MG/DL — HIGH (ref 70–99)
POTASSIUM SERPL-MCNC: 3.6 MMOL/L — SIGNIFICANT CHANGE UP (ref 3.5–5.3)
POTASSIUM SERPL-SCNC: 3.6 MMOL/L — SIGNIFICANT CHANGE UP (ref 3.5–5.3)
SODIUM SERPL-SCNC: 144 MMOL/L — SIGNIFICANT CHANGE UP (ref 135–145)

## 2023-08-27 PROCEDURE — 99232 SBSQ HOSP IP/OBS MODERATE 35: CPT

## 2023-08-27 RX ADMIN — Medication 1: at 12:59

## 2023-08-27 RX ADMIN — INSULIN GLARGINE 2 UNIT(S): 100 INJECTION, SOLUTION SUBCUTANEOUS at 21:08

## 2023-08-27 RX ADMIN — Medication 1: at 17:29

## 2023-08-27 RX ADMIN — FONDAPARINUX SODIUM 2.5 MILLIGRAM(S): 2.5 INJECTION, SOLUTION SUBCUTANEOUS at 11:06

## 2023-08-28 ENCOUNTER — TRANSCRIPTION ENCOUNTER (OUTPATIENT)
Age: 73
End: 2023-08-28

## 2023-08-28 VITALS
HEART RATE: 77 BPM | DIASTOLIC BLOOD PRESSURE: 48 MMHG | RESPIRATION RATE: 16 BRPM | SYSTOLIC BLOOD PRESSURE: 98 MMHG | OXYGEN SATURATION: 100 %

## 2023-08-28 LAB
-  AMIKACIN: SIGNIFICANT CHANGE UP
-  AMOXICILLIN/CLAVULANIC ACID: SIGNIFICANT CHANGE UP
-  AMPICILLIN/SULBACTAM: SIGNIFICANT CHANGE UP
-  AMPICILLIN: SIGNIFICANT CHANGE UP
-  AZTREONAM: SIGNIFICANT CHANGE UP
-  CEFAZOLIN: SIGNIFICANT CHANGE UP
-  CEFEPIME: SIGNIFICANT CHANGE UP
-  CEFOXITIN: SIGNIFICANT CHANGE UP
-  CEFTRIAXONE: SIGNIFICANT CHANGE UP
-  CEFUROXIME: SIGNIFICANT CHANGE UP
-  CIPROFLOXACIN: SIGNIFICANT CHANGE UP
-  ERTAPENEM: SIGNIFICANT CHANGE UP
-  GENTAMICIN: SIGNIFICANT CHANGE UP
-  IMIPENEM: SIGNIFICANT CHANGE UP
-  LEVOFLOXACIN: SIGNIFICANT CHANGE UP
-  MEROPENEM: SIGNIFICANT CHANGE UP
-  NITROFURANTOIN: SIGNIFICANT CHANGE UP
-  PIPERACILLIN/TAZOBACTAM: SIGNIFICANT CHANGE UP
-  TOBRAMYCIN: SIGNIFICANT CHANGE UP
-  TRIMETHOPRIM/SULFAMETHOXAZOLE: SIGNIFICANT CHANGE UP
ANION GAP SERPL CALC-SCNC: 9 MMOL/L — SIGNIFICANT CHANGE UP (ref 5–17)
BUN SERPL-MCNC: 61 MG/DL — HIGH (ref 7–23)
CALCIUM SERPL-MCNC: 8.4 MG/DL — LOW (ref 8.5–10.1)
CHLORIDE SERPL-SCNC: 117 MMOL/L — HIGH (ref 96–108)
CO2 SERPL-SCNC: 18 MMOL/L — LOW (ref 22–31)
CREAT SERPL-MCNC: 1.55 MG/DL — HIGH (ref 0.5–1.3)
CULTURE RESULTS: SIGNIFICANT CHANGE UP
EGFR: 35 ML/MIN/1.73M2 — LOW
GLUCOSE BLDC GLUCOMTR-MCNC: 206 MG/DL — HIGH (ref 70–99)
GLUCOSE SERPL-MCNC: 112 MG/DL — HIGH (ref 70–99)
METHOD TYPE: SIGNIFICANT CHANGE UP
ORGANISM # SPEC MICROSCOPIC CNT: SIGNIFICANT CHANGE UP
ORGANISM # SPEC MICROSCOPIC CNT: SIGNIFICANT CHANGE UP
POTASSIUM SERPL-MCNC: 4 MMOL/L — SIGNIFICANT CHANGE UP (ref 3.5–5.3)
POTASSIUM SERPL-SCNC: 4 MMOL/L — SIGNIFICANT CHANGE UP (ref 3.5–5.3)
SODIUM SERPL-SCNC: 144 MMOL/L — SIGNIFICANT CHANGE UP (ref 135–145)
SPECIMEN SOURCE: SIGNIFICANT CHANGE UP

## 2023-08-28 PROCEDURE — 99239 HOSP IP/OBS DSCHRG MGMT >30: CPT

## 2023-08-28 RX ORDER — TRAMADOL HYDROCHLORIDE 50 MG/1
1 TABLET ORAL
Qty: 0 | Refills: 0 | DISCHARGE
Start: 2023-08-28

## 2023-08-28 RX ORDER — INSULIN LISPRO 100/ML
3 VIAL (ML) SUBCUTANEOUS
Qty: 0 | Refills: 0 | DISCHARGE
Start: 2023-08-28

## 2023-08-28 RX ORDER — SENNA PLUS 8.6 MG/1
2 TABLET ORAL
Qty: 0 | Refills: 0 | DISCHARGE
Start: 2023-08-28

## 2023-08-28 RX ORDER — METFORMIN HYDROCHLORIDE 850 MG/1
1 TABLET ORAL
Refills: 0 | DISCHARGE

## 2023-08-28 RX ORDER — INSULIN GLARGINE 100 [IU]/ML
2 INJECTION, SOLUTION SUBCUTANEOUS
Qty: 0 | Refills: 0 | DISCHARGE
Start: 2023-08-28

## 2023-08-28 RX ADMIN — FONDAPARINUX SODIUM 2.5 MILLIGRAM(S): 2.5 INJECTION, SOLUTION SUBCUTANEOUS at 10:25

## 2023-08-28 RX ADMIN — Medication 2: at 11:59

## 2023-08-28 NOTE — DISCHARGE NOTE PROVIDER - NSDCFUSCHEDAPPT_GEN_ALL_CORE_FT
Elgin Dean Physician [facility] [address]  Scheduled Appointment: 09/14/2023    Elgin Dean  [place] [facility] [address]  Scheduled Appointment: 11/03/2023

## 2023-08-28 NOTE — PROGRESS NOTE ADULT - SUBJECTIVE AND OBJECTIVE BOX
SUBJECTIVE:    CHIEF COMPLAINT:  Patient is a 72y old  Female who presents with a chief complaint of weakness, malaise, loose stools (27 Aug 2023 07:44)      HPI:  71 y/o female with PMH chronic lymphadema, DM2, HTN, lung cancer / colon cancer, recent admission 8/12-8/17 for RLE cellulitis presents to ED c/o nonspecific, generalized weakness x 3 days. Stated she feels as if "my body is not connected." Denies any specific pain. Pt just finished PO doxycycline  for lower extremity wound and cellulitis. Discharged from . Rehab was recommended bu pt. refused.. Endorses loss of appetite and diarrhea since starting abx, still with loose stools. Daughter endorsed a mild productive cough in the morning. Pt denies fever, chills, cp ,sob, n/v/abd pain, dysuria, extremity numbness/tingling. No recent trauma or use of AC. Saw wound care specialist day PTA.       Active Problems  Chronic stasis dermatitis (454.1) (I87.2)  Colon cancer (153.9) (C18.9)  HTN (hypertension), benign (401.1) (I10)  Hyperkalemia (276.7) (E87.5)  Lymphedema of both lower extremities (457.1) (I89.0)  Malignant neoplasm of upper lobe of left lung (162.3) (C34.12)  Metabolic syndrome (277.7) (E88.81)  Obesity, morbid (more than 100 lbs over ideal weight or BMI > 40) (278.01) (E66.01)  Prophylactic vaccination against streptococcus pneumoniae and influenza (V06.6) (Z23)  Type 2 diabetes mellitus without complication, without long-term current use of insulin  (250.00) (E11.9)      Past Medical History  History of Cellulitis of right lower leg (682.6) (L03.115)           Surgical History  History of Cholecystectomy  History of Hysterectomy (V88.01)  History of Laminectomy Lumbar  History of Lung lobectomy  History of Partial Colectomy  History of Rotator Cuff Repair              Family History  Family history of Bladder carcinoma : Mother  Family history of coronary artery disease (V17.3) (Z82.49) : Father       Social History  Denies alcohol consumption (V49.89) (Z78.9)  Does not use illicit drugs (V49.89) (Z78.9)  Has no children  Never smoked cigarettes (V49.89) (Z78.9)  Single       Current Meds  Clobetasol Propionate 0.05 % External Cream; APPLY SPARINGLY TO AFFECTED  AREA(S) TWICE DAILY  metFORMIN HCl - 500 MG Oral Tablet; TAKE TWO TABLETS BY MOUTH TWICE A DAY           Allergies    Keflex TABS (25 Aug 2023 16:40)      Interval HPI and Overnight Events:    REVIEW OF SYSTEMS:  CONSTITUTIONAL: No weakness, fevers or chills  EYES/ENT: No visual changes;  No vertigo or throat pain   NECK: No pain or stiffness  RESPIRATORY: No cough, wheezing, hemoptysis; No shortness of breath  CARDIOVASCULAR: No chest pain or palpitations  GASTROINTESTINAL: No abdominal or epigastric pain. No nausea, vomiting, or hematemesis; No diarrhea or constipation. No melena or hematochezia.  GENITOURINARY: No dysuria, frequency or hematuria  NEUROLOGICAL: No numbness or weakness  SKIN: No itching, burning, rashes, or lesions   All other review of systems is negative unless indicated above    OBJECTIVE    Vital Signs Last 24 Hrs  T(C): 36.4 (27 Aug 2023 09:16), Max: 36.8 (27 Aug 2023 06:00)  T(F): 97.5 (27 Aug 2023 09:16), Max: 98.3 (27 Aug 2023 06:00)  HR: 75 (27 Aug 2023 14:00) (67 - 76)  BP: 113/52 (27 Aug 2023 14:00) (94/57 - 118/60)  BP(mean): 67 (27 Aug 2023 14:00) (57 - 72)  RR: 16 (27 Aug 2023 14:00) (13 - 20)  SpO2: 96% (27 Aug 2023 12:00) (96% - 100%)        MEDICATIONS  (STANDING):  dextrose 5%. 1000 milliLiter(s) (50 mL/Hr) IV Continuous <Continuous>  dextrose 5%. 1000 milliLiter(s) (100 mL/Hr) IV Continuous <Continuous>  dextrose 50% Injectable 25 Gram(s) IV Push once  dextrose 50% Injectable 12.5 Gram(s) IV Push once  dextrose 50% Injectable 25 Gram(s) IV Push once  fondaparinux Injectable 2.5 milliGRAM(s) SubCutaneous daily  glucagon  Injectable 1 milliGRAM(s) IntraMuscular once  insulin glargine Injectable (LANTUS) 2 Unit(s) SubCutaneous at bedtime  insulin lispro (ADMELOG) corrective regimen sliding scale   SubCutaneous three times a day before meals  senna 2 Tablet(s) Oral at bedtime      LABS:                         11.4   6.50  )-----------( 114      ( 26 Aug 2023 05:40 )             32.3     08-27    144  |  118<H>  |  72<H>  ----------------------------<  107<H>  3.6   |  18<L>  |  1.75<H>    Ca    8.5      27 Aug 2023 06:28    TPro  7.2  /  Alb  3.2<L>  /  TBili  1.0  /  DBili  x   /  AST  29  /  ALT  36  /  AlkPhos  88  08-25    Urinalysis Basic - ( 27 Aug 2023 06:28 )    Color: x / Appearance: x / SG: x / pH: x  Gluc: 107 mg/dL / Ketone: x  / Bili: x / Urobili: x   Blood: x / Protein: x / Nitrite: x   Leuk Esterase: x / RBC: x / WBC x   Sq Epi: x / Non Sq Epi: x / Bacteria: x      PT/INR - ( 25 Aug 2023 14:31 )   PT: 12.9 sec;   INR: 1.15 ratio     PTT - ( 25 Aug 2023 14:31 )  PTT:34.5 sec    Specimen Source .Blood None   08-25 @ 14:55  Culture Results  No growth at 24 hours  Specimen Source .Blood None   08-25 @ 14:31  Culture Results  No growth at 24 hours    URINE CULTURE    08-25 @ 14:55    CULTURE RESULTS   No growth at 24 hours  URINE CULTURE    08-25 @ 14:31    CULTURE RESULTS   No growth at 24 hours    CAPILLARY BLOOD GLUCOSE  POCT Blood Glucose.: 175 mg/dL (27 Aug 2023 12:57)  POCT Blood Glucose.: 97 mg/dL (27 Aug 2023 08:41)  POCT Blood Glucose.: 135 mg/dL (26 Aug 2023 22:21)  POCT Blood Glucose.: 158 mg/dL (26 Aug 2023 16:26)  
Patient is a 72y Female who reports no complaints as new, asleep but arousable.        MEDICATIONS  (STANDING):  dextrose 5%. 1000 milliLiter(s) (50 mL/Hr) IV Continuous <Continuous>  dextrose 5%. 1000 milliLiter(s) (100 mL/Hr) IV Continuous <Continuous>  dextrose 50% Injectable 25 Gram(s) IV Push once  dextrose 50% Injectable 12.5 Gram(s) IV Push once  dextrose 50% Injectable 25 Gram(s) IV Push once  fondaparinux Injectable 2.5 milliGRAM(s) SubCutaneous daily  glucagon  Injectable 1 milliGRAM(s) IntraMuscular once  insulin glargine Injectable (LANTUS) 2 Unit(s) SubCutaneous at bedtime  insulin lispro (ADMELOG) corrective regimen sliding scale   SubCutaneous three times a day before meals  senna 2 Tablet(s) Oral at bedtime  sodium chloride 0.9%. 1000 milliLiter(s) (100 mL/Hr) IV Continuous <Continuous>    MEDICATIONS  (PRN):  acetaminophen     Tablet .. 650 milliGRAM(s) Oral every 6 hours PRN Temp greater or equal to 38C (100.4F), Mild Pain (1 - 3)  aluminum hydroxide/magnesium hydroxide/simethicone Suspension 30 milliLiter(s) Oral every 4 hours PRN Dyspepsia  cyclobenzaprine 5 milliGRAM(s) Oral every 8 hours PRN Spasm  dextrose Oral Gel 15 Gram(s) Oral once PRN Blood Glucose LESS THAN 70 milliGRAM(s)/deciliter  melatonin 3 milliGRAM(s) Oral at bedtime PRN Insomnia  ondansetron Injectable 4 milliGRAM(s) IV Push every 8 hours PRN Nausea and/or Vomiting  traMADol 50 milliGRAM(s) Oral every 6 hours PRN Moderate Pain (4 - 6)        T(C): , Max: 36.8 (08-27-23 @ 06:00)  T(F): , Max: 98.3 (08-27-23 @ 06:00)  HR: 76 (08-27-23 @ 06:00)  BP: 115/43 (08-27-23 @ 06:00)  BP(mean): 63 (08-27-23 @ 06:00)  RR: 14 (08-27-23 @ 06:00)  SpO2: 100% (08-27-23 @ 06:00)  Wt(kg): --    08-26 @ 07:01  -  08-27 @ 07:00  --------------------------------------------------------  IN: 1150 mL / OUT: 1400 mL / NET: -250 mL          PHYSICAL EXAM:    Constitutional: NAD, frail  HEENT:  MM  Neck: No LAD, No JVD  Respiratory: dist  Cardiovascular: S1 and S2, RRR  Gastrointestinal: obese  Extremities: ++chronic changes  Neurological: Asleep but arousable        LABS:                        11.4   6.50  )-----------( 114      ( 26 Aug 2023 05:40 )             32.3     27 Aug 2023 06:28    144    |  118    |  72     ----------------------------<  107    3.6     |  18     |  1.75   26 Aug 2023 05:40    136    |  112    |  88     ----------------------------<  106    3.7     |  14     |  2.20   25 Aug 2023 14:31    129    |  104    |  96     ----------------------------<  140    4.4     |  15     |  3.06     Ca    8.5        27 Aug 2023 06:28  Ca    8.5        26 Aug 2023 05:40  Ca    9.5        25 Aug 2023 14:31    TPro  7.2    /  Alb  3.2    /  TBili  1.0    /  DBili  x      /  AST  29     /  ALT  36     /  AlkPhos  88     25 Aug 2023 14:31          Urine Studies:  Urinalysis Basic - ( 27 Aug 2023 06:28 )    Color: x / Appearance: x / SG: x / pH: x  Gluc: 107 mg/dL / Ketone: x  / Bili: x / Urobili: x   Blood: x / Protein: x / Nitrite: x   Leuk Esterase: x / RBC: x / WBC x   Sq Epi: x / Non Sq Epi: x / Bacteria: x            RADIOLOGY & ADDITIONAL STUDIES:              
Patient is a 72y Female who reports no complaints as new, taking some po well    MEDICATIONS  (STANDING):  dextrose 5%. 1000 milliLiter(s) (50 mL/Hr) IV Continuous <Continuous>  dextrose 5%. 1000 milliLiter(s) (100 mL/Hr) IV Continuous <Continuous>  dextrose 50% Injectable 25 Gram(s) IV Push once  dextrose 50% Injectable 12.5 Gram(s) IV Push once  dextrose 50% Injectable 25 Gram(s) IV Push once  fondaparinux Injectable 2.5 milliGRAM(s) SubCutaneous daily  glucagon  Injectable 1 milliGRAM(s) IntraMuscular once  insulin glargine Injectable (LANTUS) 2 Unit(s) SubCutaneous at bedtime  insulin lispro (ADMELOG) corrective regimen sliding scale   SubCutaneous three times a day before meals  senna 2 Tablet(s) Oral at bedtime    MEDICATIONS  (PRN):  acetaminophen     Tablet .. 650 milliGRAM(s) Oral every 6 hours PRN Temp greater or equal to 38C (100.4F), Mild Pain (1 - 3)  aluminum hydroxide/magnesium hydroxide/simethicone Suspension 30 milliLiter(s) Oral every 4 hours PRN Dyspepsia  cyclobenzaprine 5 milliGRAM(s) Oral every 8 hours PRN Spasm  dextrose Oral Gel 15 Gram(s) Oral once PRN Blood Glucose LESS THAN 70 milliGRAM(s)/deciliter  melatonin 3 milliGRAM(s) Oral at bedtime PRN Insomnia  ondansetron Injectable 4 milliGRAM(s) IV Push every 8 hours PRN Nausea and/or Vomiting  traMADol 50 milliGRAM(s) Oral every 6 hours PRN Moderate Pain (4 - 6)        T(C): , Max: 37 (08-27-23 @ 20:46)  T(F): , Max: 98.6 (08-27-23 @ 20:46)  HR: 75 (08-28-23 @ 04:00)  BP: 112/48 (08-28-23 @ 04:00)  BP(mean): 66 (08-28-23 @ 04:00)  RR: 14 (08-28-23 @ 04:00)  SpO2: 97% (08-28-23 @ 04:00)  Wt(kg): --    08-27 @ 07:01  -  08-28 @ 07:00  --------------------------------------------------------  IN: 0 mL / OUT: 1350 mL / NET: -1350 mL          PHYSICAL EXAM:    Constitutional: NAD, frail  HEENT:  MM  dist  Cardiovascular: S1 and S2   Extremities: chronic issues/changes  Neurological: A/O x 3   : No Berry  Skin: No rashes  Access: Not applicable        LABS:    28 Aug 2023 05:54    144    |  117    |  61     ----------------------------<  112    4.0     |  18     |  1.55   27 Aug 2023 06:28    144    |  118    |  72     ----------------------------<  107    3.6     |  18     |  1.75   26 Aug 2023 05:40    136    |  112    |  88     ----------------------------<  106    3.7     |  14     |  2.20   25 Aug 2023 14:31    129    |  104    |  96     ----------------------------<  140    4.4     |  15     |  3.06     Ca    8.4        28 Aug 2023 05:54  Ca    8.5        27 Aug 2023 06:28  Ca    8.5        26 Aug 2023 05:40  Ca    9.5        25 Aug 2023 14:31    TPro  7.2    /  Alb  3.2    /  TBili  1.0    /  DBili  x      /  AST  29     /  ALT  36     /  AlkPhos  88     25 Aug 2023 14:31          Urine Studies:  Urinalysis Basic - ( 28 Aug 2023 05:54 )    Color: x / Appearance: x / SG: x / pH: x  Gluc: 112 mg/dL / Ketone: x  / Bili: x / Urobili: x   Blood: x / Protein: x / Nitrite: x   Leuk Esterase: x / RBC: x / WBC x   Sq Epi: x / Non Sq Epi: x / Bacteria: x            RADIOLOGY & ADDITIONAL STUDIES:              
SUBJECTIVE:    CHIEF COMPLAINT:  Patient is a 72y old  Female who presents with a chief complaint of weakness, malaise, loose stools (26 Aug 2023 10:09)      HPI:  73 y/o female with PMH chronic lymphadema, DM2, HTN, lung cancer / colon cancer, recent admission 8/12-8/17 for RLE cellulitis presents to ED c/o nonspecific, generalized weakness x 3 days. Stated she feels as if "my body is not connected." Denies any specific pain. Pt just finished PO doxycycline  for lower extremity wound and cellulitis. Discharged from . Rehab was recommended bu pt. refused.. Endorses loss of appetite and diarrhea since starting abx, still with loose stools. Daughter endorsed a mild productive cough in the morning. Pt denies fever, chills, cp ,sob, n/v/abd pain, dysuria, extremity numbness/tingling. No recent trauma or use of AC. Saw wound care specialist day PTA.       Active Problems  Chronic stasis dermatitis (454.1) (I87.2)  Colon cancer (153.9) (C18.9)  HTN (hypertension), benign (401.1) (I10)  Hyperkalemia (276.7) (E87.5)  Lymphedema of both lower extremities (457.1) (I89.0)  Malignant neoplasm of upper lobe of left lung (162.3) (C34.12)  Metabolic syndrome (277.7) (E88.81)  Obesity, morbid (more than 100 lbs over ideal weight or BMI > 40) (278.01) (E66.01)  Prophylactic vaccination against streptococcus pneumoniae and influenza (V06.6) (Z23)  Type 2 diabetes mellitus without complication, without long-term current use of insulin  (250.00) (E11.9)      Past Medical History  History of Cellulitis of right lower leg (682.6) (L03.115)           Surgical History  History of Cholecystectomy  History of Hysterectomy (V88.01)  History of Laminectomy Lumbar  History of Lung lobectomy  History of Partial Colectomy  History of Rotator Cuff Repair              Family History  Family history of Bladder carcinoma : Mother  Family history of coronary artery disease (V17.3) (Z82.49) : Father       Social History  Denies alcohol consumption (V49.89) (Z78.9)  Does not use illicit drugs (V49.89) (Z78.9)  Has no children  Never smoked cigarettes (V49.89) (Z78.9)  Single       Current Meds  Clobetasol Propionate 0.05 % External Cream; APPLY SPARINGLY TO AFFECTED  AREA(S) TWICE DAILY  metFORMIN HCl - 500 MG Oral Tablet; TAKE TWO TABLETS BY MOUTH TWICE A DAY           Allergies    Keflex TABS (25 Aug 2023 16:40)      Interval HPI and Overnight Events: feels a little stronger    REVIEW OF SYSTEMS:  CONSTITUTIONAL: No weakness, fevers or chills  EYES/ENT: No visual changes;  No vertigo or throat pain   NECK: No pain or stiffness  RESPIRATORY: No cough, wheezing, hemoptysis; No shortness of breath  CARDIOVASCULAR: No chest pain or palpitations  GASTROINTESTINAL: No abdominal or epigastric pain. No nausea, vomiting, or hematemesis; No diarrhea or constipation. No melena or hematochezia.  GENITOURINARY: No dysuria, frequency or hematuria  NEUROLOGICAL: No numbness or weakness  SKIN: No itching, burning, rashes, or lesions   All other review of systems is negative unless indicated above    OBJECTIVE    Vital Signs Last 24 Hrs  T(C): 36.1 (26 Aug 2023 14:24), Max: 36.8 (25 Aug 2023 18:36)  T(F): 97 (26 Aug 2023 14:24), Max: 98.2 (25 Aug 2023 18:36)  HR: 67 (26 Aug 2023 14:00) (67 - 92)  BP: 94/38 (26 Aug 2023 14:00) (94/38 - 123/65)  BP(mean): 55 (26 Aug 2023 14:00) (55 - 79)  RR: 13 (26 Aug 2023 14:00) (11 - 20)  SpO2: 100% (26 Aug 2023 08:00) (96% - 100%)    Parameters below as of 25 Aug 2023 23:16  Patient On (Oxygen Delivery Method): room air        MEDICATIONS  (STANDING):  dextrose 5%. 1000 milliLiter(s) (50 mL/Hr) IV Continuous <Continuous>  dextrose 5%. 1000 milliLiter(s) (100 mL/Hr) IV Continuous <Continuous>  dextrose 50% Injectable 25 Gram(s) IV Push once  dextrose 50% Injectable 12.5 Gram(s) IV Push once  dextrose 50% Injectable 25 Gram(s) IV Push once  fondaparinux Injectable 2.5 milliGRAM(s) SubCutaneous daily  glucagon  Injectable 1 milliGRAM(s) IntraMuscular once  insulin glargine Injectable (LANTUS) 2 Unit(s) SubCutaneous at bedtime  insulin lispro (ADMELOG) corrective regimen sliding scale   SubCutaneous three times a day before meals  senna 2 Tablet(s) Oral at bedtime  sodium chloride 0.9%. 1000 milliLiter(s) (100 mL/Hr) IV Continuous <Continuous>      LABS:                         11.4   6.50  )-----------( 114      ( 26 Aug 2023 05:40 )             32.3     08-26    136  |  112<H>  |  88<H>  ----------------------------<  106<H>  3.7   |  14<L>  |  2.20<H>    Ca    8.5      26 Aug 2023 05:40    TPro  7.2  /  Alb  3.2<L>  /  TBili  1.0  /  DBili  x   /  AST  29  /  ALT  36  /  AlkPhos  88  08-25    Urinalysis Basic - ( 26 Aug 2023 05:40 )    Color: x / Appearance: x / SG: x / pH: x  Gluc: 106 mg/dL / Ketone: x  / Bili: x / Urobili: x   Blood: x / Protein: x / Nitrite: x   Leuk Esterase: x / RBC: x / WBC x   Sq Epi: x / Non Sq Epi: x / Bacteria: x      PT/INR - ( 25 Aug 2023 14:31 )   PT: 12.9 sec;   INR: 1.15 ratio    PTT - ( 25 Aug 2023 14:31 )  PTT:34.5 sec    CAPILLARY BLOOD GLUCOSE  POCT Blood Glucose.: 147 mg/dL (26 Aug 2023 11:56)  POCT Blood Glucose.: 103 mg/dL (26 Aug 2023 07:56)  POCT Blood Glucose.: 120 mg/dL (25 Aug 2023 23:53)    
SUBJECTIVE:    CHIEF COMPLAINT:  Patient is a 72y old  Female who presents with a chief complaint of weakness, malaise, loose stools (28 Aug 2023 09:37)      HPI:  73 y/o female with PMH chronic lymphadema, DM2, HTN, lung cancer / colon cancer, recent admission 8/12-8/17 for RLE cellulitis presents to ED c/o nonspecific, generalized weakness x 3 days. Stated she feels as if "my body is not connected." Denies any specific pain. Pt just finished PO doxycycline  for lower extremity wound and cellulitis. Discharged from . Rehab was recommended bu pt. refused.. Endorses loss of appetite and diarrhea since starting abx, still with loose stools. Daughter endorsed a mild productive cough in the morning. Pt denies fever, chills, cp ,sob, n/v/abd pain, dysuria, extremity numbness/tingling. No recent trauma or use of AC. Saw wound care specialist day PTA.       Active Problems  Chronic stasis dermatitis (454.1) (I87.2)  Colon cancer (153.9) (C18.9)  HTN (hypertension), benign (401.1) (I10)  Hyperkalemia (276.7) (E87.5)  Lymphedema of both lower extremities (457.1) (I89.0)  Malignant neoplasm of upper lobe of left lung (162.3) (C34.12)  Metabolic syndrome (277.7) (E88.81)  Obesity, morbid (more than 100 lbs over ideal weight or BMI > 40) (278.01) (E66.01)  Prophylactic vaccination against streptococcus pneumoniae and influenza (V06.6) (Z23)  Type 2 diabetes mellitus without complication, without long-term current use of insulin  (250.00) (E11.9)      Past Medical History  History of Cellulitis of right lower leg (682.6) (L03.115)           Surgical History  History of Cholecystectomy  History of Hysterectomy (V88.01)  History of Laminectomy Lumbar  History of Lung lobectomy  History of Partial Colectomy  History of Rotator Cuff Repair              Family History  Family history of Bladder carcinoma : Mother  Family history of coronary artery disease (V17.3) (Z82.49) : Father       Social History  Denies alcohol consumption (V49.89) (Z78.9)  Does not use illicit drugs (V49.89) (Z78.9)  Has no children  Never smoked cigarettes (V49.89) (Z78.9)  Single       Current Meds  Clobetasol Propionate 0.05 % External Cream; APPLY SPARINGLY TO AFFECTED  AREA(S) TWICE DAILY  metFORMIN HCl - 500 MG Oral Tablet; TAKE TWO TABLETS BY MOUTH TWICE A DAY           Allergies    Keflex TABS (25 Aug 2023 16:40)      Interval HPI and Overnight Events: No diarrhea. Ai OOB to chair and 3-4 steps    REVIEW OF SYSTEMS:  CONSTITUTIONAL: No weakness, fevers or chills  EYES/ENT: No visual changes;  No vertigo or throat pain   NECK: No pain or stiffness  RESPIRATORY: No cough, wheezing, hemoptysis; No shortness of breath  CARDIOVASCULAR: No chest pain or palpitations  GASTROINTESTINAL: No abdominal or epigastric pain. No nausea, vomiting, or hematemesis; No diarrhea or constipation. No melena or hematochezia.  GENITOURINARY: No dysuria, frequency or hematuria  NEUROLOGICAL: No numbness or weakness  SKIN: No itching, burning, rashes, or lesions   All other review of systems is negative unless indicated above    OBJECTIVE    Vital Signs Last 24 Hrs  T(C): 37 (27 Aug 2023 20:46), Max: 37 (27 Aug 2023 20:46)  T(F): 98.6 (27 Aug 2023 20:46), Max: 98.6 (27 Aug 2023 20:46)  HR: 75 (28 Aug 2023 04:00) (67 - 81)  BP: 112/48 (28 Aug 2023 04:00) (97/38 - 113/52)  BP(mean): 66 (28 Aug 2023 04:00) (57 - 69)  RR: 14 (28 Aug 2023 04:00) (14 - 16)  SpO2: 97% (28 Aug 2023 04:00) (96% - 99%)    Parameters below as of 28 Aug 2023 04:00  Patient On (Oxygen Delivery Method): room air        MEDICATIONS  (STANDING):  dextrose 5%. 1000 milliLiter(s) (50 mL/Hr) IV Continuous <Continuous>  dextrose 5%. 1000 milliLiter(s) (100 mL/Hr) IV Continuous <Continuous>  dextrose 50% Injectable 25 Gram(s) IV Push once  dextrose 50% Injectable 12.5 Gram(s) IV Push once  dextrose 50% Injectable 25 Gram(s) IV Push once  fondaparinux Injectable 2.5 milliGRAM(s) SubCutaneous daily  glucagon  Injectable 1 milliGRAM(s) IntraMuscular once  insulin glargine Injectable (LANTUS) 2 Unit(s) SubCutaneous at bedtime  insulin lispro (ADMELOG) corrective regimen sliding scale   SubCutaneous three times a day before meals  senna 2 Tablet(s) Oral at bedtime      LABS:     08-28    144  |  117<H>  |  61<H>  ----------------------------<  112<H>  4.0   |  18<L>  |  1.55<H>    Ca    8.4<L>      28 Aug 2023 05:54      Urinalysis Basic - ( 28 Aug 2023 05:54 )  Color: x / Appearance: x / SG: x / pH: x  Gluc: 112 mg/dL / Ketone: x  / Bili: x / Urobili: x   Blood: x / Protein: x / Nitrite: x   Leuk Esterase: x / RBC: x / WBC x   Sq Epi: x / Non Sq Epi: x / Bacteria: x    Specimen Source Clean Catch None   08-25 @ 18:43  Culture Results  >100,000 CFU/ml Klebsiella pneumoniae  Specimen Source .Blood None   08-25 @ 14:55  Culture Results  No growth at 48 Hours  Specimen Source .Blood None   08-25 @ 14:31  Culture Results  No growth at 48 Hours    CAPILLARY BLOOD GLUCOSE  POCT Blood Glucose.: 128 mg/dL (27 Aug 2023 21:07)  POCT Blood Glucose.: 174 mg/dL (27 Aug 2023 17:27)  POCT Blood Glucose.: 175 mg/dL (27 Aug 2023 12:57)

## 2023-08-28 NOTE — DIETITIAN INITIAL EVALUATION ADULT - NSFNSGIIOFT_GEN_A_CORE
I&O's Detail    27 Aug 2023 07:01  -  28 Aug 2023 07:00  --------------------------------------------------------  IN:  Total IN: 0 mL    OUT:    Incontinent per Collection Bag (mL): 450 mL    Voided (mL): 900 mL  Total OUT: 1350 mL    Total NET: -1350 mL

## 2023-08-28 NOTE — DISCHARGE NOTE PROVIDER - CARE PROVIDER_API CALL
Elgin Dean  [address], [address], [place] [address]  Phone: [phone]  Fax: [phone]  Follow Up Time:

## 2023-08-28 NOTE — PHYSICAL THERAPY INITIAL EVALUATION ADULT - MANUAL MUSCLE TESTING RESULTS, REHAB EVAL
except Bilat hip/knee flex 4- - 4/5; knee ext 4/5; R ankle PF 3+/5; DF 2-/5 (pt reports Hx of chronic R "foot drop")/no strength deficits were identified

## 2023-08-28 NOTE — DISCHARGE NOTE PROVIDER - HOSPITAL COURSE
Pt admitted with dehydration, diarrhea, and lactic acidosis. Seen by nephrology, Tx with IVF. Prerenal azotemia and ADRIAN improved. IVF discontinued. Observed renal function continue to improve. Recommed that the pt. stay off metformin. Could try Januvia after discharge. Now on Lantus with coversge

## 2023-08-28 NOTE — DISCHARGE NOTE NURSING/CASE MANAGEMENT/SOCIAL WORK - PATIENT PORTAL LINK FT
You can access the FollowMyHealth Patient Portal offered by [facility] by registering at the following website: [email or web address]. By joining [employer]’s FollowMyHealth portal, you will also be able to view your health information using other applications (apps) compatible with our system.

## 2023-08-28 NOTE — DISCHARGE NOTE PROVIDER - NSDCMRMEDTOKEN_GEN_ALL_CORE_FT
insulin glargine 100 units/mL subcutaneous solution: 2 unit(s) subcutaneous once a day (at bedtime)  insulin lispro 100 units/mL injectable solution: 3 injectable 3 times a day as needed per coverage scale  senna leaf extract oral tablet: 2 tab(s) orally once a day (at bedtime)  traMADol 50 mg oral tablet: 1 tab(s) orally every 6 hours As needed Moderate Pain (4 - 6)

## 2023-08-28 NOTE — DIETITIAN INITIAL EVALUATION ADULT - OTHER INFO
71 y/o F with a PMHx of chronic lymphedema, DM2, HTN, lung cancer / colon cancer, recent admission 8/12-8/17 for RLE cellulitis presented to ED c/o nonspecific, generalized weakness x 3 days. Stated she feels as if "my body is not connected." Denies any specific pain. Pt just finished PO doxycycline for lower extremity wound and cellulitis. Discharged from . Rehab was recommended bu pt. refused. Endorses loss of appetite and diarrhea since starting abx, still with loose stools. Daughter endorsed a mild productive cough in the morning. Admitted for ADRIAN, dehydration with lactic acidosis, and weakness. FULL CODE.    Reports an improving appetite/ intake; breakfast tray observed at bedside - 75% of tray consumed. States that her appetite is slowly coming back and this is the best she has eaten since admission. Pt reports that her ubw is 249# which has been a stable wt for her except for her lymphoedema. RD obtained bedscale wt on 8/28 - 263#; 4+ edema likely skewing weight. Weight hx reviewed: 255# w/ severe edema (taken by RD on 8/14/23), 291# w/ severe edema (taken by RD on 9/26/22). Pt overweight/ obese appearing. NFPE reveals no muscle/ fat wasting, pt does not meet criteria for PCM at this time. Body habitus and edema may be masking any wasting. Pt currently on Consistent Carbohydrate diet with BG WNL; received 2 units of Lantus and 2 units Admelog x 24 hours. HgbA1c 7.5% indicating good glycemic control pta. Recommend to change diet to Low Sodium, Consistent Carbohydrate diet 2/2 diabetes and edema. Encouraged high protein intake for wound healing, pt receptive to trialing LPS TID in effort to optimize protein intake. Please see additional recommendations below.

## 2023-08-28 NOTE — DIETITIAN INITIAL EVALUATION ADULT - PHYSCIAL ASSESSMENT
No muscle/ fat wasting noted Opioid Pregnancy And Lactation Text: These medications can lead to premature delivery and should be avoided during pregnancy. These medications are also present in breast milk in small amounts.

## 2023-08-28 NOTE — PROGRESS NOTE ADULT - REASON FOR ADMISSION
weakness, malaise, loose stools

## 2023-08-28 NOTE — DIETITIAN INITIAL EVALUATION ADULT - ORAL INTAKE PTA/DIET HISTORY
Reports a poor appetite/ intake x 1 week 2/2 taking doxycycline. States that her appetite was getting worse over the last week which is one reason why she came to the hospital. Lives at home by herself however has aides through [facility] to assist w/ ADLs and provide wound care. She also has neighbors come by and assist w/ other ADLs/ errands. Receives meals via "[employer] needs" meals get delivered and pt has someone put them away. Does not consume any ONS at home.

## 2023-08-28 NOTE — PHYSICAL THERAPY INITIAL EVALUATION ADULT - GENERAL OBSERVATIONS, REHAB EVAL
bladder suction; HM; BP cuff; pt rec'd in bed supine; HR 76; /67; O2 Sat 100%; pt denied pain; RN Catina present; LE's edematous / discolored / Bilat foot / ankles gauze wrapped

## 2023-08-28 NOTE — DIETITIAN INITIAL EVALUATION ADULT - PERTINENT LABORATORY DATA
08-28    144  |  117<H>  |  61<H>  ----------------------------<  112<H>  4.0   |  18<L>  |  1.55<H>    Ca    8.4<L>      28 Aug 2023 05:54    POCT Blood Glucose.: 206 mg/dL (08-28-23 @ 11:55)  A1C with Estimated Average Glucose Result: 7.5 % (08-13-23 @ 05:16)  A1C with Estimated Average Glucose Result: 8.8 % (04-03-23 @ 07:33)  A1C with Estimated Average Glucose Result: 5.9 % (01-02-23 @ 05:25)

## 2023-08-28 NOTE — PHYSICAL THERAPY INITIAL EVALUATION ADULT - MODALITIES TREATMENT COMMENTS
pt left seated in chair raised with pillow post Eval; chair alarm donned; all above lines / monitors intact; LE's elevated on stool/pillow; callbell in reach; pt instructed not to get up alone; call nursing for assist; martínez well; denied pain; RN Catina made aware pt OOB

## 2023-08-28 NOTE — PROGRESS NOTE ADULT - ASSESSMENT
71 y/o F w/ PMH of chronic lymphadema, DM2, HTN, lung cancer / colon cancer, chronic lymphedema with renal evaluation of ADRIAN.,    ADRIAN  -Appears to be pre renal w GI losses and poor intake, stabilizing w IVF  -Keep off IVF  -No nsaids/contrast    Met Acidosis  Stable    Diarrhea    d/c with RN staff, team   
71 y/o female with PMH chronic lymphadema, DM2, HTN, lung cancer / colon cancer, recent admission 8/12-8/17 for RLE cellulitis presents to ED c/o nonspecific, generalized weakness x 3 days.     Assessment  ADRIAN improving  Pre-renal azotemia  Dehydration with lactic acidosis  Type 2 DM  HTN  Lymphedema  Severe Stasis Dermatitis  Hyponatremia    Plan:  On surgical Stepdown  ContinueIVF  Wound care  repeat lactate WNL  Nephrology consult appreciated  Screen for c diff pending  PT  insulin coverage  D/c  metformin with tendency towards lactic acidosis  Will need rehab on d/c  fondaparinux for dvt prophylaxis  full code  
73 y/o female with PMH chronic lymphadema, DM2, HTN, lung cancer / colon cancer, recent admission 8/12-8/17 for RLE cellulitis presents to ED c/o nonspecific, generalized weakness x 3 days.     Assessment  ADRIAN improving  Pre-renal azotemia improved  Dehydration with lactic acidosis - resolved  Type 2 DM  HTN  Lymphedema  Severe Stasis Dermatitis  Hyponatremia - resolved    Plan:  On surgical Stepdown  Off IVF  Wound care  repeat lactate WNL  Nephrology following  PT  insulin coverage  D/c  metformin with tendency towards lactic acidosis  Start to look at rehab on d/c        DVT Prophylaxis:  fondaparinux for dvt prophylaxis    Advanced Directives:  full code  
73 y/o female with PMH chronic lymphadema, DM2, HTN, lung cancer / colon cancer, recent admission 8/12-8/17 for RLE cellulitis presents to ED c/o nonspecific, generalized weakness x 3 days.     Assessment  ADRIAN improving  Pre-renal azotemia improved  Dehydration with lactic acidosis - resolved  Type 2 DM  HTN  Lymphedema  Severe Stasis Dermatitis  Hyponatremia - ressolved    Plan:  On surgical Stepdown  d/c ContinueIVF  Wound care  repeat lactate WNL  Nephrology consult appreciated  Screen for c diff pending  PT  insulin coverage  D/c  metformin with tendency towards lactic acidosis  Will need rehab on d/c  remove fluid restriction      DVT Prophylaxis:  fondaparinux for dvt prophylaxis    Advanced Directives:  full code  
  73 y/o F w/ PMH of chronic lymphadema, DM2, HTN, lung cancer / colon cancer, chronic lymphadema with renal evaluation of ADRIAN.,    ADRIAN  -Appears to be pre renal w GI losses and poor intake, stabilizing w ivf  -Keep near even today, can stop IVF  -No nsaids/contrast    Met Acidosis  Stabilizing. Can consider bicarb infusion or oral T's if needed or decrement  BMP in AM    Diarrhea    d/c with RN staff, team

## 2023-08-28 NOTE — DIETITIAN INITIAL EVALUATION ADULT - ADD RECOMMEND
1) Change diet to Low Sodium, Consistent Carbohydrate diet  2) LPS TID (provides 100 kcal, 15 g protein/ serving) to optimize nutritional needs   3) Obtain vitamin D 25OH level to assess nutriture  4) Please obtain daily weights  5) Recommend to add MVI w/minerals, Vit C 500 mg BID, add Zinc Sulfate 220 mg x 10 days to promote wound healing.  6) Encourage protein-rich foods, maximize food preferences   7) Monitor bowel movements, if no BM for >3 days, consider implementing bowel regimen.  8) Monitor and optimize BG levels between 140-180 mg/dL by medical management   9) Confirm goals of care regarding nutrition support  RD will continue to monitor PO intake, labs, hydration, and wt prn.

## 2023-08-28 NOTE — DISCHARGE NOTE PROVIDER - NSDCCPCAREPLAN_GEN_ALL_CORE_FT
PRINCIPAL DISCHARGE DIAGNOSIS  Diagnosis: ADRAIN (acute kidney injury)  Assessment and Plan of Treatment:       SECONDARY DISCHARGE DIAGNOSES  Diagnosis: Acute prerenal failure  Assessment and Plan of Treatment:     Diagnosis: Type 2 diabetes mellitus  Assessment and Plan of Treatment:     Diagnosis: Lymphedema  Assessment and Plan of Treatment:     Diagnosis: Chronic stasis dermatitis  Assessment and Plan of Treatment:

## 2023-08-28 NOTE — DIETITIAN INITIAL EVALUATION ADULT - PERTINENT MEDS FT
MEDICATIONS  (STANDING):  dextrose 5%. 1000 milliLiter(s) (50 mL/Hr) IV Continuous <Continuous>  dextrose 5%. 1000 milliLiter(s) (100 mL/Hr) IV Continuous <Continuous>  dextrose 50% Injectable 25 Gram(s) IV Push once  dextrose 50% Injectable 12.5 Gram(s) IV Push once  dextrose 50% Injectable 25 Gram(s) IV Push once  fondaparinux Injectable 2.5 milliGRAM(s) SubCutaneous daily  glucagon  Injectable 1 milliGRAM(s) IntraMuscular once  insulin glargine Injectable (LANTUS) 2 Unit(s) SubCutaneous at bedtime  insulin lispro (ADMELOG) corrective regimen sliding scale   SubCutaneous three times a day before meals  senna 2 Tablet(s) Oral at bedtime    MEDICATIONS  (PRN):  acetaminophen     Tablet .. 650 milliGRAM(s) Oral every 6 hours PRN Temp greater or equal to 38C (100.4F), Mild Pain (1 - 3)  aluminum hydroxide/magnesium hydroxide/simethicone Suspension 30 milliLiter(s) Oral every 4 hours PRN Dyspepsia  cyclobenzaprine 5 milliGRAM(s) Oral every 8 hours PRN Spasm  dextrose Oral Gel 15 Gram(s) Oral once PRN Blood Glucose LESS THAN 70 milliGRAM(s)/deciliter  melatonin 3 milliGRAM(s) Oral at bedtime PRN Insomnia  ondansetron Injectable 4 milliGRAM(s) IV Push every 8 hours PRN Nausea and/or Vomiting  traMADol 50 milliGRAM(s) Oral every 6 hours PRN Moderate Pain (4 - 6)    Home Medications:  metFORMIN 500 mg oral tablet: 1 orally 2 times a day (25 Aug 2023 16:42)

## 2023-09-05 DIAGNOSIS — E87.20 ACIDOSIS, UNSPECIFIED: ICD-10-CM

## 2023-09-05 DIAGNOSIS — E78.5 HYPERLIPIDEMIA, UNSPECIFIED: ICD-10-CM

## 2023-09-05 DIAGNOSIS — E88.81 METABOLIC SYNDROME AND OTHER INSULIN RESISTANCE: ICD-10-CM

## 2023-09-05 DIAGNOSIS — C18.9 MALIGNANT NEOPLASM OF COLON, UNSPECIFIED: ICD-10-CM

## 2023-09-05 DIAGNOSIS — C34.12 MALIGNANT NEOPLASM OF UPPER LOBE, LEFT BRONCHUS OR LUNG: ICD-10-CM

## 2023-09-05 DIAGNOSIS — Z23 ENCOUNTER FOR IMMUNIZATION: ICD-10-CM

## 2023-09-05 DIAGNOSIS — N17.9 ACUTE KIDNEY FAILURE, UNSPECIFIED: ICD-10-CM

## 2023-09-05 DIAGNOSIS — I89.0 LYMPHEDEMA, NOT ELSEWHERE CLASSIFIED: ICD-10-CM

## 2023-09-05 DIAGNOSIS — R19.7 DIARRHEA, UNSPECIFIED: ICD-10-CM

## 2023-09-05 DIAGNOSIS — E11.9 TYPE 2 DIABETES MELLITUS WITHOUT COMPLICATIONS: ICD-10-CM

## 2023-09-05 DIAGNOSIS — I87.2 VENOUS INSUFFICIENCY (CHRONIC) (PERIPHERAL): ICD-10-CM

## 2023-09-05 DIAGNOSIS — E87.1 HYPO-OSMOLALITY AND HYPONATREMIA: ICD-10-CM

## 2023-09-05 DIAGNOSIS — I10 ESSENTIAL (PRIMARY) HYPERTENSION: ICD-10-CM

## 2023-09-14 ENCOUNTER — APPOINTMENT (OUTPATIENT)
Dept: FAMILY MEDICINE | Facility: CLINIC | Age: 73
End: 2023-09-14

## 2023-09-20 ENCOUNTER — TRANSCRIPTION ENCOUNTER (OUTPATIENT)
Age: 73
End: 2023-09-20

## 2023-09-22 PROBLEM — I10 ESSENTIAL (PRIMARY) HYPERTENSION: Chronic | Status: ACTIVE | Noted: 2023-08-25

## 2023-09-22 PROBLEM — E11.9 TYPE 2 DIABETES MELLITUS WITHOUT COMPLICATIONS: Chronic | Status: ACTIVE | Noted: 2023-08-25

## 2023-09-22 PROBLEM — I89.0 LYMPHEDEMA, NOT ELSEWHERE CLASSIFIED: Chronic | Status: ACTIVE | Noted: 2023-08-25

## 2023-09-22 PROBLEM — C34.90 MALIGNANT NEOPLASM OF UNSPECIFIED PART OF UNSPECIFIED BRONCHUS OR LUNG: Chronic | Status: ACTIVE | Noted: 2023-08-25

## 2023-09-22 PROBLEM — C18.9 MALIGNANT NEOPLASM OF COLON, UNSPECIFIED: Chronic | Status: ACTIVE | Noted: 2023-08-25

## 2023-09-27 ENCOUNTER — APPOINTMENT (OUTPATIENT)
Dept: FAMILY MEDICINE | Facility: CLINIC | Age: 73
End: 2023-09-27
Payer: MEDICARE

## 2023-09-27 DIAGNOSIS — Z23 ENCOUNTER FOR IMMUNIZATION: ICD-10-CM

## 2023-09-27 PROCEDURE — 99496 TRANSJ CARE MGMT HIGH F2F 7D: CPT | Mod: 95

## 2023-09-27 RX ORDER — METFORMIN HYDROCHLORIDE 500 MG/1
500 TABLET, COATED ORAL
Qty: 360 | Refills: 0 | Status: DISCONTINUED | COMMUNITY
Start: 2021-11-08 | End: 2023-09-27

## 2023-10-02 ENCOUNTER — OUTPATIENT (OUTPATIENT)
Dept: OUTPATIENT SERVICES | Facility: HOSPITAL | Age: 73
LOS: 1 days | End: 2023-10-02
Payer: MEDICARE

## 2023-10-02 DIAGNOSIS — I10 ESSENTIAL (PRIMARY) HYPERTENSION: ICD-10-CM

## 2023-10-02 DIAGNOSIS — I89.0 LYMPHEDEMA, NOT ELSEWHERE CLASSIFIED: ICD-10-CM

## 2023-10-02 DIAGNOSIS — E11.69 TYPE 2 DIABETES MELLITUS WITH OTHER SPECIFIED COMPLICATION: ICD-10-CM

## 2023-10-02 DIAGNOSIS — E11.622 TYPE 2 DIABETES MELLITUS WITH OTHER SKIN ULCER: ICD-10-CM

## 2023-10-02 DIAGNOSIS — M19.90 UNSPECIFIED OSTEOARTHRITIS, UNSPECIFIED SITE: ICD-10-CM

## 2023-10-02 DIAGNOSIS — L97.518 NON-PRESSURE CHRONIC ULCER OF OTHER PART OF RIGHT FOOT WITH OTHER SPECIFIED SEVERITY: ICD-10-CM

## 2023-10-02 DIAGNOSIS — I73.9 PERIPHERAL VASCULAR DISEASE, UNSPECIFIED: ICD-10-CM

## 2023-10-02 DIAGNOSIS — L97.528 NON-PRESSURE CHRONIC ULCER OF OTHER PART OF LEFT FOOT WITH OTHER SPECIFIED SEVERITY: ICD-10-CM

## 2023-10-02 DIAGNOSIS — Z79.84 LONG TERM (CURRENT) USE OF ORAL HYPOGLYCEMIC DRUGS: ICD-10-CM

## 2023-10-02 PROCEDURE — 29580 STRAPPING UNNA BOOT: CPT | Mod: 59,50

## 2023-10-02 PROCEDURE — 99214 OFFICE O/P EST MOD 30 MIN: CPT | Mod: 25

## 2023-10-09 ENCOUNTER — OUTPATIENT (OUTPATIENT)
Dept: OUTPATIENT SERVICES | Facility: HOSPITAL | Age: 73
LOS: 1 days | End: 2023-10-09
Payer: MEDICARE

## 2023-10-09 DIAGNOSIS — L13.9 BULLOUS DISORDER, UNSPECIFIED: ICD-10-CM

## 2023-10-09 DIAGNOSIS — I10 ESSENTIAL (PRIMARY) HYPERTENSION: ICD-10-CM

## 2023-10-09 DIAGNOSIS — M19.90 UNSPECIFIED OSTEOARTHRITIS, UNSPECIFIED SITE: ICD-10-CM

## 2023-10-09 DIAGNOSIS — I89.0 LYMPHEDEMA, NOT ELSEWHERE CLASSIFIED: ICD-10-CM

## 2023-10-09 DIAGNOSIS — E11.622 TYPE 2 DIABETES MELLITUS WITH OTHER SKIN ULCER: ICD-10-CM

## 2023-10-09 DIAGNOSIS — E11.69 TYPE 2 DIABETES MELLITUS WITH OTHER SPECIFIED COMPLICATION: ICD-10-CM

## 2023-10-09 DIAGNOSIS — L97.828 NON-PRESSURE CHRONIC ULCER OF OTHER PART OF LEFT LOWER LEG WITH OTHER SPECIFIED SEVERITY: ICD-10-CM

## 2023-10-09 DIAGNOSIS — I73.89 OTHER SPECIFIED PERIPHERAL VASCULAR DISEASES: ICD-10-CM

## 2023-10-09 DIAGNOSIS — Z79.84 LONG TERM (CURRENT) USE OF ORAL HYPOGLYCEMIC DRUGS: ICD-10-CM

## 2023-10-09 DIAGNOSIS — L97.518 NON-PRESSURE CHRONIC ULCER OF OTHER PART OF RIGHT FOOT WITH OTHER SPECIFIED SEVERITY: ICD-10-CM

## 2023-10-09 PROCEDURE — 99214 OFFICE O/P EST MOD 30 MIN: CPT

## 2023-10-10 ENCOUNTER — INPATIENT (INPATIENT)
Facility: HOSPITAL | Age: 73
LOS: 5 days | Discharge: HOME | DRG: 872 | End: 2023-10-16
Attending: HOSPITALIST | Admitting: INTERNAL MEDICINE
Payer: MEDICARE

## 2023-10-10 VITALS
DIASTOLIC BLOOD PRESSURE: 56 MMHG | HEIGHT: 68 IN | WEIGHT: 259.93 LBS | HEART RATE: 113 BPM | RESPIRATION RATE: 20 BRPM | OXYGEN SATURATION: 97 % | SYSTOLIC BLOOD PRESSURE: 117 MMHG | TEMPERATURE: 103 F

## 2023-10-10 DIAGNOSIS — L03.115 CELLULITIS OF RIGHT LOWER LIMB: ICD-10-CM

## 2023-10-10 LAB
ALBUMIN SERPL ELPH-MCNC: 2.6 G/DL — LOW (ref 3.3–5)
ALP SERPL-CCNC: 74 U/L — SIGNIFICANT CHANGE UP (ref 40–120)
ALT FLD-CCNC: 15 U/L — SIGNIFICANT CHANGE UP (ref 12–78)
ANION GAP SERPL CALC-SCNC: 5 MMOL/L — SIGNIFICANT CHANGE UP (ref 5–17)
ANISOCYTOSIS BLD QL: SLIGHT — SIGNIFICANT CHANGE UP
APTT BLD: 26.8 SEC — SIGNIFICANT CHANGE UP (ref 24.5–35.6)
AST SERPL-CCNC: 34 U/L — SIGNIFICANT CHANGE UP (ref 15–37)
BASOPHILS # BLD AUTO: 0 K/UL — SIGNIFICANT CHANGE UP (ref 0–0.2)
BASOPHILS NFR BLD AUTO: 0 % — SIGNIFICANT CHANGE UP (ref 0–2)
BILIRUB SERPL-MCNC: 1.3 MG/DL — HIGH (ref 0.2–1.2)
BUN SERPL-MCNC: 14 MG/DL — SIGNIFICANT CHANGE UP (ref 7–23)
CALCIUM SERPL-MCNC: 8.3 MG/DL — LOW (ref 8.5–10.1)
CHLORIDE SERPL-SCNC: 106 MMOL/L — SIGNIFICANT CHANGE UP (ref 96–108)
CO2 SERPL-SCNC: 22 MMOL/L — SIGNIFICANT CHANGE UP (ref 22–31)
CREAT SERPL-MCNC: 0.97 MG/DL — SIGNIFICANT CHANGE UP (ref 0.5–1.3)
EGFR: 62 ML/MIN/1.73M2 — SIGNIFICANT CHANGE UP
EOSINOPHIL # BLD AUTO: 0 K/UL — SIGNIFICANT CHANGE UP (ref 0–0.5)
EOSINOPHIL NFR BLD AUTO: 0 % — SIGNIFICANT CHANGE UP (ref 0–6)
GLUCOSE SERPL-MCNC: 166 MG/DL — HIGH (ref 70–99)
HCT VFR BLD CALC: 29.8 % — LOW (ref 34.5–45)
HGB BLD-MCNC: 9.9 G/DL — LOW (ref 11.5–15.5)
HYPOCHROMIA BLD QL: SLIGHT — SIGNIFICANT CHANGE UP
INR BLD: 1.17 RATIO — SIGNIFICANT CHANGE UP (ref 0.85–1.18)
LACTATE SERPL-SCNC: 1.7 MMOL/L — SIGNIFICANT CHANGE UP (ref 0.7–2)
LACTATE SERPL-SCNC: 2.4 MMOL/L — HIGH (ref 0.7–2)
LYMPHOCYTES # BLD AUTO: 0.16 K/UL — LOW (ref 1–3.3)
LYMPHOCYTES # BLD AUTO: 1 % — LOW (ref 13–44)
MANUAL SMEAR VERIFICATION: SIGNIFICANT CHANGE UP
MCHC RBC-ENTMCNC: 28.2 PG — SIGNIFICANT CHANGE UP (ref 27–34)
MCHC RBC-ENTMCNC: 33.2 GM/DL — SIGNIFICANT CHANGE UP (ref 32–36)
MCV RBC AUTO: 84.9 FL — SIGNIFICANT CHANGE UP (ref 80–100)
MICROCYTES BLD QL: SLIGHT — SIGNIFICANT CHANGE UP
MONOCYTES # BLD AUTO: 0.62 K/UL — SIGNIFICANT CHANGE UP (ref 0–0.9)
MONOCYTES NFR BLD AUTO: 4 % — SIGNIFICANT CHANGE UP (ref 2–14)
NEUTROPHILS # BLD AUTO: 14.75 K/UL — HIGH (ref 1.8–7.4)
NEUTROPHILS NFR BLD AUTO: 91 % — HIGH (ref 43–77)
NEUTS BAND # BLD: 4 % — SIGNIFICANT CHANGE UP (ref 0–8)
NRBC # BLD: 0 /100 — SIGNIFICANT CHANGE UP (ref 0–0)
NRBC # BLD: SIGNIFICANT CHANGE UP /100 WBCS (ref 0–0)
OVALOCYTES BLD QL SMEAR: SLIGHT — SIGNIFICANT CHANGE UP
PLAT MORPH BLD: NORMAL — SIGNIFICANT CHANGE UP
PLATELET # BLD AUTO: 91 K/UL — LOW (ref 150–400)
POIKILOCYTOSIS BLD QL AUTO: SLIGHT — SIGNIFICANT CHANGE UP
POTASSIUM SERPL-MCNC: 4.4 MMOL/L — SIGNIFICANT CHANGE UP (ref 3.5–5.3)
POTASSIUM SERPL-SCNC: 4.4 MMOL/L — SIGNIFICANT CHANGE UP (ref 3.5–5.3)
PROT SERPL-MCNC: 6.2 GM/DL — SIGNIFICANT CHANGE UP (ref 6–8.3)
PROTHROM AB SERPL-ACNC: 13.2 SEC — HIGH (ref 9.5–13)
RBC # BLD: 3.51 M/UL — LOW (ref 3.8–5.2)
RBC # FLD: 15.1 % — HIGH (ref 10.3–14.5)
RBC BLD AUTO: ABNORMAL
SODIUM SERPL-SCNC: 133 MMOL/L — LOW (ref 135–145)
WBC # BLD: 15.53 K/UL — HIGH (ref 3.8–10.5)
WBC # FLD AUTO: 15.53 K/UL — HIGH (ref 3.8–10.5)

## 2023-10-10 PROCEDURE — 71045 X-RAY EXAM CHEST 1 VIEW: CPT | Mod: 26

## 2023-10-10 PROCEDURE — 81001 URINALYSIS AUTO W/SCOPE: CPT

## 2023-10-10 PROCEDURE — 85025 COMPLETE CBC W/AUTO DIFF WBC: CPT

## 2023-10-10 PROCEDURE — 85027 COMPLETE CBC AUTOMATED: CPT

## 2023-10-10 PROCEDURE — 36415 COLL VENOUS BLD VENIPUNCTURE: CPT

## 2023-10-10 PROCEDURE — 83605 ASSAY OF LACTIC ACID: CPT

## 2023-10-10 PROCEDURE — 93010 ELECTROCARDIOGRAM REPORT: CPT

## 2023-10-10 PROCEDURE — 99285 EMERGENCY DEPT VISIT HI MDM: CPT

## 2023-10-10 PROCEDURE — 97163 PT EVAL HIGH COMPLEX 45 MIN: CPT | Mod: GP

## 2023-10-10 PROCEDURE — 80048 BASIC METABOLIC PNL TOTAL CA: CPT

## 2023-10-10 PROCEDURE — 82962 GLUCOSE BLOOD TEST: CPT

## 2023-10-10 PROCEDURE — 99223 1ST HOSP IP/OBS HIGH 75: CPT

## 2023-10-10 PROCEDURE — 87086 URINE CULTURE/COLONY COUNT: CPT

## 2023-10-10 RX ORDER — INSULIN LISPRO 100/ML
VIAL (ML) SUBCUTANEOUS AT BEDTIME
Refills: 0 | Status: DISCONTINUED | OUTPATIENT
Start: 2023-10-11 | End: 2023-10-16

## 2023-10-10 RX ORDER — ACETAMINOPHEN 500 MG
650 TABLET ORAL ONCE
Refills: 0 | Status: COMPLETED | OUTPATIENT
Start: 2023-10-10 | End: 2023-10-10

## 2023-10-10 RX ORDER — SITAGLIPTIN 50 MG/1
1 TABLET, FILM COATED ORAL
Refills: 0 | DISCHARGE

## 2023-10-10 RX ORDER — SODIUM CHLORIDE 9 MG/ML
2000 INJECTION INTRAMUSCULAR; INTRAVENOUS; SUBCUTANEOUS ONCE
Refills: 0 | Status: COMPLETED | OUTPATIENT
Start: 2023-10-10 | End: 2023-10-10

## 2023-10-10 RX ORDER — SODIUM CHLORIDE 9 MG/ML
1000 INJECTION, SOLUTION INTRAVENOUS
Refills: 0 | Status: DISCONTINUED | OUTPATIENT
Start: 2023-10-10 | End: 2023-10-16

## 2023-10-10 RX ORDER — ACETAMINOPHEN 500 MG
650 TABLET ORAL EVERY 6 HOURS
Refills: 0 | Status: DISCONTINUED | OUTPATIENT
Start: 2023-10-10 | End: 2023-10-16

## 2023-10-10 RX ORDER — VANCOMYCIN HCL 1 G
1750 VIAL (EA) INTRAVENOUS ONCE
Refills: 0 | Status: COMPLETED | OUTPATIENT
Start: 2023-10-10 | End: 2023-10-10

## 2023-10-10 RX ORDER — ACETAMINOPHEN 500 MG
1000 TABLET ORAL ONCE
Refills: 0 | Status: COMPLETED | OUTPATIENT
Start: 2023-10-10 | End: 2023-10-10

## 2023-10-10 RX ORDER — DEXTROSE 50 % IN WATER 50 %
12.5 SYRINGE (ML) INTRAVENOUS ONCE
Refills: 0 | Status: DISCONTINUED | OUTPATIENT
Start: 2023-10-10 | End: 2023-10-16

## 2023-10-10 RX ORDER — VANCOMYCIN HCL 1 G
2000 VIAL (EA) INTRAVENOUS EVERY 12 HOURS
Refills: 0 | Status: DISCONTINUED | OUTPATIENT
Start: 2023-10-10 | End: 2023-10-11

## 2023-10-10 RX ORDER — GLUCAGON INJECTION, SOLUTION 0.5 MG/.1ML
1 INJECTION, SOLUTION SUBCUTANEOUS ONCE
Refills: 0 | Status: DISCONTINUED | OUTPATIENT
Start: 2023-10-10 | End: 2023-10-16

## 2023-10-10 RX ORDER — ONDANSETRON 8 MG/1
4 TABLET, FILM COATED ORAL EVERY 8 HOURS
Refills: 0 | Status: DISCONTINUED | OUTPATIENT
Start: 2023-10-10 | End: 2023-10-16

## 2023-10-10 RX ORDER — DEXTROSE 50 % IN WATER 50 %
25 SYRINGE (ML) INTRAVENOUS ONCE
Refills: 0 | Status: DISCONTINUED | OUTPATIENT
Start: 2023-10-10 | End: 2023-10-16

## 2023-10-10 RX ORDER — INFLUENZA VIRUS VACCINE 15; 15; 15; 15 UG/.5ML; UG/.5ML; UG/.5ML; UG/.5ML
0.7 SUSPENSION INTRAMUSCULAR ONCE
Refills: 0 | Status: DISCONTINUED | OUTPATIENT
Start: 2023-10-10 | End: 2023-10-16

## 2023-10-10 RX ORDER — INSULIN LISPRO 100/ML
VIAL (ML) SUBCUTANEOUS
Refills: 0 | Status: DISCONTINUED | OUTPATIENT
Start: 2023-10-10 | End: 2023-10-16

## 2023-10-10 RX ORDER — SODIUM CHLORIDE 9 MG/ML
1000 INJECTION INTRAMUSCULAR; INTRAVENOUS; SUBCUTANEOUS
Refills: 0 | Status: DISCONTINUED | OUTPATIENT
Start: 2023-10-10 | End: 2023-10-12

## 2023-10-10 RX ORDER — LANOLIN ALCOHOL/MO/W.PET/CERES
3 CREAM (GRAM) TOPICAL AT BEDTIME
Refills: 0 | Status: DISCONTINUED | OUTPATIENT
Start: 2023-10-10 | End: 2023-10-16

## 2023-10-10 RX ORDER — DEXTROSE 50 % IN WATER 50 %
15 SYRINGE (ML) INTRAVENOUS ONCE
Refills: 0 | Status: DISCONTINUED | OUTPATIENT
Start: 2023-10-10 | End: 2023-10-16

## 2023-10-10 RX ADMIN — Medication 1000 MILLIGRAM(S): at 19:25

## 2023-10-10 RX ADMIN — SODIUM CHLORIDE 1333.33 MILLILITER(S): 9 INJECTION INTRAMUSCULAR; INTRAVENOUS; SUBCUTANEOUS at 16:00

## 2023-10-10 RX ADMIN — Medication 650 MILLIGRAM(S): at 21:01

## 2023-10-10 RX ADMIN — Medication 250 MILLIGRAM(S): at 17:56

## 2023-10-10 RX ADMIN — Medication 400 MILLIGRAM(S): at 16:19

## 2023-10-10 RX ADMIN — SODIUM CHLORIDE 125 MILLILITER(S): 9 INJECTION INTRAMUSCULAR; INTRAVENOUS; SUBCUTANEOUS at 23:30

## 2023-10-10 RX ADMIN — Medication 110 MILLIGRAM(S): at 16:00

## 2023-10-10 NOTE — PATIENT PROFILE ADULT - FUNCTIONAL ASSESSMENT - DAILY ACTIVITY SECTION LABEL
04/25/21 1501   NIV Type   Skin Assessment Clean, dry, & intact   Skin Protection for O2 Device Yes   Mode Bilevel   Mask Type Full face mask   Mask Size Small   Settings/Measurements   IPAP 16 cmH20   CPAP/EPAP 5 cmH2O   Rate Ordered 12   Resp 25   Insp Rise Time (%) 1 %   FiO2  40 %   I Time/ I Time % 0.9 s   Vt Exhaled 504 mL   Minute Volume 12.2 Liters   Mask Leak (lpm) 0 lpm   Comfort Level Fair   Using Accessory Muscles Yes   SpO2 94   Breath Sounds   Right Upper Lobe Expiratory Wheezes   Right Middle Lobe Expiratory Wheezes   Right Lower Lobe Expiratory Wheezes   Left Upper Lobe Expiratory Wheezes   Left Lower Lobe Expiratory Wheezes   Alarm Settings   Alarms On Y   Press Low Alarm 7 cmH2O   High Pressure Alarm 35 cmH2O   Apnea (secs) 20 secs   Resp Rate Low Alarm 10   High Respiratory Rate 40 br/min .

## 2023-10-10 NOTE — H&P ADULT - RESPIRATORY
clear to auscultation bilaterally/respirations non-labored/diminished breath sounds, L/diminished breath sounds, R

## 2023-10-10 NOTE — H&P ADULT - NSHPPHYSICALEXAM_GEN_ALL_CORE
Vital Signs Last 24 Hrs  T(C): 37.1 (10 Oct 2023 21:40), Max: 39.2 (10 Oct 2023 14:10)  T(F): 98.8 (10 Oct 2023 21:40), Max: 102.6 (10 Oct 2023 16:11)  HR: 62 (10 Oct 2023 21:40) (62 - 113)  BP: 111/46 (10 Oct 2023 21:40) (111/46 - 128/43)  BP(mean): 65 (10 Oct 2023 18:44) (65 - 71)  RR: 18 (10 Oct 2023 21:40) (18 - 20)  SpO2: 95% (10 Oct 2023 21:40) (95% - 99%)    Parameters below as of 10 Oct 2023 21:40  Patient On (Oxygen Delivery Method): room air

## 2023-10-10 NOTE — H&P ADULT - HISTORY OF PRESENT ILLNESS
1 y/o female with PMHx of Colon CA, lung CA, HTN, DM, chronic bilateral lymphedema, HH hospitalization for RLE cellulitis in August 2023 and 2 weeks later admitted again for dehydration with lactic acidosis due to diarrhea discharged then to [facility] for rehab, currently living at home for the past 2 weeks presents to the ED c/o fever. Pt follows at wound care center for bilateral lower extremity wounds due to lymphedema, was seen there yesterday and had normal treatment, states that they used a new wrap though. Pt states she woke up this morning feeling well and then throughout the day she has developed shaking chills. No cough, no SOB, no burning urination.    In ED /56        RR 20   T 102.5    97% sat RA          PAST MEDICAL HX  History of Cellulitis of right lower leg            PAST SURGICAL HX  History of Cholecystectomy  History of Hysterectomy (V88.01)  History of Laminectomy Lumbar  History of Lung lobectomy  History of Partial Colectomy  History of Rotator Cuff Repair              FAMILY HX  Family history of Bladder carcinoma : Mother  Family history of coronary artery disease (V17.3) (Z82.49) : Father       SOCIAL HX  Denies alcohol consumption   Does not use illicit drugs   Has no children  Never smoked cigarettes   Single   72 year old female w hx  Colon CA, SYLVIA  lung CA, DM II,  HTN, chronic bilateral lymphedema to lower extremities came to ED c/o fever and chills    Went to [facility] at  yesterday; saw Dr. Pardo and Dr. Bashir.  Tried new dressing yesterday. Pt felt great then and this AM   Then had + chills, +fatigue had fever at 12 noon and came to ED      adm RLE cellulitis and then  later admitted again for dehydration with lactic acidosis due to diarrhea;  Taken off metformin. DCd to [facility] for rehab,  Currently at home for the past 2 weeks         In ED /56        RR 20   T 102.5    97% sat RA  lower extremity cellulitis bilaterally  15K WBC  NS 2000 cc as 31 cc/kg  doxycycline 100 mg IV  vanco 1750 IV        PAST MEDICAL HX  History of Cellulitis of right lower leg   Chronic stasis dermatitis   Colon cancer   HTN (hypertension), benign   Hyperkalemia   Lymphedema of both lower extremities  Malignant neoplasm of upper lobe of left lung   Metabolic syndrome   Obesity, morbid (more than 100 lbs over ideal weight or BMI > 40)  Type 2 diabetes mellitus without complication, without long-term current use of insulin         PAST SURGICAL HX  History of Cholecystectomy  History of Hysterectomy (V88.01)  History of Laminectomy Lumbar  History of Lung lobectomy  History of Partial Colectomy  History of Rotator Cuff Repair              FAMILY HX  Family history of Bladder carcinoma : Mother  Family history of coronary artery disease (V17.3) (Z82.49) : Father       SOCIAL HX  Denies alcohol consumption   Does not use illicit drugs   Has no children  Never smoked cigarettes   Single

## 2023-10-10 NOTE — H&P ADULT - SKIN COMMENTS
chronic lymphedema w irregular surfaces and serous yellow drainage both lower extremities w fouls odor

## 2023-10-10 NOTE — ED PROVIDER NOTE - CLINICAL SUMMARY MEDICAL DECISION MAKING FREE TEXT BOX
71 y/o white female, multiple PMH  Colon CA, lung CA, HTN, DM, chronic bilateral lymphedema wound with wound care at [place] wound [facility], BIBA from home c/o fever, shaking chills, general weakness and failure to thrive, no SOB, no urinary complaints. Pt meets sepsis criteria. Plan: sepsis alert EKG, CXR, sepsis labs, Tylenol PO, BGM sepsis IV fluids, IV vancomycin discuss with pt doctor, Dr. Dean 73 y/o white female, multiple PMH  Colon CA, lung CA, HTN, DM, chronic bilateral lymphedema wound with wound care at [place] wound care, BIBA from home c/o fever, shaking chills, general weakness and failure to thrive, no SOB, no urinary complaints. Pt meets sepsis criteria.   Plan: sepsis alert EKG, CXR, sepsis labs, Tylenol PO, BGM sepsis IV fluids, IV vancomycin/Doxy; discuss with pt doctor, Dr. Dean    17:30, JANES Zambrano MD:  Labs notable for elev. WBC 15.5, elev. lactate 2.4.  Dr. Dean aware but medically not taking admissions today.  Dr. Lozano aware of Med admission. 71 y/o white female, multiple PMH  Colon CA, lung CA, HTN, DM, chronic bilateral lymphedema wound with wound care at [place] wound care, BIBA from home c/o fever, shaking chills, general weakness and failure to thrive, no SOB, no urinary complaints. Pt meets sepsis criteria.   Plan: sepsis alert EKG, CXR, sepsis labs, Tylenol PO, BGM, sepsis IV fluids, IV vancomycin/Doxy (+ ceph. allergy); discuss with pt doctor, Dr. Dean    17:30, JANES Zambrano MD:  Labs notable for elev. WBC 15.5, elev. lactate 2.4.  Dr. Dean aware but medically not taking admissions today.  Dr. Lozano aware of Med admission.

## 2023-10-10 NOTE — PATIENT PROFILE ADULT - FALL HARM RISK - HARM RISK INTERVENTIONS

## 2023-10-10 NOTE — H&P ADULT - ASSESSMENT
72 year old female w hx  Colon CA, SYLVIA  lung CA, DM II,  HTN, chronic bilateral lymphedema to lower extremities came to ED c/o fever and chills        #Sepsis  T 102.5, tachy 113 w   #Cellulitis : both lower extremites  #Leukocytosis 15 K  #Chronic lower extremities w lymphedema  1. admit to med  2. follow up cx  3. s/p NS 2000 cc as 31 cc/kg  4. continue NS @ 125 cc/hr  5. doxycycline 100 mg IV q 12   6. Vanco 1750 mg in ED  7. vanco 2000 mg q 12  8. ID consult  9. Podiatry consult  10. dressings changed tonight on floor      #DM II  1. hold Januvia  2. BGM  3. ISS  4. Hb A1c      #HTN  on no meds  1. reg diet  2. monitor BP      #VTE  enoxaparin        #80 minutes     72 year old female w hx  Colon CA, SYLVIA  lung CA, DM II,  HTN, chronic bilateral lymphedema to lower extremities came to ED c/o fever and chills        #Sepsis  T 102.5, tachy 113 w   #Cellulitis : both lower extremites  #Leukocytosis 15 K  #Chronic lower extremities w lymphedema  1. admit to med  2. follow up cx  3. s/p NS 2000 cc as 31 cc/kg  4. continue NS @ 125 cc/hr  5. doxycycline 100 mg IV q 12   6. Vanco 1750 mg in ED  7. vanco 2000 mg q 12  8. ID consult  9. Podiatry consult  10. dressings changed tonight on floor      #Elevated lactic acid  due to above  2.4 to 1.7 after IVF  1. IV NS      #Dehydration/hypovolemia  poor oral intake today  +fever and sepsis  pt states has not urinated  1. NS 2000 cc  2. continue IVF as ordered        #DM II  1. hold Januvia  2. BGM  3. ISS  4. Hb A1c      #HTN  on no meds  1. reg diet  2. monitor BP      #VTE  enoxaparin        #80 minutes     72 year old female w hx  Colon CA, SYLVIA  lung CA, DM II,  HTN, chronic bilateral lymphedema to lower extremities came to ED c/o fever and chills        #Sepsis  T 102.5, tachy 113 w   #Cellulitis : both lower extremites  #Leukocytosis 15 K  #Chronic lower extremities w lymphedema  1. admit to med  2. follow up cx  3. s/p NS 2000 cc as 31 cc/kg  4. continue NS @ 125 cc/hr  5. doxycycline 100 mg IV q 12   6. Vanco 1750 mg in ED  7. vanco 2000 mg q 12  8. ID consult  9. Podiatry consult  10. dressings changed tonight on floor      #Elevated lactic acid  due to above  2.4 to 1.7 after IVF  1. IV NS      #Dehydration/hypovolemia  poor oral intake today  +fever and sepsis  pt states has not urinated  1. NS 2000 cc  2. continue IVF as ordered        #DM II  1. hold Januvia  2. BGM  3. ISS  4. Hb A1c      #HTN  on no meds  1. reg diet  2. monitor BP      #Hypoalbuminemia  #Obesity w BMI 46  1. nutrition consult      #VTE  enoxaparin        #80 minutes

## 2023-10-10 NOTE — ED ADULT NURSE NOTE - OBJECTIVE STATEMENT
DELON VYAS AT WOUND CENTER C/O FEVERS, TMAX 102 . CHILLS, WEAKNESS. SEEN AT [facility] WOUND CARE AND DRESSING CHANGED. DENIES CP/SOB/N/V/D.

## 2023-10-10 NOTE — ED PROVIDER NOTE - CARE PLAN
1 Principal Discharge DX:	Cellulitis of right foot  Secondary Diagnosis:	Lymphedema of leg  Secondary Diagnosis:	Sepsis, unspecified organism

## 2023-10-10 NOTE — PATIENT PROFILE ADULT - FUNCTIONAL ASSESSMENT - BASIC MOBILITY ASSESSMENT TYPE
possibility of labrum injury, right trapezius strain. Rx Tramadol given. Referral orthopedic follow up with Dr. Leidy Bolden given. Pain improved post medication. Discussed Dx, Tx, Rx, follow up, reasons to return to ED patient states understanding and denies any questions. PROCEDURES:    Procedures      FINAL IMPRESSION      1. Right shoulder pain, unspecified chronicity Stable   2. Strain of right shoulder, initial encounter Stable         DISPOSITION/PLAN   DISPOSITION Discharge - Pending Orders Complete 05/01/2023 01:28:57 PM      PATIENT REFERRED TO:  Tracey Mcgrath MD  [id number]   THE [facility] [phone]    In 2 days      [facility] ED  [address] [facility] [address] [phone]    If symptoms worsen    Tracey Mcgrath MD  [id number]   [facility] [phone]            DISCHARGE MEDICATIONS:  New Prescriptions    TRAMADOL (ULTRAM) 50 MG TABLET    Take 1 tablet by mouth every 6 hours as needed for Pain for up to 3 days. Intended supply: 3 days.  Take lowest dose possible to manage pain Max Daily Amount: 200 mg       (Please note that portions of this note were completed with a voice recognition program.  Efforts were made to edit the dictations but occasionally words are mis-transcribed.)    SATYA Chase CNP, APRN - CNP  05/01/23 4644 Admission

## 2023-10-10 NOTE — ED PROVIDER NOTE - OBJECTIVE STATEMENT
71 y/o female with PMHx of Colon CA, lung CA, HTN, DM, chronic bilateral lymphedema,  hospitalization for RLE cellulitis in August 2023 and 2 weeks later admitted again for dehydration with lactic acidosis due to diarrhea discharged then to [facility] for rehab, currently living at home for the past 2 weeks presents to the ED c/o fever. Pt follows at wound care center for bilateral lower extremity wounds due to lymphedema, was seen there yesterday and had normal treatment, states that they used a new wrap though. Pt states she woke up this morning feeling well and then throughout the day she has developed shaking chills. No cough, no SOB, no burning urination.    Wound care: Stephen  PMd: Dianne 71 y/o female with PMHx of colon CA, lung CA, HTN, DM, chronic bilateral lymphedema,  hospitalization for RLE cellulitis in August 2023 and 2 weeks later admitted again for dehydration with lactic acidosis due to diarrhea discharged then to [facility] for rehab, currently living at home past 2 weeks presents to the ED c/o fever. Pt follows at  [facility] for bilateral lower extremity wounds due to her lymphedema, was seen there yesterday and had normal treatment, states that they used a new wrap though. Pt states she woke up this morning feeling well and then throughout the day she has developed shaking chills, malaise, general weakness. No cough, no SOB, no burning urination.    Wound care: Stephen  PMd: Dianne

## 2023-10-10 NOTE — ED ADULT NURSE REASSESSMENT NOTE - NS ED NURSE REASSESS COMMENT FT1
Received patient at 1915 in ER bed #4. Patient a & ox4, respirations even and unlabored on room air Await MD méndez.

## 2023-10-10 NOTE — ED ADULT TRIAGE NOTE - CHIEF COMPLAINT QUOTE
Pt. BIBEMS from home c/o fevers. Pt. reports feeling "shaky" starting at 12pm. Pt. has no other complaints at this time. Pt. has 102.5F temp in triage

## 2023-10-11 LAB
ANION GAP SERPL CALC-SCNC: 5 MMOL/L — SIGNIFICANT CHANGE UP (ref 5–17)
ANISOCYTOSIS BLD QL: SLIGHT — SIGNIFICANT CHANGE UP
APPEARANCE UR: ABNORMAL
BACTERIA # UR AUTO: ABNORMAL /HPF
BASOPHILS # BLD AUTO: 0.05 K/UL — SIGNIFICANT CHANGE UP (ref 0–0.2)
BASOPHILS NFR BLD AUTO: 0.5 % — SIGNIFICANT CHANGE UP (ref 0–2)
BILIRUB UR-MCNC: ABNORMAL
BUN SERPL-MCNC: 18 MG/DL — SIGNIFICANT CHANGE UP (ref 7–23)
CALCIUM SERPL-MCNC: 7.9 MG/DL — LOW (ref 8.5–10.1)
CAST: SIGNIFICANT CHANGE UP /LPF
CHLORIDE SERPL-SCNC: 110 MMOL/L — HIGH (ref 96–108)
CO2 SERPL-SCNC: 21 MMOL/L — LOW (ref 22–31)
COLOR SPEC: ABNORMAL
CREAT SERPL-MCNC: 1.02 MG/DL — SIGNIFICANT CHANGE UP (ref 0.5–1.3)
DIFF PNL FLD: NEGATIVE — SIGNIFICANT CHANGE UP
EGFR: 58 ML/MIN/1.73M2 — LOW
EOSINOPHIL # BLD AUTO: 0.05 K/UL — SIGNIFICANT CHANGE UP (ref 0–0.5)
EOSINOPHIL NFR BLD AUTO: 0.5 % — SIGNIFICANT CHANGE UP (ref 0–6)
GLUCOSE BLDC GLUCOMTR-MCNC: 119 MG/DL — HIGH (ref 70–99)
GLUCOSE BLDC GLUCOMTR-MCNC: 121 MG/DL — HIGH (ref 70–99)
GLUCOSE BLDC GLUCOMTR-MCNC: 143 MG/DL — HIGH (ref 70–99)
GLUCOSE BLDC GLUCOMTR-MCNC: 164 MG/DL — HIGH (ref 70–99)
GLUCOSE SERPL-MCNC: 154 MG/DL — HIGH (ref 70–99)
GLUCOSE UR QL: NEGATIVE MG/DL — SIGNIFICANT CHANGE UP
GRAM STN FLD: SIGNIFICANT CHANGE UP
HCT VFR BLD CALC: 30.7 % — LOW (ref 34.5–45)
HGB BLD-MCNC: 10.1 G/DL — LOW (ref 11.5–15.5)
IMM GRANULOCYTES NFR BLD AUTO: 0.4 % — SIGNIFICANT CHANGE UP (ref 0–0.9)
KETONES UR-MCNC: NEGATIVE MG/DL — SIGNIFICANT CHANGE UP
LEUKOCYTE ESTERASE UR-ACNC: ABNORMAL
LYMPHOCYTES # BLD AUTO: 0.56 K/UL — LOW (ref 1–3.3)
LYMPHOCYTES # BLD AUTO: 5.2 % — LOW (ref 13–44)
MANUAL SMEAR VERIFICATION: SIGNIFICANT CHANGE UP
MCHC RBC-ENTMCNC: 28 PG — SIGNIFICANT CHANGE UP (ref 27–34)
MCHC RBC-ENTMCNC: 32.9 GM/DL — SIGNIFICANT CHANGE UP (ref 32–36)
MCV RBC AUTO: 85 FL — SIGNIFICANT CHANGE UP (ref 80–100)
METHOD TYPE: SIGNIFICANT CHANGE UP
MONOCYTES # BLD AUTO: 0.63 K/UL — SIGNIFICANT CHANGE UP (ref 0–0.9)
MONOCYTES NFR BLD AUTO: 5.8 % — SIGNIFICANT CHANGE UP (ref 2–14)
NEUTROPHILS # BLD AUTO: 9.52 K/UL — HIGH (ref 1.8–7.4)
NEUTROPHILS NFR BLD AUTO: 87.6 % — HIGH (ref 43–77)
NITRITE UR-MCNC: POSITIVE
P AERUGINOSA DNA BLD POS NAA+NON-PROBE: SIGNIFICANT CHANGE UP
PH UR: 5.5 — SIGNIFICANT CHANGE UP (ref 5–8)
PLAT MORPH BLD: NORMAL — SIGNIFICANT CHANGE UP
PLATELET # BLD AUTO: 86 K/UL — LOW (ref 150–400)
POTASSIUM SERPL-MCNC: 3.8 MMOL/L — SIGNIFICANT CHANGE UP (ref 3.5–5.3)
POTASSIUM SERPL-SCNC: 3.8 MMOL/L — SIGNIFICANT CHANGE UP (ref 3.5–5.3)
PROT UR-MCNC: SIGNIFICANT CHANGE UP MG/DL
RBC # BLD: 3.61 M/UL — LOW (ref 3.8–5.2)
RBC # FLD: 15.6 % — HIGH (ref 10.3–14.5)
RBC BLD AUTO: SIGNIFICANT CHANGE UP
RBC CASTS # UR COMP ASSIST: 4 /HPF — SIGNIFICANT CHANGE UP (ref 0–4)
SODIUM SERPL-SCNC: 136 MMOL/L — SIGNIFICANT CHANGE UP (ref 135–145)
SP GR SPEC: 1.02 — SIGNIFICANT CHANGE UP (ref 1–1.03)
SPECIMEN SOURCE: SIGNIFICANT CHANGE UP
SQUAMOUS # UR AUTO: 26 /HPF — HIGH (ref 0–5)
UROBILINOGEN FLD QL: 1 MG/DL — SIGNIFICANT CHANGE UP (ref 0.2–1)
WBC # BLD: 10.85 K/UL — HIGH (ref 3.8–10.5)
WBC # FLD AUTO: 10.85 K/UL — HIGH (ref 3.8–10.5)
WBC UR QL: 53 /HPF — HIGH (ref 0–5)

## 2023-10-11 PROCEDURE — 99233 SBSQ HOSP IP/OBS HIGH 50: CPT

## 2023-10-11 RX ORDER — PIPERACILLIN AND TAZOBACTAM 4; .5 G/20ML; G/20ML
3.38 INJECTION, POWDER, LYOPHILIZED, FOR SOLUTION INTRAVENOUS ONCE
Refills: 0 | Status: COMPLETED | OUTPATIENT
Start: 2023-10-11 | End: 2023-10-11

## 2023-10-11 RX ORDER — PIPERACILLIN AND TAZOBACTAM 4; .5 G/20ML; G/20ML
3.38 INJECTION, POWDER, LYOPHILIZED, FOR SOLUTION INTRAVENOUS EVERY 8 HOURS
Refills: 0 | Status: DISCONTINUED | OUTPATIENT
Start: 2023-10-11 | End: 2023-10-16

## 2023-10-11 RX ORDER — VANCOMYCIN HCL 1 G
1500 VIAL (EA) INTRAVENOUS EVERY 12 HOURS
Refills: 0 | Status: DISCONTINUED | OUTPATIENT
Start: 2023-10-11 | End: 2023-10-12

## 2023-10-11 RX ORDER — ENOXAPARIN SODIUM 100 MG/ML
40 INJECTION SUBCUTANEOUS EVERY 12 HOURS
Refills: 0 | Status: DISCONTINUED | OUTPATIENT
Start: 2023-10-11 | End: 2023-10-16

## 2023-10-11 RX ADMIN — Medication 300 MILLIGRAM(S): at 22:23

## 2023-10-11 RX ADMIN — SODIUM CHLORIDE 125 MILLILITER(S): 9 INJECTION INTRAMUSCULAR; INTRAVENOUS; SUBCUTANEOUS at 07:00

## 2023-10-11 RX ADMIN — ENOXAPARIN SODIUM 40 MILLIGRAM(S): 100 INJECTION SUBCUTANEOUS at 09:26

## 2023-10-11 RX ADMIN — SODIUM CHLORIDE 125 MILLILITER(S): 9 INJECTION INTRAMUSCULAR; INTRAVENOUS; SUBCUTANEOUS at 17:02

## 2023-10-11 RX ADMIN — ENOXAPARIN SODIUM 40 MILLIGRAM(S): 100 INJECTION SUBCUTANEOUS at 22:35

## 2023-10-11 RX ADMIN — Medication 650 MILLIGRAM(S): at 22:54

## 2023-10-11 RX ADMIN — Medication 650 MILLIGRAM(S): at 22:24

## 2023-10-11 RX ADMIN — PIPERACILLIN AND TAZOBACTAM 25 GRAM(S): 4; .5 INJECTION, POWDER, LYOPHILIZED, FOR SOLUTION INTRAVENOUS at 17:02

## 2023-10-11 RX ADMIN — Medication 110 MILLIGRAM(S): at 09:26

## 2023-10-11 RX ADMIN — PIPERACILLIN AND TAZOBACTAM 200 GRAM(S): 4; .5 INJECTION, POWDER, LYOPHILIZED, FOR SOLUTION INTRAVENOUS at 10:46

## 2023-10-11 NOTE — PROGRESS NOTE ADULT - SUBJECTIVE AND OBJECTIVE BOX
SUBJECTIVE:    CHIEF COMPLAINT:  Patient is a 72y old  Female who presents with a chief complaint of Cellulitis of right lower extremity    HPI:  72 year old female w hx  Colon CA, SYLVIA  lung CA, DM II,  HTN, chronic bilateral lymphedema to lower extremities came to ED c/o fever and chills    [facility] to [facility] at   saw Dr. Pardo and Dr. Bashir.  Tried new dressing yesterday. Pt felt great then and this AM   Then had + chills, +fatigue had fever at 12 noon and came to ED      adm RLE cellulitis and then  later admitted again for dehydration with lactic acidosis due to diarrhea;  Taken off metformin. DCd to [facility] for rehab,  Currently at home for the past 2 weeks     In ED /56        RR 20   T 102.5    97% sat RA  lower extremity cellulitis bilaterally  15K WBC  NS 2000 cc as 31 cc/kg  doxycycline 100 mg IV  vanco 1750 IV    PAST MEDICAL HX  History of Cellulitis of right lower leg   Chronic stasis dermatitis   Colon cancer   HTN (hypertension), benign   Hyperkalemia   Lymphedema of both lower extremities  Malignant neoplasm of upper lobe of left lung   Metabolic syndrome   Obesity, morbid (more than 100 lbs over ideal weight or BMI > 40)  Type 2 diabetes mellitus without complication, without long-term current use of insulin       PAST SURGICAL HX  History of Cholecystectomy  History of Hysterectomy (V88.01)  History of Laminectomy Lumbar  History of Lung lobectomy  History of Partial Colectomy  History of Rotator Cuff Repair     FAMILY HX  Family history of Bladder carcinoma : Mother  Family history of coronary artery disease (V17.3) (Z82.49) : Father     SOCIAL HX  Denies alcohol consumption   Does not use illicit drugs   Has no children  Never smoked cigarettes   Single    Interval HPI and Overnight Events: Pt feeling better. Still with foul smelling legs. No further chills or fever.    REVIEW OF SYSTEMS:  CONSTITUTIONAL: No weakness, fevers or chills  EYES/ENT: No visual changes;  No vertigo or throat pain   NECK: No pain or stiffness  RESPIRATORY: No cough, wheezing, hemoptysis; No shortness of breath  CARDIOVASCULAR: No chest pain or palpitations  GASTROINTESTINAL: No abdominal or epigastric pain. No nausea, vomiting, or hematemesis; No diarrhea or constipation. No melena or hematochezia.  GENITOURINARY: No dysuria, frequency or hematuria  NEUROLOGICAL: No numbness or weakness  All other review of systems is negative unless indicated above    OBJECTIVE    PHYSICAL EXAM:  Vital Signs Last 24 Hrs  T(C): 37.1 (11 Oct 2023 07:41), Max: 39.2 (10 Oct 2023 16:11)  T(F): 98.8 (11 Oct 2023 07:41), Max: 102.6 (10 Oct 2023 16:11)  HR: 81 (11 Oct 2023 07:41) (62 - 95)  BP: 108/46 (11 Oct 2023 07:41) (108/46 - 128/43)  BP(mean): 65 (10 Oct 2023 18:44) (65 - 71)  RR: 18 (11 Oct 2023 07:41) (18 - 18)  SpO2: 93% (11 Oct 2023 07:) (93% - 99%)    Parameters below as of 11 Oct 2023 07:41  Patient On (Oxygen Delivery Method): room air    Constitutional: NAD, awake and alert, well-developed  HEENT: PERR, EOMI, Normal Hearing, MMM  Neck: Soft and supple, No LAD, No JVD  Respiratory: Breath sounds are clear bilaterally, No wheezing, rales or rhonchi  Cardiovascular: S1 and S2, regular rate and rhythm, no Murmurs, gallops or rubs  Gastrointestinal: Bowel Sounds present, soft, nontender, nondistended, no guarding, no rebound  Extremities: severe chronic stasis dermatitis, lymphedema  Neurological: A/O x 3, no focal deficits  Musculoskeletal: 5/5 strength b/l upper and lower extremities  Skin: No rashes    MEDICATIONS  (STANDING):  dextrose 5%. 1000 milliLiter(s) (50 mL/Hr) IV Continuous <Continuous>  dextrose 5%. 1000 milliLiter(s) (100 mL/Hr) IV Continuous <Continuous>  dextrose 50% Injectable 25 Gram(s) IV Push once  dextrose 50% Injectable 25 Gram(s) IV Push once  dextrose 50% Injectable 12.5 Gram(s) IV Push once  enoxaparin Injectable 40 milliGRAM(s) SubCutaneous every 12 hours  glucagon  Injectable 1 milliGRAM(s) IntraMuscular once  influenza  Vaccine (HIGH DOSE) 0.7 milliLiter(s) IntraMuscular once  insulin lispro (ADMELOG) corrective regimen sliding scale   SubCutaneous three times a day before meals  insulin lispro (ADMELOG) corrective regimen sliding scale   SubCutaneous at bedtime  piperacillin/tazobactam IVPB.- 3.375 Gram(s) IV Intermittent once  piperacillin/tazobactam IVPB.. 3.375 Gram(s) IV Intermittent every 8 hours  sodium chloride 0.9%. 1000 milliLiter(s) (125 mL/Hr) IV Continuous <Continuous>  vancomycin  IVPB 1500 milliGRAM(s) IV Intermittent every 12 hours      LABS:                         9.9    15.53 )-----------( 91       ( 10 Oct 2023 15:20 )             29.8     1010    133<L>  |  106  |  14  ----------------------------<  166<H>  4.4   |  22  |  0.97    Ca    8.3<L>      10 Oct 2023 15:20    TPro  6.2  /  Alb  2.6<L>  /  TBili  1.3<H>  /  DBili  x   /  AST  34  /  ALT  15  /  AlkPhos  74  10-10    Urinalysis Basic - ( 11 Oct 2023 06:00 )    Color: Orange / Appearance: Cloudy / S.023 / pH: x  Gluc: x / Ketone: Negative mg/dL  / Bili: Small / Urobili: 1.0 mg/dL   Blood: x / Protein: Trace mg/dL / Nitrite: Positive   Leuk Esterase: Small / RBC: 4 /HPF / WBC 53 /HPF   Sq Epi: x / Non Sq Epi: 26 /HPF / Bacteria: Many /HPF      PT/INR - ( 10 Oct 2023 15:20 )   PT: 13.2 sec;   INR: 1.17 ratio    PTT - ( 10 Oct 2023 15:20 )  PTT:26.8 sec    Specimen Source .Blood Blood-Peripheral   10-10 @ 15:20  Culture Results  Growth in aerobic bottle: Gram Negative Rods  Direct identification is available within approximately 3-5  hours either by Blood Panel Multiplexed PCR or Direct  MALDI-TOF. Details: [email or web address]    URINE CULTURE    10-10 @ 15:20    CULTURE RESULTS   Growth in aerobic bottle: Gram Negative Rods  Direct identification is available within approximately 3-5  hours either by Blood Panel Multiplexed PCR or Direct  MALDI-TOF. Details: [email or web address]    CAPILLARY BLOOD GLUCOSE  POCT Blood Glucose.: 119 mg/dL (11 Oct 2023 12:03)  POCT Blood Glucose.: 121 mg/dL (11 Oct 2023 08:03)

## 2023-10-11 NOTE — DIETITIAN INITIAL EVALUATION ADULT - ORAL INTAKE PTA/DIET HISTORY
Pt lives at home alone; w/ part-time aide 4 times/week that helps cook/grocery shop. Reports small appetite and also intentionally eating less to lose weight; 24-hr recall reveals likely consuming <75% of ENN. Does not consume any ONS at home. Frequently goes to wound care center for lymphedema; receives meals via "[employer] needs" meals get delivered and pt has someone put them away. W/ T2DM - doesn't monitor BGL at home, takes DM oral medication - natalyuvia.

## 2023-10-11 NOTE — CONSULT NOTE ADULT - SUBJECTIVE AND OBJECTIVE BOX
Patient is a 72y old  Female who presents with a chief complaint of sepsis (10 Oct 2023 22:36)    HPI:  72 year old female w hx  Colon CA, SYLVIA  lung CA, DM II,  HTN, chronic bilateral lymphedema to lower extremities came to ED c/o fever and chills Went to [facility] at   saw Dr. Pardo and Dr. Bashir.  Tried new dressing. Pt felt great then and this AM Then had + chills, +fatigue had fever at 12 noon and came to ED   adm RLE cellulitis and then  later admitted again for dehydration with lactic acidosis due to diarrhea;  Taken off metformin. DCd to [facility] for rehab, Currently at home for the past 2 weeks In ED /56       RR 20  T 102.5    97% sat RA lower extremity cellulitis bilaterally 15K WBC NS 2000 cc as 31 cc/kg doxycycline 100 mg IV vanco 1750 IV.         PAST MEDICAL HX  History of Cellulitis of right lower leg   Chronic stasis dermatitis   Colon cancer   HTN (hypertension), benign   Hyperkalemia   Lymphedema of both lower extremities  Malignant neoplasm of upper lobe of left lung   Metabolic syndrome   Obesity, morbid (more than 100 lbs over ideal weight or BMI > 40)  Type 2 diabetes mellitus without complication, without long-term current use of insulin         PAST SURGICAL HX  History of Cholecystectomy  History of Hysterectomy (V88.01)  History of Laminectomy Lumbar  History of Lung lobectomy  History of Partial Colectomy  History of Rotator Cuff Repair           Meds: per reconciliation sheet, noted below  MEDICATIONS  (STANDING):  dextrose 5%. 1000 milliLiter(s) (50 mL/Hr) IV Continuous <Continuous>  dextrose 5%. 1000 milliLiter(s) (100 mL/Hr) IV Continuous <Continuous>  dextrose 50% Injectable 25 Gram(s) IV Push once  dextrose 50% Injectable 25 Gram(s) IV Push once  dextrose 50% Injectable 12.5 Gram(s) IV Push once  enoxaparin Injectable 40 milliGRAM(s) SubCutaneous every 12 hours  glucagon  Injectable 1 milliGRAM(s) IntraMuscular once  influenza  Vaccine (HIGH DOSE) 0.7 milliLiter(s) IntraMuscular once  insulin lispro (ADMELOG) corrective regimen sliding scale   SubCutaneous three times a day before meals  piperacillin/tazobactam IVPB. 3.375 Gram(s) IV Intermittent once  piperacillin/tazobactam IVPB.- 3.375 Gram(s) IV Intermittent once  piperacillin/tazobactam IVPB.. 3.375 Gram(s) IV Intermittent every 8 hours  sodium chloride 0.9%. 1000 milliLiter(s) (125 mL/Hr) IV Continuous <Continuous>  vancomycin  IVPB 1500 milliGRAM(s) IV Intermittent every 12 hours    Allergies    Keflex (Anaphylaxis)  cephalexin (Unknown)    Intolerances      Social: no smoking, no alcohol, no illegal drugs; no recent travel, no exposure to TB  FAMILY HISTORY:     no history of premature cardiovascular disease in first degree relatives    ROS: +fever,  chills, no HA, no dizziness, no sore throat, no blurry vision, no CP, no palpitations, no SOB, no cough, no abdominal pain, no diarrhea, no N/V, no dysuria, no leg pain, no claudication, no rash, no joint aches, no rectal pain or bleeding, no night sweats    All other systems reviewed and are negative    Vital Signs Last 24 Hrs  T(C): 37.1 (11 Oct 2023 07:41), Max: 39.2 (10 Oct 2023 14:10)  T(F): 98.8 (11 Oct 2023 07:41), Max: 102.6 (10 Oct 2023 16:11)  HR: 81 (11 Oct 2023 07:41) (62 - 113)  BP: 108/46 (11 Oct 2023 07:41) (108/46 - 128/43)  BP(mean): 65 (10 Oct 2023 18:44) (65 - 71)  RR: 18 (11 Oct 2023 07:41) (18 - 20)  SpO2: 93% (11 Oct 2023 07:41) (93% - 99%)    Parameters below as of 11 Oct 2023 07:41  Patient On (Oxygen Delivery Method): room air      Daily Height in cm: 172.72 (10 Oct 2023 14:10)    Daily     PE:  Constitutional: NAD   HEENT: NC/AT, EOMI, PERRLA, conjunctivae clear; ears and nose atraumatic; pharynx benign  Neck: supple; thyroid not palpable  Back: no tenderness  Respiratory: respiratory effort normal; clear to auscultation  Cardiovascular: S1S2 regular, no murmurs  Abdomen: soft, not tender, not distended, positive BS; liver and spleen WNL  Genitourinary: no suprapubic tenderness  Lymphatic: no LN palpable  Musculoskeletal: no muscle tenderness, no joint swelling or tenderness  Extremities: no pedal edema bilateral LE erythema warmth tenderness lymphedema  Neurological/ Psychiatric: AxOx3, Judgement and insight normal;  moving all extremities  Skin: no rashes; no palpable lesions    Labs: all available labs reviewed                        9.9    15.53 )-----------( 91       ( 10 Oct 2023 15:20 )             29.8     10-10    133<L>  |  106  |  14  ----------------------------<  166<H>  4.4   |  22  |  0.97    Ca    8.3<L>      10 Oct 2023 15:20    TPro  6.2  /  Alb  2.6<L>  /  TBili  1.3<H>  /  DBili  x   /  AST  34  /  ALT  15  /  AlkPhos  74  10-10     LIVER FUNCTIONS - ( 10 Oct 2023 15:20 )  Alb: 2.6 g/dL / Pro: 6.2 gm/dL / ALK PHOS: 74 U/L / ALT: 15 U/L / AST: 34 U/L / GGT: x           Urinalysis Basic - ( 11 Oct 2023 06:00 )    Color: Orange / Appearance: Cloudy / S.023 / pH: x  Gluc: x / Ketone: Negative mg/dL  / Bili: Small / Urobili: 1.0 mg/dL   Blood: x / Protein: Trace mg/dL / Nitrite: Positive   Leuk Esterase: Small / RBC: 4 /HPF / WBC 53 /HPF   Sq Epi: x / Non Sq Epi: 26 /HPF / Bacteria: Many /HPF          Radiology: all available radiological tests reviewed  < from: Xray Chest 1 View-PORTABLE IMMEDIATE (23 @ 16:28) >    ACC: [id number] EXAM:  XR CHEST PORTABLE IMMED 1V   ORDERED BY: RHIANNA SHULTZ     PROCEDURE DATE:  2023          INTERPRETATION:  AP chest on 2023 at 3:35 PM. Patient has   weakness. There is history of lung and colon cancer.    Heart magnified by technique. Right-sided port is noted.    There is hazy density over the left medial chest again noted.    Findings are similar to  this year. Exact significance of left   lung findings are uncertain.    IMPRESSION: Stable hazy density over the left medial chest.        ACC: [id number] EXAM:  US DPLX LWR EXT VEINS COMPL BI   ORDERED BY: TROY BROWN     PROCEDURE DATE:  2023          INTERPRETATION:  CLINICAL INFORMATION: Leg swelling.    COMPARISON: Venous Doppler dated 2022.    TECHNIQUE: Duplex sonography of the BILATERAL LOWER extremity veins with   color and spectral Doppler, with and without compression.    FINDINGS:    RIGHT:  Normal compressibility of the RIGHT common femoral, femoral and popliteal   veins.  Doppler examination shows normal spontaneous and phasic flow.  Limited visualized of the RIGHT calf veins. No RIGHT calf vein thrombosis   detected.    LEFT:  Normal compressibility of the LEFT common femoral, femoral and popliteal   veins.  Doppler examination shows normal spontaneous and phasic flow.  Limited visualized of the LEFT calf veins. No LEFT calf vein thrombosis   detected.    IMPRESSION:  No evidence of deep venous thrombosis in either lower extremity.    Limited visualization of the calf veins.        Advanced directives addressed: full resuscitation

## 2023-10-11 NOTE — PROGRESS NOTE ADULT - ASSESSMENT
72 year old female w hx  Colon CA, SYLVIA  lung CA, DM II,  HTN, chronic bilateral lymphedema to lower extremities came to ED c/o fever and chills    Assessment:  Gram Negative Sepsis and Bacteremia  Cellulitis  Lymphedema  Chronic and severe Stasis Dermatitis  Type 2 DM  Hypoalbuminemia  Obesity w BMI 46    Plan:  Zos;yn  Vancomycin  ID consult appreciated  Podiatry following  Follow cx  S/p NS 2000 cc as 31 cc/kg  Continue NS @ 125 cc/hr  Wound Care  hold Cooper RUFFINM's  ISS  nutrition consult  VTE proph with enoxaparin

## 2023-10-11 NOTE — CONSULT NOTE ADULT - ASSESSMENT
72 year old female w hx  Colon CA, SYLVIA  lung CA, DM II,  HTN, chronic bilateral lymphedema to lower extremities came to ED c/o fever and chills Went to [facility] at   saw Dr. Pardo and Dr. Bashir.  Tried new dressing. Pt felt great then and this AM Then had + chills, +fatigue had fever at 12 noon and came to ED   adm RLE cellulitis and then  later admitted again for dehydration with lactic acidosis due to diarrhea;  Taken off metformin. DCd to [facility] for rehab, Currently at home for the past 2 weeks In ED /56       RR 20  T 102.5    97% sat RA lower extremity cellulitis bilaterally 15K WBC NS 2000 cc as 31 cc/kg doxycycline 100 mg IV vanco 1750 IV.     1. Sepsis. RLE cellulitis. Chronic LE Lymphedema. Leukocytosis. Morbid obesity  - imaging reviewed   - agree with vancomycin adjust dose to [id number] check trough prior to 4th dose  - on zosyn [id number]   - continue with antibiotic coverage  - f/u cultures   - podiatry eval, wound care   - monitor temps  - tolerating abx well so far; no side effects noted  - reason for abx use and side effects reviewed with patient  - supportive care  - fu cbc    2. other issues - care per medicine

## 2023-10-11 NOTE — DIETITIAN INITIAL EVALUATION ADULT - NSFNSGIIOFT_GEN_A_CORE
I&O's Detail    10 Oct 2023 07:01  -  11 Oct 2023 07:00  --------------------------------------------------------  IN:    sodium chloride 0.9%: 975 mL  Total IN: 975 mL    OUT:  Total OUT: 0 mL    Total NET: 975 mL

## 2023-10-11 NOTE — DIETITIAN INITIAL EVALUATION ADULT - PERTINENT MEDS FT
MEDICATIONS  (STANDING):  dextrose 5%. 1000 milliLiter(s) (50 mL/Hr) IV Continuous <Continuous>  dextrose 5%. 1000 milliLiter(s) (100 mL/Hr) IV Continuous <Continuous>  dextrose 50% Injectable 25 Gram(s) IV Push once  dextrose 50% Injectable 25 Gram(s) IV Push once  dextrose 50% Injectable 12.5 Gram(s) IV Push once  enoxaparin Injectable 40 milliGRAM(s) SubCutaneous every 12 hours  glucagon  Injectable 1 milliGRAM(s) IntraMuscular once  influenza  Vaccine (HIGH DOSE) 0.7 milliLiter(s) IntraMuscular once  insulin lispro (ADMELOG) corrective regimen sliding scale   SubCutaneous at bedtime  insulin lispro (ADMELOG) corrective regimen sliding scale   SubCutaneous three times a day before meals  piperacillin/tazobactam IVPB.- 3.375 Gram(s) IV Intermittent once  piperacillin/tazobactam IVPB.. 3.375 Gram(s) IV Intermittent every 8 hours  sodium chloride 0.9%. 1000 milliLiter(s) (125 mL/Hr) IV Continuous <Continuous>  vancomycin  IVPB 1500 milliGRAM(s) IV Intermittent every 12 hours    MEDICATIONS  (PRN):  acetaminophen     Tablet .. 650 milliGRAM(s) Oral every 6 hours PRN Temp greater or equal to 38C (100.4F), Mild Pain (1 - 3)  aluminum hydroxide/magnesium hydroxide/simethicone Suspension 30 milliLiter(s) Oral every 4 hours PRN Dyspepsia  dextrose Oral Gel 15 Gram(s) Oral once PRN Blood Glucose LESS THAN 70 milliGRAM(s)/deciliter  melatonin 3 milliGRAM(s) Oral at bedtime PRN Insomnia  ondansetron Injectable 4 milliGRAM(s) IV Push every 8 hours PRN Nausea and/or Vomiting

## 2023-10-11 NOTE — DIETITIAN INITIAL EVALUATION ADULT - OTHER INFO
72 year old female w hx  Colon CA, SYLVIA  lung CA, DM II,  HTN, chronic bilateral lymphedema to lower extremities came to ED c/o fever and chills Went to [facility] at   saw Dr. Pardo and Dr. Bashir.  Tried new dressing. Pt felt great then and this AM Then had + chills, +fatigue had fever at 12 noon and came to ED   adm RLE cellulitis and then later admitted again for dehydration with lactic acidosis due to diarrhea; taken off metformin. D/C'd to [facility] for rehab  Admit for Sepsis, RLE cellulitis, chronic LE Lymphedema, morbid obesity     Known to nutr services and has NOT been dx'd w/ mal on any admission. Reports improved appetite, "loves the food and staff at ," likely w/ good PO intake since admit (x 1 day). Reports UBW of 265# x 1 mo ago; bed scale wt of 294# taken by RD on 10/11/23 - w/ non-pitting L/R edema. NFPE reveals no muscle/fat wasting - appears obese, w/ non-pitting edema could be skewing appearance/masking losses. Reports that she "feels like she lost weight, clothes are fitting looser." Wt hx as per EMR: 263# (bed scale wt taken by RD on 8/28/23), 255# w/ severe edema (taken by RD on 8/14/23), 291# w/ severe edema (taken by RD on 9/26/22). Wt gain of 31# x 1.5 mo - not clinically significant; wt gain could be due to shifts in fluid retention. W/ T2DM - hgb A1C of 7.5% on 8/13/23 - good glycemic control for age and POCTs WNL x 24 hrs (119, 121). Liberalize diet to DASH/TLC only to maximize caloric and nutrient intake 2/2 hgb A1C/POCTs WNL. Add premier protein shake 1x/day  (provides 160kcal, 30g protein) and Bravo BID (provides 90 kcal, 2.5 g collagen, 7 g L-Arginine, 7 g L-Glutamine/ 1 packet) to promote wound healing. Bravo is intended to support tissue building and collagen formation in the dietary management of wounds, which has been clinically shown to support wound healing (including pressure injuries, diabetic foot ulcers surgical incisions, burns, and other acute/chronic wounds) by enhancing collagen formation in as little as 2 weeks. See below for other recommendations.

## 2023-10-11 NOTE — CONSULT NOTE ADULT - SUBJECTIVE AND OBJECTIVE BOX
Date of Consult: 10/11/23  Chief Complaint : 72 year old female w hx  Colon CA, SYLVIA  lung CA, DM II,  HTN, chronic bilateral lymphedema to lower extremities came to ED c/o fever and chills [facility]   saw Dr. Pardo and Dr. Bashir.  Tried new dressing. Pt felt great then and this AM Then had + chills, +fatigue had fever at 12 noon and came to ED   adm RLE cellulitis and then  later admitted again for dehydration with lactic acidosis due to diarrhea;  Taken off metformin. DCd to [facility] for rehab, Currently at home for the past 2 weeks In ED /56       RR 20  T 102.5    97% sat RA lower extremity cellulitis bilaterally 15K WBC NS 2000 cc as 31 cc/kg doxycycline 100 mg IV vanco 1750 IV.      REVIEW OF SYSTEMS:  CONSTITUTIONAL: + Chills  EYES/ENT: No visual changes;  No vertigo or throat pain   NECK: No pain or stiffness  RESPIRATORY: No cough, wheezing, hemoptysis; No shortness of breath  CARDIOVASCULAR: No chest pain or palpitations  GASTROINTESTINAL: No abdominal or epigastric pain. No nausea, vomiting, or hematemesis; No diarrhea or constipation. No melena or hematochezia.  GENITOURINARY: No dysuria, frequency or hematuria  NEUROLOGICAL: No numbness or weakness  SKIN: See physical examination.  All other review of systems is negative unless indicated above    PMH: No pertinent past medical history    Lymphatic edema    Hypertension    Type 2 diabetes mellitus    Lung cancer    Colon cancer      PSH:    Allergies:Keflex (Anaphylaxis)  cephalexin (Unknown)      Vitals  T(F): 98.8 (10-11-23 @ 07:41), Max: 102.6 (10-10-23 @ 16:11)  HR: 81 (10-11-23 @ 07:41) (62 - 113)  BP: 108/46 (10-11-23 @ 07:41) (108/46 - 128/43)  RR: 18 (10-11-23 @ 07:41) (18 - 20)  SpO2: 93% (10-11-23 @ 07:41) (93% - 99%)  Wt(kg): --    MEDICATIONS  (STANDING):  dextrose 5%. 1000 milliLiter(s) (50 mL/Hr) IV Continuous <Continuous>  dextrose 5%. 1000 milliLiter(s) (100 mL/Hr) IV Continuous <Continuous>  dextrose 50% Injectable 25 Gram(s) IV Push once  dextrose 50% Injectable 25 Gram(s) IV Push once  dextrose 50% Injectable 12.5 Gram(s) IV Push once  enoxaparin Injectable 40 milliGRAM(s) SubCutaneous every 12 hours  glucagon  Injectable 1 milliGRAM(s) IntraMuscular once  influenza  Vaccine (HIGH DOSE) 0.7 milliLiter(s) IntraMuscular once  insulin lispro (ADMELOG) corrective regimen sliding scale   SubCutaneous at bedtime  insulin lispro (ADMELOG) corrective regimen sliding scale   SubCutaneous three times a day before meals  piperacillin/tazobactam IVPB.- 3.375 Gram(s) IV Intermittent once  piperacillin/tazobactam IVPB.. 3.375 Gram(s) IV Intermittent every 8 hours  sodium chloride 0.9%. 1000 milliLiter(s) (125 mL/Hr) IV Continuous <Continuous>  vancomycin  IVPB 1500 milliGRAM(s) IV Intermittent every 12 hours    MEDICATIONS  (PRN):  acetaminophen     Tablet .. 650 milliGRAM(s) Oral every 6 hours PRN Temp greater or equal to 38C (100.4F), Mild Pain (1 - 3)  aluminum hydroxide/magnesium hydroxide/simethicone Suspension 30 milliLiter(s) Oral every 4 hours PRN Dyspepsia  dextrose Oral Gel 15 Gram(s) Oral once PRN Blood Glucose LESS THAN 70 milliGRAM(s)/deciliter  melatonin 3 milliGRAM(s) Oral at bedtime PRN Insomnia  ondansetron Injectable 4 milliGRAM(s) IV Push every 8 hours PRN Nausea and/or Vomiting      Physical Exam:   Constitutiona: NAD, alert;  Derm:  Skin warm, dry and supple bilateral.  Ulceration to the _ measuring approx _, +/-hyperkeratotic border, wound base Granular/fibrotic/necrotic, +/- purulence,+/- fluctuance, +/- tracking/tunneling,  (+/-) probe to bone,+/- sero/sanguinous drainage, +/-  malodor, +/- periowound erythema, +/-streaking lymphangitis noted, +/-Increased skin pigmentation noted to b/l lower legs   Vascular: Dorsalis Pedis and Posterior Tibial pulses /4.  Capillary re-fill time less then 3 seconds digits 1-5 bilateral. +/- Pitting edema noted to b/l lower legs  Neuro: Protective sensation intact/diminished to the level of the digits bilateral.  MSK: Muscle strength 4/5 in all major muscle groups of Right lower extremity. 5/5 in all muscle groups of LLE;  .        Labs:                          9.9    15.53 )-----------( 91       ( 10 Oct 2023 15:20 )             29.8     WBC Trend  15.53<H> Date (10-10 @ 15:20)      Chem  10-10    133<L>  |  106  |  14  ----------------------------<  166<H>  4.4   |  22  |  0.97    Ca    8.3<L>      10 Oct 2023 15:20    TPro  6.2  /  Alb  2.6<L>  /  TBili  1.3<H>  /  DBili  x   /  AST  34  /  ALT  15  /  AlkPhos  74  10-10      Date of Service: 10/11/23  HPI: 72 year old female w hx  Colon CA, SYLVIA  lung CA, DM II,  HTN, chronic bilateral lymphedema to lower extremities came to ED c/o fever and chills [facility]   saw Dr. Mitchell and Dr. Bashir.  Tried new dressing. Pt felt great then and this AM Then had + chills, +fatigue had fever at 12 noon and came to ED   adm RLE cellulitis and then  later admitted again for dehydration with lactic acidosis due to diarrhea;  Taken off metformin. DCd to [facility] for [facility], Currently at home for the past 2 weeks In ED /56       RR 20  T 102.5    97% sat RA lower extremity cellulitis bilaterally 15K WBC NS 2000 cc as 31 cc/kg doxycycline 100 mg IV vanco 1750 IV.          PAST MEDICAL HX  History of Cellulitis of right lower leg   Chronic stasis dermatitis   Colon cancer   HTN (hypertension), benign   Hyperkalemia   Lymphedema of both lower extremities  Malignant neoplasm of upper lobe of left lung   Metabolic syndrome   Obesity, morbid (more than 100 lbs over ideal weight or BMI > 40)  Type 2 diabetes mellitus without complication, without long-term current use of insulin         PAST SURGICAL HX  History of Cholecystectomy  History of Hysterectomy (V88.01)  History of Laminectomy Lumbar  History of Lung lobectomy  History of Partial Colectomy  History of Rotator Cuff Repair              FAMILY HX  Family history of Bladder carcinoma : Mother  Family history of coronary artery disease (V17.3) (Z82.49) : Father       SOCIAL HX  Denies alcohol consumption   Does not use illicit drugs   Has no children  Never smoked cigarettes   Single   (10 Oct 2023 22:36)            PMH: No pertinent past medical history    Lymphatic edema    Hypertension    Type 2 diabetes mellitus    Lung cancer    Colon cancer      PSH:    Allergies:Keflex (Anaphylaxis)  cephalexin (Unknown)      Labs:                          9.9    15.53 )-----------( 91       ( 10 Oct 2023 15:20 )             29.8     WBC Trend  15.53<H> Date (10-10 @ 15:20)      Chem  10-10    133<L>  |  106  |  14  ----------------------------<  166<H>  4.4   |  22  |  0.97    Ca    8.3<L>      10 Oct 2023 15:20    TPro  6.2  /  Alb  2.6<L>  /  TBili  1.3<H>  /  DBili  x   /  AST  34  /  ALT  15  /  AlkPhos  74  10-10          T(F): 98.8 (10-11-23 @ 07:41), Max: 102.6 (10-10-23 @ 16:11)  HR: 81 (10-11-23 @ 07:41) (62 - 113)  BP: 108/46 (10-11-23 @ 07:41) (108/46 - 128/43)  RR: 18 (10-11-23 @ 07:41) (18 - 20)  SpO2: 93% (10-11-23 @ 07:41) (93% - 99%)  Wt(kg): --    REVIEW OF SYSTEMS:    CONSTITUTIONAL: No weakness, fevers or chills  EYES: No visual changes  RESPIRATORY: No cough, wheezing; No shortness of breath  CARDIOVASCULAR: No chest pain or palpitations  GASTROINTESTINAL: No abdominal or epigastric pain. No nausea, vomiting; No diarrhea or constipation.   GENITOURINARY: No dysuria, frequency or hematuria  NEUROLOGICAL: No numbness or weakness  SKIN: See physical examination.  All other review of systems is negative unless indicated above    Physical Exam:   Constitutional: NAD, alert;  Lower Extremity Focus  Derm:  Skin warm, dry and supple bilateral.    Significant generalized lichenification and verrucous changes to skin extending from below the knees to the feet bilaterally, cobblestone like papules and nodules extending from below knee to feet bilaterally. Skin firm to the touch and malodorous bilaterally. Significant hemosiderin deposition to BLE. Noted erythema to BLE.    No acute underlying ulcerations, no pus/purulence, no PTB.   Vascular: Dorsalis Pedis and Posterior Tibial pulses non palpable due to significant edema to BLE.  Capillary re-fill time less then 3 seconds digits 1-5 bilateral.    Neuro: Protective sensation intact to the level of the digits bilateral.  MSK: Muscle strength 5/5 all major muscle groups bilateral.                Date of Service: 10/11/23  HPI: 72 year old female w hx  Colon CA, SYLVIA  lung CA, DM II,  HTN, chronic bilateral lymphedema to lower extremities came to ED c/o fever and chills [facility]   saw Dr. Mitchell and Dr. Bashir.  Tried new dressing. Pt felt great then and this AM Then had + chills, +fatigue had fever at 12 noon and came to ED   adm RLE cellulitis and then  later admitted again for dehydration with lactic acidosis due to diarrhea;  Taken off metformin. DCd to [facility] for rehab, Currently at home for the past 2 weeks In ED /56       RR 20  T 102.5    97% sat RA lower extremity cellulitis bilaterally 15K WBC NS 2000 cc as 31 cc/kg doxycycline 100 mg IV vanco 1750 IV.          PAST MEDICAL HX  History of Cellulitis of right lower leg   Chronic stasis dermatitis   Colon cancer   HTN (hypertension), benign   Hyperkalemia   Lymphedema of both lower extremities  Malignant neoplasm of upper lobe of left lung   Metabolic syndrome   Obesity, morbid (more than 100 lbs over ideal weight or BMI > 40)  Type 2 diabetes mellitus without complication, without long-term current use of insulin         PAST SURGICAL HX  History of Cholecystectomy  History of Hysterectomy (V88.01)  History of Laminectomy Lumbar  History of Lung lobectomy  History of Partial Colectomy  History of Rotator Cuff Repair              FAMILY HX  Family history of Bladder carcinoma : Mother  Family history of coronary artery disease (V17.3) (Z82.49) : Father       SOCIAL HX  Denies alcohol consumption   Does not use illicit drugs   Has no children  Never smoked cigarettes   Single   (10 Oct 2023 22:36)            PMH: No pertinent past medical history    Lymphatic edema    Hypertension    Type 2 diabetes mellitus    Lung cancer    Colon cancer      PSH:    Allergies:Keflex (Anaphylaxis)  cephalexin (Unknown)      Labs:                          9.9    15.53 )-----------( 91       ( 10 Oct 2023 15:20 )             29.8     WBC Trend  15.53<H> Date (10-10 @ 15:20)      Chem  10-10    133<L>  |  106  |  14  ----------------------------<  166<H>  4.4   |  22  |  0.97    Ca    8.3<L>      10 Oct 2023 15:20    TPro  6.2  /  Alb  2.6<L>  /  TBili  1.3<H>  /  DBili  x   /  AST  34  /  ALT  15  /  AlkPhos  74  10-10          T(F): 98.8 (10-11-23 @ 07:41), Max: 102.6 (10-10-23 @ 16:11)  HR: 81 (10-11-23 @ 07:41) (62 - 113)  BP: 108/46 (10-11-23 @ 07:41) (108/46 - 128/43)  RR: 18 (10-11-23 @ 07:41) (18 - 20)  SpO2: 93% (10-11-23 @ 07:41) (93% - 99%)  Wt(kg): --    REVIEW OF SYSTEMS:    CONSTITUTIONAL: No weakness, fevers or chills  EYES: No visual changes  RESPIRATORY: No cough, wheezing; No shortness of breath  CARDIOVASCULAR: No chest pain or palpitations  GASTROINTESTINAL: No abdominal or epigastric pain. No nausea, vomiting; No diarrhea or constipation.   GENITOURINARY: No dysuria, frequency or hematuria  NEUROLOGICAL: No numbness or weakness  SKIN: See physical examination.  All other review of systems is negative unless indicated above    Physical Exam:   Constitutional: NAD, alert;  Lower Extremity Focus  Derm:  Skin warm, dry and supple bilateral.    Significant generalized lichenification and verrucous changes to skin extending from below the knees to feet bilaterally, cobblestone like papules and nodules extending from below knee to feet bilaterally. Ryan interdigital maceration and malodor. Noted hemosiderin deposition to BLE. Noted erythema to BLE.    No acute underlying ulcerations, no pus/purulence, no PTB.   Vascular: Dorsalis Pedis and Posterior Tibial pulses non palpable due to significant edema to BLE.  Capillary re-fill time less then 3 seconds digits 1-5 bilateral.    Neuro: Protective sensation intact to the level of the digits bilateral.  MSK: Muscle strength 5/5 all major muscle groups bilateral.

## 2023-10-11 NOTE — DIETITIAN INITIAL EVALUATION ADULT - PERTINENT LABORATORY DATA
10-10    133<L>  |  106  |  14  ----------------------------<  166<H>  4.4   |  22  |  0.97    Ca    8.3<L>      10 Oct 2023 15:20    TPro  6.2  /  Alb  2.6<L>  /  TBili  1.3<H>  /  DBili  x   /  AST  34  /  ALT  15  /  AlkPhos  74  10-10  POCT Blood Glucose.: 119 mg/dL (10-11-23 @ 12:03)  A1C with Estimated Average Glucose Result: 7.5 % (08-13-23 @ 05:16)  A1C with Estimated Average Glucose Result: 8.8 % (04-03-23 @ 07:33)  A1C with Estimated Average Glucose Result: 5.9 % (01-02-23 @ 05:25)

## 2023-10-11 NOTE — DIETITIAN INITIAL EVALUATION ADULT - NS FNS DIET ORDER
Diet, Consistent Carbohydrate/No Snacks:   DASH/TLC {Sodium & Cholesterol Restricted} (DASH) (10-10-23 @ 17:54)

## 2023-10-11 NOTE — DIETITIAN INITIAL EVALUATION ADULT - ADD RECOMMEND
1) Liberalize diet to DASH/TLC ONLY to maximize caloric and nutrient intake.  2) Add premier protein shake 1x/day (provides 160kcal, 30g protein) and Bravo BID (provides 90 kcal, 2.5 g collagen, 7 g L-Arginine, 7 g L-Glutamine/ 1 packet) to promote wound healing. Bravo is intended to support tissue building and collagen formation in the dietary management of wounds, which has been clinically shown to support wound healing (including pressure injuries, diabetic foot ulcers surgical incisions, burns, and other acute/chronic wounds) by enhancing collagen formation in as little as 2 weeks.  3) Monitor bowel movements, if no BM for >3 days, consider implementing bowel regimen.  4) Encourage protein-rich foods, maximize food preferences  5) Recommend to add MVI w/minerals, Vit C 500 mg BID, add Zinc Sulfate 220 mg x 10 days to promote wound healing.  6) Monitor lytes/ min and replete prn.  7) Monitor and optimize BG levels between 140-180 mg/dL by medical management   8) Confirm goals of care regarding nutrition support  RD will continue to monitor PO intake, labs, hydration, and wt prn.

## 2023-10-11 NOTE — DIETITIAN INITIAL EVALUATION ADULT - FEEDING ASSISTANCE
Interval H&P   I reviewed the H&P, I examined the patient, and there are no changes in the patient's condition. Risks and Benefits discussed. Proceed with surgery.  Mirna Herring MD  4:00 PM     Tray set-up, open all containers; meal encouragement

## 2023-10-11 NOTE — CONSULT NOTE ADULT - ASSESSMENT
Assesment: 73 y/o male see inpatient for the following   -Non- Healing Full thickness Right foot submet 5 ulceration  -Diabetes Mellitus   -Pain in __ Foot   -Difficulty with ambulation      P:   Chart reviewed and Patient evaluated; discussed treatment and plan with .   Discussed diagnosis and treatment with patient  Wound flush with normal saline  Obtained wound culture to be sent to Pathology  Excisional debridement with 15 blade of ---- base down to subcutaneous tissue Right/ Left foot ulceration  Applied --- with dry sterile dressing  X-rays reviewed : Shows -------  Continue with IV antibiotics As Per ID  Ordered MINOR  Weight bearing/Non-weight bearing  to ------------  Offloading to bilateral Heels.   Discussed importance of daily foot examinations and proper shoe gear and to importance of lower Fasting Blood Glucose levels.   Patient demonstrated verbal understanding of all interventions and tolerated interventions well without any complications.   Podiatry will follow while in house.   A: 73 y/o Female seen for the following:   -Chronic BLE Lymphedema   -BLE Elephantiasis nostras verrucosa  -DM2  -Difficulty with ambulation    P:   Chart reviewed and Patient evaluated;  Discussed diagnosis and treatment with patient. Discussed importance of daily foot examinations, proper shoe gear, importance of tight glycemic control.   Flushed interdigitally with NS  Applied betadine gauze interdigitally with DSD to Ryan feet  Underlying elephantiasis nostras verrucosa caused by chronic BLE lymphedema  Elevate BLE  Continue with IV antibiotics as per ID  All additional care per Med appreciated  Patient demonstrated verbal understanding of all interventions and tolerated interventions well without any complications.   Podiatry will follow while in house      Case D/W attending Dr. Mitchell  A: 73 y/o Female seen for the following:   -Chronic BLE Lymphedema   -BLE Elephantiasis nostras verrucosa  -DM2  -Difficulty with ambulation    P:   Chart reviewed and Patient evaluated;  Discussed diagnosis and treatment with patient. Discussed importance of daily foot examinations, proper shoe gear, importance of tight glycemic control.   Flushed interdigitally with NS  Applied betadine gauze interdigitally with DSD to Ryan feet  Noted significant generalized verrucous changes to skin extending from bilateral below the knee to feet, cobblestone like nodules to BLE, interdigital maceration and malodor.   Underlying elephantiasis nostras verrucosa caused by chronic BLE lymphedema  Elevate BLE  Continue with IV antibiotics as per ID  All additional care per Med appreciated  Patient demonstrated verbal understanding of all interventions and tolerated interventions well without any complications.   Podiatry will follow while in house      Case D/W attending Dr. Mitchell  A: 73 y/o Female seen for the following:   -Chronic BLE Lymphedema   -BLE Elephantiasis nostras verrucosa  -DM2  -Difficulty with ambulation    P:   Chart reviewed and Patient evaluated;  Discussed diagnosis and treatment with patient. Discussed importance of daily foot examinations, proper shoe gear, importance of tight glycemic control.   Flushed interdigitally with NS  Applied betadine gauze interdigitally with DSD to Ryan feet  Noted significant generalized verrucous changes to skin extending from bilateral below the knee to feet, cobblestone like nodules to BLE, interdigital maceration and malodor.   Underlying elephantiasis nostras verrucosa caused by chronic BLE lymphedema  Elevate BLE  Recommend dermatology consult as patient has bacterial/fungal infection to b/l feet which is complexed w. underlying elephantisis  Continue with IV antibiotics as per ID  All additional care per Med appreciated  Patient demonstrated verbal understanding of all interventions and tolerated interventions well without any complications.   Podiatry will follow while in house      Case D/W attending Dr. Mitchell

## 2023-10-11 NOTE — DIETITIAN NUTRITION RISK NOTIFICATION - TREATMENT: THE FOLLOWING DIET HAS BEEN RECOMMENDED
Diet, Consistent Carbohydrate/No Snacks:   DASH/TLC {Sodium & Cholesterol Restricted} (DASH) (10-10-23 @ 17:54) [Active]

## 2023-10-12 LAB
ANION GAP SERPL CALC-SCNC: 4 MMOL/L — LOW (ref 5–17)
BUN SERPL-MCNC: 18 MG/DL — SIGNIFICANT CHANGE UP (ref 7–23)
CALCIUM SERPL-MCNC: 8.5 MG/DL — SIGNIFICANT CHANGE UP (ref 8.5–10.1)
CHLORIDE SERPL-SCNC: 112 MMOL/L — HIGH (ref 96–108)
CO2 SERPL-SCNC: 22 MMOL/L — SIGNIFICANT CHANGE UP (ref 22–31)
CREAT SERPL-MCNC: 1.03 MG/DL — SIGNIFICANT CHANGE UP (ref 0.5–1.3)
EGFR: 58 ML/MIN/1.73M2 — LOW
GLUCOSE BLDC GLUCOMTR-MCNC: 112 MG/DL — HIGH (ref 70–99)
GLUCOSE BLDC GLUCOMTR-MCNC: 116 MG/DL — HIGH (ref 70–99)
GLUCOSE BLDC GLUCOMTR-MCNC: 116 MG/DL — HIGH (ref 70–99)
GLUCOSE BLDC GLUCOMTR-MCNC: 141 MG/DL — HIGH (ref 70–99)
GLUCOSE BLDC GLUCOMTR-MCNC: 149 MG/DL — HIGH (ref 70–99)
GLUCOSE SERPL-MCNC: 115 MG/DL — HIGH (ref 70–99)
HCT VFR BLD CALC: 31.2 % — LOW (ref 34.5–45)
HGB BLD-MCNC: 10.1 G/DL — LOW (ref 11.5–15.5)
MCHC RBC-ENTMCNC: 28.1 PG — SIGNIFICANT CHANGE UP (ref 27–34)
MCHC RBC-ENTMCNC: 32.4 GM/DL — SIGNIFICANT CHANGE UP (ref 32–36)
MCV RBC AUTO: 86.7 FL — SIGNIFICANT CHANGE UP (ref 80–100)
PLATELET # BLD AUTO: 92 K/UL — LOW (ref 150–400)
POTASSIUM SERPL-MCNC: 3.7 MMOL/L — SIGNIFICANT CHANGE UP (ref 3.5–5.3)
POTASSIUM SERPL-SCNC: 3.7 MMOL/L — SIGNIFICANT CHANGE UP (ref 3.5–5.3)
RBC # BLD: 3.6 M/UL — LOW (ref 3.8–5.2)
RBC # FLD: 15.8 % — HIGH (ref 10.3–14.5)
SODIUM SERPL-SCNC: 138 MMOL/L — SIGNIFICANT CHANGE UP (ref 135–145)
WBC # BLD: 7.53 K/UL — SIGNIFICANT CHANGE UP (ref 3.8–10.5)
WBC # FLD AUTO: 7.53 K/UL — SIGNIFICANT CHANGE UP (ref 3.8–10.5)

## 2023-10-12 PROCEDURE — 99233 SBSQ HOSP IP/OBS HIGH 50: CPT

## 2023-10-12 RX ORDER — ZINC SULFATE TAB 220 MG (50 MG ZINC EQUIVALENT) 220 (50 ZN) MG
220 TAB ORAL DAILY
Refills: 0 | Status: DISCONTINUED | OUTPATIENT
Start: 2023-10-12 | End: 2023-10-16

## 2023-10-12 RX ORDER — ASCORBIC ACID 60 MG
500 TABLET,CHEWABLE ORAL
Refills: 0 | Status: DISCONTINUED | OUTPATIENT
Start: 2023-10-12 | End: 2023-10-16

## 2023-10-12 RX ADMIN — PIPERACILLIN AND TAZOBACTAM 25 GRAM(S): 4; .5 INJECTION, POWDER, LYOPHILIZED, FOR SOLUTION INTRAVENOUS at 00:45

## 2023-10-12 RX ADMIN — PIPERACILLIN AND TAZOBACTAM 25 GRAM(S): 4; .5 INJECTION, POWDER, LYOPHILIZED, FOR SOLUTION INTRAVENOUS at 07:32

## 2023-10-12 RX ADMIN — Medication 1 TABLET(S): at 14:49

## 2023-10-12 RX ADMIN — PIPERACILLIN AND TAZOBACTAM 25 GRAM(S): 4; .5 INJECTION, POWDER, LYOPHILIZED, FOR SOLUTION INTRAVENOUS at 23:19

## 2023-10-12 RX ADMIN — SODIUM CHLORIDE 125 MILLILITER(S): 9 INJECTION INTRAMUSCULAR; INTRAVENOUS; SUBCUTANEOUS at 07:35

## 2023-10-12 RX ADMIN — ENOXAPARIN SODIUM 40 MILLIGRAM(S): 100 INJECTION SUBCUTANEOUS at 22:26

## 2023-10-12 RX ADMIN — ENOXAPARIN SODIUM 40 MILLIGRAM(S): 100 INJECTION SUBCUTANEOUS at 09:30

## 2023-10-12 RX ADMIN — ZINC SULFATE TAB 220 MG (50 MG ZINC EQUIVALENT) 220 MILLIGRAM(S): 220 (50 ZN) TAB at 14:47

## 2023-10-12 RX ADMIN — Medication 500 MILLIGRAM(S): at 22:26

## 2023-10-12 RX ADMIN — SODIUM CHLORIDE 125 MILLILITER(S): 9 INJECTION INTRAMUSCULAR; INTRAVENOUS; SUBCUTANEOUS at 00:44

## 2023-10-12 RX ADMIN — PIPERACILLIN AND TAZOBACTAM 25 GRAM(S): 4; .5 INJECTION, POWDER, LYOPHILIZED, FOR SOLUTION INTRAVENOUS at 16:19

## 2023-10-12 NOTE — PROGRESS NOTE ADULT - SUBJECTIVE AND OBJECTIVE BOX
Date of service: 10-12-23 @ 13:47    pt seen and examined  has blood cx growing PSAE  has leg discomfort  no fevers    ROS: no fever or chills; denies dizziness, no HA, no SOB or cough, no abdominal pain, no diarrhea or constipation;  no legs pain, no rashes    MEDICATIONS  (STANDING):  ascorbic acid 500 milliGRAM(s) Oral two times a day  dextrose 5%. 1000 milliLiter(s) (50 mL/Hr) IV Continuous <Continuous>  dextrose 5%. 1000 milliLiter(s) (100 mL/Hr) IV Continuous <Continuous>  dextrose 50% Injectable 25 Gram(s) IV Push once  dextrose 50% Injectable 25 Gram(s) IV Push once  dextrose 50% Injectable 12.5 Gram(s) IV Push once  enoxaparin Injectable 40 milliGRAM(s) SubCutaneous every 12 hours  glucagon  Injectable 1 milliGRAM(s) IntraMuscular once  influenza  Vaccine (HIGH DOSE) 0.7 milliLiter(s) IntraMuscular once  insulin lispro (ADMELOG) corrective regimen sliding scale   SubCutaneous at bedtime  insulin lispro (ADMELOG) corrective regimen sliding scale   SubCutaneous three times a day before meals  multivitamin 1 Tablet(s) Oral daily  piperacillin/tazobactam IVPB.. 3.375 Gram(s) IV Intermittent every 8 hours  zinc sulfate 220 milliGRAM(s) Oral daily      Vital Signs Last 24 Hrs  T(C): 36.6 (12 Oct 2023 07:34), Max: 37.2 (11 Oct 2023 20:30)  T(F): 97.9 (12 Oct 2023 07:34), Max: 99 (11 Oct 2023 20:30)  HR: 83 (12 Oct 2023 07:34) (83 - 95)  BP: 136/61 (12 Oct 2023 07:34) (134/49 - 136/61)  BP(mean): --  RR: 18 (12 Oct 2023 07:34) (18 - 18)  SpO2: 99% (12 Oct 2023 07:34) (96% - 99%)    Parameters below as of 12 Oct 2023 07:34  Patient On (Oxygen Delivery Method): room air      PE:  Constitutional: NAD   HEENT: NC/AT, EOMI, PERRLA, conjunctivae clear; ears and nose atraumatic; pharynx benign  Neck: supple; thyroid not palpable  Back: no tenderness  Respiratory: respiratory effort normal; clear to auscultation  Cardiovascular: S1S2 regular, no murmurs  Abdomen: soft, not tender, not distended, positive BS; liver and spleen WNL  Genitourinary: no suprapubic tenderness  Lymphatic: no LN palpable  Musculoskeletal: no muscle tenderness, no joint swelling or tenderness  Extremities: no pedal edema bilateral LE erythema warmth tenderness lymphedema  Neurological/ Psychiatric: AxOx3, Judgement and insight normal;  moving all extremities  Skin: no rashes; no palpable lesions    Labs: all available labs reviewed                                   10.   7.53  )-----------( 92       ( 12 Oct 2023 06:57 )             31.2     10    138  |  112<H>  |  18  ----------------------------<  115<H>  3.7   |  22  |  1.03    Ca    8.5      12 Oct 2023 06:57    TPro  6.2  /  Alb  2.6<L>  /  TBili  1.3<H>  /  DBili  x   /  AST  34  /  ALT  15  /  AlkPhos  74  10-10          Urinalysis Basic - ( 11 Oct 2023 06:00 )    Color: Orange / Appearance: Cloudy / S.023 / pH: x  Gluc: x / Ketone: Negative mg/dL  / Bili: Small / Urobili: 1.0 mg/dL   Blood: x / Protein: Trace mg/dL / Nitrite: Positive   Leuk Esterase: Small / RBC: 4 /HPF / WBC 53 /HPF   Sq Epi: x / Non Sq Epi: 26 /HPF / Bacteria: Many /HPF    Culture - Blood (10.10.23 @ 15:20)   Specimen Source: .Blood Blood-Peripheral  Culture Results:   No growth at 24 hours  Culture - Blood (10.10.23 @ 15:20)   - Pseudomonas aeruginosa: Detec  Gram Stain:   Growth in aerobic bottle: Gram Negative Rods  Specimen Source: .Blood Blood-Peripheral  Organism: Blood Culture PCR  Culture Results:   Growth in aerobic bottle: Pseudomonas aeruginosa   Direct identification is available within approximately 3-5   hours either by Blood Panel Multiplexed PCR or Direct   MALDI-TOF. Details: [email or web address]  Organism Identification: Blood Culture PCR  Method Type: PCR    Radiology: all available radiological tests reviewed      ACC: [id number] EXAM:  XR CHEST PORTABLE IMMED 1V   ORDERED BY: RHIANNA SHULTZ     PROCEDURE DATE:  2023          INTERPRETATION:  AP chest on 2023 at 3:35 PM. Patient has   weakness. There is history of lung and colon cancer.    Heart magnified by technique. Right-sided port is noted.    There is hazy density over the left medial chest again noted.    Findings are similar to  this year. Exact significance of left   lung findings are uncertain.    IMPRESSION: Stable hazy density over the left medial chest.        ACC: [id number] EXAM:  US DPLX LWR EXT VEINS COMPL BI   ORDERED BY: TROY BROWN     PROCEDURE DATE:  2023          INTERPRETATION:  CLINICAL INFORMATION: Leg swelling.    COMPARISON: Venous Doppler dated 2022.    TECHNIQUE: Duplex sonography of the BILATERAL LOWER extremity veins with   color and spectral Doppler, with and without compression.    FINDINGS:    RIGHT:  Normal compressibility of the RIGHT common femoral, femoral and popliteal   veins.  Doppler examination shows normal spontaneous and phasic flow.  Limited visualized of the RIGHT calf veins. No RIGHT calf vein thrombosis   detected.    LEFT:  Normal compressibility of the LEFT common femoral, femoral and popliteal   veins.  Doppler examination shows normal spontaneous and phasic flow.  Limited visualized of the LEFT calf veins. No LEFT calf vein thrombosis   detected.    IMPRESSION:  No evidence of deep venous thrombosis in either lower extremity.    Limited visualization of the calf veins.        Advanced directives addressed: full resuscitation

## 2023-10-12 NOTE — PROGRESS NOTE ADULT - SUBJECTIVE AND OBJECTIVE BOX
SUBJECTIVE:    CHIEF COMPLAINT:  Patient is a 72y old  Female who presents with a chief complaint of sepsis (11 Oct 2023 15:14)      PAST MEDICAL HX  History of Cellulitis of right lower leg   Chronic stasis dermatitis   Colon cancer   HTN (hypertension), benign   Hyperkalemia   Lymphedema of both lower extremities  Malignant neoplasm of upper lobe of left lung   Metabolic syndrome   Obesity, morbid (more than 100 lbs over ideal weight or BMI > 40)  Type 2 diabetes mellitus without complication, without long-term current use of insulin         PAST SURGICAL HX  History of Cholecystectomy  History of Hysterectomy (V88.01)  History of Laminectomy Lumbar  History of Lung lobectomy  History of Partial Colectomy  History of Rotator Cuff Repair              FAMILY HX  Family history of Bladder carcinoma : Mother  Family history of coronary artery disease (V17.3) (Z82.49) : Father       SOCIAL HX  Denies alcohol consumption   Does not use illicit drugs   Has no children  Never smoked cigarettes   Single   (10 Oct 2023 22:36)      Interval HPI and Overnight Events: Pt tolelrating antibiotics. PT ordered. Podiatry following. Still with oozing, red legs    REVIEW OF SYSTEMS:  CONSTITUTIONAL: No weakness, fevers or chills  EYES/ENT: No visual changes;  No vertigo or throat pain   NECK: No pain or stiffness  RESPIRATORY: No cough, wheezing, hemoptysis; No shortness of breath  CARDIOVASCULAR: No chest pain or palpitations  GASTROINTESTINAL: No abdominal or epigastric pain. No nausea, vomiting, or hematemesis; No diarrhea or constipation. No melena or hematochezia.  GENITOURINARY: No dysuria, frequency or hematuria  NEUROLOGICAL: No numbness or weakness  SKIN: No itching, burning,   All other review of systems is negative unless indicated above    OBJECTIVE    Vital Signs Last 24 Hrs  T(C): 36.6 (12 Oct 2023 07:34), Max: 37.2 (11 Oct 2023 20:30)  T(F): 97.9 (12 Oct 2023 07:34), Max: 99 (11 Oct 2023 20:30)  HR: 83 (12 Oct 2023 07:34) (83 - 95)  BP: 136/61 (12 Oct 2023 07:34) (134/49 - 136/61)  BP(mean): --  RR: 18 (12 Oct 2023 07:34) (18 - 18)  SpO2: 99% (12 Oct 2023 07:34) (96% - 99%)    Parameters below as of 12 Oct 2023 07:34  Patient On (Oxygen Delivery Method): room air        MEDICATIONS  (STANDING):  dextrose 5%. 1000 milliLiter(s) (50 mL/Hr) IV Continuous <Continuous>  dextrose 5%. 1000 milliLiter(s) (100 mL/Hr) IV Continuous <Continuous>  dextrose 50% Injectable 25 Gram(s) IV Push once  dextrose 50% Injectable 25 Gram(s) IV Push once  dextrose 50% Injectable 12.5 Gram(s) IV Push once  enoxaparin Injectable 40 milliGRAM(s) SubCutaneous every 12 hours  glucagon  Injectable 1 milliGRAM(s) IntraMuscular once  influenza  Vaccine (HIGH DOSE) 0.7 milliLiter(s) IntraMuscular once  insulin lispro (ADMELOG) corrective regimen sliding scale   SubCutaneous at bedtime  insulin lispro (ADMELOG) corrective regimen sliding scale   SubCutaneous three times a day before meals  piperacillin/tazobactam IVPB.. 3.375 Gram(s) IV Intermittent every 8 hours  sodium chloride 0.9%. 1000 milliLiter(s) (125 mL/Hr) IV Continuous <Continuous>  vancomycin  IVPB 1500 milliGRAM(s) IV Intermittent every 12 hours      LABS:                         10.1   7.53  )-----------( 92       ( 12 Oct 2023 06:57 )             31.2     10-12    138  |  112<H>  |  18  ----------------------------<  115<H>  3.7   |  22  |  1.03    Ca    8.5      12 Oct 2023 06:57    TPro  6.2  /  Alb  2.6<L>  /  TBili  1.3<H>  /  DBili  x   /  AST  34  /  ALT  15  /  AlkPhos  74  10-10    Urinalysis Basic - ( 12 Oct 2023 06:57 )  Color: x / Appearance: x / SG: x / pH: x  Gluc: 115 mg/dL / Ketone: x  / Bili: x / Urobili: x   Blood: x / Protein: x / Nitrite: x   Leuk Esterase: x / RBC: x / WBC x   Sq Epi: x / Non Sq Epi: x / Bacteria: x    PT/INR - ( 10 Oct 2023 15:20 )   PT: 13.2 sec;   INR: 1.17 ratio    PTT - ( 10 Oct 2023 15:20 )  PTT:26.8 sec    Specimen Source .Blood Blood-Peripheral   10-10 @ 15:20  Culture Results  Growth in aerobic bottle: Gram Negative Rods  Direct identification is available within approximately 3-5  hours either by Blood Panel Multiplexed PCR or Direct  MALDI-TOF. Details: [email or web address]    URINE CULTURE    10-10 @ 15:20    CULTURE RESULTS   Growth in aerobic bottle: Gram Negative Rods  Direct identification is available within approximately 3-5  hours either by Blood Panel Multiplexed PCR or Direct  MALDI-TOF. Details: [email or web address]    CAPILLARY BLOOD GLUCOSE  POCT Blood Glucose.: 112 mg/dL (12 Oct 2023 08:13)  POCT Blood Glucose.: 164 mg/dL (11 Oct 2023 22:08)  POCT Blood Glucose.: 143 mg/dL (11 Oct 2023 16:43)  POCT Blood Glucose.: 119 mg/dL (11 Oct 2023 12:03)

## 2023-10-12 NOTE — PROGRESS NOTE ADULT - ASSESSMENT
A: 71 y/o Female seen for the following:   -Chronic BLE Lymphedema   -BLE Elephantiasis nostras verrucosa  -DM2  -Difficulty with ambulation    P:   Chart reviewed and Patient evaluated;  Discussed diagnosis and treatment with patient. Discussed importance of daily foot examinations, proper shoe gear, importance of tight glycemic control.   Flushed interdigitally with NS  Applied betadine gauze interdigitally with DSD to Ryan feet  Significant generalized verrucous changes to skin extending from bilateral below the knee to feet, cobblestone like nodules to BLE, interdigital maceration and malodor. Underlying elephantiasis nostras verrucosa caused by chronic BLE lymphedema  Elevate BLE  Recommend dermatology consult as patient has bacterial/fungal infection to b/l feet which is complexed with underlying elephantiasis   Continue with IV antibiotics as per ID  All additional care per Med appreciated  Patient demonstrated verbal understanding of all interventions and tolerated interventions well without any complications.   Podiatry will follow while in house      Case D/W attending Dr. Mitchell

## 2023-10-12 NOTE — PROGRESS NOTE ADULT - ASSESSMENT
72 year old female w hx  Colon CA, SYLVIA  lung CA, DM II,  HTN, chronic bilateral lymphedema to lower extremities came to ED c/o fever and chills [facility]   saw Dr. Pardo and Dr. Bashir.  Tried new dressing. Pt felt great then and this AM Then had + chills, +fatigue had fever at 12 noon and came to ED   adm RLE cellulitis and then  later admitted again for dehydration with lactic acidosis due to diarrhea;  Taken off metformin. DCd to [facility] for rehab, Currently at home for the past 2 weeks In ED /56       RR 20  T 102.5    97% sat RA lower extremity cellulitis bilaterally 15K WBC NS 2000 cc as 31 cc/kg doxycycline 100 mg IV vanco 1750 IV.     1. Sepsis with PSAE. RLE cellulitis. Chronic LE Lymphedema. Leukocytosis- resolved. Morbid obesity  - imaging reviewed   - blood cx growing PSAE f/u sensitivities   - on vancomycin  #2-3 - dc   - on zosyn [id number] #2-3  - continue with antibiotic coverage  - f/u repeat blood cx  - podiatry eval, wound care   - monitor temps  - tolerating abx well so far; no side effects noted  - reason for abx use and side effects reviewed with patient  - supportive care  - fu cbc    2. other issues - care per medicine

## 2023-10-12 NOTE — PROGRESS NOTE ADULT - SUBJECTIVE AND OBJECTIVE BOX
Date of Service: 10/12/23  HPI: 72 year old female w hx  Colon CA, SYLVIA  lung CA, DM II,  HTN, chronic bilateral lymphedema to lower extremities came to ED c/o fever and chills [facility]   saw Dr. Mitchell and Dr. Bashir.  Tried new dressing. Pt felt great then and this AM Then had + chills, +fatigue had fever at 12 noon and came to ED   adm RLE cellulitis and then  later admitted again for dehydration with lactic acidosis due to diarrhea;  Taken off metformin. DCd to [facility] for rehab, Currently at home for the past 2 weeks In ED /56       RR 20  T 102.5    97% sat RA lower extremity cellulitis bilaterally 15K WBC NS 2000 cc as 31 cc/kg doxycycline 100 mg IV vanco 1750 IV.      10/12/23: Pt seen by podiatry team with attending present. Pt resting at bedside, NAD, ryan foot dressings in place. No additional pedal complaints reported at this time.     PAST MEDICAL HX  History of Cellulitis of right lower leg   Chronic stasis dermatitis   Colon cancer   HTN (hypertension), benign   Hyperkalemia   Lymphedema of both lower extremities  Malignant neoplasm of upper lobe of left lung   Metabolic syndrome   Obesity, morbid (more than 100 lbs over ideal weight or BMI > 40)  Type 2 diabetes mellitus without complication, without long-term current use of insulin         PAST SURGICAL HX  History of Cholecystectomy  History of Hysterectomy (V88.01)  History of Laminectomy Lumbar  History of Lung lobectomy  History of Partial Colectomy  History of Rotator Cuff Repair              FAMILY HX  Family history of Bladder carcinoma : Mother  Family history of coronary artery disease (V17.3) (Z82.49) : Father       SOCIAL HX  Denies alcohol consumption   Does not use illicit drugs   Has no children  Never smoked cigarettes   Single   (10 Oct 2023 22:36)            PMH: No pertinent past medical history    Lymphatic edema    Hypertension    Type 2 diabetes mellitus    Lung cancer    Colon cancer      PSH:    Allergies:Keflex (Anaphylaxis)  cephalexin (Unknown)      Labs:                          10.1   7.53  )-----------( 92       ( 12 Oct 2023 06:57 )             31.2     10-12    138  |  112<H>  |  18  ----------------------------<  115<H>  3.7   |  22  |  1.03    Ca    8.5      12 Oct 2023 06:57    TPro  6.2  /  Alb  2.6<L>  /  TBili  1.3<H>  /  DBili  x   /  AST  34  /  ALT  15  /  AlkPhos  74  10-10    Vital Signs Last 24 Hrs  T(C): 36.6 (12 Oct 2023 07:34), Max: 37.2 (11 Oct 2023 20:30)  T(F): 97.9 (12 Oct 2023 07:34), Max: 99 (11 Oct 2023 20:30)  HR: 83 (12 Oct 2023 07:34) (83 - 95)  BP: 136/61 (12 Oct 2023 07:34) (134/49 - 136/61)  BP(mean): --  RR: 18 (12 Oct 2023 07:34) (18 - 18)  SpO2: 99% (12 Oct 2023 07:34) (96% - 99%)    Parameters below as of 12 Oct 2023 07:34  Patient On (Oxygen Delivery Method): room air      REVIEW OF SYSTEMS:    CONSTITUTIONAL: No weakness, fevers or chills  EYES: No visual changes  RESPIRATORY: No cough, wheezing; No shortness of breath  CARDIOVASCULAR: No chest pain or palpitations  GASTROINTESTINAL: No abdominal or epigastric pain. No nausea, vomiting; No diarrhea or constipation.   GENITOURINARY: No dysuria, frequency or hematuria  NEUROLOGICAL: No numbness or weakness  SKIN: See physical examination.  All other review of systems is negative unless indicated above    Physical Exam:   Constitutional: NAD, alert;  Lower Extremity Focus  Derm:  Skin warm, dry and supple bilateral.    Significant generalized lichenification and verrucous changes to skin extending from below the knees to feet bilaterally, cobblestone like papules and nodules extending from below knee to feet bilaterally. Ryan interdigital maceration and malodor. Noted hemosiderin deposition to BLE. Noted erythema to BLE.    No acute underlying ulcerations, no pus/purulence, no PTB.   Vascular: Dorsalis Pedis and Posterior Tibial pulses non palpable due to significant edema to BLE.  Capillary re-fill time less then 3 seconds digits 1-5 bilateral.    Neuro: Protective sensation intact to the level of the digits bilateral.  MSK: Muscle strength 5/5 all major muscle groups bilateral.

## 2023-10-12 NOTE — PROGRESS NOTE ADULT - ASSESSMENT
72 year old female w hx  Colon CA, SYLVIA  lung CA, DM II,  HTN, chronic bilateral lymphedema to lower extremities came to ED c/o fever and chills    Assessment:  Gram Negative Sepsis and Bacteremia  Cellulitis  Lymphedema  Chronic and severe Stasis Dermatitis  Type 2 DM  Hypoalbuminemia  Obesity w BMI 46    Plan:  Zos;yn  Vancomycin  ID consult appreciated  Podiatry following  Follow cx  S/p NS 2000 cc as 31 cc/kg  Continue NS @ 125 cc/hr  Wound Care  hold Cooper RUFFINM's  ISS  nutrition consult appreciated  VTE proph with enoxaparin  add protien suppliments and vitamins  PT

## 2023-10-13 DIAGNOSIS — I89.0 LYMPHEDEMA, NOT ELSEWHERE CLASSIFIED: ICD-10-CM

## 2023-10-13 DIAGNOSIS — I87.2 VENOUS INSUFFICIENCY (CHRONIC) (PERIPHERAL): ICD-10-CM

## 2023-10-13 LAB
-  AMIKACIN: SIGNIFICANT CHANGE UP
-  AZTREONAM: SIGNIFICANT CHANGE UP
-  CEFEPIME: SIGNIFICANT CHANGE UP
-  CEFTAZIDIME: SIGNIFICANT CHANGE UP
-  CIPROFLOXACIN: SIGNIFICANT CHANGE UP
-  GENTAMICIN: SIGNIFICANT CHANGE UP
-  IMIPENEM: SIGNIFICANT CHANGE UP
-  LEVOFLOXACIN: SIGNIFICANT CHANGE UP
-  MEROPENEM: SIGNIFICANT CHANGE UP
-  PIPERACILLIN/TAZOBACTAM: SIGNIFICANT CHANGE UP
-  TOBRAMYCIN: SIGNIFICANT CHANGE UP
CULTURE RESULTS: SIGNIFICANT CHANGE UP
CULTURE RESULTS: SIGNIFICANT CHANGE UP
GLUCOSE BLDC GLUCOMTR-MCNC: 109 MG/DL — HIGH (ref 70–99)
GLUCOSE BLDC GLUCOMTR-MCNC: 132 MG/DL — HIGH (ref 70–99)
GLUCOSE BLDC GLUCOMTR-MCNC: 147 MG/DL — HIGH (ref 70–99)
GLUCOSE BLDC GLUCOMTR-MCNC: 98 MG/DL — SIGNIFICANT CHANGE UP (ref 70–99)
METHOD TYPE: SIGNIFICANT CHANGE UP
ORGANISM # SPEC MICROSCOPIC CNT: SIGNIFICANT CHANGE UP
SPECIMEN SOURCE: SIGNIFICANT CHANGE UP
SPECIMEN SOURCE: SIGNIFICANT CHANGE UP

## 2023-10-13 PROCEDURE — 99222 1ST HOSP IP/OBS MODERATE 55: CPT

## 2023-10-13 PROCEDURE — 99233 SBSQ HOSP IP/OBS HIGH 50: CPT

## 2023-10-13 RX ORDER — CHLORHEXIDINE GLUCONATE 213 G/1000ML
1 SOLUTION TOPICAL EVERY 24 HOURS
Refills: 0 | Status: DISCONTINUED | OUTPATIENT
Start: 2023-10-13 | End: 2023-10-15

## 2023-10-13 RX ORDER — MUPIROCIN 20 MG/G
1 OINTMENT TOPICAL EVERY 12 HOURS
Refills: 0 | Status: DISCONTINUED | OUTPATIENT
Start: 2023-10-13 | End: 2023-10-16

## 2023-10-13 RX ORDER — ACETIC ACID
1 LIQUID (ML) MISCELLANEOUS DAILY
Refills: 0 | Status: DISCONTINUED | OUTPATIENT
Start: 2023-10-13 | End: 2023-10-16

## 2023-10-13 RX ORDER — SENNA PLUS 8.6 MG/1
2 TABLET ORAL AT BEDTIME
Refills: 0 | Status: DISCONTINUED | OUTPATIENT
Start: 2023-10-13 | End: 2023-10-16

## 2023-10-13 RX ADMIN — ENOXAPARIN SODIUM 40 MILLIGRAM(S): 100 INJECTION SUBCUTANEOUS at 09:22

## 2023-10-13 RX ADMIN — PIPERACILLIN AND TAZOBACTAM 25 GRAM(S): 4; .5 INJECTION, POWDER, LYOPHILIZED, FOR SOLUTION INTRAVENOUS at 21:45

## 2023-10-13 RX ADMIN — PIPERACILLIN AND TAZOBACTAM 25 GRAM(S): 4; .5 INJECTION, POWDER, LYOPHILIZED, FOR SOLUTION INTRAVENOUS at 06:18

## 2023-10-13 RX ADMIN — Medication 500 MILLIGRAM(S): at 09:21

## 2023-10-13 RX ADMIN — Medication 1 TABLET(S): at 09:21

## 2023-10-13 RX ADMIN — PIPERACILLIN AND TAZOBACTAM 25 GRAM(S): 4; .5 INJECTION, POWDER, LYOPHILIZED, FOR SOLUTION INTRAVENOUS at 15:45

## 2023-10-13 RX ADMIN — Medication 500 MILLIGRAM(S): at 21:46

## 2023-10-13 RX ADMIN — ENOXAPARIN SODIUM 40 MILLIGRAM(S): 100 INJECTION SUBCUTANEOUS at 21:45

## 2023-10-13 RX ADMIN — SENNA PLUS 2 TABLET(S): 8.6 TABLET ORAL at 21:46

## 2023-10-13 NOTE — PROGRESS NOTE ADULT - SUBJECTIVE AND OBJECTIVE BOX
Date of service: 10-13-23 @ 16:16    pt seen and examined  no distress comfortable   no fevers    ROS: no fever or chills; denies dizziness, no HA, no SOB or cough, no abdominal pain, no diarrhea or constipation;  no legs pain, no rashes      MEDICATIONS  (STANDING):  acetic acid 0.25% Topical Irrigation 1 Application(s) Topical daily  ascorbic acid 500 milliGRAM(s) Oral two times a day  chlorhexidine 4% Liquid 1 Application(s) Topical every 24 hours  dextrose 5%. 1000 milliLiter(s) (50 mL/Hr) IV Continuous <Continuous>  dextrose 5%. 1000 milliLiter(s) (100 mL/Hr) IV Continuous <Continuous>  dextrose 50% Injectable 25 Gram(s) IV Push once  dextrose 50% Injectable 25 Gram(s) IV Push once  dextrose 50% Injectable 12.5 Gram(s) IV Push once  enoxaparin Injectable 40 milliGRAM(s) SubCutaneous every 12 hours  glucagon  Injectable 1 milliGRAM(s) IntraMuscular once  influenza  Vaccine (HIGH DOSE) 0.7 milliLiter(s) IntraMuscular once  insulin lispro (ADMELOG) corrective regimen sliding scale   SubCutaneous at bedtime  insulin lispro (ADMELOG) corrective regimen sliding scale   SubCutaneous three times a day before meals  multivitamin 1 Tablet(s) Oral daily  mupirocin 2% Ointment 1 Application(s) Topical every 12 hours  piperacillin/tazobactam IVPB.. 3.375 Gram(s) IV Intermittent every 8 hours  senna 2 Tablet(s) Oral at bedtime  zinc sulfate 220 milliGRAM(s) Oral daily    Vital Signs Last 24 Hrs  T(C): 37.1 (13 Oct 2023 09:12), Max: 37.5 (12 Oct 2023 21:00)  T(F): 98.8 (13 Oct 2023 09:12), Max: 99.5 (12 Oct 2023 21:00)  HR: 81 (13 Oct 2023 09:12) (81 - 84)  BP: 132/58 (13 Oct 2023 09:12) (132/58 - 149/64)  BP(mean): --  RR: 18 (13 Oct 2023 09:12) (18 - 18)  SpO2: 97% (13 Oct 2023 09:12) (97% - 97%)    Parameters below as of 13 Oct 2023 09:12  Patient On (Oxygen Delivery Method): room air              PE:  Constitutional: NAD   HEENT: NC/AT, EOMI, PERRLA, conjunctivae clear; ears and nose atraumatic; pharynx benign  Neck: supple; thyroid not palpable  Back: no tenderness  Respiratory: respiratory effort normal; clear to auscultation  Cardiovascular: S1S2 regular, no murmurs  Abdomen: soft, not tender, not distended, positive BS; liver and spleen WNL  Genitourinary: no suprapubic tenderness  Lymphatic: no LN palpable  Musculoskeletal: no muscle tenderness, no joint swelling or tenderness  Extremities: no pedal edema bilateral LE erythema warmth tenderness lymphedema  Neurological/ Psychiatric: AxOx3, Judgement and insight normal;  moving all extremities  Skin: no rashes; no palpable lesions    Labs: all available labs reviewed                                              10.  )-----------( 92       ( 12 Oct 2023 06:57 )             31.2     10    138  |  112<H>  |  18  ----------------------------<  115<H>  3.7   |  22  |  1.03    Ca    8.5      12 Oct 2023 06:57        Urinalysis Basic - ( 11 Oct 2023 06:00 )    Color: Orange / Appearance: Cloudy / S.023 / pH: x  Gluc: x / Ketone: Negative mg/dL  / Bili: Small / Urobili: 1.0 mg/dL   Blood: x / Protein: Trace mg/dL / Nitrite: Positive   Leuk Esterase: Small / RBC: 4 /HPF / WBC 53 /HPF   Sq Epi: x / Non Sq Epi: 26 /HPF / Bacteria: Many /HPF    Culture - Blood (10.10.23 @ 15:20)   Specimen Source: .Blood Blood-Peripheral  Culture Results:   No growth at 24 hours  Culture - Blood (10.10.23 @ 15:20)   - Pseudomonas aeruginosa: Detec  Gram Stain:   Growth in aerobic bottle: Gram Negative Rods  Specimen Source: .Blood Blood-Peripheral  Organism: Blood Culture PCR  Culture Results:   Growth in aerobic bottle: Pseudomonas aeruginosa   Direct identification is available within approximately 3-5   hours either by Blood Panel Multiplexed PCR or Direct   MALDI-TOF. Details: [email or web address]  Organism Identification: Blood Culture PCR  Method Type: PCR    Radiology: all available radiological tests reviewed      ACC: [id number] EXAM:  XR CHEST PORTABLE IMMED 1V   ORDERED BY: RHIANNA SHULTZ     PROCEDURE DATE:  2023          INTERPRETATION:  AP chest on 2023 at 3:35 PM. Patient has   weakness. There is history of lung and colon cancer.    Heart magnified by technique. Right-sided port is noted.    There is hazy density over the left medial chest again noted.    Findings are similar to  this year. Exact significance of left   lung findings are uncertain.    IMPRESSION: Stable hazy density over the left medial chest.        ACC: [id number] EXAM:  US DPLX LWR EXT VEINS COMPL BI   ORDERED BY: TROY BROWN     PROCEDURE DATE:  2023          INTERPRETATION:  CLINICAL INFORMATION: Leg swelling.    COMPARISON: Venous Doppler dated 2022.    TECHNIQUE: Duplex sonography of the BILATERAL LOWER extremity veins with   color and spectral Doppler, with and without compression.    FINDINGS:    RIGHT:  Normal compressibility of the RIGHT common femoral, femoral and popliteal   veins.  Doppler examination shows normal spontaneous and phasic flow.  Limited visualized of the RIGHT calf veins. No RIGHT calf vein thrombosis   detected.    LEFT:  Normal compressibility of the LEFT common femoral, femoral and popliteal   veins.  Doppler examination shows normal spontaneous and phasic flow.  Limited visualized of the LEFT calf veins. No LEFT calf vein thrombosis   detected.    IMPRESSION:  No evidence of deep venous thrombosis in either lower extremity.    Limited visualization of the calf veins.        Advanced directives addressed: full resuscitation

## 2023-10-13 NOTE — PHYSICAL THERAPY INITIAL EVALUATION ADULT - MANUAL MUSCLE TESTING RESULTS, REHAB EVAL
Bilateral UE strength 4/5 throughout grossly. Bilateral LE Strength 2-2+/5 throughout grossly. Right DF 1/5, left DF 3/5.

## 2023-10-13 NOTE — CONSULT NOTE ADULT - ASSESSMENT
Stasis/EVN;  recent infectious complication likely from bacterial entry in macerated areas between toes-  Agree with current Podiatry plan of wound care-  recommend adding:    dilute acetic acid soaks between dressing changes  wash feet/lower legs with hibiclens  mupirocin ointment between toes  After discharge, acitretin therapy may be beneficial to decrease hypertrophic areas of skin allowing for bacterial overgrowth;   Discussed with nursing staff & Podiatry.

## 2023-10-13 NOTE — PHYSICAL THERAPY INITIAL EVALUATION ADULT - ADDITIONAL COMMENTS
Info obtained from eval 8/2023. Info obtained from eval 8/2023. Update 10/13- Pt states she ambulates with RW. Pt also states she has aide assistance at home. Pt with right foot drop, used AFO in past.

## 2023-10-13 NOTE — PROGRESS NOTE ADULT - SUBJECTIVE AND OBJECTIVE BOX
SUBJECTIVE:    CHIEF COMPLAINT:  Patient is a 72y old  Female who presents with a chief complaint of sepsis (12 Oct 2023 13:59)      PAST MEDICAL HX  History of Cellulitis of right lower leg   Chronic stasis dermatitis   Colon cancer   HTN (hypertension), benign   Hyperkalemia   Lymphedema of both lower extremities  Malignant neoplasm of upper lobe of left lung   Metabolic syndrome   Obesity, morbid (more than 100 lbs over ideal weight or BMI > 40)  Type 2 diabetes mellitus without complication, without long-term current use of insulin         PAST SURGICAL HX  History of Cholecystectomy  History of Hysterectomy (V88.01)  History of Laminectomy Lumbar  History of Lung lobectomy  History of Partial Colectomy  History of Rotator Cuff Repair              FAMILY HX  Family history of Bladder carcinoma : Mother  Family history of coronary artery disease (V17.3) (Z82.49) : Father       SOCIAL HX  Denies alcohol consumption   Does not use illicit drugs   Has no children  Never smoked cigarettes   Single   (10 Oct 2023 22:36)      Interval HPI and Overnight Events: Pt feeling well. C/o constipation. Legs dry today    REVIEW OF SYSTEMS:  CONSTITUTIONAL: No weakness, fevers or chills  EYES/ENT: No visual changes;  No vertigo or throat pain   NECK: No pain or stiffness  RESPIRATORY: No cough, wheezing, hemoptysis; No shortness of breath  CARDIOVASCULAR: No chest pain or palpitations  GASTROINTESTINAL: No abdominal or epigastric pain. No nausea, vomiting, or hematemesis; No diarrhea or constipation. No melena or hematochezia.  GENITOURINARY: No dysuria, frequency or hematuria  NEUROLOGICAL: No numbness or weakness  SKIN: No itching, burning, rashes, or lesions   All other review of systems is negative unless indicated above    OBJECTIVE    Vital Signs Last 24 Hrs  T(C): 37.5 (12 Oct 2023 21:00), Max: 37.5 (12 Oct 2023 21:00)  T(F): 99.5 (12 Oct 2023 21:00), Max: 99.5 (12 Oct 2023 21:00)  HR: 82 (12 Oct 2023 21:00) (82 - 84)  BP: 138/59 (12 Oct 2023 21:00) (136/61 - 149/64)  BP(mean): --  RR: 18 (12 Oct 2023 21:00) (18 - 18)  SpO2: 97% (12 Oct 2023 21:00) (97% - 99%)    Parameters below as of 12 Oct 2023 17:27  Patient On (Oxygen Delivery Method): room air        MEDICATIONS  (STANDING):  ascorbic acid 500 milliGRAM(s) Oral two times a day  dextrose 5%. 1000 milliLiter(s) (100 mL/Hr) IV Continuous <Continuous>  dextrose 5%. 1000 milliLiter(s) (50 mL/Hr) IV Continuous <Continuous>  dextrose 50% Injectable 25 Gram(s) IV Push once  dextrose 50% Injectable 25 Gram(s) IV Push once  dextrose 50% Injectable 12.5 Gram(s) IV Push once  enoxaparin Injectable 40 milliGRAM(s) SubCutaneous every 12 hours  glucagon  Injectable 1 milliGRAM(s) IntraMuscular once  influenza  Vaccine (HIGH DOSE) 0.7 milliLiter(s) IntraMuscular once  insulin lispro (ADMELOG) corrective regimen sliding scale   SubCutaneous three times a day before meals  insulin lispro (ADMELOG) corrective regimen sliding scale   SubCutaneous at bedtime  multivitamin 1 Tablet(s) Oral daily  piperacillin/tazobactam IVPB.. 3.375 Gram(s) IV Intermittent every 8 hours  senna 2 Tablet(s) Oral at bedtime  zinc sulfate 220 milliGRAM(s) Oral daily      LABS:                         10.1   7.53  )-----------( 92       ( 12 Oct 2023 06:57 )             31.2     10-12    138  |  112<H>  |  18  ----------------------------<  115<H>  3.7   |  22  |  1.03    Ca    8.5      12 Oct 2023 06:57      Urinalysis Basic - ( 12 Oct 2023 06:57 )    Color: x / Appearance: x / SG: x / pH: x  Gluc: 115 mg/dL / Ketone: x  / Bili: x / Urobili: x   Blood: x / Protein: x / Nitrite: x   Leuk Esterase: x / RBC: x / WBC x   Sq Epi: x / Non Sq Epi: x / Bacteria: x    Specimen Source Clean Catch Clean Catch (Midstream)   10-11 @ 06:00  Culture Results  Culture grew 3 or more types of organisms which indicate  collection contamination; consider recollection only if clinically  indicated.  Specimen Source .Blood Blood-Peripheral   10-10 @ 15:20  Culture Results  Growth in aerobic bottle: Pseudomonas aeruginosa  Direct identification is available within approximately 3-5  hours either by Blood Panel Multiplexed PCR or Direct  MALDI-TOF. Details: [email or web address]    CAPILLARY BLOOD GLUCOSE  POCT Blood Glucose.: 141 mg/dL (12 Oct 2023 21:37)  POCT Blood Glucose.: 149 mg/dL (12 Oct 2023 16:55)  POCT Blood Glucose.: 116 mg/dL (12 Oct 2023 11:48)  POCT Blood Glucose.: 112 mg/dL (12 Oct 2023 08:13)

## 2023-10-13 NOTE — PROGRESS NOTE ADULT - ASSESSMENT
72 year old female w hx  Colon CA, SYLVIA  lung CA, DM II,  HTN, chronic bilateral lymphedema to lower extremities came to ED c/o fever and chills    Assessment:  Pseudomonas Sepsis and Bacteremia  Cellulitis  Lymphedema  Chronic and severe Stasis Dermatitis  Type 2 DM  Hypoalbuminemia  Obesity w BMI 46    Plan:  Zos;yn  Vancomycin  ID following  Podiatry following  Follow cx  S/p NS 2000 cc as 31 cc/kg  Continue NS @ 125 cc/hr  Wound Care  hold Cooper  BGM's  ISS  nutrition consult appreciated  VTE proph with enoxaparin  add protien suppliments and vitamins  PT  dermatology consult recommended by podiatry for severe stasis dermatitis, cellulitis and chronic lymphedema

## 2023-10-13 NOTE — PROGRESS NOTE ADULT - ASSESSMENT
A: 73 y/o Female seen for the following:   -Chronic BLE Lymphedema   -BLE Elephantiasis nostras verrucosa  -DM2  -Difficulty with ambulation    P:   Chart reviewed and Patient evaluated;  Discussed diagnosis and treatment with patient. Discussed importance of daily foot examinations, proper shoe gear, importance of tight glycemic control.   Flushed interdigitally with NS  Applied betadine gauze interdigitally with DSD to Ryan feet  Significant generalized verrucous changes to skin extending from bilateral below the knee to feet, cobblestone like nodules to BLE, interdigital maceration and malodor. Underlying elephantiasis nostras verrucosa caused by chronic BLE lymphedema  Elevate BLE  Dermatology consult and recommendations appreciated.   Ordered mupirocin, acetic acid 0.25%, Hibiclens to be used daily on the ryan LE  Continue with antibiotics as per ID  All additional care per Med appreciated  Patient demonstrated verbal understanding of all interventions and tolerated interventions well without any complications.   Podiatry will follow while in house      Case D/W attending Dr. Mitchell  A: 71 y/o Female seen for the following:   -Chronic BLE Lymphedema   -BLE Elephantiasis nostras verrucosa  -DM2  -Difficulty with ambulation    P:   Chart reviewed and Patient evaluated;  Discussed diagnosis and treatment with patient. Discussed importance of daily foot examinations, proper shoe gear, importance of tight glycemic control.   Flushed interdigitally with NS  Applied betadine gauze interdigitally with DSD to Ryan feet  Significant generalized verrucous changes to skin extending from bilateral below the knee to feet, cobblestone like nodules to BLE, interdigital maceration and malodor. Underlying elephantiasis nostras verrucosa caused by chronic BLE lymphedema  Elevate BLE  Dermatology consult and recommendations appreciated.   Ordered mupirocin, acetic acid 0.25%, Hibiclens to be used daily on the ryan LE  recommend urine culture repeat  Continue with antibiotics as per ID  All additional care per Med appreciated  Patient demonstrated verbal understanding of all interventions and tolerated interventions well without any complications.   Podiatry will follow while in house      Case D/W attending Dr. Mitchell

## 2023-10-13 NOTE — PHYSICAL THERAPY INITIAL EVALUATION ADULT - LEVEL OF INDEPENDENCE: SUPINE/SIT, REHAB EVAL
Pt did not want to attempt OOB at this time secondary to awaiting consult from derm, and with possible re-wrapping of bilateral LE's. Will attempt to increase mobility on 10/14/2023.

## 2023-10-13 NOTE — CONSULT NOTE ADULT - SUBJECTIVE AND OBJECTIVE BOX
Pt. with chronic stasis with elephantiasis verrucosa of the lower legs-  under care of wound care center, also home care-  recent stay in SNF with decompensation, pain following discharge  Admitted for pseudomonas sepsis;  under care of podiatry- with wraps, edema therapy,         O:  Pt. alert, cooperative    b/l LEs with profound edema, sclerosis, elephantiasis like changes of lower legs and feet  + significant maceration between toes  minimal thickening of nails, no mycotic changes  no signs of active infection

## 2023-10-13 NOTE — CONSULT NOTE ADULT - CONSULT REASON
B/L wounds/ lymphedema
chronic changes on feet, toes, recent admission for sepsis/cellulitis
sepsis   Bilateral LE cellulitis

## 2023-10-13 NOTE — PHYSICAL THERAPY INITIAL EVALUATION ADULT - GENERAL OBSERVATIONS, REHAB EVAL
Pt found supine in bed with IV and bed alarm activated. Pt c/o pain bilateral LE's 3/10. Noted mod-severe edema bilateral lower legs, with redness, and blistering. Noted bilateral foot wounds wrapped in gauze and wounds weeping.

## 2023-10-13 NOTE — PHYSICAL THERAPY INITIAL EVALUATION ADULT - PLANNED THERAPY INTERVENTIONS, PT EVAL
Increawe mobility when possible. Eval. Pt left in bed with all observation section equipment/material intact and bed alarm activated. Pt with same pain complaints as above. Will cont to follow pt and increase as tolerated./bed mobility training/gait training/ROM/strengthening/transfer training

## 2023-10-13 NOTE — PROGRESS NOTE ADULT - ASSESSMENT
72 year old female w hx  Colon CA, SYLVIA  lung CA, DM II,  HTN, chronic bilateral lymphedema to lower extremities came to ED c/o fever and chills Went to [facility] at   saw Dr. Pardo and Dr. Bashir.  Tried new dressing. Pt felt great then and this AM Then had + chills, +fatigue had fever at 12 noon and came to ED   adm RLE cellulitis and then  later admitted again for dehydration with lactic acidosis due to diarrhea;  Taken off metformin. DCd to [facility] for rehab, Currently at home for the past 2 weeks In ED /56       RR 20  T 102.5    97% sat RA lower extremity cellulitis bilaterally 15K WBC NS 2000 cc as 31 cc/kg doxycycline 100 mg IV vanco 1750 IV.     1. Sepsis with PSAE. Pyuria, Probable UTI. RLE cellulitis. Chronic LE Lymphedema. Leukocytosis- resolved. Morbid obesity  - imaging reviewed   - blood cx growing PSAE  sensitivities reviewed  - on vancomycin  #2-3 - dc   - on zosyn [id number] #3-4  - continue with antibiotic coverage  - podiatry eval appreciated  - on dc po ciprofloxacin 500mg BID x 14 day course until 10/25   - monitor temps  - tolerating abx well so far; no side effects noted  - reason for abx use and side effects reviewed with patient  - supportive care  - fu cbc    2. other issues - care per medicine

## 2023-10-13 NOTE — PROGRESS NOTE ADULT - SUBJECTIVE AND OBJECTIVE BOX
Date of Service: 10/13/23  HPI: 72 year old female w hx  Colon CA, SYLVIA  lung CA, DM II,  HTN, chronic bilateral lymphedema to lower extremities came to ED c/o fever and chills Went to [facility] at   saw Dr. Mitchell and Dr. Bashir.  Tried new dressing. Pt felt great then and this AM Then had + chills, +fatigue had fever at 12 noon and came to ED   adm RLE cellulitis and then  later admitted again for dehydration with lactic acidosis due to diarrhea;  Taken off metformin. DCd to [facility] for rehab, Currently at home for the past 2 weeks In ED /56       RR 20  T 102.5    97% sat RA lower extremity cellulitis bilaterally 15K WBC NS 2000 cc as 31 cc/kg doxycycline 100 mg IV vanco 1750 IV.      10/13/23: Pt seen by podiatry team with attending present. Pt resting at bedside, NAD, ryan foot dressings clean dry and intact. No additional pedal complaints reported at this time. Dermatology team also present. Discussed recommendations.     PAST MEDICAL HX  History of Cellulitis of right lower leg   Chronic stasis dermatitis   Colon cancer   HTN (hypertension), benign   Hyperkalemia   Lymphedema of both lower extremities  Malignant neoplasm of upper lobe of left lung   Metabolic syndrome   Obesity, morbid (more than 100 lbs over ideal weight or BMI > 40)  Type 2 diabetes mellitus without complication, without long-term current use of insulin         PAST SURGICAL HX  History of Cholecystectomy  History of Hysterectomy (V88.01)  History of Laminectomy Lumbar  History of Lung lobectomy  History of Partial Colectomy  History of Rotator Cuff Repair              FAMILY HX  Family history of Bladder carcinoma : Mother  Family history of coronary artery disease (V17.3) (Z82.49) : Father       SOCIAL HX  Denies alcohol consumption   Does not use illicit drugs   Has no children  Never smoked cigarettes   Single   (10 Oct 2023 22:36)            PMH: No pertinent past medical history    Lymphatic edema    Hypertension    Type 2 diabetes mellitus    Lung cancer    Colon cancer      PSH:    Allergies:Keflex (Anaphylaxis)  cephalexin (Unknown)      Labs:                          10.1   7.53  )-----------( 92       ( 12 Oct 2023 06:57 )             31.2   10-12    138  |  112<H>  |  18  ----------------------------<  115<H>  3.7   |  22  |  1.03    Ca    8.5      12 Oct 2023 06:57        Vital Signs Last 24 Hrs  T(C): 37.1 (13 Oct 2023 09:12), Max: 37.5 (12 Oct 2023 21:00)  T(F): 98.8 (13 Oct 2023 09:12), Max: 99.5 (12 Oct 2023 21:00)  HR: 81 (13 Oct 2023 09:12) (81 - 84)  BP: 132/58 (13 Oct 2023 09:12) (132/58 - 149/64)  BP(mean): --  RR: 18 (13 Oct 2023 09:12) (18 - 18)  SpO2: 97% (13 Oct 2023 09:12) (97% - 97%)    Parameters below as of 13 Oct 2023 09:12  Patient On (Oxygen Delivery Method): room air        REVIEW OF SYSTEMS:    CONSTITUTIONAL: No weakness, fevers or chills  EYES: No visual changes  RESPIRATORY: No cough, wheezing; No shortness of breath  CARDIOVASCULAR: No chest pain or palpitations  GASTROINTESTINAL: No abdominal or epigastric pain. No nausea, vomiting; No diarrhea or constipation.   GENITOURINARY: No dysuria, frequency or hematuria  NEUROLOGICAL: No numbness or weakness  SKIN: See physical examination.  All other review of systems is negative unless indicated above    Physical Exam:   Constitutional: NAD, alert;  Lower Extremity Focus  Derm:  Skin warm, dry and supple bilateral.    Significant generalized lichenification and verrucous changes to skin extending from below the knees to feet bilaterally, cobblestone like papules and nodules extending from below knee to feet bilaterally. Ryan interdigital maceration and malodor. Noted hemosiderin deposition to BLE. Noted erythema to BLE.    No acute underlying ulcerations, no pus/purulence, no PTB.   Vascular: Dorsalis Pedis and Posterior Tibial pulses non palpable due to significant edema to BLE.  Capillary re-fill time less then 3 seconds digits 1-5 bilateral.    Neuro: Protective sensation intact to the level of the digits bilateral.  MSK: Muscle strength 5/5 all major muscle groups bilateral.

## 2023-10-13 NOTE — PHYSICAL THERAPY INITIAL EVALUATION ADULT - ACTIVE RANGE OF MOTION EXAMINATION, REHAB EVAL
Right hip flex ~ 40 degrees, no movement on command right DF. PROM DF ~-15 degrees. Left hip flex ~ 30 degrees, and DF neutral./Left UE Active ROM was WFL (within functional limits)/Right UE Active ROM was WFL (within functional limits)

## 2023-10-14 LAB
GLUCOSE BLDC GLUCOMTR-MCNC: 106 MG/DL — HIGH (ref 70–99)
GLUCOSE BLDC GLUCOMTR-MCNC: 107 MG/DL — HIGH (ref 70–99)
GLUCOSE BLDC GLUCOMTR-MCNC: 117 MG/DL — HIGH (ref 70–99)
GLUCOSE BLDC GLUCOMTR-MCNC: 134 MG/DL — HIGH (ref 70–99)

## 2023-10-14 PROCEDURE — 99233 SBSQ HOSP IP/OBS HIGH 50: CPT

## 2023-10-14 RX ORDER — POLYETHYLENE GLYCOL 3350 17 G/17G
17 POWDER, FOR SOLUTION ORAL DAILY
Refills: 0 | Status: DISCONTINUED | OUTPATIENT
Start: 2023-10-14 | End: 2023-10-16

## 2023-10-14 RX ADMIN — POLYETHYLENE GLYCOL 3350 17 GRAM(S): 17 POWDER, FOR SOLUTION ORAL at 09:15

## 2023-10-14 RX ADMIN — PIPERACILLIN AND TAZOBACTAM 25 GRAM(S): 4; .5 INJECTION, POWDER, LYOPHILIZED, FOR SOLUTION INTRAVENOUS at 05:30

## 2023-10-14 RX ADMIN — PIPERACILLIN AND TAZOBACTAM 25 GRAM(S): 4; .5 INJECTION, POWDER, LYOPHILIZED, FOR SOLUTION INTRAVENOUS at 21:24

## 2023-10-14 RX ADMIN — Medication 10 MILLIGRAM(S): at 20:17

## 2023-10-14 RX ADMIN — ENOXAPARIN SODIUM 40 MILLIGRAM(S): 100 INJECTION SUBCUTANEOUS at 09:15

## 2023-10-14 RX ADMIN — ENOXAPARIN SODIUM 40 MILLIGRAM(S): 100 INJECTION SUBCUTANEOUS at 21:06

## 2023-10-14 RX ADMIN — SENNA PLUS 2 TABLET(S): 8.6 TABLET ORAL at 21:06

## 2023-10-14 RX ADMIN — Medication 500 MILLIGRAM(S): at 21:06

## 2023-10-14 RX ADMIN — PIPERACILLIN AND TAZOBACTAM 25 GRAM(S): 4; .5 INJECTION, POWDER, LYOPHILIZED, FOR SOLUTION INTRAVENOUS at 14:09

## 2023-10-14 RX ADMIN — Medication 1 TABLET(S): at 09:15

## 2023-10-14 RX ADMIN — Medication 500 MILLIGRAM(S): at 09:15

## 2023-10-14 NOTE — PROGRESS NOTE ADULT - ASSESSMENT
72 year old female w hx  Colon CA, SYLVIA  lung CA, DM II,  HTN, chronic bilateral lymphedema to lower extremities came to ED c/o fever and chills    Assessment:  Pseudomonas Sepsis and Bacteremia  Cellulitis  Lymphedema  Chronic and severe Stasis Dermatitis  Type 2 DM  Hypoalbuminemia  Obesity w BMI 46    Plan:  Zos;yn  Vancomycin  ID following  Podiatry following  Follow cx  S/p NS 2000 cc as 31 cc/kg  Continue NS @ 125 cc/hr  Wound Care  hold Cooper RUFFINM's  ISS  nutrition consult appreciated  VTE proph with enoxaparin  add protien suppliments and vitamins  PT  dermatology consult appreciated  Dilute Acetic acid soak, mupiracin  ACE Wraps

## 2023-10-14 NOTE — PROGRESS NOTE ADULT - SUBJECTIVE AND OBJECTIVE BOX
Date of Service: 10/14/23  HPI: 72 year old female w hx  Colon CA, SYLVIA  lung CA, DM II,  HTN, chronic bilateral lymphedema to lower extremities came to ED c/o fever and chills Went [facility] at   saw Dr. Mitchell and Dr. Bashir.  Tried new dressing. Pt felt great then and this AM Then had + chills, +fatigue had fever at 12 noon and came to ED   adm RLE cellulitis and then  later admitted again for dehydration with lactic acidosis due to diarrhea;  Taken off metformin. DCd to [facility] for rehab, Currently at home for the past 2 weeks In ED /56       RR 20  T 102.5    97% sat RA lower extremity cellulitis bilaterally 15K WBC NS 2000 cc as 31 cc/kg doxycycline 100 mg IV vanco 1750 IV.      10/14/23:     PAST MEDICAL HX  History of Cellulitis of right lower leg   Chronic stasis dermatitis   Colon cancer   HTN (hypertension), benign   Hyperkalemia   Lymphedema of both lower extremities  Malignant neoplasm of upper lobe of left lung   Metabolic syndrome   Obesity, morbid (more than 100 lbs over ideal weight or BMI > 40)  Type 2 diabetes mellitus without complication, without long-term current use of insulin         PAST SURGICAL HX  History of Cholecystectomy  History of Hysterectomy (V88.01)  History of Laminectomy Lumbar  History of Lung lobectomy  History of Partial Colectomy  History of Rotator Cuff Repair              FAMILY HX  Family history of Bladder carcinoma : Mother  Family history of coronary artery disease (V17.3) (Z82.49) : Father       SOCIAL HX  Denies alcohol consumption   Does not use illicit drugs   Has no children  Never smoked cigarettes   Single   (10 Oct 2023 22:36)            PMH: No pertinent past medical history    Lymphatic edema    Hypertension    Type 2 diabetes mellitus    Lung cancer    Colon cancer      PSH:    Allergies:Keflex (Anaphylaxis)  cephalexin (Unknown)      Labs:                          10.1   7.53  )-----------( 92       ( 12 Oct 2023 06:57 )             31.2   10-12    138  |  112<H>  |  18  ----------------------------<  115<H>  3.7   |  22  |  1.03    Ca    8.5      12 Oct 2023 06:57        Vital Signs Last 24 Hrs  T(C): 36.6 (14 Oct 2023 07:15), Max: 37.1 (13 Oct 2023 16:23)  T(F): 97.9 (14 Oct 2023 07:15), Max: 98.8 (13 Oct 2023 16:23)  HR: 80 (14 Oct 2023 07:15) (80 - 85)  BP: 137/61 (14 Oct 2023 07:15) (122/85 - 137/61)  BP(mean): --  RR: 18 (14 Oct 2023 07:15) (18 - 18)  SpO2: 99% (14 Oct 2023 07:15) (97% - 99%)    Parameters below as of 14 Oct 2023 07:15  Patient On (Oxygen Delivery Method): room air          REVIEW OF SYSTEMS:    CONSTITUTIONAL: No weakness, fevers or chills  EYES: No visual changes  RESPIRATORY: No cough, wheezing; No shortness of breath  CARDIOVASCULAR: No chest pain or palpitations  GASTROINTESTINAL: No abdominal or epigastric pain. No nausea, vomiting; No diarrhea or constipation.   GENITOURINARY: No dysuria, frequency or hematuria  NEUROLOGICAL: No numbness or weakness  SKIN: See physical examination.  All other review of systems is negative unless indicated above    Physical Exam:   Constitutional: NAD, alert;  Lower Extremity Focus  Derm:  Skin warm, dry and supple bilateral.    Significant generalized lichenification and verrucous changes to skin extending from below the knees to feet bilaterally, cobblestone like papules and nodules extending from below knee to feet bilaterally. Ryan interdigital maceration and malodor. Noted hemosiderin deposition to BLE. Noted erythema to BLE.    No acute underlying ulcerations, no pus/purulence, no PTB.   Vascular: Dorsalis Pedis and Posterior Tibial pulses non palpable due to significant edema to BLE.  Capillary re-fill time less then 3 seconds digits 1-5 bilateral.    Neuro: Protective sensation intact to the level of the digits bilateral.  MSK: Muscle strength 5/5 all major muscle groups bilateral.                Date of Service: 10/14/23  HPI: 72 year old female w hx  Colon CA, SYLVIA  lung CA, DM II,  HTN, chronic bilateral lymphedema to lower extremities came to ED c/o fever and chills Went to [facility] at   saw Dr. Mitchell and Dr. Bashir.  Tried new dressing. Pt felt great then and this AM Then had + chills, +fatigue had fever at 12 noon and came to ED   adm RLE cellulitis and then  later admitted again for dehydration with lactic acidosis due to diarrhea;  Taken off metformin. DCd to [facility] for rehab, Currently at home for the past 2 weeks In ED /56       RR 20  T 102.5    97% sat RA lower extremity cellulitis bilaterally 15K WBC NS 2000 cc as 31 cc/kg doxycycline 100 mg IV vanco 1750 IV.      10/14/23: Pt seen by podiatry today. Resting comfortably on bed. feels better. No fever, NAD.     PAST MEDICAL HX  History of Cellulitis of right lower leg   Chronic stasis dermatitis   Colon cancer   HTN (hypertension), benign   Hyperkalemia   Lymphedema of both lower extremities  Malignant neoplasm of upper lobe of left lung   Metabolic syndrome   Obesity, morbid (more than 100 lbs over ideal weight or BMI > 40)  Type 2 diabetes mellitus without complication, without long-term current use of insulin         PAST SURGICAL HX  History of Cholecystectomy  History of Hysterectomy (V88.01)  History of Laminectomy Lumbar  History of Lung lobectomy  History of Partial Colectomy  History of Rotator Cuff Repair              FAMILY HX  Family history of Bladder carcinoma : Mother  Family history of coronary artery disease (V17.3) (Z82.49) : Father       SOCIAL HX  Denies alcohol consumption   Does not use illicit drugs   Has no children  Never smoked cigarettes   Single   (10 Oct 2023 22:36)            PMH: No pertinent past medical history    Lymphatic edema    Hypertension    Type 2 diabetes mellitus    Lung cancer    Colon cancer      PSH:    Allergies:Keflex (Anaphylaxis)  cephalexin (Unknown)      Labs:                          10.1   7.53  )-----------( 92       ( 12 Oct 2023 06:57 )             31.2   10-12    138  |  112<H>  |  18  ----------------------------<  115<H>  3.7   |  22  |  1.03    Ca    8.5      12 Oct 2023 06:57        Vital Signs Last 24 Hrs  T(C): 36.9 (14 Oct 2023 15:08), Max: 36.9 (14 Oct 2023 15:08)  T(F): 98.4 (14 Oct 2023 15:08), Max: 98.4 (14 Oct 2023 15:08)  HR: 74 (14 Oct 2023 15:08) (74 - 85)  BP: 135/62 (14 Oct 2023 15:08) (122/85 - 137/61)  BP(mean): --  RR: 18 (14 Oct 2023 15:08) (18 - 18)  SpO2: 99% (14 Oct 2023 15:08) (97% - 99%)    Parameters below as of 14 Oct 2023 15:08  Patient On (Oxygen Delivery Method): room air          REVIEW OF SYSTEMS:    CONSTITUTIONAL: No weakness, fevers or chills  EYES: No visual changes  RESPIRATORY: No cough, wheezing; No shortness of breath  CARDIOVASCULAR: No chest pain or palpitations  GASTROINTESTINAL: No abdominal or epigastric pain. No nausea, vomiting; No diarrhea or constipation.   GENITOURINARY: No dysuria, frequency or hematuria  NEUROLOGICAL: No numbness or weakness  SKIN: See physical examination.  All other review of systems is negative unless indicated above    Physical Exam:   Constitutional: NAD, alert;  Lower Extremity Focus  Derm:  Skin warm, dry and supple bilateral.    Significant generalized lichenification and verrucous changes to skin extending from below the knees to feet bilaterally, cobblestone like papules and nodules extending from below knee to feet bilaterally. Ryan interdigital maceration and malodor. Noted hemosiderin deposition to BLE. Noted erythema to BLE.    No acute underlying ulcerations, no pus/purulence, no PTB.   Vascular: Dorsalis Pedis and Posterior Tibial pulses non palpable due to significant edema to BLE.  Capillary re-fill time less then 3 seconds digits 1-5 bilateral.    Neuro: Protective sensation intact to the level of the digits bilateral.  MSK: Muscle strength 5/5 all major muscle groups bilateral.

## 2023-10-14 NOTE — PROGRESS NOTE ADULT - SUBJECTIVE AND OBJECTIVE BOX
SUBJECTIVE:    CHIEF COMPLAINT:  Patient is a 72y old  Female who presents with a chief complaint of sepsis (14 Oct 2023 15:06)      PAST MEDICAL HX  History of Cellulitis of right lower leg   Chronic stasis dermatitis   Colon cancer   HTN (hypertension), benign   Hyperkalemia   Lymphedema of both lower extremities  Malignant neoplasm of upper lobe of left lung   Metabolic syndrome   Obesity, morbid (more than 100 lbs over ideal weight or BMI > 40)  Type 2 diabetes mellitus without complication, without long-term current use of insulin         PAST SURGICAL HX  History of Cholecystectomy  History of Hysterectomy (V88.01)  History of Laminectomy Lumbar  History of Lung lobectomy  History of Partial Colectomy  History of Rotator Cuff Repair              FAMILY HX  Family history of Bladder carcinoma : Mother  Family history of coronary artery disease (V17.3) (Z82.49) : Father       SOCIAL HX  Denies alcohol consumption   Does not use illicit drugs   Has no children  Never smoked cigarettes   Single   (10 Oct 2023 22:36)      Interval HPI and Overnight Events: Pt feelling well. Saw podiatry and derm yesterday    REVIEW OF SYSTEMS:  CONSTITUTIONAL: No weakness, fevers or chills  EYES/ENT: No visual changes;  No vertigo or throat pain   NECK: No pain or stiffness  RESPIRATORY: No cough, wheezing, hemoptysis; No shortness of breath  CARDIOVASCULAR: No chest pain or palpitations  GASTROINTESTINAL: No abdominal or epigastric pain. No nausea, vomiting, or hematemesis; No diarrhea or constipation. No melena or hematochezia.  GENITOURINARY: No dysuria, frequency or hematuria  NEUROLOGICAL: No numbness or weakness    All other review of systems is negative unless indicated above    OBJECTIVE    Vital Signs Last 24 Hrs  T(C): 36.6 (14 Oct 2023 07:15), Max: 37.1 (13 Oct 2023 16:23)  T(F): 97.9 (14 Oct 2023 07:15), Max: 98.8 (13 Oct 2023 16:23)  HR: 80 (14 Oct 2023 07:15) (80 - 85)  BP: 137/61 (14 Oct 2023 07:15) (122/85 - 137/61)  BP(mean): --  RR: 18 (14 Oct 2023 07:15) (18 - 18)  SpO2: 99% (14 Oct 2023 07:15) (97% - 99%)    Parameters below as of 14 Oct 2023 07:15  Patient On (Oxygen Delivery Method): room air        MEDICATIONS  (STANDING):  acetic acid 0.25% Topical Irrigation 1 Application(s) Topical daily  ascorbic acid 500 milliGRAM(s) Oral two times a day  chlorhexidine 4% Liquid 1 Application(s) Topical every 24 hours  dextrose 5%. 1000 milliLiter(s) (100 mL/Hr) IV Continuous <Continuous>  dextrose 5%. 1000 milliLiter(s) (50 mL/Hr) IV Continuous <Continuous>  dextrose 50% Injectable 12.5 Gram(s) IV Push once  dextrose 50% Injectable 25 Gram(s) IV Push once  dextrose 50% Injectable 25 Gram(s) IV Push once  enoxaparin Injectable 40 milliGRAM(s) SubCutaneous every 12 hours  glucagon  Injectable 1 milliGRAM(s) IntraMuscular once  influenza  Vaccine (HIGH DOSE) 0.7 milliLiter(s) IntraMuscular once  insulin lispro (ADMELOG) corrective regimen sliding scale   SubCutaneous at bedtime  insulin lispro (ADMELOG) corrective regimen sliding scale   SubCutaneous three times a day before meals  multivitamin 1 Tablet(s) Oral daily  mupirocin 2% Ointment 1 Application(s) Topical every 12 hours  piperacillin/tazobactam IVPB.. 3.375 Gram(s) IV Intermittent every 8 hours  polyethylene glycol 3350 17 Gram(s) Oral daily  senna 2 Tablet(s) Oral at bedtime  zinc sulfate 220 milliGRAM(s) Oral daily      LABS:     Specimen Source Clean Catch Clean Catch (Midstream)   10-11 @ 06:00  Culture Results  Culture grew 3 or more types of organisms which indicate  collection contamination; consider recollection only if clinically  indicated.  Specimen Source .Blood Blood-Peripheral   10-10 @ 15:20  Culture Results  Growth in aerobic bottle: Pseudomonas aeruginosa  Direct identification is available within approximately 3-5  hours either by Blood Panel Multiplexed PCR or Direct  MALDI-TOF. Details: [email or web address]    CAPILLARY BLOOD GLUCOSE  POCT Blood Glucose.: 117 mg/dL (14 Oct 2023 11:43)  POCT Blood Glucose.: 106 mg/dL (14 Oct 2023 07:04)  POCT Blood Glucose.: 147 mg/dL (13 Oct 2023 21:46)  POCT Blood Glucose.: 132 mg/dL (13 Oct 2023 17:05)

## 2023-10-14 NOTE — PROGRESS NOTE ADULT - ASSESSMENT
72 year old female w hx  Colon CA, SYLVIA  lung CA, DM II,  HTN, chronic bilateral lymphedema to lower extremities came to ED c/o fever and chills [facility]   saw Dr. Pardo and Dr. Bashir.  Tried new dressing. Pt felt great then and this AM Then had + chills, +fatigue had fever at 12 noon and came to ED   adm RLE cellulitis and then  later admitted again for dehydration with lactic acidosis due to diarrhea;  Taken off metformin. DCd to [facility] for rehab, Currently at home for the past 2 weeks In ED /56       RR 20  T 102.5    97% sat RA lower extremity cellulitis bilaterally 15K WBC NS 2000 cc as 31 cc/kg doxycycline 100 mg IV vanco 1750 IV.     1. Sepsis with PSAE. Pyuria, Probable UTI. RLE cellulitis. Chronic LE Lymphedema. Leukocytosis- resolved. Morbid obesity  - imaging reviewed   - blood cx growing PSAE  sensitivities reviewed  - on vancomycin  #2-3 - dc   - on zosyn [id number] #4-5   - continue with antibiotic coverage  - podiatry eval appreciated  - on dc po ciprofloxacin 500mg BID x 14 day course until 10/25   - monitor temps  - tolerating abx well so far; no side effects noted  - reason for abx use and side effects reviewed with patient  - supportive care  - fu cbc    2. other issues - care per medicine

## 2023-10-14 NOTE — PROGRESS NOTE ADULT - SUBJECTIVE AND OBJECTIVE BOX
Date of service: 10-14-23 @ 15:07    pt seen and examined  has constipation  no fevers    ROS: no fever or chills; denies dizziness, no HA, no SOB or cough, no abdominal pain, no diarrhea or constipation;  no legs pain, no rashes      MEDICATIONS  (STANDING):  acetic acid 0.25% Topical Irrigation 1 Application(s) Topical daily  ascorbic acid 500 milliGRAM(s) Oral two times a day  chlorhexidine 4% Liquid 1 Application(s) Topical every 24 hours  dextrose 5%. 1000 milliLiter(s) (100 mL/Hr) IV Continuous <Continuous>  dextrose 5%. 1000 milliLiter(s) (50 mL/Hr) IV Continuous <Continuous>  dextrose 50% Injectable 12.5 Gram(s) IV Push once  dextrose 50% Injectable 25 Gram(s) IV Push once  dextrose 50% Injectable 25 Gram(s) IV Push once  enoxaparin Injectable 40 milliGRAM(s) SubCutaneous every 12 hours  glucagon  Injectable 1 milliGRAM(s) IntraMuscular once  influenza  Vaccine (HIGH DOSE) 0.7 milliLiter(s) IntraMuscular once  insulin lispro (ADMELOG) corrective regimen sliding scale   SubCutaneous three times a day before meals  insulin lispro (ADMELOG) corrective regimen sliding scale   SubCutaneous at bedtime  multivitamin 1 Tablet(s) Oral daily  mupirocin 2% Ointment 1 Application(s) Topical every 12 hours  piperacillin/tazobactam IVPB.. 3.375 Gram(s) IV Intermittent every 8 hours  polyethylene glycol 3350 17 Gram(s) Oral daily  senna 2 Tablet(s) Oral at bedtime  zinc sulfate 220 milliGRAM(s) Oral daily    Vital Signs Last 24 Hrs  T(C): 36.6 (14 Oct 2023 07:15), Max: 37.1 (13 Oct 2023 16:23)  T(F): 97.9 (14 Oct 2023 07:15), Max: 98.8 (13 Oct 2023 16:23)  HR: 80 (14 Oct 2023 07:15) (80 - 85)  BP: 137/61 (14 Oct 2023 07:15) (122/85 - 137/61)  BP(mean): --  RR: 18 (14 Oct 2023 07:15) (18 - 18)  SpO2: 99% (14 Oct 2023 07:15) (97% - 99%)    Parameters below as of 14 Oct 2023 07:15  Patient On (Oxygen Delivery Method): room air      PE:  Constitutional: NAD   HEENT: NC/AT, EOMI, PERRLA, conjunctivae clear; ears and nose atraumatic; pharynx benign  Neck: supple; thyroid not palpable  Back: no tenderness  Respiratory: respiratory effort normal; clear to auscultation  Cardiovascular: S1S2 regular, no murmurs  Abdomen: soft, not tender, not distended, positive BS; liver and spleen WNL  Genitourinary: no suprapubic tenderness  Lymphatic: no LN palpable  Musculoskeletal: no muscle tenderness, no joint swelling or tenderness  Extremities: no pedal edema bilateral LE erythema warmth tenderness lymphedema  Neurological/ Psychiatric: AxOx3, Judgement and insight normal;  moving all extremities  Skin: no rashes; no palpable lesions    Labs: all available labs reviewed                          Urinalysis Basic - ( 11 Oct 2023 06:00 )    Color: Orange / Appearance: Cloudy / S.023 / pH: x  Gluc: x / Ketone: Negative mg/dL  / Bili: Small / Urobili: 1.0 mg/dL   Blood: x / Protein: Trace mg/dL / Nitrite: Positive   Leuk Esterase: Small / RBC: 4 /HPF / WBC 53 /HPF   Sq Epi: x / Non Sq Epi: 26 /HPF / Bacteria: Many /HPF    Culture - Blood (10.10.23 @ 15:20)   Specimen Source: .Blood Blood-Peripheral  Culture Results:   No growth at 24 hours  Culture - Blood (10.10.23 @ 15:20)   - Pseudomonas aeruginosa: Detec  Gram Stain:   Growth in aerobic bottle: Gram Negative Rods  Specimen Source: .Blood Blood-Peripheral  Organism: Blood Culture PCR  Culture Results:   Growth in aerobic bottle: Pseudomonas aeruginosa   Direct identification is available within approximately 3-5   hours either by Blood Panel Multiplexed PCR or Direct   MALDI-TOF. Details: [email or web address]  Organism Identification: Blood Culture PCR  Method Type: PCR    Radiology: all available radiological tests reviewed      ACC: [id number] EXAM:  XR CHEST PORTABLE IMMED 1V   ORDERED BY: RHIANNA SHULTZ     PROCEDURE DATE:  2023          INTERPRETATION:  AP chest on 2023 at 3:35 PM. Patient has   weakness. There is history of lung and colon cancer.    Heart magnified by technique. Right-sided port is noted.    There is hazy density over the left medial chest again noted.    Findings are similar to  this year. Exact significance of left   lung findings are uncertain.    IMPRESSION: Stable hazy density over the left medial chest.        ACC: [id number] EXAM:  US DPLX LWR EXT VEINS COMPL BI   ORDERED BY: TROY BROWN     PROCEDURE DATE:  2023          INTERPRETATION:  CLINICAL INFORMATION: Leg swelling.    COMPARISON: Venous Doppler dated 2022.    TECHNIQUE: Duplex sonography of the BILATERAL LOWER extremity veins with   color and spectral Doppler, with and without compression.    FINDINGS:    RIGHT:  Normal compressibility of the RIGHT common femoral, femoral and popliteal   veins.  Doppler examination shows normal spontaneous and phasic flow.  Limited visualized of the RIGHT calf veins. No RIGHT calf vein thrombosis   detected.    LEFT:  Normal compressibility of the LEFT common femoral, femoral and popliteal   veins.  Doppler examination shows normal spontaneous and phasic flow.  Limited visualized of the LEFT calf veins. No LEFT calf vein thrombosis   detected.    IMPRESSION:  No evidence of deep venous thrombosis in either lower extremity.    Limited visualization of the calf veins.        Advanced directives addressed: full resuscitation

## 2023-10-15 ENCOUNTER — TRANSCRIPTION ENCOUNTER (OUTPATIENT)
Age: 73
End: 2023-10-15

## 2023-10-15 LAB
CULTURE RESULTS: SIGNIFICANT CHANGE UP
GLUCOSE BLDC GLUCOMTR-MCNC: 103 MG/DL — HIGH (ref 70–99)
GLUCOSE BLDC GLUCOMTR-MCNC: 113 MG/DL — HIGH (ref 70–99)
GLUCOSE BLDC GLUCOMTR-MCNC: 126 MG/DL — HIGH (ref 70–99)
GLUCOSE BLDC GLUCOMTR-MCNC: 129 MG/DL — HIGH (ref 70–99)
HCT VFR BLD CALC: 32.1 % — LOW (ref 34.5–45)
HGB BLD-MCNC: 10.4 G/DL — LOW (ref 11.5–15.5)
MCHC RBC-ENTMCNC: 27.9 PG — SIGNIFICANT CHANGE UP (ref 27–34)
MCHC RBC-ENTMCNC: 32.4 GM/DL — SIGNIFICANT CHANGE UP (ref 32–36)
MCV RBC AUTO: 86.1 FL — SIGNIFICANT CHANGE UP (ref 80–100)
PLATELET # BLD AUTO: 106 K/UL — LOW (ref 150–400)
RBC # BLD: 3.73 M/UL — LOW (ref 3.8–5.2)
RBC # FLD: 15 % — HIGH (ref 10.3–14.5)
SPECIMEN SOURCE: SIGNIFICANT CHANGE UP
WBC # BLD: 3.77 K/UL — LOW (ref 3.8–10.5)
WBC # FLD AUTO: 3.77 K/UL — LOW (ref 3.8–10.5)

## 2023-10-15 PROCEDURE — 99232 SBSQ HOSP IP/OBS MODERATE 35: CPT

## 2023-10-15 RX ORDER — ACETIC ACID
0 LIQUID (ML) MISCELLANEOUS
Qty: 0 | Refills: 0 | DISCHARGE
Start: 2023-10-15

## 2023-10-15 RX ORDER — CIPROFLOXACIN LACTATE 400MG/40ML
1 VIAL (ML) INTRAVENOUS
Qty: 20 | Refills: 0
Start: 2023-10-15 | End: 2023-10-24

## 2023-10-15 RX ORDER — ASCORBIC ACID 60 MG
1 TABLET,CHEWABLE ORAL
Qty: 0 | Refills: 0 | DISCHARGE
Start: 2023-10-15

## 2023-10-15 RX ORDER — MUPIROCIN 20 MG/G
1 OINTMENT TOPICAL
Qty: 4 | Refills: 10
Start: 2023-10-15

## 2023-10-15 RX ADMIN — PIPERACILLIN AND TAZOBACTAM 25 GRAM(S): 4; .5 INJECTION, POWDER, LYOPHILIZED, FOR SOLUTION INTRAVENOUS at 22:48

## 2023-10-15 RX ADMIN — CHLORHEXIDINE GLUCONATE 1 APPLICATION(S): 213 SOLUTION TOPICAL at 10:54

## 2023-10-15 RX ADMIN — Medication 1 APPLICATION(S): at 10:55

## 2023-10-15 RX ADMIN — Medication 500 MILLIGRAM(S): at 09:13

## 2023-10-15 RX ADMIN — PIPERACILLIN AND TAZOBACTAM 25 GRAM(S): 4; .5 INJECTION, POWDER, LYOPHILIZED, FOR SOLUTION INTRAVENOUS at 13:15

## 2023-10-15 RX ADMIN — ENOXAPARIN SODIUM 40 MILLIGRAM(S): 100 INJECTION SUBCUTANEOUS at 09:13

## 2023-10-15 RX ADMIN — Medication 10 MILLIGRAM(S): at 15:53

## 2023-10-15 RX ADMIN — Medication 1 TABLET(S): at 09:13

## 2023-10-15 RX ADMIN — MUPIROCIN 1 APPLICATION(S): 20 OINTMENT TOPICAL at 10:54

## 2023-10-15 RX ADMIN — POLYETHYLENE GLYCOL 3350 17 GRAM(S): 17 POWDER, FOR SOLUTION ORAL at 09:14

## 2023-10-15 RX ADMIN — PIPERACILLIN AND TAZOBACTAM 25 GRAM(S): 4; .5 INJECTION, POWDER, LYOPHILIZED, FOR SOLUTION INTRAVENOUS at 06:02

## 2023-10-15 NOTE — PROGRESS NOTE ADULT - ASSESSMENT
A: 71 y/o Female seen for the following:   -Chronic BLE Lymphedema   -BLE Elephantiasis nostras verrucosa  -DM2  -Difficulty with ambulation    P:   Chart reviewed and Patient evaluated;  Discussed diagnosis and treatment with patient. Discussed importance of daily foot examinations, proper shoe gear, importance of tight glycemic control.   Significant generalized verrucous changes to skin extending from bilateral below the knee to feet, cobblestone like nodules to BLE, interdigital maceration and malodor. Underlying elephantiasis nostras verrucosa caused by chronic BLE lymphedema  Elevate BLE  Dermatology consult and recommendations appreciated.   WC: betadine gauze interdigitally with DSD to Ryan feet  Ordered mupirocin, acetic acid 0.25%, Hibiclens to be used every other day on the ryan LE  Continue with antibiotics as per ID  All additional care per Med appreciated  Patient demonstrated verbal understanding of all interventions and tolerated interventions well without any complications.   Podiatry will follow while in house      Case D/W attending Dr. Mitchell  A: 73 y/o Female seen for the following:   -Chronic BLE Lymphedema   -BLE Elephantiasis nostras verrucosa  -DM2  -Difficulty with ambulation    P:   Chart reviewed and Patient evaluated;  Discussed diagnosis and treatment with patient. Discussed importance of daily foot examinations, proper shoe gear, importance of tight glycemic control.   Significant generalized verrucous changes to skin extending from bilateral below the knee to feet, cobblestone like nodules to BLE, interdigital maceration and malodor. Underlying elephantiasis nostras verrucosa caused by chronic BLE lymphedema  Elevate BLE  Dermatology consult and recommendations appreciated.   WC: Acetic acid 0.25% soaks, Mupirocin and DSD to ryan feet   Continue with antibiotics as per ID  All additional care per Med appreciated  Patient demonstrated verbal understanding of all interventions and tolerated interventions well without any complications.   Podiatry will follow while in house      Case D/W attending Dr. Mitchell     Wound care instructions for RYAN LE every other day:  - Gently remove old dressings.  - Apply acetic acid soaked gauze to the ryan LE and clean the skin with dry gauze.   - Apply Mupirocin to the Ryan LE and interdigits.  - Apply dry gauze in between digits to prevent maceration, ABD pads, sterile gauze, kerlix and ace wrap  to the LE

## 2023-10-15 NOTE — DISCHARGE NOTE PROVIDER - NSDCFUSCHEDAPPT_GEN_ALL_CORE_FT
Carloz Mitchell  [facility]  HNT PreAdmits  Scheduled Appointment: 10/16/2023    Elgin Dean  [address]  Scheduled Appointment: 10/19/2023    Elgin Dean  [address]  Scheduled Appointment: 11/03/2023

## 2023-10-15 NOTE — DISCHARGE NOTE PROVIDER - CARE PROVIDER_API CALL
Elgin Dean  [facility]  [address], [address] [address]  Phone: [phone]  Fax: [phone]  Follow Up Time:     Carloz Mitchell  Foot and Ankle Surgery  [address], [address]  [place] [address]  Phone: [phone]  Fax: [phone]  Follow Up Time:    Elgin Dean  [facility]  [address], [address]  [place] [address]  Phone: [phone]  Fax: [phone]  Follow Up Time:     Carloz Mitchell  Foot and Ankle Surgery  [address], [address]  [place] [address]  Phone: [phone]  Fax: [phone]  Follow Up Time:     Elgin Ledbetter  [address], [address]  [place] [address]  Phone: [phone]  Fax: [phone]  Follow Up Time:

## 2023-10-15 NOTE — DISCHARGE NOTE PROVIDER - HOSPITAL COURSE
Patient admitted for cellulitis, possible/probably sepsis and possible UTI  Pseudomonal cultured on one blood tube  Underwent wound care, ID consult, podiatery and dermatology consults  Treated with IV antibiotics and vigorous wound care.  Discharged to home on oral medications and wound care visits. Patient admitted for cellulitis, possible/probably sepsis and possible UTI  Pseudomonal cultured on one blood tube  Underwent wound care, ID consult, podiatry's and dermatology consults  Treated with IV antibiotics and vigorous wound care.  Discharged to home on oral medications and wound care visits.  Pt will follow up with Dermatology for the Acitretin Rx and monitoring

## 2023-10-15 NOTE — PROGRESS NOTE ADULT - MUSCULOSKELETAL COMMENTS
Severe lymphedema, stasis dermatits, eythema, and oozing to legs. Less ooze
legs improved. Not oozing at this time. Wond care completed by podiatry
lymphedema, stasis dermatitis
Persistent lymphedema, chronic stasis dermatitis. Less red today. No oozing this am

## 2023-10-15 NOTE — DISCHARGE NOTE PROVIDER - NSDCPNSUBOBJ_GEN_ALL_CORE
SUBJECTIVE:    CHIEF COMPLAINT:  Patient is a 72y old  Female who presents with a chief complaint of sepsis (15 Oct 2023 10:57)    Interval HPI and Overnight Events: Pt feels well. Legs dressed. Eager for discharge. Home care arranged    REVIEW OF SYSTEMS:  CONSTITUTIONAL: No weakness, fevers or chills  EYES/ENT: No visual changes;  No vertigo or throat pain   NECK: No pain or stiffness  RESPIRATORY: No cough, wheezing, hemoptysis; No shortness of breath  CARDIOVASCULAR: No chest pain or palpitations  GASTROINTESTINAL: No abdominal or epigastric pain. No nausea, vomiting, or hematemesis; No diarrhea or constipation. No melena or hematochezia.  GENITOURINARY: No dysuria, frequency or hematuria  NEUROLOGICAL: No numbness or weakness  SKIN: No itching, burning, rashes, or lesions   All other review of systems is negative unless indicated above    OBJECTIVE    PHYSICAL EXAM:  Vital Signs Last 24 Hrs  T(C): 36.6 (16 Oct 2023 07:37), Max: 37.1 (15 Oct 2023 16:36)  T(F): 97.9 (16 Oct 2023 07:37), Max: 98.8 (15 Oct 2023 16:36)  HR: 81 (16 Oct 2023 07:37) (74 - 87)  BP: 139/57 (16 Oct 2023 07:37) (139/57 - 152/69)  BP(mean): --  RR: 16 (16 Oct 2023 07:37) (16 - 18)  SpO2: 96% (16 Oct 2023 07:37) (96% - 96%)    Parameters below as of 16 Oct 2023 07:37  Patient On (Oxygen Delivery Method): room air      Constitutional: NAD, awake and alert, well-developed  HEENT: PERR, EOMI, Normal Hearing, MMM  Neck: Soft and supple, No LAD, No JVD  Respiratory: Breath sounds are clear bilaterally, No wheezing, rales or rhonchi  Cardiovascular: S1 and S2, regular rate and rhythm, no Murmurs, gallops or rubs  Gastrointestinal: Bowel Sounds present, soft, nontender, nondistended, no guarding, no rebound  Extremities: Dressings intact and clean with ACE wraps  Vascular: 2+ peripheral pulses  Neurological: A/O x 3, no focal deficits  Musculoskeletal: 5/5 strength b/l upper and lower extremities  Skin: No rashes    MEDICATIONS  (STANDING):  acetic acid 0.25% Topical Irrigation 1 Application(s) Topical once  ascorbic acid 500 milliGRAM(s) Oral two times a day  dextrose 5%. 1000 milliLiter(s) (50 mL/Hr) IV Continuous <Continuous>  dextrose 5%. 1000 milliLiter(s) (100 mL/Hr) IV Continuous <Continuous>  dextrose 50% Injectable 25 Gram(s) IV Push once  dextrose 50% Injectable 25 Gram(s) IV Push once  dextrose 50% Injectable 12.5 Gram(s) IV Push once  enoxaparin Injectable 40 milliGRAM(s) SubCutaneous every 12 hours  glucagon  Injectable 1 milliGRAM(s) IntraMuscular once  influenza  Vaccine (HIGH DOSE) 0.7 milliLiter(s) IntraMuscular once  insulin lispro (ADMELOG) corrective regimen sliding scale   SubCutaneous three times a day before meals  insulin lispro (ADMELOG) corrective regimen sliding scale   SubCutaneous at bedtime  multivitamin 1 Tablet(s) Oral daily  mupirocin 2% Ointment 1 Application(s) Topical once  piperacillin/tazobactam IVPB.. 3.375 Gram(s) IV Intermittent every 8 hours  polyethylene glycol 3350 17 Gram(s) Oral daily  senna 2 Tablet(s) Oral at bedtime  zinc sulfate 220 milliGRAM(s) Oral daily      LABS:                         10.4   3.77  )-----------( 106      ( 15 Oct 2023 06:51 )             32.1       CAPILLARY BLOOD GLUCOSE  POCT Blood Glucose.: 100 mg/dL (16 Oct 2023 07:45)  POCT Blood Glucose.: 129 mg/dL (15 Oct 2023 20:32)  POCT Blood Glucose.: 126 mg/dL (15 Oct 2023 16:55)  POCT Blood Glucose.: 103 mg/dL (15 Oct 2023 12:03)  · Musculoskeletal Comments	legs improved. Not oozing at this time. Wond care completed by podiatry    Assessment and Plan:   · Assessment	  72 year old female w hx  Colon CA, SYLVIA  lung CA, DM II,  HTN, chronic bilateral lymphedema to lower extremities came to ED c/o fever and chills    Assessment:  Pseudomonas Sepsis and Bacteremia  Cellulitis  Lymphedema  Chronic and severe Stasis Dermatitis  Type 2 DM  Hypoalbuminemia  Obesity w BMI 46    Plan:  Zos;yn change to cipro  Podiatry following  Follow cx  Wound Care  resume Januvia at home  PT  dermatology follow up  Dilute Acetic acid soak, mupiracin  ACE Wraps  Acitretin on discharge. Discussed need for  monitoring weekly by dermatology. She will follow up with derm  Home today

## 2023-10-15 NOTE — PROGRESS NOTE ADULT - CARDIOVASCULAR
normal/regular rate and rhythm/S1 S2 present/no gallops/no rub/no murmur

## 2023-10-15 NOTE — PROGRESS NOTE ADULT - SUBJECTIVE AND OBJECTIVE BOX
SUBJECTIVE:    CHIEF COMPLAINT:  Patient is a 72y old  Female who presents with a chief complaint of sepsis (15 Oct 2023 10:44)    PAST MEDICAL HX  History of Cellulitis of right lower leg   Chronic stasis dermatitis   Colon cancer   HTN (hypertension), benign   Hyperkalemia   Lymphedema of both lower extremities  Malignant neoplasm of upper lobe of left lung   Metabolic syndrome   Obesity, morbid (more than 100 lbs over ideal weight or BMI > 40)  Type 2 diabetes mellitus without complication, without long-term current use of insulin         PAST SURGICAL HX  History of Cholecystectomy  History of Hysterectomy (V88.01)  History of Laminectomy Lumbar  History of Lung lobectomy  History of Partial Colectomy  History of Rotator Cuff Repair     FAMILY HX  Family history of Bladder carcinoma : Mother  Family history of coronary artery disease (V17.3) (Z82.49) : Father       SOCIAL HX  Denies alcohol consumption   Does not use illicit drugs   Has no children  Never smoked cigarettes   Single   (10 Oct 2023 22:36)      Interval HPI and Overnight Events: Pt improved. Wounds dressed by podiatry again today.    REVIEW OF SYSTEMS:  CONSTITUTIONAL: No weakness, fevers or chills  EYES/ENT: No visual changes;  No vertigo or throat pain   NECK: No pain or stiffness  RESPIRATORY: No cough, wheezing, hemoptysis; No shortness of breath  CARDIOVASCULAR: No chest pain or palpitations  GASTROINTESTINAL: No abdominal or epigastric pain. No nausea, vomiting, or hematemesis; No diarrhea or constipation. No melena or hematochezia.  GENITOURINARY: No dysuria, frequency or hematuria  NEUROLOGICAL: No numbness or weakness  SKIN: No itching, burning, rashes, or lesions   All other review of systems is negative unless indicated above    OBJECTIVE    Vital Signs Last 24 Hrs  T(C): 36.9 (15 Oct 2023 08:21), Max: 36.9 (14 Oct 2023 15:08)  T(F): 98.4 (15 Oct 2023 08:21), Max: 98.4 (14 Oct 2023 15:08)  HR: 85 (15 Oct 2023 08:21) (74 - 85)  BP: 120/59 (15 Oct 2023 08:21) (120/59 - 153/57)  BP(mean): --  RR: 18 (15 Oct 2023 08:21) (18 - 18)  SpO2: 99% (15 Oct 2023 08:21) (96% - 99%)    Parameters below as of 15 Oct 2023 08:21  Patient On (Oxygen Delivery Method): room air        MEDICATIONS  (STANDING):  acetic acid 0.25% Topical Irrigation 1 Application(s) Topical daily  ascorbic acid 500 milliGRAM(s) Oral two times a day  chlorhexidine 4% Liquid 1 Application(s) Topical every 24 hours  dextrose 5%. 1000 milliLiter(s) (100 mL/Hr) IV Continuous <Continuous>  dextrose 5%. 1000 milliLiter(s) (50 mL/Hr) IV Continuous <Continuous>  dextrose 50% Injectable 25 Gram(s) IV Push once  dextrose 50% Injectable 25 Gram(s) IV Push once  dextrose 50% Injectable 12.5 Gram(s) IV Push once  enoxaparin Injectable 40 milliGRAM(s) SubCutaneous every 12 hours  glucagon  Injectable 1 milliGRAM(s) IntraMuscular once  influenza  Vaccine (HIGH DOSE) 0.7 milliLiter(s) IntraMuscular once  insulin lispro (ADMELOG) corrective regimen sliding scale   SubCutaneous at bedtime  insulin lispro (ADMELOG) corrective regimen sliding scale   SubCutaneous three times a day before meals  multivitamin 1 Tablet(s) Oral daily  mupirocin 2% Ointment 1 Application(s) Topical every 12 hours  piperacillin/tazobactam IVPB.. 3.375 Gram(s) IV Intermittent every 8 hours  polyethylene glycol 3350 17 Gram(s) Oral daily  senna 2 Tablet(s) Oral at bedtime  zinc sulfate 220 milliGRAM(s) Oral daily      LABS:                         10.4   3.77  )-----------( 106      ( 15 Oct 2023 06:51 )             32.1       Specimen Source Clean Catch Clean Catch (Midstream)   10-11 @ 06:00  Culture Results  Culture grew 3 or more types of organisms which indicate  collection contamination; consider recollection only if clinically  indicated.  Specimen Source .Blood Blood-Peripheral   10-10 @ 15:20  Culture Results  Growth in aerobic bottle: Pseudomonas aeruginosa  Direct identification is available within approximately 3-5  hours either by Blood Panel Multiplexed PCR or Direct  MALDI-TOF. Details: [email or web address]    CAPILLARY BLOOD GLUCOSE  POCT Blood Glucose.: 113 mg/dL (15 Oct 2023 07:51)  POCT Blood Glucose.: 134 mg/dL (14 Oct 2023 21:18)  POCT Blood Glucose.: 107 mg/dL (14 Oct 2023 16:49)  POCT Blood Glucose.: 117 mg/dL (14 Oct 2023 11:43)

## 2023-10-15 NOTE — DISCHARGE NOTE PROVIDER - NSDCCPCAREPLAN_GEN_ALL_CORE_FT
PRINCIPAL DISCHARGE DIAGNOSIS  Diagnosis: Cellulitis of right foot  Assessment and Plan of Treatment:       SECONDARY DISCHARGE DIAGNOSES  Diagnosis: Elephantiasis nostra verrucosa  Assessment and Plan of Treatment:     Diagnosis: Lymphedema of leg  Assessment and Plan of Treatment:     Diagnosis: Stasis dermatitis  Assessment and Plan of Treatment:     Diagnosis: Sepsis, unspecified organism  Assessment and Plan of Treatment:

## 2023-10-15 NOTE — DISCHARGE NOTE PROVIDER - CARE PROVIDERS DIRECT ADDRESSES
[email or web address],DirectAddress_Unknown ,[email or web address],DirectAddress_Unknown,[email or web address]

## 2023-10-15 NOTE — DISCHARGE NOTE PROVIDER - PROVIDER TOKENS
PROVIDER:[TOKEN:[[id number]]],PROVIDER:[TOKEN:[[id number]]] PROVIDER:[TOKEN:[[id number]]],PROVIDER:[TOKEN:[[id number]]],PROVIDER:[TOKEN:[[id number]]]

## 2023-10-15 NOTE — DISCHARGE NOTE PROVIDER - NSDCMRMEDTOKEN_GEN_ALL_CORE_FT
acetic acid 0.25% irrigation solution: irrigation soak every other day  ascorbic acid 500 mg oral tablet: 1 tab(s) orally 2 times a day  Cipro 500 mg oral tablet: 1 tab(s) orally 2 times a day MDD: 2 tab  Januvia 25 mg oral tablet: 1 tab(s) orally once a day  Multiple Vitamins oral tablet: 1 tab(s) orally once a day  mupirocin 2% topical ointment: Apply topically to affected area Tuesday, Thursday, Saturday   acetic acid 0.25% irrigation solution: irrigation every other day (at bedtime) soak every other day  ascorbic acid 500 mg oral tablet: 1 tab(s) orally 2 times a day  Cipro 500 mg oral tablet: 1 tab(s) orally 2 times a day MDD: 2 tab  Januvia 25 mg oral tablet: 1 tab(s) orally once a day  Multiple Vitamins oral tablet: 1 tab(s) orally once a day  mupirocin 2% topical ointment: Apply topically to affected area Tuesday, Thursday, Saturday

## 2023-10-15 NOTE — PROGRESS NOTE ADULT - RESPIRATORY
normal/clear to auscultation bilaterally/no wheezes/no rales/no rhonchi

## 2023-10-15 NOTE — PROGRESS NOTE ADULT - SUBJECTIVE AND OBJECTIVE BOX
Date of Service: 10/1/23  HPI: 72 year old female w hx  Colon CA, SYLVIA  lung CA, DM II,  HTN, chronic bilateral lymphedema to lower extremities came to ED c/o fever and chills [facility]   saw Dr. Mitchell and Dr. Bashir.  Tried new dressing. Pt felt great then and this AM Then had + chills, +fatigue had fever at 12 noon and came to ED   adm RLE cellulitis and then  later admitted again for dehydration with lactic acidosis due to diarrhea;  Taken off metformin. DCd to [facility] for rehab, Currently at home for the past 2 weeks In ED /56       RR 20  T 102.5    97% sat RA lower extremity cellulitis bilaterally 15K WBC NS 2000 cc as 31 cc/kg doxycycline 100 mg IV vanco 1750 IV.      10/14/23: Pt seen by podiatry today. Resting comfortably on bed. feels better. No fever, NAD.     PAST MEDICAL HX  History of Cellulitis of right lower leg   Chronic stasis dermatitis   Colon cancer   HTN (hypertension), benign   Hyperkalemia   Lymphedema of both lower extremities  Malignant neoplasm of upper lobe of left lung   Metabolic syndrome   Obesity, morbid (more than 100 lbs over ideal weight or BMI > 40)  Type 2 diabetes mellitus without complication, without long-term current use of insulin         PAST SURGICAL HX  History of Cholecystectomy  History of Hysterectomy (V88.01)  History of Laminectomy Lumbar  History of Lung lobectomy  History of Partial Colectomy  History of Rotator Cuff Repair              FAMILY HX  Family history of Bladder carcinoma : Mother  Family history of coronary artery disease (V17.3) (Z82.49) : Father       SOCIAL HX  Denies alcohol consumption   Does not use illicit drugs   Has no children  Never smoked cigarettes   Single   (10 Oct 2023 22:36)            PMH: No pertinent past medical history    Lymphatic edema    Hypertension    Type 2 diabetes mellitus    Lung cancer    Colon cancer      PSH:    Allergies:Keflex (Anaphylaxis)  cephalexin (Unknown)      Labs:                          10.1   7.53  )-----------( 92       ( 12 Oct 2023 06:57 )             31.2   10-12    138  |  112<H>  |  18  ----------------------------<  115<H>  3.7   |  22  |  1.03    Ca    8.5      12 Oct 2023 06:57        Vital Signs Last 24 Hrs  T(C): 36.9 (14 Oct 2023 15:08), Max: 36.9 (14 Oct 2023 15:08)  T(F): 98.4 (14 Oct 2023 15:08), Max: 98.4 (14 Oct 2023 15:08)  HR: 74 (14 Oct 2023 15:08) (74 - 85)  BP: 135/62 (14 Oct 2023 15:08) (122/85 - 137/61)  BP(mean): --  RR: 18 (14 Oct 2023 15:08) (18 - 18)  SpO2: 99% (14 Oct 2023 15:08) (97% - 99%)    Parameters below as of 14 Oct 2023 15:08  Patient On (Oxygen Delivery Method): room air          REVIEW OF SYSTEMS:    CONSTITUTIONAL: No weakness, fevers or chills  EYES: No visual changes  RESPIRATORY: No cough, wheezing; No shortness of breath  CARDIOVASCULAR: No chest pain or palpitations  GASTROINTESTINAL: No abdominal or epigastric pain. No nausea, vomiting; No diarrhea or constipation.   GENITOURINARY: No dysuria, frequency or hematuria  NEUROLOGICAL: No numbness or weakness  SKIN: See physical examination.  All other review of systems is negative unless indicated above    Physical Exam:   Constitutional: NAD, alert;  Lower Extremity Focus  Derm:  Skin warm, dry and supple bilateral.    Significant generalized lichenification and verrucous changes to skin extending from below the knees to feet bilaterally, cobblestone like papules and nodules extending from below knee to feet bilaterally. Ryan interdigital maceration and malodor. Noted hemosiderin deposition to BLE. Noted erythema to BLE.    No acute underlying ulcerations, no pus/purulence, no PTB.   Vascular: Dorsalis Pedis and Posterior Tibial pulses non palpable due to significant edema to BLE.  Capillary re-fill time less then 3 seconds digits 1-5 bilateral.    Neuro: Protective sensation intact to the level of the digits bilateral.  MSK: Muscle strength 5/5 all major muscle groups bilateral.                Date of Service: 10/15/23  HPI: 72 year old female w hx  Colon CA, SYLVIA  lung CA, DM II,  HTN, chronic bilateral lymphedema to lower extremities came to ED c/o fever and chills [facility]   saw Dr. Mitchell and Dr. Bashir.  Tried new dressing. Pt felt great then and this AM Then had + chills, +fatigue had fever at 12 noon and came to ED   adm RLE cellulitis and then  later admitted again for dehydration with lactic acidosis due to diarrhea;  Taken off metformin. DCd to [facility] for rehab, Currently at home for the past 2 weeks In ED /56       RR 20  T 102.5    97% sat RA lower extremity cellulitis bilaterally 15K WBC NS 2000 cc as 31 cc/kg doxycycline 100 mg IV vanco 1750 IV.      10/15/23: Pt seen by podiatry today with attending present. Resting comfortably on bed. feels better. No fever, constipation resolved. NAD. no other pedal complaints.     PAST MEDICAL HX  History of Cellulitis of right lower leg   Chronic stasis dermatitis   Colon cancer   HTN (hypertension), benign   Hyperkalemia   Lymphedema of both lower extremities  Malignant neoplasm of upper lobe of left lung   Metabolic syndrome   Obesity, morbid (more than 100 lbs over ideal weight or BMI > 40)  Type 2 diabetes mellitus without complication, without long-term current use of insulin         PAST SURGICAL HX  History of Cholecystectomy  History of Hysterectomy (V88.01)  History of Laminectomy Lumbar  History of Lung lobectomy  History of Partial Colectomy  History of Rotator Cuff Repair              FAMILY HX  Family history of Bladder carcinoma : Mother  Family history of coronary artery disease (V17.3) (Z82.49) : Father       SOCIAL HX  Denies alcohol consumption   Does not use illicit drugs   Has no children  Never smoked cigarettes   Single   (10 Oct 2023 22:36)            PMH: No pertinent past medical history    Lymphatic edema    Hypertension    Type 2 diabetes mellitus    Lung cancer    Colon cancer      PSH:    Allergies:Keflex (Anaphylaxis)  cephalexin (Unknown)      Labs:                                     10.4   3.77  )-----------( 106      ( 15 Oct 2023 06:51 )             32.1     10-12    138  |  112<H>  |  18  ----------------------------<  115<H>  3.7   |  22  |  1.03    Ca    8.5      12 Oct 2023 06:57        Vital Signs Last 24 Hrs  T(C): 36.9 (15 Oct 2023 08:21), Max: 36.9 (14 Oct 2023 15:08)  T(F): 98.4 (15 Oct 2023 08:21), Max: 98.4 (14 Oct 2023 15:08)  HR: 85 (15 Oct 2023 08:21) (74 - 85)  BP: 120/59 (15 Oct 2023 08:21) (120/59 - 153/57)  BP(mean): --  RR: 18 (15 Oct 2023 08:21) (18 - 18)  SpO2: 99% (15 Oct 2023 08:21) (96% - 99%)    Parameters below as of 15 Oct 2023 08:21  Patient On (Oxygen Delivery Method): room air          REVIEW OF SYSTEMS:    CONSTITUTIONAL: No weakness, fevers or chills  EYES: No visual changes  RESPIRATORY: No cough, wheezing; No shortness of breath  CARDIOVASCULAR: No chest pain or palpitations  GASTROINTESTINAL: No abdominal or epigastric pain. No nausea, vomiting; No diarrhea or constipation.   GENITOURINARY: No dysuria, frequency or hematuria  NEUROLOGICAL: No numbness or weakness  SKIN: See physical examination.  All other review of systems is negative unless indicated above    Physical Exam:   Constitutional: NAD, alert;  Lower Extremity Focus  Derm:  Skin warm, dry and supple bilateral.    Significant generalized lichenification and verrucous changes to skin extending from below the knees to feet bilaterally, cobblestone like papules and nodules extending from below knee to feet bilaterally. Ryan interdigital maceration and malodor. Noted hemosiderin deposition to BLE. Noted erythema to BLE.    No acute underlying ulcerations, no pus/purulence, no PTB.   Vascular: Dorsalis Pedis and Posterior Tibial pulses non palpable due to significant edema to BLE.  Capillary re-fill time less then 3 seconds digits 1-5 bilateral.    Neuro: Protective sensation intact to the level of the digits bilateral.  MSK: Muscle strength 5/5 all major muscle groups bilateral.

## 2023-10-15 NOTE — PROGRESS NOTE ADULT - ASSESSMENT
72 year old female w hx  Colon CA, SYLVIA  lung CA, DM II,  HTN, chronic bilateral lymphedema to lower extremities came to ED c/o fever and chills    Assessment:  Pseudomonas Sepsis and Bacteremia  Cellulitis  Lymphedema  Chronic and severe Stasis Dermatitis  Type 2 DM  Hypoalbuminemia  Obesity w BMI 46    Plan:  Zos;yn  ID recommends change to cipro on d/c  Podiatry following  Follow cx  Wound Care  hold Cooper RUFFINM's  ISS  nutrition consult appreciated  VTE proph with enoxaparin  protien suppliments and vitamins  PT  dermatology consult appreciated  Dilute Acetic acid soak, mupiracin  ACE Wraps  Acitretin on discharge. Will discuss either monitoring weekly by myself vs dermatology  Possible discharge tomorrow when home aid is available

## 2023-10-16 ENCOUNTER — NON-APPOINTMENT (OUTPATIENT)
Age: 73
End: 2023-10-16

## 2023-10-16 ENCOUNTER — TRANSCRIPTION ENCOUNTER (OUTPATIENT)
Age: 73
End: 2023-10-16

## 2023-10-16 VITALS
DIASTOLIC BLOOD PRESSURE: 57 MMHG | HEART RATE: 81 BPM | TEMPERATURE: 98 F | SYSTOLIC BLOOD PRESSURE: 139 MMHG | OXYGEN SATURATION: 96 % | RESPIRATION RATE: 16 BRPM

## 2023-10-16 LAB
GLUCOSE BLDC GLUCOMTR-MCNC: 100 MG/DL — HIGH (ref 70–99)
GLUCOSE BLDC GLUCOMTR-MCNC: 153 MG/DL — HIGH (ref 70–99)

## 2023-10-16 PROCEDURE — 99239 HOSP IP/OBS DSCHRG MGMT >30: CPT

## 2023-10-16 RX ORDER — MUPIROCIN 20 MG/G
1 OINTMENT TOPICAL ONCE
Refills: 0 | Status: DISCONTINUED | OUTPATIENT
Start: 2023-10-16 | End: 2023-10-16

## 2023-10-16 RX ORDER — ACETIC ACID
1 LIQUID (ML) MISCELLANEOUS ONCE
Refills: 0 | Status: DISCONTINUED | OUTPATIENT
Start: 2023-10-16 | End: 2023-10-16

## 2023-10-16 RX ADMIN — PIPERACILLIN AND TAZOBACTAM 25 GRAM(S): 4; .5 INJECTION, POWDER, LYOPHILIZED, FOR SOLUTION INTRAVENOUS at 05:42

## 2023-10-16 NOTE — PHARMACOTHERAPY INTERVENTION NOTE - COMMENTS
72y female admitted with Cellulitis of right lower extremity    HPI:  72 year old female with a PMHx of Colon CA, SYLVIA lung CA, DM II, HTN, and chronic bilateral lymphedema to lower extremities presents with fever and chills. [facility]day and saw Dr. Pardo and Dr. Bashir.  Tried new dressing yesterday. Pt felt great then and this AM then had chills, fatigue and fever.   admission for RLE cellulitis and later admitted again for dehydration with lactic acidosis due to diarrhea; Taken off metformin. DCd to [facility] for rehab but has been at home for the past 2 weeks. (10 Oct 2023 22:36)    Pertinent PMH/PSH  Lymphatic edema  Hypertension  Type 2 diabetes mellitus  Lung cancer  Colon cancer    Home Medications:  Januvia 25 mg oral tablet: 1 tab(s) orally once a day (10 Oct 2023 18:59)    A1C Result(s) Within Prior 3 Months  A1C with Estimated Average Glucose Result: 7.5 % (08-13-23 @ 05:16)    Renal Function Within Prior 72 Hours: N/A    Current Orders  insulin lispro (ADMELOG) corrective regimen sliding scale   SubCutaneous at bedtime  insulin lispro (ADMELOG) corrective regimen sliding scale   SubCutaneous three times a day before meals    POCT Within Prior 24 Hours  POCT Blood Glucose.: 147 mg/dL (10-13-23 @ 21:46)  POCT Blood Glucose.: 106 mg/dL (10-14-23 @ 07:04)  POCT Blood Glucose.: 117 mg/dL (10-14-23 @ 11:43)  POCT Blood Glucose.: 107 mg/dL (10-14-23 @ 16:49)  POCT Blood Glucose.: 134 mg/dL (10-14-23 @ 21:18)  POCT Blood Glucose.: 113 mg/dL (10-15-23 @ 07:51)    Insulin Administration Within Prior 24 Hours: N/A    Other Administration(s) (i.e. Steroids, PO diabetes medications) Within Prior 24 Hours  piperacillin/tazobactam IVPB in 100 mL D5W   25 mL/Hr IV Intermittent (10-14-23 @ 14:09)   25 mL/Hr IV Intermittent (10-14-23 @ 21:24)   25 mL/Hr IV Intermittent (10-15-23 @ 06:02)    Height (cm): 172.7 (10-10-23 @ 14:10)  Weight (kg): 137.3 (10-10-23 @ 21:40)  BMI (kg/m2): 46 (10-10-23 @ 21:40)    Current Diet  Diet, Consistent Carbohydrate/No Snacks:   Bravo(7 Gm Arginine/7 Gm Glut/1.2 Gm HMB     Qty per Day:  2 (10-12-23 @ 12:59) [Active]    Assessment/Plan:  - Patient with a history of Type 2 Diabetes Mellitus: Glycemic control to be managed by Inpatient Diabetes Management Team  - A1C is 7.5%: Patients treatment goal is A1C < 8.0% due to age and the presence of multiple comorbid conditions per the 2023 ADA standards and thus patient has acceptable glycemic control outpatient, currently on Januvia 25 mg PO QD at home [eGFR 58]  - Patient is insulin naive, currently ordered a moderate intensity insulin correction scale TID AC and HS [BMI 46]  - Fasting POCT 113: Patients glycemic control is currently within protocol range of POCT 100-180  - Prior day POCT trended 106, 117, 107, 134: Patient has not required Admelog correction  - Of note, patient is currently prescribed antibiotics which she receives in 100 mL of D5 Q8H  - Will continue current management with moderate intensity insulin correction scale TID AC and HS to titrate therapy to glycemic POCT target 100-180
72y female admitted with Cellulitis of right lower extremity    HPI:  72 year old female with a PMHx of Colon CA, SYLVIA lung CA, DM II, HTN, and chronic bilateral lymphedema to lower extremities presents with fever and chills. [facility]  yesterday and saw Dr. Pardo and Dr. Basihr.  Tried new dressing yesterday. Pt felt great then and this AM then had chills, fatigue and fever.   admission for RLE cellulitis and later admitted again for dehydration with lactic acidosis due to diarrhea; Taken off metformin. DCd to [facility] for rehab but has been at home for the past 2 weeks. (10 Oct 2023 22:36)    Pertinent PMH/PSH  Lymphatic edema  Hypertension  Type 2 diabetes mellitus  Lung cancer  Colon cancer    Home Medications:  Januvia 25 mg oral tablet: 1 tab(s) orally once a day (10 Oct 2023 18:59)    A1C Result(s) Within Prior 3 Months  A1C with Estimated Average Glucose Result: 7.5 % (08-13-23 @ 05:16)    Renal Function Within Prior 72 Hours  Creatinine: 0.97 mg/dL (10-10-23 @ 15:20)    Current Orders  insulin lispro (ADMELOG) corrective regimen sliding scale   SubCutaneous at bedtime  insulin lispro (ADMELOG) corrective regimen sliding scale   SubCutaneous three times a day before meals    POCT Within Prior 24 Hours  POCT Blood Glucose.: 121 mg/dL (10-11-23 @ 08:03)  POCT Blood Glucose.: 119 mg/dL (10-11-23 @ 12:03)    Insulin Administration Within Prior 24 Hours: N/A    Other Administration(s) (i.e. Steroids, PO diabetes medications) Within Prior 24 Hours  doxycycline IVPB in 100 mL D5W   110 mL/Hr IV Intermittent (10-10-23 @ 16:00)   110 mL/Hr IV Intermittent (10-11-23 @ 09:26)  piperacillin/tazobactam IVPB in 100 mL D5W   200 mL/Hr IV Intermittent (10-11-23 @ 10:46)  vancomycin  IVPB in 250 mL D5W *Increased to 500 mL D5W*   250 mL/Hr IV Intermittent (10-10-23 @ 17:56)    Height (cm): 172.7 (10-10-23 @ 14:10)  Weight (kg): 137.3 (10-10-23 @ 21:40)  BMI (kg/m2): 46 (10-10-23 @ 21:40)    Current Diet  Diet, Consistent Carbohydrate/No Snacks:   DASH/TLC Sodium & Cholesterol Restricted (DASH) (10-10-23 @ 17:54) [Active]    Assessment/Plan:  - Patient with a history of Type 2 Diabetes Mellitus: Glycemic control to be managed by Inpatient Diabetes Management Team  - A1C is 7.5%: Patients treatment goal is A1C < 8.0% due to age and the presence of multiple comorbid conditions per the 2023 ADA standards and thus patient has acceptable glycemic control outpatient, currently on Januvia 25 mg PO QD at home [eGFR 62]  - Patient is insulin naive, currently ordered a moderate intensity insulin correction scale TID AC and HS [BMI 46]  - Fasting POCT 121 and pre-lunch POCT 119: Patients glycemic control is currently within protocol range of POCT 100-180  - Patient is receiving antibiotic infusions diluted in D5W [1300 mL D5W/Day]  - Will continue current management with moderate intensity insulin correction scale TID AC and HS to titrate therapy to glycemic POCT target 100-180
72y female admitted with Cellulitis of right lower extremity    HPI:  72 year old female with a PMHx of Colon CA, SYLVIA lung CA, DM II, HTN, and chronic bilateral lymphedema to lower extremities presents with fever and chills. [facility]  yesterday and saw Dr. Pardo and Dr. Bashir.  Tried new dressing yesterday. Pt felt great then and this AM then had chills, fatigue and fever.   admission for RLE cellulitis and later admitted again for dehydration with lactic acidosis due to diarrhea; Taken off metformin. DCd to [facility] for rehab but has been at home for the past 2 weeks. (10 Oct 2023 22:36)    Pertinent PMH/PSH  Lymphatic edema  Hypertension  Type 2 diabetes mellitus  Lung cancer  Colon cancer    Home Medications:  Januvia 25 mg oral tablet: 1 tab(s) orally once a day (10 Oct 2023 18:59)    A1C Result(s) Within Prior 3 Months  A1C with Estimated Average Glucose Result: 7.5 % (08-13-23 @ 05:16)    Renal Function Within Prior 72 Hours  Creatinine: 0.97 mg/dL (10-10-23 @ 15:20)  Creatinine: 1.02 mg/dL (10-11-23 @ 16:30)  Creatinine: 1.03 mg/dL (10-12-23 @ 06:57)    Current Orders  insulin lispro (ADMELOG) corrective regimen sliding scale   SubCutaneous three times a day before meals  insulin lispro (ADMELOG) corrective regimen sliding scale   SubCutaneous at bedtime    POCT Within Prior 24 Hours  POCT Blood Glucose.: 121 mg/dL (10-11-23 @ 08:03)  POCT Blood Glucose.: 119 mg/dL (10-11-23 @ 12:03)  POCT Blood Glucose.: 143 mg/dL (10-11-23 @ 16:43)  POCT Blood Glucose.: 164 mg/dL (10-11-23 @ 22:08)  POCT Blood Glucose.: 112 mg/dL (10-12-23 @ 08:13)  POCT Blood Glucose.: 116 mg/dL (10-12-23 @ 11:48)    Insulin Administration Within Prior 24 Hours: N/A    Other Administration(s) (i.e. Steroids, PO diabetes medications) Within Prior 24 Hours  piperacillin/tazobactam IVPB in 100 mL D5W   25 mL/Hr IV Intermittent (10-11-23 @ 17:02)   25 mL/Hr IV Intermittent (10-12-23 @ 00:45)   25 mL/Hr IV Intermittent (10-12-23 @ 07:32)  vancomycin  IVPB in 500 mL D5W *Discontinued*   300 mL/Hr IV Intermittent (10-11-23 @ 22:23)    Height (cm): 172.7 (10-10-23 @ 14:10)  Weight (kg): 137.3 (10-10-23 @ 21:40)  BMI (kg/m2): 46 (10-10-23 @ 21:40)    Current Diet  Diet, Consistent Carbohydrate/No Snacks:   Bravo(7 Gm Arginine/7 Gm Glut/1.2 Gm HMB     Qty per Day:  2 (10-12-23 @ 12:59) [Active]    Assessment/Plan:  - Patient with a history of Type 2 Diabetes Mellitus: Glycemic control to be managed by Inpatient Diabetes Management Team  - A1C is 7.5%: Patients treatment goal is A1C < 8.0% due to age and the presence of multiple comorbid conditions per the 2023 ADA standards and thus patient has acceptable glycemic control outpatient, currently on Januvia 25 mg PO QD at home [eGFR 58]  - Patient is insulin naive, currently ordered a moderate intensity insulin correction scale TID AC and HS [BMI 46]  - Fasting POCT 112 and pre-lunch POCT 116: Patients glycemic control is currently within protocol range of POCT 100-180  - Prior day POCT trended 121, 119, 143, 164: Patient has not required Admelog correction  - Patient is receiving antibiotic infusions diluted in D5W [1300 mL D5W/Day reduced down to 300 mL D5W/Day now that vancomycin discontinued], will be started on Bravo nutritional supplements Q12H today  - Will continue current management with moderate intensity insulin correction scale TID AC and HS to titrate therapy to glycemic POCT target 100-180
Medication reconciliation completed.  Reviewed Medication list and confirmed med allergies with patient; confirmed with Dr. Yeh MedHx.  pt confirms that the only medication she takes is Januvia.
72y female admitted with Cellulitis of right lower extremity    HPI:  72 year old female with a PMHx of Colon CA, SYLVIA lung CA, DM II, HTN, and chronic bilateral lymphedema to lower extremities presents with fever and chills. [facility]  yesterday and saw Dr. Pardo and Dr. Bashir.  Tried new dressing yesterday. Pt felt great then and this AM then had chills, fatigue and fever.   admission for RLE cellulitis and later admitted again for dehydration with lactic acidosis due to diarrhea; Taken off metformin. DCd to [facility] for rehab but has been at home for the past 2 weeks. (10 Oct 2023 22:36)    Pertinent PMH/PSH  Lymphatic edema  Hypertension  Type 2 diabetes mellitus  Lung cancer  Colon cancer    Home Medications:  Januvia 25 mg oral tablet: 1 tab(s) orally once a day (10 Oct 2023 18:59)    A1C Result(s) Within Prior 3 Months  A1C with Estimated Average Glucose Result: 7.5 % (08-13-23 @ 05:16)    Renal Function Within Prior 72 Hours: N/A    Current Orders  insulin lispro (ADMELOG) corrective regimen sliding scale   SubCutaneous three times a day before meals  insulin lispro (ADMELOG) corrective regimen sliding scale   SubCutaneous at bedtime    POCT Within Prior 24 Hours  POCT Blood Glucose.: 113 mg/dL (10-15-23 @ 07:51)  POCT Blood Glucose.: 103 mg/dL (10-15-23 @ 12:03)  POCT Blood Glucose.: 126 mg/dL (10-15-23 @ 16:55)  POCT Blood Glucose.: 129 mg/dL (10-15-23 @ 20:32)  POCT Blood Glucose.: 100 mg/dL (10-16-23 @ 07:45)  POCT Blood Glucose.: 153 mg/dL (10-16-23 @ 11:10)    Insulin Administration Within Prior 24 Hours: N/A    Other Administration(s) (i.e. Steroids, PO diabetes medications) Within Prior 24 Hours  piperacillin/tazobactam IVPB in 100 mL D5W   25 mL/Hr IV Intermittent (10-15-23 @ 22:48)   25 mL/Hr IV Intermittent (10-16-23 @ 05:42)    Height (cm): 172.7 (10-10-23 @ 14:10)  Weight (kg): 137.3 (10-10-23 @ 21:40)  BMI (kg/m2): 46 (10-10-23 @ 21:40)    Current Diet  Diet, Consistent Carbohydrate/No Snacks:   Bravo(7 Gm Arginine/7 Gm Glut/1.2 Gm HMB     Qty per Day:  2 (10-12-23 @ 12:59) [Active]    Assessment/Plan:  - Patient with a history of Type 2 Diabetes Mellitus: Glycemic control to be managed by Inpatient Diabetes Management Team  - A1C is 7.5%: Patients treatment goal is A1C < 8.0% due to age and the presence of multiple comorbid conditions per the 2023 ADA standards and thus patient has acceptable glycemic control outpatient, currently on Januvia 25 mg PO QD at home [eGFR 58]  - Patient is insulin naive, currently ordered a moderate intensity insulin correction scale TID AC and HS [BMI 46]  - Fasting POCT 100 and pre-lunch POCT 153: Patients glycemic control is currently within protocol range of POCT 100-180  - Prior day POCT trended 113, 103, 126, 129: Patient has not required Admelog correction  - Of note, patient is currently prescribed antibiotics which she receives in 100 mL of D5 Q8H  - Will continue current management with moderate intensity insulin correction scale TID AC and HS to titrate therapy to glycemic POCT target 100-180
72y female admitted with Cellulitis of right lower extremity    HPI:  72 year old female with a PMHx of Colon CA, SYLVIA lung CA, DM II, HTN, and chronic bilateral lymphedema to lower extremities presents with fever and chills. [facility]  yesterday and saw Dr. Pardo and Dr. Bashir.  Tried new dressing yesterday. Pt felt great then and this AM then had chills, fatigue and fever.   admission for RLE cellulitis and later admitted again for dehydration with lactic acidosis due to diarrhea; Taken off metformin. DCd to [facility] for rehab but has been at home for the past 2 weeks. (10 Oct 2023 22:36)    Pertinent PMH/PSH  Lymphatic edema  Hypertension  Type 2 diabetes mellitus  Lung cancer  Colon cancer    Home Medications:  Januvia 25 mg oral tablet: 1 tab(s) orally once a day (10 Oct 2023 18:59)    A1C Result(s) Within Prior 3 Months  A1C with Estimated Average Glucose Result: 7.5 % (08-13-23 @ 05:16)    Renal Function Within Prior 72 Hours  Creatinine: 0.97 mg/dL (10-10-23 @ 15:20)  Creatinine: 1.02 mg/dL (10-11-23 @ 16:30)  Creatinine: 1.03 mg/dL (10-12-23 @ 06:57)    Current Orders  insulin lispro (ADMELOG) corrective regimen sliding scale   SubCutaneous at bedtime  insulin lispro (ADMELOG) corrective regimen sliding scale   SubCutaneous three times a day before meals    POCT Within Prior 24 Hours  POCT Blood Glucose.: 112 mg/dL (10-12-23 @ 08:13)  POCT Blood Glucose.: 116 mg/dL (10-12-23 @ 11:48)  POCT Blood Glucose.: 149 mg/dL (10-12-23 @ 16:55)  POCT Blood Glucose.: 141 mg/dL (10-12-23 @ 21:37)  POCT Blood Glucose.: 98 mg/dL (10-13-23 @ 08:04)  POCT Blood Glucose.: 109 mg/dL (10-13-23 @ 12:26)    Insulin Administration Within Prior 24 Hours: N/A    Other Administration(s) (i.e. Steroids, PO diabetes medications) Within Prior 24 Hours  piperacillin/tazobactam IVPB in 100 mL D5W   25 mL/Hr IV Intermittent (10-12-23 @ 16:19)   25 mL/Hr IV Intermittent (10-12-23 @ 23:19)   25 mL/Hr IV Intermittent (10-13-23 @ 06:18)    Height (cm): 172.7 (10-10-23 @ 14:10)  Weight (kg): 137.3 (10-10-23 @ 21:40)  BMI (kg/m2): 46 (10-10-23 @ 21:40)    Current Diet  Diet, Consistent Carbohydrate/No Snacks:   Bravo(7 Gm Arginine/7 Gm Glut/1.2 Gm HMB     Qty per Day:  2 (10-12-23 @ 12:59) [Active]    Assessment/Plan:  - Patient with a history of Type 2 Diabetes Mellitus: Glycemic control to be managed by Inpatient Diabetes Management Team  - A1C is 7.5%: Patients treatment goal is A1C < 8.0% due to age and the presence of multiple comorbid conditions per the 2023 ADA standards and thus patient has acceptable glycemic control outpatient, currently on Januvia 25 mg PO QD at home [eGFR 58]  - Patient is insulin naive, currently ordered a moderate intensity insulin correction scale TID AC and HS [BMI 46]  - Fasting POCT 98 and pre-lunch POCT 109: Patients glycemic control is currently slightly below/within protocol range of POCT 100-180  - Prior day POCT trended 112, 116, 149, 141: Patient has not required Admelog correction  - Will continue current management with moderate intensity insulin correction scale TID AC and HS to titrate therapy to glycemic POCT target 100-180  - If POCT is < 180 again within the next 24 hours, would consider decreasing insulin correction scale to low intensity TID AC and HS due to a potential risk of hypoglycemia, despite BMI > 30
72y female admitted with Cellulitis of right lower extremity    HPI:  72 year old female with a PMHx of Colon CA, SYLVIA lung CA, DM II, HTN, and chronic bilateral lymphedema to lower extremities presents with fever and chills. [facility]  yesterday and saw Dr. Pardo and Dr. Bashir.  Tried new dressing yesterday. Pt felt great then and this AM then had chills, fatigue and fever.   admission for RLE cellulitis and later admitted again for dehydration with lactic acidosis due to diarrhea; Taken off metformin. DCd to [facility] for rehab but has been at home for the past 2 weeks. (10 Oct 2023 22:36)    Pertinent PMH/PSH  Lymphatic edema  Hypertension  Type 2 diabetes mellitus  Lung cancer  Colon cancer    Home Medications:  Januvia 25 mg oral tablet: 1 tab(s) orally once a day (10 Oct 2023 18:59)    A1C Result(s) Within Prior 3 Months  A1C with Estimated Average Glucose Result: 7.5 % (08-13-23 @ 05:16)    Renal Function Within Prior 72 Hours  Creatinine: 1.02 mg/dL (10-11-23 @ 16:30)  Creatinine: 1.03 mg/dL (10-12-23 @ 06:57)    Current Orders  insulin lispro (ADMELOG) corrective regimen sliding scale   SubCutaneous at bedtime  insulin lispro (ADMELOG) corrective regimen sliding scale   SubCutaneous three times a day before meals    POCT Within Prior 24 Hours  POCT Blood Glucose.: 98 mg/dL (10-13-23 @ 08:04)  POCT Blood Glucose.: 109 mg/dL (10-13-23 @ 12:26)  POCT Blood Glucose.: 132 mg/dL (10-13-23 @ 17:05)  POCT Blood Glucose.: 147 mg/dL (10-13-23 @ 21:46)  POCT Blood Glucose.: 106 mg/dL (10-14-23 @ 07:04)  POCT Blood Glucose.: 117 mg/dL (10-14-23 @ 11:43)    Insulin Administration Within Prior 24 Hours  N/A    Other Administration(s) (i.e. Steroids, PO diabetes medications) Within Prior 24 Hours  piperacillin/tazobactam IVPB [in 100 mL D5 Q8H]   25 mL/Hr IV Intermittent (10-13-23 @ 15:45)   25 mL/Hr IV Intermittent (10-13-23 @ 21:45)   25 mL/Hr IV Intermittent (10-14-23 @ 05:30)    Height (cm): 172.7 (10-10-23 @ 14:10)  Weight (kg): 137.3 (10-10-23 @ 21:40)  BMI (kg/m2): 46 (10-10-23 @ 21:40)    Current Diet  Diet, Consistent Carbohydrate/No Snacks:   Bravo(7 Gm Arginine/7 Gm Glut/1.2 Gm HMB     Qty per Day:  2 (10-12-23 @ 12:59) [Active]    Assessment/Plan:  - Patient with a history of Type 2 Diabetes Mellitus: Glycemic control to be managed by Inpatient Diabetes Management Team  - A1C is 7.5%: Patients treatment goal is A1C < 8.0% due to age and the presence of multiple comorbid conditions per the 2023 ADA standards and thus patient has acceptable glycemic control outpatient, currently on Januvia 25 mg PO QD at home [eGFR 58]  - Patient is insulin naive, currently ordered a moderate intensity insulin correction scale TID AC and HS [BMI 46]  - Fasting POCT 106 and pre-lunch POCT 117: Patients glycemic control is currently slightly below/within protocol range of POCT 100-180  - Prior day POCT trended 98, 109, 132, 147: Patient has not required Admelog correction  - Of note, patient is currently prescribed antibiotics which she receives in 100 mL of D5 Q8H.  - Will continue current management with moderate intensity insulin correction scale TID AC and HS to titrate therapy to glycemic POCT target 100-180  - If POCT trends < 100 again within the next 24 hours, would consider decreasing insulin correction scale to low intensity TID AC and HS due to a potential risk of hypoglycemia, despite BMI > 30

## 2023-10-16 NOTE — PROGRESS NOTE ADULT - ASSESSMENT
A: 71 y/o Female seen for the following:   -Chronic BLE Lymphedema   -BLE Elephantiasis nostras verrucosa  -DM2  -Difficulty with ambulation    P:   Chart reviewed and Patient evaluated;  Discussed diagnosis and treatment with patient. Discussed importance of daily foot examinations, proper shoe gear, importance of tight glycemic control.   Significant generalized verrucous changes to skin extending from bilateral below the knee to feet, cobblestone like nodules to BLE, interdigital maceration and malodor. Underlying elephantiasis nostras verrucosa caused by chronic BLE lymphedema  Elevate BLE  Dermatology consult and recommendations appreciated.   WC: Acetic acid 0.25% soaks, Mupirocin and DSD to ryan feet   Continue with antibiotics as per ID  All additional care per Med appreciated  Patient demonstrated verbal understanding of all interventions and tolerated interventions well without any complications.   Podiatry will follow while in house      Case D/W attending Dr. Mitchell     Wound care instructions for RYAN LE every other day:  - Gently remove old dressings.  - Apply acetic acid soaked gauze to the ryan LE and clean the skin with dry gauze.   - Apply Mupirocin to the Ryan LE and interdigits.  - Apply dry gauze in between digits to prevent maceration, ABD pads, sterile gauze, kerlix and ace wrap  to the LE     A: 71 y/o Female seen for the following:   -Chronic BLE Lymphedema   -BLE Elephantiasis nostras verrucosa  -DM2  -Difficulty with ambulation    P:   Chart reviewed and Patient evaluated;  Discussed diagnosis and treatment with patient. Discussed importance of daily foot examinations, proper shoe gear, importance of tight glycemic control.   Significant generalized verrucous changes to skin extending from bilateral below the knee to feet, cobblestone like nodules to BLE, interdigital maceration and malodor. Underlying elephantiasis nostras verrucosa caused by chronic BLE lymphedema  Elevate BLE  Dermatology consult and recommendations appreciated.   WC: Acetic acid 0.25% soaks, Mupirocin and DSD to ryan feet   Continue with antibiotics as per ID  All additional care per Med appreciated  Patient demonstrated verbal understanding of all interventions and tolerated interventions well without any complications.   Podiatry will follow while in house      Case D/W attending Dr. Mitchell     Wound care instructions for BLE 3 times a week:  - Gently remove old dressings.  - Apply acetic acid soaked gauze to the ryan LE and clean the skin with dry gauze.   - Apply Mupirocin to the Ryan LE and interdigits.  - Apply dry gauze in between digits to prevent maceration, ABD pads, sterile gauze, kerlix and ace wrap to the LE

## 2023-10-16 NOTE — PHARMACOTHERAPY INTERVENTION NOTE - REASON FOR NOTE
Inpatient Diabetes Management Team

## 2023-10-16 NOTE — PROGRESS NOTE ADULT - SUBJECTIVE AND OBJECTIVE BOX
Date of Service: 10/16/23  HPI: 72 year old female w hx  Colon CA, SYLVIA  lung CA, DM II,  HTN, chronic bilateral lymphedema to lower extremities came to ED c/o fever and chills [facility]   saw Dr. Mitchell and Dr. Bashir.  Tried new dressing. Pt felt great then and this AM Then had + chills, +fatigue had fever at 12 noon and came to ED   adm RLE cellulitis and then  later admitted again for dehydration with lactic acidosis due to diarrhea;  Taken off metformin. DCd to [facility] for rehab, Currently at home for the past 2 weeks In ED /56       RR 20  T 102.5    97% sat RA lower extremity cellulitis bilaterally 15K WBC NS 2000 cc as 31 cc/kg doxycycline 100 mg IV vanco 1750 IV.      10/16/23: Pt seen by podiatry today. Resting comfortably on bed, feels better. NAD. No other pedal complaints.     PAST MEDICAL HX  History of Cellulitis of right lower leg   Chronic stasis dermatitis   Colon cancer   HTN (hypertension), benign   Hyperkalemia   Lymphedema of both lower extremities  Malignant neoplasm of upper lobe of left lung   Metabolic syndrome   Obesity, morbid (more than 100 lbs over ideal weight or BMI > 40)  Type 2 diabetes mellitus without complication, without long-term current use of insulin         PAST SURGICAL HX  History of Cholecystectomy  History of Hysterectomy (V88.01)  History of Laminectomy Lumbar  History of Lung lobectomy  History of Partial Colectomy  History of Rotator Cuff Repair              FAMILY HX  Family history of Bladder carcinoma : Mother  Family history of coronary artery disease (V17.3) (Z82.49) : Father       SOCIAL HX  Denies alcohol consumption   Does not use illicit drugs   Has no children  Never smoked cigarettes   Single   (10 Oct 2023 22:36)            PMH: No pertinent past medical history    Lymphatic edema    Hypertension    Type 2 diabetes mellitus    Lung cancer    Colon cancer      PSH:    Allergies:Keflex (Anaphylaxis)  cephalexin (Unknown)      Labs:                                     10.4   3.77  )-----------( 106      ( 15 Oct 2023 06:51 )             32.1     Vital Signs Last 24 Hrs  T(C): 36.6 (16 Oct 2023 07:37), Max: 37.1 (15 Oct 2023 16:36)  T(F): 97.9 (16 Oct 2023 07:37), Max: 98.8 (15 Oct 2023 16:36)  HR: 81 (16 Oct 2023 07:37) (74 - 87)  BP: 139/57 (16 Oct 2023 07:37) (139/57 - 152/69)  BP(mean): --  RR: 16 (16 Oct 2023 07:37) (16 - 18)  SpO2: 96% (16 Oct 2023 07:37) (96% - 96%)    Parameters below as of 16 Oct 2023 07:37  Patient On (Oxygen Delivery Method): room air            REVIEW OF SYSTEMS:    CONSTITUTIONAL: No weakness, fevers or chills  EYES: No visual changes  RESPIRATORY: No cough, wheezing; No shortness of breath  CARDIOVASCULAR: No chest pain or palpitations  GASTROINTESTINAL: No abdominal or epigastric pain. No nausea, vomiting; No diarrhea or constipation.   GENITOURINARY: No dysuria, frequency or hematuria  NEUROLOGICAL: No numbness or weakness  SKIN: See physical examination.  All other review of systems is negative unless indicated above    Physical Exam:   Constitutional: NAD, alert;  Lower Extremity Focus  Derm:  Skin warm, dry and supple bilateral.    Significant generalized lichenification and verrucous changes to skin extending from below the knees to feet bilaterally, cobblestone like papules and nodules extending from below knee to feet bilaterally. Ryan interdigital maceration and malodor. Noted hemosiderin deposition to BLE. Noted erythema to BLE.    No acute underlying ulcerations, no pus/purulence, no PTB.   Vascular: Dorsalis Pedis and Posterior Tibial pulses non palpable due to significant edema to BLE.  Capillary re-fill time less then 3 seconds digits 1-5 bilateral.    Neuro: Protective sensation intact to the level of the digits bilateral.  MSK: Muscle strength 5/5 all major muscle groups bilateral.

## 2023-10-16 NOTE — DISCHARGE NOTE NURSING/CASE MANAGEMENT/SOCIAL WORK - PATIENT PORTAL LINK FT
You can access the FollowMyHealth Patient Portal offered by [facility] by registering at the following website: [email or web address]. By joining [employer]’s FollowMyHealth portal, you will also be able to view your health information using other applications (apps) compatible with our system.

## 2023-10-16 NOTE — PROGRESS NOTE ADULT - PROVIDER SPECIALTY LIST ADULT
Infectious Disease
Podiatry
Family Medicine
Infectious Disease
Infectious Disease
Podiatry
Family Medicine
Family Medicine
Podiatry
Family Medicine
Family Medicine

## 2023-10-16 NOTE — PROGRESS NOTE ADULT - ATTENDING COMMENTS
I agree with the assessment and plan

## 2023-10-19 ENCOUNTER — APPOINTMENT (OUTPATIENT)
Dept: FAMILY MEDICINE | Facility: CLINIC | Age: 73
End: 2023-10-19
Payer: MEDICARE

## 2023-10-19 VITALS
SYSTOLIC BLOOD PRESSURE: 128 MMHG | HEIGHT: 68 IN | WEIGHT: 293 LBS | DIASTOLIC BLOOD PRESSURE: 20 MMHG | BODY MASS INDEX: 44.41 KG/M2

## 2023-10-19 DIAGNOSIS — Z90.49 ACQUIRED ABSENCE OF OTHER SPECIFIED PARTS OF DIGESTIVE TRACT: ICD-10-CM

## 2023-10-19 DIAGNOSIS — Z85.118 PERSONAL HISTORY OF OTHER MALIGNANT NEOPLASM OF BRONCHUS AND LUNG: ICD-10-CM

## 2023-10-19 DIAGNOSIS — A41.52 SEPSIS DUE TO PSEUDOMONAS: ICD-10-CM

## 2023-10-19 DIAGNOSIS — E11.9 TYPE 2 DIABETES MELLITUS WITHOUT COMPLICATIONS: ICD-10-CM

## 2023-10-19 DIAGNOSIS — E86.1 HYPOVOLEMIA: ICD-10-CM

## 2023-10-19 DIAGNOSIS — I10 ESSENTIAL (PRIMARY) HYPERTENSION: ICD-10-CM

## 2023-10-19 DIAGNOSIS — Z23 ENCOUNTER FOR IMMUNIZATION: ICD-10-CM

## 2023-10-19 DIAGNOSIS — E86.0 DEHYDRATION: ICD-10-CM

## 2023-10-19 DIAGNOSIS — Z79.4 LONG TERM (CURRENT) USE OF INSULIN: ICD-10-CM

## 2023-10-19 DIAGNOSIS — E88.09 OTHER DISORDERS OF PLASMA-PROTEIN METABOLISM, NOT ELSEWHERE CLASSIFIED: ICD-10-CM

## 2023-10-19 DIAGNOSIS — E66.01 MORBID (SEVERE) OBESITY DUE TO EXCESS CALORIES: ICD-10-CM

## 2023-10-19 DIAGNOSIS — I87.2 VENOUS INSUFFICIENCY (CHRONIC) (PERIPHERAL): ICD-10-CM

## 2023-10-19 DIAGNOSIS — Z80.52 FAMILY HISTORY OF MALIGNANT NEOPLASM OF BLADDER: ICD-10-CM

## 2023-10-19 DIAGNOSIS — I89.0 LYMPHEDEMA, NOT ELSEWHERE CLASSIFIED: ICD-10-CM

## 2023-10-19 DIAGNOSIS — L03.115 CELLULITIS OF RIGHT LOWER LIMB: ICD-10-CM

## 2023-10-19 DIAGNOSIS — Z90.2 ACQUIRED ABSENCE OF LUNG [PART OF]: ICD-10-CM

## 2023-10-19 DIAGNOSIS — Z90.710 ACQUIRED ABSENCE OF BOTH CERVIX AND UTERUS: ICD-10-CM

## 2023-10-19 DIAGNOSIS — Z85.038 PERSONAL HISTORY OF OTHER MALIGNANT NEOPLASM OF LARGE INTESTINE: ICD-10-CM

## 2023-10-19 PROCEDURE — 90662 IIV NO PRSV INCREASED AG IM: CPT

## 2023-10-19 PROCEDURE — 99496 TRANSJ CARE MGMT HIGH F2F 7D: CPT

## 2023-10-19 PROCEDURE — G0008: CPT

## 2023-10-20 ENCOUNTER — OUTPATIENT (OUTPATIENT)
Dept: OUTPATIENT SERVICES | Facility: HOSPITAL | Age: 73
LOS: 1 days | End: 2023-10-20
Payer: MEDICARE

## 2023-10-20 DIAGNOSIS — E11.69 TYPE 2 DIABETES MELLITUS WITH OTHER SPECIFIED COMPLICATION: ICD-10-CM

## 2023-10-20 DIAGNOSIS — I10 ESSENTIAL (PRIMARY) HYPERTENSION: ICD-10-CM

## 2023-10-20 DIAGNOSIS — Z79.84 LONG TERM (CURRENT) USE OF ORAL HYPOGLYCEMIC DRUGS: ICD-10-CM

## 2023-10-20 DIAGNOSIS — I89.0 LYMPHEDEMA, NOT ELSEWHERE CLASSIFIED: ICD-10-CM

## 2023-10-20 DIAGNOSIS — M19.90 UNSPECIFIED OSTEOARTHRITIS, UNSPECIFIED SITE: ICD-10-CM

## 2023-10-20 DIAGNOSIS — E11.9 TYPE 2 DIABETES MELLITUS WITHOUT COMPLICATIONS: ICD-10-CM

## 2023-10-20 DIAGNOSIS — I73.89 OTHER SPECIFIED PERIPHERAL VASCULAR DISEASES: ICD-10-CM

## 2023-10-20 DIAGNOSIS — L13.9 BULLOUS DISORDER, UNSPECIFIED: ICD-10-CM

## 2023-10-20 PROCEDURE — 99213 OFFICE O/P EST LOW 20 MIN: CPT

## 2023-10-30 ENCOUNTER — OUTPATIENT (OUTPATIENT)
Dept: OUTPATIENT SERVICES | Facility: HOSPITAL | Age: 73
LOS: 1 days | End: 2023-10-30
Payer: MEDICARE

## 2023-10-30 DIAGNOSIS — L97.518 NON-PRESSURE CHRONIC ULCER OF OTHER PART OF RIGHT FOOT WITH OTHER SPECIFIED SEVERITY: ICD-10-CM

## 2023-10-30 DIAGNOSIS — E11.69 TYPE 2 DIABETES MELLITUS WITH OTHER SPECIFIED COMPLICATION: ICD-10-CM

## 2023-10-30 DIAGNOSIS — I10 ESSENTIAL (PRIMARY) HYPERTENSION: ICD-10-CM

## 2023-10-30 DIAGNOSIS — I73.89 OTHER SPECIFIED PERIPHERAL VASCULAR DISEASES: ICD-10-CM

## 2023-10-30 DIAGNOSIS — E11.622 TYPE 2 DIABETES MELLITUS WITH OTHER SKIN ULCER: ICD-10-CM

## 2023-10-30 DIAGNOSIS — L97.528 NON-PRESSURE CHRONIC ULCER OF OTHER PART OF LEFT FOOT WITH OTHER SPECIFIED SEVERITY: ICD-10-CM

## 2023-10-30 DIAGNOSIS — I89.0 LYMPHEDEMA, NOT ELSEWHERE CLASSIFIED: ICD-10-CM

## 2023-10-30 DIAGNOSIS — L13.9 BULLOUS DISORDER, UNSPECIFIED: ICD-10-CM

## 2023-10-30 DIAGNOSIS — M19.90 UNSPECIFIED OSTEOARTHRITIS, UNSPECIFIED SITE: ICD-10-CM

## 2023-10-30 DIAGNOSIS — Z79.84 LONG TERM (CURRENT) USE OF ORAL HYPOGLYCEMIC DRUGS: ICD-10-CM

## 2023-10-30 PROCEDURE — 99213 OFFICE O/P EST LOW 20 MIN: CPT

## 2023-11-03 ENCOUNTER — APPOINTMENT (OUTPATIENT)
Dept: FAMILY MEDICINE | Facility: CLINIC | Age: 73
End: 2023-11-03

## 2023-11-13 ENCOUNTER — OUTPATIENT (OUTPATIENT)
Dept: OUTPATIENT SERVICES | Facility: HOSPITAL | Age: 73
LOS: 1 days | End: 2023-11-13
Payer: MEDICARE

## 2023-11-13 ENCOUNTER — EMERGENCY (EMERGENCY)
Facility: HOSPITAL | Age: 73
LOS: 0 days | Discharge: HOME | End: 2023-11-13
Attending: STUDENT IN AN ORGANIZED HEALTH CARE EDUCATION/TRAINING PROGRAM
Payer: MEDICARE

## 2023-11-13 VITALS
DIASTOLIC BLOOD PRESSURE: 70 MMHG | RESPIRATION RATE: 18 BRPM | TEMPERATURE: 98 F | OXYGEN SATURATION: 97 % | SYSTOLIC BLOOD PRESSURE: 162 MMHG | HEART RATE: 85 BPM

## 2023-11-13 VITALS — HEIGHT: 68 IN

## 2023-11-13 DIAGNOSIS — E11.69 TYPE 2 DIABETES MELLITUS WITH OTHER SPECIFIED COMPLICATION: ICD-10-CM

## 2023-11-13 DIAGNOSIS — Z20.822 CONTACT WITH AND (SUSPECTED) EXPOSURE TO COVID-19: ICD-10-CM

## 2023-11-13 DIAGNOSIS — M19.90 UNSPECIFIED OSTEOARTHRITIS, UNSPECIFIED SITE: ICD-10-CM

## 2023-11-13 DIAGNOSIS — I10 ESSENTIAL (PRIMARY) HYPERTENSION: ICD-10-CM

## 2023-11-13 DIAGNOSIS — R60.9 EDEMA, UNSPECIFIED: ICD-10-CM

## 2023-11-13 DIAGNOSIS — L97.518 NON-PRESSURE CHRONIC ULCER OF OTHER PART OF RIGHT FOOT WITH OTHER SPECIFIED SEVERITY: ICD-10-CM

## 2023-11-13 DIAGNOSIS — R06.02 SHORTNESS OF BREATH: ICD-10-CM

## 2023-11-13 DIAGNOSIS — I89.0 LYMPHEDEMA, NOT ELSEWHERE CLASSIFIED: ICD-10-CM

## 2023-11-13 DIAGNOSIS — K74.60 UNSPECIFIED CIRRHOSIS OF LIVER: ICD-10-CM

## 2023-11-13 DIAGNOSIS — E11.622 TYPE 2 DIABETES MELLITUS WITH OTHER SKIN ULCER: ICD-10-CM

## 2023-11-13 DIAGNOSIS — I73.89 OTHER SPECIFIED PERIPHERAL VASCULAR DISEASES: ICD-10-CM

## 2023-11-13 DIAGNOSIS — R18.8 OTHER ASCITES: ICD-10-CM

## 2023-11-13 DIAGNOSIS — L13.9 BULLOUS DISORDER, UNSPECIFIED: ICD-10-CM

## 2023-11-13 DIAGNOSIS — E66.9 OBESITY, UNSPECIFIED: ICD-10-CM

## 2023-11-13 DIAGNOSIS — Z79.84 LONG TERM (CURRENT) USE OF ORAL HYPOGLYCEMIC DRUGS: ICD-10-CM

## 2023-11-13 DIAGNOSIS — L97.528 NON-PRESSURE CHRONIC ULCER OF OTHER PART OF LEFT FOOT WITH OTHER SPECIFIED SEVERITY: ICD-10-CM

## 2023-11-13 DIAGNOSIS — K76.6 PORTAL HYPERTENSION: ICD-10-CM

## 2023-11-13 LAB
ALBUMIN SERPL ELPH-MCNC: 3 G/DL — LOW (ref 3.3–5)
ALBUMIN SERPL ELPH-MCNC: 3 G/DL — LOW (ref 3.3–5)
ALP SERPL-CCNC: 74 U/L — SIGNIFICANT CHANGE UP (ref 40–120)
ALP SERPL-CCNC: 74 U/L — SIGNIFICANT CHANGE UP (ref 40–120)
ALT FLD-CCNC: 16 U/L — SIGNIFICANT CHANGE UP (ref 12–78)
ALT FLD-CCNC: 16 U/L — SIGNIFICANT CHANGE UP (ref 12–78)
ANION GAP SERPL CALC-SCNC: 6 MMOL/L — SIGNIFICANT CHANGE UP (ref 5–17)
ANION GAP SERPL CALC-SCNC: 6 MMOL/L — SIGNIFICANT CHANGE UP (ref 5–17)
APTT BLD: 26.5 SEC — SIGNIFICANT CHANGE UP (ref 24.5–35.6)
APTT BLD: 26.5 SEC — SIGNIFICANT CHANGE UP (ref 24.5–35.6)
AST SERPL-CCNC: 36 U/L — SIGNIFICANT CHANGE UP (ref 15–37)
AST SERPL-CCNC: 36 U/L — SIGNIFICANT CHANGE UP (ref 15–37)
BASE EXCESS BLDV CALC-SCNC: 1.7 MMOL/L — SIGNIFICANT CHANGE UP (ref -2–3)
BASE EXCESS BLDV CALC-SCNC: 1.7 MMOL/L — SIGNIFICANT CHANGE UP (ref -2–3)
BASOPHILS # BLD AUTO: 0.03 K/UL — SIGNIFICANT CHANGE UP (ref 0–0.2)
BASOPHILS # BLD AUTO: 0.03 K/UL — SIGNIFICANT CHANGE UP (ref 0–0.2)
BASOPHILS NFR BLD AUTO: 0.9 % — SIGNIFICANT CHANGE UP (ref 0–2)
BASOPHILS NFR BLD AUTO: 0.9 % — SIGNIFICANT CHANGE UP (ref 0–2)
BILIRUB SERPL-MCNC: 1.1 MG/DL — SIGNIFICANT CHANGE UP (ref 0.2–1.2)
BILIRUB SERPL-MCNC: 1.1 MG/DL — SIGNIFICANT CHANGE UP (ref 0.2–1.2)
BUN SERPL-MCNC: 9 MG/DL — SIGNIFICANT CHANGE UP (ref 7–23)
BUN SERPL-MCNC: 9 MG/DL — SIGNIFICANT CHANGE UP (ref 7–23)
CALCIUM SERPL-MCNC: 8.6 MG/DL — SIGNIFICANT CHANGE UP (ref 8.5–10.1)
CALCIUM SERPL-MCNC: 8.6 MG/DL — SIGNIFICANT CHANGE UP (ref 8.5–10.1)
CHLORIDE SERPL-SCNC: 111 MMOL/L — HIGH (ref 96–108)
CHLORIDE SERPL-SCNC: 111 MMOL/L — HIGH (ref 96–108)
CO2 SERPL-SCNC: 24 MMOL/L — SIGNIFICANT CHANGE UP (ref 22–31)
CO2 SERPL-SCNC: 24 MMOL/L — SIGNIFICANT CHANGE UP (ref 22–31)
CREAT SERPL-MCNC: 0.91 MG/DL — SIGNIFICANT CHANGE UP (ref 0.5–1.3)
CREAT SERPL-MCNC: 0.91 MG/DL — SIGNIFICANT CHANGE UP (ref 0.5–1.3)
EGFR: 67 ML/MIN/1.73M2 — SIGNIFICANT CHANGE UP
EGFR: 67 ML/MIN/1.73M2 — SIGNIFICANT CHANGE UP
EOSINOPHIL # BLD AUTO: 0.14 K/UL — SIGNIFICANT CHANGE UP (ref 0–0.5)
EOSINOPHIL # BLD AUTO: 0.14 K/UL — SIGNIFICANT CHANGE UP (ref 0–0.5)
EOSINOPHIL NFR BLD AUTO: 4.3 % — SIGNIFICANT CHANGE UP (ref 0–6)
EOSINOPHIL NFR BLD AUTO: 4.3 % — SIGNIFICANT CHANGE UP (ref 0–6)
GAS PNL BLDV: SIGNIFICANT CHANGE UP
GAS PNL BLDV: SIGNIFICANT CHANGE UP
GLUCOSE SERPL-MCNC: 147 MG/DL — HIGH (ref 70–99)
GLUCOSE SERPL-MCNC: 147 MG/DL — HIGH (ref 70–99)
HCO3 BLDV-SCNC: 28 MMOL/L — SIGNIFICANT CHANGE UP (ref 22–29)
HCO3 BLDV-SCNC: 28 MMOL/L — SIGNIFICANT CHANGE UP (ref 22–29)
HCT VFR BLD CALC: 35.1 % — SIGNIFICANT CHANGE UP (ref 34.5–45)
HCT VFR BLD CALC: 35.1 % — SIGNIFICANT CHANGE UP (ref 34.5–45)
HGB BLD-MCNC: 11.2 G/DL — LOW (ref 11.5–15.5)
HGB BLD-MCNC: 11.2 G/DL — LOW (ref 11.5–15.5)
IMM GRANULOCYTES NFR BLD AUTO: 0.3 % — SIGNIFICANT CHANGE UP (ref 0–0.9)
IMM GRANULOCYTES NFR BLD AUTO: 0.3 % — SIGNIFICANT CHANGE UP (ref 0–0.9)
INR BLD: 1.15 RATIO — SIGNIFICANT CHANGE UP (ref 0.85–1.18)
INR BLD: 1.15 RATIO — SIGNIFICANT CHANGE UP (ref 0.85–1.18)
LACTATE SERPL-SCNC: 1.6 MMOL/L — SIGNIFICANT CHANGE UP (ref 0.7–2)
LACTATE SERPL-SCNC: 1.6 MMOL/L — SIGNIFICANT CHANGE UP (ref 0.7–2)
LYMPHOCYTES # BLD AUTO: 0.82 K/UL — LOW (ref 1–3.3)
LYMPHOCYTES # BLD AUTO: 0.82 K/UL — LOW (ref 1–3.3)
LYMPHOCYTES # BLD AUTO: 25.4 % — SIGNIFICANT CHANGE UP (ref 13–44)
LYMPHOCYTES # BLD AUTO: 25.4 % — SIGNIFICANT CHANGE UP (ref 13–44)
MANUAL SMEAR VERIFICATION: SIGNIFICANT CHANGE UP
MANUAL SMEAR VERIFICATION: SIGNIFICANT CHANGE UP
MCHC RBC-ENTMCNC: 27.6 PG — SIGNIFICANT CHANGE UP (ref 27–34)
MCHC RBC-ENTMCNC: 27.6 PG — SIGNIFICANT CHANGE UP (ref 27–34)
MCHC RBC-ENTMCNC: 31.9 GM/DL — LOW (ref 32–36)
MCHC RBC-ENTMCNC: 31.9 GM/DL — LOW (ref 32–36)
MCV RBC AUTO: 86.5 FL — SIGNIFICANT CHANGE UP (ref 80–100)
MCV RBC AUTO: 86.5 FL — SIGNIFICANT CHANGE UP (ref 80–100)
MONOCYTES # BLD AUTO: 0.27 K/UL — SIGNIFICANT CHANGE UP (ref 0–0.9)
MONOCYTES # BLD AUTO: 0.27 K/UL — SIGNIFICANT CHANGE UP (ref 0–0.9)
MONOCYTES NFR BLD AUTO: 8.4 % — SIGNIFICANT CHANGE UP (ref 2–14)
MONOCYTES NFR BLD AUTO: 8.4 % — SIGNIFICANT CHANGE UP (ref 2–14)
NEUTROPHILS # BLD AUTO: 1.96 K/UL — SIGNIFICANT CHANGE UP (ref 1.8–7.4)
NEUTROPHILS # BLD AUTO: 1.96 K/UL — SIGNIFICANT CHANGE UP (ref 1.8–7.4)
NEUTROPHILS NFR BLD AUTO: 60.7 % — SIGNIFICANT CHANGE UP (ref 43–77)
NEUTROPHILS NFR BLD AUTO: 60.7 % — SIGNIFICANT CHANGE UP (ref 43–77)
NT-PROBNP SERPL-SCNC: 501 PG/ML — HIGH (ref 0–125)
NT-PROBNP SERPL-SCNC: 501 PG/ML — HIGH (ref 0–125)
OVALOCYTES BLD QL SMEAR: SIGNIFICANT CHANGE UP
OVALOCYTES BLD QL SMEAR: SIGNIFICANT CHANGE UP
PCO2 BLDV: 48 MMHG — HIGH (ref 39–42)
PCO2 BLDV: 48 MMHG — HIGH (ref 39–42)
PH BLDV: 7.37 — SIGNIFICANT CHANGE UP (ref 7.32–7.43)
PH BLDV: 7.37 — SIGNIFICANT CHANGE UP (ref 7.32–7.43)
PLAT MORPH BLD: NORMAL — SIGNIFICANT CHANGE UP
PLAT MORPH BLD: NORMAL — SIGNIFICANT CHANGE UP
PLATELET # BLD AUTO: 70 K/UL — LOW (ref 150–400)
PLATELET # BLD AUTO: 70 K/UL — LOW (ref 150–400)
PO2 BLDV: 75 MMHG — HIGH (ref 25–45)
PO2 BLDV: 75 MMHG — HIGH (ref 25–45)
POIKILOCYTOSIS BLD QL AUTO: SIGNIFICANT CHANGE UP
POIKILOCYTOSIS BLD QL AUTO: SIGNIFICANT CHANGE UP
POTASSIUM SERPL-MCNC: 4 MMOL/L — SIGNIFICANT CHANGE UP (ref 3.5–5.3)
POTASSIUM SERPL-MCNC: 4 MMOL/L — SIGNIFICANT CHANGE UP (ref 3.5–5.3)
POTASSIUM SERPL-SCNC: 4 MMOL/L — SIGNIFICANT CHANGE UP (ref 3.5–5.3)
POTASSIUM SERPL-SCNC: 4 MMOL/L — SIGNIFICANT CHANGE UP (ref 3.5–5.3)
PROT SERPL-MCNC: 7.3 GM/DL — SIGNIFICANT CHANGE UP (ref 6–8.3)
PROT SERPL-MCNC: 7.3 GM/DL — SIGNIFICANT CHANGE UP (ref 6–8.3)
PROTHROM AB SERPL-ACNC: 12.9 SEC — SIGNIFICANT CHANGE UP (ref 9.5–13)
PROTHROM AB SERPL-ACNC: 12.9 SEC — SIGNIFICANT CHANGE UP (ref 9.5–13)
RAPID RVP RESULT: SIGNIFICANT CHANGE UP
RAPID RVP RESULT: SIGNIFICANT CHANGE UP
RBC # BLD: 4.06 M/UL — SIGNIFICANT CHANGE UP (ref 3.8–5.2)
RBC # BLD: 4.06 M/UL — SIGNIFICANT CHANGE UP (ref 3.8–5.2)
RBC # FLD: 14.6 % — HIGH (ref 10.3–14.5)
RBC # FLD: 14.6 % — HIGH (ref 10.3–14.5)
RBC BLD AUTO: ABNORMAL
RBC BLD AUTO: ABNORMAL
SAO2 % BLDV: 97 % — HIGH (ref 67–88)
SAO2 % BLDV: 97 % — HIGH (ref 67–88)
SARS-COV-2 RNA SPEC QL NAA+PROBE: SIGNIFICANT CHANGE UP
SARS-COV-2 RNA SPEC QL NAA+PROBE: SIGNIFICANT CHANGE UP
SODIUM SERPL-SCNC: 141 MMOL/L — SIGNIFICANT CHANGE UP (ref 135–145)
SODIUM SERPL-SCNC: 141 MMOL/L — SIGNIFICANT CHANGE UP (ref 135–145)
TROPONIN I, HIGH SENSITIVITY RESULT: 30.33 NG/L — SIGNIFICANT CHANGE UP
TROPONIN I, HIGH SENSITIVITY RESULT: 30.33 NG/L — SIGNIFICANT CHANGE UP
WBC # BLD: 3.23 K/UL — LOW (ref 3.8–10.5)
WBC # BLD: 3.23 K/UL — LOW (ref 3.8–10.5)
WBC # FLD AUTO: 3.23 K/UL — LOW (ref 3.8–10.5)
WBC # FLD AUTO: 3.23 K/UL — LOW (ref 3.8–10.5)

## 2023-11-13 PROCEDURE — 84484 ASSAY OF TROPONIN QUANT: CPT

## 2023-11-13 PROCEDURE — 36415 COLL VENOUS BLD VENIPUNCTURE: CPT

## 2023-11-13 PROCEDURE — 99285 EMERGENCY DEPT VISIT HI MDM: CPT

## 2023-11-13 PROCEDURE — 93010 ELECTROCARDIOGRAM REPORT: CPT

## 2023-11-13 PROCEDURE — 71275 CT ANGIOGRAPHY CHEST: CPT | Mod: 26,MA

## 2023-11-13 PROCEDURE — 80053 COMPREHEN METABOLIC PANEL: CPT

## 2023-11-13 PROCEDURE — 99214 OFFICE O/P EST MOD 30 MIN: CPT | Mod: 25

## 2023-11-13 PROCEDURE — 85025 COMPLETE CBC W/AUTO DIFF WBC: CPT

## 2023-11-13 PROCEDURE — 99214 OFFICE O/P EST MOD 30 MIN: CPT

## 2023-11-13 PROCEDURE — 82803 BLOOD GASES ANY COMBINATION: CPT

## 2023-11-13 PROCEDURE — 85610 PROTHROMBIN TIME: CPT

## 2023-11-13 PROCEDURE — 93005 ELECTROCARDIOGRAM TRACING: CPT

## 2023-11-13 PROCEDURE — 71275 CT ANGIOGRAPHY CHEST: CPT | Mod: MA

## 2023-11-13 PROCEDURE — 99285 EMERGENCY DEPT VISIT HI MDM: CPT | Mod: 25

## 2023-11-13 PROCEDURE — 85730 THROMBOPLASTIN TIME PARTIAL: CPT

## 2023-11-13 PROCEDURE — 83880 ASSAY OF NATRIURETIC PEPTIDE: CPT

## 2023-11-13 PROCEDURE — 0225U NFCT DS DNA&RNA 21 SARSCOV2: CPT

## 2023-11-13 PROCEDURE — 83605 ASSAY OF LACTIC ACID: CPT

## 2023-11-13 NOTE — ED ADULT TRIAGE NOTE - CHIEF COMPLAINT QUOTE
BROUGHT DOWN FROM WOUND CENTER. AS PER RN PT BECAME SOB, C/O DIFFICULTY BREATHING WITH AUDIBLE WHEEZING. PT O2 IN TRIAGE 92% WITH WHEEZING. PT UNABLE TO COMPLETE SENTENCES WITHOUT TAKING A BREATH,. PMH: LYMPHOEDEMA, DMII, PVD, CHF, LUNG CANCER. EKG IN TRTIAGE. PT TO MAIN

## 2023-11-13 NOTE — ED ADULT NURSE NOTE - NSFALLHARMRISKINTERV_ED_ALL_ED
Assistance OOB with selected safe patient handling equipment if applicable/Communicate risk of Fall with Harm to all staff, patient, and family/Monitor gait and stability/Provide patient with walking aids/Provide visual cue: red socks, yellow wristband, yellow gown, etc/Reinforce activity limits and safety measures with patient and family/Bed in lowest position, wheels locked, appropriate side rails in place/Call bell, personal items and telephone in reach/Instruct patient to call for assistance before getting out of bed/chair/stretcher/Non-slip footwear applied when patient is off stretcher/Jordan to call system/Physically safe environment - no spills, clutter or unnecessary equipment/Purposeful Proactive Rounding/Room/bathroom lighting operational, light cord in reach

## 2023-11-13 NOTE — ED ADULT NURSE NOTE - OBJECTIVE STATEMENT
The pt is a 72 y/o female presents to ED c/o of SOB. The pt states that she was at the wound care center and became SOB. The pt denies fever, CP, dizziness, chills, N/V/D. Pt is A&O x4, pt states she uses a walker at home but uses a wheelchair to get into the car. Pt has trouble speaking in-between sentences without taking a breath. Pt is 99% on room air.

## 2023-11-13 NOTE — ED PROVIDER NOTE - CLINICAL SUMMARY MEDICAL DECISION MAKING FREE TEXT BOX
Elderly female here with worsening SOB. CTA negative for PE, PNA, and pulmonary edema. Troponin and pro BNP are negative. 12-lead without signs of ischemia. Pt is saturating at 96% and breathing comfortably on RA. Will d/c with PCP and GI follow-up.

## 2023-11-13 NOTE — ED PROVIDER NOTE - MUSCULOSKELETAL, MLM
bilateral LE lymphedema, spine appears normal, range of motion is not limited, no muscle or joint tenderness

## 2023-11-13 NOTE — ED PROVIDER NOTE - NSFOLLOWUPINSTRUCTIONS_ED_ALL_ED_FT
Seek immediate medical assistance for any new or worsening symptoms. If you have issues obtaining follow up, please call: [phone] or [phone]  to obtain a doctor or specialist who takes your insurance in your area.     YOU MUST CALL DR. FLAHERTY TOMORROW FOR FOLLOW UP REGARDING YOUR CT SCAN RESULTS (CIRRHOSIS)

## 2023-11-13 NOTE — ED PROVIDER NOTE - PROGRESS NOTE DETAILS
Zeb Rao for Dr. Wayne: Extensive discussion had with her PCP who also admits his own pts here regarding work-up. Cardiopulmonary workup here in the ER is negative however CAT scan shows liver cirrhosis with ascites and portal hypertension. Dr. Dean states he will see the pt in the office on Wednesday and would not like the pt to be admitted as she physically decompensates when she is in the hospital.

## 2023-11-13 NOTE — ED PROVIDER NOTE - OBJECTIVE STATEMENT
74 y/o female with PMHx of lung CA in remission s/p left upper lobe lobectomy non-smoker, no heart disease coming in with worsening BLANCA. No cough nor chest pain. Pt states she never sleeps flat due to SOB but now it is worsening. Pt has chronic lymphedema to the bilateral LEs which she follows with regularly at the wound care center. No fevers, recent travel, diaphoresis, or LOC. No sick contacts at home.

## 2023-11-13 NOTE — ED STATDOCS - OBJECTIVE STATEMENT
74 y/o female with PMHx of severe lymphedema, HTN, lung cancer, colon cancer, DM Type 2 presents to the ED SIB Dr. Mitchell for wheezing and SOB; no treatments administered at Dr. Mitchell's office. Patient's vaccinations are UTD. She ambulates with a walker at home.  PCP: Dr. Gross

## 2023-11-13 NOTE — ED STATDOCS - CLINICAL SUMMARY MEDICAL DECISION MAKING FREE TEXT BOX
72 y/o female with SOB sent in by her doctor. Will obtain blood work, imaging to r/o PE vs. CHF and will reassess closely.

## 2023-11-13 NOTE — ED STATDOCS - PHYSICAL EXAMINATION
Constitutional: Elderly female sitting in wheelchair, appears anxious.  Eyes: PERRLA  Head: Normocephalic   Mouth: MMM  Cardiac: regular rate   Resp: Lungs CTAB  GI: Abd s/nt/nd, no rebound or guarding.  Neuro: awake, alert, moving all extremities  Skin: No rashes  Ext: +Lymphedema of lower extremities.

## 2023-11-13 NOTE — ED PROVIDER NOTE - PATIENT PORTAL LINK FT
You can access the FollowMyHealth Patient Portal offered by [facility] by registering at the following website: [email or web address]. By joining [employer]’s FollowMyHealth portal, you will also be able to view your health information using other applications (apps) compatible with our system.

## 2023-11-15 ENCOUNTER — APPOINTMENT (OUTPATIENT)
Dept: FAMILY MEDICINE | Facility: CLINIC | Age: 73
End: 2023-11-15
Payer: MEDICARE

## 2023-11-15 VITALS
OXYGEN SATURATION: 97 % | HEART RATE: 100 BPM | HEIGHT: 68 IN | DIASTOLIC BLOOD PRESSURE: 60 MMHG | WEIGHT: 293 LBS | TEMPERATURE: 97.8 F | SYSTOLIC BLOOD PRESSURE: 160 MMHG | BODY MASS INDEX: 44.41 KG/M2

## 2023-11-15 DIAGNOSIS — I87.2 VENOUS INSUFFICIENCY (CHRONIC) (PERIPHERAL): ICD-10-CM

## 2023-11-15 DIAGNOSIS — E87.5 HYPERKALEMIA: ICD-10-CM

## 2023-11-15 DIAGNOSIS — Z00.00 ENCOUNTER FOR GENERAL ADULT MEDICAL EXAMINATION W/OUT ABNORMAL FINDINGS: ICD-10-CM

## 2023-11-15 PROCEDURE — 99214 OFFICE O/P EST MOD 30 MIN: CPT

## 2023-11-26 ENCOUNTER — RX RENEWAL (OUTPATIENT)
Age: 73
End: 2023-11-26

## 2023-11-26 RX ORDER — MUPIROCIN 20 MG/G
2 OINTMENT TOPICAL
Qty: 110 | Refills: 0 | Status: ACTIVE | COMMUNITY
Start: 2021-11-15 | End: 1900-01-01

## 2023-11-27 DIAGNOSIS — F41.9 ANXIETY DISORDER, UNSPECIFIED: ICD-10-CM

## 2023-11-27 DIAGNOSIS — F32.A ANXIETY DISORDER, UNSPECIFIED: ICD-10-CM

## 2023-11-30 ENCOUNTER — APPOINTMENT (OUTPATIENT)
Dept: GASTROENTEROLOGY | Facility: CLINIC | Age: 73
End: 2023-11-30
Payer: MEDICARE

## 2023-11-30 VITALS
BODY MASS INDEX: 43.19 KG/M2 | HEIGHT: 68 IN | SYSTOLIC BLOOD PRESSURE: 118 MMHG | WEIGHT: 285 LBS | DIASTOLIC BLOOD PRESSURE: 64 MMHG

## 2023-11-30 PROCEDURE — 99203 OFFICE O/P NEW LOW 30 MIN: CPT

## 2023-12-01 ENCOUNTER — TRANSCRIPTION ENCOUNTER (OUTPATIENT)
Age: 73
End: 2023-12-01

## 2023-12-15 ENCOUNTER — OUTPATIENT (OUTPATIENT)
Dept: OUTPATIENT SERVICES | Facility: HOSPITAL | Age: 73
LOS: 1 days | End: 2023-12-15
Payer: MEDICARE

## 2023-12-15 DIAGNOSIS — L13.9 BULLOUS DISORDER, UNSPECIFIED: ICD-10-CM

## 2023-12-15 DIAGNOSIS — B35.1 TINEA UNGUIUM: ICD-10-CM

## 2023-12-15 DIAGNOSIS — E11.69 TYPE 2 DIABETES MELLITUS WITH OTHER SPECIFIED COMPLICATION: ICD-10-CM

## 2023-12-15 DIAGNOSIS — E11.622 TYPE 2 DIABETES MELLITUS WITH OTHER SKIN ULCER: ICD-10-CM

## 2023-12-15 DIAGNOSIS — I89.0 LYMPHEDEMA, NOT ELSEWHERE CLASSIFIED: ICD-10-CM

## 2023-12-15 DIAGNOSIS — Z91.81 HISTORY OF FALLING: ICD-10-CM

## 2023-12-15 DIAGNOSIS — Z79.84 LONG TERM (CURRENT) USE OF ORAL HYPOGLYCEMIC DRUGS: ICD-10-CM

## 2023-12-15 DIAGNOSIS — I10 ESSENTIAL (PRIMARY) HYPERTENSION: ICD-10-CM

## 2023-12-15 DIAGNOSIS — I73.9 PERIPHERAL VASCULAR DISEASE, UNSPECIFIED: ICD-10-CM

## 2023-12-15 DIAGNOSIS — M19.90 UNSPECIFIED OSTEOARTHRITIS, UNSPECIFIED SITE: ICD-10-CM

## 2023-12-15 PROCEDURE — 99213 OFFICE O/P EST LOW 20 MIN: CPT | Mod: 25

## 2023-12-15 PROCEDURE — 11721 DEBRIDE NAIL 6 OR MORE: CPT | Mod: 59

## 2024-01-05 ENCOUNTER — OUTPATIENT (OUTPATIENT)
Dept: OUTPATIENT SERVICES | Facility: HOSPITAL | Age: 74
LOS: 1 days | End: 2024-01-05
Payer: MEDICARE

## 2024-01-05 DIAGNOSIS — E11.69 TYPE 2 DIABETES MELLITUS WITH OTHER SPECIFIED COMPLICATION: ICD-10-CM

## 2024-01-05 DIAGNOSIS — I89.0 LYMPHEDEMA, NOT ELSEWHERE CLASSIFIED: ICD-10-CM

## 2024-01-05 DIAGNOSIS — M19.90 UNSPECIFIED OSTEOARTHRITIS, UNSPECIFIED SITE: ICD-10-CM

## 2024-01-05 DIAGNOSIS — I10 ESSENTIAL (PRIMARY) HYPERTENSION: ICD-10-CM

## 2024-01-05 DIAGNOSIS — I73.9 PERIPHERAL VASCULAR DISEASE, UNSPECIFIED: ICD-10-CM

## 2024-01-05 DIAGNOSIS — Z91.81 HISTORY OF FALLING: ICD-10-CM

## 2024-01-05 DIAGNOSIS — E11.622 TYPE 2 DIABETES MELLITUS WITH OTHER SKIN ULCER: ICD-10-CM

## 2024-01-05 DIAGNOSIS — L13.9 BULLOUS DISORDER, UNSPECIFIED: ICD-10-CM

## 2024-01-05 DIAGNOSIS — Z79.84 LONG TERM (CURRENT) USE OF ORAL HYPOGLYCEMIC DRUGS: ICD-10-CM

## 2024-01-05 PROCEDURE — 99213 OFFICE O/P EST LOW 20 MIN: CPT

## 2024-01-16 ENCOUNTER — APPOINTMENT (OUTPATIENT)
Dept: GASTROENTEROLOGY | Facility: CLINIC | Age: 74
End: 2024-01-16

## 2024-01-16 ENCOUNTER — RX RENEWAL (OUTPATIENT)
Age: 74
End: 2024-01-16

## 2024-01-22 ENCOUNTER — OUTPATIENT (OUTPATIENT)
Dept: OUTPATIENT SERVICES | Facility: HOSPITAL | Age: 74
LOS: 1 days | End: 2024-01-22
Payer: MEDICARE

## 2024-01-22 DIAGNOSIS — I89.0 LYMPHEDEMA, NOT ELSEWHERE CLASSIFIED: ICD-10-CM

## 2024-01-22 PROCEDURE — 11721 DEBRIDE NAIL 6 OR MORE: CPT | Mod: 59

## 2024-01-22 PROCEDURE — 99213 OFFICE O/P EST LOW 20 MIN: CPT | Mod: 25

## 2024-01-26 NOTE — DISCHARGE NOTE PROVIDER - PROVIDER RX CONTACT NUMBER
Patient states he has had DB, cough for 2 weeks. Heart rate at [facility] pulmonologist was 130, sat 91% on room air. Heart here 80, sat 94%, patient was diaphoretic. No fever. CT scan 1/23, states he is feeling better at present.   [phone]

## 2024-01-30 DIAGNOSIS — E11.622 TYPE 2 DIABETES MELLITUS WITH OTHER SKIN ULCER: ICD-10-CM

## 2024-01-30 DIAGNOSIS — E11.69 TYPE 2 DIABETES MELLITUS WITH OTHER SPECIFIED COMPLICATION: ICD-10-CM

## 2024-01-30 DIAGNOSIS — Z79.84 LONG TERM (CURRENT) USE OF ORAL HYPOGLYCEMIC DRUGS: ICD-10-CM

## 2024-01-30 DIAGNOSIS — L13.9 BULLOUS DISORDER, UNSPECIFIED: ICD-10-CM

## 2024-01-30 DIAGNOSIS — M19.90 UNSPECIFIED OSTEOARTHRITIS, UNSPECIFIED SITE: ICD-10-CM

## 2024-01-30 DIAGNOSIS — Z91.81 HISTORY OF FALLING: ICD-10-CM

## 2024-01-30 DIAGNOSIS — L03.039 CELLULITIS OF UNSPECIFIED TOE: ICD-10-CM

## 2024-01-30 DIAGNOSIS — I89.0 LYMPHEDEMA, NOT ELSEWHERE CLASSIFIED: ICD-10-CM

## 2024-01-30 DIAGNOSIS — I10 ESSENTIAL (PRIMARY) HYPERTENSION: ICD-10-CM

## 2024-02-12 ENCOUNTER — OUTPATIENT (OUTPATIENT)
Dept: OUTPATIENT SERVICES | Facility: HOSPITAL | Age: 74
LOS: 1 days | End: 2024-02-12
Payer: MEDICARE

## 2024-02-12 DIAGNOSIS — I89.0 LYMPHEDEMA, NOT ELSEWHERE CLASSIFIED: ICD-10-CM

## 2024-02-12 DIAGNOSIS — L13.9 BULLOUS DISORDER, UNSPECIFIED: ICD-10-CM

## 2024-02-12 DIAGNOSIS — I10 ESSENTIAL (PRIMARY) HYPERTENSION: ICD-10-CM

## 2024-02-12 DIAGNOSIS — I73.9 PERIPHERAL VASCULAR DISEASE, UNSPECIFIED: ICD-10-CM

## 2024-02-12 DIAGNOSIS — Z91.81 HISTORY OF FALLING: ICD-10-CM

## 2024-02-12 DIAGNOSIS — Z79.84 LONG TERM (CURRENT) USE OF ORAL HYPOGLYCEMIC DRUGS: ICD-10-CM

## 2024-02-12 DIAGNOSIS — M19.90 UNSPECIFIED OSTEOARTHRITIS, UNSPECIFIED SITE: ICD-10-CM

## 2024-02-12 DIAGNOSIS — E11.9 TYPE 2 DIABETES MELLITUS WITHOUT COMPLICATIONS: ICD-10-CM

## 2024-02-12 DIAGNOSIS — E11.69 TYPE 2 DIABETES MELLITUS WITH OTHER SPECIFIED COMPLICATION: ICD-10-CM

## 2024-02-12 PROCEDURE — 99213 OFFICE O/P EST LOW 20 MIN: CPT

## 2024-03-04 ENCOUNTER — OUTPATIENT (OUTPATIENT)
Dept: OUTPATIENT SERVICES | Facility: HOSPITAL | Age: 74
LOS: 1 days | End: 2024-03-04
Payer: MEDICARE

## 2024-03-04 DIAGNOSIS — M19.90 UNSPECIFIED OSTEOARTHRITIS, UNSPECIFIED SITE: ICD-10-CM

## 2024-03-04 DIAGNOSIS — I89.0 LYMPHEDEMA, NOT ELSEWHERE CLASSIFIED: ICD-10-CM

## 2024-03-04 DIAGNOSIS — L13.9 BULLOUS DISORDER, UNSPECIFIED: ICD-10-CM

## 2024-03-04 DIAGNOSIS — I10 ESSENTIAL (PRIMARY) HYPERTENSION: ICD-10-CM

## 2024-03-04 DIAGNOSIS — E11.9 TYPE 2 DIABETES MELLITUS WITHOUT COMPLICATIONS: ICD-10-CM

## 2024-03-04 DIAGNOSIS — E11.69 TYPE 2 DIABETES MELLITUS WITH OTHER SPECIFIED COMPLICATION: ICD-10-CM

## 2024-03-04 DIAGNOSIS — Z79.84 LONG TERM (CURRENT) USE OF ORAL HYPOGLYCEMIC DRUGS: ICD-10-CM

## 2024-03-04 DIAGNOSIS — I73.9 PERIPHERAL VASCULAR DISEASE, UNSPECIFIED: ICD-10-CM

## 2024-03-04 DIAGNOSIS — Z91.81 HISTORY OF FALLING: ICD-10-CM

## 2024-03-04 PROCEDURE — 99212 OFFICE O/P EST SF 10 MIN: CPT

## 2024-03-25 ENCOUNTER — OUTPATIENT (OUTPATIENT)
Dept: OUTPATIENT SERVICES | Facility: HOSPITAL | Age: 74
LOS: 1 days | End: 2024-03-25
Payer: MEDICARE

## 2024-03-25 DIAGNOSIS — E11.69 TYPE 2 DIABETES MELLITUS WITH OTHER SPECIFIED COMPLICATION: ICD-10-CM

## 2024-03-25 DIAGNOSIS — I89.0 LYMPHEDEMA, NOT ELSEWHERE CLASSIFIED: ICD-10-CM

## 2024-03-25 DIAGNOSIS — E11.9 TYPE 2 DIABETES MELLITUS WITHOUT COMPLICATIONS: ICD-10-CM

## 2024-03-25 DIAGNOSIS — Z79.84 LONG TERM (CURRENT) USE OF ORAL HYPOGLYCEMIC DRUGS: ICD-10-CM

## 2024-03-25 DIAGNOSIS — L13.9 BULLOUS DISORDER, UNSPECIFIED: ICD-10-CM

## 2024-03-25 DIAGNOSIS — I73.9 PERIPHERAL VASCULAR DISEASE, UNSPECIFIED: ICD-10-CM

## 2024-03-25 DIAGNOSIS — M19.90 UNSPECIFIED OSTEOARTHRITIS, UNSPECIFIED SITE: ICD-10-CM

## 2024-03-25 DIAGNOSIS — Z91.81 HISTORY OF FALLING: ICD-10-CM

## 2024-03-25 DIAGNOSIS — I10 ESSENTIAL (PRIMARY) HYPERTENSION: ICD-10-CM

## 2024-03-25 PROCEDURE — 99213 OFFICE O/P EST LOW 20 MIN: CPT

## 2024-04-15 ENCOUNTER — OUTPATIENT (OUTPATIENT)
Dept: OUTPATIENT SERVICES | Facility: HOSPITAL | Age: 74
LOS: 1 days | End: 2024-04-15
Payer: MEDICARE

## 2024-04-15 DIAGNOSIS — Z79.84 LONG TERM (CURRENT) USE OF ORAL HYPOGLYCEMIC DRUGS: ICD-10-CM

## 2024-04-15 DIAGNOSIS — I89.0 LYMPHEDEMA, NOT ELSEWHERE CLASSIFIED: ICD-10-CM

## 2024-04-15 DIAGNOSIS — E11.9 TYPE 2 DIABETES MELLITUS WITHOUT COMPLICATIONS: ICD-10-CM

## 2024-04-15 DIAGNOSIS — M19.90 UNSPECIFIED OSTEOARTHRITIS, UNSPECIFIED SITE: ICD-10-CM

## 2024-04-15 DIAGNOSIS — I10 ESSENTIAL (PRIMARY) HYPERTENSION: ICD-10-CM

## 2024-04-15 DIAGNOSIS — Z91.81 HISTORY OF FALLING: ICD-10-CM

## 2024-04-15 DIAGNOSIS — E11.69 TYPE 2 DIABETES MELLITUS WITH OTHER SPECIFIED COMPLICATION: ICD-10-CM

## 2024-04-15 PROCEDURE — 99212 OFFICE O/P EST SF 10 MIN: CPT

## 2024-05-06 ENCOUNTER — OUTPATIENT (OUTPATIENT)
Dept: OUTPATIENT SERVICES | Facility: HOSPITAL | Age: 74
LOS: 1 days | End: 2024-05-06
Payer: MEDICARE

## 2024-05-06 ENCOUNTER — OUTPATIENT (OUTPATIENT)
Dept: OUTPATIENT SERVICES | Facility: HOSPITAL | Age: 74
LOS: 1 days | End: 2024-05-06

## 2024-05-06 DIAGNOSIS — I89.0 LYMPHEDEMA, NOT ELSEWHERE CLASSIFIED: ICD-10-CM

## 2024-05-06 PROCEDURE — 99213 OFFICE O/P EST LOW 20 MIN: CPT

## 2024-05-08 NOTE — PHARMACOTHERAPY INTERVENTION NOTE - COMMENTS
Medication history complete, reviewed medications with patient and confirmed with doctor first med hx.  
Modified cephalexin allergy history to state that patient tolerated meropenem during this admission.    Moises Alfredo, PharmD, BCIDP  Clinical Pharmacy Specialist, Infectious Diseases  Tele-Antimicrobial Stewardship Program (Tele-ASP)  Tele-ASP Phone: [phone]  
DISCHARGE

## 2024-05-15 ENCOUNTER — APPOINTMENT (OUTPATIENT)
Dept: FAMILY MEDICINE | Facility: CLINIC | Age: 74
End: 2024-05-15
Payer: MEDICARE

## 2024-05-15 VITALS
DIASTOLIC BLOOD PRESSURE: 80 MMHG | BODY MASS INDEX: 43.19 KG/M2 | WEIGHT: 285 LBS | SYSTOLIC BLOOD PRESSURE: 120 MMHG | HEIGHT: 68 IN

## 2024-05-15 DIAGNOSIS — K74.60 UNSPECIFIED CIRRHOSIS OF LIVER: ICD-10-CM

## 2024-05-15 DIAGNOSIS — E11.9 TYPE 2 DIABETES MELLITUS W/OUT COMPLICATIONS: ICD-10-CM

## 2024-05-15 DIAGNOSIS — I10 ESSENTIAL (PRIMARY) HYPERTENSION: ICD-10-CM

## 2024-05-15 DIAGNOSIS — R18.8 UNSPECIFIED CIRRHOSIS OF LIVER: ICD-10-CM

## 2024-05-15 DIAGNOSIS — E66.01 MORBID (SEVERE) OBESITY DUE TO EXCESS CALORIES: ICD-10-CM

## 2024-05-15 DIAGNOSIS — E88.810 METABOLIC SYNDROME: ICD-10-CM

## 2024-05-15 PROCEDURE — 99214 OFFICE O/P EST MOD 30 MIN: CPT

## 2024-05-15 RX ORDER — ESCITALOPRAM OXALATE 10 MG/1
10 TABLET ORAL
Qty: 30 | Refills: 1 | Status: DISCONTINUED | COMMUNITY
Start: 2023-11-27 | End: 2024-05-15

## 2024-05-15 RX ORDER — SODIUM POLYSTYRENE SULFONATE 15 G/60ML
15 SUSPENSION ORAL; RECTAL EVERY OTHER DAY
Qty: 180 | Refills: 0 | Status: DISCONTINUED | COMMUNITY
Start: 2023-08-10 | End: 2024-05-15

## 2024-05-15 RX ORDER — TRAMADOL HYDROCHLORIDE 50 MG/1
50 TABLET, COATED ORAL TWICE DAILY
Qty: 60 | Refills: 0 | Status: DISCONTINUED | COMMUNITY
Start: 2023-12-01 | End: 2024-05-15

## 2024-05-15 RX ORDER — SITAGLIPTIN 25 MG/1
25 TABLET, FILM COATED ORAL
Qty: 90 | Refills: 0 | Status: ACTIVE | COMMUNITY
Start: 2023-09-27 | End: 1900-01-01

## 2024-05-15 NOTE — PHYSICAL EXAM
[Normal] : no acute distress, well nourished, well developed and well-appearing [Normal Sclera/Conjunctiva] : normal sclera/conjunctiva [Normal Outer Ear/Nose] : the outer ears and nose were normal in appearance [No JVD] : no jugular venous distention [No Respiratory Distress] : no respiratory distress  [Soft] : abdomen soft [de-identified] : Unable to examine due to telehealth visit

## 2024-05-15 NOTE — HISTORY OF PRESENT ILLNESS
[Home] : at home, [unfilled] , at the time of the visit. [Medical Office: ([facility])___] : at the medical office located in  [Verbal consent obtained from patient] : the patient, [unfilled] [FreeTextEntry4] : Komal Jung

## 2024-05-21 DIAGNOSIS — E11.9 TYPE 2 DIABETES MELLITUS WITHOUT COMPLICATIONS: ICD-10-CM

## 2024-05-21 DIAGNOSIS — I89.0 LYMPHEDEMA, NOT ELSEWHERE CLASSIFIED: ICD-10-CM

## 2024-05-21 DIAGNOSIS — Z79.84 LONG TERM (CURRENT) USE OF ORAL HYPOGLYCEMIC DRUGS: ICD-10-CM

## 2024-05-21 DIAGNOSIS — Z91.81 HISTORY OF FALLING: ICD-10-CM

## 2024-05-21 DIAGNOSIS — I10 ESSENTIAL (PRIMARY) HYPERTENSION: ICD-10-CM

## 2024-05-21 DIAGNOSIS — M19.90 UNSPECIFIED OSTEOARTHRITIS, UNSPECIFIED SITE: ICD-10-CM

## 2024-05-21 DIAGNOSIS — I73.9 PERIPHERAL VASCULAR DISEASE, UNSPECIFIED: ICD-10-CM

## 2024-05-21 DIAGNOSIS — E11.69 TYPE 2 DIABETES MELLITUS WITH OTHER SPECIFIED COMPLICATION: ICD-10-CM

## 2024-05-31 ENCOUNTER — OUTPATIENT (OUTPATIENT)
Dept: OUTPATIENT SERVICES | Facility: HOSPITAL | Age: 74
LOS: 1 days | End: 2024-05-31
Payer: MEDICARE

## 2024-05-31 DIAGNOSIS — I89.0 LYMPHEDEMA, NOT ELSEWHERE CLASSIFIED: ICD-10-CM

## 2024-05-31 PROCEDURE — 99213 OFFICE O/P EST LOW 20 MIN: CPT

## 2024-06-05 DIAGNOSIS — E11.69 TYPE 2 DIABETES MELLITUS WITH OTHER SPECIFIED COMPLICATION: ICD-10-CM

## 2024-06-05 DIAGNOSIS — E11.9 TYPE 2 DIABETES MELLITUS WITHOUT COMPLICATIONS: ICD-10-CM

## 2024-06-05 DIAGNOSIS — Z91.81 HISTORY OF FALLING: ICD-10-CM

## 2024-06-05 DIAGNOSIS — Z79.84 LONG TERM (CURRENT) USE OF ORAL HYPOGLYCEMIC DRUGS: ICD-10-CM

## 2024-06-05 DIAGNOSIS — I10 ESSENTIAL (PRIMARY) HYPERTENSION: ICD-10-CM

## 2024-06-05 DIAGNOSIS — I73.9 PERIPHERAL VASCULAR DISEASE, UNSPECIFIED: ICD-10-CM

## 2024-06-05 DIAGNOSIS — I89.0 LYMPHEDEMA, NOT ELSEWHERE CLASSIFIED: ICD-10-CM

## 2024-06-05 DIAGNOSIS — M19.90 UNSPECIFIED OSTEOARTHRITIS, UNSPECIFIED SITE: ICD-10-CM

## 2024-06-28 ENCOUNTER — OUTPATIENT (OUTPATIENT)
Dept: OUTPATIENT SERVICES | Facility: HOSPITAL | Age: 74
LOS: 1 days | End: 2024-06-28
Payer: MEDICARE

## 2024-06-28 DIAGNOSIS — I89.0 LYMPHEDEMA, NOT ELSEWHERE CLASSIFIED: ICD-10-CM

## 2024-06-28 PROCEDURE — 99214 OFFICE O/P EST MOD 30 MIN: CPT

## 2024-06-28 RX ORDER — ACETIC ACID 250 MG/100ML
1 IRRIGANT IRRIGATION DAILY
Refills: 0 | Status: DISCONTINUED | OUTPATIENT
Start: 2024-06-28 | End: 2024-07-12

## 2024-07-10 DIAGNOSIS — E11.9 TYPE 2 DIABETES MELLITUS WITHOUT COMPLICATIONS: ICD-10-CM

## 2024-07-10 DIAGNOSIS — M19.90 UNSPECIFIED OSTEOARTHRITIS, UNSPECIFIED SITE: ICD-10-CM

## 2024-07-10 DIAGNOSIS — I73.9 PERIPHERAL VASCULAR DISEASE, UNSPECIFIED: ICD-10-CM

## 2024-07-10 DIAGNOSIS — E11.69 TYPE 2 DIABETES MELLITUS WITH OTHER SPECIFIED COMPLICATION: ICD-10-CM

## 2024-07-10 DIAGNOSIS — Z91.81 HISTORY OF FALLING: ICD-10-CM

## 2024-07-10 DIAGNOSIS — I10 ESSENTIAL (PRIMARY) HYPERTENSION: ICD-10-CM

## 2024-07-10 DIAGNOSIS — Z79.84 LONG TERM (CURRENT) USE OF ORAL HYPOGLYCEMIC DRUGS: ICD-10-CM

## 2024-07-10 DIAGNOSIS — I89.0 LYMPHEDEMA, NOT ELSEWHERE CLASSIFIED: ICD-10-CM

## 2024-07-19 ENCOUNTER — OUTPATIENT (OUTPATIENT)
Dept: OUTPATIENT SERVICES | Facility: HOSPITAL | Age: 74
LOS: 1 days | End: 2024-07-19
Payer: MEDICARE

## 2024-07-19 DIAGNOSIS — I89.0 LYMPHEDEMA, NOT ELSEWHERE CLASSIFIED: ICD-10-CM

## 2024-07-19 PROCEDURE — 99213 OFFICE O/P EST LOW 20 MIN: CPT

## 2024-07-23 ENCOUNTER — TRANSCRIPTION ENCOUNTER (OUTPATIENT)
Age: 74
End: 2024-07-23

## 2024-07-24 ENCOUNTER — TRANSCRIPTION ENCOUNTER (OUTPATIENT)
Age: 74
End: 2024-07-24

## 2024-07-24 RX ORDER — DOXYCYCLINE MONOHYDRATE 100 MG
2 TABLET ORAL
Refills: 0 | DISCHARGE
Start: 2024-07-24 | End: 2024-07-31

## 2024-07-26 ENCOUNTER — NON-APPOINTMENT (OUTPATIENT)
Age: 74
End: 2024-07-26

## 2024-07-30 DIAGNOSIS — I10 ESSENTIAL (PRIMARY) HYPERTENSION: ICD-10-CM

## 2024-07-30 DIAGNOSIS — L03.115 CELLULITIS OF RIGHT LOWER LIMB: ICD-10-CM

## 2024-07-30 DIAGNOSIS — E11.9 TYPE 2 DIABETES MELLITUS WITHOUT COMPLICATIONS: ICD-10-CM

## 2024-07-30 DIAGNOSIS — I89.0 LYMPHEDEMA, NOT ELSEWHERE CLASSIFIED: ICD-10-CM

## 2024-07-30 DIAGNOSIS — I73.9 PERIPHERAL VASCULAR DISEASE, UNSPECIFIED: ICD-10-CM

## 2024-07-30 DIAGNOSIS — L03.116 CELLULITIS OF LEFT LOWER LIMB: ICD-10-CM

## 2024-07-30 DIAGNOSIS — M19.90 UNSPECIFIED OSTEOARTHRITIS, UNSPECIFIED SITE: ICD-10-CM

## 2024-07-30 DIAGNOSIS — Z79.84 LONG TERM (CURRENT) USE OF ORAL HYPOGLYCEMIC DRUGS: ICD-10-CM

## 2024-07-30 DIAGNOSIS — Z91.81 HISTORY OF FALLING: ICD-10-CM

## 2024-07-31 ENCOUNTER — APPOINTMENT (OUTPATIENT)
Dept: FAMILY MEDICINE | Facility: CLINIC | Age: 74
End: 2024-07-31
Payer: MEDICARE

## 2024-07-31 VITALS
SYSTOLIC BLOOD PRESSURE: 121 MMHG | DIASTOLIC BLOOD PRESSURE: 58 MMHG | WEIGHT: 285 LBS | HEIGHT: 68 IN | BODY MASS INDEX: 43.19 KG/M2

## 2024-07-31 DIAGNOSIS — I10 ESSENTIAL (PRIMARY) HYPERTENSION: ICD-10-CM

## 2024-07-31 DIAGNOSIS — L03.119 CELLULITIS OF UNSPECIFIED PART OF LIMB: ICD-10-CM

## 2024-07-31 DIAGNOSIS — E66.01 MORBID (SEVERE) OBESITY DUE TO EXCESS CALORIES: ICD-10-CM

## 2024-07-31 DIAGNOSIS — E11.9 TYPE 2 DIABETES MELLITUS W/OUT COMPLICATIONS: ICD-10-CM

## 2024-07-31 PROCEDURE — 99496 TRANSJ CARE MGMT HIGH F2F 7D: CPT

## 2024-07-31 NOTE — PHYSICAL EXAM
[Normal] : no acute distress, well nourished, well developed and well-appearing [Normal Sclera/Conjunctiva] : normal sclera/conjunctiva [Normal Outer Ear/Nose] : the outer ears and nose were normal in appearance [No JVD] : no jugular venous distention [No Respiratory Distress] : no respiratory distress  [de-identified] : Unable to examine due to telehealth visit

## 2024-07-31 NOTE — HISTORY OF PRESENT ILLNESS
[Home] : at home, [unfilled] , at the time of the visit. [Medical Office: ([facility])___] : at the medical office located in  [Verbal consent obtained from patient] : the patient, [unfilled] [FreeTextEntry4] : Komal Jung [Post-hospitalization from ___ Hospital] : Post-hospitalization from [unfilled] Hospital [Admitted on: ___] : The patient was admitted on [unfilled] [Discharged on ___] : discharged on [unfilled] [FreeTextEntry2] : Pt s/p discharge for cellulitis. Had maggots to the wound. Needed wound care and  deep cleaning. Treated with IV and then oral antibiotic. Treated by podiatry service and going to the wound care center Home care came in and cleaned and wrapped legs. Eating yogurt. No fever or chills.

## 2024-08-02 ENCOUNTER — OUTPATIENT (OUTPATIENT)
Dept: OUTPATIENT SERVICES | Facility: HOSPITAL | Age: 74
LOS: 1 days | End: 2024-08-02

## 2024-08-02 DIAGNOSIS — I89.0 LYMPHEDEMA, NOT ELSEWHERE CLASSIFIED: ICD-10-CM

## 2024-08-02 PROCEDURE — 99212 OFFICE O/P EST SF 10 MIN: CPT

## 2024-08-12 DIAGNOSIS — M19.90 UNSPECIFIED OSTEOARTHRITIS, UNSPECIFIED SITE: ICD-10-CM

## 2024-08-12 DIAGNOSIS — I89.0 LYMPHEDEMA, NOT ELSEWHERE CLASSIFIED: ICD-10-CM

## 2024-08-12 DIAGNOSIS — I10 ESSENTIAL (PRIMARY) HYPERTENSION: ICD-10-CM

## 2024-08-12 DIAGNOSIS — Z79.84 LONG TERM (CURRENT) USE OF ORAL HYPOGLYCEMIC DRUGS: ICD-10-CM

## 2024-08-12 DIAGNOSIS — I73.9 PERIPHERAL VASCULAR DISEASE, UNSPECIFIED: ICD-10-CM

## 2024-08-12 DIAGNOSIS — E11.69 TYPE 2 DIABETES MELLITUS WITH OTHER SPECIFIED COMPLICATION: ICD-10-CM

## 2024-08-12 DIAGNOSIS — E11.9 TYPE 2 DIABETES MELLITUS WITHOUT COMPLICATIONS: ICD-10-CM

## 2024-08-12 DIAGNOSIS — Z91.81 HISTORY OF FALLING: ICD-10-CM

## 2024-08-14 ENCOUNTER — INPATIENT (INPATIENT)
Facility: HOSPITAL | Age: 74
LOS: 6 days | Discharge: NURSING, REHAB OR LONG-TERM CARE | DRG: 872 | End: 2024-08-21
Attending: FAMILY MEDICINE | Admitting: HOSPITALIST
Payer: MEDICARE

## 2024-08-14 VITALS
HEIGHT: 65 IN | TEMPERATURE: 98 F | DIASTOLIC BLOOD PRESSURE: 55 MMHG | RESPIRATION RATE: 15 BRPM | WEIGHT: 231.93 LBS | OXYGEN SATURATION: 95 % | HEART RATE: 103 BPM | SYSTOLIC BLOOD PRESSURE: 144 MMHG

## 2024-08-14 DIAGNOSIS — B37.2 CANDIDIASIS OF SKIN AND NAIL: ICD-10-CM

## 2024-08-14 DIAGNOSIS — E87.22 CHRONIC METABOLIC ACIDOSIS: ICD-10-CM

## 2024-08-14 DIAGNOSIS — L03.115 CELLULITIS OF RIGHT LOWER LIMB: ICD-10-CM

## 2024-08-14 DIAGNOSIS — Z85.118 PERSONAL HISTORY OF OTHER MALIGNANT NEOPLASM OF BRONCHUS AND LUNG: ICD-10-CM

## 2024-08-14 DIAGNOSIS — D69.6 THROMBOCYTOPENIA, UNSPECIFIED: ICD-10-CM

## 2024-08-14 DIAGNOSIS — Z11.52 ENCOUNTER FOR SCREENING FOR COVID-19: ICD-10-CM

## 2024-08-14 DIAGNOSIS — E11.65 TYPE 2 DIABETES MELLITUS WITH HYPERGLYCEMIA: ICD-10-CM

## 2024-08-14 DIAGNOSIS — I87.2 VENOUS INSUFFICIENCY (CHRONIC) (PERIPHERAL): ICD-10-CM

## 2024-08-14 DIAGNOSIS — I10 ESSENTIAL (PRIMARY) HYPERTENSION: ICD-10-CM

## 2024-08-14 DIAGNOSIS — N39.0 URINARY TRACT INFECTION, SITE NOT SPECIFIED: ICD-10-CM

## 2024-08-14 DIAGNOSIS — L03.90 CELLULITIS, UNSPECIFIED: ICD-10-CM

## 2024-08-14 DIAGNOSIS — A41.9 SEPSIS, UNSPECIFIED ORGANISM: ICD-10-CM

## 2024-08-14 DIAGNOSIS — Z88.1 ALLERGY STATUS TO OTHER ANTIBIOTIC AGENTS: ICD-10-CM

## 2024-08-14 DIAGNOSIS — Z79.84 LONG TERM (CURRENT) USE OF ORAL HYPOGLYCEMIC DRUGS: ICD-10-CM

## 2024-08-14 DIAGNOSIS — Z85.038 PERSONAL HISTORY OF OTHER MALIGNANT NEOPLASM OF LARGE INTESTINE: ICD-10-CM

## 2024-08-14 DIAGNOSIS — I89.0 LYMPHEDEMA, NOT ELSEWHERE CLASSIFIED: ICD-10-CM

## 2024-08-14 DIAGNOSIS — E66.9 OBESITY, UNSPECIFIED: ICD-10-CM

## 2024-08-14 LAB
ANISOCYTOSIS BLD QL: SLIGHT — SIGNIFICANT CHANGE UP
APPEARANCE UR: CLEAR — SIGNIFICANT CHANGE UP
BACTERIA # UR AUTO: ABNORMAL /HPF
BASOPHILS # BLD AUTO: 0.05 K/UL — SIGNIFICANT CHANGE UP (ref 0–0.2)
BASOPHILS NFR BLD AUTO: 0.3 % — SIGNIFICANT CHANGE UP (ref 0–2)
BILIRUB UR-MCNC: NEGATIVE — SIGNIFICANT CHANGE UP
CAST: 0 /LPF — SIGNIFICANT CHANGE UP (ref 0–4)
COLOR SPEC: SIGNIFICANT CHANGE UP
DIFF PNL FLD: NEGATIVE — SIGNIFICANT CHANGE UP
EOSINOPHIL # BLD AUTO: 0 K/UL — SIGNIFICANT CHANGE UP (ref 0–0.5)
EOSINOPHIL NFR BLD AUTO: 0 % — SIGNIFICANT CHANGE UP (ref 0–6)
FLUAV AG NPH QL: SIGNIFICANT CHANGE UP
FLUBV AG NPH QL: SIGNIFICANT CHANGE UP
GLUCOSE UR QL: NEGATIVE MG/DL — SIGNIFICANT CHANGE UP
HCT VFR BLD CALC: 36.6 % — SIGNIFICANT CHANGE UP (ref 34.5–45)
HGB BLD-MCNC: 12.3 G/DL — SIGNIFICANT CHANGE UP (ref 11.5–15.5)
IMM GRANULOCYTES NFR BLD AUTO: 0.5 % — SIGNIFICANT CHANGE UP (ref 0–0.9)
KETONES UR-MCNC: NEGATIVE MG/DL — SIGNIFICANT CHANGE UP
LACTATE SERPL-SCNC: 1.9 MMOL/L — SIGNIFICANT CHANGE UP (ref 0.7–2)
LACTATE SERPL-SCNC: 3.2 MMOL/L — HIGH (ref 0.7–2)
LACTATE SERPL-SCNC: 3.9 MMOL/L — HIGH (ref 0.7–2)
LEUKOCYTE ESTERASE UR-ACNC: ABNORMAL
LYMPHOCYTES # BLD AUTO: 0.28 K/UL — LOW (ref 1–3.3)
LYMPHOCYTES # BLD AUTO: 1.6 % — LOW (ref 13–44)
MANUAL SMEAR VERIFICATION: SIGNIFICANT CHANGE UP
MCHC RBC-ENTMCNC: 28.6 PG — SIGNIFICANT CHANGE UP (ref 27–34)
MCHC RBC-ENTMCNC: 33.6 GM/DL — SIGNIFICANT CHANGE UP (ref 32–36)
MCV RBC AUTO: 85.1 FL — SIGNIFICANT CHANGE UP (ref 80–100)
MONOCYTES # BLD AUTO: 0.73 K/UL — SIGNIFICANT CHANGE UP (ref 0–0.9)
MONOCYTES NFR BLD AUTO: 4.1 % — SIGNIFICANT CHANGE UP (ref 2–14)
NEUTROPHILS # BLD AUTO: 16.71 K/UL — HIGH (ref 1.8–7.4)
NEUTROPHILS NFR BLD AUTO: 93.5 % — HIGH (ref 43–77)
NITRITE UR-MCNC: POSITIVE
PH UR: 5 — SIGNIFICANT CHANGE UP (ref 5–8)
PLAT MORPH BLD: NORMAL — SIGNIFICANT CHANGE UP
PLATELET # BLD AUTO: 100 K/UL — LOW (ref 150–400)
PROT UR-MCNC: SIGNIFICANT CHANGE UP MG/DL
RBC # BLD: 4.3 M/UL — SIGNIFICANT CHANGE UP (ref 3.8–5.2)
RBC # FLD: 16.2 % — HIGH (ref 10.3–14.5)
RBC BLD AUTO: SIGNIFICANT CHANGE UP
RBC CASTS # UR COMP ASSIST: 4 /HPF — SIGNIFICANT CHANGE UP (ref 0–4)
RSV RNA NPH QL NAA+NON-PROBE: SIGNIFICANT CHANGE UP
SARS-COV-2 RNA SPEC QL NAA+PROBE: SIGNIFICANT CHANGE UP
SP GR SPEC: 1.02 — SIGNIFICANT CHANGE UP (ref 1–1.03)
SQUAMOUS # UR AUTO: 2 /HPF — SIGNIFICANT CHANGE UP (ref 0–5)
UROBILINOGEN FLD QL: 1 MG/DL — SIGNIFICANT CHANGE UP (ref 0.2–1)
WBC # BLD: 17.86 K/UL — HIGH (ref 3.8–10.5)
WBC # FLD AUTO: 17.86 K/UL — HIGH (ref 3.8–10.5)
WBC UR QL: 18 /HPF — HIGH (ref 0–5)

## 2024-08-14 PROCEDURE — 99223 1ST HOSP IP/OBS HIGH 75: CPT

## 2024-08-14 PROCEDURE — 85027 COMPLETE CBC AUTOMATED: CPT

## 2024-08-14 PROCEDURE — 93010 ELECTROCARDIOGRAM REPORT: CPT

## 2024-08-14 PROCEDURE — 36415 COLL VENOUS BLD VENIPUNCTURE: CPT

## 2024-08-14 PROCEDURE — 97530 THERAPEUTIC ACTIVITIES: CPT | Mod: GP

## 2024-08-14 PROCEDURE — 82962 GLUCOSE BLOOD TEST: CPT

## 2024-08-14 PROCEDURE — 97162 PT EVAL MOD COMPLEX 30 MIN: CPT | Mod: GP

## 2024-08-14 PROCEDURE — 80048 BASIC METABOLIC PNL TOTAL CA: CPT

## 2024-08-14 PROCEDURE — 83605 ASSAY OF LACTIC ACID: CPT

## 2024-08-14 PROCEDURE — 73590 X-RAY EXAM OF LOWER LEG: CPT | Mod: 26,RT

## 2024-08-14 PROCEDURE — 99285 EMERGENCY DEPT VISIT HI MDM: CPT

## 2024-08-14 PROCEDURE — 83036 HEMOGLOBIN GLYCOSYLATED A1C: CPT

## 2024-08-14 PROCEDURE — 97116 GAIT TRAINING THERAPY: CPT | Mod: GP

## 2024-08-14 PROCEDURE — 71045 X-RAY EXAM CHEST 1 VIEW: CPT | Mod: 26

## 2024-08-14 RX ORDER — ENOXAPARIN SODIUM 100 MG/ML
40 INJECTION SUBCUTANEOUS EVERY 24 HOURS
Refills: 0 | Status: DISCONTINUED | OUTPATIENT
Start: 2024-08-14 | End: 2024-08-21

## 2024-08-14 RX ORDER — SODIUM CHLORIDE 9 MG/ML
1750 INJECTION INTRAMUSCULAR; INTRAVENOUS; SUBCUTANEOUS ONCE
Refills: 0 | Status: COMPLETED | OUTPATIENT
Start: 2024-08-14 | End: 2024-08-14

## 2024-08-14 RX ORDER — PIPERACILLIN SODIUM AND TAZOBACTAM SODIUM 3; .375 G/15ML; G/15ML
3.38 INJECTION, POWDER, FOR SOLUTION INTRAVENOUS EVERY 8 HOURS
Refills: 0 | Status: DISCONTINUED | OUTPATIENT
Start: 2024-08-14 | End: 2024-08-15

## 2024-08-14 RX ORDER — DEXTROSE 15 G/33 G
25 GEL IN PACKET (GRAM) ORAL ONCE
Refills: 0 | Status: DISCONTINUED | OUTPATIENT
Start: 2024-08-14 | End: 2024-08-21

## 2024-08-14 RX ORDER — DEXTROSE 15 G/33 G
15 GEL IN PACKET (GRAM) ORAL ONCE
Refills: 0 | Status: DISCONTINUED | OUTPATIENT
Start: 2024-08-14 | End: 2024-08-21

## 2024-08-14 RX ORDER — GLUCAGON INJECTION, SOLUTION 1 MG/.2ML
1 INJECTION, SOLUTION SUBCUTANEOUS ONCE
Refills: 0 | Status: DISCONTINUED | OUTPATIENT
Start: 2024-08-14 | End: 2024-08-21

## 2024-08-14 RX ORDER — PIPERACILLIN SODIUM AND TAZOBACTAM SODIUM 3; .375 G/15ML; G/15ML
3.38 INJECTION, POWDER, FOR SOLUTION INTRAVENOUS ONCE
Refills: 0 | Status: COMPLETED | OUTPATIENT
Start: 2024-08-14 | End: 2024-08-14

## 2024-08-14 RX ORDER — ACETAMINOPHEN 325 MG/1
650 TABLET ORAL EVERY 6 HOURS
Refills: 0 | Status: DISCONTINUED | OUTPATIENT
Start: 2024-08-14 | End: 2024-08-21

## 2024-08-14 RX ORDER — ASCORBIC ACID/ASCORBATE SODIUM 500 MG
500 TABLET,CHEWABLE ORAL DAILY
Refills: 0 | Status: DISCONTINUED | OUTPATIENT
Start: 2024-08-14 | End: 2024-08-21

## 2024-08-14 RX ORDER — SODIUM CHLORIDE 9 MG/ML
1000 INJECTION INTRAMUSCULAR; INTRAVENOUS; SUBCUTANEOUS
Refills: 0 | Status: DISCONTINUED | OUTPATIENT
Start: 2024-08-14 | End: 2024-08-16

## 2024-08-14 RX ORDER — ACETAMINOPHEN 325 MG/1
1000 TABLET ORAL ONCE
Refills: 0 | Status: COMPLETED | OUTPATIENT
Start: 2024-08-14 | End: 2024-08-14

## 2024-08-14 RX ORDER — DOXYCYCLINE MONOHYDRATE 100 MG
100 TABLET ORAL ONCE
Refills: 0 | Status: COMPLETED | OUTPATIENT
Start: 2024-08-14 | End: 2024-08-14

## 2024-08-14 RX ORDER — LINAGLIPTIN 5 MG/1
5 TABLET, FILM COATED ORAL DAILY
Refills: 0 | Status: DISCONTINUED | OUTPATIENT
Start: 2024-08-14 | End: 2024-08-21

## 2024-08-14 RX ORDER — MAGNESIUM, ALUMINUM HYDROXIDE 200-225/5
30 SUSPENSION, ORAL (FINAL DOSE FORM) ORAL EVERY 4 HOURS
Refills: 0 | Status: DISCONTINUED | OUTPATIENT
Start: 2024-08-14 | End: 2024-08-21

## 2024-08-14 RX ORDER — DOXYCYCLINE MONOHYDRATE 100 MG
100 TABLET ORAL EVERY 12 HOURS
Refills: 0 | Status: DISCONTINUED | OUTPATIENT
Start: 2024-08-15 | End: 2024-08-19

## 2024-08-14 RX ORDER — DEXTROSE 15 G/33 G
12.5 GEL IN PACKET (GRAM) ORAL ONCE
Refills: 0 | Status: DISCONTINUED | OUTPATIENT
Start: 2024-08-14 | End: 2024-08-21

## 2024-08-14 RX ORDER — ONDANSETRON 2 MG/ML
4 INJECTION, SOLUTION INTRAMUSCULAR; INTRAVENOUS EVERY 8 HOURS
Refills: 0 | Status: DISCONTINUED | OUTPATIENT
Start: 2024-08-14 | End: 2024-08-21

## 2024-08-14 RX ADMIN — ACETAMINOPHEN 400 MILLIGRAM(S): 325 TABLET ORAL at 18:55

## 2024-08-14 RX ADMIN — SODIUM CHLORIDE 1750 MILLILITER(S): 9 INJECTION INTRAMUSCULAR; INTRAVENOUS; SUBCUTANEOUS at 19:03

## 2024-08-14 RX ADMIN — Medication 110 MILLIGRAM(S): at 19:36

## 2024-08-14 RX ADMIN — PIPERACILLIN SODIUM AND TAZOBACTAM SODIUM 200 GRAM(S): 3; .375 INJECTION, POWDER, FOR SOLUTION INTRAVENOUS at 18:47

## 2024-08-14 NOTE — H&P ADULT - ASSESSMENT
The patient is a 73y old moderately obese Female with significant PMH of hypertension, colon and lung cancer, diabetes-2, chronic thrombocytopenia, chronic metabolic acidosis without AG, chronic B/L LE lymphedema, recent hospitalization for legs cellulitis presented to the ED with c/o increased right lower leg pain over the past couple days.  Patient says that she was doing pretty well, but then started feeling increased pain and generalized weakness for last couple days, and had difficulty getting out of bed. She says that she walks with help of a cane and walker.  She has HHA who comes for her leg/foot dressing every other days. She also feels cold and chills but denies fever.  She also denies chest pain, sob, cough, abdominal pain, diarrhea or dysuria. She also denies any h/o DVT or PE.  Denies smoking, alcohol or substance abuse.       # Sepsis due to B/L LE cellulitis and UTI.  -Leukocytosis of 17.86k with left shift of 93% and elevated lactate level.  -Admit to medical floor.  -Maintenance IV fluids.  -Serial lactate levels.  -Follow blood and urine cultures.  -Follow CXR report.  -Continue Zosyn and Doxy IV empirically for now.  -Tylenol prn.  -ID consult request placed.    # Uncontrolled DM-2 with hyperglycemia.  -.   -Will check A1c level.  -ISS with coverage.  -On Januvia.  -Accu-checks q AC & HS.    # Chronic thrombocytopenia and metabolic acidosis without AG.  -Platelets 100k, Bicarb 19 and AG 6.  -Continue to monitor.    # DVT prophylaxis: Lovenox sq daily.

## 2024-08-14 NOTE — ED PROVIDER NOTE - CLINICAL SUMMARY MEDICAL DECISION MAKING FREE TEXT BOX
Ddx: Cellulitis/ RO dvt/ no evidence of crepitus to suggest nec fasc/ weakness, ro sepsis  Plan: Cbc, cmp, lactatel blood cx, cxr, xray tib/fib, US, likely admit

## 2024-08-14 NOTE — PATIENT PROFILE ADULT - VISION (WITH CORRECTIVE LENSES IF THE PATIENT USUALLY WEARS THEM):
Unknown
Partially impaired: cannot see medication labels or newsprint, but can see obstacles in path, and the surrounding layout; can count fingers at arm's length

## 2024-08-14 NOTE — H&P ADULT - NSHPLABSRESULTS_GEN_ALL_CORE
LABS:                        12.3   17.86 )-----------( 100      ( 14 Aug 2024 18:01 )             36.6     08-14    133<L>  |  108  |  18  ----------------------------<  171<H>  5.1   |  19<L>  |  1.22    Ca    9.0      14 Aug 2024 19:12    TPro  6.3  /  Alb  2.3<L>  /  TBili  1.2  /  DBili  x   /  AST  37  /  ALT  22  /  AlkPhos  88  08-14    PT/INR - ( 14 Aug 2024 19:12 )   PT: 13.9 sec;   INR: 1.24 ratio         PTT - ( 14 Aug 2024 19:12 )  PTT:35.1 sec

## 2024-08-14 NOTE — PATIENT PROFILE ADULT - FUNCTIONAL ASSESSMENT - BASIC MOBILITY 2.
[facility]                                         January 14, 2018                   Chief Complaint  2 y.o. male with Fever    History of Present Illness  Krishan Jeffery presents to the emergency room with nasal congestion today  Patient has subjective fever per mother at bedside, no fever in the ER this morning  Patient on exam has clear nasal drainage with nasal mucosa erythema noted now  Patient has clear lung sounds in all fields bilaterally no wheezing reported today  No nausea vomiting or diarrhea, does not look toxic or dehydrated on evaluation    The history is provided by mom  No past medical history on file.  No past surgical history on file. :  No Known Allergies   No family history on file.    Review of Systems and Physical Exam     Review of Systems  -- Constitution - no fever, denies fatigue, no weakness, no chills  -- Eyes - no tearing or redness, no visual disturbance  -- Ear, Nose - sneezing, nasal congestion and clear discharge   -- Mouth,Throat - no sore throat, no toothache, normal voice, normal swallowing  -- Respiratory - cough and congestion, no shortness of breath, no FAUSTIN  -- Cardiovascular - denies chest pain, no palpitations, denies claudication  -- Gastrointestinal - denies abdominal pain, nausea, vomiting, or diarrhea  -- Musculoskeletal - denies back pain, negative for myalgias and arthralgias   -- Neurological - no headache, denies weakness or seizure; no LOC  -- Skin - denies pallor, rash, or changes in skin. no hives or welts noted    Vital Signs  -- His temperature is 98.9 °F (37.2 °C).   -- His pulse is 151   -- His respiration is 20 and oxygen saturation is 97%.      Physical Exam  -- Nursing note and vitals reviewed  -- Constitutional: Appears well-developed and well-nourished  -- Head: Atraumatic. Normocephalic. No obvious abnormality  -- Eyes: Pupils are equal and reactive to light. Normal conjunctiva and lids  -- Nose: nasal mucosa erythema and edema;  clear nasal discharge noted   -- Throat: Mucous membranes moist, pharynx normal, normal tonsils. No lesions   -- Ears: External ears and TM normal bilaterally. Normal hearing and no drainage  -- Neck: Normal range of motion. Neck supple. No masses, trachea midline  -- Cardiac: Normal rate, regular rhythm and normal heart sounds  -- Pulmonary: Normal respiratory effort, breath sounds clear to auscultation  -- Abdominal: Soft, no tenderness. Normal bowel sounds. Normal liver edge  -- Musculoskeletal: Normal range of motion, no effusions. Joints stable   -- Neurological: No focal deficits. Showed good interaction with staff  -- Vascular: Posterior tibial, dorsalis pedis and radial pulses 2+ bilaterally      Emergency Room Course     Treatment and Evaluation  -- Chest x-ray showed no infiltrate and showed no acute pathology  -- The influenza screen was negative  -- The strep screen was negative    Medications Given  -- IM 10 mg Solumedrol given today in the ER    Diagnosis  -- The encounter diagnosis was Acute nasopharyngitis.    Disposition and Plan  -- Disposition: home  -- Condition: stable  -- Follow-up: Parents to follow up with Keara Arceo MD in 1-2 days.  -- I advised the parent(s) that we have found no life threatening condition today  -- At this time, I believe the patient is clinically stable for discharge.   -- The parent(s) acknowledges that close follow up with a MD is required after all ER visits  -- The parent(s) agrees to comply with all instruction and direction given in the ER  -- The parent(s) agrees to return to ER if any symptoms reoccur     This note is dictated on Dragon Natural Speaking word recognition program.  There are word recognition mistakes that are occasionally missed on review.           Ron Flores MD  01/14/18 0957     3 = A little assistance

## 2024-08-14 NOTE — H&P ADULT - HISTORY OF PRESENT ILLNESS
Chief Complaint: weakness.    · Chief Complaint: The patient is a 73y Female complaining of weakness.  · HPI Objective Statement: Patient is a 73-year-old lady with a past medical history of hypertension, colon cancer, lung cancer, diabetes, liver edema with recent cellulitis who presents to the ED with increased right lower leg pain over the past couple days.  Patient was admitted to the hospital, finished her course of antibiotics and was discharged back home.  Was doing pretty well, but then started feeling increased pain and weakness in the last day or so.  Has had difficulty getting out of bed.  Has had chills, no measured fever.  No cough, no chest pain, no shortness of breath.  No history of DVT/PE.  Had her legs wrapped every 2 days.   Chief Complaint: weakness.    The patient is a 73y old moderately obese Female with significant PMH of hypertension, colon and lung cancer, diabetes-2, chronic thrombocytopenia, chronic metabolic acidosis without AG, chronic B/L LE lymphedema, recent hospitalization for legs cellulitis presented to the ED with c/o increased right lower leg pain over the past couple days.  Patient says that she was doing pretty well, but then started feeling increased pain and generalized weakness for last couple days, and had difficulty getting out of bed. She says that she walks with help of a cane and walker.  She has HHA who comes for her leg/foot dressing every other days. She also feels cold and chills but denies fever.  She also denies chest pain, sob, cough, abdominal pain, diarrhea or dysuria. She also denies any h/o DVT or PE.  Denies smoking, alcohol or substance abuse.   Refused US venous duplex study in ED.    Sig labs:  WBC 17.86k with N 93%, Lactate elevated 3.2->3.9, Platelets 100k, Hb 12.3, Bicarb 19, AG 6, , LFTs wnl.  UA consistent with UTI showing LE moderate, Nitrite positive, Bacteria many.  RVP negative.    CXR: shows haziness/opacity in LLL, but official report pending.  XR right tibia-fibula: shows soft tissue edema, but negative for any fracture or dislocation.    Zosyn and Doxy IV and NS 30 cc/kg BW bolus given in ED after blood culture draw.

## 2024-08-14 NOTE — ED ADULT NURSE REASSESSMENT NOTE - NS ED NURSE REASSESS COMMENT FT1
handoff report given to night given to night nurse at bedside no distress noted
Resumed care from Mani LU. pt awake, speaking in full and complete sentences without difficulty. denies pain, no new complaints.

## 2024-08-14 NOTE — H&P ADULT - NSICDXPASTMEDICALHX_GEN_ALL_CORE_FT
PAST MEDICAL HISTORY:  Colon cancer     Hypertension     Lung cancer     Lymphatic edema     Type 2 diabetes mellitus      PAST MEDICAL HISTORY:  Acquired thrombocytopenia     Colon cancer     H/O metabolic acidosis with normal anion gap     Hypertension     Lung cancer     Lymphatic edema     Moderate obesity     Type 2 diabetes mellitus

## 2024-08-14 NOTE — PATIENT PROFILE ADULT - FUNCTIONAL ASSESSMENT - BASIC MOBILITY 6.
3-calculated by average/Not able to assess (calculate score using [facility] averaging method)

## 2024-08-14 NOTE — ED ADULT TRIAGE NOTE - CHIEF COMPLAINT QUOTE
patient brought in by EMS from home c/o weakness, bilateral lower leg pain.  patient had legs wrapped yesterday, hx lymphedema.  reports waking up today feeling weak, endorsing chills.  walks with walker at baseline.

## 2024-08-14 NOTE — ED PROVIDER NOTE - SKIN COLOR
Bilateral lymphedemia with elephantiasis, erythema and tenderness more pronounced on RLE. No crepitus. Malodorous., Dressings clean.

## 2024-08-14 NOTE — ED ADULT NURSE NOTE - NSHOSCREENINGQ1_ED_ALL_ED
Ok to try chlorzoxazone 250mg bid  
PCP: Dr. Jose Hsu      Onset: 12/24/22  Location / description: Muscle spasm / started to close my throat   Precipitating Factors: History of Parkinson's Disease   Pain Scale (1-10), 10 highest: 8/10  Associated Symptoms:   Hard time swallowing , spasm are in neck and throat   What improves / worsens symptoms: Worsening Muscle spasm becoming more pain   Symptom specific medications:  Possible missing medication   LMP : No LMP recorded (lmp unknown). Patient has had a hysterectomy.  Are you pregnant or breast feeding: n/a   Recent visits (last 3-4 weeks) for same reason or recent surgery:  Yes. No.     PLAN:   Directed to Emergency Department    Patient/Caller refuses to follow recommendations.    Reason for Disposition  • Dark (cola colored) or red-colored urine    Protocols used: MUSCLE ACHES AND BODY PAIN-A-AH      
Patient states she had an episode on Saturday where she had a muscle spasm in her throat.  States she couldn't swallow for 2-3 minutes and had a hard time breathing because she was panicking.  She has never had an episode like this.  Spasms are usually in her extremities.  No further episodes.  She did not go to the ER.    Also states she is pretty sick right now with fever, cough, and shortness of breath which started yesterday.  She has not been evaluated and advised ER if she was having shortness of breath.     Patient states she was never able to start the new medication that was prescribed at last office visit because pharmacy did not have this.      Called pharmacy and was informed that Metaxalone is not on patient's insurance formulary.  Preferred medication would be tizanidine or chlorzoxazone 500 mg.  Looks like patient may have been on Tizanidine a few years ago.    Please advise.  
Prescription sent to the pharmacy.    Called patient and left message to return call.  
Regarding: difficulty breathing, difficulty swallowing, and has muscle spasm  ----- Message from Emily Martínez sent at 12/27/2022  8:48 AM CST -----  Patient Name: Maryanne Browning    Specialist or PCP Name: Cesar Varela MD    Symptoms:  difficulty breathing, difficulty swallowing, and has muscle spasm    Pregnant (females aged 13-60. If Yes, how long?) : no    Call Back # : [phone]    Which State are you currently located in?: [place]    Name of Clinic Site / Acct# : [id number]     Use following scripting for patients waiting for a callback:   \"Nurse callback times vary based on call volumes; please be aware the return phone call may come from an unidentified phone number. If your symptoms worsen or become life threatening while waiting, you should seek immediate assistance by calling 911 or going to the ER for evaluation.\"    
Spoke with patient about trying chlorzoxazone 250 mg twice daily.  Patient agreeable.  Advised ER if she were to have any further episodes where she couldn't swallow or breathe.    
No

## 2024-08-14 NOTE — PATIENT PROFILE ADULT - FALL HARM RISK - HARM RISK INTERVENTIONS

## 2024-08-14 NOTE — ED PROVIDER NOTE - OBJECTIVE STATEMENT
Patient is a 73-year-old lady with a past medical history of hypertension, colon cancer, lung cancer, diabetes, liver edema with recent cellulitis who presents to the ED with increased right lower leg pain over the past couple days.  Patient was admitted to the hospital, finished her course of antibiotics and was discharged back home.  Was doing pretty well, but then started feeling increased pain and weakness in the last day or so.  Has had difficulty getting out of bed.  Has had chills, no measured fever.  No cough, no chest pain, no shortness of breath.  No history of DVT/PE.  Had her legs wrapped every 2 days.

## 2024-08-14 NOTE — PHARMACOTHERAPY INTERVENTION NOTE - COMMENTS
Medication reconciliation completed.  Reviewed Medication list and confirmed med allergies with patient; confirmed with Dr. First Medleroy.

## 2024-08-14 NOTE — H&P ADULT - NSHPPHYSICALEXAM_GEN_ALL_CORE
PHYSICAL EXAM:  GENERAL: NAD, lying in bed comfortably  HEAD:  Atraumatic, Normocephalic  EYES: EOMI, PERRLA, conjunctiva and sclera clear  ENT: Moist mucous membranes  NECK: Supple, No JVD  CHEST/LUNG: Clear to auscultation bilaterally; No rales, rhonchi, wheezing, or rubs. Unlabored respirations  HEART: Regular rate and rhythm; No murmurs, rubs, or gallops  ABDOMEN: Bowel sounds present; Soft, Nontender, Nondistended. No hepatomegaly  EXTREMITIES:  2+ Peripheral Pulses, brisk capillary refill. No clubbing, cyanosis.  Chronic lymphedema B/L LE.  NERVOUS SYSTEM:  Alert & Oriented X3, speech clear. No deficits   MSK: FROM all 4 extremities, full and equal strength. Foot dressing and creppe bandage in place.  SKIN: No rashes or lesions

## 2024-08-14 NOTE — ED ADULT NURSE NOTE - NSFALLRISKINTERV_ED_ALL_ED

## 2024-08-14 NOTE — ED ADULT NURSE NOTE - OBJECTIVE STATEMENT
patient brought in by EMS from home c/o weakness, bilateral lower leg pain.  patient had legs wrapped yesterday, hx lymphedema.  reports waking up today feeling weak, endorsing chills.  walks with walker at baseline. no other complaints at this time. MD at bedside 154.94

## 2024-08-15 LAB
A1C WITH ESTIMATED AVERAGE GLUCOSE RESULT: 5.5 % — SIGNIFICANT CHANGE UP (ref 4–5.6)
ANION GAP SERPL CALC-SCNC: 4 MMOL/L — LOW (ref 5–17)
BUN SERPL-MCNC: 21 MG/DL — SIGNIFICANT CHANGE UP (ref 7–23)
CALCIUM SERPL-MCNC: 8.5 MG/DL — SIGNIFICANT CHANGE UP (ref 8.5–10.1)
CHLORIDE SERPL-SCNC: 111 MMOL/L — HIGH (ref 96–108)
CO2 SERPL-SCNC: 19 MMOL/L — LOW (ref 22–31)
CREAT SERPL-MCNC: 1.01 MG/DL — SIGNIFICANT CHANGE UP (ref 0.5–1.3)
EGFR: 59 ML/MIN/1.73M2 — LOW
ESTIMATED AVERAGE GLUCOSE: 111 MG/DL — SIGNIFICANT CHANGE UP (ref 68–114)
GLUCOSE BLDC GLUCOMTR-MCNC: 115 MG/DL — HIGH (ref 70–99)
GLUCOSE BLDC GLUCOMTR-MCNC: 121 MG/DL — HIGH (ref 70–99)
GLUCOSE BLDC GLUCOMTR-MCNC: 132 MG/DL — HIGH (ref 70–99)
GLUCOSE BLDC GLUCOMTR-MCNC: 139 MG/DL — HIGH (ref 70–99)
GLUCOSE BLDC GLUCOMTR-MCNC: 169 MG/DL — HIGH (ref 70–99)
GLUCOSE SERPL-MCNC: 132 MG/DL — HIGH (ref 70–99)
HCT VFR BLD CALC: 30.2 % — LOW (ref 34.5–45)
HGB BLD-MCNC: 10.2 G/DL — LOW (ref 11.5–15.5)
MCHC RBC-ENTMCNC: 28.5 PG — SIGNIFICANT CHANGE UP (ref 27–34)
MCHC RBC-ENTMCNC: 33.8 GM/DL — SIGNIFICANT CHANGE UP (ref 32–36)
MCV RBC AUTO: 84.4 FL — SIGNIFICANT CHANGE UP (ref 80–100)
PLATELET # BLD AUTO: 73 K/UL — LOW (ref 150–400)
POTASSIUM SERPL-MCNC: 4.6 MMOL/L — SIGNIFICANT CHANGE UP (ref 3.5–5.3)
POTASSIUM SERPL-SCNC: 4.6 MMOL/L — SIGNIFICANT CHANGE UP (ref 3.5–5.3)
RBC # BLD: 3.58 M/UL — LOW (ref 3.8–5.2)
RBC # FLD: 15.9 % — HIGH (ref 10.3–14.5)
SODIUM SERPL-SCNC: 134 MMOL/L — LOW (ref 135–145)
WBC # BLD: 12.64 K/UL — HIGH (ref 3.8–10.5)
WBC # FLD AUTO: 12.64 K/UL — HIGH (ref 3.8–10.5)

## 2024-08-15 PROCEDURE — 99233 SBSQ HOSP IP/OBS HIGH 50: CPT

## 2024-08-15 RX ORDER — ACETIC ACID
1 LIQUID (ML) MISCELLANEOUS DAILY
Refills: 0 | Status: DISCONTINUED | OUTPATIENT
Start: 2024-08-15 | End: 2024-08-21

## 2024-08-15 RX ORDER — MUPIROCIN 2 %
1 OINTMENT (GRAM) TOPICAL EVERY 12 HOURS
Refills: 0 | Status: DISCONTINUED | OUTPATIENT
Start: 2024-08-15 | End: 2024-08-21

## 2024-08-15 RX ORDER — PIPERACILLIN SODIUM AND TAZOBACTAM SODIUM 3; .375 G/15ML; G/15ML
3.38 INJECTION, POWDER, FOR SOLUTION INTRAVENOUS EVERY 8 HOURS
Refills: 0 | Status: DISCONTINUED | OUTPATIENT
Start: 2024-08-15 | End: 2024-08-21

## 2024-08-15 RX ORDER — SENNA 187 MG
2 TABLET ORAL AT BEDTIME
Refills: 0 | Status: DISCONTINUED | OUTPATIENT
Start: 2024-08-15 | End: 2024-08-21

## 2024-08-15 RX ORDER — IBUPROFEN 600 MG
600 TABLET ORAL EVERY 6 HOURS
Refills: 0 | Status: DISCONTINUED | OUTPATIENT
Start: 2024-08-15 | End: 2024-08-21

## 2024-08-15 RX ADMIN — SODIUM CHLORIDE 75 MILLILITER(S): 9 INJECTION INTRAMUSCULAR; INTRAVENOUS; SUBCUTANEOUS at 01:34

## 2024-08-15 RX ADMIN — Medication 1 APPLICATION(S): at 20:03

## 2024-08-15 RX ADMIN — Medication 110 MILLIGRAM(S): at 20:04

## 2024-08-15 RX ADMIN — Medication 600 MILLIGRAM(S): at 20:04

## 2024-08-15 RX ADMIN — Medication 500 MILLIGRAM(S): at 09:32

## 2024-08-15 RX ADMIN — ACETAMINOPHEN 650 MILLIGRAM(S): 325 TABLET ORAL at 09:31

## 2024-08-15 RX ADMIN — ENOXAPARIN SODIUM 40 MILLIGRAM(S): 100 INJECTION SUBCUTANEOUS at 09:33

## 2024-08-15 RX ADMIN — Medication 110 MILLIGRAM(S): at 09:32

## 2024-08-15 RX ADMIN — PIPERACILLIN SODIUM AND TAZOBACTAM SODIUM 25 GRAM(S): 3; .375 INJECTION, POWDER, FOR SOLUTION INTRAVENOUS at 01:35

## 2024-08-15 RX ADMIN — PIPERACILLIN SODIUM AND TAZOBACTAM SODIUM 25 GRAM(S): 3; .375 INJECTION, POWDER, FOR SOLUTION INTRAVENOUS at 09:33

## 2024-08-15 RX ADMIN — PIPERACILLIN SODIUM AND TAZOBACTAM SODIUM 25 GRAM(S): 3; .375 INJECTION, POWDER, FOR SOLUTION INTRAVENOUS at 17:40

## 2024-08-15 RX ADMIN — LINAGLIPTIN 5 MILLIGRAM(S): 5 TABLET, FILM COATED ORAL at 09:33

## 2024-08-15 RX ADMIN — SODIUM CHLORIDE 75 MILLILITER(S): 9 INJECTION INTRAMUSCULAR; INTRAVENOUS; SUBCUTANEOUS at 21:29

## 2024-08-15 RX ADMIN — PIPERACILLIN SODIUM AND TAZOBACTAM SODIUM 25 GRAM(S): 3; .375 INJECTION, POWDER, FOR SOLUTION INTRAVENOUS at 21:25

## 2024-08-15 RX ADMIN — Medication 600 MILLIGRAM(S): at 21:24

## 2024-08-15 RX ADMIN — Medication 2 TABLET(S): at 20:03

## 2024-08-15 RX ADMIN — Medication 1 TABLET(S): at 09:32

## 2024-08-15 NOTE — CONSULT NOTE ADULT - ASSESSMENT
74 y/o moderately obese Female with h/o hypertension, colon and lung cancer, diabetes mellitus -2, chronic thrombocytopenia, chronic metabolic acidosis without AG, chronic B/L LE lymphedema, recent hospitalization for legs cellulitis was admitted on 8/14 for increased right lower leg pain over the past couple days. Patient says that she was doing pretty well, but then started feeling increased pain and generalized weakness for last couple days, and had difficulty getting out of bed. She says that she walks with help of a cane and walker. She has HHA who comes for her leg/foot dressing every other days. She also feels cold and chills but denies fever. In ER she was noted febrile and received zosyn and doxycycline.    1. Febrile syndrome. Probable sepsis. Pyuria. Probable UTI. RLE cellulitis. Allergy to cephalexin.  -leukocytosis  -obtain BC x 2, urine c/s         72 y/o moderately obese Female with h/o hypertension, colon and lung cancer, diabetes mellitus -2, chronic thrombocytopenia, chronic metabolic acidosis without AG, chronic B/L LE lymphedema, recent hospitalization for legs cellulitis was admitted on 8/14 for increased right lower leg pain over the past couple days. Patient says that she was doing pretty well, but then started feeling increased pain and generalized weakness for last couple days, and had difficulty getting out of bed. She says that she walks with help of a cane and walker. She has HHA who comes for her leg/foot dressing every other days. She also feels cold and chills but denies fever. In ER she was noted febrile and received zosyn and doxycycline.    1. Febrile syndrome. Probable sepsis. RLE extensive cellulitis. B/l legs lymphedema and stasis dermatitis. Pyuria. Probable UTI. Allergy to cephalexin.  -leukocytosis  -obtain BC x 2, urine c/s  -start zosyn 3.375 gm IV q8h and doxycycline 100 mg PO q12h  -reason for abx use and side effects reviewed with patient; monitor BMP   -elevate legs  -local wound care  -podiatry/ wound care evaluation  -old chart reviewed to assess prior cultures  -monitor temps  -f/u CBC  -supportive care  2. Other issues:   -care per medicine    Clinical team may change from intravenous to oral antibiotics when the following criteria are met:   1. Patient is clinically improving/stable       a)	Improved signs and symptoms of infection from initial presentation       b)	Afebrile for 24 hours       c)	Leukocytosis trending towards normal range   2. Patient is tolerating oral intake   3. Initial/repeat blood cultures are negative    When above criteria met may change iv antibiotics to an oral agent   Cannot advise changing to oral antibiotic therapy until culture sensitivity is available.

## 2024-08-15 NOTE — CONSULT NOTE ADULT - SUBJECTIVE AND OBJECTIVE BOX
Patient is a 73y old  Female who presents with a chief complaint of Sepsis due to legs cellulitis and UTI    HPI:  74 y/o moderately obese Female with h/o hypertension, colon and lung cancer, diabetes mellitus -2, chronic thrombocytopenia, chronic metabolic acidosis without AG, chronic B/L LE lymphedema, recent hospitalization for legs cellulitis was admitted on 8/14 for increased right lower leg pain over the past couple days. Patient says that she was doing pretty well, but then started feeling increased pain and generalized weakness for last couple days, and had difficulty getting out of bed. She says that she walks with help of a cane and walker. She has HHA who comes for her leg/foot dressing every other days. She also feels cold and chills but denies fever. In ER she received zosyn and doxycycline.    PMH: as above  PSH: as above  Meds: per reconciliation sheet, noted below  MEDICATIONS  (STANDING):  ascorbic acid 500 milliGRAM(s) Oral daily  dextrose 5%. 1000 milliLiter(s) (50 mL/Hr) IV Continuous <Continuous>  dextrose 5%. 1000 milliLiter(s) (100 mL/Hr) IV Continuous <Continuous>  dextrose 50% Injectable 25 Gram(s) IV Push once  dextrose 50% Injectable 25 Gram(s) IV Push once  dextrose 50% Injectable 12.5 Gram(s) IV Push once  doxycycline IVPB 100 milliGRAM(s) IV Intermittent every 12 hours  enoxaparin Injectable 40 milliGRAM(s) SubCutaneous every 24 hours  glucagon  Injectable 1 milliGRAM(s) IntraMuscular once  insulin lispro (ADMELOG) corrective regimen sliding scale   SubCutaneous three times a day before meals  insulin lispro (ADMELOG) corrective regimen sliding scale   SubCutaneous at bedtime  linagliptin 5 milliGRAM(s) Oral daily  multivitamin 1 Tablet(s) Oral daily  piperacillin/tazobactam IVPB.. 3.375 Gram(s) IV Intermittent every 8 hours  sodium chloride 0.9%. 1000 milliLiter(s) (75 mL/Hr) IV Continuous <Continuous>    MEDICATIONS  (PRN):  acetaminophen     Tablet .. 650 milliGRAM(s) Oral every 6 hours PRN Temp greater or equal to 38C (100.4F), Mild Pain (1 - 3)  aluminum hydroxide/magnesium hydroxide/simethicone Suspension 30 milliLiter(s) Oral every 4 hours PRN Dyspepsia  dextrose Oral Gel 15 Gram(s) Oral once PRN Blood Glucose LESS THAN 70 milliGRAM(s)/deciliter  melatonin 3 milliGRAM(s) Oral at bedtime PRN Insomnia  ondansetron Injectable 4 milliGRAM(s) IV Push every 8 hours PRN Nausea and/or Vomiting    Allergies    cephalexin (Unknown)  Keflex (Anaphylaxis)    Intolerances      Social: no smoking, no alcohol, no illegal drugs; no recent travel, no exposure to TB  FAMILY HISTORY:  No pertinent family history in first degree relatives    ROS: the patient denies HA, no seizures, no dizziness, no sore throat, no nasal congestion, no blurry vision, no CP, no palpitations, no SOB, no cough, no abdominal pain, no diarrhea, no N/V, has dysuria, no leg pain, no claudication, has RLE rash, no joint aches, no rectal pain or bleeding, no night sweats, has increased weakness  All other systems reviewed and are negative    Vital Signs Last 24 Hrs  T(C): 36.8 (14 Aug 2024 23:23), Max: 39.4 (14 Aug 2024 18:32)  T(F): 98.2 (14 Aug 2024 23:23), Max: 103 (14 Aug 2024 18:32)  HR: 69 (14 Aug 2024 23:23) (69 - 103)  BP: 105/45 (14 Aug 2024 23:23) (105/45 - 144/55)  BP(mean): 64 (14 Aug 2024 21:00) (58 - 76)  RR: 18 (14 Aug 2024 23:23) (15 - 22)  SpO2: 100% (14 Aug 2024 23:23) (95% - 100%)    Parameters below as of 14 Aug 2024 23:23  Patient On (Oxygen Delivery Method): room air      Daily Height in cm: 165.1 (14 Aug 2024 17:14)    Daily     PE:    Constitutional:  No acute distress  HEENT: NC/AT, EOMI, PERRLA, conjunctivae clear; ears and nose atraumatic; pharynx benign  Neck: supple; thyroid not palpable  Back: no tenderness  Respiratory: respiratory effort normal; decreased BS at bases  Cardiovascular: S1S2 regular, no murmurs  Abdomen: soft, not tender, not distended, positive BS; no liver or spleen organomegaly  Genitourinary: no suprapubic tenderness  Lymphatic: no LN palpable  Musculoskeletal: no muscle tenderness, no joint swelling or tenderness  Extremities: 1-2+ pedal edema  Neurological/ Psychiatric: AxOx3, judgement and insight normal; moving all extremities  Skin: no rashes; no palpable lesions    Labs: all available labs reviewed                        10.2   12.64 )-----------( x        ( 15 Aug 2024 07:55 )             30.2     08-15    134<L>  |  111<H>  |  21  ----------------------------<  132<H>  4.6   |  19<L>  |  1.01    Ca    8.5      15 Aug 2024 07:55    TPro  6.3  /  Alb  2.3<L>  /  TBili  1.2  /  DBili  x   /  AST  37  /  ALT  22  /  AlkPhos  88  08-14     LIVER FUNCTIONS - ( 14 Aug 2024 19:12 )  Alb: 2.3 g/dL / Pro: 6.3 gm/dL / ALK PHOS: 88 U/L / ALT: 22 U/L / AST: 37 U/L / GGT: x           Urinalysis with Rflx Culture (08-14 @ 18:01)  Urine Appearance: Clear  Protein, Urine: Trace mg/dL  Urine Microscopic-Add On (NC) (08-14 @ 18:01)  White Blood Cell - Urine: 18 /HPF  Red Blood Cell - Urine: 4 /HPF    Urinalysis with Rflx Culture (collected 14 Aug 2024 18:01)    Radiology: all available radiological tests reviewed    Advanced directives addressed: full resuscitation Patient is a 73y old  Female who presents with a chief complaint of Sepsis due to legs cellulitis and UTI    HPI:  74 y/o moderately obese Female with h/o hypertension, colon and lung cancer, diabetes mellitus -2, chronic thrombocytopenia, chronic metabolic acidosis without AG, chronic B/L LE lymphedema, recent hospitalization for legs cellulitis was admitted on 8/14 for increased right lower leg pain over the past couple days. Patient says that she was doing pretty well, but then started feeling increased pain and generalized weakness for last couple days, and had difficulty getting out of bed. She says that she walks with help of a cane and walker. She has HHA who comes for her leg/foot dressing every other days. She also feels cold and chills but denies fever. In ER she received zosyn and doxycycline.    PMH: as above  PSH: as above  Meds: per reconciliation sheet, noted below  MEDICATIONS  (STANDING):  ascorbic acid 500 milliGRAM(s) Oral daily  dextrose 5%. 1000 milliLiter(s) (50 mL/Hr) IV Continuous <Continuous>  dextrose 5%. 1000 milliLiter(s) (100 mL/Hr) IV Continuous <Continuous>  dextrose 50% Injectable 25 Gram(s) IV Push once  dextrose 50% Injectable 25 Gram(s) IV Push once  dextrose 50% Injectable 12.5 Gram(s) IV Push once  doxycycline IVPB 100 milliGRAM(s) IV Intermittent every 12 hours  enoxaparin Injectable 40 milliGRAM(s) SubCutaneous every 24 hours  glucagon  Injectable 1 milliGRAM(s) IntraMuscular once  insulin lispro (ADMELOG) corrective regimen sliding scale   SubCutaneous three times a day before meals  insulin lispro (ADMELOG) corrective regimen sliding scale   SubCutaneous at bedtime  linagliptin 5 milliGRAM(s) Oral daily  multivitamin 1 Tablet(s) Oral daily  piperacillin/tazobactam IVPB.. 3.375 Gram(s) IV Intermittent every 8 hours  sodium chloride 0.9%. 1000 milliLiter(s) (75 mL/Hr) IV Continuous <Continuous>    MEDICATIONS  (PRN):  acetaminophen     Tablet .. 650 milliGRAM(s) Oral every 6 hours PRN Temp greater or equal to 38C (100.4F), Mild Pain (1 - 3)  aluminum hydroxide/magnesium hydroxide/simethicone Suspension 30 milliLiter(s) Oral every 4 hours PRN Dyspepsia  dextrose Oral Gel 15 Gram(s) Oral once PRN Blood Glucose LESS THAN 70 milliGRAM(s)/deciliter  melatonin 3 milliGRAM(s) Oral at bedtime PRN Insomnia  ondansetron Injectable 4 milliGRAM(s) IV Push every 8 hours PRN Nausea and/or Vomiting    Allergies    cephalexin (Unknown)  Keflex (Anaphylaxis)    Intolerances      Social: no smoking, no alcohol, no illegal drugs; no recent travel, no exposure to TB  FAMILY HISTORY:  No pertinent family history in first degree relatives    ROS: the patient denies HA, no seizures, no dizziness, no sore throat, no nasal congestion, no blurry vision, no CP, no palpitations, no SOB, no cough, no abdominal pain, no diarrhea, no N/V, has dysuria, no leg pain, no claudication, has RLE rash, no joint aches, no rectal pain or bleeding, no night sweats, has increased weakness  All other systems reviewed and are negative    Vital Signs Last 24 Hrs  T(C): 36.8 (14 Aug 2024 23:23), Max: 39.4 (14 Aug 2024 18:32)  T(F): 98.2 (14 Aug 2024 23:23), Max: 103 (14 Aug 2024 18:32)  HR: 69 (14 Aug 2024 23:23) (69 - 103)  BP: 105/45 (14 Aug 2024 23:23) (105/45 - 144/55)  BP(mean): 64 (14 Aug 2024 21:00) (58 - 76)  RR: 18 (14 Aug 2024 23:23) (15 - 22)  SpO2: 100% (14 Aug 2024 23:23) (95% - 100%)    Parameters below as of 14 Aug 2024 23:23  Patient On (Oxygen Delivery Method): room air      Daily Height in cm: 165.1 (14 Aug 2024 17:14)    Daily     PE:    Constitutional:  No acute distress  HEENT: NC/AT, EOMI, PERRLA, conjunctivae clear; ears and nose atraumatic; pharynx benign  Neck: supple; thyroid not palpable  Back: no tenderness  Respiratory: respiratory effort normal; decreased BS at bases  Cardiovascular: S1S2 regular, no murmurs  Abdomen: soft, not tender, not distended, positive BS; no liver or spleen organomegaly  Genitourinary: no suprapubic tenderness  Lymphatic: no LN palpable  Musculoskeletal: no muscle tenderness, no joint swelling or tenderness  Extremities: 2+ pedal edema  b/l Lower legs severe edema, erythema, warmth and tenderness; Right leg is worse with erythema extending to right thigh  Neurological/ Psychiatric: AxOx3, judgement and insight normal; moving all extremities  Skin: no rashes; no palpable lesions    Labs: all available labs reviewed                        10.2   12.64 )-----------( x        ( 15 Aug 2024 07:55 )             30.2     08-15    134<L>  |  111<H>  |  21  ----------------------------<  132<H>  4.6   |  19<L>  |  1.01    Ca    8.5      15 Aug 2024 07:55    TPro  6.3  /  Alb  2.3<L>  /  TBili  1.2  /  DBili  x   /  AST  37  /  ALT  22  /  AlkPhos  88  08-14     LIVER FUNCTIONS - ( 14 Aug 2024 19:12 )  Alb: 2.3 g/dL / Pro: 6.3 gm/dL / ALK PHOS: 88 U/L / ALT: 22 U/L / AST: 37 U/L / GGT: x           Urinalysis with Rflx Culture (08-14 @ 18:01)  Urine Appearance: Clear  Protein, Urine: Trace mg/dL  Urine Microscopic-Add On (NC) (08-14 @ 18:01)  White Blood Cell - Urine: 18 /HPF  Red Blood Cell - Urine: 4 /HPF    Urinalysis with Rflx Culture (collected 14 Aug 2024 18:01)    Radiology: all available radiological tests reviewed    Advanced directives addressed: full resuscitation

## 2024-08-15 NOTE — CONSULT NOTE ADULT - SUBJECTIVE AND OBJECTIVE BOX
Date of Consult: 8/15/2024     HPI: Podiatry was consulted for a 72 yo moderately obese Female with significant  PMHx of  hypertension, colon and lung cancer, diabetes-2, chronic thrombocytopenia, chronic metabolic acidosis without AG, chronic B/L LE lymphedema, recent hospitalization for legs cellulitis presented to the ED with complaints of  increased right lower leg pain over the past couple days.  Patient says that she was doing pretty well, but then started feeling increased pain and generalized weakness for last couple of days, and had difficulty getting out of bed. She says that she walks with help of a cane and walker. Patient has a home Help Aid (HHA) who comes for her leg/foot dressing every other days. She also feels cold and chills but denies fever.  She also denies chest pain, sob, cough, abdominal pain, diarrhea or dysuria. She also denies any h/o DVT or PE.  Denies smoking, alcohol or substance abuse.   Refused US venous duplex study in ED.        PMH: No pertinent past medical history    Lymphatic edema    Hypertension    Type 2 diabetes mellitus    Lung cancer    Colon cancer      PSH:No significant past surgical history        Allergies: Keflex (Anaphylaxis)  cephalexin (Unknown)      Labs:                          10.2   12.64 )-----------( 73       ( 15 Aug 2024 07:55 )             30.2   08-15    134<L>  |  111<H>  |  21  ----------------------------<  132<H>  4.6   |  19<L>  |  1.01    Ca    8.5      15 Aug 2024 07:55    TPro  6.3  /  Alb  2.3<L>  /  TBili  1.2  /  DBili  x   /  AST  37  /  ALT  22  /  AlkPhos  88  08-14      Vital Signs Last 24 Hrs  T(C): 36.7 (15 Aug 2024 07:48), Max: 39.4 (14 Aug 2024 18:32)  T(F): 98.1 (15 Aug 2024 07:48), Max: 103 (14 Aug 2024 18:32)  HR: 75 (15 Aug 2024 07:48) (69 - 103)  BP: 116/78 (15 Aug 2024 07:48) (105/45 - 144/55)  BP(mean): 64 (14 Aug 2024 21:00) (58 - 76)  RR: 19 (15 Aug 2024 07:48) (15 - 22)  SpO2: 100% (15 Aug 2024 07:48) (95% - 100%)    Parameters below as of 15 Aug 2024 07:48  Patient On (Oxygen Delivery Method): room air          MEDICATIONS  (STANDING):  acetic acid 0.25% Topical Irrigation 1 Application(s) Topical daily  ascorbic acid 500 milliGRAM(s) Oral daily  dextrose 5%. 1000 milliLiter(s) (100 mL/Hr) IV Continuous <Continuous>  dextrose 5%. 1000 milliLiter(s) (50 mL/Hr) IV Continuous <Continuous>  dextrose 50% Injectable 25 Gram(s) IV Push once  dextrose 50% Injectable 12.5 Gram(s) IV Push once  dextrose 50% Injectable 25 Gram(s) IV Push once  doxycycline IVPB 100 milliGRAM(s) IV Intermittent every 12 hours  enoxaparin Injectable 40 milliGRAM(s) SubCutaneous every 24 hours  glucagon  Injectable 1 milliGRAM(s) IntraMuscular once  insulin lispro (ADMELOG) corrective regimen sliding scale   SubCutaneous three times a day before meals  insulin lispro (ADMELOG) corrective regimen sliding scale   SubCutaneous at bedtime  linagliptin 5 milliGRAM(s) Oral daily  multivitamin 1 Tablet(s) Oral daily  mupirocin 2% Ointment 1 Application(s) Topical every 12 hours  piperacillin/tazobactam IVPB.. 3.375 Gram(s) IV Intermittent every 8 hours  sodium chloride 0.9%. 1000 milliLiter(s) (75 mL/Hr) IV Continuous <Continuous>    MEDICATIONS  (PRN):  acetaminophen     Tablet .. 650 milliGRAM(s) Oral every 6 hours PRN Temp greater or equal to 38C (100.4F), Mild Pain (1 - 3)  aluminum hydroxide/magnesium hydroxide/simethicone Suspension 30 milliLiter(s) Oral every 4 hours PRN Dyspepsia  dextrose Oral Gel 15 Gram(s) Oral once PRN Blood Glucose LESS THAN 70 milliGRAM(s)/deciliter  melatonin 3 milliGRAM(s) Oral at bedtime PRN Insomnia  ondansetron Injectable 4 milliGRAM(s) IV Push every 8 hours PRN Nausea and/or Vomiting        REVIEW OF SYSTEMS:    CONSTITUTIONAL: No weakness, fevers or chills  EYES: No visual changes  RESPIRATORY: No cough, wheezing; No shortness of breath  CARDIOVASCULAR: No chest pain or palpitations  GASTROINTESTINAL: No abdominal or epigastric pain. No nausea, vomiting; No diarrhea or constipation.   GENITOURINARY: No dysuria, frequency or hematuria  NEUROLOGICAL: No numbness or weakness  SKIN: See physical examination.  All other review of systems is negative unless indicated above    Physical Exam:   Derm:  Skin warm, dry and supple bilateral.    Significant generalized lichenification and verrucous changes to skin extending from below the knees to feet bilaterally, cobblestone like papules and nodules extending from below knee to feet bilaterally. Ryan interdigital maceration and malodor. Noted hemosiderin deposition to BLE. Noted erythema to the right dorsal foot    Vascular: Dorsalis Pedis and Posterior Tibial pulses non palpable due to significant edema to BLE.    Neuro: Protective sensation intact to the level of the digits bilateral.  MSK: Muscle strength 5/5 all major muscle groups bilateral. Pt is tender to palpation of bilateral feet

## 2024-08-15 NOTE — CONSULT NOTE ADULT - ASSESSMENT
Assessment and Recommendation:   Assessment	   A: 73 y/o Female seen for the following:   - Cellulitis to lower extremity  - Chronic BLE Lymphedema   - BLE Elephantiasis nostras verrucosa  -DM2  -Difficulty with ambulation    P:   Chart reviewed and Patient evaluated;  Discussed diagnosis and treatment with patient. Discussed importance of daily foot examinations, proper shoe gear, importance of tight glycemic control.   Significant generalized verrucous changes to skin extending from bilateral below the knee to feet, cobblestone like nodules to BLE, interdigital maceration and malodor. Underlying elephantiasis nostras verrucosa caused by chronic BLE lymphedema  Elevate BLE  Antibiotics as per ID  All additional care per Med appreciated  Patient demonstrated verbal understanding of all interventions and tolerated interventions well without any complications.   Podiatry will follow while in house    Case D/W attending Dr. Mitchell  Assessment and Recommendation:   Assessment	   A: 71 y/o Female seen for the following:   - Cellulitis to right Foot  - Chronic BLE Lymphedema   - BLE Elephantiasis nostras verrucosa  -DM2  -Difficulty with ambulation    P:   Chart reviewed and Patient evaluated;  Discussed diagnosis and treatment with patient. Discussed importance of daily foot examinations, proper shoe gear, importance of tight glycemic control.   Significant generalized verrucous changes to skin extending from bilateral below the knee to feet, cobblestone like nodules to BLE, interdigital maceration and malodor. Underlying elephantiasis nostras verrucosa caused by chronic BLE lymphedema  Elevate BLE  Antibiotics as per ID  All additional care per Med appreciated  Patient demonstrated verbal understanding of all interventions and tolerated interventions well without any complications.   Podiatry will follow while in house    Case D/W attending Dr. Mitchell

## 2024-08-16 LAB
GLUCOSE BLDC GLUCOMTR-MCNC: 110 MG/DL — HIGH (ref 70–99)
GLUCOSE BLDC GLUCOMTR-MCNC: 133 MG/DL — HIGH (ref 70–99)
GLUCOSE BLDC GLUCOMTR-MCNC: 135 MG/DL — HIGH (ref 70–99)
GLUCOSE BLDC GLUCOMTR-MCNC: 85 MG/DL — SIGNIFICANT CHANGE UP (ref 70–99)

## 2024-08-16 PROCEDURE — 99233 SBSQ HOSP IP/OBS HIGH 50: CPT

## 2024-08-16 RX ORDER — PSYLLIUM HUSK 3.4 G/6.5G
1 GRANULE ORAL DAILY
Refills: 0 | Status: DISCONTINUED | OUTPATIENT
Start: 2024-08-16 | End: 2024-08-21

## 2024-08-16 RX ORDER — TRAMADOL HYDROCHLORIDE 200 MG/1
50 TABLET, EXTENDED RELEASE ORAL EVERY 8 HOURS
Refills: 0 | Status: DISCONTINUED | OUTPATIENT
Start: 2024-08-16 | End: 2024-08-21

## 2024-08-16 RX ADMIN — Medication 600 MILLIGRAM(S): at 17:39

## 2024-08-16 RX ADMIN — Medication 1 TABLET(S): at 09:21

## 2024-08-16 RX ADMIN — Medication 2 TABLET(S): at 21:30

## 2024-08-16 RX ADMIN — PIPERACILLIN SODIUM AND TAZOBACTAM SODIUM 25 GRAM(S): 3; .375 INJECTION, POWDER, FOR SOLUTION INTRAVENOUS at 13:59

## 2024-08-16 RX ADMIN — PIPERACILLIN SODIUM AND TAZOBACTAM SODIUM 25 GRAM(S): 3; .375 INJECTION, POWDER, FOR SOLUTION INTRAVENOUS at 21:29

## 2024-08-16 RX ADMIN — Medication 1 APPLICATION(S): at 21:33

## 2024-08-16 RX ADMIN — Medication 600 MILLIGRAM(S): at 18:12

## 2024-08-16 RX ADMIN — Medication 500 MILLIGRAM(S): at 09:21

## 2024-08-16 RX ADMIN — LINAGLIPTIN 5 MILLIGRAM(S): 5 TABLET, FILM COATED ORAL at 09:21

## 2024-08-16 RX ADMIN — Medication 600 MILLIGRAM(S): at 11:33

## 2024-08-16 RX ADMIN — Medication 1 APPLICATION(S): at 11:33

## 2024-08-16 RX ADMIN — Medication 1 APPLICATION(S): at 11:41

## 2024-08-16 RX ADMIN — TRAMADOL HYDROCHLORIDE 50 MILLIGRAM(S): 200 TABLET, EXTENDED RELEASE ORAL at 22:40

## 2024-08-16 RX ADMIN — PSYLLIUM HUSK 1 PACKET(S): 3.4 GRANULE ORAL at 13:58

## 2024-08-16 RX ADMIN — Medication 110 MILLIGRAM(S): at 09:22

## 2024-08-16 RX ADMIN — Medication 600 MILLIGRAM(S): at 12:30

## 2024-08-16 RX ADMIN — TRAMADOL HYDROCHLORIDE 50 MILLIGRAM(S): 200 TABLET, EXTENDED RELEASE ORAL at 22:00

## 2024-08-16 RX ADMIN — TRAMADOL HYDROCHLORIDE 50 MILLIGRAM(S): 200 TABLET, EXTENDED RELEASE ORAL at 21:30

## 2024-08-16 RX ADMIN — PIPERACILLIN SODIUM AND TAZOBACTAM SODIUM 25 GRAM(S): 3; .375 INJECTION, POWDER, FOR SOLUTION INTRAVENOUS at 05:17

## 2024-08-16 RX ADMIN — Medication 110 MILLIGRAM(S): at 21:30

## 2024-08-17 LAB
-  AMOXICILLIN/CLAVULANIC ACID: SIGNIFICANT CHANGE UP
-  AMPICILLIN/SULBACTAM: SIGNIFICANT CHANGE UP
-  AMPICILLIN: SIGNIFICANT CHANGE UP
-  AZTREONAM: SIGNIFICANT CHANGE UP
-  CEFAZOLIN: SIGNIFICANT CHANGE UP
-  CEFEPIME: SIGNIFICANT CHANGE UP
-  CEFOXITIN: SIGNIFICANT CHANGE UP
-  CEFTRIAXONE: SIGNIFICANT CHANGE UP
-  CEFUROXIME: SIGNIFICANT CHANGE UP
-  CIPROFLOXACIN: SIGNIFICANT CHANGE UP
-  ERTAPENEM: SIGNIFICANT CHANGE UP
-  GENTAMICIN: SIGNIFICANT CHANGE UP
-  IMIPENEM: SIGNIFICANT CHANGE UP
-  LEVOFLOXACIN: SIGNIFICANT CHANGE UP
-  MEROPENEM: SIGNIFICANT CHANGE UP
-  NITROFURANTOIN: SIGNIFICANT CHANGE UP
-  PIPERACILLIN/TAZOBACTAM: SIGNIFICANT CHANGE UP
-  TOBRAMYCIN: SIGNIFICANT CHANGE UP
-  TRIMETHOPRIM/SULFAMETHOXAZOLE: SIGNIFICANT CHANGE UP
ANION GAP SERPL CALC-SCNC: 5 MMOL/L — SIGNIFICANT CHANGE UP (ref 5–17)
BUN SERPL-MCNC: 24 MG/DL — HIGH (ref 7–23)
CALCIUM SERPL-MCNC: 8.7 MG/DL — SIGNIFICANT CHANGE UP (ref 8.5–10.1)
CHLORIDE SERPL-SCNC: 114 MMOL/L — HIGH (ref 96–108)
CO2 SERPL-SCNC: 21 MMOL/L — LOW (ref 22–31)
CREAT SERPL-MCNC: 1.06 MG/DL — SIGNIFICANT CHANGE UP (ref 0.5–1.3)
CULTURE RESULTS: ABNORMAL
EGFR: 55 ML/MIN/1.73M2 — LOW
GLUCOSE BLDC GLUCOMTR-MCNC: 107 MG/DL — HIGH (ref 70–99)
GLUCOSE BLDC GLUCOMTR-MCNC: 112 MG/DL — HIGH (ref 70–99)
GLUCOSE BLDC GLUCOMTR-MCNC: 125 MG/DL — HIGH (ref 70–99)
GLUCOSE BLDC GLUCOMTR-MCNC: 87 MG/DL — SIGNIFICANT CHANGE UP (ref 70–99)
GLUCOSE SERPL-MCNC: 85 MG/DL — SIGNIFICANT CHANGE UP (ref 70–99)
HCT VFR BLD CALC: 33.2 % — LOW (ref 34.5–45)
HGB BLD-MCNC: 10.5 G/DL — LOW (ref 11.5–15.5)
MCHC RBC-ENTMCNC: 27.6 PG — SIGNIFICANT CHANGE UP (ref 27–34)
MCHC RBC-ENTMCNC: 31.6 GM/DL — LOW (ref 32–36)
MCV RBC AUTO: 87.4 FL — SIGNIFICANT CHANGE UP (ref 80–100)
METHOD TYPE: SIGNIFICANT CHANGE UP
ORGANISM # SPEC MICROSCOPIC CNT: ABNORMAL
ORGANISM # SPEC MICROSCOPIC CNT: SIGNIFICANT CHANGE UP
PLATELET # BLD AUTO: 82 K/UL — LOW (ref 150–400)
POTASSIUM SERPL-MCNC: 4.2 MMOL/L — SIGNIFICANT CHANGE UP (ref 3.5–5.3)
POTASSIUM SERPL-SCNC: 4.2 MMOL/L — SIGNIFICANT CHANGE UP (ref 3.5–5.3)
RBC # BLD: 3.8 M/UL — SIGNIFICANT CHANGE UP (ref 3.8–5.2)
RBC # FLD: 16.2 % — HIGH (ref 10.3–14.5)
SODIUM SERPL-SCNC: 140 MMOL/L — SIGNIFICANT CHANGE UP (ref 135–145)
SPECIMEN SOURCE: SIGNIFICANT CHANGE UP
WBC # BLD: 4.8 K/UL — SIGNIFICANT CHANGE UP (ref 3.8–10.5)
WBC # FLD AUTO: 4.8 K/UL — SIGNIFICANT CHANGE UP (ref 3.8–10.5)

## 2024-08-17 PROCEDURE — 99233 SBSQ HOSP IP/OBS HIGH 50: CPT

## 2024-08-17 RX ORDER — POLYETHYLENE GLYCOL 3350 17 G/17G
17 POWDER, FOR SOLUTION ORAL AT BEDTIME
Refills: 0 | Status: DISCONTINUED | OUTPATIENT
Start: 2024-08-17 | End: 2024-08-21

## 2024-08-17 RX ADMIN — Medication 1 TABLET(S): at 09:23

## 2024-08-17 RX ADMIN — Medication 500 MILLIGRAM(S): at 09:24

## 2024-08-17 RX ADMIN — Medication 1 APPLICATION(S): at 11:43

## 2024-08-17 RX ADMIN — PIPERACILLIN SODIUM AND TAZOBACTAM SODIUM 25 GRAM(S): 3; .375 INJECTION, POWDER, FOR SOLUTION INTRAVENOUS at 22:31

## 2024-08-17 RX ADMIN — PIPERACILLIN SODIUM AND TAZOBACTAM SODIUM 25 GRAM(S): 3; .375 INJECTION, POWDER, FOR SOLUTION INTRAVENOUS at 05:14

## 2024-08-17 RX ADMIN — Medication 600 MILLIGRAM(S): at 09:24

## 2024-08-17 RX ADMIN — TRAMADOL HYDROCHLORIDE 50 MILLIGRAM(S): 200 TABLET, EXTENDED RELEASE ORAL at 21:12

## 2024-08-17 RX ADMIN — LINAGLIPTIN 5 MILLIGRAM(S): 5 TABLET, FILM COATED ORAL at 09:25

## 2024-08-17 RX ADMIN — Medication 110 MILLIGRAM(S): at 21:07

## 2024-08-17 RX ADMIN — Medication 600 MILLIGRAM(S): at 01:11

## 2024-08-17 RX ADMIN — PIPERACILLIN SODIUM AND TAZOBACTAM SODIUM 25 GRAM(S): 3; .375 INJECTION, POWDER, FOR SOLUTION INTRAVENOUS at 13:38

## 2024-08-17 RX ADMIN — Medication 1 APPLICATION(S): at 13:03

## 2024-08-17 RX ADMIN — Medication 600 MILLIGRAM(S): at 02:15

## 2024-08-17 RX ADMIN — Medication 110 MILLIGRAM(S): at 09:25

## 2024-08-17 RX ADMIN — POLYETHYLENE GLYCOL 3350 17 GRAM(S): 17 POWDER, FOR SOLUTION ORAL at 13:38

## 2024-08-18 LAB
GLUCOSE BLDC GLUCOMTR-MCNC: 102 MG/DL — HIGH (ref 70–99)
GLUCOSE BLDC GLUCOMTR-MCNC: 103 MG/DL — HIGH (ref 70–99)
GLUCOSE BLDC GLUCOMTR-MCNC: 129 MG/DL — HIGH (ref 70–99)
GLUCOSE BLDC GLUCOMTR-MCNC: 80 MG/DL — SIGNIFICANT CHANGE UP (ref 70–99)

## 2024-08-18 PROCEDURE — 99233 SBSQ HOSP IP/OBS HIGH 50: CPT

## 2024-08-18 RX ORDER — MUPIROCIN 2 %
1 OINTMENT (GRAM) TOPICAL ONCE
Refills: 0 | Status: COMPLETED | OUTPATIENT
Start: 2024-08-18 | End: 2024-08-18

## 2024-08-18 RX ADMIN — LINAGLIPTIN 5 MILLIGRAM(S): 5 TABLET, FILM COATED ORAL at 09:31

## 2024-08-18 RX ADMIN — TRAMADOL HYDROCHLORIDE 50 MILLIGRAM(S): 200 TABLET, EXTENDED RELEASE ORAL at 20:46

## 2024-08-18 RX ADMIN — PIPERACILLIN SODIUM AND TAZOBACTAM SODIUM 25 GRAM(S): 3; .375 INJECTION, POWDER, FOR SOLUTION INTRAVENOUS at 05:08

## 2024-08-18 RX ADMIN — PIPERACILLIN SODIUM AND TAZOBACTAM SODIUM 25 GRAM(S): 3; .375 INJECTION, POWDER, FOR SOLUTION INTRAVENOUS at 21:53

## 2024-08-18 RX ADMIN — PIPERACILLIN SODIUM AND TAZOBACTAM SODIUM 25 GRAM(S): 3; .375 INJECTION, POWDER, FOR SOLUTION INTRAVENOUS at 13:02

## 2024-08-18 RX ADMIN — Medication 110 MILLIGRAM(S): at 20:45

## 2024-08-18 RX ADMIN — Medication 1 APPLICATION(S): at 12:32

## 2024-08-18 RX ADMIN — Medication 110 MILLIGRAM(S): at 09:31

## 2024-08-19 LAB
GLUCOSE BLDC GLUCOMTR-MCNC: 104 MG/DL — HIGH (ref 70–99)
GLUCOSE BLDC GLUCOMTR-MCNC: 122 MG/DL — HIGH (ref 70–99)
GLUCOSE BLDC GLUCOMTR-MCNC: 123 MG/DL — HIGH (ref 70–99)
GLUCOSE BLDC GLUCOMTR-MCNC: 90 MG/DL — SIGNIFICANT CHANGE UP (ref 70–99)

## 2024-08-19 PROCEDURE — 99233 SBSQ HOSP IP/OBS HIGH 50: CPT

## 2024-08-19 RX ORDER — NYSTATIN 100000/G
1 CREAM (GRAM) TOPICAL
Refills: 0 | Status: DISCONTINUED | OUTPATIENT
Start: 2024-08-19 | End: 2024-08-21

## 2024-08-19 RX ORDER — DOXYCYCLINE MONOHYDRATE 100 MG
100 TABLET ORAL EVERY 12 HOURS
Refills: 0 | Status: DISCONTINUED | OUTPATIENT
Start: 2024-08-19 | End: 2024-08-20

## 2024-08-19 RX ADMIN — Medication 2 TABLET(S): at 21:06

## 2024-08-19 RX ADMIN — TRAMADOL HYDROCHLORIDE 50 MILLIGRAM(S): 200 TABLET, EXTENDED RELEASE ORAL at 21:05

## 2024-08-19 RX ADMIN — Medication 1 TABLET(S): at 09:55

## 2024-08-19 RX ADMIN — PIPERACILLIN SODIUM AND TAZOBACTAM SODIUM 25 GRAM(S): 3; .375 INJECTION, POWDER, FOR SOLUTION INTRAVENOUS at 05:32

## 2024-08-19 RX ADMIN — LINAGLIPTIN 5 MILLIGRAM(S): 5 TABLET, FILM COATED ORAL at 09:55

## 2024-08-19 RX ADMIN — PSYLLIUM HUSK 1 PACKET(S): 3.4 GRANULE ORAL at 09:55

## 2024-08-19 RX ADMIN — Medication 600 MILLIGRAM(S): at 10:30

## 2024-08-19 RX ADMIN — Medication 1 APPLICATION(S): at 21:07

## 2024-08-19 RX ADMIN — PIPERACILLIN SODIUM AND TAZOBACTAM SODIUM 25 GRAM(S): 3; .375 INJECTION, POWDER, FOR SOLUTION INTRAVENOUS at 22:10

## 2024-08-19 RX ADMIN — Medication 1 APPLICATION(S): at 12:05

## 2024-08-19 RX ADMIN — Medication 100 MILLIGRAM(S): at 21:05

## 2024-08-19 RX ADMIN — POLYETHYLENE GLYCOL 3350 17 GRAM(S): 17 POWDER, FOR SOLUTION ORAL at 09:56

## 2024-08-19 RX ADMIN — Medication 1 APPLICATION(S): at 09:56

## 2024-08-19 RX ADMIN — Medication 600 MILLIGRAM(S): at 09:57

## 2024-08-19 RX ADMIN — ENOXAPARIN SODIUM 40 MILLIGRAM(S): 100 INJECTION SUBCUTANEOUS at 09:55

## 2024-08-19 RX ADMIN — PIPERACILLIN SODIUM AND TAZOBACTAM SODIUM 25 GRAM(S): 3; .375 INJECTION, POWDER, FOR SOLUTION INTRAVENOUS at 15:47

## 2024-08-19 RX ADMIN — Medication 1 APPLICATION(S): at 15:52

## 2024-08-19 RX ADMIN — Medication 500 MILLIGRAM(S): at 09:54

## 2024-08-19 RX ADMIN — TRAMADOL HYDROCHLORIDE 50 MILLIGRAM(S): 200 TABLET, EXTENDED RELEASE ORAL at 22:00

## 2024-08-19 NOTE — PHYSICAL THERAPY INITIAL EVALUATION ADULT - GENERAL OBSERVATIONS, REHAB EVAL
Pt seen for 45min PT Eval. Pt rec'd semi supine in bed in NAD, BLE dressing. Pt requries CGA-min Ax1 for all mobility, trans and amb 10ft, pt very concerned about avoiding IV dislogding declining further amb while it is running. Pt requesting to sit at EOB pt has good seated balance and postural control, +bed alarm, RN and CNA. P

## 2024-08-19 NOTE — PROGRESS NOTE ADULT - SKIN
macular erythemitous rash under left breast with satelite lesions
red, hyperkeratotic, erythema, and stasis changes to both legs. Feet wrapped./warm and dry/scaling/swelling
Legs redressed and wrapped today by podiatry.
legs slightly less red then yesterday. Slightly less swollen. Still severe stasis dermatitis

## 2024-08-19 NOTE — PHYSICAL THERAPY INITIAL EVALUATION ADULT - ACTIVE RANGE OF MOTION EXAMINATION, REHAB EVAL
pt able to clear BLE from bed however limited 2/2 weight of LEs./bilateral upper extremity Active ROM was WFL (within functional limits)

## 2024-08-19 NOTE — PHYSICAL THERAPY INITIAL EVALUATION ADULT - ADDITIONAL COMMENTS
Pt states she ambulates with RW. Pt also states she has aide assistance at home. Pt with right foot drop, used AFO in past. -information from Oct 2023. Pt states she ambulates with RW. Pt also states she has aide assistance at home. Pt with right foot drop, used AFO in past. -information from Oct 2023. pt has wound care dress her legs 3x per week.

## 2024-08-19 NOTE — PHYSICAL THERAPY INITIAL EVALUATION ADULT - PERTINENT HX OF CURRENT PROBLEM, REHAB EVAL
72yo old moderately obese Female with significant PMH of hypertension, colon and lung cancer, diabetes-2, chronic thrombocytopenia, chronic metabolic acidosis without AG, chronic B/L LE lymphedema, recent hospitalization for legs cellulitis presented to the ED with c/o increased right lower leg pain over the past couple days. Pt with Sepsis due to B/L LE cellulitis and UTI.

## 2024-08-20 PROBLEM — Z86.39 PERSONAL HISTORY OF OTHER ENDOCRINE, NUTRITIONAL AND METABOLIC DISEASE: Chronic | Status: ACTIVE | Noted: 2024-08-14

## 2024-08-20 PROBLEM — D69.6 THROMBOCYTOPENIA, UNSPECIFIED: Chronic | Status: ACTIVE | Noted: 2024-08-14

## 2024-08-20 PROBLEM — E66.8 OTHER OBESITY: Chronic | Status: ACTIVE | Noted: 2024-08-14

## 2024-08-20 LAB
CULTURE RESULTS: SIGNIFICANT CHANGE UP
CULTURE RESULTS: SIGNIFICANT CHANGE UP
GLUCOSE BLDC GLUCOMTR-MCNC: 105 MG/DL — HIGH (ref 70–99)
GLUCOSE BLDC GLUCOMTR-MCNC: 126 MG/DL — HIGH (ref 70–99)
GLUCOSE BLDC GLUCOMTR-MCNC: 85 MG/DL — SIGNIFICANT CHANGE UP (ref 70–99)
GLUCOSE BLDC GLUCOMTR-MCNC: 92 MG/DL — SIGNIFICANT CHANGE UP (ref 70–99)
SPECIMEN SOURCE: SIGNIFICANT CHANGE UP
SPECIMEN SOURCE: SIGNIFICANT CHANGE UP

## 2024-08-20 PROCEDURE — 99232 SBSQ HOSP IP/OBS MODERATE 35: CPT

## 2024-08-20 RX ORDER — SULFAMETHOXAZOLE AND TRIMETHOPRIM 800; 160 MG/1; MG/1
1 TABLET ORAL EVERY 12 HOURS
Refills: 0 | Status: DISCONTINUED | OUTPATIENT
Start: 2024-08-20 | End: 2024-08-21

## 2024-08-20 RX ADMIN — TRAMADOL HYDROCHLORIDE 50 MILLIGRAM(S): 200 TABLET, EXTENDED RELEASE ORAL at 22:21

## 2024-08-20 RX ADMIN — SULFAMETHOXAZOLE AND TRIMETHOPRIM 1 TABLET(S): 800; 160 TABLET ORAL at 21:33

## 2024-08-20 RX ADMIN — TRAMADOL HYDROCHLORIDE 50 MILLIGRAM(S): 200 TABLET, EXTENDED RELEASE ORAL at 10:00

## 2024-08-20 RX ADMIN — Medication 1 APPLICATION(S): at 21:34

## 2024-08-20 RX ADMIN — ENOXAPARIN SODIUM 40 MILLIGRAM(S): 100 INJECTION SUBCUTANEOUS at 09:15

## 2024-08-20 RX ADMIN — PIPERACILLIN SODIUM AND TAZOBACTAM SODIUM 25 GRAM(S): 3; .375 INJECTION, POWDER, FOR SOLUTION INTRAVENOUS at 14:36

## 2024-08-20 RX ADMIN — Medication 100 MILLIGRAM(S): at 09:16

## 2024-08-20 RX ADMIN — Medication 1 APPLICATION(S): at 21:33

## 2024-08-20 RX ADMIN — PIPERACILLIN SODIUM AND TAZOBACTAM SODIUM 25 GRAM(S): 3; .375 INJECTION, POWDER, FOR SOLUTION INTRAVENOUS at 21:33

## 2024-08-20 RX ADMIN — Medication 500 MILLIGRAM(S): at 09:16

## 2024-08-20 RX ADMIN — Medication 1 APPLICATION(S): at 12:07

## 2024-08-20 RX ADMIN — POLYETHYLENE GLYCOL 3350 17 GRAM(S): 17 POWDER, FOR SOLUTION ORAL at 09:17

## 2024-08-20 RX ADMIN — PIPERACILLIN SODIUM AND TAZOBACTAM SODIUM 25 GRAM(S): 3; .375 INJECTION, POWDER, FOR SOLUTION INTRAVENOUS at 05:30

## 2024-08-20 RX ADMIN — TRAMADOL HYDROCHLORIDE 50 MILLIGRAM(S): 200 TABLET, EXTENDED RELEASE ORAL at 09:15

## 2024-08-20 RX ADMIN — LINAGLIPTIN 5 MILLIGRAM(S): 5 TABLET, FILM COATED ORAL at 09:17

## 2024-08-20 RX ADMIN — Medication 1 APPLICATION(S): at 12:08

## 2024-08-20 RX ADMIN — Medication 1 TABLET(S): at 09:15

## 2024-08-20 RX ADMIN — Medication 2 TABLET(S): at 21:33

## 2024-08-20 RX ADMIN — TRAMADOL HYDROCHLORIDE 50 MILLIGRAM(S): 200 TABLET, EXTENDED RELEASE ORAL at 21:33

## 2024-08-20 NOTE — PROGRESS NOTE ADULT - RESPIRATORY
[FreeTextEntry1] : Ms. Bonilla presents with above history and imaging reports.  Patient is neurologically intact.  Given her complaints and imaging findings, I would like to repeat her CT head to assess ICH and MRI cervical spine to further evaluate neck complaints.\par I would like her to continue with her Keppra 500 mg twice daily for seizure prophylaxis.  MRI\par She should follow-up after imaging for formal review.\par Patient has been given an opportunity to ask and have their questions answered to their satisfaction.\par Patient knows to call the office if there are any new or worsening symptoms.\par 
normal/clear to auscultation bilaterally/no wheezes/no rales/no rhonchi

## 2024-08-21 ENCOUNTER — TRANSCRIPTION ENCOUNTER (OUTPATIENT)
Age: 74
End: 2024-08-21

## 2024-08-21 ENCOUNTER — NON-APPOINTMENT (OUTPATIENT)
Age: 74
End: 2024-08-21

## 2024-08-21 VITALS
DIASTOLIC BLOOD PRESSURE: 53 MMHG | SYSTOLIC BLOOD PRESSURE: 130 MMHG | OXYGEN SATURATION: 96 % | TEMPERATURE: 98 F | HEART RATE: 73 BPM | RESPIRATION RATE: 18 BRPM

## 2024-08-21 LAB
GLUCOSE BLDC GLUCOMTR-MCNC: 131 MG/DL — HIGH (ref 70–99)
GLUCOSE BLDC GLUCOMTR-MCNC: 92 MG/DL — SIGNIFICANT CHANGE UP (ref 70–99)

## 2024-08-21 PROCEDURE — 99239 HOSP IP/OBS DSCHRG MGMT >30: CPT

## 2024-08-21 RX ORDER — ASCORBIC ACID/ASCORBATE SODIUM 500 MG
1 TABLET,CHEWABLE ORAL
Qty: 0 | Refills: 0 | DISCHARGE
Start: 2024-08-21

## 2024-08-21 RX ORDER — SULFAMETHOXAZOLE AND TRIMETHOPRIM 800; 160 MG/1; MG/1
1 TABLET ORAL
Qty: 0 | Refills: 0 | DISCHARGE
Start: 2024-08-21

## 2024-08-21 RX ORDER — MUPIROCIN 2 %
1 OINTMENT (GRAM) TOPICAL
Qty: 0 | Refills: 0 | DISCHARGE
Start: 2024-08-21

## 2024-08-21 RX ORDER — TRAMADOL HYDROCHLORIDE 200 MG/1
1 TABLET, EXTENDED RELEASE ORAL
Qty: 0 | Refills: 0 | DISCHARGE
Start: 2024-08-21

## 2024-08-21 RX ORDER — ACETAMINOPHEN 325 MG/1
2 TABLET ORAL
Qty: 0 | Refills: 0 | DISCHARGE
Start: 2024-08-21

## 2024-08-21 RX ORDER — POLYETHYLENE GLYCOL 3350 17 G/17G
17 POWDER, FOR SOLUTION ORAL
Qty: 0 | Refills: 0 | DISCHARGE
Start: 2024-08-21

## 2024-08-21 RX ORDER — NYSTATIN 100000/G
0 CREAM (GRAM) TOPICAL
Qty: 0 | Refills: 0 | DISCHARGE
Start: 2024-08-21

## 2024-08-21 RX ORDER — IBUPROFEN 600 MG
1 TABLET ORAL
Qty: 0 | Refills: 0 | DISCHARGE
Start: 2024-08-21

## 2024-08-21 RX ADMIN — Medication 1 APPLICATION(S): at 09:02

## 2024-08-21 RX ADMIN — PIPERACILLIN SODIUM AND TAZOBACTAM SODIUM 25 GRAM(S): 3; .375 INJECTION, POWDER, FOR SOLUTION INTRAVENOUS at 05:17

## 2024-08-21 RX ADMIN — LINAGLIPTIN 5 MILLIGRAM(S): 5 TABLET, FILM COATED ORAL at 08:59

## 2024-08-21 RX ADMIN — Medication 1 TABLET(S): at 09:01

## 2024-08-21 RX ADMIN — Medication 1 APPLICATION(S): at 09:05

## 2024-08-21 RX ADMIN — SULFAMETHOXAZOLE AND TRIMETHOPRIM 1 TABLET(S): 800; 160 TABLET ORAL at 09:01

## 2024-08-21 RX ADMIN — ENOXAPARIN SODIUM 40 MILLIGRAM(S): 100 INJECTION SUBCUTANEOUS at 08:58

## 2024-08-21 NOTE — DISCHARGE NOTE PROVIDER - NSDCMRMEDTOKEN_GEN_ALL_CORE_FT
acetaminophen 325 mg oral tablet: 2 tab(s) orally every 6 hours As needed Temp greater or equal to 38C (100.4F), Mild Pain (1 - 3)  acetic acid 0.25% irrigation solution: irrigation 3 times a week  ascorbic acid 500 mg oral tablet: 1 tab(s) orally once a day  ibuprofen 600 mg oral tablet: 1 tab(s) orally every 6 hours As needed Moderate Pain (4 - 6)  Januvia 25 mg oral tablet: 1 tab(s) orally once a day  Multiple Vitamins oral tablet: 1 tab(s) orally once a day  mupirocin 2% topical ointment: 1 Apply topically to affected area every 12 hours  nystatin 100,000 units/g topical cream: Apply topically to affected area 2 times a day 1 Apply topically to affected area under breast  2 times a day  polyethylene glycol 3350 oral powder for reconstitution: 17 gram(s) orally once a day (at bedtime) As needed Constipation  sulfamethoxazole-trimethoprim 800 mg-160 mg oral tablet: 1 tab(s) orally every 12 hours  traMADol 50 mg oral tablet: 1 tab(s) orally every 8 hours As needed Severe Pain (7 - 10)

## 2024-08-21 NOTE — PROGRESS NOTE ADULT - ASSESSMENT
Assessment: 71 y/o Female seen for the following:   - Cellulitis to right Foot  - Chronic BLE Lymphedema   - BLE Elephantiasis nostras verrucosa  - DM2  - Difficulty with ambulation    Plan:   Chart reviewed and Patient evaluated;  Discussed diagnosis and treatment with patient. Discussed importance of daily foot examinations, proper shoe gear, importance of tight glycemic control.   Significant generalized verrucous changes to skin extending from bilateral below the knee to feet, cobblestone like nodules to BLE, interdigital maceration and malodor. Underlying elephantiasis nostras verrucosa caused by chronic BLE lymphedema; Erythema and edema to ryan LE improving  Dressings changed today, Ryan LE improved  Elevate BLE  Antibiotics as per ID  No acute podiatric intervention warranted at this time. Pt to continue with local wound care and antibiotics per ID  All additional care per Med appreciated  Patient demonstrated verbal understanding of all interventions and tolerated interventions well without any complications.   Podiatry will follow while in house       Wound care instructions to be performed three times a week for bilateral feet:  Please remove all dressings   Use Acetic acid-soaked gauze and place throughout the right and left foot and in between digits for a 2-3 minutes  Apply Bactroban (Muprocin) to all the macerated areas in  midfoot and forefoot   Apply gauze, kerlix and ace.  Thank you.
72 y/o moderately obese Female with h/o hypertension, colon and lung cancer, diabetes mellitus -2, chronic thrombocytopenia, chronic metabolic acidosis without AG, chronic B/L LE lymphedema, recent hospitalization for legs cellulitis was admitted on 8/14 for increased right lower leg pain over the past couple days. Patient says that she was doing pretty well, but then started feeling increased pain and generalized weakness for last couple days, and had difficulty getting out of bed. She says that she walks with help of a cane and walker. She has HHA who comes for her leg/foot dressing every other days. She also feels cold and chills but denies fever. In ER she was noted febrile and received zosyn and doxycycline.    1. Febrile syndrome. Probable sepsis. RLE extensive cellulitis. B/l legs lymphedema and stasis dermatitis. Pyuria. Probable UTI. Allergy to cephalexin.  -leukocytosis  -BC x 2, urine c/s noted   -on zosyn 3.375 gm IV q8h and doxycycline 100 mg PO q12h # 5  -tolerating abx well so far; no side effects noted  -elevate legs  -local wound care  -podiatry/ wound care   -poor IV access  -change doxy to PO formulation  -continue abx coverage   -f/u cultures  -monitor temps  -f/u CBC  -supportive care  2. Other issues:   -care per medicine    Clinical team may change from intravenous to oral antibiotics when the following criteria are met:   1. Patient is clinically improving/stable       a)	Improved signs and symptoms of infection from initial presentation       b)	Afebrile for 24 hours       c)	Leukocytosis trending towards normal range   2. Patient is tolerating oral intake   3. Initial/repeat blood cultures are negative    When above criteria met may change iv antibiotics to an oral doxy          
74 y/o moderately obese Female with h/o hypertension, colon and lung cancer, diabetes mellitus -2, chronic thrombocytopenia, chronic metabolic acidosis without AG, chronic B/L LE lymphedema, recent hospitalization for legs cellulitis was admitted on 8/14 for increased right lower leg pain over the past couple days. Patient says that she was doing pretty well, but then started feeling increased pain and generalized weakness for last couple days, and had difficulty getting out of bed. She says that she walks with help of a cane and walker. She has HHA who comes for her leg/foot dressing every other days. She also feels cold and chills but denies fever. In ER she was noted febrile and received zosyn and doxycycline.    1. Febrile syndrome. Probable sepsis. RLE extensive cellulitis. B/l legs lymphedema and stasis dermatitis. Pyuria. Probable UTI. Allergy to cephalexin.  -leukocytosis  -BC x 2, urine c/s noted   -on zosyn 3.375 gm IV q8h and doxycycline 100 mg PO q12h # 6  -tolerating abx well so far; no side effects noted  -elevate legs  -local wound care  -podiatry/ wound care   -poor IV access  -d/c doxycycline  -change to bactrim DS 1 tab PO q12h for 10 more days  -f/u cultures  -monitor temps  -f/u CBC  -supportive care  2. Other issues:   -care per medicine    Clinical team may change from intravenous to oral antibiotics when the following criteria are met:   1. Patient is clinically improving/stable       a)	Improved signs and symptoms of infection from initial presentation       b)	Afebrile for 24 hours       c)	Leukocytosis trending towards normal range   2. Patient is tolerating oral intake   3. Initial/repeat blood cultures are negative    When above criteria met may change iv antibiotics to PO bactrim as above           
Assesment: 71 y/o Female seen for the following:   - Cellulitis to right Foot  - Chronic BLE Lymphedema   - BLE Elephantiasis nostras verrucosa  -DM2  -Difficulty with ambulation    Plan:   Chart reviewed and Patient evaluated;  Discussed diagnosis and treatment with patient. Discussed importance of daily foot examinations, proper shoe gear, importance of tight glycemic control.   Significant generalized verrucous changes to skin extending from bilateral below the knee to feet, cobblestone like nodules to BLE, interdigital maceration and malodor. Underlying elephantiasis nostras verrucosa caused by chronic BLE lymphedema  Next dressing change sunday   Elevate BLE  Antibiotics as per ID  All additional care per Med appreciated  Patient demonstrated verbal understanding of all interventions and tolerated interventions well without any complications.   Podiatry will follow while in house   
Assesment: 73 y/o Female seen for the following:   - Cellulitis to right Foot  - Chronic BLE Lymphedema   - BLE Elephantiasis nostras verrucosa  -DM2  -Difficulty with ambulation    Plan:   Chart reviewed and Patient evaluated;  Discussed diagnosis and treatment with patient. Discussed importance of daily foot examinations, proper shoe gear, importance of tight glycemic control.   Significant generalized verrucous changes to skin extending from bilateral below the knee to feet, cobblestone like nodules to BLE, interdigital maceration and malodor. Underlying elephantiasis nostras verrucosa caused by chronic BLE lymphedema; Erythema and edema to ryan LE improving  Dressings changed today, Ryan LE improved  Elevate BLE  Antibiotics as per ID  No acute podiatric intervention warranted at this time. Pt to continue with local wound care and antibiotics per ID  All additional care per Med appreciated  Patient demonstrated verbal understanding of all interventions and tolerated interventions well without any complications.   Podiatry will follow while in house       Wound care instructions to be performed three times a week for bilateral feet:  Please remove all dressings   Use Acetic acid-soaked gauze and place throughout the right and left foot and in between digits for a 2-3 minutes  Apply Bactroban (Muprocin) to all the macerated areas in  midfoot and forefoot   Apply gauze, kerlix and ace.  Thank you.
The patient is a 73y old moderately obese Female with significant PMH of hypertension, colon and lung cancer, diabetes-2, chronic thrombocytopenia, chronic metabolic acidosis without AG, chronic B/L LE lymphedema, recent hospitalization for legs cellulitis presented to the ED with c/o increased right lower leg pain over the past couple days.  Patient says that she was doing pretty well, but then started feeling increased pain and generalized weakness for last couple days, and had difficulty getting out of bed. She says that she walks with help of a cane and walker.  She has HHA who comes for her leg/foot dressing every other days. She also feels cold and chills but denies fever.  She also denies chest pain, sob, cough, abdominal pain, diarrhea or dysuria. She also denies any h/o DVT or PE.  Denies smoking, alcohol or substance abuse.       Assessment:  Sepsis due to B/L LE cellulitis and UTI. Improved  Uncontrolled DM-2 with hyperglycemia.  Severe stasis dermatitis and lymphedema  Chronic thrombocytopenia seems to be worsening   Metabolic acidosis without AG.  Intertriginous Candidiasis    Plan:  Continue Zosyn  and Bactrim  To d/c zosyn on day 7 tomorrow. per ID  Tylenol prn.  For SNF rehab tomorrow   - ACE Wraps to legs  ISS with coverage.  On Januvia.  Accu-checks q AC & HS.  Podiatry following and doing dressing changes every other day.   PT in progress  Accepted into [facility]    DVT prophylaxis  No Lovenox secondary to thrombocytopenia.    Full Code  
72 y/o moderately obese Female with h/o hypertension, colon and lung cancer, diabetes mellitus -2, chronic thrombocytopenia, chronic metabolic acidosis without AG, chronic B/L LE lymphedema, recent hospitalization for legs cellulitis was admitted on 8/14 for increased right lower leg pain over the past couple days. Patient says that she was doing pretty well, but then started feeling increased pain and generalized weakness for last couple days, and had difficulty getting out of bed. She says that she walks with help of a cane and walker. She has HHA who comes for her leg/foot dressing every other days. She also feels cold and chills but denies fever. In ER she was noted febrile and received zosyn and doxycycline.    1. Febrile syndrome. Probable sepsis. RLE extensive cellulitis. B/l legs lymphedema and stasis dermatitis. Pyuria. Probable UTI. Allergy to cephalexin.  -leukocytosis  -BC x 2, urine c/s noted   -on zosyn 3.375 gm IV q8h and doxycycline 100 mg PO q12h # 2  -tolerating abx well so far; no side effects noted  -elevate legs  -local wound care  -podiatry/ wound care evaluation  -continue abx coverage   -f/u cultures  -monitor temps  -f/u CBC  -supportive care  2. Other issues:   -care per medicine    Clinical team may change from intravenous to oral antibiotics when the following criteria are met:   1. Patient is clinically improving/stable       a)	Improved signs and symptoms of infection from initial presentation       b)	Afebrile for 24 hours       c)	Leukocytosis trending towards normal range   2. Patient is tolerating oral intake   3. Initial/repeat blood cultures are negative    When above criteria met may change iv antibiotics to an oral agent   Cannot advise changing to oral antibiotic therapy until culture sensitivity is available.          
74 y/o moderately obese Female with h/o hypertension, colon and lung cancer, diabetes mellitus -2, chronic thrombocytopenia, chronic metabolic acidosis without AG, chronic B/L LE lymphedema, recent hospitalization for legs cellulitis was admitted on 8/14 for increased right lower leg pain over the past couple days. Patient says that she was doing pretty well, but then started feeling increased pain and generalized weakness for last couple days, and had difficulty getting out of bed. She says that she walks with help of a cane and walker. She has HHA who comes for her leg/foot dressing every other days. She also feels cold and chills but denies fever. In ER she was noted febrile and received zosyn and doxycycline.    1. Febrile syndrome. Probable sepsis. RLE extensive cellulitis. B/l legs lymphedema and stasis dermatitis. Pyuria. Probable UTI. Allergy to cephalexin.  -leukocytosis  -BC x 2, urine c/s noted   -on zosyn 3.375 gm IV q8h # 7 and bactrim DS 1 tab PO q12h # 2  -s/p doxycycline 100 mg PO q12h # 6  -tolerating abx well so far; no side effects noted  -elevate legs  -local wound care  -podiatry/ wound care   -poor IV access  -d/c zosyn  -continue bactrim DS 1 tab PO q12h for 10 more days  -f/u cultures  -monitor temps  -f/u CBC  -supportive care  2. Other issues:   -care per medicine    Clinical team may change from intravenous to oral antibiotics when the following criteria are met:   1. Patient is clinically improving/stable       a)	Improved signs and symptoms of infection from initial presentation       b)	Afebrile for 24 hours       c)	Leukocytosis trending towards normal range   2. Patient is tolerating oral intake   3. Initial/repeat blood cultures are negative    When above criteria met may change iv antibiotics to PO bactrim as above           
The patient is a 73y old moderately obese Female with significant PMH of hypertension, colon and lung cancer, diabetes-2, chronic thrombocytopenia, chronic metabolic acidosis without AG, chronic B/L LE lymphedema, recent hospitalization for legs cellulitis presented to the ED with c/o increased right lower leg pain over the past couple days.  Patient says that she was doing pretty well, but then started feeling increased pain and generalized weakness for last couple days, and had difficulty getting out of bed. She says that she walks with help of a cane and walker.  She has HHA who comes for her leg/foot dressing every other days. She also feels cold and chills but denies fever.  She also denies chest pain, sob, cough, abdominal pain, diarrhea or dysuria. She also denies any h/o DVT or PE.  Denies smoking, alcohol or substance abuse.       Assessment:  Sepsis due to B/L LE cellulitis and UTI.            -Leukocytosis of 17.86k --> 12.6  with left shift ; Elevated lactate level now 1.9  Uncontrolled DM-2 with hyperglycemia.  Severe stasis dermatitis and lymphedema  Chronic thrombocytopenia seems to be worsening   Metabolic acidosis without AG.  Intertriginous Candidiasis    Plan:  Continue Zosyn and Doxy IV empirically for now.  Tylenol prn.  ID following  - will discuss how much longer needs to be on IV Tx  ACE Wraps to legs  ISS with coverage.  On Januvia.  Accu-checks q AC & HS.  Podiatry following and doing dressing changes every other day. Will discuss necessity of continued Tx after tomorrow am dressing change  Miralax  PT in progress    DVT prophylaxis  D/c Lovenox secondary to thrombocytopenia.    Full Code  
The patient is a 73y old moderately obese Female with significant PMH of hypertension, colon and lung cancer, diabetes-2, chronic thrombocytopenia, chronic metabolic acidosis without AG, chronic B/L LE lymphedema, recent hospitalization for legs cellulitis presented to the ED with c/o increased right lower leg pain over the past couple days.  Patient says that she was doing pretty well, but then started feeling increased pain and generalized weakness for last couple days, and had difficulty getting out of bed. She says that she walks with help of a cane and walker.  She has HHA who comes for her leg/foot dressing every other days. She also feels cold and chills but denies fever.  She also denies chest pain, sob, cough, abdominal pain, diarrhea or dysuria. She also denies any h/o DVT or PE.  Denies smoking, alcohol or substance abuse.       Sepsis due to B/L LE cellulitis and UTI.            -Leukocytosis of 17.86k --> 12.6  with left shift ; Elevated lactate level now 1.9  Uncontrolled DM-2 with hyperglycemia.  Severe stasis dermatitis and lymphedema   Chronic thrombocytopenia and metabolic acidosis without AG.      Plan:  Maintenance IV fluids.  Serial lactate levels resolved  Follow blood and urine cultures.  Follow CXR report.  Continue Zosyn and Doxy IV empirically for now.  Tylenol prn.  ID consult appreciated  ACE Wraps to legs  ISS with coverage.  On Januvia.  Accu-checks q AC & HS.  Podiatry Consult    DVT prophylaxis  Lovenox sq daily.    Full Code  
The patient is a 73y old moderately obese Female with significant PMH of hypertension, colon and lung cancer, diabetes-2, chronic thrombocytopenia, chronic metabolic acidosis without AG, chronic B/L LE lymphedema, recent hospitalization for legs cellulitis presented to the ED with c/o increased right lower leg pain over the past couple days.  Patient says that she was doing pretty well, but then started feeling increased pain and generalized weakness for last couple days, and had difficulty getting out of bed. She says that she walks with help of a cane and walker.  She has HHA who comes for her leg/foot dressing every other days. She also feels cold and chills but denies fever.  She also denies chest pain, sob, cough, abdominal pain, diarrhea or dysuria. She also denies any h/o DVT or PE.  Denies smoking, alcohol or substance abuse.       Sepsis due to B/L LE cellulitis and UTI.            -Leukocytosis of 17.86k --> 12.6  with left shift ; Elevated lactate level now 1.9  Uncontrolled DM-2 with hyperglycemia.  Severe stasis dermatitis and lymphedema  Chronic thrombocytopenia seems to be worsening   Metabolic acidosis without AG.      Plan:  D/c Maintenance IV fluids.  Follow blood and urine cultures.  Continue Zosyn and Doxy IV empirically for now.  Tylenol prn.  ID following   ACE Wraps to legs  ISS with coverage.  On Januvia.  Accu-checks q AC & HS.  Podiatry Consult appreciated    DVT prophylaxis  D/c Lovenox secondary to thrombocytopenia.    Full Code  
The patient is a 73y old moderately obese Female with significant PMH of hypertension, colon and lung cancer, diabetes-2, chronic thrombocytopenia, chronic metabolic acidosis without AG, chronic B/L LE lymphedema, recent hospitalization for legs cellulitis presented to the ED with c/o increased right lower leg pain over the past couple days.  Patient says that she was doing pretty well, but then started feeling increased pain and generalized weakness for last couple days, and had difficulty getting out of bed. She says that she walks with help of a cane and walker.  She has HHA who comes for her leg/foot dressing every other days. She also feels cold and chills but denies fever.  She also denies chest pain, sob, cough, abdominal pain, diarrhea or dysuria. She also denies any h/o DVT or PE.  Denies smoking, alcohol or substance abuse.       Sepsis due to B/L LE cellulitis and UTI.            -Leukocytosis of 17.86k --> 12.6  with left shift ; Elevated lactate level now 1.9  Uncontrolled DM-2 with hyperglycemia.  Severe stasis dermatitis and lymphedema  Chronic thrombocytopenia seems to be worsening   Metabolic acidosis without AG.      Plan:  Follow blood culture.  Continue Zosyn and Doxy IV empirically for now.  Tylenol prn.  ID following   ACE Wraps to legs  ISS with coverage.  On Januvia.  Accu-checks q AC & HS.  Podiatry Consult following and doing dressing changes every other day  Miralax  May give enema this evening if no results and uncomfortable    DVT prophylaxis  D/c Lovenox secondary to thrombocytopenia.    Full Code  
The patient is a 73y old moderately obese Female with significant PMH of hypertension, colon and lung cancer, diabetes-2, chronic thrombocytopenia, chronic metabolic acidosis without AG, chronic B/L LE lymphedema, recent hospitalization for legs cellulitis presented to the ED with c/o increased right lower leg pain over the past couple days.  Patient says that she was doing pretty well, but then started feeling increased pain and generalized weakness for last couple days, and had difficulty getting out of bed. She says that she walks with help of a cane and walker.  She has HHA who comes for her leg/foot dressing every other days. She also feels cold and chills but denies fever.  She also denies chest pain, sob, cough, abdominal pain, diarrhea or dysuria. She also denies any h/o DVT or PE.  Denies smoking, alcohol or substance abuse.       Sepsis due to B/L LE cellulitis and UTI.            -Leukocytosis of 17.86k --> 12.6  with left shift ; Elevated lactate level now 1.9  Uncontrolled DM-2 with hyperglycemia.  Severe stasis dermatitis and lymphedema  Chronic thrombocytopenia seems to be worsening   Metabolic acidosis without AG.      Plan:  Follow blood culture.  Continue Zosyn and Doxy IV empirically for now.  Tylenol prn.  ID following   ACE Wraps to legs  ISS with coverage.  On Januvia.  Accu-checks q AC & HS.  Podiatry Consult following and doing dressing changes every other day  Miralax  Start PT    DVT prophylaxis  D/c Lovenox secondary to thrombocytopenia.    Full Code

## 2024-08-21 NOTE — DISCHARGE NOTE PROVIDER - NSDCCPCAREPLAN_GEN_ALL_CORE_FT
PRINCIPAL DISCHARGE DIAGNOSIS  Diagnosis: Cellulitis  Assessment and Plan of Treatment:       SECONDARY DISCHARGE DIAGNOSES  Diagnosis: Stasis dermatitis  Assessment and Plan of Treatment:     Diagnosis: Lymphedema, limb  Assessment and Plan of Treatment:     Diagnosis: Acute UTI  Assessment and Plan of Treatment:     Diagnosis: Sepsis, unspecified organism  Assessment and Plan of Treatment:     Diagnosis: Diabetes mellitus, type 2  Assessment and Plan of Treatment:

## 2024-08-21 NOTE — DISCHARGE NOTE PROVIDER - CARE PROVIDER_API CALL
Elgin Dean  [address], [address], [place] [address]  Phone: [phone]  Fax: [phone]  Follow Up Time:

## 2024-08-21 NOTE — DISCHARGE NOTE NURSING/CASE MANAGEMENT/SOCIAL WORK - PATIENT PORTAL LINK FT
You can access the FollowMyHealth Patient Portal offered by [facility] by registering at the following website: [email or web address]. By joining [facility]’s FollowMyHealth portal, you will also be able to view your health information using other applications (apps) compatible with our system.

## 2024-08-21 NOTE — DISCHARGE NOTE PROVIDER - NSDCPNSUBOBJ_GEN_ALL_CORE
SUBJECTIVE:    CHIEF COMPLAINT:  Patient is a 73y old  Female who presents with a chief complaint of Sepsis due to legs cellulitis and UTI (20 Aug 2024 15:18)      Interval HPI and Overnight Events: Pt feeling well. Awaiting SNF rehab.    REVIEW OF SYSTEMS:  CONSTITUTIONAL: No weakness, fevers or chills  EYES/ENT: No visual changes;  No vertigo or throat pain   NECK: No pain or stiffness  RESPIRATORY: No cough, wheezing, hemoptysis; No shortness of breath  CARDIOVASCULAR: No chest pain or palpitations  GASTROINTESTINAL: No abdominal or epigastric pain. No nausea, vomiting, or hematemesis; No diarrhea or constipation. No melena or hematochezia.  GENITOURINARY: No dysuria, frequency or hematuria  NEUROLOGICAL: No numbness or weakness  All other review of systems is negative unless indicated above    OBJECTIVE    PHYSICAL EXAM:  Vital Signs Last 24 Hrs  T(C): 36.9 (21 Aug 2024 08:31), Max: 36.9 (20 Aug 2024 16:29)  T(F): 98.4 (21 Aug 2024 08:31), Max: 98.4 (20 Aug 2024 16:29)  HR: 73 (21 Aug 2024 08:31) (73 - 77)  BP: 130/53 (21 Aug 2024 08:31) (123/61 - 132/51)  BP(mean): 73 (21 Aug 2024 00:26) (73 - 73)  RR: 18 (21 Aug 2024 08:31) (16 - 18)  SpO2: 96% (21 Aug 2024 08:31) (96% - 98%)    Parameters below as of 21 Aug 2024 08:31  Patient On (Oxygen Delivery Method): room air      Constitutional: NAD, awake and alert, well-developed  HEENT: PERR, EOMI, Normal Hearing, MMM  Neck: Soft and supple, No LAD, No JVD  Respiratory: Breath sounds are clear bilaterally, No wheezing, rales or rhonchi  Cardiovascular: S1 and S2, regular rate and rhythm, no Murmurs, gallops or rubs  Gastrointestinal: Bowel Sounds present, soft, nontender, nondistended, no guarding, no rebound  Extremities: No peripheral edema  Vascular: 2+ peripheral pulses  Neurological: A/O x 3, no focal deficits  Musculoskeletal: 5/5 strength b/l upper and lower extremities  Skin: Severe stasis dermatitis, lymphedema, Hyperkeratosis to bilateral legs. Much less erythema.    MEDICATIONS  (STANDING):  acetic acid 0.25% Topical Irrigation 1 Application(s) Topical daily  ascorbic acid 500 milliGRAM(s) Oral daily  dextrose 50% Injectable 25 Gram(s) IV Push once  dextrose 50% Injectable 25 Gram(s) IV Push once  dextrose 50% Injectable 12.5 Gram(s) IV Push once  enoxaparin Injectable 40 milliGRAM(s) SubCutaneous every 24 hours  glucagon  Injectable 1 milliGRAM(s) IntraMuscular once  insulin lispro (ADMELOG) corrective regimen sliding scale   SubCutaneous at bedtime  insulin lispro (ADMELOG) corrective regimen sliding scale   SubCutaneous three times a day before meals  linagliptin 5 milliGRAM(s) Oral daily  multivitamin 1 Tablet(s) Oral daily  mupirocin 2% Ointment 1 Application(s) Topical every 12 hours  nystatin Cream 1 Application(s) Topical two times a day  piperacillin/tazobactam IVPB.. 3.375 Gram(s) IV Intermittent every 8 hours  psyllium Powder 1 Packet(s) Oral daily  senna 2 Tablet(s) Oral at bedtime  trimethoprim  160 mG/sulfamethoxazole 800 mG 1 Tablet(s) Oral every 12 hours    CAPILLARY BLOOD GLUCOSE  POCT Blood Glucose.: 92 mg/dL (21 Aug 2024 07:42)  POCT Blood Glucose.: 126 mg/dL (20 Aug 2024 21:03)  POCT Blood Glucose.: 105 mg/dL (20 Aug 2024 16:59)  POCT Blood Glucose.: 92 mg/dL (20 Aug 2024 12:03)    Assessment and Plan:   · Assessment	  The patient is a 73y old moderately obese Female with significant PMH of hypertension, colon and lung cancer, diabetes-2, chronic thrombocytopenia, chronic metabolic acidosis without AG, chronic B/L LE lymphedema, recent hospitalization for legs cellulitis presented to the ED with c/o increased right lower leg pain over the past couple days.  Patient says that she was doing pretty well, but then started feeling increased pain and generalized weakness for last couple days, and had difficulty getting out of bed. She says that she walks with help of a cane and walker.  She has HHA who comes for her leg/foot dressing every other days. She also feels cold and chills but denies fever.  She also denies chest pain, sob, cough, abdominal pain, diarrhea or dysuria. She also denies any h/o DVT or PE.  Denies smoking, alcohol or substance abuse.       Assessment:  Sepsis due to B/L LE cellulitis and UTI. Improved  Uncontrolled DM-2 with hyperglycemia Improved  Severe stasis dermatitis and lymphedema  Chronic thrombocytopenia seems to be worsening   Metabolic acidosis without AG.  Intertriginous Candidiasis    Plan:  D/c Zosyn  Continue Bactrim   Tylenol prn.  For SNF rehab today   - ACE Wraps to legs to Knee high  ISS with coverage.  On Januvia.  Accu-checks q AC & HS.  Podiatry following.   PT in progress  Accepted into [facility]    DVT prophylaxis  No Lovenox secondary to thrombocytopenia.    Full Code

## 2024-08-21 NOTE — PROGRESS NOTE ADULT - SUBJECTIVE AND OBJECTIVE BOX
Date of service: 08-21-24 @ 11:55    Lying in bed in NAD  Nausea is improved  Tolerating bactrim     ROS: no fever or chills; denies dizziness, no HA, no SOB or cough, no abdominal pain, no diarrhea or constipation; no dysuria, no legs pain, no rashes    MEDICATIONS  (STANDING):  acetic acid 0.25% Topical Irrigation 1 Application(s) Topical daily  ascorbic acid 500 milliGRAM(s) Oral daily  dextrose 50% Injectable 25 Gram(s) IV Push once  dextrose 50% Injectable 25 Gram(s) IV Push once  dextrose 50% Injectable 12.5 Gram(s) IV Push once  enoxaparin Injectable 40 milliGRAM(s) SubCutaneous every 24 hours  glucagon  Injectable 1 milliGRAM(s) IntraMuscular once  insulin lispro (ADMELOG) corrective regimen sliding scale   SubCutaneous at bedtime  insulin lispro (ADMELOG) corrective regimen sliding scale   SubCutaneous three times a day before meals  linagliptin 5 milliGRAM(s) Oral daily  multivitamin 1 Tablet(s) Oral daily  mupirocin 2% Ointment 1 Application(s) Topical every 12 hours  nystatin Cream 1 Application(s) Topical two times a day  piperacillin/tazobactam IVPB.. 3.375 Gram(s) IV Intermittent every 8 hours  psyllium Powder 1 Packet(s) Oral daily  senna 2 Tablet(s) Oral at bedtime  trimethoprim  160 mG/sulfamethoxazole 800 mG 1 Tablet(s) Oral every 12 hours    Vital Signs Last 24 Hrs  T(C): 36.9 (21 Aug 2024 08:31), Max: 36.9 (20 Aug 2024 16:29)  T(F): 98.4 (21 Aug 2024 08:31), Max: 98.4 (20 Aug 2024 16:29)  HR: 73 (21 Aug 2024 08:31) (73 - 77)  BP: 130/53 (21 Aug 2024 08:31) (123/61 - 132/51)  BP(mean): 73 (21 Aug 2024 00:26) (73 - 73)  RR: 18 (21 Aug 2024 08:31) (16 - 18)  SpO2: 96% (21 Aug 2024 08:31) (96% - 98%)    Parameters below as of 21 Aug 2024 08:31  Patient On (Oxygen Delivery Method): room air     Physical exam:    Constitutional:  No acute distress  HEENT: NC/AT, EOMI, PERRLA, conjunctivae clear; ears and nose atraumatic; pharynx benign  Neck: supple; thyroid not palpable  Back: no tenderness  Respiratory: respiratory effort normal; decreased BS at bases  Cardiovascular: S1S2 regular, no murmurs  Abdomen: soft, not tender, not distended, positive BS; no liver or spleen organomegaly  Genitourinary: no suprapubic tenderness  Lymphatic: no LN palpable  Musculoskeletal: no muscle tenderness, no joint swelling or tenderness  Extremities: 2+ pedal edema  b/l Lower legs severe edema, erythema, warmth and tenderness;   Right leg is worse with erythema extending to right thigh - improving  Neurological/ Psychiatric: AxOx3, judgement and insight normal; moving all extremities  Skin: no rashes; no palpable lesions    Labs: reviewed                        10.2   12.64 )-----------( x        ( 15 Aug 2024 07:55 )             30.2     08-15    134<L>  |  111<H>  |  21  ----------------------------<  132<H>  4.6   |  19<L>  |  1.01    Ca    8.5      15 Aug 2024 07:55    TPro  6.3  /  Alb  2.3<L>  /  TBili  1.2  /  DBili  x   /  AST  37  /  ALT  22  /  AlkPhos  88  08-14     LIVER FUNCTIONS - ( 14 Aug 2024 19:12 )  Alb: 2.3 g/dL / Pro: 6.3 gm/dL / ALK PHOS: 88 U/L / ALT: 22 U/L / AST: 37 U/L / GGT: x           Urinalysis with Rflx Culture (08-14 @ 18:01)  Urine Appearance: Clear  Protein, Urine: Trace mg/dL  Urine Microscopic-Add On (NC) (08-14 @ 18:01)  White Blood Cell - Urine: 18 /HPF  Red Blood Cell - Urine: 4 /HPF    Urinalysis with Rflx Culture (collected 14 Aug 2024 18:01)    Radiology: all available radiological tests reviewed    Advanced directives addressed: full resuscitation
SUBJECTIVE:    CHIEF COMPLAINT:  Patient is a 73y old  Female who presents with a chief complaint of Sepsis due to legs cellulitis and UTI (15 Aug 2024 15:25)      HPI:  Chief Complaint: weakness.    The patient is a 73y old moderately obese Female with significant PMH of hypertension, colon and lung cancer, diabetes-2, chronic thrombocytopenia, chronic metabolic acidosis without AG, chronic B/L LE lymphedema, recent hospitalization for legs cellulitis presented to the ED with c/o increased right lower leg pain over the past couple days.  Patient says that she was doing pretty well, but then started feeling increased pain and generalized weakness for last couple days, and had difficulty getting out of bed. She says that she walks with help of a cane and walker.  She has HHA who comes for her leg/foot dressing every other days. She also feels cold and chills but denies fever.  She also denies chest pain, sob, cough, abdominal pain, diarrhea or dysuria. She also denies any h/o DVT or PE.  Denies smoking, alcohol or substance abuse.   Refused US venous duplex study in ED.    Sig labs:  WBC 17.86k with N 93%, Lactate elevated 3.2->3.9, Platelets 100k, Hb 12.3, Bicarb 19, AG 6, , LFTs wnl.  UA consistent with UTI showing LE moderate, Nitrite positive, Bacteria many.  RVP negative.    CXR: shows haziness/opacity in LLL, but official report pending.  XR right tibia-fibula: shows soft tissue edema, but negative for any fracture or dislocation.    Zosyn and Doxy IV and NS 30 cc/kg BW bolus given in ED after blood culture draw.       (14 Aug 2024 20:27)      Interval HPI and Overnight Events: Pt tolerating antibiotics. Some pain to legs. Still redness.    REVIEW OF SYSTEMS:  CONSTITUTIONAL: No weakness, fevers or chills  EYES/ENT: No visual changes;  No vertigo or throat pain   NECK: No pain or stiffness  RESPIRATORY: No cough, wheezing, hemoptysis; No shortness of breath  CARDIOVASCULAR: No chest pain or palpitations  GASTROINTESTINAL: No abdominal or epigastric pain. No nausea, vomiting, or hematemesis; No diarrhea or constipation. No melena or hematochezia.  GENITOURINARY: No dysuria, frequency or hematuria  NEUROLOGICAL: No numbness or weakness  SKIN: No itching, burning, rashes, or lesions   All other review of systems is negative unless indicated above    OBJECTIVE    Vital Signs Last 24 Hrs  T(C): 36.8 (16 Aug 2024 08:12), Max: 37.1 (15 Aug 2024 23:20)  T(F): 98.2 (16 Aug 2024 08:12), Max: 98.7 (15 Aug 2024 23:20)  HR: 68 (16 Aug 2024 08:12) (58 - 73)  BP: 122/50 (16 Aug 2024 08:12) (111/41 - 122/50)  BP(mean): --  RR: 18 (16 Aug 2024 08:12) (18 - 18)  SpO2: 99% (16 Aug 2024 08:12) (98% - 99%)    Parameters below as of 15 Aug 2024 23:20  Patient On (Oxygen Delivery Method): room air        MEDICATIONS  (STANDING):  acetic acid 0.25% Topical Irrigation 1 Application(s) Topical daily  ascorbic acid 500 milliGRAM(s) Oral daily  dextrose 5%. 1000 milliLiter(s) (50 mL/Hr) IV Continuous <Continuous>  dextrose 5%. 1000 milliLiter(s) (100 mL/Hr) IV Continuous <Continuous>  dextrose 50% Injectable 25 Gram(s) IV Push once  dextrose 50% Injectable 12.5 Gram(s) IV Push once  dextrose 50% Injectable 25 Gram(s) IV Push once  doxycycline IVPB 100 milliGRAM(s) IV Intermittent every 12 hours  enoxaparin Injectable 40 milliGRAM(s) SubCutaneous every 24 hours  glucagon  Injectable 1 milliGRAM(s) IntraMuscular once  insulin lispro (ADMELOG) corrective regimen sliding scale   SubCutaneous three times a day before meals  insulin lispro (ADMELOG) corrective regimen sliding scale   SubCutaneous at bedtime  linagliptin 5 milliGRAM(s) Oral daily  multivitamin 1 Tablet(s) Oral daily  mupirocin 2% Ointment 1 Application(s) Topical every 12 hours  piperacillin/tazobactam IVPB.. 3.375 Gram(s) IV Intermittent every 8 hours  senna 2 Tablet(s) Oral at bedtime  sodium chloride 0.9%. 1000 milliLiter(s) (75 mL/Hr) IV Continuous <Continuous>      LABS:                         10.2   12.64 )-----------( 73       ( 15 Aug 2024 07:55 )             30.2     08-15    134<L>  |  111<H>  |  21  ----------------------------<  132<H>  4.6   |  19<L>  |  1.01    Ca    8.5      15 Aug 2024 07:55    TPro  6.3  /  Alb  2.3<L>  /  TBili  1.2  /  DBili  x   /  AST  37  /  ALT  22  /  AlkPhos  88  08-14    Urinalysis Basic - ( 15 Aug 2024 07:55 )    Color: x / Appearance: x / SG: x / pH: x  Gluc: 132 mg/dL / Ketone: x  / Bili: x / Urobili: x   Blood: x / Protein: x / Nitrite: x   Leuk Esterase: x / RBC: x / WBC x   Sq Epi: x / Non Sq Epi: x / Bacteria: x    PT/INR - ( 14 Aug 2024 19:12 )   PT: 13.9 sec;   INR: 1.24 ratio    PTT - ( 14 Aug 2024 19:12 )  PTT:35.1 sec    Specimen Source .Blood None   08-14 @ 18:01  Culture Results  No growth at 24 hours    URINE CULTURE    08-14 @ 18:01    CULTURE RESULTS   No growth at 24 hours    CAPILLARY BLOOD GLUCOSE  POCT Blood Glucose.: 85 mg/dL (16 Aug 2024 08:08)  POCT Blood Glucose.: 139 mg/dL (15 Aug 2024 20:05)  POCT Blood Glucose.: 132 mg/dL (15 Aug 2024 16:47)  POCT Blood Glucose.: 115 mg/dL (15 Aug 2024 11:37)    
SUBJECTIVE:    CHIEF COMPLAINT:  Patient is a 73y old  Female who presents with a chief complaint of Sepsis due to legs cellulitis and UTI (20 Aug 2024 13:04)      HPI:  Chief Complaint: weakness.    The patient is a 73y old moderately obese Female with significant PMH of hypertension, colon and lung cancer, diabetes-2, chronic thrombocytopenia, chronic metabolic acidosis without AG, chronic B/L LE lymphedema, recent hospitalization for legs cellulitis presented to the ED with c/o increased right lower leg pain over the past couple days.  Patient says that she was doing pretty well, but then started feeling increased pain and generalized weakness for last couple days, and had difficulty getting out of bed. She says that she walks with help of a cane and walker.  She has HHA who comes for her leg/foot dressing every other days. She also feels cold and chills but denies fever.  She also denies chest pain, sob, cough, abdominal pain, diarrhea or dysuria. She also denies any h/o DVT or PE.  Denies smoking, alcohol or substance abuse.   Refused US venous duplex study in ED.    Sig labs:  WBC 17.86k with N 93%, Lactate elevated 3.2->3.9, Platelets 100k, Hb 12.3, Bicarb 19, AG 6, , LFTs wnl.  UA consistent with UTI showing LE moderate, Nitrite positive, Bacteria many.  RVP negative.    CXR: shows haziness/opacity in LLL, but official report pending.  XR right tibia-fibula: shows soft tissue edema, but negative for any fracture or dislocation.      Interval HPI and Overnight Events: Pt feeling well. Tolerating the zosyn. Changed from doxy to po bactrim. Pt wants SNF rehab. Spoke to ID. OK to d/c zosyn tomorrow on day 7    REVIEW OF SYSTEMS:  CONSTITUTIONAL: No weakness, fevers or chills  EYES/ENT: No visual changes;  No vertigo or throat pain   NECK: No pain or stiffness  RESPIRATORY: No cough, wheezing, hemoptysis; No shortness of breath  CARDIOVASCULAR: No chest pain or palpitations  GASTROINTESTINAL: No abdominal or epigastric pain. No nausea, vomiting, or hematemesis; No diarrhea or constipation. No melena or hematochezia.  GENITOURINARY: No dysuria, frequency or hematuria  NEUROLOGICAL: No numbness or weakness  SKIN: No itching, burning, rashes, or lesions   All other review of systems is negative unless indicated above    OBJECTIVE    Vital Signs Last 24 Hrs  T(C): 36.8 (20 Aug 2024 08:31), Max: 36.9 (19 Aug 2024 16:03)  T(F): 98.3 (20 Aug 2024 08:31), Max: 98.5 (19 Aug 2024 16:03)  HR: 76 (20 Aug 2024 08:31) (76 - 76)  BP: 125/52 (20 Aug 2024 08:31) (122/51 - 133/59)  BP(mean): --  RR: 18 (20 Aug 2024 08:31) (18 - 19)  SpO2: 98% (20 Aug 2024 08:31) (97% - 98%)    Parameters below as of 20 Aug 2024 08:31  Patient On (Oxygen Delivery Method): room air        MEDICATIONS  (STANDING):  acetic acid 0.25% Topical Irrigation 1 Application(s) Topical daily  ascorbic acid 500 milliGRAM(s) Oral daily  dextrose 50% Injectable 25 Gram(s) IV Push once  dextrose 50% Injectable 12.5 Gram(s) IV Push once  dextrose 50% Injectable 25 Gram(s) IV Push once  enoxaparin Injectable 40 milliGRAM(s) SubCutaneous every 24 hours  glucagon  Injectable 1 milliGRAM(s) IntraMuscular once  insulin lispro (ADMELOG) corrective regimen sliding scale   SubCutaneous three times a day before meals  insulin lispro (ADMELOG) corrective regimen sliding scale   SubCutaneous at bedtime  linagliptin 5 milliGRAM(s) Oral daily  multivitamin 1 Tablet(s) Oral daily  mupirocin 2% Ointment 1 Application(s) Topical every 12 hours  nystatin Cream 1 Application(s) Topical two times a day  piperacillin/tazobactam IVPB.. 3.375 Gram(s) IV Intermittent every 8 hours  psyllium Powder 1 Packet(s) Oral daily  senna 2 Tablet(s) Oral at bedtime  trimethoprim  160 mG/sulfamethoxazole 800 mG 1 Tablet(s) Oral every 12 hours      LABS:     CAPILLARY BLOOD GLUCOSE  POCT Blood Glucose.: 92 mg/dL (20 Aug 2024 12:03)  POCT Blood Glucose.: 85 mg/dL (20 Aug 2024 07:44)  POCT Blood Glucose.: 123 mg/dL (19 Aug 2024 20:20)  POCT Blood Glucose.: 122 mg/dL (19 Aug 2024 16:17)  
8/20/24: Pt seen by podiatry today. Resting comfortably, NAD, dressings changed, ryan edema and erythema improved.     PMH: No pertinent past medical history    Lymphatic edema    Hypertension    Type 2 diabetes mellitus    Lung cancer    Colon cancer      PSH:No significant past surgical history        Allergies: Keflex (Anaphylaxis)  cephalexin (Unknown)      Labs:                        10.5   4.80  )-----------( 82       ( 17 Aug 2024 06:42 )             33.2   08-17    140  |  114<H>  |  24<H>  ----------------------------<  85  4.2   |  21<L>  |  1.06    Ca    8.7      17 Aug 2024 06:42      Vital Signs Last 24 Hrs  T(C): 36.8 (20 Aug 2024 08:31), Max: 36.9 (19 Aug 2024 16:03)  T(F): 98.3 (20 Aug 2024 08:31), Max: 98.5 (19 Aug 2024 16:03)  HR: 76 (20 Aug 2024 08:31) (76 - 76)  BP: 125/52 (20 Aug 2024 08:31) (122/51 - 133/59)  BP(mean): --  RR: 18 (20 Aug 2024 08:31) (18 - 19)  SpO2: 98% (20 Aug 2024 08:31) (97% - 98%)    Parameters below as of 20 Aug 2024 08:31  Patient On (Oxygen Delivery Method): room air        MEDICATIONS  (STANDING):  acetic acid 0.25% Topical Irrigation 1 Application(s) Topical daily  ascorbic acid 500 milliGRAM(s) Oral daily  dextrose 50% Injectable 25 Gram(s) IV Push once  dextrose 50% Injectable 25 Gram(s) IV Push once  dextrose 50% Injectable 12.5 Gram(s) IV Push once  doxycycline IVPB 100 milliGRAM(s) IV Intermittent every 12 hours  enoxaparin Injectable 40 milliGRAM(s) SubCutaneous every 24 hours  glucagon  Injectable 1 milliGRAM(s) IntraMuscular once  insulin lispro (ADMELOG) corrective regimen sliding scale   SubCutaneous at bedtime  insulin lispro (ADMELOG) corrective regimen sliding scale   SubCutaneous three times a day before meals  linagliptin 5 milliGRAM(s) Oral daily  multivitamin 1 Tablet(s) Oral daily  mupirocin 2% Ointment 1 Application(s) Topical every 12 hours  piperacillin/tazobactam IVPB.. 3.375 Gram(s) IV Intermittent every 8 hours  senna 2 Tablet(s) Oral at bedtime    MEDICATIONS  (PRN):  acetaminophen     Tablet .. 650 milliGRAM(s) Oral every 6 hours PRN Temp greater or equal to 38C (100.4F), Mild Pain (1 - 3)  aluminum hydroxide/magnesium hydroxide/simethicone Suspension 30 milliLiter(s) Oral every 4 hours PRN Dyspepsia  dextrose Oral Gel 15 Gram(s) Oral once PRN Blood Glucose LESS THAN 70 milliGRAM(s)/deciliter  ibuprofen  Tablet. 600 milliGRAM(s) Oral every 6 hours PRN Moderate Pain (4 - 6)  melatonin 3 milliGRAM(s) Oral at bedtime PRN Insomnia  ondansetron Injectable 4 milliGRAM(s) IV Push every 8 hours PRN Nausea and/or Vomiting        REVIEW OF SYSTEMS:    CONSTITUTIONAL: No weakness, fevers or chills  EYES: No visual changes  RESPIRATORY: No cough, wheezing; No shortness of breath  CARDIOVASCULAR: No chest pain or palpitations  GASTROINTESTINAL: No abdominal or epigastric pain. No nausea, vomiting; No diarrhea or constipation.   GENITOURINARY: No dysuria, frequency or hematuria  NEUROLOGICAL: No numbness or weakness  SKIN: See physical examination.  All other review of systems is negative unless indicated above    Physical Exam:   Derm:  Skin warm, dry and supple bilateral.    Significant generalized lichenification and verrucous changes to skin extending from below the knees to feet bilaterally, cobblestone like papules and nodules extending from below knee to feet bilaterally. Ryan interdigital maceration and malodor. Noted hemosiderin deposition to BLE. Noted erythema to the right dorsal foot  improved  Vascular: Dorsalis Pedis and Posterior Tibial pulses non palpable due to significant edema to BLE.    Neuro: Protective sensation intact to the level of the digits bilateral.  MSK: Muscle strength 5/5 all major muscle groups bilateral. Pt is tender to palpation of bilateral feet (improved)             
Date of service: 08-16-24 @ 09:54    Lying in bed in NAD  Has left lower leg pain  No fever    ROS: no fever or chills; denies dizziness, no HA, no SOB or cough, no abdominal pain, no diarrhea or constipation; no dysuria, has left legs pain, no rashes    MEDICATIONS  (STANDING):  acetic acid 0.25% Topical Irrigation 1 Application(s) Topical daily  ascorbic acid 500 milliGRAM(s) Oral daily  dextrose 50% Injectable 25 Gram(s) IV Push once  dextrose 50% Injectable 25 Gram(s) IV Push once  dextrose 50% Injectable 12.5 Gram(s) IV Push once  doxycycline IVPB 100 milliGRAM(s) IV Intermittent every 12 hours  enoxaparin Injectable 40 milliGRAM(s) SubCutaneous every 24 hours  glucagon  Injectable 1 milliGRAM(s) IntraMuscular once  insulin lispro (ADMELOG) corrective regimen sliding scale   SubCutaneous three times a day before meals  insulin lispro (ADMELOG) corrective regimen sliding scale   SubCutaneous at bedtime  linagliptin 5 milliGRAM(s) Oral daily  multivitamin 1 Tablet(s) Oral daily  mupirocin 2% Ointment 1 Application(s) Topical every 12 hours  piperacillin/tazobactam IVPB.. 3.375 Gram(s) IV Intermittent every 8 hours  senna 2 Tablet(s) Oral at bedtime    Vital Signs Last 24 Hrs  T(C): 36.8 (16 Aug 2024 08:12), Max: 37.1 (15 Aug 2024 23:20)  T(F): 98.2 (16 Aug 2024 08:12), Max: 98.7 (15 Aug 2024 23:20)  HR: 68 (16 Aug 2024 08:12) (58 - 73)  BP: 122/50 (16 Aug 2024 08:12) (111/41 - 122/50)  BP(mean): --  RR: 18 (16 Aug 2024 08:12) (18 - 18)  SpO2: 99% (16 Aug 2024 08:12) (98% - 99%)    Parameters below as of 15 Aug 2024 23:20  Patient On (Oxygen Delivery Method): room air     Physical exam:    Constitutional:  No acute distress  HEENT: NC/AT, EOMI, PERRLA, conjunctivae clear; ears and nose atraumatic; pharynx benign  Neck: supple; thyroid not palpable  Back: no tenderness  Respiratory: respiratory effort normal; decreased BS at bases  Cardiovascular: S1S2 regular, no murmurs  Abdomen: soft, not tender, not distended, positive BS; no liver or spleen organomegaly  Genitourinary: no suprapubic tenderness  Lymphatic: no LN palpable  Musculoskeletal: no muscle tenderness, no joint swelling or tenderness  Extremities: 2+ pedal edema  b/l Lower legs severe edema, erythema, warmth and tenderness; Right leg is worse with erythema extending to right thigh  Neurological/ Psychiatric: AxOx3, judgement and insight normal; moving all extremities  Skin: no rashes; no palpable lesions    Labs: all available labs reviewed                        10.2   12.64 )-----------( x        ( 15 Aug 2024 07:55 )             30.2     08-15    134<L>  |  111<H>  |  21  ----------------------------<  132<H>  4.6   |  19<L>  |  1.01    Ca    8.5      15 Aug 2024 07:55    TPro  6.3  /  Alb  2.3<L>  /  TBili  1.2  /  DBili  x   /  AST  37  /  ALT  22  /  AlkPhos  88  08-14     LIVER FUNCTIONS - ( 14 Aug 2024 19:12 )  Alb: 2.3 g/dL / Pro: 6.3 gm/dL / ALK PHOS: 88 U/L / ALT: 22 U/L / AST: 37 U/L / GGT: x           Urinalysis with Rflx Culture (08-14 @ 18:01)  Urine Appearance: Clear  Protein, Urine: Trace mg/dL  Urine Microscopic-Add On (NC) (08-14 @ 18:01)  White Blood Cell - Urine: 18 /HPF  Red Blood Cell - Urine: 4 /HPF    Urinalysis with Rflx Culture (collected 14 Aug 2024 18:01)    Radiology: all available radiological tests reviewed    Advanced directives addressed: full resuscitation
Date of service: 08-19-24 @ 13:32    Lying in bed in NAD  Has right leg pain  No fever  Poor IV access    ROS: no fever or chills; denies dizziness, no HA, no SOB or cough, no abdominal pain, no diarrhea or constipation; no dysuria, no rashes    MEDICATIONS  (STANDING):  acetic acid 0.25% Topical Irrigation 1 Application(s) Topical daily  ascorbic acid 500 milliGRAM(s) Oral daily  dextrose 50% Injectable 25 Gram(s) IV Push once  dextrose 50% Injectable 25 Gram(s) IV Push once  dextrose 50% Injectable 12.5 Gram(s) IV Push once  doxycycline monohydrate Capsule 100 milliGRAM(s) Oral every 12 hours  enoxaparin Injectable 40 milliGRAM(s) SubCutaneous every 24 hours  glucagon  Injectable 1 milliGRAM(s) IntraMuscular once  insulin lispro (ADMELOG) corrective regimen sliding scale   SubCutaneous three times a day before meals  insulin lispro (ADMELOG) corrective regimen sliding scale   SubCutaneous at bedtime  linagliptin 5 milliGRAM(s) Oral daily  multivitamin 1 Tablet(s) Oral daily  mupirocin 2% Ointment 1 Application(s) Topical every 12 hours  nystatin Cream 1 Application(s) Topical two times a day  piperacillin/tazobactam IVPB.. 3.375 Gram(s) IV Intermittent every 8 hours  psyllium Powder 1 Packet(s) Oral daily  senna 2 Tablet(s) Oral at bedtime    Vital Signs Last 24 Hrs  T(C): 36.6 (19 Aug 2024 07:17), Max: 37.1 (19 Aug 2024 00:31)  T(F): 97.8 (19 Aug 2024 07:17), Max: 98.7 (19 Aug 2024 00:31)  HR: 80 (19 Aug 2024 07:17) (77 - 80)  BP: 147/68 (19 Aug 2024 07:17) (125/53 - 147/68)  BP(mean): 69 (19 Aug 2024 00:31) (69 - 69)  RR: 19 (19 Aug 2024 07:17) (18 - 19)  SpO2: 98% (19 Aug 2024 07:17) (97% - 100%)    Parameters below as of 19 Aug 2024 07:17  Patient On (Oxygen Delivery Method): room air         Physical exam:    Constitutional:  No acute distress  HEENT: NC/AT, EOMI, PERRLA, conjunctivae clear; ears and nose atraumatic; pharynx benign  Neck: supple; thyroid not palpable  Back: no tenderness  Respiratory: respiratory effort normal; decreased BS at bases  Cardiovascular: S1S2 regular, no murmurs  Abdomen: soft, not tender, not distended, positive BS; no liver or spleen organomegaly  Genitourinary: no suprapubic tenderness  Lymphatic: no LN palpable  Musculoskeletal: no muscle tenderness, no joint swelling or tenderness  Extremities: 2+ pedal edema  b/l Lower legs severe edema, erythema, warmth and tenderness;   Right leg is worse with erythema extending to right thigh - improving  Neurological/ Psychiatric: AxOx3, judgement and insight normal; moving all extremities  Skin: no rashes; no palpable lesions    Labs: reviewed                        10.2   12.64 )-----------( x        ( 15 Aug 2024 07:55 )             30.2     08-15    134<L>  |  111<H>  |  21  ----------------------------<  132<H>  4.6   |  19<L>  |  1.01    Ca    8.5      15 Aug 2024 07:55    TPro  6.3  /  Alb  2.3<L>  /  TBili  1.2  /  DBili  x   /  AST  37  /  ALT  22  /  AlkPhos  88  08-14     LIVER FUNCTIONS - ( 14 Aug 2024 19:12 )  Alb: 2.3 g/dL / Pro: 6.3 gm/dL / ALK PHOS: 88 U/L / ALT: 22 U/L / AST: 37 U/L / GGT: x           Urinalysis with Rflx Culture (08-14 @ 18:01)  Urine Appearance: Clear  Protein, Urine: Trace mg/dL  Urine Microscopic-Add On (NC) (08-14 @ 18:01)  White Blood Cell - Urine: 18 /HPF  Red Blood Cell - Urine: 4 /HPF    Urinalysis with Rflx Culture (collected 14 Aug 2024 18:01)    Radiology: all available radiological tests reviewed    Advanced directives addressed: full resuscitation
SUBJECTIVE:    CHIEF COMPLAINT:  Patient is a 73y old  Female who presents with a chief complaint of Sepsis due to legs cellulitis and UTI (16 Aug 2024 11:26)      HPI:  Chief Complaint: weakness.    The patient is a 73y old moderately obese Female with significant PMH of hypertension, colon and lung cancer, diabetes-2, chronic thrombocytopenia, chronic metabolic acidosis without AG, chronic B/L LE lymphedema, recent hospitalization for legs cellulitis presented to the ED with c/o increased right lower leg pain over the past couple days.  Patient says that she was doing pretty well, but then started feeling increased pain and generalized weakness for last couple days, and had difficulty getting out of bed. She says that she walks with help of a cane and walker.  She has HHA who comes for her leg/foot dressing every other days. She also feels cold and chills but denies fever.  She also denies chest pain, sob, cough, abdominal pain, diarrhea or dysuria. She also denies any h/o DVT or PE.  Denies smoking, alcohol or substance abuse.   Refused US venous duplex study in ED.    Sig labs:  WBC 17.86k with N 93%, Lactate elevated 3.2->3.9, Platelets 100k, Hb 12.3, Bicarb 19, AG 6, , LFTs wnl.  UA consistent with UTI showing LE moderate, Nitrite positive, Bacteria many.  RVP negative.    CXR: shows haziness/opacity in LLL, but official report pending.  XR right tibia-fibula: shows soft tissue edema, but negative for any fracture or dislocation.    Zosyn and Doxy IV and NS 30 cc/kg BW bolus given in ED after blood culture draw.      Interval HPI and Overnight Events: Pt doing better. C/o constipation. Has small BM this am. Given Mirilax. Encouraged po fluid intake.    REVIEW OF SYSTEMS:  CONSTITUTIONAL: No weakness, fevers or chills  EYES/ENT: No visual changes;  No vertigo or throat pain   NECK: No pain or stiffness  RESPIRATORY: No cough, wheezing, hemoptysis; No shortness of breath  CARDIOVASCULAR: No chest pain or palpitations  GASTROINTESTINAL: No abdominal or epigastric pain. No nausea, vomiting, or hematemesis; No diarrhea or constipation. No melena or hematochezia.  GENITOURINARY: No dysuria, frequency or hematuria  NEUROLOGICAL: No numbness or weakness  SKIN: No itching, burning, rashes, or lesions   All other review of systems is negative unless indicated above    OBJECTIVE    Vital Signs Last 24 Hrs  T(C): 37.1 (17 Aug 2024 16:05), Max: 37.2 (17 Aug 2024 09:13)  T(F): 98.7 (17 Aug 2024 16:05), Max: 99 (17 Aug 2024 09:13)  HR: 80 (17 Aug 2024 16:05) (68 - 80)  BP: 136/52 (17 Aug 2024 16:05) (118/50 - 136/52)  BP(mean): --  RR: 18 (17 Aug 2024 16:05) (16 - 18)  SpO2: 100% (17 Aug 2024 16:05) (91% - 100%)    Parameters below as of 17 Aug 2024 16:05  Patient On (Oxygen Delivery Method): room air        MEDICATIONS  (STANDING):  acetic acid 0.25% Topical Irrigation 1 Application(s) Topical daily  ascorbic acid 500 milliGRAM(s) Oral daily  dextrose 50% Injectable 25 Gram(s) IV Push once  dextrose 50% Injectable 25 Gram(s) IV Push once  dextrose 50% Injectable 12.5 Gram(s) IV Push once  doxycycline IVPB 100 milliGRAM(s) IV Intermittent every 12 hours  enoxaparin Injectable 40 milliGRAM(s) SubCutaneous every 24 hours  glucagon  Injectable 1 milliGRAM(s) IntraMuscular once  insulin lispro (ADMELOG) corrective regimen sliding scale   SubCutaneous three times a day before meals  insulin lispro (ADMELOG) corrective regimen sliding scale   SubCutaneous at bedtime  linagliptin 5 milliGRAM(s) Oral daily  multivitamin 1 Tablet(s) Oral daily  mupirocin 2% Ointment 1 Application(s) Topical every 12 hours  piperacillin/tazobactam IVPB.. 3.375 Gram(s) IV Intermittent every 8 hours  psyllium Powder 1 Packet(s) Oral daily  senna 2 Tablet(s) Oral at bedtime      LABS:                         10.5   4.80  )-----------( 82       ( 17 Aug 2024 06:42 )             33.2     08-17    140  |  114<H>  |  24<H>  ----------------------------<  85  4.2   |  21<L>  |  1.06    Ca    8.7      17 Aug 2024 06:42      Urinalysis Basic - ( 17 Aug 2024 06:42 )    Color: x / Appearance: x / SG: x / pH: x  Gluc: 85 mg/dL / Ketone: x  / Bili: x / Urobili: x   Blood: x / Protein: x / Nitrite: x   Leuk Esterase: x / RBC: x / WBC x   Sq Epi: x / Non Sq Epi: x / Bacteria: x            Specimen Source .Blood None   08-14 @ 18:01  Culture Results  No growth at 48 Hours            CAPILLARY BLOOD GLUCOSE      POCT Blood Glucose.: 112 mg/dL (17 Aug 2024 16:42)  POCT Blood Glucose.: 125 mg/dL (17 Aug 2024 11:39)  POCT Blood Glucose.: 87 mg/dL (17 Aug 2024 07:41)  POCT Blood Glucose.: 133 mg/dL (16 Aug 2024 20:19)        RADIOLOGY/EKG:      
Date of Service: 8/16/2024  HPI: Podiatry was consulted for a 74 yo moderately obese Female with significant  PMHx of  hypertension, colon and lung cancer, diabetes-2, chronic thrombocytopenia, chronic metabolic acidosis without AG, chronic B/L LE lymphedema, recent hospitalization for legs cellulitis presented to the ED with complaints of  increased right lower leg pain over the past couple days.  Patient says that she was doing pretty well, but then started feeling increased pain and generalized weakness for last couple of days, and had difficulty getting out of bed. She says that she walks with help of a cane and walker. Patient has a home Help [facility] (HHA) who comes for her leg/foot dressing every other days. She also feels cold and chills but denies fever.  She also denies chest pain, sob, cough, abdominal pain, diarrhea or dysuria. She also denies any h/o DVT or PE.  Denies smoking, alcohol or substance abuse.   Refused US venous duplex study in ED.  8/16/24: patient seen bedside w attending. No new pedal complaints. Dressing changed today next sunday.       PMH: No pertinent past medical history    Lymphatic edema    Hypertension    Type 2 diabetes mellitus    Lung cancer    Colon cancer      PSH:No significant past surgical history        Allergies: Keflex (Anaphylaxis)  cephalexin (Unknown)      Labs:                            10.2   12.64 )-----------( 73       ( 15 Aug 2024 07:55 )             30.2     08-15    134<L>  |  111<H>  |  21  ----------------------------<  132<H>  4.6   |  19<L>  |  1.01    Ca    8.5      15 Aug 2024 07:55    TPro  6.3  /  Alb  2.3<L>  /  TBili  1.2  /  DBili  x   /  AST  37  /  ALT  22  /  AlkPhos  88  08-14         Vital Signs Last 24 Hrs  T(C): 36.8 (16 Aug 2024 08:12), Max: 37.1 (15 Aug 2024 23:20)  T(F): 98.2 (16 Aug 2024 08:12), Max: 98.7 (15 Aug 2024 23:20)  HR: 68 (16 Aug 2024 08:12) (58 - 73)  BP: 122/50 (16 Aug 2024 08:12) (111/41 - 122/50)  BP(mean): --  RR: 18 (16 Aug 2024 08:12) (18 - 18)  SpO2: 99% (16 Aug 2024 08:12) (98% - 99%)    Parameters below as of 15 Aug 2024 23:20  Patient On (Oxygen Delivery Method): room air      MEDICATIONS  (STANDING):  acetic acid 0.25% Topical Irrigation 1 Application(s) Topical daily  ascorbic acid 500 milliGRAM(s) Oral daily  dextrose 50% Injectable 25 Gram(s) IV Push once  dextrose 50% Injectable 25 Gram(s) IV Push once  dextrose 50% Injectable 12.5 Gram(s) IV Push once  doxycycline IVPB 100 milliGRAM(s) IV Intermittent every 12 hours  enoxaparin Injectable 40 milliGRAM(s) SubCutaneous every 24 hours  glucagon  Injectable 1 milliGRAM(s) IntraMuscular once  insulin lispro (ADMELOG) corrective regimen sliding scale   SubCutaneous at bedtime  insulin lispro (ADMELOG) corrective regimen sliding scale   SubCutaneous three times a day before meals  linagliptin 5 milliGRAM(s) Oral daily  multivitamin 1 Tablet(s) Oral daily  mupirocin 2% Ointment 1 Application(s) Topical every 12 hours  piperacillin/tazobactam IVPB.. 3.375 Gram(s) IV Intermittent every 8 hours  senna 2 Tablet(s) Oral at bedtime    MEDICATIONS  (PRN):  acetaminophen     Tablet .. 650 milliGRAM(s) Oral every 6 hours PRN Temp greater or equal to 38C (100.4F), Mild Pain (1 - 3)  aluminum hydroxide/magnesium hydroxide/simethicone Suspension 30 milliLiter(s) Oral every 4 hours PRN Dyspepsia  dextrose Oral Gel 15 Gram(s) Oral once PRN Blood Glucose LESS THAN 70 milliGRAM(s)/deciliter  ibuprofen  Tablet. 600 milliGRAM(s) Oral every 6 hours PRN Moderate Pain (4 - 6)  melatonin 3 milliGRAM(s) Oral at bedtime PRN Insomnia  ondansetron Injectable 4 milliGRAM(s) IV Push every 8 hours PRN Nausea and/or Vomiting        REVIEW OF SYSTEMS:    CONSTITUTIONAL: No weakness, fevers or chills  EYES: No visual changes  RESPIRATORY: No cough, wheezing; No shortness of breath  CARDIOVASCULAR: No chest pain or palpitations  GASTROINTESTINAL: No abdominal or epigastric pain. No nausea, vomiting; No diarrhea or constipation.   GENITOURINARY: No dysuria, frequency or hematuria  NEUROLOGICAL: No numbness or weakness  SKIN: See physical examination.  All other review of systems is negative unless indicated above    Physical Exam:   Derm:  Skin warm, dry and supple bilateral.    Significant generalized lichenification and verrucous changes to skin extending from below the knees to feet bilaterally, cobblestone like papules and nodules extending from below knee to feet bilaterally. Ryan interdigital maceration and malodor. Noted hemosiderin deposition to BLE. Noted erythema to the right dorsal foot    Vascular: Dorsalis Pedis and Posterior Tibial pulses non palpable due to significant edema to BLE.    Neuro: Protective sensation intact to the level of the digits bilateral.  MSK: Muscle strength 5/5 all major muscle groups bilateral. Pt is tender to palpation of bilateral feet              
Date of service: 08-20-24 @ 13:05    Has right foot pain  Has nausea due to doxycycline  No fever    ROS: no fever or chills; denies dizziness, no HA, no SOB or cough, no abdominal pain, no diarrhea or constipation; no dysuria, no rashes    MEDICATIONS  (STANDING):  acetic acid 0.25% Topical Irrigation 1 Application(s) Topical daily  ascorbic acid 500 milliGRAM(s) Oral daily  dextrose 50% Injectable 25 Gram(s) IV Push once  dextrose 50% Injectable 12.5 Gram(s) IV Push once  dextrose 50% Injectable 25 Gram(s) IV Push once  enoxaparin Injectable 40 milliGRAM(s) SubCutaneous every 24 hours  glucagon  Injectable 1 milliGRAM(s) IntraMuscular once  insulin lispro (ADMELOG) corrective regimen sliding scale   SubCutaneous three times a day before meals  insulin lispro (ADMELOG) corrective regimen sliding scale   SubCutaneous at bedtime  linagliptin 5 milliGRAM(s) Oral daily  multivitamin 1 Tablet(s) Oral daily  mupirocin 2% Ointment 1 Application(s) Topical every 12 hours  nystatin Cream 1 Application(s) Topical two times a day  piperacillin/tazobactam IVPB.. 3.375 Gram(s) IV Intermittent every 8 hours  psyllium Powder 1 Packet(s) Oral daily  senna 2 Tablet(s) Oral at bedtime  trimethoprim  160 mG/sulfamethoxazole 800 mG 1 Tablet(s) Oral every 12 hours    Vital Signs Last 24 Hrs  T(C): 36.8 (20 Aug 2024 08:31), Max: 36.9 (19 Aug 2024 16:03)  T(F): 98.3 (20 Aug 2024 08:31), Max: 98.5 (19 Aug 2024 16:03)  HR: 76 (20 Aug 2024 08:31) (76 - 76)  BP: 125/52 (20 Aug 2024 08:31) (122/51 - 133/59)  BP(mean): --  RR: 18 (20 Aug 2024 08:31) (18 - 19)  SpO2: 98% (20 Aug 2024 08:31) (97% - 98%)    Parameters below as of 20 Aug 2024 08:31  Patient On (Oxygen Delivery Method): room air    Physical exam:    Constitutional:  No acute distress  HEENT: NC/AT, EOMI, PERRLA, conjunctivae clear; ears and nose atraumatic; pharynx benign  Neck: supple; thyroid not palpable  Back: no tenderness  Respiratory: respiratory effort normal; decreased BS at bases  Cardiovascular: S1S2 regular, no murmurs  Abdomen: soft, not tender, not distended, positive BS; no liver or spleen organomegaly  Genitourinary: no suprapubic tenderness  Lymphatic: no LN palpable  Musculoskeletal: no muscle tenderness, no joint swelling or tenderness  Extremities: 2+ pedal edema  b/l Lower legs severe edema, erythema, warmth and tenderness;   Right leg is worse with erythema extending to right thigh - improving  Neurological/ Psychiatric: AxOx3, judgement and insight normal; moving all extremities  Skin: no rashes; no palpable lesions    Labs: reviewed                        10.2   12.64 )-----------( x        ( 15 Aug 2024 07:55 )             30.2     08-15    134<L>  |  111<H>  |  21  ----------------------------<  132<H>  4.6   |  19<L>  |  1.01    Ca    8.5      15 Aug 2024 07:55    TPro  6.3  /  Alb  2.3<L>  /  TBili  1.2  /  DBili  x   /  AST  37  /  ALT  22  /  AlkPhos  88  08-14     LIVER FUNCTIONS - ( 14 Aug 2024 19:12 )  Alb: 2.3 g/dL / Pro: 6.3 gm/dL / ALK PHOS: 88 U/L / ALT: 22 U/L / AST: 37 U/L / GGT: x           Urinalysis with Rflx Culture (08-14 @ 18:01)  Urine Appearance: Clear  Protein, Urine: Trace mg/dL  Urine Microscopic-Add On (NC) (08-14 @ 18:01)  White Blood Cell - Urine: 18 /HPF  Red Blood Cell - Urine: 4 /HPF    Urinalysis with Rflx Culture (collected 14 Aug 2024 18:01)    Radiology: all available radiological tests reviewed    Advanced directives addressed: full resuscitation
8/18/24: Pt seen by podiatry today, dressings changed. Pt pleasant resting bedside, NAD, Ryan edema improved, Erythema improved.     PMH: No pertinent past medical history    Lymphatic edema    Hypertension    Type 2 diabetes mellitus    Lung cancer    Colon cancer      PSH:No significant past surgical history        Allergies: Keflex (Anaphylaxis)  cephalexin (Unknown)      Labs:                        10.5   4.80  )-----------( 82       ( 17 Aug 2024 06:42 )             33.2   08-17    140  |  114<H>  |  24<H>  ----------------------------<  85  4.2   |  21<L>  |  1.06    Ca    8.7      17 Aug 2024 06:42      Vital Signs Last 24 Hrs  T(C): 36.3 (18 Aug 2024 08:15), Max: 37.1 (17 Aug 2024 16:05)  T(F): 97.3 (18 Aug 2024 08:15), Max: 98.7 (17 Aug 2024 16:05)  HR: 73 (18 Aug 2024 08:15) (73 - 87)  BP: 134/48 (18 Aug 2024 08:15) (122/50 - 136/52)  BP(mean): --  RR: 18 (18 Aug 2024 08:15) (18 - 18)  SpO2: 98% (18 Aug 2024 08:15) (98% - 100%)    Parameters below as of 18 Aug 2024 08:15  Patient On (Oxygen Delivery Method): room air    MEDICATIONS  (STANDING):  acetic acid 0.25% Topical Irrigation 1 Application(s) Topical daily  ascorbic acid 500 milliGRAM(s) Oral daily  dextrose 50% Injectable 25 Gram(s) IV Push once  dextrose 50% Injectable 25 Gram(s) IV Push once  dextrose 50% Injectable 12.5 Gram(s) IV Push once  doxycycline IVPB 100 milliGRAM(s) IV Intermittent every 12 hours  enoxaparin Injectable 40 milliGRAM(s) SubCutaneous every 24 hours  glucagon  Injectable 1 milliGRAM(s) IntraMuscular once  insulin lispro (ADMELOG) corrective regimen sliding scale   SubCutaneous at bedtime  insulin lispro (ADMELOG) corrective regimen sliding scale   SubCutaneous three times a day before meals  linagliptin 5 milliGRAM(s) Oral daily  multivitamin 1 Tablet(s) Oral daily  mupirocin 2% Ointment 1 Application(s) Topical every 12 hours  piperacillin/tazobactam IVPB.. 3.375 Gram(s) IV Intermittent every 8 hours  senna 2 Tablet(s) Oral at bedtime    MEDICATIONS  (PRN):  acetaminophen     Tablet .. 650 milliGRAM(s) Oral every 6 hours PRN Temp greater or equal to 38C (100.4F), Mild Pain (1 - 3)  aluminum hydroxide/magnesium hydroxide/simethicone Suspension 30 milliLiter(s) Oral every 4 hours PRN Dyspepsia  dextrose Oral Gel 15 Gram(s) Oral once PRN Blood Glucose LESS THAN 70 milliGRAM(s)/deciliter  ibuprofen  Tablet. 600 milliGRAM(s) Oral every 6 hours PRN Moderate Pain (4 - 6)  melatonin 3 milliGRAM(s) Oral at bedtime PRN Insomnia  ondansetron Injectable 4 milliGRAM(s) IV Push every 8 hours PRN Nausea and/or Vomiting        REVIEW OF SYSTEMS:    CONSTITUTIONAL: No weakness, fevers or chills  EYES: No visual changes  RESPIRATORY: No cough, wheezing; No shortness of breath  CARDIOVASCULAR: No chest pain or palpitations  GASTROINTESTINAL: No abdominal or epigastric pain. No nausea, vomiting; No diarrhea or constipation.   GENITOURINARY: No dysuria, frequency or hematuria  NEUROLOGICAL: No numbness or weakness  SKIN: See physical examination.  All other review of systems is negative unless indicated above    Physical Exam:   Derm:  Skin warm, dry and supple bilateral.    Significant generalized lichenification and verrucous changes to skin extending from below the knees to feet bilaterally, cobblestone like papules and nodules extending from below knee to feet bilaterally. Ryan interdigital maceration and malodor. Noted hemosiderin deposition to BLE. Noted erythema to the right dorsal foot  improved  Vascular: Dorsalis Pedis and Posterior Tibial pulses non palpable due to significant edema to BLE.    Neuro: Protective sensation intact to the level of the digits bilateral.  MSK: Muscle strength 5/5 all major muscle groups bilateral. Pt is tender to palpation of bilateral feet (improved)             
SUBJECTIVE:    CHIEF COMPLAINT:  Patient is a 73y old  Female who presents with a chief complaint of Sepsis due to legs cellulitis and UTI (18 Aug 2024 12:19)      HPI:  Chief Complaint: weakness.    The patient is a 73y old moderately obese Female with significant PMH of hypertension, colon and lung cancer, diabetes-2, chronic thrombocytopenia, chronic metabolic acidosis without AG, chronic B/L LE lymphedema, recent hospitalization for legs cellulitis presented to the ED with c/o increased right lower leg pain over the past couple days.  Patient says that she was doing pretty well, but then started feeling increased pain and generalized weakness for last couple days, and had difficulty getting out of bed. She says that she walks with help of a cane and walker.  She has HHA who comes for her leg/foot dressing every other days. She also feels cold and chills but denies fever.  She also denies chest pain, sob, cough, abdominal pain, diarrhea or dysuria. She also denies any h/o DVT or PE.  Denies smoking, alcohol or substance abuse.   Refused US venous duplex study in ED.    Sig labs:  WBC 17.86k with N 93%, Lactate elevated 3.2->3.9, Platelets 100k, Hb 12.3, Bicarb 19, AG 6, , LFTs wnl.  UA consistent with UTI showing LE moderate, Nitrite positive, Bacteria many.  RVP negative.    CXR: shows haziness/opacity in LLL, but official report pending.  XR right tibia-fibula: shows soft tissue edema, but negative for any fracture or dislocation.    Zosyn and Doxy IV and NS 30 cc/kg BW bolus given in ED after blood culture draw.    Interval HPI and Overnight Events: Patient feeling better. Had 2 successful bowel movements last night. Did not need the enema.    REVIEW OF SYSTEMS:  CONSTITUTIONAL: No weakness, fevers or chills  EYES/ENT: No visual changes;  No vertigo or throat pain   NECK: No pain or stiffness  RESPIRATORY: No cough, wheezing, hemoptysis; No shortness of breath  CARDIOVASCULAR: No chest pain or palpitations  GASTROINTESTINAL: No abdominal or epigastric pain. No nausea, vomiting, or hematemesis; No diarrhea or constipation. No melena or hematochezia.  GENITOURINARY: No dysuria, frequency or hematuria  NEUROLOGICAL: No numbness or weakness  SKIN: No itching, burning, rashes, or lesions   All other review of systems is negative unless indicated above    OBJECTIVE    Vital Signs Last 24 Hrs  T(C): 36.3 (18 Aug 2024 08:15), Max: 37.1 (17 Aug 2024 16:05)  T(F): 97.3 (18 Aug 2024 08:15), Max: 98.7 (17 Aug 2024 16:05)  HR: 73 (18 Aug 2024 08:15) (73 - 87)  BP: 134/48 (18 Aug 2024 08:15) (122/50 - 136/52)  BP(mean): --  RR: 18 (18 Aug 2024 08:15) (18 - 18)  SpO2: 98% (18 Aug 2024 08:15) (98% - 100%)    Parameters below as of 18 Aug 2024 08:15  Patient On (Oxygen Delivery Method): room air        MEDICATIONS  (STANDING):  acetic acid 0.25% Topical Irrigation 1 Application(s) Topical daily  ascorbic acid 500 milliGRAM(s) Oral daily  dextrose 50% Injectable 25 Gram(s) IV Push once  dextrose 50% Injectable 25 Gram(s) IV Push once  dextrose 50% Injectable 12.5 Gram(s) IV Push once  doxycycline IVPB 100 milliGRAM(s) IV Intermittent every 12 hours  enoxaparin Injectable 40 milliGRAM(s) SubCutaneous every 24 hours  glucagon  Injectable 1 milliGRAM(s) IntraMuscular once  insulin lispro (ADMELOG) corrective regimen sliding scale   SubCutaneous three times a day before meals  insulin lispro (ADMELOG) corrective regimen sliding scale   SubCutaneous at bedtime  linagliptin 5 milliGRAM(s) Oral daily  multivitamin 1 Tablet(s) Oral daily  mupirocin 2% Ointment 1 Application(s) Topical every 12 hours  piperacillin/tazobactam IVPB.. 3.375 Gram(s) IV Intermittent every 8 hours  psyllium Powder 1 Packet(s) Oral daily  senna 2 Tablet(s) Oral at bedtime      LABS:                         10.5   4.80  )-----------( 82       ( 17 Aug 2024 06:42 )             33.2     08-17    140  |  114<H>  |  24<H>  ----------------------------<  85  4.2   |  21<L>  |  1.06    Ca    8.7      17 Aug 2024 06:42      Urinalysis Basic - ( 17 Aug 2024 06:42 )    Color: x / Appearance: x / SG: x / pH: x  Gluc: 85 mg/dL / Ketone: x  / Bili: x / Urobili: x   Blood: x / Protein: x / Nitrite: x   Leuk Esterase: x / RBC: x / WBC x   Sq Epi: x / Non Sq Epi: x / Bacteria: x            Specimen Source .Blood None   08-14 @ 18:01  Culture Results  No growth at 72 Hours            CAPILLARY BLOOD GLUCOSE      POCT Blood Glucose.: 102 mg/dL (18 Aug 2024 11:34)  POCT Blood Glucose.: 80 mg/dL (18 Aug 2024 07:29)  POCT Blood Glucose.: 107 mg/dL (17 Aug 2024 20:31)  POCT Blood Glucose.: 112 mg/dL (17 Aug 2024 16:42)        RADIOLOGY/EKG:      
SUBJECTIVE:    CHIEF COMPLAINT:  Patient is a 73y old  Female who presents with a chief complaint of Sepsis due to legs cellulitis and UTI (18 Aug 2024 12:39)      HPI:  Chief Complaint: weakness.    The patient is a 73y old moderately obese Female with significant PMH of hypertension, colon and lung cancer, diabetes-2, chronic thrombocytopenia, chronic metabolic acidosis without AG, chronic B/L LE lymphedema, recent hospitalization for legs cellulitis presented to the ED with c/o increased right lower leg pain over the past couple days.  Patient says that she was doing pretty well, but then started feeling increased pain and generalized weakness for last couple days, and had difficulty getting out of bed. She says that she walks with help of a cane and walker.  She has HHA who comes for her leg/foot dressing every other days. She also feels cold and chills but denies fever.  She also denies chest pain, sob, cough, abdominal pain, diarrhea or dysuria. She also denies any h/o DVT or PE.  Denies smoking, alcohol or substance abuse.   Refused US venous duplex study in ED.    Sig labs:  WBC 17.86k with N 93%, Lactate elevated 3.2->3.9, Platelets 100k, Hb 12.3, Bicarb 19, AG 6, , LFTs wnl.  UA consistent with UTI showing LE moderate, Nitrite positive, Bacteria many.  RVP negative.    CXR: shows haziness/opacity in LLL, but official report pending.  XR right tibia-fibula: shows soft tissue edema, but negative for any fracture or dislocation.    Zosyn and Doxy IV and NS 30 cc/kg BW bolus given in ED after blood culture draw.          Interval HPI and Overnight Events: c/o intertriginous rash under left breast.    REVIEW OF SYSTEMS:  CONSTITUTIONAL: No weakness, fevers or chills  EYES/ENT: No visual changes;  No vertigo or throat pain   NECK: No pain or stiffness  RESPIRATORY: No cough, wheezing, hemoptysis; No shortness of breath  CARDIOVASCULAR: No chest pain or palpitations  GASTROINTESTINAL: No abdominal or epigastric pain. No nausea, vomiting, or hematemesis; No diarrhea or constipation. No melena or hematochezia.  GENITOURINARY: No dysuria, frequency or hematuria  NEUROLOGICAL: No numbness or weakness  SKIN: No itching, burning, rashes, or lesions   All other review of systems is negative unless indicated above    OBJECTIVE    Vital Signs Last 24 Hrs  T(C): 36.6 (19 Aug 2024 07:17), Max: 37.1 (19 Aug 2024 00:31)  T(F): 97.8 (19 Aug 2024 07:17), Max: 98.7 (19 Aug 2024 00:31)  HR: 80 (19 Aug 2024 07:17) (77 - 80)  BP: 147/68 (19 Aug 2024 07:17) (125/53 - 147/68)  BP(mean): 69 (19 Aug 2024 00:31) (69 - 69)  RR: 19 (19 Aug 2024 07:17) (18 - 19)  SpO2: 98% (19 Aug 2024 07:17) (97% - 100%)    Parameters below as of 19 Aug 2024 07:17  Patient On (Oxygen Delivery Method): room air        MEDICATIONS  (STANDING):  acetic acid 0.25% Topical Irrigation 1 Application(s) Topical daily  ascorbic acid 500 milliGRAM(s) Oral daily  dextrose 50% Injectable 25 Gram(s) IV Push once  dextrose 50% Injectable 12.5 Gram(s) IV Push once  dextrose 50% Injectable 25 Gram(s) IV Push once  doxycycline IVPB 100 milliGRAM(s) IV Intermittent every 12 hours  enoxaparin Injectable 40 milliGRAM(s) SubCutaneous every 24 hours  glucagon  Injectable 1 milliGRAM(s) IntraMuscular once  insulin lispro (ADMELOG) corrective regimen sliding scale   SubCutaneous at bedtime  insulin lispro (ADMELOG) corrective regimen sliding scale   SubCutaneous three times a day before meals  linagliptin 5 milliGRAM(s) Oral daily  multivitamin 1 Tablet(s) Oral daily  mupirocin 2% Ointment 1 Application(s) Topical every 12 hours  piperacillin/tazobactam IVPB.. 3.375 Gram(s) IV Intermittent every 8 hours  psyllium Powder 1 Packet(s) Oral daily  senna 2 Tablet(s) Oral at bedtime      LABS:   Specimen Source .Blood None   08-14 @ 18:01  Culture Results  No growth at 4 days    CAPILLARY BLOOD GLUCOSE  POCT Blood Glucose.: 90 mg/dL (19 Aug 2024 07:59)  POCT Blood Glucose.: 129 mg/dL (18 Aug 2024 20:26)  POCT Blood Glucose.: 103 mg/dL (18 Aug 2024 16:42)  POCT Blood Glucose.: 102 mg/dL (18 Aug 2024 11:34)    
SUBJECTIVE:    CHIEF COMPLAINT:  Patient is a 73y old  Female who presents with a chief complaint of Sepsis due to legs cellulitis and UTI (15 Aug 2024 08:56)      HPI:  Chief Complaint: weakness.    The patient is a 73y old moderately obese Female with significant PMH of hypertension, colon and lung cancer, diabetes-2, chronic thrombocytopenia, chronic metabolic acidosis without AG, chronic B/L LE lymphedema, recent hospitalization for legs cellulitis presented to the ED with c/o increased right lower leg pain over the past couple days.  Patient says that she was doing pretty well, but then started feeling increased pain and generalized weakness for last couple days, and had difficulty getting out of bed. She says that she walks with help of a cane and walker.  She has HHA who comes for her leg/foot dressing every other days. She also feels cold and chills but denies fever.  She also denies chest pain, sob, cough, abdominal pain, diarrhea or dysuria. She also denies any h/o DVT or PE.  Denies smoking, alcohol or substance abuse.   Refused US venous duplex study in ED.    Sig labs:  WBC 17.86k with N 93%, Lactate elevated 3.2->3.9, Platelets 100k, Hb 12.3, Bicarb 19, AG 6, , LFTs wnl.  UA consistent with UTI showing LE moderate, Nitrite positive, Bacteria many.  RVP negative.    CXR: shows haziness/opacity in LLL, but official report pending.  XR right tibia-fibula: shows soft tissue edema, but negative for any fracture or dislocation.    Zosyn and Doxy IV and NS 30 cc/kg BW bolus given in ED after blood culture draw.       (14 Aug 2024 20:27)      Interval HPI and Overnight Events: PT feeling a little better since admission. Seen by ID. Agreed with antibiotic Tx.    REVIEW OF SYSTEMS:  CONSTITUTIONAL: No weakness, fevers or chills  EYES/ENT: No visual changes;  No vertigo or throat pain   NECK: No pain or stiffness  RESPIRATORY: No cough, wheezing, hemoptysis; No shortness of breath  CARDIOVASCULAR: No chest pain or palpitations  GASTROINTESTINAL: No abdominal or epigastric pain. No nausea, vomiting, or hematemesis; No diarrhea or constipation. No melena or hematochezia.  GENITOURINARY: No dysuria, frequency or hematuria  NEUROLOGICAL: No numbness or weakness  SKIN: No itching, burning, rashes, or lesions   All other review of systems is negative unless indicated above    OBJECTIVE    Vital Signs Last 24 Hrs  T(C): 36.8 (14 Aug 2024 23:23), Max: 39.4 (14 Aug 2024 18:32)  T(F): 98.2 (14 Aug 2024 23:23), Max: 103 (14 Aug 2024 18:32)  HR: 69 (14 Aug 2024 23:23) (69 - 103)  BP: 105/45 (14 Aug 2024 23:23) (105/45 - 144/55)  BP(mean): 64 (14 Aug 2024 21:00) (58 - 76)  RR: 18 (14 Aug 2024 23:23) (15 - 22)  SpO2: 100% (14 Aug 2024 23:23) (95% - 100%)    Parameters below as of 14 Aug 2024 23:23  Patient On (Oxygen Delivery Method): room air    MEDICATIONS  (STANDING):  ascorbic acid 500 milliGRAM(s) Oral daily  dextrose 5%. 1000 milliLiter(s) (50 mL/Hr) IV Continuous <Continuous>  dextrose 5%. 1000 milliLiter(s) (100 mL/Hr) IV Continuous <Continuous>  dextrose 50% Injectable 25 Gram(s) IV Push once  dextrose 50% Injectable 12.5 Gram(s) IV Push once  dextrose 50% Injectable 25 Gram(s) IV Push once  doxycycline IVPB 100 milliGRAM(s) IV Intermittent every 12 hours  enoxaparin Injectable 40 milliGRAM(s) SubCutaneous every 24 hours  glucagon  Injectable 1 milliGRAM(s) IntraMuscular once  insulin lispro (ADMELOG) corrective regimen sliding scale   SubCutaneous three times a day before meals  insulin lispro (ADMELOG) corrective regimen sliding scale   SubCutaneous at bedtime  linagliptin 5 milliGRAM(s) Oral daily  multivitamin 1 Tablet(s) Oral daily  piperacillin/tazobactam IVPB.. 3.375 Gram(s) IV Intermittent every 8 hours  sodium chloride 0.9%. 1000 milliLiter(s) (75 mL/Hr) IV Continuous <Continuous>      LABS:                         10.2   12.64 )-----------( 73       ( 15 Aug 2024 07:55 )             30.2     08-15    134<L>  |  111<H>  |  21  ----------------------------<  132<H>  4.6   |  19<L>  |  1.01    Ca    8.5      15 Aug 2024 07:55    TPro  6.3  /  Alb  2.3<L>  /  TBili  1.2  /  DBili  x   /  AST  37  /  ALT  22  /  AlkPhos  88  08-14    Urinalysis Basic - ( 15 Aug 2024 07:55 )  Color: x / Appearance: x / SG: x / pH: x  Gluc: 132 mg/dL / Ketone: x  / Bili: x / Urobili: x   Blood: x / Protein: x / Nitrite: x   Leuk Esterase: x / RBC: x / WBC x   Sq Epi: x / Non Sq Epi: x / Bacteria: x    PT/INR - ( 14 Aug 2024 19:12 )   PT: 13.9 sec;   INR: 1.24 ratio    PTT - ( 14 Aug 2024 19:12 )  PTT:35.1 sec    CAPILLARY BLOOD GLUCOSE  POCT Blood Glucose.: 121 mg/dL (15 Aug 2024 07:47)  POCT Blood Glucose.: 169 mg/dL (15 Aug 2024 01:19)

## 2024-08-21 NOTE — PROGRESS NOTE ADULT - REASON FOR ADMISSION
Sepsis due to legs cellulitis and UTI

## 2024-08-21 NOTE — PROGRESS NOTE ADULT - PROVIDER SPECIALTY LIST ADULT
Infectious Disease
Infectious Disease
Family Medicine
Infectious Disease
Infectious Disease
Podiatry
Family Medicine
Podiatry
Family Medicine
Podiatry
Family Medicine

## 2024-08-21 NOTE — DISCHARGE NOTE PROVIDER - NSDCFUADDINST_GEN_ALL_CORE_FT
Wound care instructions to be performed daily or at least every other day for bilateral feet:  Please remove all dressings   Use Acetic acid-soaked gauze and place throughout the right and left foot and in between digits for a 2-3 minutes  Apply Bactroban (Muprocin) to all the macerated areas in  midfoot and forefoot   Apply gauze, kerlix and ace.  Thank you.

## 2024-08-21 NOTE — DISCHARGE NOTE PROVIDER - HOSPITAL COURSE
The patient is a 73y old moderately obese Female with significant PMH of hypertension, colon and lung cancer, diabetes-2, chronic thrombocytopenia, chronic metabolic acidosis without AG, chronic B/L LE lymphedema, recent hospitalization for legs cellulitis presented to the ED with c/o increased right lower leg pain over the prior couple of days. She then , then started feeling increased pain and generalized weakness for last couple days, and had difficulty getting out of bed. She says that she walks with help of a cane and walker.  She has HHA who comes for her leg/foot dressing every other days. She also c/o cold and chills but denied fever.  Negative US venous duplex study in ED. Sig labs:  WBC 17.86k with N 93%, Lactate elevated 3.2->3.9 -->1.9, Platelets 100k --->70K, Hb 12.3, Bicarb 19, AG 6, , LFTs wnl. UA consistent with UTI. XR right tibia-fibula: shows soft tissue edema, but negative for any fracture or dislocation.    Admitted to medical bed. Seen by ID and by Podiatry.  Received IV Zosyn x 7 days and Doxycycline which was changed to Bactrim. Podiatry did regular dressing changes and legs werapped to knees with ACE bandages.     Clinically improved. [place] to be stable to transfer to [facility] rehab and continue wound care recommendations per podiatry:    Wound care instructions to be performed daily or at least every other day for bilateral feet:  Please remove all dressings   Use Acetic acid-soaked gauze and place throughout the right and left foot and in between digits for a 2-3 minutes  Apply Bactroban (Muprocin) to all the macerated areas in  midfoot and forefoot   Apply gauze, kerlix and ace.  Thank you.

## 2024-08-21 NOTE — DISCHARGE NOTE PROVIDER - NSDCFUADDAPPT_GEN_ALL_CORE_FT
See Dr Dean within 1 week of discharge from Rehab  See Dr Mitchell at wound care center after discharge from Rehab

## 2024-08-21 NOTE — DISCHARGE NOTE NURSING/CASE MANAGEMENT/SOCIAL WORK - NSDCPEFALRISK_GEN_ALL_CORE
For information on Fall & Injury Prevention, visit: [email or web address] OR  [email or web address] OR  https://www.cdc.gov/steadi/patient.html

## 2024-08-21 NOTE — DISCHARGE NOTE PROVIDER - NSDCFUSCHEDAPPT_GEN_ALL_CORE_FT
Carloz Mitchell [facility]  HNT PreAdmits  Scheduled Appointment: 08/23/2024

## 2024-09-13 ENCOUNTER — OUTPATIENT (OUTPATIENT)
Dept: OUTPATIENT SERVICES | Facility: HOSPITAL | Age: 74
LOS: 1 days | End: 2024-09-13
Payer: MEDICARE

## 2024-09-13 DIAGNOSIS — I89.0 LYMPHEDEMA, NOT ELSEWHERE CLASSIFIED: ICD-10-CM

## 2024-09-13 PROCEDURE — 99213 OFFICE O/P EST LOW 20 MIN: CPT

## 2024-09-24 DIAGNOSIS — I73.9 PERIPHERAL VASCULAR DISEASE, UNSPECIFIED: ICD-10-CM

## 2024-09-24 DIAGNOSIS — I10 ESSENTIAL (PRIMARY) HYPERTENSION: ICD-10-CM

## 2024-09-24 DIAGNOSIS — E11.9 TYPE 2 DIABETES MELLITUS WITHOUT COMPLICATIONS: ICD-10-CM

## 2024-09-24 DIAGNOSIS — Z79.84 LONG TERM (CURRENT) USE OF ORAL HYPOGLYCEMIC DRUGS: ICD-10-CM

## 2024-09-24 DIAGNOSIS — E11.69 TYPE 2 DIABETES MELLITUS WITH OTHER SPECIFIED COMPLICATION: ICD-10-CM

## 2024-09-24 DIAGNOSIS — M19.90 UNSPECIFIED OSTEOARTHRITIS, UNSPECIFIED SITE: ICD-10-CM

## 2024-09-24 DIAGNOSIS — I89.0 LYMPHEDEMA, NOT ELSEWHERE CLASSIFIED: ICD-10-CM

## 2024-09-24 DIAGNOSIS — Z91.81 HISTORY OF FALLING: ICD-10-CM

## 2024-09-25 NOTE — DIETITIAN INITIAL EVALUATION ADULT - NSFNSGIASSESSMENTFT_GEN_A_CORE
Detail Level: Detailed Quality 47: Advance Care Plan: Advance Care Planning discussed and documented in the medical record; patient did not wish or was not able to name a surrogate decision maker or provide an advance care plan. Quality 130: Documentation Of Current Medications In The Medical Record: Current Medications Documented (8/28) No BM doc'd, reports constipation; receives senna daily.

## 2024-09-26 ENCOUNTER — APPOINTMENT (OUTPATIENT)
Dept: FAMILY MEDICINE | Facility: CLINIC | Age: 74
End: 2024-09-26
Payer: MEDICARE

## 2024-09-26 DIAGNOSIS — I89.0 LYMPHEDEMA, NOT ELSEWHERE CLASSIFIED: ICD-10-CM

## 2024-09-26 DIAGNOSIS — I10 ESSENTIAL (PRIMARY) HYPERTENSION: ICD-10-CM

## 2024-09-26 PROCEDURE — 99496 TRANSJ CARE MGMT HIGH F2F 7D: CPT

## 2024-09-26 NOTE — PLAN
[FreeTextEntry1] : Continue on present meds Off of antibiotics Legs getting a little smaller Continue to do wound care and ace WRAPS

## 2024-09-26 NOTE — HISTORY OF PRESENT ILLNESS
[Discharged on ___] : discharged on [unfilled] [Home] : at home, [unfilled] , at the time of the visit. [Medical Office: ([facility])___] : at the medical office located in  [Verbal consent obtained from patient] : the patient, [unfilled] [FreeTextEntry4] : Komal Jung [FreeTextEntry2] : Pt was admitted to [facility] with Cellulitis. Then went to rehab ([facility]) Got her up and walking. PT and OT

## 2024-09-30 ENCOUNTER — INPATIENT (INPATIENT)
Facility: HOSPITAL | Age: 74
LOS: 7 days | Discharge: HOME HEALTH | DRG: 603 | End: 2024-10-08
Attending: FAMILY MEDICINE | Admitting: FAMILY MEDICINE
Payer: MEDICARE

## 2024-09-30 VITALS
OXYGEN SATURATION: 99 % | TEMPERATURE: 98 F | HEIGHT: 65 IN | RESPIRATION RATE: 18 BRPM | DIASTOLIC BLOOD PRESSURE: 68 MMHG | HEART RATE: 92 BPM | SYSTOLIC BLOOD PRESSURE: 158 MMHG | WEIGHT: 265 LBS

## 2024-09-30 DIAGNOSIS — E66.01 MORBID (SEVERE) OBESITY DUE TO EXCESS CALORIES: ICD-10-CM

## 2024-09-30 DIAGNOSIS — I10 ESSENTIAL (PRIMARY) HYPERTENSION: ICD-10-CM

## 2024-09-30 DIAGNOSIS — E11.9 TYPE 2 DIABETES MELLITUS WITHOUT COMPLICATIONS: ICD-10-CM

## 2024-09-30 DIAGNOSIS — K74.60 UNSPECIFIED CIRRHOSIS OF LIVER: ICD-10-CM

## 2024-09-30 DIAGNOSIS — C34.90 MALIGNANT NEOPLASM OF UNSPECIFIED PART OF UNSPECIFIED BRONCHUS OR LUNG: ICD-10-CM

## 2024-09-30 DIAGNOSIS — E87.22 CHRONIC METABOLIC ACIDOSIS: ICD-10-CM

## 2024-09-30 DIAGNOSIS — D61.818 OTHER PANCYTOPENIA: ICD-10-CM

## 2024-09-30 DIAGNOSIS — D69.6 THROMBOCYTOPENIA, UNSPECIFIED: ICD-10-CM

## 2024-09-30 DIAGNOSIS — K76.6 PORTAL HYPERTENSION: ICD-10-CM

## 2024-09-30 DIAGNOSIS — L03.115 CELLULITIS OF RIGHT LOWER LIMB: ICD-10-CM

## 2024-09-30 DIAGNOSIS — C18.9 MALIGNANT NEOPLASM OF COLON, UNSPECIFIED: ICD-10-CM

## 2024-09-30 DIAGNOSIS — Z92.21 PERSONAL HISTORY OF ANTINEOPLASTIC CHEMOTHERAPY: ICD-10-CM

## 2024-09-30 LAB
ALBUMIN SERPL ELPH-MCNC: 2.9 G/DL — LOW (ref 3.3–5)
ALP SERPL-CCNC: 83 U/L — SIGNIFICANT CHANGE UP (ref 40–120)
ALT FLD-CCNC: 17 U/L — SIGNIFICANT CHANGE UP (ref 12–78)
ANION GAP SERPL CALC-SCNC: 4 MMOL/L — LOW (ref 5–17)
ANISOCYTOSIS BLD QL: SLIGHT — SIGNIFICANT CHANGE UP
AST SERPL-CCNC: 29 U/L — SIGNIFICANT CHANGE UP (ref 15–37)
BASOPHILS # BLD AUTO: 0 K/UL — SIGNIFICANT CHANGE UP (ref 0–0.2)
BASOPHILS NFR BLD AUTO: 0 % — SIGNIFICANT CHANGE UP (ref 0–2)
BILIRUB SERPL-MCNC: 1 MG/DL — SIGNIFICANT CHANGE UP (ref 0.2–1.2)
BUN SERPL-MCNC: 11 MG/DL — SIGNIFICANT CHANGE UP (ref 7–23)
CALCIUM SERPL-MCNC: 8.6 MG/DL — SIGNIFICANT CHANGE UP (ref 8.5–10.1)
CHLORIDE SERPL-SCNC: 113 MMOL/L — HIGH (ref 96–108)
CO2 SERPL-SCNC: 26 MMOL/L — SIGNIFICANT CHANGE UP (ref 22–31)
CREAT SERPL-MCNC: 0.79 MG/DL — SIGNIFICANT CHANGE UP (ref 0.5–1.3)
EGFR: 79 ML/MIN/1.73M2 — SIGNIFICANT CHANGE UP
EOSINOPHIL # BLD AUTO: 0.17 K/UL — SIGNIFICANT CHANGE UP (ref 0–0.5)
EOSINOPHIL NFR BLD AUTO: 5 % — SIGNIFICANT CHANGE UP (ref 0–6)
GLUCOSE SERPL-MCNC: 114 MG/DL — HIGH (ref 70–99)
HCT VFR BLD CALC: 30.7 % — LOW (ref 34.5–45)
HGB BLD-MCNC: 9.9 G/DL — LOW (ref 11.5–15.5)
HYPOCHROMIA BLD QL: SLIGHT — SIGNIFICANT CHANGE UP
LYMPHOCYTES # BLD AUTO: 0.57 K/UL — LOW (ref 1–3.3)
LYMPHOCYTES # BLD AUTO: 17 % — SIGNIFICANT CHANGE UP (ref 13–44)
MAGNESIUM SERPL-MCNC: 1.8 MG/DL — SIGNIFICANT CHANGE UP (ref 1.6–2.6)
MANUAL SMEAR VERIFICATION: SIGNIFICANT CHANGE UP
MCHC RBC-ENTMCNC: 27.9 PG — SIGNIFICANT CHANGE UP (ref 27–34)
MCHC RBC-ENTMCNC: 32.2 GM/DL — SIGNIFICANT CHANGE UP (ref 32–36)
MCV RBC AUTO: 86.5 FL — SIGNIFICANT CHANGE UP (ref 80–100)
MICROCYTES BLD QL: SLIGHT — SIGNIFICANT CHANGE UP
MONOCYTES # BLD AUTO: 0.34 K/UL — SIGNIFICANT CHANGE UP (ref 0–0.9)
MONOCYTES NFR BLD AUTO: 10 % — SIGNIFICANT CHANGE UP (ref 2–14)
NEUTROPHILS # BLD AUTO: 2.28 K/UL — SIGNIFICANT CHANGE UP (ref 1.8–7.4)
NEUTROPHILS NFR BLD AUTO: 68 % — SIGNIFICANT CHANGE UP (ref 43–77)
NRBC # BLD: 0 /100 WBCS — SIGNIFICANT CHANGE UP (ref 0–0)
NRBC # BLD: SIGNIFICANT CHANGE UP /100 WBCS (ref 0–0)
PLAT MORPH BLD: NORMAL — SIGNIFICANT CHANGE UP
PLATELET # BLD AUTO: 73 K/UL — LOW (ref 150–400)
PLATELET COUNT - ESTIMATE: ABNORMAL
POTASSIUM SERPL-MCNC: 3.7 MMOL/L — SIGNIFICANT CHANGE UP (ref 3.5–5.3)
POTASSIUM SERPL-SCNC: 3.7 MMOL/L — SIGNIFICANT CHANGE UP (ref 3.5–5.3)
PROT SERPL-MCNC: 6.4 GM/DL — SIGNIFICANT CHANGE UP (ref 6–8.3)
RBC # BLD: 3.55 M/UL — LOW (ref 3.8–5.2)
RBC # FLD: 14.4 % — SIGNIFICANT CHANGE UP (ref 10.3–14.5)
RBC BLD AUTO: ABNORMAL
SODIUM SERPL-SCNC: 143 MMOL/L — SIGNIFICANT CHANGE UP (ref 135–145)
WBC # BLD: 3.35 K/UL — LOW (ref 3.8–10.5)
WBC # FLD AUTO: 3.35 K/UL — LOW (ref 3.8–10.5)

## 2024-09-30 PROCEDURE — 93010 ELECTROCARDIOGRAM REPORT: CPT

## 2024-09-30 PROCEDURE — 73620 X-RAY EXAM OF FOOT: CPT | Mod: 26,RT

## 2024-09-30 PROCEDURE — 99285 EMERGENCY DEPT VISIT HI MDM: CPT

## 2024-09-30 RX ORDER — ACETAMINOPHEN 325 MG
650 TABLET ORAL ONCE
Refills: 0 | Status: COMPLETED | OUTPATIENT
Start: 2024-09-30 | End: 2024-09-30

## 2024-09-30 RX ORDER — VANCOMYCIN HCL-SODIUM CHLORIDE IV SOLN 1.5 GM/250ML-0.9% 1.5-0.9/25 GM/ML-%
1750 SOLUTION INTRAVENOUS ONCE
Refills: 0 | Status: COMPLETED | OUTPATIENT
Start: 2024-09-30 | End: 2024-09-30

## 2024-09-30 RX ADMIN — Medication 650 MILLIGRAM(S): at 18:43

## 2024-09-30 NOTE — ED ADULT TRIAGE NOTE - AS TEMP SITE
oral Consent: The patient's consent was obtained including but not limited to risks of crusting, scabbing, blistering, scarring, darker or lighter pigmentary change, recurrence, incomplete removal and infection. Number Of Freeze-Thaw Cycles: 1 freeze-thaw cycle Detail Level: Detailed Post-Care Instructions: I reviewed with the patient in detail post-care instructions. Patient is to wear sunprotection, and avoid picking at any of the treated lesions. Pt may apply Vaseline to crusted or scabbing areas. Render Post-Care Instructions In Note?: no Duration Of Freeze Thaw-Cycle (Seconds): 0 Medical Necessity Clause: This procedure was medically necessary because the lesions that were treated were: Medical Necessity Information: It is in your best interest to select a reason for this procedure from the list below. All of these items fulfill various CMS LCD requirements except the new and changing color options. Number Of Freeze-Thaw Cycles: 3 freeze-thaw cycles

## 2024-09-30 NOTE — ED ADULT NURSE NOTE - OBJECTIVE STATEMENT
Patient states she was told to come to the ED by her visiting nurse to have her foot evaluated to rule out infection because of worsening redness. Pt states she has lymphedema after being treated for colon and lung cancer. Bilateral lower extremity swelling and redness.

## 2024-09-30 NOTE — ED ADULT NURSE NOTE - HISTORY OF COVID-19 VACCINATION
Denies known Latex allergy or symptoms of Latex sensitivity.   Medications reviewed with patient:No changes made.  Tobacco use verified.  Medical and Surgical history reviewed: No changes made.      Preferred Pharmacy:   [id number] - [place], [place] - [address]  [address]  [id number]  [facility] [address]  Phone: [phone] Fax: [phone]        Refills needed? no  Letter for work/school needed? no    Chief Complaint   Patient presents with   • Hand     Np; cyst on right hand               Vaccine status unknown

## 2024-09-30 NOTE — ED ADULT TRIAGE NOTE - CHIEF COMPLAINT QUOTE
pt presents to the ED for possible R leg infection. as per pt she had her home health nurse visit today and she recommended the pt come to the hospital for Antiobiotic treatment. pt A&Ox4, well appearing, and ambulatory with walker at baseline with no further complaints or discomforts reported at this time.

## 2024-09-30 NOTE — ED ADULT NURSE NOTE - CAS EDN DISCHARGE INTERVENTIONS
Spoke with patient regarding d/c plan. She stated she still needed to think about it. PPD ordered. OT ordered for eval.     Patient stated she wishes to wait for OT eval and to work with PT again before deciding about rehab. PICC line was also placed today. Will follow up with patient this afternoon. IV intact/Admission wristband placed

## 2024-09-30 NOTE — ED PROVIDER NOTE - NS_ATTENDINGSCRIBE_ED_ALL_ED
f/u visit. Wanted another opinion Drinking plenty of water Still cycles constipation and severe diarrhea Cramping abd pain Staining with bowel movements and hard stools Will strain without output No blood in stool, but can see mucus No abn weight loss Labs from Dr. Leary reviewed with normocytic anemia She does report a significnat history of abdomina surgeries with lots of scar tissue (appy, tubal ligation, tubal ligation reversal, hysterectomy, ectopic pregnancy)  . PRIOR VISIT 4/2021 EGD was done November 2016.  Mild reflux was found.  Balloon dilatation was performed.  Colonoscopy was done August 2017.  Diverticulosis.  Otherwise normal exam 4/15/21 severe diarrhea lasting 3 days twice a month Hx thyroid and breast cancer Alternating diarrhea and constipation. Takes laxative for constipation then diarrhea. I personally performed the service described in the documentation recorded by the scribe in my presence, and it accurately and completely records my words and actions.

## 2024-09-30 NOTE — ED PROVIDER NOTE - PROGRESS NOTE DETAILS
Spoke with MD Roberts from podiatry who states patient's right lower extremity can be wrapped in gauze Kerlix and an Ace bandage patient will be evaluated when admitted.

## 2024-09-30 NOTE — ED PROVIDER NOTE - CLINICAL SUMMARY MEDICAL DECISION MAKING FREE TEXT BOX
74 y/o F with PMHx of thrombocytopenia, spinal surgery years ago with residual R foot drop, colon CA s/p chemo, lung CA, DM2, HTN, lymphatic edema, recent admission for UTI and leg infection dc'd to [facility] x2 weeks ago presents to the ED for evaluation of R leg wound. Will r/o infection, labs, wound care consult, Tylenol for pain, reassess. 74 y/o F with PMHx of thrombocytopenia, spinal surgery years ago with residual R foot drop, colon CA s/p chemo, lung CA, DM2, HTN, lymphatic edema, recent admission for UTI and leg infection dc'd to [facility] x2 weeks ago presents to the ED for evaluation of R leg wound. C/f cellulitis -  labs, wound care consult, abx,Tylenol for pain, reassess.

## 2024-09-30 NOTE — ED PROVIDER NOTE - NSICDXPASTMEDICALHX_GEN_ALL_CORE_FT
PAST MEDICAL HISTORY:  Acquired thrombocytopenia     Colon cancer     H/O metabolic acidosis with normal anion gap     Hypertension     Lung cancer     Lymphatic edema     Moderate obesity     Type 2 diabetes mellitus

## 2024-09-30 NOTE — ED PROVIDER NOTE - OBJECTIVE STATEMENT
74 y/o F with PMHx of thrombocytopenia, spinal surgery years ago with residual R foot drop, colon CA s/p chemo, lung CA, DM2, HTN, lymphatic edema, recent admission for UTI and leg infection dc'd to [facility] x2 weeks ago presents to the ED for evaluation of R leg wound. Endorses draining from b/l feet, R worse than L on 3rd, 4th, 5th digits. Pt visited by home health nurse today who suggested pt talk to wound care center. Pt called wound care center and was told to come to the ED for evaluation. Endorses worsening redness to the R foot. Denies fevers, chills, worsening swelling. Tylenol taken PTA. Pt states she is ambulatory with a walker at baseline. Notes she finished Bactrim while at [facility]. States she has the [employer] wound wrappers that come 3x per week. Has scheduled appointment at wound care center on Friday 10/4.

## 2024-09-30 NOTE — ED ADULT NURSE REASSESSMENT NOTE - NS ED NURSE REASSESS COMMENT FT1
Pt received by ALY Lazar. Introduced self to pt and updated pt on plan of care. Pt verbalized understanding. Pt states "the tylenol helped a lot with the pain, I don't feel the tingling in my R toe anymore." Pt waiting on results at this time. Bed at lowest height, bed in hallway by East RN desk, AOx4, denies pain or discomfort at this time.

## 2024-09-30 NOTE — ED ADULT NURSE NOTE - NSFALLRISKINTERV_ED_ALL_ED

## 2024-09-30 NOTE — ED PROVIDER NOTE - PHYSICAL EXAMINATION
No
Constitutional: NAD, alert, verbal  HEENT: NCAT, EOMi, PERRL  Cardiac: RRR no MRG  Resp: clear, no wheezing or crackles  GI: ab soft ntnd, no r/g  Ext: RLE with severe lymphedema, R dorsum of foot erythematous, warm, fluctuant, nontender   Neuro: A&Ox3, IVY  Skin: No rashes

## 2024-10-01 ENCOUNTER — TRANSCRIPTION ENCOUNTER (OUTPATIENT)
Age: 74
End: 2024-10-01

## 2024-10-01 DIAGNOSIS — L03.90 CELLULITIS, UNSPECIFIED: ICD-10-CM

## 2024-10-01 LAB
A1C WITH ESTIMATED AVERAGE GLUCOSE RESULT: 5.2 % — SIGNIFICANT CHANGE UP (ref 4–5.6)
ALBUMIN SERPL ELPH-MCNC: 2.6 G/DL — LOW (ref 3.3–5)
ALP SERPL-CCNC: 77 U/L — SIGNIFICANT CHANGE UP (ref 40–120)
ALT FLD-CCNC: 17 U/L — SIGNIFICANT CHANGE UP (ref 12–78)
ANION GAP SERPL CALC-SCNC: 3 MMOL/L — LOW (ref 5–17)
AST SERPL-CCNC: 29 U/L — SIGNIFICANT CHANGE UP (ref 15–37)
BASOPHILS # BLD AUTO: 0.03 K/UL — SIGNIFICANT CHANGE UP (ref 0–0.2)
BASOPHILS NFR BLD AUTO: 0.9 % — SIGNIFICANT CHANGE UP (ref 0–2)
BILIRUB SERPL-MCNC: 1.1 MG/DL — SIGNIFICANT CHANGE UP (ref 0.2–1.2)
BUN SERPL-MCNC: 11 MG/DL — SIGNIFICANT CHANGE UP (ref 7–23)
CALCIUM SERPL-MCNC: 8.5 MG/DL — SIGNIFICANT CHANGE UP (ref 8.5–10.1)
CHLORIDE SERPL-SCNC: 113 MMOL/L — HIGH (ref 96–108)
CO2 SERPL-SCNC: 26 MMOL/L — SIGNIFICANT CHANGE UP (ref 22–31)
CREAT SERPL-MCNC: 0.88 MG/DL — SIGNIFICANT CHANGE UP (ref 0.5–1.3)
CRP SERPL-MCNC: 4.6 MG/ML — SIGNIFICANT CHANGE UP (ref 0–5)
EGFR: 69 ML/MIN/1.73M2 — SIGNIFICANT CHANGE UP
EOSINOPHIL # BLD AUTO: 0.18 K/UL — SIGNIFICANT CHANGE UP (ref 0–0.5)
EOSINOPHIL NFR BLD AUTO: 5.7 % — SIGNIFICANT CHANGE UP (ref 0–6)
ERYTHROCYTE [SEDIMENTATION RATE] IN BLOOD: 28 MM/HR — HIGH (ref 0–20)
ESTIMATED AVERAGE GLUCOSE: 103 MG/DL — SIGNIFICANT CHANGE UP (ref 68–114)
GLUCOSE BLDC GLUCOMTR-MCNC: 116 MG/DL — HIGH (ref 70–99)
GLUCOSE BLDC GLUCOMTR-MCNC: 130 MG/DL — HIGH (ref 70–99)
GLUCOSE BLDC GLUCOMTR-MCNC: 133 MG/DL — HIGH (ref 70–99)
GLUCOSE BLDC GLUCOMTR-MCNC: 157 MG/DL — HIGH (ref 70–99)
GLUCOSE BLDC GLUCOMTR-MCNC: 188 MG/DL — HIGH (ref 70–99)
GLUCOSE SERPL-MCNC: 119 MG/DL — HIGH (ref 70–99)
HCT VFR BLD CALC: 29.5 % — LOW (ref 34.5–45)
HGB BLD-MCNC: 9.6 G/DL — LOW (ref 11.5–15.5)
IMM GRANULOCYTES NFR BLD AUTO: 0.3 % — SIGNIFICANT CHANGE UP (ref 0–0.9)
LYMPHOCYTES # BLD AUTO: 0.71 K/UL — LOW (ref 1–3.3)
LYMPHOCYTES # BLD AUTO: 22.4 % — SIGNIFICANT CHANGE UP (ref 13–44)
MCHC RBC-ENTMCNC: 28.3 PG — SIGNIFICANT CHANGE UP (ref 27–34)
MCHC RBC-ENTMCNC: 32.5 GM/DL — SIGNIFICANT CHANGE UP (ref 32–36)
MCV RBC AUTO: 87 FL — SIGNIFICANT CHANGE UP (ref 80–100)
MONOCYTES # BLD AUTO: 0.36 K/UL — SIGNIFICANT CHANGE UP (ref 0–0.9)
MONOCYTES NFR BLD AUTO: 11.4 % — SIGNIFICANT CHANGE UP (ref 2–14)
NEUTROPHILS # BLD AUTO: 1.88 K/UL — SIGNIFICANT CHANGE UP (ref 1.8–7.4)
NEUTROPHILS NFR BLD AUTO: 59.3 % — SIGNIFICANT CHANGE UP (ref 43–77)
PLATELET # BLD AUTO: 71 K/UL — LOW (ref 150–400)
POTASSIUM SERPL-MCNC: 4.1 MMOL/L — SIGNIFICANT CHANGE UP (ref 3.5–5.3)
POTASSIUM SERPL-SCNC: 4.1 MMOL/L — SIGNIFICANT CHANGE UP (ref 3.5–5.3)
PROT SERPL-MCNC: 6 GM/DL — SIGNIFICANT CHANGE UP (ref 6–8.3)
RBC # BLD: 3.39 M/UL — LOW (ref 3.8–5.2)
RBC # FLD: 14.5 % — SIGNIFICANT CHANGE UP (ref 10.3–14.5)
SODIUM SERPL-SCNC: 142 MMOL/L — SIGNIFICANT CHANGE UP (ref 135–145)
WBC # BLD: 3.17 K/UL — LOW (ref 3.8–10.5)
WBC # FLD AUTO: 3.17 K/UL — LOW (ref 3.8–10.5)

## 2024-10-01 PROCEDURE — 99223 1ST HOSP IP/OBS HIGH 75: CPT

## 2024-10-01 PROCEDURE — 86334 IMMUNOFIX E-PHORESIS SERUM: CPT

## 2024-10-01 PROCEDURE — 83036 HEMOGLOBIN GLYCOSYLATED A1C: CPT

## 2024-10-01 PROCEDURE — 85610 PROTHROMBIN TIME: CPT

## 2024-10-01 PROCEDURE — 85025 COMPLETE CBC W/AUTO DIFF WBC: CPT

## 2024-10-01 PROCEDURE — 97162 PT EVAL MOD COMPLEX 30 MIN: CPT | Mod: GP

## 2024-10-01 PROCEDURE — 36415 COLL VENOUS BLD VENIPUNCTURE: CPT

## 2024-10-01 PROCEDURE — 93970 EXTREMITY STUDY: CPT

## 2024-10-01 PROCEDURE — 85027 COMPLETE CBC AUTOMATED: CPT

## 2024-10-01 PROCEDURE — 87324 CLOSTRIDIUM AG IA: CPT

## 2024-10-01 PROCEDURE — 84155 ASSAY OF PROTEIN SERUM: CPT

## 2024-10-01 PROCEDURE — 82962 GLUCOSE BLOOD TEST: CPT

## 2024-10-01 PROCEDURE — 83540 ASSAY OF IRON: CPT

## 2024-10-01 PROCEDURE — 86431 RHEUMATOID FACTOR QUANT: CPT

## 2024-10-01 PROCEDURE — 85652 RBC SED RATE AUTOMATED: CPT

## 2024-10-01 PROCEDURE — 82607 VITAMIN B-12: CPT

## 2024-10-01 PROCEDURE — 80053 COMPREHEN METABOLIC PANEL: CPT

## 2024-10-01 PROCEDURE — 85384 FIBRINOGEN ACTIVITY: CPT

## 2024-10-01 PROCEDURE — 82728 ASSAY OF FERRITIN: CPT

## 2024-10-01 PROCEDURE — 80202 ASSAY OF VANCOMYCIN: CPT

## 2024-10-01 PROCEDURE — 87449 NOS EACH ORGANISM AG IA: CPT

## 2024-10-01 PROCEDURE — 85049 AUTOMATED PLATELET COUNT: CPT

## 2024-10-01 PROCEDURE — 83550 IRON BINDING TEST: CPT

## 2024-10-01 PROCEDURE — 82784 ASSAY IGA/IGD/IGG/IGM EACH: CPT

## 2024-10-01 PROCEDURE — 80074 ACUTE HEPATITIS PANEL: CPT

## 2024-10-01 PROCEDURE — 82746 ASSAY OF FOLIC ACID SERUM: CPT

## 2024-10-01 PROCEDURE — 93970 EXTREMITY STUDY: CPT | Mod: 26

## 2024-10-01 PROCEDURE — 87389 HIV-1 AG W/HIV-1&-2 AB AG IA: CPT

## 2024-10-01 PROCEDURE — 83521 IG LIGHT CHAINS FREE EACH: CPT

## 2024-10-01 PROCEDURE — 97530 THERAPEUTIC ACTIVITIES: CPT | Mod: GP

## 2024-10-01 PROCEDURE — 86140 C-REACTIVE PROTEIN: CPT

## 2024-10-01 PROCEDURE — 84165 PROTEIN E-PHORESIS SERUM: CPT

## 2024-10-01 PROCEDURE — 94760 N-INVAS EAR/PLS OXIMETRY 1: CPT

## 2024-10-01 PROCEDURE — 97116 GAIT TRAINING THERAPY: CPT | Mod: GP

## 2024-10-01 PROCEDURE — 85730 THROMBOPLASTIN TIME PARTIAL: CPT

## 2024-10-01 PROCEDURE — 84443 ASSAY THYROID STIM HORMONE: CPT

## 2024-10-01 PROCEDURE — 86038 ANTINUCLEAR ANTIBODIES: CPT

## 2024-10-01 RX ORDER — AZTREONAM 75 MG/ML
1000 KIT INHALATION EVERY 8 HOURS
Refills: 0 | Status: DISCONTINUED | OUTPATIENT
Start: 2024-10-01 | End: 2024-10-01

## 2024-10-01 RX ORDER — ALCOHOL ANTISEPTIC PADS
15 PADS, MEDICATED (EA) TOPICAL ONCE
Refills: 0 | Status: DISCONTINUED | OUTPATIENT
Start: 2024-10-01 | End: 2024-10-08

## 2024-10-01 RX ORDER — AZTREONAM 75 MG/ML
1000 KIT INHALATION ONCE
Refills: 0 | Status: COMPLETED | OUTPATIENT
Start: 2024-10-01 | End: 2024-10-01

## 2024-10-01 RX ORDER — VANCOMYCIN HCL-SODIUM CHLORIDE IV SOLN 1.5 GM/250ML-0.9% 1.5-0.9/25 GM/ML-%
1250 SOLUTION INTRAVENOUS EVERY 12 HOURS
Refills: 0 | Status: DISCONTINUED | OUTPATIENT
Start: 2024-10-01 | End: 2024-10-03

## 2024-10-01 RX ORDER — GLUCAGON INJECTION, SOLUTION 0.5 MG/.1ML
1 INJECTION, SOLUTION SUBCUTANEOUS ONCE
Refills: 0 | Status: DISCONTINUED | OUTPATIENT
Start: 2024-10-01 | End: 2024-10-08

## 2024-10-01 RX ORDER — ACETAMINOPHEN 325 MG
650 TABLET ORAL EVERY 6 HOURS
Refills: 0 | Status: DISCONTINUED | OUTPATIENT
Start: 2024-10-01 | End: 2024-10-08

## 2024-10-01 RX ORDER — ALCOHOL ANTISEPTIC PADS
12.5 PADS, MEDICATED (EA) TOPICAL ONCE
Refills: 0 | Status: DISCONTINUED | OUTPATIENT
Start: 2024-10-01 | End: 2024-10-08

## 2024-10-01 RX ORDER — ALCOHOL ANTISEPTIC PADS
25 PADS, MEDICATED (EA) TOPICAL ONCE
Refills: 0 | Status: DISCONTINUED | OUTPATIENT
Start: 2024-10-01 | End: 2024-10-08

## 2024-10-01 RX ORDER — SODIUM CHLORIDE IRRIG SOLUTION 0.9 %
1000 SOLUTION, IRRIGATION IRRIGATION
Refills: 0 | Status: DISCONTINUED | OUTPATIENT
Start: 2024-10-01 | End: 2024-10-08

## 2024-10-01 RX ORDER — INSULIN LISPRO 100/ML
VIAL (ML) SUBCUTANEOUS
Refills: 0 | Status: DISCONTINUED | OUTPATIENT
Start: 2024-10-01 | End: 2024-10-08

## 2024-10-01 RX ORDER — ACETIC ACID 3 %
1 LIQUID (ML) MISCELLANEOUS ONCE
Refills: 0 | Status: COMPLETED | OUTPATIENT
Start: 2024-10-01 | End: 2024-10-01

## 2024-10-01 RX ORDER — INFLUENZA VIRUS VACCINE 15; 15; 15; 15 UG/.5ML; UG/.5ML; UG/.5ML; UG/.5ML
0.5 SUSPENSION INTRAMUSCULAR ONCE
Refills: 0 | Status: DISCONTINUED | OUTPATIENT
Start: 2024-10-01 | End: 2024-10-08

## 2024-10-01 RX ORDER — AZTREONAM 75 MG/ML
KIT INHALATION
Refills: 0 | Status: DISCONTINUED | OUTPATIENT
Start: 2024-10-01 | End: 2024-10-01

## 2024-10-01 RX ORDER — INSULIN LISPRO 100/ML
VIAL (ML) SUBCUTANEOUS AT BEDTIME
Refills: 0 | Status: DISCONTINUED | OUTPATIENT
Start: 2024-10-01 | End: 2024-10-08

## 2024-10-01 RX ORDER — MUPIROCIN 20 MG/G
1 OINTMENT TOPICAL ONCE
Refills: 0 | Status: COMPLETED | OUTPATIENT
Start: 2024-10-01 | End: 2024-10-01

## 2024-10-01 RX ADMIN — MUPIROCIN 1 APPLICATION(S): 20 OINTMENT TOPICAL at 17:10

## 2024-10-01 RX ADMIN — VANCOMYCIN HCL-SODIUM CHLORIDE IV SOLN 1.5 GM/250ML-0.9% 333.33 MILLIGRAM(S): 1.5-0.9/25 SOLUTION at 00:32

## 2024-10-01 RX ADMIN — AZTREONAM 50 MILLIGRAM(S): KIT at 05:45

## 2024-10-01 RX ADMIN — VANCOMYCIN HCL-SODIUM CHLORIDE IV SOLN 1.5 GM/250ML-0.9% 166.67 MILLIGRAM(S): 1.5-0.9/25 SOLUTION at 08:36

## 2024-10-01 RX ADMIN — Medication 650 MILLIGRAM(S): at 11:15

## 2024-10-01 RX ADMIN — Medication 1 APPLICATION(S): at 17:10

## 2024-10-01 RX ADMIN — Medication 650 MILLIGRAM(S): at 23:32

## 2024-10-01 RX ADMIN — VANCOMYCIN HCL-SODIUM CHLORIDE IV SOLN 1.5 GM/250ML-0.9% 166.67 MILLIGRAM(S): 1.5-0.9/25 SOLUTION at 21:30

## 2024-10-01 RX ADMIN — Medication 650 MILLIGRAM(S): at 10:50

## 2024-10-01 NOTE — PATIENT PROFILE ADULT - NSPROPASSIVESMOKEEXPOSURE_GEN_A_NUR
The patient identified the right elbow as the correct procedure site. This was verified with the consent and with 2 patient identifiers. The skin over the elbow lateral epicondyle injection site was prepped with Betadine.   A 22-gauge needle was introduced No

## 2024-10-01 NOTE — PHYSICAL THERAPY INITIAL EVALUATION ADULT - MODALITIES TREATMENT COMMENTS
Bilateral feet noted to have bandage and dressing. Podiatry consulted but no documentation at this time. Weight bearing status for BLE questionable at this time until podiatry recommendations in place. According to patient, she ambulated in ED to/from bathroom with RW support.

## 2024-10-01 NOTE — CONSULT NOTE ADULT - SUBJECTIVE AND OBJECTIVE BOX
Date of consult: 10/1/2024    HPI: 72 y/o F w/ PMH of HTN, colon & lung cancer, DM2, chronic thrombocytopenia chronic metabolic acidosis, chronic B/L LE lymphedema p/w worsening RLE wounds. Patient states that wound care nurse that comes to the house noticed that patient was draining more from her RLE wounds and also had erythema. She called wound care center who referred patient to ED. Patient denies fever, chills     (01 Oct 2024 04:28)    PMH: No pertinent past medical history    Lymphatic edema    Hypertension    Type 2 diabetes mellitus    Lung cancer    Colon cancer    Acquired thrombocytopenia    H/O metabolic acidosis with normal anion gap    Moderate obesity      PSH:No significant past surgical history        Allergies:Keflex (Anaphylaxis)  cephalexin (Unknown)      Labs:                          9.6    3.17  )-----------( 71       ( 01 Oct 2024 07:09 )             29.5     WBC Trend  3.17[L] Date (10-01 @ 07:09)  3.35[L] Date (09-30 @ 17:45)      Chem  10-01    142  |  113[H]  |  11  ----------------------------<  119[H]  4.1   |  26  |  0.88    Ca    8.5      01 Oct 2024 07:09  Mg     1.8     09-30    TPro  6.0  /  Alb  2.6[L]  /  TBili  1.1  /  DBili  x   /  AST  29  /  ALT  17  /  AlkPhos  77  10-01          T(F): 98.3 (10-01-24 @ 08:07), Max: 98.7 (10-01-24 @ 01:26)  HR: 88 (10-01-24 @ 08:07) (88 - 95)  BP: 139/59 (10-01-24 @ 08:07) (138/51 - 158/68)  RR: 17 (10-01-24 @ 08:07) (17 - 18)  SpO2: 96% (10-01-24 @ 08:07) (95% - 99%)  Wt(kg): --    REVIEW OF SYSTEMS:    CONSTITUTIONAL: No weakness, fevers or chills  EYES: No visual changes  RESPIRATORY: No cough, wheezing; No shortness of breath  CARDIOVASCULAR: No chest pain or palpitations  GASTROINTESTINAL: No abdominal or epigastric pain. No nausea, vomiting; No diarrhea or constipation.   GENITOURINARY: No dysuria, frequency or hematuria  NEUROLOGICAL: No numbness or weakness  SKIN: See physical examination.  All other review of systems is negative unless indicated above    Physical Exam:   Constitutional: NAD, alert;  Lower Extremity Focus  Derm:  Skin warm, dry and supple bilateral.    Significant generalized lichenification and verrucous changes to skin extending from below the knees to feet bilaterally, cobblestone like papules and nodules extending from below knee to feet bilaterally. Noted hemosiderin deposition to BLE. Noted erythema to bilateral feet (right>left)   Vascular: Dorsalis Pedis and Posterior Tibial pulses non palpable due to significant edema to BLE.    Neuro: Protective sensation intact to the level of the digits bilateral.  MSK: Muscle strength 5/5 all major muscle groups bilateral.

## 2024-10-01 NOTE — H&P ADULT - ASSESSMENT
72 y/o F w/ PMH of HTN, colon & lung cancer, DM2, chronic thrombocytoepnia, chronic metabolic acidosis, chronic B/L LE lymphadema, p/w worsening RLE wounds    *RLE wounds + cellulitis   -Vanco / Aztreonem  -F/u blood cx  -ID consult  -Podiatry consult   -LE U/S to r/o DVT     *Colon / lung cancer + Pancytopenia   -F/u outpatient Heme / onc     *DM2  -Humalog ISS  -Diabetic diet     *DVT ppx  -SCDs 2/2 thrombocytopenia     >75 mins required for admission      74 y/o F w/ PMH of HTN, colon & lung cancer, DM2, chronic thrombocytoepnia, chronic metabolic acidosis, chronic B/L LE lymphadema, p/w worsening RLE wounds    *RLE wounds + cellulitis   -Vanco / Aztreonem  -F/u blood cx  -ID consult  -Podiatry consult   -LE U/S to r/o DVT     *Colon / lung cancer + Pancytopenia   -F/u outpatient Heme / onc     *DM2  -Humalog ISS  -Diabetic diet     *DVT ppx  -SCDs 2/2 thrombocytopenia     >75 mins required for admission

## 2024-10-01 NOTE — H&P ADULT - HIV OFFER
NUTRITION - FOLLOW-UP TREATMENT NOTE  Patient Name: Vicenta Canales         Date: 10/24/2018  : 1982    YES/NO Patient  Verified  Diagnosis: Breast CA   In time:   2:30             Out time:   3:00   Total Treatment Time (min):   30 min     SUBJECTIVE/ASSESSMENT    Changes in medication or medical history? Any new allergies, surgeries or procedures? NO    If yes, update Summary List   Pt in for follow up r/t healthy eating with breast CA. She has her last round of chemo approaching on 10/29/18. She is worried about that and about deciding between surgeries. She states that she probably went overboard with sweets last week because she could finally taste them. She did some walking for exercise but forgot about the yoga. Current Wt: 150 Previous Wt: - Wt Change: -     Achievement of Goals: 1. At least 5 servings of fruits and vegetables per day = not met, continue  2. Look into yoga for beginners (to help with stress and digestion) = not met, continue  3. No bottled water or soy products = met, continue     Patient Education:  [x]  Review current plan with patient   []  Other:    Handouts/  Information Provided: []  Carbohydrates  []  Protein  []  Fiber  []  Serving Sizes  []  Fluids  []  General guidelines []  Diabetes  []  Cholesterol  []  Sodium  []  SBGM  []  Food Journals  [x]  Others: Foods recommended for CA and foods to limit with CA; list of studies r/t food and CA     New Patient Goals: 1. Eat within 1-2 hours of waking and every 3-5 hrs after  2.  Try yoga video (will send through e-mail)     PLAN    [x]  Continue on current plan []  Follow-up PRN   []  Discharge due to :    [x]  Next appt: 18 @ 3 pm     Dietitian: Lucy Durant MS, RD    Date: 10/24/2018 Time: 2:59 PM
SUMMARY: SEE PREVIOUS NOTE. NO ACUTE CHANGE TONIGHT. PT SLEPT WELL, MEDICATED
X2 WITH 1MG MORPHINE. TURNED Q2 HOURS. MINIMAL OUTPUT FROM NG. PT HAS DENIED
NAUSEA. PT HAD SMEAR OF STOOL, APPEARED SLIGHTLY BLOODY. SURGICAL SITE WNL.
SWELLING AT L ARM ABOUT THE SAME AS SHIFT START, ARM ELEVATED. NO ACUTE SAFETY
CONCERNS AT THIS TIME. WILL CTM AND REPORT TO DAY RN.
Opt out

## 2024-10-01 NOTE — CONSULT NOTE ADULT - ASSESSMENT
A: 71 y/o Female seen for the following:   - Cellulitis to right Foot  - Chronic BLE Lymphedema   - BLE Elephantiasis nostras verrucosa  - DM2  - Difficulty with ambulation      P:   Chart reviewed and Patient evaluated;  Discussed diagnosis and treatment with patient. Discussed importance of daily foot examinations, proper shoe gear, importance of tight glycemic control.   X-rays reviewed : on wet read showing no soft tissue emphysema, or acute bony pathology  Significant generalized verrucous changes to skin extending from bilateral below the knee to feet, cobblestone like nodules to BLE, interdigital maceration and malodor. Underlying elephantiasis nostras verrucosa caused by chronic BLE lymphedema; Erythema and edema to norberto LE noted (right>left)  Applied Bactroban with dry sterile compressive dressings to NORBERTO LE  Elevate BLE  Antibiotics as per ID  No acute podiatric surgical intervention warranted at this time. Pt to continue with local wound care and antibiotics per ID  All additional care per Med appreciated  Patient demonstrated verbal understanding of all interventions and tolerated interventions well without any complications.   Podiatry will follow while in house

## 2024-10-01 NOTE — PHYSICAL THERAPY INITIAL EVALUATION ADULT - MANUAL MUSCLE TESTING RESULTS, REHAB EVAL
Strength is grossly at least 3+/5 throughout BUE, 3-/5 BLE/grossly assessed due to BUE 4/5, BLE 3-/5, R ankle DF 2-/5/grossly assessed due to

## 2024-10-01 NOTE — CONSULT NOTE ADULT - SUBJECTIVE AND OBJECTIVE BOX
SUBJECTIVE:    CHIEF COMPLAINT:  Patient is a 73y old  Female who presents with a chief complaint of worsening RLE wounds. (01 Oct 2024 04:28)      HPI:  72 y/o F w/ PMH of HTN, colon & lung cancer, DM2, chronic thrombocytoepnia, chronic metabolic acidosis, chronic B/L LE lymphadema, p/w worsening RLE wounds. Patient states that wound care nurse that comes to the house noticed that patient was draining more from her RLE wounds and also had erythema. She called wound care center who referred patient to ED. Patient denies fever, chills      Active Problems  Encounter for preventive health examination (V70.0) (Z00.00)  Anxiety and depression (300.00,311) (F41.9,F32.A)  Cellulitis of lower extremity, unspecified laterality (682.6) (L03.119)  Chronic stasis dermatitis (454.1) (I87.2)  Cirrhosis of liver with ascites, unspecified hepatic cirrhosis type (571.5) (K74.60,R18.8)  Colon cancer (153.9) (C18.9)  HTN (hypertension), benign (401.1) (I10)  Hyperkalemia (276.7) (E87.5)  Influenza vaccine administered (V04.81) (Z23)  Lymphedema of both lower extremities (457.1) (I89.0)  Malignant neoplasm of upper lobe of left lung (162.3) (C34.12)  Metabolic syndrome (277.7) (E88.810)  Obesity, morbid (more than 100 lbs over ideal weight or BMI > 40) (278.01) (E66.01)  Type 2 diabetes mellitus without complication, without long-term current use of insulin  (250.00) (E11.9)    Past Medical History  History of Cellulitis of right lower leg (682.6) (L03.115)  History of Prophylactic vaccination against streptococcus pneumoniae and influenza  (V06.6) (Z23)    Surgical History  History of Cholecystectomy  History of Hysterectomy (V88.01)  History of Laminectomy Lumbar  History of Lung lobectomy  History of Partial Colectomy  History of Rotator Cuff Repair    Family History  Family history of Bladder carcinoma : Mother  Family history of coronary artery disease (V17.3) (Z82.49) : Father    Social History  Denies alcohol consumption (V49.89) (Z78.9)  Does not use illicit drugs (V49.89) (Z78.9)  Has no children  Never smoked cigarettes (V49.89) (Z78.9)  Single    Home Meds  Clobetasol Propionate 0.05 % External Cream; APPLY SPARINGLY TO AFFECTED  AREA(S) TWICE DAILY  Januvia 25 MG Oral Tablet; TAKE 1 TABLET BY MOUTH EVERY DAY  Mupirocin 2 % External Ointment; APPLY TOPICALLY TO AFFECTED AREA TUESDAY ,  THURSDAY AND SATURDAY    Patient takes medication(s) as prescribed.     MEDICATIONS:  MEDICATIONS  (STANDING):  acetic acid 0.25% Topical Irrigation 1 Application(s) Topical once  dextrose 5%. 1000 milliLiter(s) (100 mL/Hr) IV Continuous <Continuous>  dextrose 5%. 1000 milliLiter(s) (50 mL/Hr) IV Continuous <Continuous>  dextrose 50% Injectable 12.5 Gram(s) IV Push once  dextrose 50% Injectable 25 Gram(s) IV Push once  dextrose 50% Injectable 25 Gram(s) IV Push once  glucagon  Injectable 1 milliGRAM(s) IntraMuscular once  influenza  Vaccine (HIGH DOSE) 0.5 milliLiter(s) IntraMuscular once  insulin lispro (ADMELOG) corrective regimen sliding scale   SubCutaneous three times a day before meals  insulin lispro (ADMELOG) corrective regimen sliding scale   SubCutaneous at bedtime  mupirocin 2% Ointment 1 Application(s) Topical once  vancomycin  IVPB 1250 milliGRAM(s) IV Intermittent every 12 hours      Allergies    Keflex (Anaphylaxis)  cephalexin (Unknown)    REVIEW OF SYSTEMS:  CONSTITUTIONAL: No weakness, fevers or chills  EYES/ENT: No visual changes;  No vertigo or throat pain   NECK: No pain or stiffness  RESPIRATORY: No cough, wheezing, hemoptysis; No shortness of breath  CARDIOVASCULAR: No chest pain or palpitations  GASTROINTESTINAL: No abdominal or epigastric pain. No nausea, vomiting, or hematemesis; No diarrhea or constipation. No melena or hematochezia.  GENITOURINARY: No dysuria, frequency or hematuria  NEUROLOGICAL: No numbness or weakness  SKIN: No itching, burning, rashes, or lesions   All other review of systems is negative unless indicated above  OBJECTIVE:    PHYSICAL EXAM:  Vital Signs Last 24 Hrs  T(C): 36.8 (01 Oct 2024 08:07), Max: 37.1 (01 Oct 2024 01:26)  T(F): 98.3 (01 Oct 2024 08:07), Max: 98.7 (01 Oct 2024 01:26)  HR: 88 (01 Oct 2024 08:07) (88 - 95)  BP: 139/59 (01 Oct 2024 08:07) (138/51 - 158/68)  BP(mean): 72 (01 Oct 2024 01:26) (72 - 79)  RR: 17 (01 Oct 2024 08:07) (17 - 18)  SpO2: 96% (01 Oct 2024 08:07) (95% - 99%)    Parameters below as of 01 Oct 2024 08:07  Patient On (Oxygen Delivery Method): room air  Constitutional: NAD, awake and alert, well-developed  HEENT: PERRLA, EOMI, Pharynx Clear  Neck: Supple, No JVD, No Lymphadenopathy  Respiratory: Breath sounds are clear bilaterally, No wheezing, rales or rhonchi  Cardiovascular: S1 and S2, regular rate and rhythm, no Murmurs, rubs or gallops  Gastrointestinal: Bowel Sounds present, soft, nontender. No Hepatosplenomegaly No masses  Extremities:   Vascular: 2+ peripheral pulses  Neurological: A/O x 3, no focal deficits  Musculoskeletal: FROM upper and lower extremities  Skin: No rashes    LABS:                         9.6    3.17  )-----------( 71       ( 01 Oct 2024 07:09 )             29.5     10-01    142  |  113[H]  |  11  ----------------------------<  119[H]  4.1   |  26  |  0.88    Ca    8.5      01 Oct 2024 07:09  Mg     1.8     09-30    TPro  6.0  /  Alb  2.6[L]  /  TBili  1.1  /  DBili  x   /  AST  29  /  ALT  17  /  AlkPhos  77  10-01    Urinalysis Basic - ( 01 Oct 2024 07:09 )    Color: x / Appearance: x / SG: x / pH: x  Gluc: 119 mg/dL / Ketone: x  / Bili: x / Urobili: x   Blood: x / Protein: x / Nitrite: x   Leuk Esterase: x / RBC: x / WBC x   Sq Epi: x / Non Sq Epi: x / Bacteria: x                      CAPILLARY BLOOD GLUCOSE      POCT Blood Glucose.: 116 mg/dL (01 Oct 2024 08:35)  POCT Blood Glucose.: 157 mg/dL (01 Oct 2024 01:36)        RADIOLOGY/EKG:    DVT PPX:    ADVANCED DIRECTIVE:     SUBJECTIVE:    CHIEF COMPLAINT:  Patient is a 73y old  Female who presents with a chief complaint of worsening RLE wounds. (01 Oct 2024 04:28)      HPI:  74 y/o F w/ PMH of HTN, colon & lung cancer, DM2, chronic thrombocytoepnia, chronic metabolic acidosis, chronic B/L LE lymphadema, p/w worsening RLE wounds. Patient states that wound care nurse that comes to the house noticed that patient was draining more from her RLE wounds and also had erythema. She called wound care center who referred patient to ED. Patient denies fever, chills      Active Problems  Encounter for preventive health examination (V70.0) (Z00.00)  Anxiety and depression (300.00,311) (F41.9,F32.A)  Cellulitis of lower extremity, unspecified laterality (682.6) (L03.119)  Chronic stasis dermatitis (454.1) (I87.2)  Cirrhosis of liver with ascites, unspecified hepatic cirrhosis type (571.5) (K74.60,R18.8)  Colon cancer (153.9) (C18.9)  HTN (hypertension), benign (401.1) (I10)  Hyperkalemia (276.7) (E87.5)  Influenza vaccine administered (V04.81) (Z23)  Lymphedema of both lower extremities (457.1) (I89.0)  Malignant neoplasm of upper lobe of left lung (162.3) (C34.12)  Metabolic syndrome (277.7) (E88.810)  Obesity, morbid (more than 100 lbs over ideal weight or BMI > 40) (278.01) (E66.01)  Type 2 diabetes mellitus without complication, without long-term current use of insulin  (250.00) (E11.9)    Past Medical History  History of Cellulitis of right lower leg (682.6) (L03.115)  History of Prophylactic vaccination against streptococcus pneumoniae and influenza  (V06.6) (Z23)    Surgical History  History of Cholecystectomy  History of Hysterectomy (V88.01)  History of Laminectomy Lumbar  History of Lung lobectomy  History of Partial Colectomy  History of Rotator Cuff Repair    Family History  Family history of Bladder carcinoma : Mother  Family history of coronary artery disease (V17.3) (Z82.49) : Father    Social History  Denies alcohol consumption (V49.89) (Z78.9)  Does not use illicit drugs (V49.89) (Z78.9)  Has no children  Never smoked cigarettes (V49.89) (Z78.9)  Single    Home Meds  Clobetasol Propionate 0.05 % External Cream; APPLY SPARINGLY TO AFFECTED  AREA(S) TWICE DAILY  Januvia 25 MG Oral Tablet; TAKE 1 TABLET BY MOUTH EVERY DAY  Mupirocin 2 % External Ointment; APPLY TOPICALLY TO AFFECTED AREA TUESDAY ,  THURSDAY AND SATURDAY    Patient takes medication(s) as prescribed.     MEDICATIONS:  MEDICATIONS  (STANDING):  acetic acid 0.25% Topical Irrigation 1 Application(s) Topical once  dextrose 5%. 1000 milliLiter(s) (100 mL/Hr) IV Continuous <Continuous>  dextrose 5%. 1000 milliLiter(s) (50 mL/Hr) IV Continuous <Continuous>  dextrose 50% Injectable 12.5 Gram(s) IV Push once  dextrose 50% Injectable 25 Gram(s) IV Push once  dextrose 50% Injectable 25 Gram(s) IV Push once  glucagon  Injectable 1 milliGRAM(s) IntraMuscular once  influenza  Vaccine (HIGH DOSE) 0.5 milliLiter(s) IntraMuscular once  insulin lispro (ADMELOG) corrective regimen sliding scale   SubCutaneous three times a day before meals  insulin lispro (ADMELOG) corrective regimen sliding scale   SubCutaneous at bedtime  mupirocin 2% Ointment 1 Application(s) Topical once  vancomycin  IVPB 1250 milliGRAM(s) IV Intermittent every 12 hours      Allergies    Keflex (Anaphylaxis)  cephalexin (Unknown)    REVIEW OF SYSTEMS:  CONSTITUTIONAL: No weakness, fevers or chills  EYES/ENT: No visual changes;  No vertigo or throat pain   NECK: No pain or stiffness  RESPIRATORY: No cough, wheezing, hemoptysis; No shortness of breath  CARDIOVASCULAR: No chest pain or palpitations  GASTROINTESTINAL: No abdominal or epigastric pain. No nausea, vomiting, or hematemesis; No diarrhea or constipation. No melena or hematochezia.  GENITOURINARY: No dysuria, frequency or hematuria  NEUROLOGICAL: No numbness or weakness  SKIN: No itching, burning, rashes, or lesions   All other review of systems is negative unless indicated above  OBJECTIVE:    PHYSICAL EXAM:  Vital Signs Last 24 Hrs  T(C): 36.8 (01 Oct 2024 08:07), Max: 37.1 (01 Oct 2024 01:26)  T(F): 98.3 (01 Oct 2024 08:07), Max: 98.7 (01 Oct 2024 01:26)  HR: 88 (01 Oct 2024 08:07) (88 - 95)  BP: 139/59 (01 Oct 2024 08:07) (138/51 - 158/68)  BP(mean): 72 (01 Oct 2024 01:26) (72 - 79)  RR: 17 (01 Oct 2024 08:07) (17 - 18)  SpO2: 96% (01 Oct 2024 08:07) (95% - 99%)    Parameters below as of 01 Oct 2024 08:07  Patient On (Oxygen Delivery Method): room air  Constitutional: NAD, awake and alert, well-developed  HEENT: PERRLA, EOMI, Pharynx Clear  Neck: Supple, No JVD, No Lymphadenopathy  Respiratory: Breath sounds are clear bilaterally, No wheezing, rales or rhonchi  Cardiovascular: S1 and S2, regular rate and rhythm, no Murmurs, rubs or gallops  Gastrointestinal: Bowel Sounds present, soft, nontender. No Hepatosplenomegaly No masses  Extremities: Severe lymphedema, Feet red and ozzing, right worse than left. Feet so swollen that toes are barely visible. Purulent material to dorsal right foot.  Neurological: A/O x 3, no focal deficits  Musculoskeletal: FROM upper and lower extremities      LABS:                         9.6    3.17  )-----------( 71       ( 01 Oct 2024 07:09 )             29.5     10-01    142  |  113[H]  |  11  ----------------------------<  119[H]  4.1   |  26  |  0.88    Ca    8.5      01 Oct 2024 07:09  Mg     1.8     09-30    TPro  6.0  /  Alb  2.6[L]  /  TBili  1.1  /  DBili  x   /  AST  29  /  ALT  17  /  AlkPhos  77  10-01    Urinalysis Basic - ( 01 Oct 2024 07:09 )  Color: x / Appearance: x / SG: x / pH: x  Gluc: 119 mg/dL / Ketone: x  / Bili: x / Urobili: x   Blood: x / Protein: x / Nitrite: x   Leuk Esterase: x / RBC: x / WBC x   Sq Epi: x / Non Sq Epi: x / Bacteria: x    CAPILLARY BLOOD GLUCOSE  POCT Blood Glucose.: 116 mg/dL (01 Oct 2024 08:35)  POCT Blood Glucose.: 157 mg/dL (01 Oct 2024 01:36)

## 2024-10-01 NOTE — PHYSICAL THERAPY INITIAL EVALUATION ADULT - GENERAL OBSERVATIONS, REHAB EVAL
The Pt was received on 5S in bed supine, calm, cooperative, and willing to participate with bedside PT evaluation, +bandages on B feet, +IV. Poor response to functional mobility trng. Patient attempted EOB but unable to complete. Offered assistance on second attempt but patient declining at this time. Citing pain and discomfort on BLE after returning from ultrasound procedure this AM. Able to perform side to side rolling with handrail support with MIN A. Adjusted for comfort in supine, +alarm. The Pt was in NAD at end of tx.  Call bell, tray, and phone in place and within reach. NSG made aware.

## 2024-10-01 NOTE — PHYSICAL THERAPY INITIAL EVALUATION ADULT - ADDITIONAL COMMENTS
Pt states she ambulates with RW. Pt also states she has aide assistance at home. Pt with right foot drop, used AFO in past. -information from Oct 2023. pt has wound care dress her legs 3x per week. Pt states she ambulates with RW. Pt also states she has aide assistance at home 3hr/day 7day/week. Patient states she has multiple friends and family that assist as needed. Pt with right foot drop, no mention of brace on eval. Past PT evaluation stating patient has history of AFO.

## 2024-10-01 NOTE — CONSULT NOTE ADULT - ASSESSMENT
74 y/o F w/ PMH of HTN, colon & lung cancer, DM2, chronic thrombocytopenia chronic metabolic acidosis, chronic and severe B/L LE lymphedema  p/w worsening RLE wounds    Diagnosis:  Cellulitis of Right > Left Feet  Type 2 Diabetes  H/o Colon and Lung Cancer  Pancytopenia  HTN     Plan:  Vancomycin / Aztreonam pending ID eval  F/u blood cx  ID consult in progress  Podiatry consult and for wound care - Discussed at length. Will need to wrap up to the knees  LE U/S to r/o DVT   F/u outpatient Heme / onc   Humalog ISS  Diabetic diet   Will need a better home care plan on discharge. May need weekly wound care center evaluation and better wrapping at home.    DVT Prophylaxis  SCDs 2/2 thrombocytopenia     Advanced Directives  Full Code

## 2024-10-01 NOTE — CONSULT NOTE ADULT - SUBJECTIVE AND OBJECTIVE BOX
Patient is a 73y old  Female who presents with a chief complaint of worsening RLE wounds. (01 Oct 2024 04:28)    HPI:  74 y/o F w/ PMH of HTN, colon & lung cancer, DM2, chronic thrombocytoepnia, chronic metabolic acidosis, chronic B/L LE lymphadema, p/w worsening RLE wounds. Patient states that wound care nurse that comes to the house noticed that patient was draining more from her RLE wounds and also had erythema. She called wound care center who referred patient to ED. Patient denies fever, chills. Started on IV abx.     PSH: colon resection, lung resection, spine surgery, shoulder surgery, hysterectomy   PMH: as above    Meds: per reconciliation sheet, noted below  MEDICATIONS  (STANDING):  acetic acid 0.25% Topical Irrigation 1 Application(s) Topical once  dextrose 5%. 1000 milliLiter(s) (50 mL/Hr) IV Continuous <Continuous>  dextrose 5%. 1000 milliLiter(s) (100 mL/Hr) IV Continuous <Continuous>  dextrose 50% Injectable 12.5 Gram(s) IV Push once  dextrose 50% Injectable 25 Gram(s) IV Push once  dextrose 50% Injectable 25 Gram(s) IV Push once  glucagon  Injectable 1 milliGRAM(s) IntraMuscular once  influenza  Vaccine (HIGH DOSE) 0.5 milliLiter(s) IntraMuscular once  insulin lispro (ADMELOG) corrective regimen sliding scale   SubCutaneous three times a day before meals  insulin lispro (ADMELOG) corrective regimen sliding scale   SubCutaneous at bedtime  mupirocin 2% Ointment 1 Application(s) Topical once  vancomycin  IVPB 1250 milliGRAM(s) IV Intermittent every 12 hours    Allergies    Keflex (Anaphylaxis)  cephalexin (Unknown)    Intolerances      Social: no smoking, no alcohol, no illegal drugs; no recent travel, no exposure to TB  FAMILY HISTORY:  No pertinent family history in first degree relatives       no history of premature cardiovascular disease in first degree relatives    ROS: the patient denies fever, no chills, no HA, no dizziness, no sore throat, no blurry vision, no CP, no palpitations, no SOB, no cough, no abdominal pain, no diarrhea, no N/V, no dysuria, no leg pain, no claudication, no rash, no joint aches, no rectal pain or bleeding, no night sweats RLE drainage tenderness    All other systems reviewed and are negative    Vital Signs Last 24 Hrs  T(C): 36.8 (01 Oct 2024 08:07), Max: 37.1 (01 Oct 2024 01:26)  T(F): 98.3 (01 Oct 2024 08:07), Max: 98.7 (01 Oct 2024 01:26)  HR: 88 (01 Oct 2024 08:07) (88 - 95)  BP: 139/59 (01 Oct 2024 08:07) (138/51 - 158/68)  BP(mean): 72 (01 Oct 2024 01:26) (72 - 79)  RR: 17 (01 Oct 2024 08:07) (17 - 18)  SpO2: 96% (01 Oct 2024 08:07) (95% - 99%)    Parameters below as of 01 Oct 2024 08:07  Patient On (Oxygen Delivery Method): room air      Daily Height in cm: 165.1 (30 Sep 2024 15:45)    Daily Weight in k.1 (01 Oct 2024 02:16)    PE:  Constitutional: NAD  HEENT: NC/AT, EOMI, PERRLA, conjunctivae clear; ears and nose atraumatic; pharynx benign  Neck: supple; thyroid not palpable  Back: no tenderness  Respiratory: respiratory effort normal; clear to auscultation  Cardiovascular: S1S2 regular, no murmurs  Abdomen: soft, not tender, not distended, positive BS; liver and spleen WNL  Genitourinary: no suprapubic tenderness  Lymphatic: no LN palpable  Musculoskeletal: no muscle tenderness, no joint swelling or tenderness  Extremities: b/l LE lymphedema, RLE stasis wounds erythema warmth tenderness   Neurological/ Psychiatric: AxOx3, Judgement and insight normal;  moving all extremities  Skin: no rashes; no palpable lesions    Labs: all available labs reviewed                        9.6    3.17  )-----------( 71       ( 01 Oct 2024 07:09 )             29.5     10-    142  |  113[H]  |  11  ----------------------------<  119[H]  4.1   |  26  |  0.88    Ca    8.5      01 Oct 2024 07:09  Mg     1.8     09-30    TPro  6.0  /  Alb  2.6[L]  /  TBili  1.1  /  DBili  x   /  AST  29  /  ALT  17  /  AlkPhos  77  10-01     LIVER FUNCTIONS - ( 01 Oct 2024 07:09 )  Alb: 2.6 g/dL / Pro: 6.0 gm/dL / ALK PHOS: 77 U/L / ALT: 17 U/L / AST: 29 U/L / GGT: x           Urinalysis Basic - ( 01 Oct 2024 07:09 )    Color: x / Appearance: x / SG: x / pH: x  Gluc: 119 mg/dL / Ketone: x  / Bili: x / Urobili: x   Blood: x / Protein: x / Nitrite: x   Leuk Esterase: x / RBC: x / WBC x   Sq Epi: x / Non Sq Epi: x / Bacteria: x          Radiology: all available radiological tests reviewed    ACC: [id number] EXAM:  XR TIB FIB AP LAT 2 VIEWS RT   ORDERED BY: OLI HANEY     ACC: [id number] EXAM:  XR CHEST PORTABLE URGENT 1V   ORDERED BY: OLI HANEY     PROCEDURE DATE:  2024          INTERPRETATION:  AP chest on 2024 at 6:08 PM.Patient has   sepsis.    Heart likely enlarged. Right-sided port again noted.    There are mild central congestive findings somewhat greater on the right   and slightly increased from . Right tib-fib. 2 views. 4 images.    The right lung showsdiffuse edema extending down to the ankle.    There is advanced knee degeneration a moderate to advanced ankle   degeneration. No fracture.    IMPRESSION: Mild central CHF somewhat increased from prior. Pronounced   right leg and ankle edema with degeneration in the knee and ankle.      Advanced directives addressed: full resuscitation

## 2024-10-01 NOTE — CONSULT NOTE ADULT - ASSESSMENT
74 y/o F w/ PMH of HTN, colon & lung cancer, DM2, chronic thrombocytopenia, chronic metabolic acidosis, chronic B/L LE lymphedema, p/w worsening RLE wounds. Patient states that wound care nurse that comes to the house noticed that patient was draining more from her RLE wounds and also had erythema. She called wound care center who referred patient to ED. Patient denies fever, chills. Started on IV abx.     1. Bilateral LE Chronic Lymphedema. Stasis dermatitis. RLE cellulitis. Stasis wounds. Morbid obesity  - imaging reviewed  - dc aztreonam change to zosyn [id number]  - on vancomycin 15mg/kg q12h check trough prior to 4th dose  - continue with abx coverage  - wound care  - f/u cultures  - monitor temps  - tolerating abx well so far; no side effects noted  - reason for abx use and side effects reviewed with patient  - supportive care  - fu cbc    Clinical team may change from intravenous to oral antibiotics when the following criteria are met:   1. Patient is clinically improving/stable       a)	Improved signs and symptoms of infection from initial presentation       b)	Afebrile for 24 hours       c)	Leukocytosis trending towards normal range   2. Patient is tolerating oral intake   3. Initial/repeat blood cultures are negative     Cannot advise changing to oral antibiotic therapy until culture sensitivity is available.

## 2024-10-01 NOTE — H&P ADULT - HISTORY OF PRESENT ILLNESS
74 y/o F w/ PMH of HTN, colon & lung cancer, DM2, chronic thrombocytoepnia, chronic metabolic acidosis, chronic B/L LE lymphadema, p/w worsening RLE wounds. Patient states that wound care nurse that comes to the house noticed that patient was draining more from her RLE wounds and also had erythema. She called wound care center who referred patient to ED. Patient denies fever, chills      PSH: colon resection, lung resection, spine surgery, shoulder surgery, hysterectomy     Social Hx: Denies tobacco / etoh / drugs, patient is a retired teacher     Family Hx: Denies pertinent hx

## 2024-10-01 NOTE — DISCHARGE NOTE NURSING/CASE MANAGEMENT/SOCIAL WORK - PATIENT PORTAL LINK FT
You can access the FollowMyHealth Patient Portal offered by [facility] by registering at the following website: [email or web address]. By joining [employer]’s FollowMyHealth portal, you will also be able to view your health information using other applications (apps) compatible with our system.

## 2024-10-01 NOTE — H&P ADULT - CONVERSATION DETAILS
Patient states that her brother Suleiman is HCP / Surrogate. She is full resuscitation status for this admission

## 2024-10-01 NOTE — PHYSICAL THERAPY INITIAL EVALUATION ADULT - ACTIVE RANGE OF MOTION EXAMINATION, REHAB EVAL
pt able to clear BLE from bed however limited 2/2 weight of LEs./bilateral upper extremity Active ROM was WFL (within functional limits) except R foot dorsiflexion limited d/t prior spine injury >30 years prior./no Active ROM deficits were identified/bilateral upper extremity Active ROM was WFL (within functional limits)

## 2024-10-01 NOTE — PHYSICAL THERAPY INITIAL EVALUATION ADULT - PERTINENT HX OF CURRENT PROBLEM, REHAB EVAL
74 y/o F w/ PMH of HTN, colon & lung cancer, DM2, chronic thrombocytoepnia, chronic metabolic acidosis, chronic B/L LE lymphadema, p/w worsening RLE wounds. Patient states that wound care nurse that comes to the house noticed that patient was draining more from her RLE wounds and also had erythema. She called wound care center who referred patient to ED. Patient denies fever, chills.  Admitted for treatment of RLE wounds and cellulitis.

## 2024-10-01 NOTE — PATIENT PROFILE ADULT - FALL HARM RISK - HARM RISK INTERVENTIONS

## 2024-10-02 ENCOUNTER — TRANSCRIPTION ENCOUNTER (OUTPATIENT)
Age: 74
End: 2024-10-02

## 2024-10-02 LAB
GLUCOSE BLDC GLUCOMTR-MCNC: 106 MG/DL — HIGH (ref 70–99)
GLUCOSE BLDC GLUCOMTR-MCNC: 128 MG/DL — HIGH (ref 70–99)
GLUCOSE BLDC GLUCOMTR-MCNC: 144 MG/DL — HIGH (ref 70–99)
GLUCOSE BLDC GLUCOMTR-MCNC: 150 MG/DL — HIGH (ref 70–99)
HCT VFR BLD CALC: 32.1 % — LOW (ref 34.5–45)
HGB BLD-MCNC: 10.4 G/DL — LOW (ref 11.5–15.5)
MCHC RBC-ENTMCNC: 28.3 PG — SIGNIFICANT CHANGE UP (ref 27–34)
MCHC RBC-ENTMCNC: 32.4 GM/DL — SIGNIFICANT CHANGE UP (ref 32–36)
MCV RBC AUTO: 87.2 FL — SIGNIFICANT CHANGE UP (ref 80–100)
PLATELET # BLD AUTO: 65 K/UL — LOW (ref 150–400)
RBC # BLD: 3.68 M/UL — LOW (ref 3.8–5.2)
RBC # FLD: 14.3 % — SIGNIFICANT CHANGE UP (ref 10.3–14.5)
VANCOMYCIN TROUGH SERPL-MCNC: 20.5 UG/ML — HIGH (ref 10–20)
WBC # BLD: 3.07 K/UL — LOW (ref 3.8–10.5)
WBC # FLD AUTO: 3.07 K/UL — LOW (ref 3.8–10.5)

## 2024-10-02 PROCEDURE — 99233 SBSQ HOSP IP/OBS HIGH 50: CPT

## 2024-10-02 RX ORDER — ONDANSETRON HCL/PF 4 MG/2 ML
4 VIAL (ML) INJECTION EVERY 8 HOURS
Refills: 0 | Status: DISCONTINUED | OUTPATIENT
Start: 2024-10-02 | End: 2024-10-02

## 2024-10-02 RX ORDER — PIPERACILLIN SODIUM AND TAZOBACTAM SODIUM 12; 1.5 G/60ML; G/60ML
3.38 INJECTION, POWDER, LYOPHILIZED, FOR SOLUTION INTRAVENOUS EVERY 8 HOURS
Refills: 0 | Status: DISCONTINUED | OUTPATIENT
Start: 2024-10-02 | End: 2024-10-04

## 2024-10-02 RX ORDER — ONDANSETRON HCL/PF 4 MG/2 ML
4 VIAL (ML) INJECTION EVERY 8 HOURS
Refills: 0 | Status: DISCONTINUED | OUTPATIENT
Start: 2024-10-02 | End: 2024-10-08

## 2024-10-02 RX ORDER — MUPIROCIN 20 MG/G
1 OINTMENT TOPICAL
Refills: 0 | Status: DISCONTINUED | OUTPATIENT
Start: 2024-10-02 | End: 2024-10-08

## 2024-10-02 RX ADMIN — Medication 4 MILLIGRAM(S): at 16:33

## 2024-10-02 RX ADMIN — VANCOMYCIN HCL-SODIUM CHLORIDE IV SOLN 1.5 GM/250ML-0.9% 166.67 MILLIGRAM(S): 1.5-0.9/25 SOLUTION at 10:09

## 2024-10-02 RX ADMIN — Medication 650 MILLIGRAM(S): at 00:02

## 2024-10-02 RX ADMIN — PIPERACILLIN SODIUM AND TAZOBACTAM SODIUM 25 GRAM(S): 12; 1.5 INJECTION, POWDER, LYOPHILIZED, FOR SOLUTION INTRAVENOUS at 14:12

## 2024-10-02 RX ADMIN — Medication 650 MILLIGRAM(S): at 21:54

## 2024-10-02 RX ADMIN — Medication 650 MILLIGRAM(S): at 10:08

## 2024-10-03 LAB
GLUCOSE BLDC GLUCOMTR-MCNC: 121 MG/DL — HIGH (ref 70–99)
GLUCOSE BLDC GLUCOMTR-MCNC: 122 MG/DL — HIGH (ref 70–99)
GLUCOSE BLDC GLUCOMTR-MCNC: 127 MG/DL — HIGH (ref 70–99)
GLUCOSE BLDC GLUCOMTR-MCNC: 135 MG/DL — HIGH (ref 70–99)
VANCOMYCIN TROUGH SERPL-MCNC: 33.1 UG/ML — CRITICAL HIGH (ref 10–20)

## 2024-10-03 PROCEDURE — 99233 SBSQ HOSP IP/OBS HIGH 50: CPT

## 2024-10-03 RX ADMIN — VANCOMYCIN HCL-SODIUM CHLORIDE IV SOLN 1.5 GM/250ML-0.9% 166.67 MILLIGRAM(S): 1.5-0.9/25 SOLUTION at 04:12

## 2024-10-03 RX ADMIN — Medication 4 MILLIGRAM(S): at 17:07

## 2024-10-03 RX ADMIN — MUPIROCIN 1 APPLICATION(S): 20 OINTMENT TOPICAL at 10:49

## 2024-10-03 RX ADMIN — Medication 650 MILLIGRAM(S): at 20:23

## 2024-10-03 RX ADMIN — Medication 4 MILLIGRAM(S): at 08:19

## 2024-10-03 RX ADMIN — PIPERACILLIN SODIUM AND TAZOBACTAM SODIUM 25 GRAM(S): 12; 1.5 INJECTION, POWDER, LYOPHILIZED, FOR SOLUTION INTRAVENOUS at 08:12

## 2024-10-03 RX ADMIN — PIPERACILLIN SODIUM AND TAZOBACTAM SODIUM 25 GRAM(S): 12; 1.5 INJECTION, POWDER, LYOPHILIZED, FOR SOLUTION INTRAVENOUS at 00:18

## 2024-10-03 RX ADMIN — Medication 650 MILLIGRAM(S): at 21:23

## 2024-10-03 RX ADMIN — PIPERACILLIN SODIUM AND TAZOBACTAM SODIUM 25 GRAM(S): 12; 1.5 INJECTION, POWDER, LYOPHILIZED, FOR SOLUTION INTRAVENOUS at 17:07

## 2024-10-03 RX ADMIN — Medication 4 MILLIGRAM(S): at 00:18

## 2024-10-04 LAB
APTT BLD: 29.7 SEC — SIGNIFICANT CHANGE UP (ref 24.5–35.6)
C DIFF GDH STL QL: NEGATIVE — SIGNIFICANT CHANGE UP
C DIFF GDH STL QL: SIGNIFICANT CHANGE UP
CLOSURE TME COLL+EPINEP BLD: 65 K/UL — LOW (ref 150–400)
FIBRINOGEN PPP-MCNC: 270 MG/DL — SIGNIFICANT CHANGE UP (ref 200–435)
GLUCOSE BLDC GLUCOMTR-MCNC: 120 MG/DL — HIGH (ref 70–99)
GLUCOSE BLDC GLUCOMTR-MCNC: 121 MG/DL — HIGH (ref 70–99)
GLUCOSE BLDC GLUCOMTR-MCNC: 123 MG/DL — HIGH (ref 70–99)
GLUCOSE BLDC GLUCOMTR-MCNC: 99 MG/DL — SIGNIFICANT CHANGE UP (ref 70–99)
INR BLD: 1.07 RATIO — SIGNIFICANT CHANGE UP (ref 0.85–1.16)
PROTHROM AB SERPL-ACNC: 12.3 SEC — SIGNIFICANT CHANGE UP (ref 9.9–13.4)
TSH SERPL-MCNC: 1.93 UU/ML — SIGNIFICANT CHANGE UP (ref 0.34–4.82)

## 2024-10-04 PROCEDURE — 99233 SBSQ HOSP IP/OBS HIGH 50: CPT

## 2024-10-04 PROCEDURE — 99223 1ST HOSP IP/OBS HIGH 75: CPT

## 2024-10-04 RX ADMIN — Medication 1 TABLET(S): at 22:09

## 2024-10-04 RX ADMIN — PIPERACILLIN SODIUM AND TAZOBACTAM SODIUM 25 GRAM(S): 12; 1.5 INJECTION, POWDER, LYOPHILIZED, FOR SOLUTION INTRAVENOUS at 09:06

## 2024-10-04 RX ADMIN — Medication 650 MILLIGRAM(S): at 22:10

## 2024-10-04 RX ADMIN — Medication 4 MILLIGRAM(S): at 00:19

## 2024-10-04 RX ADMIN — Medication 650 MILLIGRAM(S): at 23:10

## 2024-10-04 RX ADMIN — Medication 4 MILLIGRAM(S): at 09:06

## 2024-10-04 RX ADMIN — PIPERACILLIN SODIUM AND TAZOBACTAM SODIUM 25 GRAM(S): 12; 1.5 INJECTION, POWDER, LYOPHILIZED, FOR SOLUTION INTRAVENOUS at 00:19

## 2024-10-04 NOTE — CONSULT NOTE ADULT - SUBJECTIVE AND OBJECTIVE BOX
HPI:  72 y/o F w/ PMH of HTN, colon & lung cancer, DM2, chronic thrombocytoepnia, chronic metabolic acidosis, chronic B/L LE lymphadema, p/w worsening RLE wounds. Patient states that wound care nurse that comes to the house noticed that patient was draining more from her RLE wounds and also had erythema. She called wound care center who referred patient to ED. Patient denies fever, chills        Heme/Onc history- She has a history of colon cancer, s/p surgery, IV and intraperitoneal chemo? at [facility] 12 years ago.  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  Still has a mediport in place.   She has since had worsening lymphedema.    Known cirrhosis, likely related to SANDERS, not followed by hepatology.    Long history of thrombocytopenia since as far back as 2016- intermittent.  Mild anemia since 2023.    Last imaging in 2022/2023 with mild splenomegaly up to 14cm, cirrhosis/portal hypertension.     Currently on zosyn for cellulitis.  Duplex negative for DVT.       PSH: colon resection, lung resection, spine surgery, shoulder surgery, hysterectomy     Social Hx: Denies tobacco / etoh / drugs, patient is a retired teacher     Family Hx: Denies pertinent hx    (01 Oct 2024 04:28)      Allergies    Keflex (Anaphylaxis)  cephalexin (Unknown)    Intolerances        MEDICATIONS  (STANDING):  dextrose 5%. 1000 milliLiter(s) (50 mL/Hr) IV Continuous <Continuous>  dextrose 5%. 1000 milliLiter(s) (100 mL/Hr) IV Continuous <Continuous>  dextrose 50% Injectable 25 Gram(s) IV Push once  dextrose 50% Injectable 25 Gram(s) IV Push once  dextrose 50% Injectable 12.5 Gram(s) IV Push once  glucagon  Injectable 1 milliGRAM(s) IntraMuscular once  influenza  Vaccine (HIGH DOSE) 0.5 milliLiter(s) IntraMuscular once  insulin lispro (ADMELOG) corrective regimen sliding scale   SubCutaneous three times a day before meals  insulin lispro (ADMELOG) corrective regimen sliding scale   SubCutaneous at bedtime  mupirocin 2% Ointment 1 Application(s) Topical two times a day  ondansetron   Disintegrating Tablet 4 milliGRAM(s) Oral every 8 hours  piperacillin/tazobactam IVPB.. 3.375 Gram(s) IV Intermittent every 8 hours    MEDICATIONS  (PRN):  acetaminophen     Tablet .. 650 milliGRAM(s) Oral every 6 hours PRN Mild Pain (1 - 3)  dextrose Oral Gel 15 Gram(s) Oral once PRN Blood Glucose LESS THAN 70 milliGRAM(s)/deciliter      PAST MEDICAL & SURGICAL HISTORY:  Lymphatic edema      Hypertension      Type 2 diabetes mellitus      Lung cancer      Colon cancer      Acquired thrombocytopenia      H/O metabolic acidosis with normal anion gap      Moderate obesity      No significant past surgical history          FAMILY HISTORY:  No pertinent family history in first degree relatives        SOCIAL HISTORY: No EtOH, no tobacco    REVIEW OF SYSTEMS:    per HPI  mild bruising  no other bleeding      T(F): 98.5 (10-04-24 @ 08:10), Max: 98.9 (10-03-24 @ 20:39)  HR: 81 (10-04-24 @ 08:10)  BP: 135/58 (10-04-24 @ 08:10)  RR: 18 (10-04-24 @ 08:10)  SpO2: 95% (10-04-24 @ 08:10)  Wt(kg): --    GENERAL: NAD, obese  HEAD:  Atraumatic, Normocephalic  EYES: EOMI, anicteric  NECK: Supple  CHEST/LUNG: Clear anteriorly, +right sided port  HEART: Regular rate and rhythm  ABDOMEN: Soft, Nontender, Nondistended, no SM appreciated  EXTREMITIES:  b/l edema, legs dressed  NEUROLOGY: non-focal                          10.4   3.07  )-----------( 65       ( 02 Oct 2024 12:31 )             32.1                    HPI:  72 y/o F w/ PMH of HTN, colon & lung cancer, DM2, chronic thrombocytoepnia, chronic metabolic acidosis, chronic B/L LE lymphadema, p/w worsening RLE wounds. Patient states that wound care nurse that comes to the house noticed that patient was draining more from her RLE wounds and also had erythema. She called wound care center who referred patient to ED. Patient denies fever, chills        Heme/Onc history- She has a history of colon cancer, s/p surgery, IV and intraperitoneal chemo? at [facility] [place] [facility] 12 years ago.  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  Still has a mediport in place.   She has since had worsening lymphedema.    Known cirrhosis, likely related to SANDERS, not followed by hepatology.    Long history of thrombocytopenia since as far back as 2016- intermittent.  Mild anemia since 2023.    Last imaging in 2022/2023 with mild splenomegaly up to 14cm, cirrhosis/portal hypertension.     Currently on zosyn for cellulitis.  Duplex negative for DVT.       PSH: colon resection, lung resection, spine surgery, shoulder surgery, hysterectomy     Social Hx: Denies tobacco / etoh / drugs, patient is a retired teacher     Family Hx: Denies pertinent hx    (01 Oct 2024 04:28)      Allergies    Keflex (Anaphylaxis)  cephalexin (Unknown)    Intolerances        MEDICATIONS  (STANDING):  dextrose 5%. 1000 milliLiter(s) (50 mL/Hr) IV Continuous <Continuous>  dextrose 5%. 1000 milliLiter(s) (100 mL/Hr) IV Continuous <Continuous>  dextrose 50% Injectable 25 Gram(s) IV Push once  dextrose 50% Injectable 25 Gram(s) IV Push once  dextrose 50% Injectable 12.5 Gram(s) IV Push once  glucagon  Injectable 1 milliGRAM(s) IntraMuscular once  influenza  Vaccine (HIGH DOSE) 0.5 milliLiter(s) IntraMuscular once  insulin lispro (ADMELOG) corrective regimen sliding scale   SubCutaneous three times a day before meals  insulin lispro (ADMELOG) corrective regimen sliding scale   SubCutaneous at bedtime  mupirocin 2% Ointment 1 Application(s) Topical two times a day  ondansetron   Disintegrating Tablet 4 milliGRAM(s) Oral every 8 hours  piperacillin/tazobactam IVPB.. 3.375 Gram(s) IV Intermittent every 8 hours    MEDICATIONS  (PRN):  acetaminophen     Tablet .. 650 milliGRAM(s) Oral every 6 hours PRN Mild Pain (1 - 3)  dextrose Oral Gel 15 Gram(s) Oral once PRN Blood Glucose LESS THAN 70 milliGRAM(s)/deciliter      PAST MEDICAL & SURGICAL HISTORY:  Lymphatic edema      Hypertension      Type 2 diabetes mellitus      Lung cancer      Colon cancer      Acquired thrombocytopenia      H/O metabolic acidosis with normal anion gap      Moderate obesity      No significant past surgical history          FAMILY HISTORY:  No pertinent family history in first degree relatives        SOCIAL HISTORY: No EtOH, no tobacco    REVIEW OF SYSTEMS:    per HPI  mild bruising  no other bleeding      T(F): 98.5 (10-04-24 @ 08:10), Max: 98.9 (10-03-24 @ 20:39)  HR: 81 (10-04-24 @ 08:10)  BP: 135/58 (10-04-24 @ 08:10)  RR: 18 (10-04-24 @ 08:10)  SpO2: 95% (10-04-24 @ 08:10)  Wt(kg): --    GENERAL: NAD, obese  HEAD:  Atraumatic, Normocephalic  EYES: EOMI, anicteric  NECK: Supple  CHEST/LUNG: Clear anteriorly, +right sided port  HEART: Regular rate and rhythm  ABDOMEN: Soft, Nontender, Nondistended, no SM appreciated  EXTREMITIES:  b/l edema, legs dressed  NEUROLOGY: non-focal  scattered ecchymosis                        10.4   3.07  )-----------( 65       ( 02 Oct 2024 12:31 )             32.1

## 2024-10-04 NOTE — CONSULT NOTE ADULT - ASSESSMENT
his is a who is admitted with a history of chronic lymphedema, weeping LE wounds with a superimposed cellulitis.   Known cirrhosis/portal hypertension.      She has a history of colon cancer s/p surgery, IV/IP chemo at [facility] [place] 12 years ago.   Lung malignancy (non smoker) s/p SYLVIA wedge resection 12 years ago at [facility] vs [facility] [place].   Will obtain records for better clarification of treatment.     She has a chronic thrombocytopenia since at least 2016 now likely exacerbated by infection/antibiotics.     Her peripheral smear with small clumps, large plt.  No schistocytes.  No dysplastic WBC seen.     Thrombocytopenia related to mild clumping, splenic sequestration due to portal hypertension/splenomegaly.   Cannot rule out an underlying MDS with her history of chemo exposure but no obvious dysplastic features on her peripheral smear.   Will check her labs- PT/PTT, fibrinogen, B12/folate levels, ASYA/RF, HIV/Hepatitis panel, SPEP/RACHEL.   Recommend abdominal ultrasound to evaluate her spleen.   Transfusion of plt if her plt <10, <15 if febrile, <50 with bleeding.     Also with a normocytic anemia/leukopenia- likely suppression from infection.   Monitor for now.       Leonora Soto D.O.   Hematology/Oncology  [facility] [place]               his is a who is admitted with a history of chronic lymphedema, weeping LE wounds with a superimposed cellulitis.   Known cirrhosis/portal hypertension.      She has a history of colon cancer s/p surgery, IV/IP chemo at [facility] [place] 12 years ago.   Lung malignancy (non smoker) s/p SYLVIA wedge resection 12 years ago at [facility] vs [facility].   Will obtain records for better clarification of treatment.     She has a chronic thrombocytopenia since at least 2016 now likely exacerbated by infection/antibiotics.     Her peripheral smear with small clumps, large plt.  No schistocytes.  No dysplastic WBC seen.     Thrombocytopenia related to mild clumping, splenic sequestration due to portal hypertension/splenomegaly.   Cannot rule out an underlying MDS with her history of chemo exposure but no obvious dysplastic features on her peripheral smear.   Will check her labs- PT/PTT, fibrinogen, B12/folate levels, ASYA/RF, HIV/Hepatitis panel, SPEP/RACHEL.   Recommend abdominal ultrasound to evaluate her spleen.   Transfusion of plt if her plt <10, <15 if febrile, <50 with bleeding.     Also with a normocytic anemia/leukopenia- likely suppression from infection.   Monitor for now.     If access becomes a problem, would use her port.       Leonora Soto D.O.   Hematology/Oncology  [facility] [place]

## 2024-10-05 LAB
FERRITIN SERPL-MCNC: 30 NG/ML — SIGNIFICANT CHANGE UP (ref 13–330)
FOLATE SERPL-MCNC: 10.3 NG/ML — SIGNIFICANT CHANGE UP
GLUCOSE BLDC GLUCOMTR-MCNC: 101 MG/DL — HIGH (ref 70–99)
GLUCOSE BLDC GLUCOMTR-MCNC: 117 MG/DL — HIGH (ref 70–99)
GLUCOSE BLDC GLUCOMTR-MCNC: 124 MG/DL — HIGH (ref 70–99)
GLUCOSE BLDC GLUCOMTR-MCNC: 142 MG/DL — HIGH (ref 70–99)
HAV IGM SER-ACNC: SIGNIFICANT CHANGE UP
HBV CORE IGM SER-ACNC: SIGNIFICANT CHANGE UP
HBV SURFACE AG SER-ACNC: SIGNIFICANT CHANGE UP
HCV AB S/CO SERPL IA: 0.35 S/CO — SIGNIFICANT CHANGE UP (ref 0–0.99)
HCV AB SERPL-IMP: SIGNIFICANT CHANGE UP
HIV 1+2 AB+HIV1 P24 AG SERPL QL IA: SIGNIFICANT CHANGE UP
IRON SATN MFR SERPL: 30 UG/DL — SIGNIFICANT CHANGE UP (ref 30–160)
IRON SATN MFR SERPL: 9 % — LOW (ref 14–50)
PROT SERPL-MCNC: 5.8 G/DL — LOW (ref 6–8.3)
RHEUMATOID FACT SERPL-ACNC: 49 IU/ML — HIGH (ref 0–13)
TIBC SERPL-MCNC: 334 UG/DL — SIGNIFICANT CHANGE UP (ref 220–430)
UIBC SERPL-MCNC: 304 UG/DL — SIGNIFICANT CHANGE UP (ref 110–370)
VIT B12 SERPL-MCNC: 458 PG/ML — SIGNIFICANT CHANGE UP (ref 232–1245)

## 2024-10-05 PROCEDURE — 99233 SBSQ HOSP IP/OBS HIGH 50: CPT

## 2024-10-05 RX ADMIN — Medication 1 TABLET(S): at 11:21

## 2024-10-05 RX ADMIN — MUPIROCIN 1 APPLICATION(S): 20 OINTMENT TOPICAL at 12:31

## 2024-10-05 RX ADMIN — Medication 650 MILLIGRAM(S): at 21:39

## 2024-10-05 RX ADMIN — Medication 1 TABLET(S): at 21:09

## 2024-10-05 RX ADMIN — Medication 650 MILLIGRAM(S): at 21:09

## 2024-10-06 LAB
CULTURE RESULTS: SIGNIFICANT CHANGE UP
CULTURE RESULTS: SIGNIFICANT CHANGE UP
GLUCOSE BLDC GLUCOMTR-MCNC: 106 MG/DL — HIGH (ref 70–99)
GLUCOSE BLDC GLUCOMTR-MCNC: 128 MG/DL — HIGH (ref 70–99)
GLUCOSE BLDC GLUCOMTR-MCNC: 133 MG/DL — HIGH (ref 70–99)
GLUCOSE BLDC GLUCOMTR-MCNC: 136 MG/DL — HIGH (ref 70–99)
SPECIMEN SOURCE: SIGNIFICANT CHANGE UP
SPECIMEN SOURCE: SIGNIFICANT CHANGE UP

## 2024-10-06 PROCEDURE — 99232 SBSQ HOSP IP/OBS MODERATE 35: CPT

## 2024-10-06 RX ADMIN — Medication 650 MILLIGRAM(S): at 21:43

## 2024-10-06 RX ADMIN — Medication 1 TABLET(S): at 09:19

## 2024-10-06 RX ADMIN — Medication 650 MILLIGRAM(S): at 21:13

## 2024-10-06 RX ADMIN — MUPIROCIN 1 APPLICATION(S): 20 OINTMENT TOPICAL at 21:13

## 2024-10-06 RX ADMIN — Medication 1 TABLET(S): at 21:13

## 2024-10-06 RX ADMIN — Medication 650 MILLIGRAM(S): at 10:19

## 2024-10-06 RX ADMIN — MUPIROCIN 1 APPLICATION(S): 20 OINTMENT TOPICAL at 11:12

## 2024-10-06 RX ADMIN — Medication 650 MILLIGRAM(S): at 09:19

## 2024-10-07 LAB
GLUCOSE BLDC GLUCOMTR-MCNC: 109 MG/DL — HIGH (ref 70–99)
GLUCOSE BLDC GLUCOMTR-MCNC: 129 MG/DL — HIGH (ref 70–99)
GLUCOSE BLDC GLUCOMTR-MCNC: 139 MG/DL — HIGH (ref 70–99)
GLUCOSE BLDC GLUCOMTR-MCNC: 141 MG/DL — HIGH (ref 70–99)
IGA FLD-MCNC: 351 MG/DL — SIGNIFICANT CHANGE UP (ref 84–499)
IGG FLD-MCNC: 1185 MG/DL — SIGNIFICANT CHANGE UP (ref 610–1660)
IGM SERPL-MCNC: 211 MG/DL — SIGNIFICANT CHANGE UP (ref 35–242)
KAPPA LC SER QL IFE: 3.96 MG/DL — HIGH (ref 0.33–1.94)
KAPPA/LAMBDA FREE LIGHT CHAIN RATIO, SERUM: 1.25 RATIO — SIGNIFICANT CHANGE UP (ref 0.26–1.65)
LAMBDA LC SER QL IFE: 3.16 MG/DL — HIGH (ref 0.57–2.63)

## 2024-10-07 PROCEDURE — 99231 SBSQ HOSP IP/OBS SF/LOW 25: CPT

## 2024-10-07 RX ADMIN — MUPIROCIN 1 APPLICATION(S): 20 OINTMENT TOPICAL at 09:40

## 2024-10-07 RX ADMIN — Medication 650 MILLIGRAM(S): at 22:30

## 2024-10-07 RX ADMIN — Medication 1 TABLET(S): at 22:00

## 2024-10-07 RX ADMIN — Medication 1 TABLET(S): at 09:10

## 2024-10-07 RX ADMIN — Medication 650 MILLIGRAM(S): at 09:10

## 2024-10-07 RX ADMIN — Medication 650 MILLIGRAM(S): at 22:00

## 2024-10-07 NOTE — PROGRESS NOTE ADULT - GASTROINTESTINAL
normal/soft/nontender/nondistended/normal active bowel sounds
normal/soft/nontender/nondistended/normal active bowel sounds
soft/nontender
soft/nontender
normal/soft/nontender/nondistended/normal active bowel sounds
normal/soft/nontender/nondistended/normal active bowel sounds

## 2024-10-07 NOTE — PROGRESS NOTE ADULT - RESPIRATORY
normal/clear to auscultation bilaterally/no wheezes/no rales/no rhonchi
clear to auscultation bilaterally/no wheezes/no rales/no rhonchi
normal/clear to auscultation bilaterally/no wheezes/no rales/no rhonchi
normal/clear to auscultation bilaterally/no wheezes/no rales/no rhonchi

## 2024-10-07 NOTE — PROGRESS NOTE ADULT - MUSCULOSKELETAL COMMENTS
Wounds redressed and rewrapped.
Feet wrapped with gauze and ace bandages s/p redressing by podiatry today.
legs dressed, and wrapped again by podiatry
Feet dressed and ace wraps in place

## 2024-10-07 NOTE — PROGRESS NOTE ADULT - ATTENDING COMMENTS
I agree with Assessment and Treatment Plan
I agree with Assessment and Treatment Plan
I agree with the assessment and plan
I agree with the assessment and plan

## 2024-10-08 ENCOUNTER — TRANSCRIPTION ENCOUNTER (OUTPATIENT)
Age: 74
End: 2024-10-08

## 2024-10-08 VITALS
OXYGEN SATURATION: 97 % | RESPIRATION RATE: 18 BRPM | SYSTOLIC BLOOD PRESSURE: 151 MMHG | TEMPERATURE: 98 F | HEART RATE: 75 BPM | DIASTOLIC BLOOD PRESSURE: 54 MMHG

## 2024-10-08 LAB
ANA TITR SER: NEGATIVE — SIGNIFICANT CHANGE UP
GLUCOSE BLDC GLUCOMTR-MCNC: 111 MG/DL — HIGH (ref 70–99)
GLUCOSE BLDC GLUCOMTR-MCNC: 99 MG/DL — SIGNIFICANT CHANGE UP (ref 70–99)

## 2024-10-08 PROCEDURE — 99239 HOSP IP/OBS DSCHRG MGMT >30: CPT

## 2024-10-08 RX ORDER — ACETAMINOPHEN 325 MG
2 TABLET ORAL
Qty: 0 | Refills: 0 | DISCHARGE
Start: 2024-10-08

## 2024-10-08 RX ORDER — MUPIROCIN 20 MG/G
1 OINTMENT TOPICAL
Qty: 0 | Refills: 0 | DISCHARGE
Start: 2024-10-08

## 2024-10-08 RX ADMIN — Medication 1 TABLET(S): at 09:30

## 2024-10-08 RX ADMIN — Medication 650 MILLIGRAM(S): at 09:31

## 2024-10-08 RX ADMIN — MUPIROCIN 1 APPLICATION(S): 20 OINTMENT TOPICAL at 09:30

## 2024-10-08 NOTE — DISCHARGE NOTE PROVIDER - NSDCDCMDCOMP_GEN_ALL_CORE
Patient ID: Henny Bailey is a 61 y o  female Date of Birth 1960     Chief Complaint  Chief Complaint   Patient presents with   [facility] Patient Visit     Patient here today for red, itchy, open wounds on lower legs  No dressings covering lower leg  Patient states she wears her compression stockings and is using her compression pumps daily  Allergies  Nsaids, Sulfate, and Ms contin [morphine]    Assessment:     Diagnoses and all orders for this visit:    Chronic venous hypertension (idiopathic) with ulcer of left lower extremity (CODE) ([facility])  -     Wound cleansing and dressings; Future    Non-pressure chronic ulcer of left lower leg, limited to breakdown of skin ([facility])  -     Wound cleansing and dressings; Future    Contact dermatitis, unspecified contact dermatitis type, unspecified trigger  -     betamethasone dipropionate (DIPROSONE) 0 05 % cream; Apply topically 2 (two) times a day              Debridement   Wound 05/05/21 Venous Ulcer Pretibial Left    Universal Protocol:  Consent: Written consent obtained  Consent given by: patient  Time out: Immediately prior to procedure a "time out" was called to verify the correct patient, procedure, equipment, support staff and site/side marked as required  Timeout called at: 5/5/2021 10:38 AM   Patient understanding: patient states understanding of the procedure being performed  Patient consent: the patient's understanding of the procedure matches consent given  Procedure consent matches procedure scheduled: N/A  Relevant documents present and verified: N/A  Test results available and properly labeled: N/A  Site marked: the operative site was marked  Imaging studies available: N/A  Required blood products, implants, devices, and special equipment available: N/A    Patient identity confirmed: verbally with patient      Performed by: NP  Debridement type: selective  Pain control: lidocaine 4%  Pre-debridement measurements  Length (cm): 0 2  Width (cm): 0 2  Depth (cm): 0 1  Surface Area (cm^2): 0 04  Volume (cm^3): 0    Post-debridement measurements  Length (cm): 0 2  Width (cm): 0 2  Depth (cm): 0 1  Percent debrided: 100%  Surface Area (cm^2): 0 04  Area debrided (cm^2): 0 04  Volume (cm^3): 0  Devitalized tissue debrided: biofilm, fibrin and slough  Instrument(s) utilized: curette  Bleeding: small  Procedural pain (0-10): 0  Post-procedural pain: 0   Response to treatment: procedure was tolerated well          Plan:  1  Initial visit  Wound debrided  EUNICE's from 3/3/21: 1 0 LLE and 1 1 RLE  Wounds appear to be d/t venous insufficiency and scratching  Will have betamethasone cream applied to LLE and Opticell Ag applied to LLE wound covered with ABD pad and secured with a CoFlex wrap   2  Will also renew patient's betamethasone cream to apply to her RLE and back  Patient is to continue to wear her compression stocking on her RLE and she is to continue to use her lymphedema pumps  3  Patient will follow up in 1 week  Wound 05/05/21 Other (comment) Pretibial Left (Active)   Wound Image Images linked 05/05/21 1016   Wound Description Pink;Slough 05/05/21 1016   Nanda-wound Assessment [place] 05/05/21 1016   Wound Length (cm) 0 2 cm 05/05/21 1016   Wound Width (cm) 0 2 cm 05/05/21 1016   Wound Depth (cm) 0 1 cm 05/05/21 1016   Wound Surface Area (cm^2) 0 04 cm^2 05/05/21 1016   Wound Volume (cm^3) 0 cm^3 05/05/21 1016   Calculated Wound Volume (cm^3) 0 cm^3 05/05/21 1016   Drainage Amount Scant 05/05/21 1016   Drainage Description Yellow 05/05/21 1016   Non-staged Wound Description Partial thickness 05/05/21 1016   Treatments Cleansed 05/05/21 1016   Patient Tolerance Tolerated well 05/05/21 1016       Wound 05/05/21 Other (comment) Pretibial Left (Active)   Date First Assessed/Time First Assessed: 05/05/21 1015   Primary Wound Type:  Other (comment)  Location: Pretibial  Wound Location Orientation: Left       [REMOVED] Wound 05/29/19 Other (Comment) Abdomen Right; Upper (Removed)   Resolved Date: 03/03/21  Date First Assessed/Time First Assessed: 05/29/19 1414   Pre-Existing Wound: No  Traumatic Wound Type: (c) Other (Comment)  Location: Abdomen  Wound Location Orientation: Right;Upper  Wound Description (Comments): needle inser    [REMOVED] Wound 03/03/21 Venous Ulcer Leg Right;Posterior (Removed)   Resolved Date: 03/10/21  Date First Assessed: 03/03/21   Primary Wound Type: Venous Ulcer  Location: Leg  Wound Location Orientation: Right;Posterior  Wound Outcome: Healed       [REMOVED] Wound 03/03/21 Venous Ulcer Leg Left;Posterior (Removed)   Resolved Date: 03/10/21  Date First Assessed: 03/03/21   Primary Wound Type: Venous Ulcer  Location: Leg  Wound Location Orientation: Left;Posterior  Wound Outcome: Healed       Subjective: Letty Latham returns to the wound center today for reopening of her venous ulcer on her left lower extremity  Patient reports her wound developed approximately 1 week ago  Patient reports increased pruritis on her bilateral lower extremities  She admits she has been scratching at her legs frequently which has caused them to reopen  Her wound drains a small amount of serous drainage  She has been wearing her compression stockings and using her lymphedema pumps daily as recommended  She denies any pain, fevers, or chills         The following portions of the patient's history were reviewed and updated as appropriate:   She  has a past medical history of Anxiety, Back pain, Back pain at L4-L5 level, Bariatric surgery status, Chronic pain disorder, Cramping of hands, DDD (degenerative disc disease), lumbar, Depression, Edema of both legs, Fall at home, Full dentures, GERD (gastroesophageal reflux disease), History of gastric ulcer, History of heartburn, Hypothyroid, Morbid obesity ([address] ), Motion sickness, Nausea & vomiting, Numbness and tingling in both hands, Numbness and tingling of both feet, PICC (peripherally inserted central catheter) in place, PONV (postoperative nausea and vomiting), Postgastrectomy malabsorption, Sciatica, Shortness of breath, Skin abnormality, Stress incontinence, Stricture esophagus, Use of cane as ambulatory aid, and Wears glasses  She   Patient Active Problem List    Diagnosis Date Noted    Low vitamin B12 level 2020    Vitamin A deficiency 2020    Other dysphagia 2020    Decreased oral intake 2020    Encounter for surgical aftercare following surgery of digestive system 2020    Postsurgical malabsorption 2020    Common bile duct stricture 2019    Choledocholithiasis 2019    Bariatric surgery status 2019    Anastomotic stricture of gastrojejunostomy 10/28/2017    Paroxysmal atrial fibrillation ([facility] [address] ) 2016    Morbid (severe) obesity due to excess calories ([facility] [address] ) 2016    Hypothyroidism 2016    Hypercholesterolemia 2016    Lumbar disc disease 2016     She  has a past surgical history that includes  section; Gastric bypass; Panniculectomy; Cholecystectomy; Abdominal surgery; pr lap, donell restrict proc, longitudinal gastrectomy (N/A, 2016); Esophagogastroduodenoscopy (N/A, 2016); Cosmetic surgery; pr egd transoral biopsy single/multiple (N/A, 2017); pr egd transoral biopsy single/multiple (N/A, 10/4/2017); Colonoscopy; pr egd transoral biopsy single/multiple (N/A, 2017); Esophagogastroduodenoscopy (N/A, 2017); Partial gastrectomy (N/A, 2017); Abdominal adhesion surgery (N/A, 2017); and IR biliary tube placement (PTC) (2019)  Her family history includes Alcohol abuse in her family; Alzheimer's disease in her mother;  Anxiety disorder in her daughter; Diabetes in her father; Hypertension in her father; No Known Problems in her daughter, daughter, maternal aunt, maternal aunt, maternal aunt, maternal aunt, maternal aunt, maternal grandmother, paternal grandmother, sister, sister, sister, sister, sister, sister, and sister  She  reports that she quit smoking about 19 years ago  Her smoking use included cigarettes  She quit after 5 00 years of use  She has never used smokeless tobacco  She reports that she does not drink alcohol or use drugs  Current Outpatient Medications   Medication Sig Dispense Refill    acetaminophen (TYLENOL) 160 mg/5 mL suspension Take 10 mL by mouth every 4 (four) hours as needed for mild pain, headaches or fever 237 mL 0    BELBUCA 150 MCG FILM   5    betamethasone dipropionate (DIPROSONE) 0 05 % cream Apply topically 2 (two) times a day 30 g 0    busPIRone (BUSPAR) 15 mg tablet Take 15 mg by mouth daily      BUSPIRONE HCL PO Take 20 mg by mouth daily at bedtime      cholecalciferol (VITAMIN D3) 1,000 units tablet Take 1,000 Units by mouth 2 (two) times a day       cyclobenzaprine (FLEXERIL) 5 mg tablet   5    furosemide (LASIX) 20 mg tablet furosemide 20 mg tablet daily      levothyroxine 100 mcg tablet Take 100 mcg by mouth daily On Odd days      LINZESS 145 MCG CAPS       Melatonin 10 MG CAPS Take 10 mg by mouth daily at bedtime      ondansetron (ZOFRAN) 4 mg tablet Take 4 mg by mouth every 12 (twelve) hours as needed for nausea or vomiting      oxybutynin (DITROPAN-XL) 10 MG 24 hr tablet Take 10 mg by mouth daily      oxyCODONE-acetaminophen (PERCOCET)  mg per tablet Take 1 tablet by mouth every 4 (four) hours      pantoprazole (PROTONIX) 40 mg tablet Take 40 mg by mouth daily   5    sucralfate (CARAFATE) 1 g/10 mL suspension TAKE 10 ML (1 G TOTAL) BY MOUTH 4 (FOUR) TIMES A DAY 1200 mL 1     Current Facility-Administered Medications   Medication Dose Route Frequency Provider Last Rate Last Admin    sodium chloride (PF) 0 9 % injection 5 mL  5 mL Intravenous PRN Ryan Hawk PA-C         She is allergic to nsaids; sulfate; and ms contin [morphine]       Review of Systems   Constitutional: Negative      HENT: Negative for ear pain and hearing loss     Eyes: Negative for pain  Respiratory: Negative for chest tightness and shortness of breath  Cardiovascular: Positive for leg swelling  Negative for chest pain and palpitations  Gastrointestinal: Negative for diarrhea, nausea and vomiting  Genitourinary: Negative for dysuria  Musculoskeletal: Negative for gait problem  Skin: Positive for rash and wound  Neurological: Negative for tremors and weakness  Psychiatric/Behavioral: Negative for behavioral problems, confusion and suicidal ideas  Objective:       Wound 05/05/21 Other (comment) Pretibial Left (Active)   Wound Image Images linked 05/05/21 1016   Wound Description Pink;Slough 05/05/21 1016   Nanda-wound Assessment [place] 05/05/21 1016   Wound Length (cm) 0 2 cm 05/05/21 1016   Wound Width (cm) 0 2 cm 05/05/21 1016   Wound Depth (cm) 0 1 cm 05/05/21 1016   Wound Surface Area (cm^2) 0 04 cm^2 05/05/21 1016   Wound Volume (cm^3) 0 cm^3 05/05/21 1016   Calculated Wound Volume (cm^3) 0 cm^3 05/05/21 1016   Drainage Amount Scant 05/05/21 1016   Drainage Description Yellow 05/05/21 1016   Non-staged Wound Description Partial thickness 05/05/21 1016   Treatments Cleansed 05/05/21 1016   Patient Tolerance Tolerated well 05/05/21 1016       /78   Pulse 91   Temp (!) 97 4 °F (36 3 °C)   Resp 20   Ht 5' 2" (1 575 m)   Wt 109 kg (240 lb)   BMI 43 90 kg/m²     Physical Exam  Vitals signs and nursing note reviewed  Constitutional:       General: She is not in acute distress  Appearance: Normal appearance  She is obese  HENT:      Head: Normocephalic and atraumatic  Eyes:      General:         Right eye: No discharge  Left eye: No discharge  Neck:      Musculoskeletal: Normal range of motion and neck supple  No neck rigidity  Pulmonary:      Effort: Pulmonary effort is normal  No respiratory distress  Musculoskeletal: Normal range of motion  Right lower leg: Edema present        Left lower leg: Edema present  Skin:     General: Skin is warm and dry  Findings: Wound present  No erythema  Neurological:      General: No focal deficit present  Mental Status: She is alert and oriented to person, place, and time  Mental status is at baseline  Psychiatric:         Mood and Affect: Mood normal          Behavior: Behavior normal          Thought Content: Thought content normal          Judgment: Judgment normal                   Wound Instructions:  Orders Placed This Encounter   Procedures    Wound cleansing and dressings     Shower yes may purchase cast cover  Apply Betamethasone/skin remedy cream to lower legs  Apply maxorb ag to the left lower leg wound  Cover with abd  Sponge  Secure with Coflex  Change dressing weekly at [address] [facility]  This was done today  Multiplayer wrap procedure     Before application, EUNICE and/or TBI determined to be adequate for healing and application of compression  Lower extremity washed prior to application of compression wrap  With the foot in dorsiflexed position, Coflex was applied as per physician orders without complications or complaint of pain  The procedure was tolerated well  Toes warm & pink post application  Patient provided education & reinforced to observe toes for any discoloration, swelling or tingling and instructed when to report to the [address] [facility] or to remove compression  Multi-layer compression wrap Instructions    Keep compression wrap/wraps clean and dry  If wraps are too tight and you experience numbness/tingling, call the wound center  If after hours, remove wraps or proceed to nearest E R  and call wound center in AM     [place] will be changed 1 x weekly    Avoid prolonged standing in one place  Elevate leg(s) above the level of the heart when sitting or as much as possible  Compression Stocking to right lower leg      Remove compression stockings every night HS and re-apply first thing qAM  Follow daily skin care as instructed  Avoid prolonged standing in one place  Elevate leg(s) above the level of the heart when sitting or as much as possible  Cont compression pumps daily for one hour  Standing Status:   Future     Standing Expiration Date:   5/5/2022        Diagnosis ICD-10-CM Associated Orders   1  Chronic venous hypertension (idiopathic) with ulcer of right lower extremity (CODE) ([facility])  I87 311 Wound cleansing and dressings   2  Chronic venous hypertension (idiopathic) with ulcer of left lower extremity (CODE) ([facility])  I87 312 Wound cleansing and dressings   3  Non-pressure chronic ulcer of right lower leg, limited to breakdown of skin ([facility])  L97 911 Wound cleansing and dressings   4  Non-pressure chronic ulcer of left lower leg, limited to breakdown of skin ([facility])  L97 921 Wound cleansing and dressings   5   Contact dermatitis, unspecified contact dermatitis type, unspecified trigger  L25 9 betamethasone dipropionate (DIPROSONE) 0 05 % cream This document is complete and the patient is ready for discharge.

## 2024-10-08 NOTE — PROGRESS NOTE ADULT - REASON FOR ADMISSION
worsening RLE wounds.

## 2024-10-08 NOTE — DISCHARGE NOTE PROVIDER - NSDCCPCAREPLAN_GEN_ALL_CORE_FT
PRINCIPAL DISCHARGE DIAGNOSIS  Diagnosis: Cellulitis  Assessment and Plan of Treatment:       SECONDARY DISCHARGE DIAGNOSES  Diagnosis: Type 2 diabetes mellitus without complications  Assessment and Plan of Treatment: Community Status: Active    Diagnosis: Venous insufficiency (chronic) (peripheral)  Assessment and Plan of Treatment: Community Status: Active    Diagnosis: MORBID (SEVERE) OBES  Assessment and Plan of Treatment: Community Status: Active    Diagnosis: Hereditary lymphedema  Assessment and Plan of Treatment: Community Status: Active

## 2024-10-08 NOTE — PROGRESS NOTE ADULT - SUBJECTIVE AND OBJECTIVE BOX
10/5/2024: Pt seen by podiatry team today AM for Ryan LE with attending present.  No Acute Distress. Patient was resting bedside. Patient states she was nauseous and sick the previous night, suspected of having Clostridium difficile infcetion, but symptoms was resolved and patient was cleared. Symptoms was suspected to be associated with medication.    PMH: No pertinent past medical history    Lymphatic edema    Hypertension    Type 2 diabetes mellitus    Lung cancer    Colon cancer    Acquired thrombocytopenia    H/O metabolic acidosis with normal anion gap    Moderate obesity      PSH:No significant past surgical history        Allergies:Keflex (Anaphylaxis)  cephalexin (Unknown)      Labs:                          10.4   3.07  )-----------( 65       ( 02 Oct 2024 12:31 )             32.1       TPro  5.8[L]  /  Alb  x   /  TBili  x   /  DBili  x   /  AST  x   /  ALT  x   /  AlkPhos  x   10-04      Vital Signs Last 24 Hrs  T(C): 36.3 (05 Oct 2024 07:40), Max: 37.1 (04 Oct 2024 16:56)  T(F): 97.4 (05 Oct 2024 07:40), Max: 98.8 (04 Oct 2024 16:56)  HR: 81 (05 Oct 2024 07:40) (78 - 81)  BP: 149/64 (05 Oct 2024 07:40) (138/64 - 149/64)  BP(mean): --  RR: 19 (05 Oct 2024 07:40) (18 - 19)  SpO2: 94% (05 Oct 2024 07:40) (94% - 95%)    Parameters below as of 05 Oct 2024 07:40  Patient On (Oxygen Delivery Method): room air      REVIEW OF SYSTEMS:    CONSTITUTIONAL: No weakness, fevers or chills  EYES: No visual changes  RESPIRATORY: No cough, wheezing; No shortness of breath  CARDIOVASCULAR: No chest pain or palpitations  GASTROINTESTINAL: No abdominal or epigastric pain. No nausea, vomiting; No diarrhea or constipation.   GENITOURINARY: No dysuria, frequency or hematuria  NEUROLOGICAL: No numbness or weakness  SKIN: See physical examination.  All other review of systems is negative unless indicated above    Physical Exam:   Constitutional: NAD, alert;  Lower Extremity Focus  Derm:  Skin warm, dry and supple bilateral.    Significant generalized lichenification and verrucous changes to skin extending from below the knees to feet bilaterally, cobblestone like papules and nodules extending from below knee to feet bilaterally. Noted hemosiderin deposition to BLE. Noted erythema to bilateral feet (right>left) improved  Vascular: Dorsalis Pedis and Posterior Tibial pulses non palpable due to significant edema to BLE.    Neuro: Protective sensation intact to the level of the digits bilateral.  MSK: Muscle strength 5/5 all major muscle groups bilateral.     
Date of service: 10-07-24 @ 10:46    pt seen and examined  no fevers  had diarrhea now resolved  laying in bed, nad    ROS: no fever or chills; denies dizziness, no HA, no SOB or cough, no abdominal pain, no dysuria, no urinary frequency    MEDICATIONS  (STANDING):  amoxicillin  875 milliGRAM(s)/clavulanate 1 Tablet(s) Oral two times a day  dextrose 5%. 1000 milliLiter(s) (50 mL/Hr) IV Continuous <Continuous>  dextrose 5%. 1000 milliLiter(s) (100 mL/Hr) IV Continuous <Continuous>  dextrose 50% Injectable 12.5 Gram(s) IV Push once  dextrose 50% Injectable 25 Gram(s) IV Push once  dextrose 50% Injectable 25 Gram(s) IV Push once  glucagon  Injectable 1 milliGRAM(s) IntraMuscular once  influenza  Vaccine (HIGH DOSE) 0.5 milliLiter(s) IntraMuscular once  insulin lispro (ADMELOG) corrective regimen sliding scale   SubCutaneous three times a day before meals  insulin lispro (ADMELOG) corrective regimen sliding scale   SubCutaneous at bedtime  mupirocin 2% Ointment 1 Application(s) Topical two times a day  ondansetron   Disintegrating Tablet 4 milliGRAM(s) Oral every 8 hours      Vital Signs Last 24 Hrs  T(C): 36.5 (07 Oct 2024 08:07), Max: 36.8 (06 Oct 2024 15:51)  T(F): 97.7 (07 Oct 2024 08:07), Max: 98.3 (06 Oct 2024 15:51)  HR: 77 (07 Oct 2024 08:07) (77 - 87)  BP: 156/65 (07 Oct 2024 08:07) (130/70 - 156/65)  BP(mean): --  RR: 18 (07 Oct 2024 08:07) (17 - 18)  SpO2: 100% (07 Oct 2024 08:07) (94% - 100%)    Parameters below as of 07 Oct 2024 08:07  Patient On (Oxygen Delivery Method): room air      PE:  Constitutional: NAD  HEENT: NC/AT, EOMI, PERRLA, conjunctivae clear; ears and nose atraumatic; pharynx benign  Neck: supple; thyroid not palpable  Back: no tenderness  Respiratory: respiratory effort normal; clear to auscultation  Cardiovascular: S1S2 regular, no murmurs  Abdomen: soft, not tender, not distended, positive BS; liver and spleen WNL  Genitourinary: no suprapubic tenderness  Lymphatic: no LN palpable  Musculoskeletal: no muscle tenderness, no joint swelling or tenderness  Extremities: b/l LE lymphedema, RLE stasis wounds R foot erythema warmth tenderness resolving    Neurological/ Psychiatric: AxOx3, Judgement and insight normal;  moving all extremities  Skin: no rashes; no palpable lesions    Labs: all available labs reviewed                   Urinalysis Basic - ( 01 Oct 2024 07:09 )    Color: x / Appearance: x / SG: x / pH: x  Gluc: 119 mg/dL / Ketone: x  / Bili: x / Urobili: x   Blood: x / Protein: x / Nitrite: x   Leuk Esterase: x / RBC: x / WBC x   Sq Epi: x / Non Sq Epi: x / Bacteria: x    Culture - Blood (09.30.24 @ 17:45)   Specimen Source: .Blood BLOOD  Culture Results:   No growth at 24 hours  Culture - Blood (09.30.24 @ 17:45)   Specimen Source: .Blood BLOOD  Culture Results:   No growth at 24 hours    Radiology: all available radiological tests reviewed    ACC: [id number] EXAM:  XR TIB FIB AP LAT 2 VIEWS RT   ORDERED BY: OLI HANEY     ACC: [id number] EXAM:  XR CHEST PORTABLE URGENT 1V   ORDERED BY: OLI HANEY     PROCEDURE DATE:  08/14/2024          INTERPRETATION:  AP chest on August 14, 2024 at 6:08 PM.Patient has   sepsis.    Heart likely enlarged. Right-sided port again noted.    There are mild central congestive findings somewhat greater on the right   and slightly increased from July 19. Right tib-fib. 2 views. 4 images.    The right lung showsdiffuse edema extending down to the ankle.    There is advanced knee degeneration a moderate to advanced ankle   degeneration. No fracture.    IMPRESSION: Mild central CHF somewhat increased from prior. Pronounced   right leg and ankle edema with degeneration in the knee and ankle.      Advanced directives addressed: full resuscitation
10/2/2024: Pt seen and examined by podiatry team today with attending present. NAD, resting bedside.    PMH: No pertinent past medical history    Lymphatic edema    Hypertension    Type 2 diabetes mellitus    Lung cancer    Colon cancer    Acquired thrombocytopenia    H/O metabolic acidosis with normal anion gap    Moderate obesity      PSH:No significant past surgical history        Allergies:Keflex (Anaphylaxis)  cephalexin (Unknown)      Labs:                        9.6    3.17  )-----------( 71       ( 01 Oct 2024 07:09 )             29.5   10-01    142  |  113[H]  |  11  ----------------------------<  119[H]  4.1   |  26  |  0.88    Ca    8.5      01 Oct 2024 07:09  Mg     1.8     09-30    TPro  6.0  /  Alb  2.6[L]  /  TBili  1.1  /  DBili  x   /  AST  29  /  ALT  17  /  AlkPhos  77  10-01      Vital Signs Last 24 Hrs  T(C): 36.9 (02 Oct 2024 08:13), Max: 37.3 (01 Oct 2024 15:30)  T(F): 98.5 (02 Oct 2024 08:13), Max: 99.1 (01 Oct 2024 15:30)  HR: 85 (02 Oct 2024 08:13) (82 - 88)  BP: 145/62 (02 Oct 2024 08:13) (139/61 - 145/62)  BP(mean): --  RR: 18 (02 Oct 2024 08:13) (17 - 18)  SpO2: 98% (02 Oct 2024 08:13) (97% - 98%)    Parameters below as of 02 Oct 2024 08:13  Patient On (Oxygen Delivery Method): room air      REVIEW OF SYSTEMS:    CONSTITUTIONAL: No weakness, fevers or chills  EYES: No visual changes  RESPIRATORY: No cough, wheezing; No shortness of breath  CARDIOVASCULAR: No chest pain or palpitations  GASTROINTESTINAL: No abdominal or epigastric pain. No nausea, vomiting; No diarrhea or constipation.   GENITOURINARY: No dysuria, frequency or hematuria  NEUROLOGICAL: No numbness or weakness  SKIN: See physical examination.  All other review of systems is negative unless indicated above    Physical Exam:   Constitutional: NAD, alert;  Lower Extremity Focus  Derm:  Skin warm, dry and supple bilateral.    Significant generalized lichenification and verrucous changes to skin extending from below the knees to feet bilaterally, cobblestone like papules and nodules extending from below knee to feet bilaterally. Noted hemosiderin deposition to BLE. Noted erythema to bilateral feet (right>left) improving  Vascular: Dorsalis Pedis and Posterior Tibial pulses non palpable due to significant edema to BLE.    Neuro: Protective sensation intact to the level of the digits bilateral.  MSK: Muscle strength 5/5 all major muscle groups bilateral.     
10/7/2024: Pt seen by podiatry team today AM for Ryan LE with attending present.  No Acute Distress. Patient was resting comfortably bedside. No new complaints    PMH: No pertinent past medical history    Lymphatic edema    Hypertension    Type 2 diabetes mellitus    Lung cancer    Colon cancer    Acquired thrombocytopenia    H/O metabolic acidosis with normal anion gap    Moderate obesity      PSH:No significant past surgical history        Allergies:Keflex (Anaphylaxis)  cephalexin (Unknown)      Labs:                          10.4   3.07  )-----------( 65       ( 02 Oct 2024 12:31 )             32.1     Vital Signs Last 24 Hrs  T(C): 36.5 (07 Oct 2024 08:07), Max: 36.8 (06 Oct 2024 15:51)  T(F): 97.7 (07 Oct 2024 08:07), Max: 98.3 (06 Oct 2024 15:51)  HR: 77 (07 Oct 2024 08:07) (77 - 87)  BP: 156/65 (07 Oct 2024 08:07) (130/70 - 156/65)  BP(mean): --  RR: 18 (07 Oct 2024 08:07) (17 - 18)  SpO2: 100% (07 Oct 2024 08:07) (94% - 100%)    Parameters below as of 07 Oct 2024 08:07  Patient On (Oxygen Delivery Method): room air          REVIEW OF SYSTEMS:    CONSTITUTIONAL: No weakness, fevers or chills  EYES: No visual changes  RESPIRATORY: No cough, wheezing; No shortness of breath  CARDIOVASCULAR: No chest pain or palpitations  GASTROINTESTINAL: No abdominal or epigastric pain. No nausea, vomiting; No diarrhea or constipation.   GENITOURINARY: No dysuria, frequency or hematuria  NEUROLOGICAL: No numbness or weakness  SKIN: See physical examination.  All other review of systems is negative unless indicated above    Physical Exam:   Constitutional: NAD, alert;  Lower Extremity Focus  Derm:  Skin warm, dry and supple bilateral.    Significant generalized lichenification and verrucous changes to skin extending from below the knees to feet bilaterally, cobblestone like papules and nodules extending from below knee to feet bilaterally. Noted hemosiderin deposition to BLE. Noted erythema to bilateral feet improving  Vascular: Dorsalis Pedis and Posterior Tibial pulses non palpable due to significant edema to BLE.    Neuro: Protective sensation intact to the level of the digits bilateral.  MSK: Muscle strength 5/5 all major muscle groups bilateral.     
SUBJECTIVE:    CHIEF COMPLAINT:  Patient is a 73y old  Female who presents with a chief complaint of worsening RLE wounds. (01 Oct 2024 11:23)      HPI:  72 y/o F w/ PMH of HTN, colon & lung cancer, DM2, chronic thrombocytoepnia, chronic metabolic acidosis, chronic B/L LE lymphadema, p/w worsening RLE wounds. Patient states that wound care nurse that comes to the house noticed that patient was draining more from her RLE wounds and also had erythema. She called wound care center who referred patient to ED. Patient denies fever, chills      PSH: colon resection, lung resection, spine surgery, shoulder surgery, hysterectomy     Social Hx: Denies tobacco / etoh / drugs, patient is a retired teacher     Family Hx: Denies pertinent hx    (01 Oct 2024 04:28)      Interval HPI and Overnight Events: Pt comfortable. Podiatry changing dressings. Seen by PT    REVIEW OF SYSTEMS:  CONSTITUTIONAL: No weakness, fevers or chills  EYES/ENT: No visual changes;  No vertigo or throat pain   NECK: No pain or stiffness  RESPIRATORY: No cough, wheezing, hemoptysis; No shortness of breath  CARDIOVASCULAR: No chest pain or palpitations  GASTROINTESTINAL: No abdominal or epigastric pain. No nausea, vomiting, or hematemesis; No diarrhea or constipation. No melena or hematochezia.  GENITOURINARY: No dysuria, frequency or hematuria  NEUROLOGICAL: No numbness or weakness    All other review of systems is negative unless indicated above    OBJECTIVE    Vital Signs Last 24 Hrs  T(C): 36.9 (02 Oct 2024 08:13), Max: 37.3 (01 Oct 2024 15:30)  T(F): 98.5 (02 Oct 2024 08:13), Max: 99.1 (01 Oct 2024 15:30)  HR: 85 (02 Oct 2024 08:13) (82 - 88)  BP: 145/62 (02 Oct 2024 08:13) (139/61 - 145/62)  BP(mean): --  RR: 18 (02 Oct 2024 08:13) (17 - 18)  SpO2: 98% (02 Oct 2024 08:13) (97% - 98%)    Parameters below as of 02 Oct 2024 08:13  Patient On (Oxygen Delivery Method): room air        MEDICATIONS  (STANDING):  dextrose 5%. 1000 milliLiter(s) (50 mL/Hr) IV Continuous <Continuous>  dextrose 5%. 1000 milliLiter(s) (100 mL/Hr) IV Continuous <Continuous>  dextrose 50% Injectable 25 Gram(s) IV Push once  dextrose 50% Injectable 12.5 Gram(s) IV Push once  dextrose 50% Injectable 25 Gram(s) IV Push once  glucagon  Injectable 1 milliGRAM(s) IntraMuscular once  influenza  Vaccine (HIGH DOSE) 0.5 milliLiter(s) IntraMuscular once  insulin lispro (ADMELOG) corrective regimen sliding scale   SubCutaneous at bedtime  insulin lispro (ADMELOG) corrective regimen sliding scale   SubCutaneous three times a day before meals  vancomycin  IVPB 1250 milliGRAM(s) IV Intermittent every 12 hours      LABS:                         9.6    3.17  )-----------( 71       ( 01 Oct 2024 07:09 )             29.5     10-01    142  |  113[H]  |  11  ----------------------------<  119[H]  4.1   |  26  |  0.88    Ca    8.5      01 Oct 2024 07:09  Mg     1.8     09-30    TPro  6.0  /  Alb  2.6[L]  /  TBili  1.1  /  DBili  x   /  AST  29  /  ALT  17  /  AlkPhos  77  10-01    Urinalysis Basic - ( 01 Oct 2024 07:09 )    Color: x / Appearance: x / SG: x / pH: x  Gluc: 119 mg/dL / Ketone: x  / Bili: x / Urobili: x   Blood: x / Protein: x / Nitrite: x   Leuk Esterase: x / RBC: x / WBC x   Sq Epi: x / Non Sq Epi: x / Bacteria: x    Specimen Source .Blood BLOOD   09-30 @ 17:45  Culture Results  No growth at 24 hours    URINE CULTURE    09-30 @ 17:45    CULTURE RESULTS   No growth at 24 hours    CAPILLARY BLOOD GLUCOSE  POCT Blood Glucose.: 106 mg/dL (02 Oct 2024 07:48)  POCT Blood Glucose.: 188 mg/dL (01 Oct 2024 20:52)  POCT Blood Glucose.: 133 mg/dL (01 Oct 2024 16:51)  POCT Blood Glucose.: 130 mg/dL (01 Oct 2024 11:40)  
10/6/2024: Pt seen by podiatry team today AM for Ryan LE with attending present.  No Acute Distress. Patient was resting comfortably bedside. No new complaints  PMH: No pertinent past medical history    Lymphatic edema    Hypertension    Type 2 diabetes mellitus    Lung cancer    Colon cancer    Acquired thrombocytopenia    H/O metabolic acidosis with normal anion gap    Moderate obesity      PSH:No significant past surgical history        Allergies:Keflex (Anaphylaxis)  cephalexin (Unknown)      Labs:                          10.4   3.07  )-----------( 65       ( 02 Oct 2024 12:31 )             32.1       TPro  5.8[L]  /  Alb  x   /  TBili  x   /  DBili  x   /  AST  x   /  ALT  x   /  AlkPhos  x   10-04      Vital Signs Last 24 Hrs  T(C): 36.3 (05 Oct 2024 07:40), Max: 37.1 (04 Oct 2024 16:56)  T(F): 97.4 (05 Oct 2024 07:40), Max: 98.8 (04 Oct 2024 16:56)  HR: 81 (05 Oct 2024 07:40) (78 - 81)  BP: 149/64 (05 Oct 2024 07:40) (138/64 - 149/64)  BP(mean): --  RR: 19 (05 Oct 2024 07:40) (18 - 19)  SpO2: 94% (05 Oct 2024 07:40) (94% - 95%)    Parameters below as of 05 Oct 2024 07:40  Patient On (Oxygen Delivery Method): room air      REVIEW OF SYSTEMS:    CONSTITUTIONAL: No weakness, fevers or chills  EYES: No visual changes  RESPIRATORY: No cough, wheezing; No shortness of breath  CARDIOVASCULAR: No chest pain or palpitations  GASTROINTESTINAL: No abdominal or epigastric pain. No nausea, vomiting; No diarrhea or constipation.   GENITOURINARY: No dysuria, frequency or hematuria  NEUROLOGICAL: No numbness or weakness  SKIN: See physical examination.  All other review of systems is negative unless indicated above    Physical Exam:   Constitutional: NAD, alert;  Lower Extremity Focus  Derm:  Skin warm, dry and supple bilateral.    Significant generalized lichenification and verrucous changes to skin extending from below the knees to feet bilaterally, cobblestone like papules and nodules extending from below knee to feet bilaterally. Noted hemosiderin deposition to BLE. Noted erythema to bilateral feet (right>left) improved  Vascular: Dorsalis Pedis and Posterior Tibial pulses non palpable due to significant edema to BLE.    Neuro: Protective sensation intact to the level of the digits bilateral.  MSK: Muscle strength 5/5 all major muscle groups bilateral.     
10/8/2024: Pt seen by podiatry team today for Ryan LE.  No Acute Distress. No new complaints. resting bedside.    PMH: No pertinent past medical history    Lymphatic edema    Hypertension    Type 2 diabetes mellitus    Lung cancer    Colon cancer    Acquired thrombocytopenia    H/O metabolic acidosis with normal anion gap    Moderate obesity      PSH:No significant past surgical history        Allergies:Keflex (Anaphylaxis)  cephalexin (Unknown)      Labs:                          10.4   3.07  )-----------( 65       ( 02 Oct 2024 12:31 )             32.1     Vital Signs Last 24 Hrs  T(C): 36.5 (08 Oct 2024 07:43), Max: 36.9 (07 Oct 2024 20:04)  T(F): 97.7 (08 Oct 2024 07:43), Max: 98.4 (07 Oct 2024 20:04)  HR: 76 (08 Oct 2024 07:43) (76 - 81)  BP: 159/82 (08 Oct 2024 07:43) (149/61 - 159/82)  BP(mean): --  RR: 19 (08 Oct 2024 07:43) (18 - 19)  SpO2: 96% (08 Oct 2024 07:43) (96% - 100%)    Parameters below as of 08 Oct 2024 07:43  Patient On (Oxygen Delivery Method): room air        REVIEW OF SYSTEMS:    CONSTITUTIONAL: No weakness, fevers or chills  EYES: No visual changes  RESPIRATORY: No cough, wheezing; No shortness of breath  CARDIOVASCULAR: No chest pain or palpitations  GASTROINTESTINAL: No abdominal or epigastric pain. No nausea, vomiting; No diarrhea or constipation.   GENITOURINARY: No dysuria, frequency or hematuria  NEUROLOGICAL: No numbness or weakness  SKIN: See physical examination.  All other review of systems is negative unless indicated above    Physical Exam:   Constitutional: NAD, alert;  Lower Extremity Focus  Derm:  Skin warm, dry and supple bilateral.    Significant generalized lichenification and verrucous changes to skin extending from below the knees to feet bilaterally, cobblestone like papules and nodules extending from below knee to feet bilaterally. Noted hemosiderin deposition to BLE. Noted erythema to bilateral feet improving  Vascular: Dorsalis Pedis and Posterior Tibial pulses non palpable due to significant edema to BLE.    Neuro: Protective sensation intact to the level of the digits bilateral.  MSK: Muscle strength 5/5 all major muscle groups bilateral.     
Date of service: 10-03-24 @ 09:41    pt seen and examined  no fevers  laying in bed, nad   vancomycin level high     ROS: no fever or chills; denies dizziness, no HA, no SOB or cough, no abdominal pain, no diarrhea or constipation; no dysuria, no urinary frequency    MEDICATIONS  (STANDING):  dextrose 5%. 1000 milliLiter(s) (50 mL/Hr) IV Continuous <Continuous>  dextrose 5%. 1000 milliLiter(s) (100 mL/Hr) IV Continuous <Continuous>  dextrose 50% Injectable 12.5 Gram(s) IV Push once  dextrose 50% Injectable 25 Gram(s) IV Push once  dextrose 50% Injectable 25 Gram(s) IV Push once  glucagon  Injectable 1 milliGRAM(s) IntraMuscular once  influenza  Vaccine (HIGH DOSE) 0.5 milliLiter(s) IntraMuscular once  insulin lispro (ADMELOG) corrective regimen sliding scale   SubCutaneous at bedtime  insulin lispro (ADMELOG) corrective regimen sliding scale   SubCutaneous three times a day before meals  mupirocin 2% Ointment 1 Application(s) Topical two times a day  ondansetron   Disintegrating Tablet 4 milliGRAM(s) Oral every 8 hours  piperacillin/tazobactam IVPB.. 3.375 Gram(s) IV Intermittent every 8 hours  vancomycin  IVPB 1250 milliGRAM(s) IV Intermittent every 12 hours    Vital Signs Last 24 Hrs  T(C): 36.8 (03 Oct 2024 08:03), Max: 37.1 (02 Oct 2024 15:30)  T(F): 98.2 (03 Oct 2024 08:03), Max: 98.7 (02 Oct 2024 15:30)  HR: 78 (03 Oct 2024 08:03) (78 - 88)  BP: 142/57 (03 Oct 2024 08:03) (113/98 - 142/57)  BP(mean): --  RR: 18 (03 Oct 2024 08:03) (17 - 18)  SpO2: 93% (03 Oct 2024 08:03) (93% - 98%)    Parameters below as of 03 Oct 2024 08:03  Patient On (Oxygen Delivery Method): room air      PE:  Constitutional: NAD  HEENT: NC/AT, EOMI, PERRLA, conjunctivae clear; ears and nose atraumatic; pharynx benign  Neck: supple; thyroid not palpable  Back: no tenderness  Respiratory: respiratory effort normal; clear to auscultation  Cardiovascular: S1S2 regular, no murmurs  Abdomen: soft, not tender, not distended, positive BS; liver and spleen WNL  Genitourinary: no suprapubic tenderness  Lymphatic: no LN palpable  Musculoskeletal: no muscle tenderness, no joint swelling or tenderness  Extremities: b/l LE lymphedema, RLE stasis wounds R foot erythema warmth tenderness resolving    Neurological/ Psychiatric: AxOx3, Judgement and insight normal;  moving all extremities  Skin: no rashes; no palpable lesions    Labs: all available labs reviewed                                              10.4   3.07  )-----------( 65       ( 02 Oct 2024 12:31 )             32.1         Vancomycin Level, Trough: 33.1 ug/mL (10-03 @ 07:43)  Vancomycin Level, Trough: 20.5 ug/mL (10-02 @ 09:39)        Urinalysis Basic - ( 01 Oct 2024 07:09 )    Color: x / Appearance: x / SG: x / pH: x  Gluc: 119 mg/dL / Ketone: x  / Bili: x / Urobili: x   Blood: x / Protein: x / Nitrite: x   Leuk Esterase: x / RBC: x / WBC x   Sq Epi: x / Non Sq Epi: x / Bacteria: x    Culture - Blood (09.30.24 @ 17:45)   Specimen Source: .Blood BLOOD  Culture Results:   No growth at 24 hours  Culture - Blood (09.30.24 @ 17:45)   Specimen Source: .Blood BLOOD  Culture Results:   No growth at 24 hours    Radiology: all available radiological tests reviewed    ACC: [id number] EXAM:  XR TIB FIB AP LAT 2 VIEWS RT   ORDERED BY: OLI HANEY     ACC: [id number] EXAM:  XR CHEST PORTABLE URGENT 1V   ORDERED BY: OLI HANEY     PROCEDURE DATE:  08/14/2024          INTERPRETATION:  AP chest on August 14, 2024 at 6:08 PM.Patient has   sepsis.    Heart likely enlarged. Right-sided port again noted.    There are mild central congestive findings somewhat greater on the right   and slightly increased from July 19. Right tib-fib. 2 views. 4 images.    The right lung showsdiffuse edema extending down to the ankle.    There is advanced knee degeneration a moderate to advanced ankle   degeneration. No fracture.    IMPRESSION: Mild central CHF somewhat increased from prior. Pronounced   right leg and ankle edema with degeneration in the knee and ankle.      Advanced directives addressed: full resuscitation
10/3/2024: Pt seen by podiatry team. NAD, resting bedside.    PMH: No pertinent past medical history    Lymphatic edema    Hypertension    Type 2 diabetes mellitus    Lung cancer    Colon cancer    Acquired thrombocytopenia    H/O metabolic acidosis with normal anion gap    Moderate obesity      PSH:No significant past surgical history        Allergies:Keflex (Anaphylaxis)  cephalexin (Unknown)      Labs:                        10.4   3.07  )-----------( 65       ( 02 Oct 2024 12:31 )             32.1       Vital Signs Last 24 Hrs  T(C): 36.8 (03 Oct 2024 08:03), Max: 37.1 (02 Oct 2024 15:30)  T(F): 98.2 (03 Oct 2024 08:03), Max: 98.7 (02 Oct 2024 15:30)  HR: 78 (03 Oct 2024 08:03) (78 - 88)  BP: 142/57 (03 Oct 2024 08:03) (113/98 - 142/57)  BP(mean): --  RR: 18 (03 Oct 2024 08:03) (17 - 18)  SpO2: 93% (03 Oct 2024 08:03) (93% - 98%)    Parameters below as of 03 Oct 2024 08:03  Patient On (Oxygen Delivery Method): room air        REVIEW OF SYSTEMS:    CONSTITUTIONAL: No weakness, fevers or chills  EYES: No visual changes  RESPIRATORY: No cough, wheezing; No shortness of breath  CARDIOVASCULAR: No chest pain or palpitations  GASTROINTESTINAL: No abdominal or epigastric pain. No nausea, vomiting; No diarrhea or constipation.   GENITOURINARY: No dysuria, frequency or hematuria  NEUROLOGICAL: No numbness or weakness  SKIN: See physical examination.  All other review of systems is negative unless indicated above    Physical Exam:   Constitutional: NAD, alert;  Lower Extremity Focus  Derm:  Skin warm, dry and supple bilateral.    Significant generalized lichenification and verrucous changes to skin extending from below the knees to feet bilaterally, cobblestone like papules and nodules extending from below knee to feet bilaterally. Noted hemosiderin deposition to BLE. Noted erythema to bilateral feet (right>left) improving  Vascular: Dorsalis Pedis and Posterior Tibial pulses non palpable due to significant edema to BLE.    Neuro: Protective sensation intact to the level of the digits bilateral.  MSK: Muscle strength 5/5 all major muscle groups bilateral.     
10/4/2024: Pt seen by podiatry team today AM for Ryan LE; NAD, resting bedside. No new complaints, per Pt she was able to walk yesterday. Erythema and edema improved.    PMH: No pertinent past medical history    Lymphatic edema    Hypertension    Type 2 diabetes mellitus    Lung cancer    Colon cancer    Acquired thrombocytopenia    H/O metabolic acidosis with normal anion gap    Moderate obesity      PSH:No significant past surgical history        Allergies:Keflex (Anaphylaxis)  cephalexin (Unknown)      Labs:                        10.4   3.07  )-----------( 65       ( 02 Oct 2024 12:31 )             32.1         REVIEW OF SYSTEMS:    CONSTITUTIONAL: No weakness, fevers or chills  EYES: No visual changes  RESPIRATORY: No cough, wheezing; No shortness of breath  CARDIOVASCULAR: No chest pain or palpitations  GASTROINTESTINAL: No abdominal or epigastric pain. No nausea, vomiting; No diarrhea or constipation.   GENITOURINARY: No dysuria, frequency or hematuria  NEUROLOGICAL: No numbness or weakness  SKIN: See physical examination.  All other review of systems is negative unless indicated above    Physical Exam:   Constitutional: NAD, alert;  Lower Extremity Focus  Derm:  Skin warm, dry and supple bilateral.    Significant generalized lichenification and verrucous changes to skin extending from below the knees to feet bilaterally, cobblestone like papules and nodules extending from below knee to feet bilaterally. Noted hemosiderin deposition to BLE. Noted erythema to bilateral feet (right>left) improved  Vascular: Dorsalis Pedis and Posterior Tibial pulses non palpable due to significant edema to BLE.    Neuro: Protective sensation intact to the level of the digits bilateral.  MSK: Muscle strength 5/5 all major muscle groups bilateral.     
Date of service: 10-02-24 @ 10:07    pt seen and examined  has some leg discomfort  no fevers  laying in bed, nad     ROS: no fever or chills; denies dizziness, no HA, no SOB or cough, no abdominal pain, no diarrhea or constipation; no dysuria, no urinary frequency    MEDICATIONS  (STANDING):  dextrose 5%. 1000 milliLiter(s) (50 mL/Hr) IV Continuous <Continuous>  dextrose 5%. 1000 milliLiter(s) (100 mL/Hr) IV Continuous <Continuous>  dextrose 50% Injectable 12.5 Gram(s) IV Push once  dextrose 50% Injectable 25 Gram(s) IV Push once  dextrose 50% Injectable 25 Gram(s) IV Push once  glucagon  Injectable 1 milliGRAM(s) IntraMuscular once  influenza  Vaccine (HIGH DOSE) 0.5 milliLiter(s) IntraMuscular once  insulin lispro (ADMELOG) corrective regimen sliding scale   SubCutaneous three times a day before meals  insulin lispro (ADMELOG) corrective regimen sliding scale   SubCutaneous at bedtime  piperacillin/tazobactam IVPB.. 3.375 Gram(s) IV Intermittent every 8 hours  vancomycin  IVPB 1250 milliGRAM(s) IV Intermittent every 12 hours      Vital Signs Last 24 Hrs  T(C): 36.9 (02 Oct 2024 08:13), Max: 37.3 (01 Oct 2024 15:30)  T(F): 98.5 (02 Oct 2024 08:13), Max: 99.1 (01 Oct 2024 15:30)  HR: 85 (02 Oct 2024 08:13) (82 - 88)  BP: 145/62 (02 Oct 2024 08:13) (139/61 - 145/62)  BP(mean): --  RR: 18 (02 Oct 2024 08:13) (17 - 18)  SpO2: 98% (02 Oct 2024 08:13) (97% - 98%)    Parameters below as of 02 Oct 2024 08:13  Patient On (Oxygen Delivery Method): room air        PE:  Constitutional: NAD  HEENT: NC/AT, EOMI, PERRLA, conjunctivae clear; ears and nose atraumatic; pharynx benign  Neck: supple; thyroid not palpable  Back: no tenderness  Respiratory: respiratory effort normal; clear to auscultation  Cardiovascular: S1S2 regular, no murmurs  Abdomen: soft, not tender, not distended, positive BS; liver and spleen WNL  Genitourinary: no suprapubic tenderness  Lymphatic: no LN palpable  Musculoskeletal: no muscle tenderness, no joint swelling or tenderness  Extremities: b/l LE lymphedema, RLE stasis wounds R foot erythema warmth tenderness   Neurological/ Psychiatric: AxOx3, Judgement and insight normal;  moving all extremities  Skin: no rashes; no palpable lesions    Labs: all available labs reviewed                                   9.6    3.17  )-----------( 71       ( 01 Oct 2024 07:09 )             29.5     10-01    142  |  113[H]  |  11  ----------------------------<  119[H]  4.1   |  26  |  0.88    Ca    8.5      01 Oct 2024 07:09  Mg     1.8     09-30    TPro  6.0  /  Alb  2.6[L]  /  TBili  1.1  /  DBili  x   /  AST  29  /  ALT  17  /  AlkPhos  77  10-01          Urinalysis Basic - ( 01 Oct 2024 07:09 )    Color: x / Appearance: x / SG: x / pH: x  Gluc: 119 mg/dL / Ketone: x  / Bili: x / Urobili: x   Blood: x / Protein: x / Nitrite: x   Leuk Esterase: x / RBC: x / WBC x   Sq Epi: x / Non Sq Epi: x / Bacteria: x    Culture - Blood (09.30.24 @ 17:45)   Specimen Source: .Blood BLOOD  Culture Results:   No growth at 24 hours  Culture - Blood (09.30.24 @ 17:45)   Specimen Source: .Blood BLOOD  Culture Results:   No growth at 24 hours    Radiology: all available radiological tests reviewed    ACC: [id number] EXAM:  XR TIB FIB AP LAT 2 VIEWS RT   ORDERED BY: OLI HANEY     ACC: [id number] EXAM:  XR CHEST PORTABLE URGENT 1V   ORDERED BY: OLI HANEY     PROCEDURE DATE:  08/14/2024          INTERPRETATION:  AP chest on August 14, 2024 at 6:08 PM.Patient has   sepsis.    Heart likely enlarged. Right-sided port again noted.    There are mild central congestive findings somewhat greater on the right   and slightly increased from July 19. Right tib-fib. 2 views. 4 images.    The right lung showsdiffuse edema extending down to the ankle.    There is advanced knee degeneration a moderate to advanced ankle   degeneration. No fracture.    IMPRESSION: Mild central CHF somewhat increased from prior. Pronounced   right leg and ankle edema with degeneration in the knee and ankle.      Advanced directives addressed: full resuscitation
Date of service: 10-04-24 @ 11:17    pt seen and examined  no fevers  laying in bed, nad       ROS: no fever or chills; denies dizziness, no HA, no SOB or cough, no abdominal pain, no diarrhea or constipation; no dysuria, no urinary frequency      MEDICATIONS  (STANDING):  dextrose 5%. 1000 milliLiter(s) (50 mL/Hr) IV Continuous <Continuous>  dextrose 5%. 1000 milliLiter(s) (100 mL/Hr) IV Continuous <Continuous>  dextrose 50% Injectable 12.5 Gram(s) IV Push once  dextrose 50% Injectable 25 Gram(s) IV Push once  dextrose 50% Injectable 25 Gram(s) IV Push once  glucagon  Injectable 1 milliGRAM(s) IntraMuscular once  influenza  Vaccine (HIGH DOSE) 0.5 milliLiter(s) IntraMuscular once  insulin lispro (ADMELOG) corrective regimen sliding scale   SubCutaneous at bedtime  insulin lispro (ADMELOG) corrective regimen sliding scale   SubCutaneous three times a day before meals  mupirocin 2% Ointment 1 Application(s) Topical two times a day  ondansetron   Disintegrating Tablet 4 milliGRAM(s) Oral every 8 hours  piperacillin/tazobactam IVPB.. 3.375 Gram(s) IV Intermittent every 8 hours    Vital Signs Last 24 Hrs  T(C): 36.9 (04 Oct 2024 08:10), Max: 37.2 (03 Oct 2024 20:39)  T(F): 98.5 (04 Oct 2024 08:10), Max: 98.9 (03 Oct 2024 20:39)  HR: 81 (04 Oct 2024 08:10) (80 - 88)  BP: 135/58 (04 Oct 2024 08:10) (134/56 - 141/66)  BP(mean): --  RR: 18 (04 Oct 2024 08:10) (18 - 18)  SpO2: 95% (04 Oct 2024 08:10) (95% - 95%)    Parameters below as of 04 Oct 2024 08:10  Patient On (Oxygen Delivery Method): room air      PE:  Constitutional: NAD  HEENT: NC/AT, EOMI, PERRLA, conjunctivae clear; ears and nose atraumatic; pharynx benign  Neck: supple; thyroid not palpable  Back: no tenderness  Respiratory: respiratory effort normal; clear to auscultation  Cardiovascular: S1S2 regular, no murmurs  Abdomen: soft, not tender, not distended, positive BS; liver and spleen WNL  Genitourinary: no suprapubic tenderness  Lymphatic: no LN palpable  Musculoskeletal: no muscle tenderness, no joint swelling or tenderness  Extremities: b/l LE lymphedema, RLE stasis wounds R foot erythema warmth tenderness resolving    Neurological/ Psychiatric: AxOx3, Judgement and insight normal;  moving all extremities  Skin: no rashes; no palpable lesions    Labs: all available labs reviewed                                   10.4   3.07  )-----------( 65       ( 02 Oct 2024 12:31 )             32.1              Vancomycin Level, Trough: 33.1 ug/mL (10-03 @ 07:43)        Urinalysis Basic - ( 01 Oct 2024 07:09 )    Color: x / Appearance: x / SG: x / pH: x  Gluc: 119 mg/dL / Ketone: x  / Bili: x / Urobili: x   Blood: x / Protein: x / Nitrite: x   Leuk Esterase: x / RBC: x / WBC x   Sq Epi: x / Non Sq Epi: x / Bacteria: x    Culture - Blood (09.30.24 @ 17:45)   Specimen Source: .Blood BLOOD  Culture Results:   No growth at 24 hours  Culture - Blood (09.30.24 @ 17:45)   Specimen Source: .Blood BLOOD  Culture Results:   No growth at 24 hours    Radiology: all available radiological tests reviewed    ACC: [id number] EXAM:  XR TIB FIB AP LAT 2 VIEWS RT   ORDERED BY: OLI HANEY     ACC: [id number] EXAM:  XR CHEST PORTABLE URGENT 1V   ORDERED BY: OLI HANEY     PROCEDURE DATE:  08/14/2024          INTERPRETATION:  AP chest on August 14, 2024 at 6:08 PM.Patient has   sepsis.    Heart likely enlarged. Right-sided port again noted.    There are mild central congestive findings somewhat greater on the right   and slightly increased from July 19. Right tib-fib. 2 views. 4 images.    The right lung showsdiffuse edema extending down to the ankle.    There is advanced knee degeneration a moderate to advanced ankle   degeneration. No fracture.    IMPRESSION: Mild central CHF somewhat increased from prior. Pronounced   right leg and ankle edema with degeneration in the knee and ankle.      Advanced directives addressed: full resuscitation
SUBJECTIVE:    CHIEF COMPLAINT:  Patient is a 73y old  Female who presents with a chief complaint of worsening RLE wounds. (06 Oct 2024 12:47)      HPI:  72 y/o F w/ PMH of HTN, colon & lung cancer, DM2, chronic thrombocytoepnia, chronic metabolic acidosis, chronic B/L LE lymphadema, p/w worsening RLE wounds. Patient states that wound care nurse that comes to the house noticed that patient was draining more from her RLE wounds and also had erythema. She called wound care center who referred patient to ED. 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 Mild stomach discomfort. No diarrhea. Did not get out of bed today because she was not seen today by PT. Does not want rehab.    REVIEW OF SYSTEMS:  CONSTITUTIONAL: No weakness, fevers or chills  EYES/ENT: No visual changes;  No vertigo or throat pain   NECK: No pain or stiffness  RESPIRATORY: No cough, wheezing, hemoptysis; No shortness of breath  CARDIOVASCULAR: No chest pain or palpitations  GASTROINTESTINAL: No abdominal or epigastric pain. No nausea, vomiting, or hematemesis; No diarrhea or constipation. No melena or hematochezia.  GENITOURINARY: No dysuria, frequency or hematuria  NEUROLOGICAL: No numbness or weakness  SKIN: No itching, burning, rashes, or lesions   All other review of systems is negative unless indicated above    OBJECTIVE    Vital Signs Last 24 Hrs  T(C): 36.8 (06 Oct 2024 15:51), Max: 37.1 (05 Oct 2024 21:00)  T(F): 98.3 (06 Oct 2024 15:51), Max: 98.7 (05 Oct 2024 21:00)  HR: 83 (06 Oct 2024 15:51) (81 - 84)  BP: 153/71 (06 Oct 2024 15:51) (137/59 - 153/71)  BP(mean): --  RR: 18 (06 Oct 2024 15:51) (18 - 18)  SpO2: 94% (06 Oct 2024 15:51) (94% - 95%)    Parameters below as of 06 Oct 2024 15:51  Patient On (Oxygen Delivery Method): room air        MEDICATIONS  (STANDING):  amoxicillin  875 milliGRAM(s)/clavulanate 1 Tablet(s) Oral two times a day  dextrose 5%. 1000 milliLiter(s) (50 mL/Hr) IV Continuous <Continuous>  dextrose 5%. 1000 milliLiter(s) (100 mL/Hr) IV Continuous <Continuous>  dextrose 50% Injectable 12.5 Gram(s) IV Push once  dextrose 50% Injectable 25 Gram(s) IV Push once  dextrose 50% Injectable 25 Gram(s) IV Push once  glucagon  Injectable 1 milliGRAM(s) IntraMuscular once  influenza  Vaccine (HIGH DOSE) 0.5 milliLiter(s) IntraMuscular once  insulin lispro (ADMELOG) corrective regimen sliding scale   SubCutaneous three times a day before meals  insulin lispro (ADMELOG) corrective regimen sliding scale   SubCutaneous at bedtime  mupirocin 2% Ointment 1 Application(s) Topical two times a day  ondansetron   Disintegrating Tablet 4 milliGRAM(s) Oral every 8 hours      LABS:     CAPILLARY BLOOD GLUCOSE  POCT Blood Glucose.: 133 mg/dL (06 Oct 2024 11:07)  POCT Blood Glucose.: 106 mg/dL (06 Oct 2024 07:41)  POCT Blood Glucose.: 142 mg/dL (05 Oct 2024 21:08)  POCT Blood Glucose.: 117 mg/dL (05 Oct 2024 16:45)  
SUBJECTIVE:    CHIEF COMPLAINT:  Patient is a 73y old  Female who presents with a chief complaint of worsening RLE wounds. (05 Oct 2024 14:10)      HPI:  72 y/o F w/ PMH of HTN, colon & lung cancer, DM2, chronic thrombocytoepnia, chronic metabolic acidosis, chronic B/L LE lymphadema, p/w worsening RLE wounds. Patient states that wound care nurse that comes to the house noticed that patient was draining more from her RLE wounds and also had erythema. She called wound care center who referred patient to ED. 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 Diarrhea resolved. C Diff negative. States she feels to weak to go home. Refuses rehab. Wants to stay a little longer in the hospital. Tolerating Augmentin    REVIEW OF SYSTEMS:  CONSTITUTIONAL: No weakness, fevers or chills  EYES/ENT: No visual changes;  No vertigo or throat pain   NECK: No pain or stiffness  RESPIRATORY: No cough, wheezing, hemoptysis; No shortness of breath  CARDIOVASCULAR: No chest pain or palpitations  GASTROINTESTINAL: No abdominal or epigastric pain. No nausea, vomiting, or hematemesis; No diarrhea or constipation. No melena or hematochezia.  GENITOURINARY: No dysuria, frequency or hematuria  NEUROLOGICAL: No numbness or weakness  SKIN: No itching, burning, rashes, or lesions   All other review of systems is negative unless indicated above    OBJECTIVE    Vital Signs Last 24 Hrs  T(C): 36.7 (05 Oct 2024 15:34), Max: 37.1 (04 Oct 2024 16:56)  T(F): 98.1 (05 Oct 2024 15:34), Max: 98.8 (04 Oct 2024 16:56)  HR: 82 (05 Oct 2024 15:34) (78 - 82)  BP: 145/62 (05 Oct 2024 15:34) (138/64 - 149/64)  BP(mean): 87 (05 Oct 2024 15:34) (87 - 87)  RR: 19 (05 Oct 2024 07:40) (18 - 19)  SpO2: 96% (05 Oct 2024 15:34) (94% - 96%)    Parameters below as of 05 Oct 2024 15:34  Patient On (Oxygen Delivery Method): room air        MEDICATIONS  (STANDING):  amoxicillin  875 milliGRAM(s)/clavulanate 1 Tablet(s) Oral two times a day  dextrose 5%. 1000 milliLiter(s) (100 mL/Hr) IV Continuous <Continuous>  dextrose 5%. 1000 milliLiter(s) (50 mL/Hr) IV Continuous <Continuous>  dextrose 50% Injectable 12.5 Gram(s) IV Push once  dextrose 50% Injectable 25 Gram(s) IV Push once  dextrose 50% Injectable 25 Gram(s) IV Push once  glucagon  Injectable 1 milliGRAM(s) IntraMuscular once  influenza  Vaccine (HIGH DOSE) 0.5 milliLiter(s) IntraMuscular once  insulin lispro (ADMELOG) corrective regimen sliding scale   SubCutaneous at bedtime  insulin lispro (ADMELOG) corrective regimen sliding scale   SubCutaneous three times a day before meals  mupirocin 2% Ointment 1 Application(s) Topical two times a day  ondansetron   Disintegrating Tablet 4 milliGRAM(s) Oral every 8 hours      LABS:         TPro  5.8[L]  /  Alb  x   /  TBili  x   /  DBili  x   /  AST  x   /  ALT  x   /  AlkPhos  x   10-04      PT/INR - ( 04 Oct 2024 12:44 )   PT: 12.3 sec;   INR: 1.07 ratio         PTT - ( 04 Oct 2024 12:44 )  PTT:29.7 sec      Specimen Source .Blood BLOOD   09-30 @ 17:45  Culture Results  No growth at 4 days            CAPILLARY BLOOD GLUCOSE      POCT Blood Glucose.: 124 mg/dL (05 Oct 2024 12:09)  POCT Blood Glucose.: 101 mg/dL (05 Oct 2024 07:58)  POCT Blood Glucose.: 121 mg/dL (04 Oct 2024 21:17)  POCT Blood Glucose.: 120 mg/dL (04 Oct 2024 16:45)        RADIOLOGY/EKG:      
SUBJECTIVE:    CHIEF COMPLAINT:  Patient is a 73y old  Female who presents with a chief complaint of worsening RLE wounds. (03 Oct 2024 10:26)      HPI:  74 y/o F w/ PMH of HTN, colon & lung cancer, DM2, chronic thrombocytoepnia, chronic metabolic acidosis, chronic B/L LE lymphadema, p/w worsening RLE wounds. Patient states that wound care nurse that comes to the house noticed that patient was draining more from her RLE wounds and also had erythema. She called wound care center who referred patient to ED. 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 Given Zofran with improvement. Vanco trough high. Dose delayed for 6 hours. ID then d/c'd    REVIEW OF SYSTEMS:  CONSTITUTIONAL: No weakness, fevers or chills  EYES/ENT: No visual changes;  No vertigo or throat pain   NECK: No pain or stiffness  RESPIRATORY: No cough, wheezing, hemoptysis; No shortness of breath  CARDIOVASCULAR: No chest pain or palpitations  GASTROINTESTINAL: No abdominal or epigastric pain. No nausea, vomiting, or hematemesis; No diarrhea or constipation. No melena or hematochezia.  GENITOURINARY: No dysuria, frequency or hematuria  NEUROLOGICAL: No numbness or weakness  SKIN: No itching, burning, rashes, or lesions   All other review of systems is negative unless indicated above    OBJECTIVE    Vital Signs Last 24 Hrs  T(C): 36.8 (03 Oct 2024 08:03), Max: 37.1 (02 Oct 2024 15:30)  T(F): 98.2 (03 Oct 2024 08:03), Max: 98.7 (02 Oct 2024 15:30)  HR: 78 (03 Oct 2024 08:03) (78 - 88)  BP: 142/57 (03 Oct 2024 08:03) (113/98 - 142/57)  BP(mean): --  RR: 18 (03 Oct 2024 08:03) (17 - 18)  SpO2: 93% (03 Oct 2024 08:03) (93% - 98%)    Parameters below as of 03 Oct 2024 08:03  Patient On (Oxygen Delivery Method): room air        MEDICATIONS  (STANDING):  dextrose 5%. 1000 milliLiter(s) (100 mL/Hr) IV Continuous <Continuous>  dextrose 5%. 1000 milliLiter(s) (50 mL/Hr) IV Continuous <Continuous>  dextrose 50% Injectable 12.5 Gram(s) IV Push once  dextrose 50% Injectable 25 Gram(s) IV Push once  dextrose 50% Injectable 25 Gram(s) IV Push once  glucagon  Injectable 1 milliGRAM(s) IntraMuscular once  influenza  Vaccine (HIGH DOSE) 0.5 milliLiter(s) IntraMuscular once  insulin lispro (ADMELOG) corrective regimen sliding scale   SubCutaneous at bedtime  insulin lispro (ADMELOG) corrective regimen sliding scale   SubCutaneous three times a day before meals  mupirocin 2% Ointment 1 Application(s) Topical two times a day  ondansetron   Disintegrating Tablet 4 milliGRAM(s) Oral every 8 hours  piperacillin/tazobactam IVPB.. 3.375 Gram(s) IV Intermittent every 8 hours      LABS:                         10.4   3.07  )-----------( 65       ( 02 Oct 2024 12:31 )             32.1   Specimen Source .Blood BLOOD   09-30 @ 17:45  Culture Results  No growth at 48 Hours            CAPILLARY BLOOD GLUCOSE      POCT Blood Glucose.: 127 mg/dL (03 Oct 2024 11:51)  POCT Blood Glucose.: 122 mg/dL (03 Oct 2024 07:59)  POCT Blood Glucose.: 128 mg/dL (02 Oct 2024 21:35)  POCT Blood Glucose.: 144 mg/dL (02 Oct 2024 16:21)        RADIOLOGY/EKG:      
SUBJECTIVE:    CHIEF COMPLAINT:  Patient is a 73y old  Female who presents with a chief complaint of worsening RLE wounds. (07 Oct 2024 11:11)      HPI:  74 y/o F w/ PMH of HTN, colon & lung cancer, DM2, chronic thrombocytoepnia, chronic metabolic acidosis, chronic B/L LE lymphadema, p/w worsening RLE wounds. Patient states that wound care nurse that comes to the house noticed that patient was draining more from her RLE wounds and also had erythema. She called wound care center who referred patient to ED. 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 Advised her that she has been basically off acute level of care since Saturday. Served with noitification of discharge today.    REVIEW OF SYSTEMS:  CONSTITUTIONAL: No weakness, fevers or chills  EYES/ENT: No visual changes;  No vertigo or throat pain   NECK: No pain or stiffness  RESPIRATORY: No cough, wheezing, hemoptysis; No shortness of breath  CARDIOVASCULAR: No chest pain or palpitations  GASTROINTESTINAL: No abdominal or epigastric pain. No nausea, vomiting, or hematemesis; No diarrhea or constipation. No melena or hematochezia.  GENITOURINARY: No dysuria, frequency or hematuria  NEUROLOGICAL: No numbness or weakness  SKIN: No itching, burning, rashes, or lesions   All other review of systems is negative unless indicated above    OBJECTIVE    Vital Signs Last 24 Hrs  T(C): 36.6 (07 Oct 2024 16:21), Max: 36.8 (06 Oct 2024 20:43)  T(F): 97.8 (07 Oct 2024 16:21), Max: 98.3 (06 Oct 2024 20:43)  HR: 79 (07 Oct 2024 16:21) (77 - 87)  BP: 156/- (07 Oct 2024 16:21) (130/70 - 156/65)  BP(mean): --  RR: 18 (07 Oct 2024 16:21) (17 - 18)  SpO2: 100% (07 Oct 2024 16:21) (94% - 100%)    Parameters below as of 07 Oct 2024 16:21  Patient On (Oxygen Delivery Method): room air        MEDICATIONS  (STANDING):  amoxicillin  875 milliGRAM(s)/clavulanate 1 Tablet(s) Oral two times a day  dextrose 5%. 1000 milliLiter(s) (50 mL/Hr) IV Continuous <Continuous>  dextrose 5%. 1000 milliLiter(s) (100 mL/Hr) IV Continuous <Continuous>  dextrose 50% Injectable 12.5 Gram(s) IV Push once  dextrose 50% Injectable 25 Gram(s) IV Push once  dextrose 50% Injectable 25 Gram(s) IV Push once  glucagon  Injectable 1 milliGRAM(s) IntraMuscular once  influenza  Vaccine (HIGH DOSE) 0.5 milliLiter(s) IntraMuscular once  insulin lispro (ADMELOG) corrective regimen sliding scale   SubCutaneous three times a day before meals  insulin lispro (ADMELOG) corrective regimen sliding scale   SubCutaneous at bedtime  mupirocin 2% Ointment 1 Application(s) Topical two times a day  ondansetron   Disintegrating Tablet 4 milliGRAM(s) Oral every 8 hours      LABS:                             CAPILLARY BLOOD GLUCOSE      POCT Blood Glucose.: 129 mg/dL (07 Oct 2024 16:11)  POCT Blood Glucose.: 139 mg/dL (07 Oct 2024 11:24)  POCT Blood Glucose.: 109 mg/dL (07 Oct 2024 07:48)  POCT Blood Glucose.: 128 mg/dL (06 Oct 2024 21:11)        RADIOLOGY/EKG:      
SUBJECTIVE:    CHIEF COMPLAINT:  Patient is a 73y old  Female who presents with a chief complaint of worsening RLE wounds. (04 Oct 2024 11:16)      HPI:  72 y/o F w/ PMH of HTN, colon & lung cancer, DM2, chronic thrombocytoepnia, chronic metabolic acidosis, chronic B/L LE lymphadema, p/w worsening RLE wounds. Patient states that wound care nurse that comes to the house noticed that patient was draining more from her RLE wounds and also had erythema. She called wound care center who referred patient to ED. Patient denies fever, chills      PSH: colon resection, lung resection, spine surgery, shoulder surgery, hysterectomy     Social Hx: Denies tobacco / etoh / drugs, patient is a retired teacher     Family Hx: Denies pertinent hx    (01 Oct 2024 04:28)      Interval HPI and Overnight Events: Pt c/o nausea and diarrhea now secondary to Zosyn. Potdiatry states that feet back to baseline.    REVIEW OF SYSTEMS:  CONSTITUTIONAL: No weakness, fevers or chills  EYES/ENT: No visual changes;  No vertigo or throat pain   NECK: No pain or stiffness  RESPIRATORY: No cough, wheezing, hemoptysis; No shortness of breath  CARDIOVASCULAR: No chest pain or palpitations  GASTROINTESTINAL: No abdominal or epigastric pain. No nausea, vomiting, or hematemesis; No diarrhea or constipation. No melena or hematochezia.  GENITOURINARY: No dysuria, frequency or hematuria  NEUROLOGICAL: No numbness or weakness  SKIN: No itching, burning, rashes, or lesions   All other review of systems is negative unless indicated above    OBJECTIVE    Vital Signs Last 24 Hrs  T(C): 36.9 (04 Oct 2024 08:10), Max: 37.2 (03 Oct 2024 20:39)  T(F): 98.5 (04 Oct 2024 08:10), Max: 98.9 (03 Oct 2024 20:39)  HR: 81 (04 Oct 2024 08:10) (80 - 88)  BP: 135/58 (04 Oct 2024 08:10) (134/56 - 141/66)  BP(mean): --  RR: 18 (04 Oct 2024 08:10) (18 - 18)  SpO2: 95% (04 Oct 2024 08:10) (95% - 95%)    Parameters below as of 04 Oct 2024 08:10  Patient On (Oxygen Delivery Method): room air        MEDICATIONS  (STANDING):  dextrose 5%. 1000 milliLiter(s) (50 mL/Hr) IV Continuous <Continuous>  dextrose 5%. 1000 milliLiter(s) (100 mL/Hr) IV Continuous <Continuous>  dextrose 50% Injectable 12.5 Gram(s) IV Push once  dextrose 50% Injectable 25 Gram(s) IV Push once  dextrose 50% Injectable 25 Gram(s) IV Push once  glucagon  Injectable 1 milliGRAM(s) IntraMuscular once  influenza  Vaccine (HIGH DOSE) 0.5 milliLiter(s) IntraMuscular once  insulin lispro (ADMELOG) corrective regimen sliding scale   SubCutaneous at bedtime  insulin lispro (ADMELOG) corrective regimen sliding scale   SubCutaneous three times a day before meals  mupirocin 2% Ointment 1 Application(s) Topical two times a day  ondansetron   Disintegrating Tablet 4 milliGRAM(s) Oral every 8 hours  piperacillin/tazobactam IVPB.. 3.375 Gram(s) IV Intermittent every 8 hours      LABS:     PT/INR - ( 04 Oct 2024 12:44 )   PT: 12.3 sec;   INR: 1.07 ratio    PTT - ( 04 Oct 2024 12:44 )  PTT:29.7 sec    Specimen Source .Blood BLOOD   09-30 @ 17:45  Culture Results  No growth at 72 Hours    CAPILLARY BLOOD GLUCOSE  POCT Blood Glucose.: 123 mg/dL (04 Oct 2024 11:27)  POCT Blood Glucose.: 99 mg/dL (04 Oct 2024 07:37)  POCT Blood Glucose.: 135 mg/dL (03 Oct 2024 21:55)  POCT Blood Glucose.: 121 mg/dL (03 Oct 2024 16:46)

## 2024-10-08 NOTE — DISCHARGE NOTE PROVIDER - NSDCFUADDAPPT_GEN_ALL_CORE_FT
See Dr Mitchell at wound care center within 1 week of discharge  Wound care at home three times per week  Recommend wound care center weekly if able

## 2024-10-08 NOTE — DISCHARGE NOTE PROVIDER - NSDCMRMEDTOKEN_GEN_ALL_CORE_FT
acetaminophen 325 mg oral tablet: 2 tab(s) orally every 6 hours As needed Mild Pain (1 - 3)  amoxicillin-clavulanate 875 mg-125 mg oral tablet: 1 tab(s) orally 2 times a day  Januvia 25 mg oral tablet: 1 tab(s) orally once a day  mupirocin 2% topical ointment: 1 Apply topically to affected area 2 times a day

## 2024-10-08 NOTE — PROGRESS NOTE ADULT - ASSESSMENT
74 y/o F w/ PMH of HTN, colon & lung cancer, DM2, chronic thrombocytopenia, chronic metabolic acidosis, chronic B/L LE lymphedema, p/w worsening RLE wounds. Patient states that wound care nurse that comes to the house noticed that patient was draining more from her RLE wounds and also had erythema. She called wound care center who referred patient to ED. Patient denies fever, chills. Started on IV abx.     1. Bilateral LE Chronic Lymphedema. Stasis dermatitis. RLE cellulitis. Stasis wounds. Morbid obesity  - imaging reviewed  - on zosyn [id number]  - on vancomycin [id number] check trough prior to 4th dose #2  - continue with abx coverage  - wound care  - f/u cultures - no growth   - monitor temps  - tolerating abx well so far; no side effects noted  - reason for abx use and side effects reviewed with patient  - supportive care  - fu cbc    Clinical team may change from intravenous to oral antibiotics when the following criteria are met:   1. Patient is clinically improving/stable       a)	Improved signs and symptoms of infection from initial presentation       b)	Afebrile for 24 hours       c)	Leukocytosis trending towards normal range   2. Patient is tolerating oral intake   3. Initial/repeat blood cultures are negative      when above met change to po augmentin x 7 day course     
74 y/o F w/ PMH of HTN, colon & lung cancer, DM2, chronic thrombocytopenia, chronic metabolic acidosis, chronic B/L LE lymphedema, p/w worsening RLE wounds. Patient states that wound care nurse that comes to the house noticed that patient was draining more from her RLE wounds and also had erythema. She called wound care center who referred patient to ED. Patient denies fever, chills. Started on IV abx.     1. Bilateral LE Chronic Lymphedema. Stasis dermatitis. RLE cellulitis. Stasis wounds. Morbid obesity  - imaging reviewed  - on zosyn [id number] #2   - on vancomycin level high #3 - dc   - continue with abx coverage  - wound care  - f/u cultures - no growth   - monitor temps  - tolerating abx well so far; no side effects noted  - reason for abx use and side effects reviewed with patient  - supportive care  - fu cbc    Clinical team may change from intravenous to oral antibiotics when the following criteria are met:   1. Patient is clinically improving/stable       a)	Improved signs and symptoms of infection from initial presentation       b)	Afebrile for 24 hours       c)	Leukocytosis trending towards normal range   2. Patient is tolerating oral intake   3. Initial/repeat blood cultures are negative      when above met change to po augmentin x 7 day course     
A: 71 y/o Female seen for the following:   - Cellulitis to right Foot  - Chronic BLE Lymphedema   - BLE Elephantiasis nostras verrucosa  - DM2  - Difficulty with ambulation      P:   Chart reviewed and Patient evaluated;  Discussed diagnosis and treatment with patient. Discussed importance of daily foot examinations, proper shoe gear, importance of tight glycemic control.   X-rays reviewed : on wet read showing no soft tissue emphysema, or acute bony pathology  Significant generalized verrucous changes to skin extending from bilateral below the knee to feet, cobblestone like nodules to BLE, interdigital maceration and malodor. Underlying elephantiasis nostras verrucosa caused by chronic BLE lymphedema; Erythema and edema improving since admission  WC: acetic acid, bacitracin and dry compressive dressings to BLE  Elevate BLE  Antibiotics as per ID  No acute podiatric surgical intervention warranted at this time. Pt to continue with local wound care and antibiotics per ID; pt to f/u at [facility] care center within 1 week of discharge  All additional care per Med appreciated  Patient demonstrated verbal understanding of all interventions and tolerated interventions well without any complications.   Podiatry will follow while in house         Wound care instructions to be performed three times a week for bilateral feet:  Please remove all dressings   Use Acetic acid-soaked gauze and place throughout the right and left foot and in between digits for a 2-3 minutes  Apply Bactroban (Muprocin) to all the macerated areas in  midfoot and forefoot   Apply gauze, kerlix and ace.  Thank you.
72 y/o F w/ PMH of HTN, colon & lung cancer, DM2, chronic thrombocytopenia, chronic metabolic acidosis, chronic B/L LE lymphedema, p/w worsening RLE wounds. Patient states that wound care nurse that comes to the house noticed that patient was draining more from her RLE wounds and also had erythema. She called wound care center who referred patient to ED. Patient denies fever, chills. Started on IV abx.     1. Bilateral LE Chronic Lymphedema. Stasis dermatitis. RLE cellulitis. Stasis wounds. Morbid obesity  - imaging reviewed  - s/p zosyn [id number] #3 changed to po augmentin  - on vancomycin level high #3 - dc 10/3  - continue with abx coverage  - c diff neg, diarrhea resolved   - wound care  - f/u cultures - no growth   - monitor temps  - tolerating abx well so far; no side effects noted  - reason for abx use and side effects reviewed with patient  - supportive care  - fu cbc    Clinical team may change from intravenous to oral antibiotics when the following criteria are met:   1. Patient is clinically improving/stable       a)	Improved signs and symptoms of infection from initial presentation       b)	Afebrile for 24 hours       c)	Leukocytosis trending towards normal range   2. Patient is tolerating oral intake   3. Initial/repeat blood cultures are negative      when above met change to po augmentin x 7 day course     
74 y/o F w/ PMH of HTN, colon & lung cancer, DM2, chronic thrombocytopenia, chronic metabolic acidosis, chronic B/L LE lymphedema, p/w worsening RLE wounds. Patient states that wound care nurse that comes to the house noticed that patient was draining more from her RLE wounds and also had erythema. She called wound care center who referred patient to ED. Patient denies fever, chills. Started on IV abx.     1. Bilateral LE Chronic Lymphedema. Stasis dermatitis. RLE cellulitis. Stasis wounds. Morbid obesity  - imaging reviewed  - on zosyn [id number] #3  - on vancomycin level high #3 - dc 10/3  - continue with abx coverage  - wound care  - f/u cultures - no growth   - monitor temps  - tolerating abx well so far; no side effects noted  - reason for abx use and side effects reviewed with patient  - supportive care  - fu cbc    Clinical team may change from intravenous to oral antibiotics when the following criteria are met:   1. Patient is clinically improving/stable       a)	Improved signs and symptoms of infection from initial presentation       b)	Afebrile for 24 hours       c)	Leukocytosis trending towards normal range   2. Patient is tolerating oral intake   3. Initial/repeat blood cultures are negative      when above met change to po augmentin x 7 day course     
A: 71 y/o Female seen for the following:   - Cellulitis to right Foot  - Chronic BLE Lymphedema   - BLE Elephantiasis nostras verrucosa  - DM2  - Difficulty with ambulation      P:   Chart reviewed and Patient evaluated;  Discussed diagnosis and treatment with patient. Discussed importance of daily foot examinations, proper shoe gear, importance of tight glycemic control.   X-rays reviewed : on wet read showing no soft tissue emphysema, or acute bony pathology  Significant generalized verrucous changes to skin extending from bilateral below the knee to feet, cobblestone like nodules to BLE, interdigital maceration and malodor. Underlying elephantiasis nostras verrucosa caused by chronic BLE lymphedema; Erythema and edema to kelly LE noted (right>left) but improved since admission  WC: acetic acid and dry compressive dressings to BLE  Elevate BLE  Antibiotics as per ID  No acute podiatric surgical intervention warranted at this time. Pt to continue with local wound care and antibiotics per ID; pt to f/u at [facility] care center within 1 week of discharge  All additional care per Med appreciated  Patient demonstrated verbal understanding of all interventions and tolerated interventions well without any complications.   Podiatry will follow while in house         Wound care instructions to be performed three times a week for bilateral feet:  Please remove all dressings   Use Acetic acid-soaked gauze and place throughout the right and left foot and in between digits for a 2-3 minutes  Apply Bactroban (Muprocin) to all the macerated areas in  midfoot and forefoot   Apply gauze, kerlix and ace.  Thank you.
72 y/o F w/ PMH of HTN, colon & lung cancer, DM2, chronic thrombocytopenia chronic metabolic acidosis, chronic and severe B/L LE lymphedema  p/w worsening RLE wounds    Diagnosis:  Cellulitis of Right > Left Feet - back to baseline  Type 2 Diabetes  H/o Colon and Lung Cancer  Pancytopenia  HTN   Diarrhea resolved    Plan:  Off Vancomycin and Zosyn  D/c Zosyn  ID following  Podiatry following for wound care - Continue to wrap up to the knees  Hematology consult  appreciated  Humalog ISS  Diabetic diet   Continue Po Augmentin  Home tomorrow? Otherwise will need rehab.    Will need a better home care plan on discharge. May need weekly wound care center evaluation and better wrapping at home.       DVT Prophylaxis  SCDs 2/2 thrombocytopenia     Advanced Directives  Full Code
72 y/o F w/ PMH of HTN, colon & lung cancer, DM2, chronic thrombocytopenia chronic metabolic acidosis, chronic and severe B/L LE lymphedema  p/w worsening RLE wounds    Diagnosis:  Cellulitis of Right > Left Feet - back to baseline  Type 2 Diabetes  H/o Colon and Lung Cancer  Pancytopenia  HTN   New Diarrhea    Plan:  Off Vancomycin  D/c Zosyn  blood cx no growth to date  ID following  Podiatry following for wound care - Continue to wrap up to the knees  LE U/S negative for DVT   Hematology consult  appreciated  Humalog ISS  Diabetic diet   Start Po Augmentin  Stool for C Diff  Home soon. Will need a better home care plan on discharge. May need weekly wound care center evaluation and better wrapping at home.       DVT Prophylaxis  SCDs 2/2 thrombocytopenia     Advanced Directives  Full Code
A: 73 y/o Female seen for the following:   - Cellulitis to right Foot  - Chronic BLE Lymphedema   - BLE Elephantiasis nostras verrucosa  - DM2  - Difficulty with ambulation      P:   Chart reviewed and Patient evaluated;  Discussed diagnosis and treatment with patient. Discussed importance of daily foot examinations, proper shoe gear, importance of tight glycemic control.   X-rays reviewed : on wet read showing no soft tissue emphysema, or acute bony pathology  Significant generalized verrucous changes to skin extending from bilateral below the knee to feet, cobblestone like nodules to BLE, interdigital maceration and malodor. Underlying elephantiasis nostras verrucosa caused by chronic BLE lymphedema; Erythema and edema improving since admission  WC: acetic acid, bacitracin and dry compressive dressings to BLE  Elevate BLE  Antibiotics as per ID  No acute podiatric surgical intervention warranted at this time. Pt to continue with local wound care and antibiotics per ID; pt to f/u at [facility] care center within 1 week of discharge  All additional care per Med appreciated  Patient demonstrated verbal understanding of all interventions and tolerated interventions well without any complications.   Podiatry will follow while in house         Wound care instructions to be performed three times a week for bilateral feet:  Please remove all dressings   Use Acetic acid-soaked gauze and place throughout the right and left foot and in between digits for a 2-3 minutes  Apply Bactroban (Muprocin) to all the macerated areas in  midfoot and forefoot   Apply gauze, kerlix and ace.  Thank you.
A: 73 y/o Female seen for the following:   - Cellulitis to right Foot  - Chronic BLE Lymphedema   - BLE Elephantiasis nostras verrucosa  - DM2  - Difficulty with ambulation      P:   Chart reviewed and Patient evaluated;  Discussed diagnosis and treatment with patient. Discussed importance of daily foot examinations, proper shoe gear, importance of tight glycemic control.   X-rays reviewed : on wet read showing no soft tissue emphysema, or acute bony pathology  Significant generalized verrucous changes to skin extending from bilateral below the knee to feet, cobblestone like nodules to BLE, interdigital maceration and malodor. Underlying elephantiasis nostras verrucosa caused by chronic BLE lymphedema; Erythema and edema to kelly LE noted (right>left) but improving since admission  WC: acetic acid and dry compressive dressings to BLE  Elevate BLE  Antibiotics as per ID  No acute podiatric surgical intervention warranted at this time. Pt to continue with local wound care and antibiotics per ID; pt to f/u at [facility] Wound care center within 1 week of discharge  All additional care per Med appreciated  Patient demonstrated verbal understanding of all interventions and tolerated interventions well without any complications.   Podiatry will follow while in house         Wound care instructions to be performed three times a week for bilateral feet:  Please remove all dressings   Use Acetic acid-soaked gauze and place throughout the right and left foot and in between digits for a 2-3 minutes  Apply Bactroban (Muprocin) to all the macerated areas in  midfoot and forefoot   Apply gauze, kerlix and ace.  Thank you.
72 y/o F w/ PMH of HTN, colon & lung cancer, DM2, chronic thrombocytopenia chronic metabolic acidosis, chronic and severe B/L LE lymphedema  p/w worsening RLE wounds    Diagnosis:  Cellulitis of Right > Left Feet  Type 2 Diabetes  H/o Colon and Lung Cancer  Pancytopenia  HTN     Plan:  Off Vancomycin  Zosyn  blood cx no growth  ID following  Podiatry following for wound care - Continue to wrap up to the knees  LE U/S negative for DVT   Hematology consult    Humalog ISS  Diabetic diet   Will need a better home care plan on discharge. May need weekly wound care center evaluation and better wrapping at home.     DVT Prophylaxis  SCDs 2/2 thrombocytopenia     Advanced Directives  Full Code
A: 71 y/o Female seen for the following:   - Cellulitis to right Foot  - Chronic BLE Lymphedema   - BLE Elephantiasis nostras verrucosa  - DM2  - Difficulty with ambulation      P:   Chart reviewed and Patient evaluated;  Discussed diagnosis and treatment with patient. Discussed importance of daily foot examinations, proper shoe gear, importance of tight glycemic control.   X-rays reviewed : on wet read showing no soft tissue emphysema, or acute bony pathology  Significant generalized verrucous changes to skin extending from bilateral below the knee to feet, cobblestone like nodules to BLE, interdigital maceration and malodor. Underlying elephantiasis nostras verrucosa caused by chronic BLE lymphedema; Erythema and edema to norberto LE noted (right>left) improving  Applied Bactroban with dry sterile compressive dressings to NORBERTO LE  Elevate BLE  Antibiotics as per ID  No acute podiatric surgical intervention warranted at this time. Pt to continue with local wound care and antibiotics per ID; pt to f/u at [facility] center within 1 week of discharge  All additional care per Med appreciated  Patient demonstrated verbal understanding of all interventions and tolerated interventions well without any complications.   Podiatry will follow while in house     Wound care instructions to be performed three times a week for bilateral feet:  Please remove all dressings   Use Acetic acid-soaked gauze and place throughout the right and left foot and in between digits for a 2-3 minutes  Apply Bactroban (Muprocin) to all the macerated areas in  midfoot and forefoot   Apply gauze, kerlix and ace.  Thank you.
74 y/o F w/ PMH of HTN, colon & lung cancer, DM2, chronic thrombocytopenia chronic metabolic acidosis, chronic and severe B/L LE lymphedema  p/w worsening RLE wounds    Diagnosis:  Cellulitis of Right > Left Feet - back to baseline  Type 2 Diabetes  H/o Colon and Lung Cancer  Pancytopenia  HTN   Diarrhea resolved    Plan:  Off Vancomycin and Zosyn  ID following  Podiatry following for wound care - Continue to wrap up to the knees  Hematology consult  appreciated  Humalog ISS  Diabetic diet   Continue Po Augmentin  Home tomorrow? Otherwise will need rehab.    Will need a better home care plan on discharge. May need weekly wound care center evaluation and better wrapping at home.       DVT Prophylaxis  SCDs 2/2 thrombocytopenia     Advanced Directives  Full Code
A: 73 y/o Female seen for the following:   - Cellulitis to right Foot  - Chronic BLE Lymphedema   - BLE Elephantiasis nostras verrucosa  - DM2  - Difficulty with ambulation      P:   Chart reviewed and Patient evaluated;  Discussed diagnosis and treatment with patient. Discussed importance of daily foot examinations, proper shoe gear, importance of tight glycemic control.   X-rays reviewed : on wet read showing no soft tissue emphysema, or acute bony pathology  Significant generalized verrucous changes to skin extending from bilateral below the knee to feet, cobblestone like nodules to BLE, interdigital maceration and malodor. Underlying elephantiasis nostras verrucosa caused by chronic BLE lymphedema; Erythema and edema to kelly LE noted (right>left) but improved since admission  WC: acetic acid, bacitracin and dry compressive dressings to BLE  Elevate BLE  Antibiotics as per ID  No acute podiatric surgical intervention warranted at this time. Pt to continue with local wound care and antibiotics per ID; pt to f/u at [facility] care center within 1 week of discharge  All additional care per Med appreciated  Patient demonstrated verbal understanding of all interventions and tolerated interventions well without any complications.   Podiatry will follow while in house         Wound care instructions to be performed three times a week for bilateral feet:  Please remove all dressings   Use Acetic acid-soaked gauze and place throughout the right and left foot and in between digits for a 2-3 minutes  Apply Bactroban (Muprocin) to all the macerated areas in  midfoot and forefoot   Apply gauze, kerlix and ace.  Thank you.
72 y/o F w/ PMH of HTN, colon & lung cancer, DM2, chronic thrombocytopenia chronic metabolic acidosis, chronic and severe B/L LE lymphedema  p/w worsening RLE wounds    Diagnosis:  Cellulitis of Right > Left Feet - back to baseline  Type 2 Diabetes  H/o Colon and Lung Cancer  Pancytopenia  HTN   Diarrhea resolved    Plan:  Off Vancomycin and Zosyn  ID following  Podiatry following for wound care - Continue to wrap up to the knees  Hematology consult  appreciated  Humalog ISS  Diabetic diet   Continue Po Augmentin  Refuses to go Home today again says maybe tomorrow Refuses rehab  Discharge order today. Pt appealing discharge with insurance..    Will need a better home care plan on discharge. May need weekly wound care center evaluation and better wrapping at home.       DVT Prophylaxis  SCDs 2/2 thrombocytopenia     Advanced Directives  Full Code
A: 73 y/o Female seen for the following:   - Cellulitis to right Foot  - Chronic BLE Lymphedema   - BLE Elephantiasis nostras verrucosa  - DM2  - Difficulty with ambulation      P:   Chart reviewed and Patient evaluated;  Discussed diagnosis and treatment with patient. Discussed importance of daily foot examinations, proper shoe gear, importance of tight glycemic control.   X-rays reviewed : on wet read showing no soft tissue emphysema, or acute bony pathology  Significant generalized verrucous changes to skin extending from bilateral below the knee to feet, cobblestone like nodules to BLE, interdigital maceration and malodor. Underlying elephantiasis nostras verrucosa caused by chronic BLE lymphedema; Erythema and edema to kelly LE noted (right>left) but improved since admission  WC: acetic acid, bacitracin and dry compressive dressings to BLE  Elevate BLE  Antibiotics as per ID  No acute podiatric surgical intervention warranted at this time. Pt to continue with local wound care and antibiotics per ID; pt to f/u at [facility] care center within 1 week of discharge  All additional care per Med appreciated  Patient demonstrated verbal understanding of all interventions and tolerated interventions well without any complications.   Podiatry will follow while in house         Wound care instructions to be performed three times a week for bilateral feet:  Please remove all dressings   Use Acetic acid-soaked gauze and place throughout the right and left foot and in between digits for a 2-3 minutes  Apply Bactroban (Muprocin) to all the macerated areas in  midfoot and forefoot   Apply gauze, kerlix and ace.  Thank you.
72 y/o F w/ PMH of HTN, colon & lung cancer, DM2, chronic thrombocytopenia chronic metabolic acidosis, chronic and severe B/L LE lymphedema  p/w worsening RLE wounds    Diagnosis:  Cellulitis of Right > Left Feet  Type 2 Diabetes  H/o Colon and Lung Cancer  Pancytopenia  HTN     Plan:  Vancomycin / Zosyn  F/u blood cx  ID consult appreciated  Podiatry following for wound care - Continue to wrap up to the knees  LE U/S negative for DVT   F/u outpatient Heme / onc   Humalog ISS  Diabetic diet   Will need a better home care plan on discharge. May need weekly wound care center evaluation and better wrapping at home.     DVT Prophylaxis  SCDs 2/2 thrombocytopenia     Advanced Directives  Full Code

## 2024-10-08 NOTE — PROGRESS NOTE ADULT - PROVIDER SPECIALTY LIST ADULT
Family Medicine
Family Medicine
Podiatry
Podiatry
Infectious Disease
Podiatry
Family Medicine
Infectious Disease
Podiatry
Podiatry
Family Medicine

## 2024-10-08 NOTE — DISCHARGE NOTE PROVIDER - HOSPITAL COURSE
Pt admitted with an exacerbation of cellulitis to foot after long course of edema, venous insufficiency, stasis dermatitis,  Treated by podiatry, seen by ID, Treated with IV Zosyn then changed to Augmentin. Daily wound care

## 2024-10-08 NOTE — DISCHARGE NOTE PROVIDER - NSDCFUADDINST_GEN_ALL_CORE_FT
Wound care instructions to be performed three times a week for bilateral feet:  Please remove all dressings   Use Acetic acid-soaked gauze and place throughout the right and left foot and in between digits for a 2-3 minutes  Apply Bactroban (Muprocin) to all the macerated areas in  midfoot and forefoot   Apply gauze, kerlix and ace.

## 2024-10-08 NOTE — DISCHARGE NOTE PROVIDER - NSDCPNSUBOBJ_GEN_ALL_CORE
SUBJECTIVE:    CHIEF COMPLAINT:  Patient is a 73y old  Female who presents with a chief complaint of worsening RLE wounds. (07 Oct 2024 17:19)      HPI:  72 y/o F w/ PMH of HTN, colon & lung cancer, DM2, chronic thrombocytoepnia, chronic metabolic acidosis, chronic B/L LE lymphadema, p/w worsening RLE wounds. Patient states that wound care nurse that comes to the house noticed that patient was draining more from her RLE wounds and also had erythema. She called wound care center who referred patient to ED. 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 No nausea, vomiting, or hematemesis; No diarrhea or constipation. No melena or hematochezia.  GENITOURINARY: No dysuria, frequency or hematuria  NEUROLOGICAL: No numbness or weakness  SKIN: No itching, burning, rashes, or lesions   All other review of systems is negative unless indicated above    OBJECTIVE    PHYSICAL EXAM:  Vital Signs Last 24 Hrs  T(C): 36.5 (08 Oct 2024 07:43), Max: 36.9 (07 Oct 2024 20:04)  T(F): 97.7 (08 Oct 2024 07:43), Max: 98.4 (07 Oct 2024 20:04)  HR: 76 (08 Oct 2024 07:43) (76 - 81)  BP: 159/82 (08 Oct 2024 07:43) (149/61 - 159/82)  BP(mean): --  RR: 19 (08 Oct 2024 07:43) (18 - 19)  SpO2: 96% (08 Oct 2024 07:43) (96% - 100%)    Parameters below as of 08 Oct 2024 07:43  Patient On (Oxygen Delivery Method): room air      Constitutional: NAD, awake and alert, well-developed  HEENT: PERR, EOMI, Normal Hearing, MMM  Neck: Soft and supple, No LAD, No JVD  Respiratory: Breath sounds are clear bilaterally, No wheezing, rales or rhonchi  Cardiovascular: S1 and S2, regular rate and rhythm, no Murmurs, gallops or rubs  Gastrointestinal: Bowel Sounds present, soft, nontender, nondistended, no guarding, no rebound  Extremities: No peripheral edema  Vascular: 2+ peripheral pulses  Neurological: A/O x 3, no focal deficits  Musculoskeletal: 5/5 strength b/l upper and lower extremities  Skin: No rashes    MEDICATIONS  (STANDING):  amoxicillin  875 milliGRAM(s)/clavulanate 1 Tablet(s) Oral two times a day  dextrose 5%. 1000 milliLiter(s) (100 mL/Hr) IV Continuous <Continuous>  dextrose 5%. 1000 milliLiter(s) (50 mL/Hr) IV Continuous <Continuous>  dextrose 50% Injectable 12.5 Gram(s) IV Push once  dextrose 50% Injectable 25 Gram(s) IV Push once  dextrose 50% Injectable 25 Gram(s) IV Push once  glucagon  Injectable 1 milliGRAM(s) IntraMuscular once  influenza  Vaccine (HIGH DOSE) 0.5 milliLiter(s) IntraMuscular once  insulin lispro (ADMELOG) corrective regimen sliding scale   SubCutaneous three times a day before meals  insulin lispro (ADMELOG) corrective regimen sliding scale   SubCutaneous at bedtime  mupirocin 2% Ointment 1 Application(s) Topical two times a day  ondansetron   Disintegrating Tablet 4 milliGRAM(s) Oral every 8 hours      LABS:   CAPILLARY BLOOD GLUCOSE  POCT Blood Glucose.: 99 mg/dL (08 Oct 2024 07:49)  POCT Blood Glucose.: 141 mg/dL (07 Oct 2024 22:03)  POCT Blood Glucose.: 129 mg/dL (07 Oct 2024 16:11)  POCT Blood Glucose.: 139 mg/dL (07 Oct 2024 11:24)  Assessment and Plan:   · Assessment	  72 y/o F w/ PMH of HTN, colon & lung cancer, DM2, chronic thrombocytopenia chronic metabolic acidosis, chronic and severe B/L LE lymphedema  p/w worsening RLE wounds    Diagnosis:  Cellulitis of Right > Left Feet - back to baseline  Type 2 Diabetes  H/o Colon and Lung Cancer  Pancytopenia  HTN   Diarrhea resolved    Plan:  Off Vancomycin and Zosyn  ID following  Podiatry following for wound care - Continue to wrap up to the knees  Hematology consult  appreciated  Humalog ISS  Diabetic diet   Continue Po Augmentin  Refuses rehab  Discharge home today.   Will need a better home care plan on discharge. Recommend weekly wound care center evaluation and better wrapping at home.       DVT Prophylaxis  SCDs 2/2 thrombocytopenia    SUBJECTIVE:    CHIEF COMPLAINT:  Patient is a 73y old  Female who presents with a chief complaint of worsening RLE wounds. (07 Oct 2024 17:19)      HPI:  74 y/o F w/ PMH of HTN, colon & lung cancer, DM2, chronic thrombocytoepnia, chronic metabolic acidosis, chronic B/L LE lymphadema, p/w worsening RLE wounds. Patient states that wound care nurse that comes to the house noticed that patient was draining more from her RLE wounds and also had erythema. She called wound care center who referred patient to ED. 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 No nausea, vomiting, or hematemesis; No diarrhea or constipation. No melena or hematochezia.  GENITOURINARY: No dysuria, frequency or hematuria  NEUROLOGICAL: No numbness or weakness  SKIN: No itching, burning, rashes, or lesions   All other review of systems is negative unless indicated above    OBJECTIVE    PHYSICAL EXAM:  Vital Signs Last 24 Hrs  T(C): 36.5 (08 Oct 2024 07:43), Max: 36.9 (07 Oct 2024 20:04)  T(F): 97.7 (08 Oct 2024 07:43), Max: 98.4 (07 Oct 2024 20:04)  HR: 76 (08 Oct 2024 07:43) (76 - 81)  BP: 159/82 (08 Oct 2024 07:43) (149/61 - 159/82)  BP(mean): --  RR: 19 (08 Oct 2024 07:43) (18 - 19)  SpO2: 96% (08 Oct 2024 07:43) (96% - 100%)    Parameters below as of 08 Oct 2024 07:43  Patient On (Oxygen Delivery Method): room air      Constitutional: NAD, awake and alert, well-developed  HEENT: PERR, EOMI, Normal Hearing, MMM  Neck: Soft and supple, No LAD, No JVD  Respiratory: Breath sounds are clear bilaterally, No wheezing, rales or rhonchi  Cardiovascular: S1 and S2, regular rate and rhythm, no Murmurs, gallops or rubs  Gastrointestinal: Bowel Sounds present, soft, nontender, nondistended, no guarding, no rebound  Extremities: No peripheral edema  Vascular: 2+ peripheral pulses  Neurological: A/O x 3, no focal deficits  Musculoskeletal: 5/5 strength b/l upper and lower extremities  Skin: No rashes    MEDICATIONS  (STANDING):  amoxicillin  875 milliGRAM(s)/clavulanate 1 Tablet(s) Oral two times a day  dextrose 5%. 1000 milliLiter(s) (100 mL/Hr) IV Continuous <Continuous>  dextrose 5%. 1000 milliLiter(s) (50 mL/Hr) IV Continuous <Continuous>  dextrose 50% Injectable 12.5 Gram(s) IV Push once  dextrose 50% Injectable 25 Gram(s) IV Push once  dextrose 50% Injectable 25 Gram(s) IV Push once  glucagon  Injectable 1 milliGRAM(s) IntraMuscular once  influenza  Vaccine (HIGH DOSE) 0.5 milliLiter(s) IntraMuscular once  insulin lispro (ADMELOG) corrective regimen sliding scale   SubCutaneous three times a day before meals  insulin lispro (ADMELOG) corrective regimen sliding scale   SubCutaneous at bedtime  mupirocin 2% Ointment 1 Application(s) Topical two times a day  ondansetron   Disintegrating Tablet 4 milliGRAM(s) Oral every 8 hours      LABS:   CAPILLARY BLOOD GLUCOSE  POCT Blood Glucose.: 99 mg/dL (08 Oct 2024 07:49)  POCT Blood Glucose.: 141 mg/dL (07 Oct 2024 22:03)  POCT Blood Glucose.: 129 mg/dL (07 Oct 2024 16:11)  POCT Blood Glucose.: 139 mg/dL (07 Oct 2024 11:24)  Assessment and Plan:   · Assessment	  74 y/o F w/ PMH of HTN, colon & lung cancer, DM2, chronic thrombocytopenia chronic metabolic acidosis, chronic and severe B/L LE lymphedema  p/w worsening RLE wounds    Diagnosis:  Cellulitis of Right > Left Feet - back to baseline  Type 2 Diabetes  H/o Colon and Lung Cancer  Pancytopenia, multifactorial  HTN   Diarrhea resolved    Plan:  Off Vancomycin and Zosyn  ID following  Podiatry following for wound care - Continue to wrap up to the knees  Hematology consult  appreciated  Humalog ISS  Diabetic diet   Continue Po Augmentin  Refuses rehab  Discharge home today.   Will need a better home care plan on discharge. Recommend weekly wound care center evaluation and better wrapping at home.       DVT Prophylaxis  SCDs 2/2 thrombocytopenia    SUBJECTIVE:    CHIEF COMPLAINT:  Patient is a 73y old  Female who presents with a chief complaint of worsening RLE wounds. (07 Oct 2024 17:19)      HPI:  74 y/o F w/ PMH of HTN, colon & lung cancer, DM2, chronic thrombocytoepnia, chronic metabolic acidosis, chronic B/L LE lymphadema, p/w worsening RLE wounds. Patient states that wound care nurse that comes to the house noticed that patient was draining more from her RLE wounds and also had erythema. She called wound care center who referred patient to ED. 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 No nausea, vomiting, or hematemesis; No diarrhea or constipation. No melena or hematochezia.  GENITOURINARY: No dysuria, frequency or hematuria  NEUROLOGICAL: No numbness or weakness  SKIN: No itching, burning, rashes, or lesions   All other review of systems is negative unless indicated above    OBJECTIVE    PHYSICAL EXAM:  Vital Signs Last 24 Hrs  T(C): 36.5 (08 Oct 2024 07:43), Max: 36.9 (07 Oct 2024 20:04)  T(F): 97.7 (08 Oct 2024 07:43), Max: 98.4 (07 Oct 2024 20:04)  HR: 76 (08 Oct 2024 07:43) (76 - 81)  BP: 159/82 (08 Oct 2024 07:43) (149/61 - 159/82)  BP(mean): --  RR: 19 (08 Oct 2024 07:43) (18 - 19)  SpO2: 96% (08 Oct 2024 07:43) (96% - 100%)    Parameters below as of 08 Oct 2024 07:43  Patient On (Oxygen Delivery Method): room air      Constitutional: NAD, awake and alert, well-developed  HEENT: PERR, EOMI, Normal Hearing, MMM  Neck: Soft and supple, No LAD, No JVD  Respiratory: Breath sounds are clear bilaterally, No wheezing, rales or rhonchi  Cardiovascular: S1 and S2, regular rate and rhythm, no Murmurs, gallops or rubs  Gastrointestinal: Bowel Sounds present, soft, nontender, nondistended, no guarding, no rebound  Extremities: No peripheral edema  Vascular: 2+ peripheral pulses  Neurological: A/O x 3, no focal deficits  Musculoskeletal: 5/5 strength b/l upper and lower extremities  Skin: No rashes    MEDICATIONS  (STANDING):  amoxicillin  875 milliGRAM(s)/clavulanate 1 Tablet(s) Oral two times a day  dextrose 5%. 1000 milliLiter(s) (100 mL/Hr) IV Continuous <Continuous>  dextrose 5%. 1000 milliLiter(s) (50 mL/Hr) IV Continuous <Continuous>  dextrose 50% Injectable 12.5 Gram(s) IV Push once  dextrose 50% Injectable 25 Gram(s) IV Push once  dextrose 50% Injectable 25 Gram(s) IV Push once  glucagon  Injectable 1 milliGRAM(s) IntraMuscular once  influenza  Vaccine (HIGH DOSE) 0.5 milliLiter(s) IntraMuscular once  insulin lispro (ADMELOG) corrective regimen sliding scale   SubCutaneous three times a day before meals  insulin lispro (ADMELOG) corrective regimen sliding scale   SubCutaneous at bedtime  mupirocin 2% Ointment 1 Application(s) Topical two times a day  ondansetron   Disintegrating Tablet 4 milliGRAM(s) Oral every 8 hours      LABS:   CAPILLARY BLOOD GLUCOSE  POCT Blood Glucose.: 99 mg/dL (08 Oct 2024 07:49)  POCT Blood Glucose.: 141 mg/dL (07 Oct 2024 22:03)  POCT Blood Glucose.: 129 mg/dL (07 Oct 2024 16:11)  POCT Blood Glucose.: 139 mg/dL (07 Oct 2024 11:24)  Assessment and Plan:   · Assessment	  74 y/o F w/ PMH of HTN, colon & lung cancer, DM2, chronic thrombocytopenia chronic metabolic acidosis, chronic and severe B/L LE lymphedema  p/w worsening RLE wounds    Diagnosis:  Cellulitis of Right > Left Feet - back to baseline  Type 2 Diabetes  H/o Colon and Lung Cancer  Pancytopenia, multifactorial - but in this case, most likely exacerbated by infection  HTN   Diarrhea resolved    Plan:  Off Vancomycin and Zosyn  ID following  Podiatry following for wound care - Continue to wrap up to the knees  Hematology consult  appreciated  Humalog ISS  Diabetic diet   Continue Po Augmentin  Refuses rehab  Discharge home today.   Will need a better home care plan on discharge. Recommend weekly wound care center evaluation and better wrapping at home.       DVT Prophylaxis  SCDs 2/2 thrombocytopenia

## 2024-10-09 ENCOUNTER — NON-APPOINTMENT (OUTPATIENT)
Age: 74
End: 2024-10-09

## 2024-10-09 LAB
% ALBUMIN: 52.3 % — SIGNIFICANT CHANGE UP
% ALPHA 1: 5.5 % — SIGNIFICANT CHANGE UP
% ALPHA 2: 9.3 % — SIGNIFICANT CHANGE UP
% BETA: 12.7 % — SIGNIFICANT CHANGE UP
% GAMMA: 20.2 % — SIGNIFICANT CHANGE UP
% M SPIKE: SIGNIFICANT CHANGE UP
ALBUMIN SERPL ELPH-MCNC: 3 G/DL — LOW (ref 3.6–5.5)
ALBUMIN/GLOB SERPL ELPH: 1.1 RATIO — SIGNIFICANT CHANGE UP
ALPHA1 GLOB SERPL ELPH-MCNC: 0.3 G/DL — SIGNIFICANT CHANGE UP (ref 0.1–0.4)
ALPHA2 GLOB SERPL ELPH-MCNC: 0.5 G/DL — SIGNIFICANT CHANGE UP (ref 0.5–1)
B-GLOBULIN SERPL ELPH-MCNC: 0.7 G/DL — SIGNIFICANT CHANGE UP (ref 0.5–1)
GAMMA GLOBULIN: 1.2 G/DL — SIGNIFICANT CHANGE UP (ref 0.6–1.6)
INTERPRETATION SERPL IFE-IMP: SIGNIFICANT CHANGE UP
M-SPIKE: SIGNIFICANT CHANGE UP (ref 0–0)
PROT PATTERN SERPL ELPH-IMP: SIGNIFICANT CHANGE UP
PROT SERPL-MCNC: 5.8 G/DL — LOW (ref 6–8.3)

## 2024-10-09 NOTE — CDI QUERY NOTE - NSCDIOTHERTXTBX_GEN_ALL_CORE_HH
The patient is documented with pancytopenia, thrombocytopenia, history of colon cancer and lung cancer chemotherapy  Could you please further clarify the type of pancytopenia    -Pancytopenia, multifactorial, drug induced, due to possible MDS , and infection   -Pancytopenia associated with other please specify  -Other please specify    Chart documentation and clinical evidence    Hem/Onc- history of colon cancer s/p surgery, [place] [facility] [place]. Lung malignancy (non smoker) s/p SYLVIA wedge [place] 12 [place] [facility] [place] [facility].  chronic thrombocytopenia since at least 2016 now likely exacerbated by infection/antibiotics. peripheral smear with small clumps, large plt.  No schistocytes.  No dysplastic WBC seen. Thrombocytopenia related to mild clumping, splenic sequestration due to portal hypertension/splenomegaly. Cannot rule out an underlying MDS with her history of chemo exposure but no obvious dysplastic features on her peripheral smear. Will check her labs- PT/PTT, fibrinogen, B12/folate levels, ASYA/RF, HIV/Hepatitis panel, SPEP/RACHEL. Recommend abdominal ultrasound to evaluate her spleen. Transfusion of plt if her plt <10, <15 if febrile, <50 with bleeding. Also with a normocytic anemia/leukopenia- likely suppression from infection.     DC summary- cellulitis to foot after long course of edema, venous insufficiency, stasis dermatitis,Treated by podiatry, seen by ID, Treated with IV Zosyn then changed to Augmentin. -PRINCIPAL DISCHARGE DIAGNOSIS: Cellulitis-SECONDARY DISCHARGE DIAGNOSES Type 2 diabetes mellitus without     WBC Count: 3.07 K/uL (10.02.24 @ 12:31)   WBC Count: 3.17 K/uL (10.01.24 @ 07:09)   WBC Count: 3.35 K/uL (09.30.24 @ 17:45)     RBC Count: 3.68 M/uL (10.02.24 @ 12:31)   RBC Count: 3.39 M/uL (10.01.24 @ 07:09)   RBC Count: 3.55 M/uL (09.30.24 @ 17:45)     Hemoglobin: 10.4 g/dL (10.02.24 @ 12:31)   Hemoglobin: 9.6 g/dL (10.01.24 @ 07:09)   Hemoglobin: 9.9 g/dL (09.30.24 @ 17:45)     Hematocrit: 32.1 % (10.02.24 @ 12:31)   Hematocrit: 29.5 % (10.01.24 @ 07:09)   Hematocrit: 30.7 % (09.30.24 @ 17:45) The patient is documented with pancytopenia, thrombocytopenia, history of colon cancer and lung cancer chemotherapy  Could you please further clarify the type of pancytopenia    -Pancytopenia, multifactorial, drug induced, due to possible MDS , and infection   -Pancytopenia associated with other please specify  -Other please specify    Chart documentation and clinical evidence    Hem/Onc- history of colon cancer s/p surgery, [place] [facility] [place] ago. Lung malignancy (non smoker) s/p SYLVIA wedge resection 12 [place] ago [place] [facility] [place] [facility].  chronic thrombocytopenia since at least 2016 now likely exacerbated by infection/antibiotics. peripheral smear with small clumps, large plt.  No schistocytes.  No dysplastic WBC seen. Thrombocytopenia related to mild clumping, splenic sequestration due to portal hypertension/splenomegaly. Cannot rule out an underlying MDS with her history of chemo exposure but no obvious dysplastic features on her peripheral smear. Will check her labs- PT/PTT, fibrinogen, B12/folate levels, ASYA/RF, HIV/Hepatitis panel, SPEP/RACHEL. Recommend abdominal ultrasound to evaluate her spleen. Transfusion of plt if her plt <10, <15 if febrile, <50 with bleeding. Also with a normocytic anemia/leukopenia- likely suppression from infection.     DC summary- cellulitis to foot after long course of edema, venous insufficiency, stasis dermatitis,Treated by podiatry, seen by ID, Treated with IV Zosyn then changed to Augmentin. -PRINCIPAL DISCHARGE DIAGNOSIS: Cellulitis-SECONDARY DISCHARGE DIAGNOSES Type 2 diabetes mellitus without     WBC Count: 3.07 K/uL (10.02.24 @ 12:31)   WBC Count: 3.17 K/uL (10.01.24 @ 07:09)   WBC Count: 3.35 K/uL (09.30.24 @ 17:45)     RBC Count: 3.68 M/uL (10.02.24 @ 12:31)   RBC Count: 3.39 M/uL (10.01.24 @ 07:09)   RBC Count: 3.55 M/uL (09.30.24 @ 17:45)     Hemoglobin: 10.4 g/dL (10.02.24 @ 12:31)   Hemoglobin: 9.6 g/dL (10.01.24 @ 07:09)   Hemoglobin: 9.9 g/dL (09.30.24 @ 17:45)     Hematocrit: 32.1 % (10.02.24 @ 12:31)   Hematocrit: 29.5 % (10.01.24 @ 07:09)   Hematocrit: 30.7 % (09.30.24 @ 17:45)    Platelet Count - Automated: 65 K/uL (10.02.24 @ 12:31)   Platelet Count - Automated: 71 K/uL (10.01.24 @ 07:09)   Platelet Count - Automated: 73 K/uL (09.30.24 @ 17:45)

## 2024-10-16 ENCOUNTER — NON-APPOINTMENT (OUTPATIENT)
Age: 74
End: 2024-10-16

## 2024-10-18 ENCOUNTER — OUTPATIENT (OUTPATIENT)
Dept: OUTPATIENT SERVICES | Facility: HOSPITAL | Age: 74
LOS: 1 days | End: 2024-10-18

## 2024-10-18 DIAGNOSIS — I89.0 LYMPHEDEMA, NOT ELSEWHERE CLASSIFIED: ICD-10-CM

## 2024-10-18 PROCEDURE — 99212 OFFICE O/P EST SF 10 MIN: CPT

## 2024-10-22 ENCOUNTER — APPOINTMENT (OUTPATIENT)
Dept: HOME HEALTH SERVICES | Facility: HOME HEALTH | Age: 74
End: 2024-10-22
Payer: MEDICARE

## 2024-10-22 VITALS
SYSTOLIC BLOOD PRESSURE: 116 MMHG | DIASTOLIC BLOOD PRESSURE: 64 MMHG | HEART RATE: 88 BPM | TEMPERATURE: 97.5 F | RESPIRATION RATE: 15 BRPM | OXYGEN SATURATION: 98 %

## 2024-10-22 DIAGNOSIS — Z85.118 PERSONAL HISTORY OF OTHER MALIGNANT NEOPLASM OF BRONCHUS AND LUNG: ICD-10-CM

## 2024-10-22 DIAGNOSIS — E66.01 MORBID (SEVERE) OBESITY DUE TO EXCESS CALORIES: ICD-10-CM

## 2024-10-22 DIAGNOSIS — I89.0 LYMPHEDEMA, NOT ELSEWHERE CLASSIFIED: ICD-10-CM

## 2024-10-22 DIAGNOSIS — I87.2 VENOUS INSUFFICIENCY (CHRONIC) (PERIPHERAL): ICD-10-CM

## 2024-10-22 DIAGNOSIS — L30.8 OTHER SPECIFIED DERMATITIS: ICD-10-CM

## 2024-10-22 DIAGNOSIS — R18.8 UNSPECIFIED CIRRHOSIS OF LIVER: ICD-10-CM

## 2024-10-22 DIAGNOSIS — E11.9 TYPE 2 DIABETES MELLITUS W/OUT COMPLICATIONS: ICD-10-CM

## 2024-10-22 DIAGNOSIS — Z85.038 PERSONAL HISTORY OF OTHER MALIGNANT NEOPLASM OF LARGE INTESTINE: ICD-10-CM

## 2024-10-22 DIAGNOSIS — K74.60 UNSPECIFIED CIRRHOSIS OF LIVER: ICD-10-CM

## 2024-10-22 PROCEDURE — 99345 HOME/RES VST NEW HIGH MDM 75: CPT

## 2024-10-22 PROCEDURE — G0008: CPT

## 2024-10-22 PROCEDURE — 90662 IIV NO PRSV INCREASED AG IM: CPT

## 2024-10-22 PROCEDURE — G0506: CPT

## 2024-10-23 DIAGNOSIS — Z79.84 LONG TERM (CURRENT) USE OF ORAL HYPOGLYCEMIC DRUGS: ICD-10-CM

## 2024-10-23 DIAGNOSIS — I89.0 LYMPHEDEMA, NOT ELSEWHERE CLASSIFIED: ICD-10-CM

## 2024-10-23 DIAGNOSIS — M19.90 UNSPECIFIED OSTEOARTHRITIS, UNSPECIFIED SITE: ICD-10-CM

## 2024-10-23 DIAGNOSIS — Z91.81 HISTORY OF FALLING: ICD-10-CM

## 2024-10-23 DIAGNOSIS — I73.9 PERIPHERAL VASCULAR DISEASE, UNSPECIFIED: ICD-10-CM

## 2024-10-23 DIAGNOSIS — E11.69 TYPE 2 DIABETES MELLITUS WITH OTHER SPECIFIED COMPLICATION: ICD-10-CM

## 2024-10-23 DIAGNOSIS — L97.812 NON-PRESSURE CHRONIC ULCER OF OTHER PART OF RIGHT LOWER LEG WITH FAT LAYER EXPOSED: ICD-10-CM

## 2024-10-23 DIAGNOSIS — I10 ESSENTIAL (PRIMARY) HYPERTENSION: ICD-10-CM

## 2024-10-23 DIAGNOSIS — E11.9 TYPE 2 DIABETES MELLITUS WITHOUT COMPLICATIONS: ICD-10-CM

## 2024-10-23 DIAGNOSIS — B37.31 ACUTE CANDIDIASIS OF VULVA AND VAGINA: ICD-10-CM

## 2024-10-23 RX ORDER — NYSTATIN 100000 [USP'U]/G
100000 POWDER TOPICAL 3 TIMES DAILY
Qty: 3 | Refills: 5 | Status: ACTIVE | COMMUNITY
Start: 2024-10-23 | End: 1900-01-01

## 2024-10-23 RX ORDER — FLUCONAZOLE 150 MG/1
150 TABLET ORAL DAILY
Qty: 2 | Refills: 0 | Status: ACTIVE | COMMUNITY
Start: 2024-10-23 | End: 1900-01-01

## 2024-10-23 RX ORDER — CLOTRIMAZOLE 10 MG/G
1 CREAM VAGINAL
Qty: 2 | Refills: 2 | Status: ACTIVE | COMMUNITY
Start: 2024-10-23 | End: 1900-01-01

## 2024-10-27 PROBLEM — L30.8 DERMATITIS ASSOCIATED WITH MOISTURE: Status: ACTIVE | Noted: 2024-10-23

## 2024-10-27 PROBLEM — Z85.038 HISTORY OF MALIGNANT NEOPLASM OF COLON: Status: RESOLVED | Noted: 2022-02-08 | Resolved: 2024-10-27

## 2024-10-27 PROBLEM — Z85.118 HISTORY OF MALIGNANT NEOPLASM OF UPPER LOBE OF LEFT LUNG: Status: RESOLVED | Noted: 2022-02-08 | Resolved: 2024-10-27

## 2024-10-28 ENCOUNTER — NON-APPOINTMENT (OUTPATIENT)
Age: 74
End: 2024-10-28

## 2024-10-29 ENCOUNTER — LABORATORY RESULT (OUTPATIENT)
Age: 74
End: 2024-10-29

## 2024-11-04 ENCOUNTER — NON-APPOINTMENT (OUTPATIENT)
Age: 74
End: 2024-11-04

## 2024-11-04 DIAGNOSIS — E55.9 VITAMIN D DEFICIENCY, UNSPECIFIED: ICD-10-CM

## 2024-11-04 RX ORDER — MULTIVIT-MIN/IRON/FOLIC ACID/K 18-600-40
50 MCG CAPSULE ORAL
Qty: 90 | Refills: 3 | Status: ACTIVE | COMMUNITY
Start: 2024-11-04 | End: 1900-01-01

## 2024-11-05 ENCOUNTER — NON-APPOINTMENT (OUTPATIENT)
Age: 74
End: 2024-11-05

## 2024-11-12 ENCOUNTER — NON-APPOINTMENT (OUTPATIENT)
Age: 74
End: 2024-11-12

## 2024-11-18 ENCOUNTER — NON-APPOINTMENT (OUTPATIENT)
Age: 74
End: 2024-11-18

## 2024-11-19 ENCOUNTER — NON-APPOINTMENT (OUTPATIENT)
Age: 74
End: 2024-11-19

## 2024-11-19 RX ORDER — ESCITALOPRAM OXALATE 10 MG/1
10 TABLET ORAL
Qty: 90 | Refills: 3 | Status: ACTIVE | COMMUNITY
Start: 2024-11-19 | End: 1900-01-01

## 2024-11-22 ENCOUNTER — NON-APPOINTMENT (OUTPATIENT)
Age: 74
End: 2024-11-22

## 2024-12-13 ENCOUNTER — NON-APPOINTMENT (OUTPATIENT)
Age: 74
End: 2024-12-13

## 2024-12-19 ENCOUNTER — APPOINTMENT (OUTPATIENT)
Dept: AFTER HOURS CARE | Facility: EMERGENCY ROOM | Age: 74
End: 2024-12-19

## 2024-12-19 ENCOUNTER — TRANSCRIPTION ENCOUNTER (OUTPATIENT)
Age: 74
End: 2024-12-19

## 2024-12-26 ENCOUNTER — APPOINTMENT (OUTPATIENT)
Dept: HOME HEALTH SERVICES | Facility: HOME HEALTH | Age: 74
End: 2024-12-26

## 2024-12-26 VITALS
TEMPERATURE: 97.2 F | DIASTOLIC BLOOD PRESSURE: 60 MMHG | HEART RATE: 90 BPM | OXYGEN SATURATION: 99 % | SYSTOLIC BLOOD PRESSURE: 120 MMHG

## 2024-12-27 RX ORDER — DOXYCYCLINE HYCLATE 100 MG/1
100 CAPSULE ORAL
Qty: 20 | Refills: 0 | Status: ACTIVE | COMMUNITY
Start: 2024-12-27 | End: 1900-01-01

## 2025-01-13 ENCOUNTER — INPATIENT (INPATIENT)
Facility: HOSPITAL | Age: 75
LOS: 3 days | Discharge: NURSING, REHAB OR LONG-TERM CARE | DRG: 948 | End: 2025-01-17
Attending: HOSPITALIST | Admitting: INTERNAL MEDICINE
Payer: MEDICARE

## 2025-01-13 ENCOUNTER — TRANSCRIPTION ENCOUNTER (OUTPATIENT)
Age: 75
End: 2025-01-13

## 2025-01-13 VITALS
TEMPERATURE: 98 F | RESPIRATION RATE: 20 BRPM | OXYGEN SATURATION: 100 % | DIASTOLIC BLOOD PRESSURE: 66 MMHG | SYSTOLIC BLOOD PRESSURE: 147 MMHG | HEART RATE: 85 BPM

## 2025-01-13 LAB
ALBUMIN SERPL ELPH-MCNC: 3.4 G/DL — SIGNIFICANT CHANGE UP (ref 3.3–5)
ALP SERPL-CCNC: 95 U/L — SIGNIFICANT CHANGE UP (ref 40–120)
ALT FLD-CCNC: 38 U/L — SIGNIFICANT CHANGE UP (ref 12–78)
ANION GAP SERPL CALC-SCNC: 9 MMOL/L — SIGNIFICANT CHANGE UP (ref 5–17)
AST SERPL-CCNC: 45 U/L — HIGH (ref 15–37)
BASE EXCESS BLDV CALC-SCNC: -11.5 MMOL/L — LOW (ref -2–3)
BASOPHILS # BLD AUTO: 0.04 K/UL — SIGNIFICANT CHANGE UP (ref 0–0.2)
BASOPHILS NFR BLD AUTO: 0.3 % — SIGNIFICANT CHANGE UP (ref 0–2)
BILIRUB SERPL-MCNC: 1.2 MG/DL — SIGNIFICANT CHANGE UP (ref 0.2–1.2)
BUN SERPL-MCNC: 104 MG/DL — HIGH (ref 7–23)
CALCIUM SERPL-MCNC: 11 MG/DL — HIGH (ref 8.5–10.1)
CHLORIDE SERPL-SCNC: 104 MMOL/L — SIGNIFICANT CHANGE UP (ref 96–108)
CK SERPL-CCNC: 72 U/L — SIGNIFICANT CHANGE UP (ref 26–192)
CO2 SERPL-SCNC: 16 MMOL/L — LOW (ref 22–31)
CREAT SERPL-MCNC: 2.18 MG/DL — HIGH (ref 0.5–1.3)
EGFR: 23 ML/MIN/1.73M2 — LOW
EOSINOPHIL # BLD AUTO: 0.02 K/UL — SIGNIFICANT CHANGE UP (ref 0–0.5)
EOSINOPHIL NFR BLD AUTO: 0.1 % — SIGNIFICANT CHANGE UP (ref 0–6)
GLUCOSE BLDC GLUCOMTR-MCNC: 152 MG/DL — HIGH (ref 70–99)
GLUCOSE SERPL-MCNC: 188 MG/DL — HIGH (ref 70–99)
HCO3 BLDV-SCNC: 13 MMOL/L — LOW (ref 22–29)
HCT VFR BLD CALC: 45.8 % — HIGH (ref 34.5–45)
HGB BLD-MCNC: 15.5 G/DL — SIGNIFICANT CHANGE UP (ref 11.5–15.5)
IMM GRANULOCYTES NFR BLD AUTO: 0.5 % — SIGNIFICANT CHANGE UP (ref 0–0.9)
LYMPHOCYTES # BLD AUTO: 0.43 K/UL — LOW (ref 1–3.3)
LYMPHOCYTES # BLD AUTO: 2.8 % — LOW (ref 13–44)
MANUAL SMEAR VERIFICATION: SIGNIFICANT CHANGE UP
MCHC RBC-ENTMCNC: 26.7 PG — LOW (ref 27–34)
MCHC RBC-ENTMCNC: 33.8 G/DL — SIGNIFICANT CHANGE UP (ref 32–36)
MCV RBC AUTO: 79 FL — LOW (ref 80–100)
MONOCYTES # BLD AUTO: 1.18 K/UL — HIGH (ref 0–0.9)
MONOCYTES NFR BLD AUTO: 7.6 % — SIGNIFICANT CHANGE UP (ref 2–14)
NEUTROPHILS # BLD AUTO: 13.76 K/UL — HIGH (ref 1.8–7.4)
NEUTROPHILS NFR BLD AUTO: 88.7 % — HIGH (ref 43–77)
PCO2 BLDV: 26 MMHG — LOW (ref 39–42)
PH BLDV: 7.31 — LOW (ref 7.32–7.43)
PLAT MORPH BLD: NORMAL — SIGNIFICANT CHANGE UP
PLATELET # BLD AUTO: 198 K/UL — SIGNIFICANT CHANGE UP (ref 150–400)
PLATELET COUNT - ESTIMATE: NORMAL — SIGNIFICANT CHANGE UP
PO2 BLDV: 56 MMHG — HIGH (ref 25–45)
POTASSIUM SERPL-MCNC: 6.3 MMOL/L — CRITICAL HIGH (ref 3.5–5.3)
POTASSIUM SERPL-SCNC: 6.3 MMOL/L — CRITICAL HIGH (ref 3.5–5.3)
PROT SERPL-MCNC: 8 GM/DL — SIGNIFICANT CHANGE UP (ref 6–8.3)
RBC # BLD: 5.8 M/UL — HIGH (ref 3.8–5.2)
RBC # FLD: 15.3 % — HIGH (ref 10.3–14.5)
RBC BLD AUTO: NORMAL — SIGNIFICANT CHANGE UP
SAO2 % BLDV: 88 % — SIGNIFICANT CHANGE UP (ref 67–88)
SODIUM SERPL-SCNC: 129 MMOL/L — LOW (ref 135–145)
TROPONIN I, HIGH SENSITIVITY RESULT: 33.55 NG/L — SIGNIFICANT CHANGE UP
WBC # BLD: 15.51 K/UL — HIGH (ref 3.8–10.5)
WBC # FLD AUTO: 15.51 K/UL — HIGH (ref 3.8–10.5)

## 2025-01-13 PROCEDURE — 93010 ELECTROCARDIOGRAM REPORT: CPT

## 2025-01-13 PROCEDURE — 71045 X-RAY EXAM CHEST 1 VIEW: CPT | Mod: 26

## 2025-01-13 PROCEDURE — 74176 CT ABD & PELVIS W/O CONTRAST: CPT | Mod: 26

## 2025-01-13 PROCEDURE — 99285 EMERGENCY DEPT VISIT HI MDM: CPT

## 2025-01-13 RX ORDER — CALCIUM GLUCONATE 94 MG/ML
2 INJECTION, SOLUTION INTRAVENOUS ONCE
Refills: 0 | Status: COMPLETED | OUTPATIENT
Start: 2025-01-13 | End: 2025-01-13

## 2025-01-13 RX ORDER — SODIUM BICARBONATE 84 MG/ML
50 INJECTION, SOLUTION INTRAVENOUS ONCE
Refills: 0 | Status: COMPLETED | OUTPATIENT
Start: 2025-01-13 | End: 2025-01-13

## 2025-01-13 RX ORDER — SODIUM ZIRCONIUM CYCLOSILICATE 10 G/10G
10 POWDER, FOR SUSPENSION ORAL ONCE
Refills: 0 | Status: COMPLETED | OUTPATIENT
Start: 2025-01-13 | End: 2025-01-13

## 2025-01-13 RX ORDER — SODIUM CHLORIDE 9 MG/ML
1000 INJECTION, SOLUTION INTRAMUSCULAR; INTRAVENOUS; SUBCUTANEOUS ONCE
Refills: 0 | Status: COMPLETED | OUTPATIENT
Start: 2025-01-13 | End: 2025-01-13

## 2025-01-13 RX ORDER — DEXTROSE MONOHYDRATE 25 G/50ML
50 INJECTION, SOLUTION INTRAVENOUS ONCE
Refills: 0 | Status: COMPLETED | OUTPATIENT
Start: 2025-01-13 | End: 2025-01-13

## 2025-01-13 RX ORDER — INSULIN HUMAN 100 [IU]/ML
5 INJECTION, SOLUTION SUBCUTANEOUS ONCE
Refills: 0 | Status: COMPLETED | OUTPATIENT
Start: 2025-01-13 | End: 2025-01-13

## 2025-01-13 RX ADMIN — SODIUM ZIRCONIUM CYCLOSILICATE 10 GRAM(S): 10 POWDER, FOR SUSPENSION ORAL at 22:44

## 2025-01-13 RX ADMIN — DEXTROSE MONOHYDRATE 50 MILLILITER(S): 25 INJECTION, SOLUTION INTRAVENOUS at 22:48

## 2025-01-13 RX ADMIN — SODIUM BICARBONATE 50 MILLIEQUIVALENT(S): 84 INJECTION, SOLUTION INTRAVENOUS at 22:59

## 2025-01-13 RX ADMIN — INSULIN HUMAN 5 UNIT(S): 100 INJECTION, SOLUTION SUBCUTANEOUS at 22:58

## 2025-01-13 RX ADMIN — CALCIUM GLUCONATE 200 GRAM(S): 94 INJECTION, SOLUTION INTRAVENOUS at 23:04

## 2025-01-13 NOTE — ED PROVIDER NOTE - CLINICAL SUMMARY MEDICAL DECISION MAKING FREE TEXT BOX
Presents to the ER complaining of generalized weakness.  Generalized weakness called fall today.  Endorses recently beginning tramadol for pain at home.  Denies injury from fall.  No obvious injuries on exam.  Exam is positive for bilateral lower extremity edema which patient states is baseline given her lymphedema history.  Labs, EKG, chest x-ray ordered. Presents to the ER complaining of generalized weakness.  Generalized weakness called fall today.  Endorses recently beginning tramadol for pain at home.  Denies injury from fall.  No obvious injuries on exam.  Exam is positive for bilateral lower extremity edema which patient states is baseline given her lymphedema history.  Labs, EKG, chest x-ray ordered.  Found to have hyperkalemia, hyponatremia, ADRIAN. leukocytosis. Given medications to lower K.  I spoke with nephrologist Dr. Hernández who recommends IVF in addition to medications given. Repeat K now normal at 4.9. Patient admitted to medicine in stable condition.

## 2025-01-13 NOTE — ED PROVIDER NOTE - OBJECTIVE STATEMENT
74-year-old female with history of thrombocytopenia, colon cancer, lung cancer, type 2 diabetes, hypertension, lymph edema presents to the ER for generalized weakness.  Patient states that she began taking tramadol 2 days ago and has become increasingly achy.  Her weakness today caused her to fall.  Denies injury after the fall.  Denies all other symptoms including chest pain, shortness of breath, palpitations, syncope, loss of consciousness, fever, chills, cough, urinary symptoms.

## 2025-01-13 NOTE — ED PROVIDER NOTE - PHYSICAL EXAMINATION
Const: Well appearing, NAD  Eyes: PERRL, EOM intact  CV: RRR, no murmurs, no chest wall tenderness, distal pulses intact  Resp: CTAB, normal resp effort  GI: soft, nondistended, nontender. No CVA tenderness  MSK: B/l LE edema. Full ROM, no muscle or bony deformity or tenderness  Neuro: AOx3, GCS 15, No focal deficits  Skin: No rash, laceration or abrasion  Psych: calm, cooperative.

## 2025-01-13 NOTE — ED ADULT TRIAGE NOTE - AS TEMP SITE
[FreeTextEntry1] : Abnormal TFT  improved unitl recent- clincially euthryoid\par  Frret T 4 and Free T3 well within normal limits\par \par   \par  pt asymptomatic  now at 34weeks\par \par  cont to monitor \par  follow up inMAy \par COvid + AB nucleocapsid and spike protein \par  pt had vaccine but also may  have  been exposed thru wotrk k and sick in laws \par \par ANmeia- ton iron \par  low B12  add 500 mcg qd  \par \par  oral

## 2025-01-13 NOTE — ED ADULT TRIAGE NOTE - CHIEF COMPLAINT QUOTE
Pt A&OX3, BIBEMS s/p fall. Pt reports having bilateral lower extremity lymphedema. Started on Tramadol 2 days ago and has been having weakness after taking the medication. Today she had increased weakness and had a fall.   Pt denies CP, SOB, LOC, head strike, anticoagulation use. BEFAST (-)

## 2025-01-13 NOTE — ED PROVIDER NOTE - CARE PLAN
1 Principal Discharge DX:	Generalized weakness   Principal Discharge DX:	Generalized weakness  Secondary Diagnosis:	Hyperkalemia  Secondary Diagnosis:	ADRIAN (acute kidney injury)

## 2025-01-13 NOTE — PROVIDER CONTACT NOTE (OTHER) - SITUATION
Dr. Hernández ([facility]; [phone])  paged @[phone] Dr. Hernández ([facility]; [phone])  paged @[phone], @[phone] Dr. Quinteros spoke to Dr. Hernández ([facility]; [phone])  paged @[phone], @[phone]

## 2025-01-14 ENCOUNTER — NON-APPOINTMENT (OUTPATIENT)
Age: 75
End: 2025-01-14

## 2025-01-14 DIAGNOSIS — R53.1 WEAKNESS: ICD-10-CM

## 2025-01-14 LAB
ALBUMIN SERPL ELPH-MCNC: 3 G/DL — LOW (ref 3.3–5)
ALP SERPL-CCNC: 84 U/L — SIGNIFICANT CHANGE UP (ref 40–120)
ALT FLD-CCNC: 32 U/L — SIGNIFICANT CHANGE UP (ref 12–78)
ANION GAP SERPL CALC-SCNC: 10 MMOL/L — SIGNIFICANT CHANGE UP (ref 5–17)
ANION GAP SERPL CALC-SCNC: 7 MMOL/L — SIGNIFICANT CHANGE UP (ref 5–17)
ANION GAP SERPL CALC-SCNC: 8 MMOL/L — SIGNIFICANT CHANGE UP (ref 5–17)
APPEARANCE UR: CLEAR — SIGNIFICANT CHANGE UP
APPEARANCE UR: CLEAR — SIGNIFICANT CHANGE UP
AST SERPL-CCNC: 39 U/L — HIGH (ref 15–37)
BACTERIA # UR AUTO: NEGATIVE /HPF — SIGNIFICANT CHANGE UP
BASE EXCESS BLDV CALC-SCNC: -8.1 MMOL/L — LOW (ref -2–3)
BASOPHILS # BLD AUTO: 0.03 K/UL — SIGNIFICANT CHANGE UP (ref 0–0.2)
BASOPHILS NFR BLD AUTO: 0.3 % — SIGNIFICANT CHANGE UP (ref 0–2)
BILIRUB SERPL-MCNC: 1.7 MG/DL — HIGH (ref 0.2–1.2)
BILIRUB UR-MCNC: NEGATIVE — SIGNIFICANT CHANGE UP
BILIRUB UR-MCNC: NEGATIVE — SIGNIFICANT CHANGE UP
BUN SERPL-MCNC: 100 MG/DL — HIGH (ref 7–23)
BUN SERPL-MCNC: 101 MG/DL — HIGH (ref 7–23)
BUN SERPL-MCNC: 105 MG/DL — HIGH (ref 7–23)
CALCIUM SERPL-MCNC: 10.6 MG/DL — HIGH (ref 8.5–10.1)
CALCIUM SERPL-MCNC: 10.6 MG/DL — HIGH (ref 8.5–10.1)
CALCIUM SERPL-MCNC: 9.8 MG/DL — SIGNIFICANT CHANGE UP (ref 8.5–10.1)
CAST: 15 /LPF — HIGH (ref 0–4)
CHLORIDE SERPL-SCNC: 104 MMOL/L — SIGNIFICANT CHANGE UP (ref 96–108)
CHLORIDE SERPL-SCNC: 106 MMOL/L — SIGNIFICANT CHANGE UP (ref 96–108)
CHLORIDE SERPL-SCNC: 108 MMOL/L — SIGNIFICANT CHANGE UP (ref 96–108)
CK SERPL-CCNC: 64 U/L — SIGNIFICANT CHANGE UP (ref 26–192)
CO2 SERPL-SCNC: 15 MMOL/L — LOW (ref 22–31)
CO2 SERPL-SCNC: 17 MMOL/L — LOW (ref 22–31)
CO2 SERPL-SCNC: 18 MMOL/L — LOW (ref 22–31)
COLOR SPEC: YELLOW — SIGNIFICANT CHANGE UP
COLOR SPEC: YELLOW — SIGNIFICANT CHANGE UP
CREAT SERPL-MCNC: 1.72 MG/DL — HIGH (ref 0.5–1.3)
CREAT SERPL-MCNC: 1.8 MG/DL — HIGH (ref 0.5–1.3)
CREAT SERPL-MCNC: 1.94 MG/DL — HIGH (ref 0.5–1.3)
DIFF PNL FLD: ABNORMAL
DIFF PNL FLD: NEGATIVE — SIGNIFICANT CHANGE UP
EGFR: 27 ML/MIN/1.73M2 — LOW
EGFR: 29 ML/MIN/1.73M2 — LOW
EGFR: 31 ML/MIN/1.73M2 — LOW
EOSINOPHIL # BLD AUTO: 0.17 K/UL — SIGNIFICANT CHANGE UP (ref 0–0.5)
EOSINOPHIL NFR BLD AUTO: 1.6 % — SIGNIFICANT CHANGE UP (ref 0–6)
GAS PNL BLDV: SIGNIFICANT CHANGE UP
GLUCOSE BLDC GLUCOMTR-MCNC: 132 MG/DL — HIGH (ref 70–99)
GLUCOSE BLDC GLUCOMTR-MCNC: 140 MG/DL — HIGH (ref 70–99)
GLUCOSE BLDC GLUCOMTR-MCNC: 147 MG/DL — HIGH (ref 70–99)
GLUCOSE BLDC GLUCOMTR-MCNC: 176 MG/DL — HIGH (ref 70–99)
GLUCOSE SERPL-MCNC: 129 MG/DL — HIGH (ref 70–99)
GLUCOSE SERPL-MCNC: 131 MG/DL — HIGH (ref 70–99)
GLUCOSE SERPL-MCNC: 197 MG/DL — HIGH (ref 70–99)
GLUCOSE UR QL: NEGATIVE MG/DL — SIGNIFICANT CHANGE UP
GLUCOSE UR QL: NEGATIVE MG/DL — SIGNIFICANT CHANGE UP
HCO3 BLDV-SCNC: 17 MMOL/L — LOW (ref 22–29)
HCT VFR BLD CALC: 37 % — SIGNIFICANT CHANGE UP (ref 34.5–45)
HCT VFR BLD CALC: 42.2 % — SIGNIFICANT CHANGE UP (ref 34.5–45)
HGB BLD-MCNC: 12 G/DL — SIGNIFICANT CHANGE UP (ref 11.5–15.5)
HGB BLD-MCNC: 14.4 G/DL — SIGNIFICANT CHANGE UP (ref 11.5–15.5)
IMM GRANULOCYTES NFR BLD AUTO: 0.5 % — SIGNIFICANT CHANGE UP (ref 0–0.9)
KETONES UR-MCNC: NEGATIVE MG/DL — SIGNIFICANT CHANGE UP
KETONES UR-MCNC: NEGATIVE MG/DL — SIGNIFICANT CHANGE UP
LEUKOCYTE ESTERASE UR-ACNC: ABNORMAL
LEUKOCYTE ESTERASE UR-ACNC: NEGATIVE — SIGNIFICANT CHANGE UP
LYMPHOCYTES # BLD AUTO: 0.89 K/UL — LOW (ref 1–3.3)
LYMPHOCYTES # BLD AUTO: 8.2 % — LOW (ref 13–44)
MCHC RBC-ENTMCNC: 27 PG — SIGNIFICANT CHANGE UP (ref 27–34)
MCHC RBC-ENTMCNC: 32.2 PG — SIGNIFICANT CHANGE UP (ref 27–34)
MCHC RBC-ENTMCNC: 32.4 G/DL — SIGNIFICANT CHANGE UP (ref 32–36)
MCHC RBC-ENTMCNC: 34.1 G/DL — SIGNIFICANT CHANGE UP (ref 32–36)
MCV RBC AUTO: 79 FL — LOW (ref 80–100)
MCV RBC AUTO: 99.2 FL — SIGNIFICANT CHANGE UP (ref 80–100)
MONOCYTES # BLD AUTO: 1.43 K/UL — HIGH (ref 0–0.9)
MONOCYTES NFR BLD AUTO: 13.1 % — SIGNIFICANT CHANGE UP (ref 2–14)
NEUTROPHILS # BLD AUTO: 8.32 K/UL — HIGH (ref 1.8–7.4)
NEUTROPHILS NFR BLD AUTO: 76.3 % — SIGNIFICANT CHANGE UP (ref 43–77)
NITRITE UR-MCNC: NEGATIVE — SIGNIFICANT CHANGE UP
NITRITE UR-MCNC: NEGATIVE — SIGNIFICANT CHANGE UP
PCO2 BLDV: 33 MMHG — LOW (ref 39–42)
PH BLDV: 7.32 — SIGNIFICANT CHANGE UP (ref 7.32–7.43)
PH UR: 5.5 — SIGNIFICANT CHANGE UP (ref 5–8)
PH UR: 5.5 — SIGNIFICANT CHANGE UP (ref 5–8)
PLATELET # BLD AUTO: 163 K/UL — SIGNIFICANT CHANGE UP (ref 150–400)
PLATELET # BLD AUTO: 186 K/UL — SIGNIFICANT CHANGE UP (ref 150–400)
PO2 BLDV: 111 MMHG — HIGH (ref 25–45)
POTASSIUM SERPL-MCNC: 4.9 MMOL/L — SIGNIFICANT CHANGE UP (ref 3.5–5.3)
POTASSIUM SERPL-MCNC: 4.9 MMOL/L — SIGNIFICANT CHANGE UP (ref 3.5–5.3)
POTASSIUM SERPL-MCNC: 5.4 MMOL/L — HIGH (ref 3.5–5.3)
POTASSIUM SERPL-SCNC: 4.9 MMOL/L — SIGNIFICANT CHANGE UP (ref 3.5–5.3)
POTASSIUM SERPL-SCNC: 4.9 MMOL/L — SIGNIFICANT CHANGE UP (ref 3.5–5.3)
POTASSIUM SERPL-SCNC: 5.4 MMOL/L — HIGH (ref 3.5–5.3)
PROT SERPL-MCNC: 7 GM/DL — SIGNIFICANT CHANGE UP (ref 6–8.3)
PROT UR-MCNC: NEGATIVE MG/DL — SIGNIFICANT CHANGE UP
PROT UR-MCNC: SIGNIFICANT CHANGE UP MG/DL
RBC # BLD: 3.73 M/UL — LOW (ref 3.8–5.2)
RBC # BLD: 5.34 M/UL — HIGH (ref 3.8–5.2)
RBC # FLD: 14.9 % — HIGH (ref 10.3–14.5)
RBC # FLD: 15.4 % — HIGH (ref 10.3–14.5)
RBC CASTS # UR COMP ASSIST: 9 /HPF — HIGH (ref 0–4)
SAO2 % BLDV: 99 % — HIGH (ref 67–88)
SODIUM SERPL-SCNC: 129 MMOL/L — LOW (ref 135–145)
SODIUM SERPL-SCNC: 131 MMOL/L — LOW (ref 135–145)
SODIUM SERPL-SCNC: 133 MMOL/L — LOW (ref 135–145)
SP GR SPEC: 1.02 — SIGNIFICANT CHANGE UP (ref 1–1.03)
SP GR SPEC: 1.02 — SIGNIFICANT CHANGE UP (ref 1–1.03)
SQUAMOUS # UR AUTO: 2 /HPF — SIGNIFICANT CHANGE UP (ref 0–5)
TROPONIN I, HIGH SENSITIVITY RESULT: 77.7 NG/L — HIGH
UROBILINOGEN FLD QL: 0.2 MG/DL — SIGNIFICANT CHANGE UP (ref 0.2–1)
UROBILINOGEN FLD QL: 1 MG/DL — SIGNIFICANT CHANGE UP (ref 0.2–1)
WBC # BLD: 10.35 K/UL — SIGNIFICANT CHANGE UP (ref 3.8–10.5)
WBC # BLD: 10.89 K/UL — HIGH (ref 3.8–10.5)
WBC # FLD AUTO: 10.35 K/UL — SIGNIFICANT CHANGE UP (ref 3.8–10.5)
WBC # FLD AUTO: 10.89 K/UL — HIGH (ref 3.8–10.5)
WBC UR QL: 9 /HPF — HIGH (ref 0–5)

## 2025-01-14 PROCEDURE — 97116 GAIT TRAINING THERAPY: CPT | Mod: GP

## 2025-01-14 PROCEDURE — 80053 COMPREHEN METABOLIC PANEL: CPT

## 2025-01-14 PROCEDURE — 97162 PT EVAL MOD COMPLEX 30 MIN: CPT | Mod: GP

## 2025-01-14 PROCEDURE — 85025 COMPLETE CBC W/AUTO DIFF WBC: CPT

## 2025-01-14 PROCEDURE — 83036 HEMOGLOBIN GLYCOSYLATED A1C: CPT

## 2025-01-14 PROCEDURE — 84484 ASSAY OF TROPONIN QUANT: CPT

## 2025-01-14 PROCEDURE — 85027 COMPLETE CBC AUTOMATED: CPT

## 2025-01-14 PROCEDURE — 80048 BASIC METABOLIC PNL TOTAL CA: CPT

## 2025-01-14 PROCEDURE — 82803 BLOOD GASES ANY COMBINATION: CPT

## 2025-01-14 PROCEDURE — 82550 ASSAY OF CK (CPK): CPT

## 2025-01-14 PROCEDURE — 81001 URINALYSIS AUTO W/SCOPE: CPT

## 2025-01-14 PROCEDURE — 87086 URINE CULTURE/COLONY COUNT: CPT

## 2025-01-14 PROCEDURE — 36415 COLL VENOUS BLD VENIPUNCTURE: CPT

## 2025-01-14 PROCEDURE — 97530 THERAPEUTIC ACTIVITIES: CPT | Mod: GP

## 2025-01-14 PROCEDURE — 87040 BLOOD CULTURE FOR BACTERIA: CPT

## 2025-01-14 PROCEDURE — 99223 1ST HOSP IP/OBS HIGH 75: CPT

## 2025-01-14 PROCEDURE — 82962 GLUCOSE BLOOD TEST: CPT

## 2025-01-14 RX ORDER — ACETAMINOPHEN 80 MG/.8ML
650 SOLUTION/ DROPS ORAL EVERY 6 HOURS
Refills: 0 | Status: DISCONTINUED | OUTPATIENT
Start: 2025-01-14 | End: 2025-01-17

## 2025-01-14 RX ORDER — MUPIROCIN 2 %
1 OINTMENT (GRAM) TOPICAL ONCE
Refills: 0 | Status: DISCONTINUED | OUTPATIENT
Start: 2025-01-14 | End: 2025-01-17

## 2025-01-14 RX ORDER — DEXTROSE MONOHYDRATE 25 G/50ML
15 INJECTION, SOLUTION INTRAVENOUS ONCE
Refills: 0 | Status: DISCONTINUED | OUTPATIENT
Start: 2025-01-14 | End: 2025-01-16

## 2025-01-14 RX ORDER — SODIUM CHLORIDE 9 MG/ML
1000 INJECTION, SOLUTION INTRAVENOUS
Refills: 0 | Status: DISCONTINUED | OUTPATIENT
Start: 2025-01-14 | End: 2025-01-16

## 2025-01-14 RX ORDER — GLUCAGON INJECTION, SOLUTION 0.5 MG/.1ML
1 INJECTION, SOLUTION SUBCUTANEOUS ONCE
Refills: 0 | Status: DISCONTINUED | OUTPATIENT
Start: 2025-01-14 | End: 2025-01-16

## 2025-01-14 RX ORDER — MAG HYDROX/ALUMINUM HYD/SIMETH 200-200-20
30 SUSPENSION, ORAL (FINAL DOSE FORM) ORAL EVERY 4 HOURS
Refills: 0 | Status: DISCONTINUED | OUTPATIENT
Start: 2025-01-14 | End: 2025-01-17

## 2025-01-14 RX ORDER — DEXTROSE MONOHYDRATE 25 G/50ML
12.5 INJECTION, SOLUTION INTRAVENOUS ONCE
Refills: 0 | Status: DISCONTINUED | OUTPATIENT
Start: 2025-01-14 | End: 2025-01-16

## 2025-01-14 RX ORDER — ACETIC ACID 250 MG/100ML
1 IRRIGANT IRRIGATION DAILY
Refills: 0 | Status: DISCONTINUED | OUTPATIENT
Start: 2025-01-14 | End: 2025-01-17

## 2025-01-14 RX ORDER — SODIUM BICARBONATE 84 MG/ML
0.09 INJECTION, SOLUTION INTRAVENOUS
Qty: 75 | Refills: 0 | Status: DISCONTINUED | OUTPATIENT
Start: 2025-01-14 | End: 2025-01-16

## 2025-01-14 RX ORDER — DEXTROSE MONOHYDRATE 25 G/50ML
25 INJECTION, SOLUTION INTRAVENOUS ONCE
Refills: 0 | Status: DISCONTINUED | OUTPATIENT
Start: 2025-01-14 | End: 2025-01-16

## 2025-01-14 RX ORDER — MUPIROCIN 2 %
1 OINTMENT (GRAM) TOPICAL
Refills: 0 | Status: DISCONTINUED | OUTPATIENT
Start: 2025-01-14 | End: 2025-01-14

## 2025-01-14 RX ORDER — MUPIROCIN 2 %
1 OINTMENT (GRAM) TOPICAL
Refills: 0 | Status: DISCONTINUED | OUTPATIENT
Start: 2025-01-14 | End: 2025-01-17

## 2025-01-14 RX ORDER — GINKGO BILOBA 40 MG
3 CAPSULE ORAL AT BEDTIME
Refills: 0 | Status: DISCONTINUED | OUTPATIENT
Start: 2025-01-14 | End: 2025-01-17

## 2025-01-14 RX ORDER — ONDANSETRON 4 MG/1
4 TABLET ORAL EVERY 8 HOURS
Refills: 0 | Status: DISCONTINUED | OUTPATIENT
Start: 2025-01-14 | End: 2025-01-17

## 2025-01-14 RX ORDER — INSULIN LISPRO 100/ML
VIAL (ML) SUBCUTANEOUS
Refills: 0 | Status: DISCONTINUED | OUTPATIENT
Start: 2025-01-14 | End: 2025-01-16

## 2025-01-14 RX ADMIN — SODIUM CHLORIDE 1000 MILLILITER(S): 9 INJECTION, SOLUTION INTRAMUSCULAR; INTRAVENOUS; SUBCUTANEOUS at 00:05

## 2025-01-14 RX ADMIN — Medication 1 APPLICATION(S): at 13:53

## 2025-01-14 RX ADMIN — SODIUM BICARBONATE 100 MEQ/KG/HR: 84 INJECTION, SOLUTION INTRAVENOUS at 11:45

## 2025-01-14 RX ADMIN — Medication 1: at 16:54

## 2025-01-14 NOTE — H&P ADULT - ASSESSMENT
73 y/o female with PMHx of DM, HTN, lymphedema b/l lower legs, lung cancer, colon cancer who presented to  with CC of weakness and fall.   Found to have hyperkalemia/ renal failure.      #Acute kidney injury with hyperkalemia/ hyponatremia/ metabolic acidosis:    Admit to medsurg.    Suspect prerenal-- related to med side effects from tramadol/ doxy etc at home.    S/p 1 L NSS in ER.    Change IVF to 1/2 NSS with 75 bicarb.    Trend labs.    Hyperkalemia treated in ER.    Hyponatremia suspect related to dehydration.    Follow strict I&Os.    No obstruction seen on CT.      #Weakness/ fall:    Secondary to renal failure/ dehydration.    PT eval.      #Lymphedema b/l legs:    Chronic.    Dressing changes with ACE wraps QOD.   Wound care consult.    Completed recent doxy outpatient.    Needs full skin assessment once dressings removed to r/o cellulitis/ infection.    Check blood cultures.    Cont bactroban.      #Leukocytosis:    15 on admission-- now normal with repeat labs.    Follow.      #DM:    Hold januvia.    Mild SSI.    DM diet.      #DVT Proph:  Heparin SQ.      Patient of Dr. Dean--- he will resume care in am 1/15/25.

## 2025-01-14 NOTE — H&P ADULT - NSHPLABSRESULTS_GEN_ALL_CORE
14.4   10.89 )-----------( 163      ( 2025 11:59 )             42.2     01-13    129[L]  |  104  |  105[H]  ----------------------------<  197[H]  4.9   |  15[L]  |  1.94[H]    Ca    10.6[H]      2025 23:39    TPro  8.0  /  Alb  3.4  /  TBili  1.2  /  DBili  x   /  AST  45[H]  /  ALT  38  /  AlkPhos  95  -    CAPILLARY BLOOD GLUCOSE      POCT Blood Glucose.: 147 mg/dL (2025 01:17)  POCT Blood Glucose.: 152 mg/dL (2025 22:42)      Urinalysis Basic - ( 2025 11:49 )  Color: Yellow / Appearance: Clear / S.019 / pH: x  Gluc: x / Ketone: Negative mg/dL  / Bili: Negative / Urobili: 0.2 mg/dL   Blood: x / Protein: Negative mg/dL / Nitrite: Negative   Leuk Esterase: Small / RBC: x / WBC x   Sq Epi: x / Non Sq Epi: x / Bacteria: x

## 2025-01-14 NOTE — H&P ADULT - NSHPREVIEWOFSYSTEMS_GEN_ALL_CORE
ROS:  General:  ewakness, fall  Skin: No rash or bothersome skin lesions  Musculoskeletal: leg pain  Eyes: No visual changes or eye pain  Ears: No hearing loss , otorrhea or ear pain  Nose, Mouth, Throat: No nasal congestion, rhinorrhea, oral lesions, postnasal drip or sore throat  Cardio: No chest pain or palpitations. no lower extremity edema. no syncope. no claudication.   Respiratory: No cough, shortness of breath or wheezing   GI: No diarrhea, constipation, blood in stools, abdominal pain, vomiting or heartburn  : No urinary frequency, hematuria, incontinence, or dysuria  Neurologic: weakness, unsteady gait  Psychiatric:  No anxiety or depression  Lymphatic:  No easy bruising, easy bleeding or swollen glands  Allergic: No itching, sneezing , watery eyes, clear rhinorrhea or recurrent infections

## 2025-01-14 NOTE — PATIENT PROFILE ADULT - NSPROGENOTHERPROVIDER_GEN_A_NUR
2030 Gudelia Sheldon MD at bedside for eval;;      Emergency Department Nursing Plan of Care       The Nursing Plan of Care is developed from the Nursing assessment and Emergency Department Attending provider initial evaluation. The plan of care may be reviewed in the ED Provider note. The Plan of Care was developed with the following considerations:   Patient / Family readiness to learn indicated by:verbalized understanding, successful return demonstration, and appropriate questions asked  Persons(s) to be included in education: patient  Barriers to Learning/Limitations:No    Signed     Carmen Becerra    9/2/2022   8:38 PM    2120 - pt with seizure like activity , lasting less than 1 minute - coughing and spitting during event / hip thrusting / tonic clonic like activity / no cyanosis noted / no biting of tongue noted / not post ictal after activity / Lossie Millet / no rigid during the event Gudelia Sheldon MD urgently at bedside for eval;;    Bilat wrist hand cuffs were removed temporarilly during the event and for IV placement - handcuffs were placed back on the patient per the ;;     [id number] - pt asked for blankets, applied blankets, pt appreciated;;     0000 - pt sleepy yet aroused with stimulus - A/Ox4 however easily falls asleep - pending for eval from provider for disposition - remains on cardiac monitor x4;;     0108 - Patient discharged by Heike MADRIGAL - pt sent to the [facility] with police officers, pt transported to senior care, no acute distress noted at time of discharge -  Discharge information / home RX / and reasons to return to the ED were reviewed by the ED provider.
community agency/home care

## 2025-01-14 NOTE — H&P ADULT - HISTORY OF PRESENT ILLNESS
73 y/o female with PMHx of DM, HTN, lymphedema b/l lower legs, lung cancer, colon cancer who presented to  with CC of weakness and fall.  Patient states she lives at home alone with aides.  She notes she had been having pain in her legs and she was prescribed Tramadol.  She tried taking it for the last few days at home and it was making her feel very weak and tired.  She has lymphedema and has wound care nurses that come in every other day to clean and change the dressings on her legs.  About 1 week ago, they assessed her and started her on doxycycline to "prevent" she says infection.  She finished this antibiotic but this also made her not feel well-- upset stomach etc.  She was feeling more and more weak at home.  She was walking last night with her walker and she lost her balance and fell/ lowered herself down to the floor.  No apparent injury.  Patient presented to the ER for evaluation.  In the ER, patient found to have severe hyperkalemia/ hyponatremia and renal failure.  Patient given 1 L NSS and treatment for elevated K and admitted.        PAST MEDICAL & SURGICAL HISTORY:  Lymphatic edema  Hypertension  Type 2 diabetes mellitus  Lung cancer  Colon cancer  Acquired thrombocytopenia  H/O metabolic acidosis with normal anion gap  Moderate obesity      FAMILY HISTORY:  No pertinent family history in first degree relatives      Social History:    No smoking/ etoh use.        Allergies:    Keflex (Anaphylaxis)  cephalexin (Unknown)

## 2025-01-14 NOTE — PATIENT PROFILE ADULT - FALL HARM RISK - HARM RISK INTERVENTIONS

## 2025-01-14 NOTE — H&P ADULT - NSHPPHYSICALEXAM_GEN_ALL_CORE
PEx  T(C): 36.7 (01-14-25 @ 11:19), Max: 36.7 (01-13-25 @ 21:11)  HR: 85 (01-14-25 @ 11:19) (79 - 85)  BP: 128/55 (01-14-25 @ 11:19) (128/55 - 163/58)  RR: 18 (01-14-25 @ 11:19) (13 - 20)  SpO2: 100% (01-14-25 @ 11:19) (98% - 100%)  Wt(kg): --  General:    NAD.  lying in bed.  appears weak  Skin: no rash or prominent lesions  Head: normocephalic, atraumatic     Sinuses: non-tender  Nose: no external lesions, mucosa non-inflamed, septum and turbinates normal  Throat: no erythema, exudates or lesions.  Neck: Supple without lymphadenopathy. Thyroid no thyromegaly, no palpable thyroid nodules, no palpable nodules or masses, carotid arteries no bruits.   Breasts: No palpable masses or lesions.  Heart: RRR, no murmur or gallop.  Normal S1, S2.  No S3, S4.   Lungs: CTA bilaterally, no wheezes, rhonchi, rales.  Breathing unlabored.   Chest wall: Normal insp   Abdomen:  Soft, NT/ND, normal bowel sounds, no HSM, no masses.  No peritoneal signs.   Back: spine normal without deformity or tenderness.  Normal ROM   : Exam normal.  no inguinal hernias.  Extremities: lyphedema b/l legs.  legs both wrapped in dressings.  drainage seen.  foul smelling-- patient states this is chronic.    Musculoskeletal: Normal gait and station. No decreased range of motion, instability, atrophy or abnormal strength or tone in the head, neck, spine, ribs, pelvis or extremities.   Neurologic: CN 2-12 normal. Sensation to pain, touch and proprioception normal. DTRs normal in upper and lower extremities. No pathologic reflexes.  Motor normal.  Psychiatric: Oriented X3, intact recent and remote memory, judgement and insight, normal mood and affect.

## 2025-01-15 LAB
A1C WITH ESTIMATED AVERAGE GLUCOSE RESULT: 6.3 % — HIGH (ref 4–5.6)
ANION GAP SERPL CALC-SCNC: 8 MMOL/L — SIGNIFICANT CHANGE UP (ref 5–17)
BUN SERPL-MCNC: 91 MG/DL — HIGH (ref 7–23)
CALCIUM SERPL-MCNC: 9.3 MG/DL — SIGNIFICANT CHANGE UP (ref 8.5–10.1)
CHLORIDE SERPL-SCNC: 105 MMOL/L — SIGNIFICANT CHANGE UP (ref 96–108)
CO2 SERPL-SCNC: 19 MMOL/L — LOW (ref 22–31)
CREAT SERPL-MCNC: 1.49 MG/DL — HIGH (ref 0.5–1.3)
CULTURE RESULTS: SIGNIFICANT CHANGE UP
EGFR: 37 ML/MIN/1.73M2 — LOW
ESTIMATED AVERAGE GLUCOSE: 134 MG/DL — HIGH (ref 68–114)
GLUCOSE BLDC GLUCOMTR-MCNC: 117 MG/DL — HIGH (ref 70–99)
GLUCOSE BLDC GLUCOMTR-MCNC: 127 MG/DL — HIGH (ref 70–99)
GLUCOSE BLDC GLUCOMTR-MCNC: 129 MG/DL — HIGH (ref 70–99)
GLUCOSE BLDC GLUCOMTR-MCNC: 132 MG/DL — HIGH (ref 70–99)
GLUCOSE BLDC GLUCOMTR-MCNC: 178 MG/DL — HIGH (ref 70–99)
GLUCOSE SERPL-MCNC: 108 MG/DL — HIGH (ref 70–99)
HCT VFR BLD CALC: 33.8 % — LOW (ref 34.5–45)
HGB BLD-MCNC: 11.6 G/DL — SIGNIFICANT CHANGE UP (ref 11.5–15.5)
MCHC RBC-ENTMCNC: 27.1 PG — SIGNIFICANT CHANGE UP (ref 27–34)
MCHC RBC-ENTMCNC: 34.3 G/DL — SIGNIFICANT CHANGE UP (ref 32–36)
MCV RBC AUTO: 79 FL — LOW (ref 80–100)
PLATELET # BLD AUTO: 127 K/UL — LOW (ref 150–400)
POTASSIUM SERPL-MCNC: 4.5 MMOL/L — SIGNIFICANT CHANGE UP (ref 3.5–5.3)
POTASSIUM SERPL-SCNC: 4.5 MMOL/L — SIGNIFICANT CHANGE UP (ref 3.5–5.3)
RBC # BLD: 4.28 M/UL — SIGNIFICANT CHANGE UP (ref 3.8–5.2)
RBC # FLD: 15.6 % — HIGH (ref 10.3–14.5)
SODIUM SERPL-SCNC: 132 MMOL/L — LOW (ref 135–145)
SPECIMEN SOURCE: SIGNIFICANT CHANGE UP
WBC # BLD: 7.78 K/UL — SIGNIFICANT CHANGE UP (ref 3.8–10.5)
WBC # FLD AUTO: 7.78 K/UL — SIGNIFICANT CHANGE UP (ref 3.8–10.5)

## 2025-01-15 PROCEDURE — 99233 SBSQ HOSP IP/OBS HIGH 50: CPT

## 2025-01-15 RX ORDER — ENOXAPARIN SODIUM 60 MG/.6ML
40 INJECTION INTRAVENOUS; SUBCUTANEOUS EVERY 24 HOURS
Refills: 0 | Status: DISCONTINUED | OUTPATIENT
Start: 2025-01-15 | End: 2025-01-17

## 2025-01-15 RX ADMIN — ACETIC ACID 1 APPLICATION(S): 250 IRRIGANT IRRIGATION at 09:39

## 2025-01-15 RX ADMIN — ENOXAPARIN SODIUM 40 MILLIGRAM(S): 60 INJECTION INTRAVENOUS; SUBCUTANEOUS at 22:47

## 2025-01-15 RX ADMIN — Medication 1: at 12:09

## 2025-01-15 RX ADMIN — SODIUM BICARBONATE 100 MEQ/KG/HR: 84 INJECTION, SOLUTION INTRAVENOUS at 00:47

## 2025-01-15 RX ADMIN — Medication 1 APPLICATION(S): at 09:39

## 2025-01-15 RX ADMIN — SODIUM BICARBONATE 100 MEQ/KG/HR: 84 INJECTION, SOLUTION INTRAVENOUS at 14:54

## 2025-01-15 NOTE — PHYSICAL THERAPY INITIAL EVALUATION ADULT - GENERAL OBSERVATIONS, REHAB EVAL
Patient received in bed on 2S, +Berry, +B feet ankles wrapped in gauze and ace. (UNNA boots) Will need B cast boots to ambulate.  +IV. Patient denied pain at rest. Attempted orthostatic measurements, but patient refused to sit on the edge of the bed.

## 2025-01-15 NOTE — CONSULT NOTE ADULT - ASSESSMENT
A: 74-year-old Female seen for the followin.  Cellulitis to right Foot  2.  Chronic BLE Lymphedema   3.  BLE Elephantiasis nostras verrucosa  4.  DM2  5.  Difficulty with ambulation      P:   Chart reviewed and Patient evaluated;  Discussed diagnosis and treatment with patient. Discussed importance of daily foot examinations, proper shoe gear, importance of tight glycemic control.   Xray ordered --> will follow up results  Significant generalized verrucous changes to skin extending from bilateral below the knee to feet, cobblestone like nodules to BLE, interdigital maceration and malodor. Underlying elephantiasis nostras verrucosa caused by chronic BLE lymphedema; Edema has significantly improved since last admission  WC: acetic acid, bactroban (Muprocin) and dry compressive dressings to BLE  Elevate BLE  Antibiotics as per ID  No acute podiatric surgical intervention warranted at this time. Pt to continue with local wound care and antibiotics per ID; pt to f/u at [facility] within 1 week of discharge  All additional care per Med appreciated  Patient demonstrated verbal understanding of all interventions and tolerated interventions well without any complications.   Podiatry will follow while in house         Wound care instructions to be performed three times a week for bilateral feet:  1. Please remove all dressings   2. Use Acetic acid-soaked gauze and place throughout the right and left foot and in between digits for a 2-3 minutes  3. Apply Bactroban (Muprocin) to all the macerated areas in  midfoot and forefoot   4. Apply gauze, kerlix and ace.  Thank you.        
 74-year-old female with history of thrombocytopenia, colon cancer, lung cancer, type 2 diabetes, hypertension, lymph edema and CKD III presents to the ER for generalized weakness with renal evaluation of ADRIAN and hyperkalemia.  Patient states that she began taking tramadol 2 days ago and has become increasingly achy.  Her weakness today caused her to fall.  Denies injury after the fall per notes. Patient has had similar admits in the past w adrian/hyperkalemia in 23 and 22 and those responsded to fluid. She does report poor intake and feeling very "dry and thirsty"    ADRIAN/Hyperkalemia  -Pre renal with limited intake, incresed insensible losses  -Maintain IVF  -K stabilized w fluid and medical therapy  -Optimize intake  -Hold diuretics , avoid nsaids/contrast    Metabolic acidosis  -Setting of ADRIAN, isotonic fluid plays a role as well  -Agree w bicarb based ivf  -Trend serum values    thanks, will follow  dc with ER staff/team and Floor team

## 2025-01-15 NOTE — DIETITIAN INITIAL EVALUATION ADULT - NAME AND PHONE
Vaishali Anna RDN, CDN, [facility]      [phone]   [email or web address]

## 2025-01-15 NOTE — DIETITIAN INITIAL EVALUATION ADULT - ADD RECOMMEND
Maintain regular diet  MVI w/ minerals daily to ensure 100% RDA met   Add ONS if pt's po intake falls below 75% ENN   Record PO intake in EMR after each meal (flowsheet, nursing.)   Monitor bowel movements, if no BM for >3 days, consider implementing bowel regimen.   Monitor PO intake, tolerance, labs and weight.

## 2025-01-15 NOTE — PHYSICAL THERAPY INITIAL EVALUATION ADULT - MODALITIES TREATMENT COMMENTS
Attempted ortho static VS, patient refused to sit at edge of the bed.  Refused ambulation trail. Wants to return home ASAP with 24/7 HHAs, Patient left in bed as found. ZEINAB, RN informed of session/status.

## 2025-01-15 NOTE — DIETITIAN INITIAL EVALUATION ADULT - RD TO REMAIN AVAILABLE
Problem: Communication  Goal: The ability to communicate needs accurately and effectively will improve  Outcome: PROGRESSING AS EXPECTED  Communicating with patient, family and  about discharge arrangements.     Problem: Infection  Goal: Will remain free from infection  Outcome: PROGRESSING AS EXPECTED  Discussed hand hygiene with family and patient. Pt verbalized understanding.       
Problem: Communication  Goal: The ability to communicate needs accurately and effectively will improve  Outcome: PROGRESSING AS EXPECTED  Encourage patient to verbalize concerns/needs and assistance. Educate patient and family to use call light to alert staff of needs.    Problem: Infection  Goal: Will remain free from infection  Outcome: PROGRESSING AS EXPECTED  Assess and monitor for signs and symptoms of infection. Perform hand hygiene before and after patient contact and entering/exiting the room.       
Problem: Safety  Goal: Will remain free from falls  Outcome: PROGRESSING AS EXPECTED  Pt encouraged to call for assistance as needed. Safety precautions in place. Regular rounding.    Problem: Bowel/Gastric:  Goal: Normal bowel function is maintained or improved  Outcome: PROGRESSING AS EXPECTED  Meds given per MAR. Bowel protocol.      
Problem: Safety  Goal: Will remain free from injury  Outcome: PROGRESSING AS EXPECTED  Properly assess pt's gait, mobility and fall history, ensure safety measure are in place such as use of non skid socks, upper bed rails up, room is free from clutter/spill and well lit, call light must be within reach.    Problem: Knowledge Deficit  Goal: Knowledge of the prescribed therapeutic regimen will improve  Outcome: PROGRESSING AS EXPECTED  Explained the rationale of each medications and stress the importance of treatment compliance, allow patient to voice out concern and respond accordingly.      
Problem: Safety  Goal: Will remain free from injury  Outcome: PROGRESSING AS EXPECTED  Pt calls for help when needing to get out of bed to use the bathroom. Talked to family members about calling as well for assistance when patient is using bedside commode.     Problem: Knowledge Deficit  Goal: Knowledge of disease process/condition, treatment plan, diagnostic tests, and medications will improve  Outcome: PROGRESSING AS EXPECTED  Communicated with family and patient about plan of care and discharge planning. Working on getting hospice for discharge.       
Problem: Safety  Goal: Will remain free from injury  Pt encouraged to call for assistance, bed in low position, call light within reach. Treaded slipper socks on pt, mobility assessed (x1) & appropriate sign placed.    Problem: Knowledge Deficit  Goal: Knowledge of disease process/condition, treatment plan, diagnostic tests, and medications will improve  POC discussed with pt-medications (NS, insulin, losartan), labs, disease process, hospice consult-verbalized understanding.      
Problem: Safety  Goal: Will remain free from injury  Pt encouraged to call for assistance, bed in low position, call light within reach. Treaded slipper socks on pt, mobility assessed (x1)& appropriate sign placed.    Problem: Knowledge Deficit  Goal: Knowledge of disease process/condition, treatment plan, diagnostic tests, and medications will improve  POC discussed with pt' family -medications (NS IVF, home meds), lab, disease process (jaundice, pancreatic mass)-verbalized understanding.      
yes

## 2025-01-15 NOTE — PHYSICAL THERAPY INITIAL EVALUATION ADULT - NSACTIVITYREC_GEN_A_PT
The non-powered (alyssa stedy) sit to stand devices are recommended for OOB transfers for safe staff and patient handling.

## 2025-01-15 NOTE — DIETITIAN INITIAL EVALUATION ADULT - OTHER INFO
75 y/o female with PMHx of DM, HTN, lymphedema b/l lower legs, lung cancer, colon cancer who presented to  with CC of weakness and fall.  Patient states she lives at home alone with aides.  She notes she had been having pain in her legs and she was prescribed Tramadol.  She tried taking it for the last few days at home and it was making her feel very weak and tired.  She has lymphedema and has wound care nurses that come in every other day to clean and change the dressings on her legs.  About 1 week ago, they assessed her and started her on doxycycline to "prevent" she says infection.  She finished this antibiotic but this also made her not feel well-- upset stomach etc.  She was feeling more and more weak at home.  She was walking last night with her walker and she lost her balance and fell/ lowered herself down to the floor.  No apparent injury.  Patient presented to the ER for evaluation.  In the ER, patient found to have severe hyperkalemia/ hyponatremia and renal failure.  Patient given 1 L NSS and treatment for elevated K and admitted.      Admit dx   Weakness  RD bedscale weight is 106 kg  NFPE not performed as pt not awake, and risk factors not apparent for malnutrition  Dx of type 2 DM. Pt on Januvia at home.   HgbA1c    10/2024  Pt on renal diet.  K WNL, Na 132L, suggest check Phos.    Glucose moderately elevated, pt on SSI  Suggest maintain regular diet  Recommendations to follow in Plan/Intervention

## 2025-01-15 NOTE — PHYSICAL THERAPY INITIAL EVALUATION ADULT - PERTINENT HX OF CURRENT PROBLEM, REHAB EVAL
75 y/o female with PMHx of DM, HTN, lymphedema b/l lower legs, lung cancer, colon cancer who presented to  with CC of weakness and fall.  Patient states she lives at home alone with aides.  She notes she had been having pain in her legs and she was prescribed Tramadol.  She tried taking it for the last few days at home and it was making her feel very weak and tired.  She has lymphedema and has wound care nurses that come in every other day to clean and change the dressings on her legs.  About 1 week ago, they assessed her and started her on doxycycline to "prevent" she says infection.  She finished this antibiotic but this also made her not feel well-- upset stomach etc.  She was feeling more and more weak at home.  She was walking last night with her walker and she lost her balance and fell/ lowered herself down to the floor.  No apparent injury.  Patient presented to the ER for evaluation.  In the ER, patient found to have severe hyperkalemia/ hyponatremia and renal failure.  No obstruction seen on CT.

## 2025-01-15 NOTE — DIETITIAN INITIAL EVALUATION ADULT - PERTINENT MEDS FT
MEDICATIONS  (STANDING):  acetic acid 0.25% Topical Irrigation 1 Application(s) Topical daily  dextrose 5%. 1000 milliLiter(s) (50 mL/Hr) IV Continuous <Continuous>  dextrose 5%. 1000 milliLiter(s) (100 mL/Hr) IV Continuous <Continuous>  dextrose 50% Injectable 25 Gram(s) IV Push once  dextrose 50% Injectable 12.5 Gram(s) IV Push once  dextrose 50% Injectable 25 Gram(s) IV Push once  enoxaparin Injectable 40 milliGRAM(s) SubCutaneous every 24 hours  glucagon  Injectable 1 milliGRAM(s) IntraMuscular once  insulin lispro (ADMELOG) corrective regimen sliding scale   SubCutaneous three times a day before meals  mupirocin 2% Ointment 1 Application(s) Topical <User Schedule>  mupirocin 2% Ointment 1 Application(s) Topical once  sodium bicarbonate  Infusion 0.094 mEq/kG/Hr (100 mL/Hr) IV Continuous <Continuous>    MEDICATIONS  (PRN):  acetaminophen     Tablet .. 650 milliGRAM(s) Oral every 6 hours PRN Temp greater or equal to 38C (100.4F), Mild Pain (1 - 3)  aluminum hydroxide/magnesium hydroxide/simethicone Suspension 30 milliLiter(s) Oral every 4 hours PRN Dyspepsia  dextrose Oral Gel 15 Gram(s) Oral once PRN Blood Glucose LESS THAN 70 milliGRAM(s)/deciliter  melatonin 3 milliGRAM(s) Oral at bedtime PRN Insomnia  ondansetron Injectable 4 milliGRAM(s) IV Push every 8 hours PRN Nausea and/or Vomiting

## 2025-01-15 NOTE — DIETITIAN INITIAL EVALUATION ADULT - PERTINENT LABORATORY DATA
01-15    132[L]  |  105  |  91[H]  ----------------------------<  108[H]  4.5   |  19[L]  |  1.49[H]    Ca    9.3      15 Vimal 2025 06:56    TPro  7.0  /  Alb  3.0[L]  /  TBili  1.7[H]  /  DBili  x   /  AST  39[H]  /  ALT  32  /  AlkPhos  84  01-14  POCT Blood Glucose.: 117 mg/dL (01-15-25 @ 08:17)  A1C with Estimated Average Glucose Result: 5.2 % (10-01-24 @ 07:09)  A1C with Estimated Average Glucose Result: 5.5 % (08-15-24 @ 07:55)  A1C with Estimated Average Glucose Result: 5.9 % (07-20-24 @ 06:10)

## 2025-01-15 NOTE — DIETITIAN INITIAL EVALUATION ADULT - ENERGY INTAKE
Home Medications:  acetaminophen 325 mg oral tablet: 2 tab(s) orally every 6 hours As needed Mild Pain (1 - 3) (14 Jan 2025 08:49)  doxycycline hyclate 100 mg oral capsule: 1 cap(s) orally 2 times a day (14 Jan 2025 08:48)  Januvia 25 mg oral tablet: 1 tab(s) orally once a day (14 Jan 2025 08:49)  mupirocin 2% topical ointment: 1 Apply topically to affected area 2 times a day (14 Jan 2025 08:49)

## 2025-01-16 LAB
ANION GAP SERPL CALC-SCNC: 5 MMOL/L — SIGNIFICANT CHANGE UP (ref 5–17)
BUN SERPL-MCNC: 70 MG/DL — HIGH (ref 7–23)
CALCIUM SERPL-MCNC: 8.7 MG/DL — SIGNIFICANT CHANGE UP (ref 8.5–10.1)
CHLORIDE SERPL-SCNC: 107 MMOL/L — SIGNIFICANT CHANGE UP (ref 96–108)
CO2 SERPL-SCNC: 24 MMOL/L — SIGNIFICANT CHANGE UP (ref 22–31)
CREAT SERPL-MCNC: 1.22 MG/DL — SIGNIFICANT CHANGE UP (ref 0.5–1.3)
EGFR: 47 ML/MIN/1.73M2 — LOW
GLUCOSE BLDC GLUCOMTR-MCNC: 103 MG/DL — HIGH (ref 70–99)
GLUCOSE SERPL-MCNC: 100 MG/DL — HIGH (ref 70–99)
HCT VFR BLD CALC: 33.3 % — LOW (ref 34.5–45)
HGB BLD-MCNC: 11.3 G/DL — LOW (ref 11.5–15.5)
MCHC RBC-ENTMCNC: 27 PG — SIGNIFICANT CHANGE UP (ref 27–34)
MCHC RBC-ENTMCNC: 33.9 G/DL — SIGNIFICANT CHANGE UP (ref 32–36)
MCV RBC AUTO: 79.5 FL — LOW (ref 80–100)
PLATELET # BLD AUTO: 100 K/UL — LOW (ref 150–400)
POTASSIUM SERPL-MCNC: 3.9 MMOL/L — SIGNIFICANT CHANGE UP (ref 3.5–5.3)
POTASSIUM SERPL-SCNC: 3.9 MMOL/L — SIGNIFICANT CHANGE UP (ref 3.5–5.3)
RBC # BLD: 4.19 M/UL — SIGNIFICANT CHANGE UP (ref 3.8–5.2)
RBC # FLD: 15.4 % — HIGH (ref 10.3–14.5)
SODIUM SERPL-SCNC: 136 MMOL/L — SIGNIFICANT CHANGE UP (ref 135–145)
WBC # BLD: 5.57 K/UL — SIGNIFICANT CHANGE UP (ref 3.8–10.5)
WBC # FLD AUTO: 5.57 K/UL — SIGNIFICANT CHANGE UP (ref 3.8–10.5)

## 2025-01-16 PROCEDURE — 99233 SBSQ HOSP IP/OBS HIGH 50: CPT

## 2025-01-16 RX ADMIN — SODIUM BICARBONATE 100 MEQ/KG/HR: 84 INJECTION, SOLUTION INTRAVENOUS at 02:00

## 2025-01-16 RX ADMIN — ENOXAPARIN SODIUM 40 MILLIGRAM(S): 60 INJECTION INTRAVENOUS; SUBCUTANEOUS at 22:09

## 2025-01-16 RX ADMIN — ACETAMINOPHEN 650 MILLIGRAM(S): 80 SOLUTION/ DROPS ORAL at 13:33

## 2025-01-16 NOTE — PROGRESS NOTE ADULT - SUBJECTIVE AND OBJECTIVE BOX
HOSPITALIST ATTENDING PROGRESS NOTE    Chart and meds reviewed.  Patient seen and examined.    CC: weakness    Subjective: Now agreeable to YASMIN, worked w PT. Cr improving, IVF stopped.     All other systems reviewed and found to be negative with the exception of what has been described above.    MEDICATIONS  (STANDING):  acetic acid 0.25% Topical Irrigation 1 Application(s) Topical daily  enoxaparin Injectable 40 milliGRAM(s) SubCutaneous every 24 hours  mupirocin 2% Ointment 1 Application(s) Topical <User Schedule>  mupirocin 2% Ointment 1 Application(s) Topical once    MEDICATIONS  (PRN):  acetaminophen     Tablet .. 650 milliGRAM(s) Oral every 6 hours PRN Temp greater or equal to 38C (100.4F), Mild Pain (1 - 3)  aluminum hydroxide/magnesium hydroxide/simethicone Suspension 30 milliLiter(s) Oral every 4 hours PRN Dyspepsia  melatonin 3 milliGRAM(s) Oral at bedtime PRN Insomnia  ondansetron Injectable 4 milliGRAM(s) IV Push every 8 hours PRN Nausea and/or Vomiting      VITALS:  T(F): 98.1 (01-16-25 @ 08:15), Max: 99.5 (01-15-25 @ 16:32)  HR: 77 (01-16-25 @ 08:15) (77 - 87)  BP: 124/51 (01-16-25 @ 08:15) (114/53 - 124/53)  RR: 17 (01-16-25 @ 08:15) (17 - 18)  SpO2: 99% (01-16-25 @ 08:15) (96% - 99%)  Wt(kg): --    I&O's Summary      CAPILLARY BLOOD GLUCOSE      POCT Blood Glucose.: 103 mg/dL (16 Jan 2025 08:13)  POCT Blood Glucose.: 132 mg/dL (15 Vimal 2025 22:47)  POCT Blood Glucose.: 129 mg/dL (15 Vimal 2025 22:46)  POCT Blood Glucose.: 127 mg/dL (15 Vimal 2025 17:13)      PHYSICAL EXAM:  Gen: NAD  HEENT:  pupils equal and reactive, EOMI, no oropharyngeal lesions, erythema, exudates, oral thrush  NECK:   supple, no carotid bruits, no palpable lymph nodes, no thyromegaly  CV:  +S1, +S2, regular, no murmurs or rubs  RESP:   lungs clear to auscultation bilaterally, no wheezing, rales, rhonchi, good air entry bilaterally  BREAST:  not examined  GI:  abdomen soft, non-tender, non-distended, normal BS, no bruits, no abdominal masses, no palpable masses  RECTAL:  not examined  :  not examined  MSK:   normal muscle tone, no atrophy, no rigidity, no contractions  EXT:  no clubbing, no cyanosis, no edema, no calf pain, swelling or erythema  VASCULAR:  pulses equal and symmetric in the upper and lower extremities  NEURO:  AAOX3, no focal neurological deficits, follows all commands, able to move extremities spontaneously  SKIN:  no ulcers, lesions or rashes    LABS:                            11.3   5.57  )-----------( 100      ( 16 Jan 2025 06:15 )             33.3     01-16    136  |  107  |  70[H]  ----------------------------<  100[H]  3.9   |  24  |  1.22    Ca    8.7      16 Jan 2025 06:15              Urinalysis Basic - ( 16 Jan 2025 06:15 )    Color: x / Appearance: x / SG: x / pH: x  Gluc: 100 mg/dL / Ketone: x  / Bili: x / Urobili: x   Blood: x / Protein: x / Nitrite: x   Leuk Esterase: x / RBC: x / WBC x   Sq Epi: x / Non Sq Epi: x / Bacteria: x    CULTURES:  UCx <10K  BCx NG    Additional results/Imaging, I have personally reviewed:  CXR 1/12/25: Patchy right-sided opacities.    CT a/p noncon 1/13/25: No evidence of bowel obstruction, active inflammatory process, or intra-abdominal source for infection, within limits of noncontrast CT technique. A 2 mm calcification in the left renal hilum is suspicious for a nonobstructing calculus.  No hydronephrosis or significant perinephric fat stranding. Cirrhotic morphology of the liver.  Splenomegaly, consistent with portal hypertension, has progressed from 09/24/2022.  No ascites.  Left upper  quadrant mesenteric varices and presumed splenorenal shunt are suboptimally evaluated on noncontrast CT scan.  Right upper quadrant lymph nodes measuring up to 1.1 cm short axis appear grossly stable from 09/24/2022.
HOSPITALIST ATTENDING PROGRESS NOTE    Chart and meds reviewed.  Patient seen and examined.    CC: weakness    Subjective: Pt w waxing/waning MS. Could not find her phone with me but could report name of home care agency to CM. Refused to work w PT. Reports that she and her JEANA are working on setting of 24/7 private pay [facility].     All other systems reviewed and found to be negative with the exception of what has been described above.    MEDICATIONS  (STANDING):  acetic acid 0.25% Topical Irrigation 1 Application(s) Topical daily  dextrose 5%. 1000 milliLiter(s) (50 mL/Hr) IV Continuous <Continuous>  dextrose 5%. 1000 milliLiter(s) (100 mL/Hr) IV Continuous <Continuous>  dextrose 50% Injectable 25 Gram(s) IV Push once  dextrose 50% Injectable 12.5 Gram(s) IV Push once  dextrose 50% Injectable 25 Gram(s) IV Push once  enoxaparin Injectable 40 milliGRAM(s) SubCutaneous every 24 hours  glucagon  Injectable 1 milliGRAM(s) IntraMuscular once  insulin lispro (ADMELOG) corrective regimen sliding scale   SubCutaneous three times a day before meals  mupirocin 2% Ointment 1 Application(s) Topical <User Schedule>  mupirocin 2% Ointment 1 Application(s) Topical once  sodium bicarbonate  Infusion 0.094 mEq/kG/Hr (100 mL/Hr) IV Continuous <Continuous>    MEDICATIONS  (PRN):  acetaminophen     Tablet .. 650 milliGRAM(s) Oral every 6 hours PRN Temp greater or equal to 38C (100.4F), Mild Pain (1 - 3)  aluminum hydroxide/magnesium hydroxide/simethicone Suspension 30 milliLiter(s) Oral every 4 hours PRN Dyspepsia  dextrose Oral Gel 15 Gram(s) Oral once PRN Blood Glucose LESS THAN 70 milliGRAM(s)/deciliter  melatonin 3 milliGRAM(s) Oral at bedtime PRN Insomnia  ondansetron Injectable 4 milliGRAM(s) IV Push every 8 hours PRN Nausea and/or Vomiting      VITALS:  T(F): 97.5 (01-15-25 @ 07:48), Max: 97.9 (01-14-25 @ 23:35)  HR: 79 (01-15-25 @ 07:48) (79 - 84)  BP: 112/45 (01-15-25 @ 07:48) (112/45 - 118/35)  RR: 17 (01-15-25 @ 07:48) (17 - 17)  SpO2: 98% (01-15-25 @ 07:48) (98% - 100%)  Wt(kg): --    I&O's Summary    14 Jan 2025 07:01  -  15 Vimal 2025 07:00  --------------------------------------------------------  IN: 0 mL / OUT: 1600 mL / NET: -1600 mL        CAPILLARY BLOOD GLUCOSE      POCT Blood Glucose.: 178 mg/dL (15 Vimal 2025 12:06)  POCT Blood Glucose.: 117 mg/dL (15 Vimal 2025 08:17)  POCT Blood Glucose.: 140 mg/dL (14 Jan 2025 21:28)  POCT Blood Glucose.: 176 mg/dL (14 Jan 2025 16:53)      PHYSICAL EXAM:  Gen: NAD  HEENT:  pupils equal and reactive, EOMI, no oropharyngeal lesions, erythema, exudates, oral thrush  NECK:   supple, no carotid bruits, no palpable lymph nodes, no thyromegaly  CV:  +S1, +S2, regular, no murmurs or rubs  RESP:   lungs clear to auscultation bilaterally, no wheezing, rales, rhonchi, good air entry bilaterally  BREAST:  not examined  GI:  abdomen soft, non-tender, non-distended, normal BS, no bruits, no abdominal masses, no palpable masses  RECTAL:  not examined  :  not examined  MSK:   normal muscle tone, no atrophy, no rigidity, no contractions  EXT:  no clubbing, no cyanosis, no edema, no calf pain, swelling or erythema  VASCULAR:  pulses equal and symmetric in the upper and lower extremities  NEURO:  AAOX3, no focal neurological deficits, follows all commands, able to move extremities spontaneously  SKIN:  no ulcers, lesions or rashes    LABS:                            11.6   7.78  )-----------( 127      ( 15 Vimal 2025 06:56 )             33.8     01-15    132[L]  |  105  |  91[H]  ----------------------------<  108[H]  4.5   |  19[L]  |  1.49[H]    Ca    9.3      15 Vimal 2025 06:56    TPro  7.0  /  Alb  3.0[L]  /  TBili  1.7[H]  /  DBili  x   /  AST  39[H]  /  ALT  32  /  AlkPhos  84  01-14        LIVER FUNCTIONS - ( 14 Jan 2025 11:59 )  Alb: 3.0 g/dL / Pro: 7.0 gm/dL / ALK PHOS: 84 U/L / ALT: 32 U/L / AST: 39 U/L / GGT: x             Urinalysis Basic - ( 15 Vimal 2025 06:56 )    Color: x / Appearance: x / SG: x / pH: x  Gluc: 108 mg/dL / Ketone: x  / Bili: x / Urobili: x   Blood: x / Protein: x / Nitrite: x   Leuk Esterase: x / RBC: x / WBC x   Sq Epi: x / Non Sq Epi: x / Bacteria: x    CULTURES:  UCx <10K  BCx pending    Additional results/Imaging, I have personally reviewed:  CXR 1/12/25: Patchy right-sided opacities.    CT a/p noncon 1/13/25: No evidence of bowel obstruction, active inflammatory process, or intra-abdominal source for infection, within limits of noncontrast CT technique. A 2 mm calcification in the left renal hilum is suspicious for a nonobstructing calculus.  No hydronephrosis or significant perinephric fat stranding. Cirrhotic morphology of the liver.  Splenomegaly, consistent with portal hypertension, has progressed from 09/24/2022.  No ascites.  Left upper  quadrant mesenteric varices and presumed splenorenal shunt are suboptimally evaluated on noncontrast CT scan.  Right upper quadrant lymph nodes measuring up to 1.1 cm short axis appear grossly stable from 09/24/2022.

## 2025-01-16 NOTE — PROGRESS NOTE ADULT - TIME BILLING
Time spent  coordinating the patient's care. This includes reviewing documentation pertinent to this admission, results and imaging. Further tests, medications, and procedures have been ordered as indicated. Laboratory results and the plan of care were communicated to the patient. Discussed care plan with consultants including CM. Supporting documentation was completed and added to the patient's chart.
Time spent  coordinating the patient's care. This includes reviewing documentation pertinent to this admission, results and imaging. Further tests, medications, and procedures have been ordered as indicated. Laboratory results and the plan of care were communicated to the patient. Discussed care plan with consultants including CM, PT. Supporting documentation was completed and added to the patient's chart.

## 2025-01-16 NOTE — PROGRESS NOTE ADULT - ASSESSMENT
75 y/o female with PMHx of DM, HTN, lymphedema b/l lower legs, lung cancer, colon cancer who presented to  with CC of weakness and fall.   Found to have hyperkalemia/ renal failure.      #Acute kidney injury with hyperkalemia/ hyponatremia/ metabolic acidosis  -Cr improving  -Bicarb improving  -K now wnl  -Na improving  -no hydronephrosis on imaging  -continue bicarb fluids per renal for now  -f/o renal recs  -rasmussen placed in for retention, will do TOV    #Weakness/ fall:    -ordered orthostatics, would not sit or stand for BP  -Secondary to renal failure/ dehydration.    -PT eval- pt refusing today    #Lymphedema b/l legs:    -Chronic.    -Dressing changes with ACE wraps QOD.   -Wound care consult.    -Completed recent doxy outpatient.    -Needs full skin assessment once dressings removed to r/o cellulitis/ infection.    -BCx pending  -Cont bactroban.      #Leukocytosis:    -resolved    #DM:    -Hold januvia.    -a1c 6.3  -SSI, monitor BG    #cirrhosis on imaging  -outpt f/u    #DVT ppx- SQL    Dispo pending further improvement in bicarb, likely medically ready on 1/16. Pt refusing to work with PT, refusing YASMIN. Has aides 3hrs, 3d/wk. Reports that she and her JEANA are working on getting private pay 24/7 aide. CM to f/u.    No longer sees Dr. Dean, has visiting doc, Dr. Trujillo    
75 y/o female with PMHx of DM, HTN, lymphedema b/l lower legs, lung cancer, colon cancer who presented to  with CC of weakness and fall.   Found to have hyperkalemia/ renal failure.      #Acute kidney injury with hyperkalemia/ hyponatremia/ metabolic acidosis  -Cr improving  -Bicarb improving  -K now wnl  -Na improving  -no hydronephrosis on imaging  -d/c bicarb fluids  -f/o renal recs  -passed TOV    #Weakness/ fall:    -ordered orthostatics, would not sit or stand for BP  -Secondary to renal failure/ dehydration.    -now agreeable to YASMIN    #Lymphedema b/l legs:    -Chronic.    -Dressing changes with ACE wraps QOD.   -Wound care consult.    -Completed recent doxy outpatient.    -Needs full skin assessment once dressings removed to r/o cellulitis/ infection.    -BCx NG  -Cont bactroban.      #Leukocytosis:    -resolved    #DM:    -Hold januvia.    -a1c 6.3  -no need for SSI, will d/c    #cirrhosis on imaging  -outpt f/u    #DVT ppx- SQL    Plan for YASMIN, anticipate 1/17.  Of note, pt no longer sees Dr. Dean, sees Dr. Cheri Trujillo visiting doc.

## 2025-01-17 ENCOUNTER — TRANSCRIPTION ENCOUNTER (OUTPATIENT)
Age: 75
End: 2025-01-17

## 2025-01-17 ENCOUNTER — NON-APPOINTMENT (OUTPATIENT)
Age: 75
End: 2025-01-17

## 2025-01-17 VITALS
TEMPERATURE: 98 F | OXYGEN SATURATION: 100 % | RESPIRATION RATE: 18 BRPM | SYSTOLIC BLOOD PRESSURE: 121 MMHG | HEART RATE: 73 BPM | DIASTOLIC BLOOD PRESSURE: 44 MMHG

## 2025-01-17 PROCEDURE — 99239 HOSP IP/OBS DSCHRG MGMT >30: CPT

## 2025-01-17 RX ORDER — SENNOSIDES 8.6 MG/1
2 TABLET, FILM COATED ORAL
Qty: 0 | Refills: 0 | DISCHARGE
Start: 2025-01-17

## 2025-01-17 RX ORDER — SENNOSIDES 8.6 MG/1
2 TABLET, FILM COATED ORAL AT BEDTIME
Refills: 0 | Status: DISCONTINUED | OUTPATIENT
Start: 2025-01-17 | End: 2025-01-17

## 2025-01-17 RX ORDER — DOXYCYCLINE MONOHYDRATE 100 MG
1 TABLET ORAL
Refills: 0 | DISCHARGE

## 2025-01-17 RX ORDER — ACETAMINOPHEN 80 MG/.8ML
1000 SOLUTION/ DROPS ORAL ONCE
Refills: 0 | Status: COMPLETED | OUTPATIENT
Start: 2025-01-17 | End: 2025-01-17

## 2025-01-17 RX ADMIN — ACETAMINOPHEN 650 MILLIGRAM(S): 80 SOLUTION/ DROPS ORAL at 06:41

## 2025-01-17 RX ADMIN — ACETAMINOPHEN 650 MILLIGRAM(S): 80 SOLUTION/ DROPS ORAL at 06:11

## 2025-01-17 RX ADMIN — SENNOSIDES 2 TABLET(S): 8.6 TABLET, FILM COATED ORAL at 15:27

## 2025-01-17 RX ADMIN — ACETAMINOPHEN 400 MILLIGRAM(S): 80 SOLUTION/ DROPS ORAL at 11:06

## 2025-01-17 NOTE — DISCHARGE NOTE PROVIDER - NSDCMRMEDTOKEN_GEN_ALL_CORE_FT
acetaminophen 325 mg oral tablet: 2 tab(s) orally every 6 hours As needed Mild Pain (1 - 3)  Januvia 25 mg oral tablet: 1 tab(s) orally once a day  mupirocin 2% topical ointment: 1 Apply topically to affected area 2 times a day  senna leaf extract oral tablet: 2 tab(s) orally once a day (at bedtime)

## 2025-01-17 NOTE — DISCHARGE NOTE PROVIDER - NSDCCPCAREPLAN_GEN_ALL_CORE_FT
PRINCIPAL DISCHARGE DIAGNOSIS  Diagnosis: ADRIAN (acute kidney injury)  Assessment and Plan of Treatment: Improved with iv fluids.

## 2025-01-17 NOTE — DISCHARGE NOTE NURSING/CASE MANAGEMENT/SOCIAL WORK - PATIENT PORTAL LINK FT
You can access the FollowMyHealth Patient Portal offered by [facility] by registering at the following website: [email or web address]. By joining [employer]’s FollowMyHealth portal, you will also be able to view your health information using other applications (apps) compatible with our system.

## 2025-01-17 NOTE — DISCHARGE NOTE PROVIDER - PROVIDER TOKENS
FREE:[LAST:[Dr. Trujillo],PHONE:[(   )    -],FAX:[(   )    -],ADDRESS:[Your visiting doc],FOLLOWUP:[2 weeks]],PROVIDER:[TOKEN:[[id number]],FOLLOWUP:[2 weeks]]

## 2025-01-17 NOTE — DISCHARGE NOTE NURSING/CASE MANAGEMENT/SOCIAL WORK - FINANCIAL ASSISTANCE
[facility] provides services at a reduced cost to those who are determined to be eligible through [facility]’s financial assistance program. Information regarding [facility]’s financial assistance program can be found by going to [email or web address] or by calling [phone].

## 2025-01-17 NOTE — DISCHARGE NOTE NURSING/CASE MANAGEMENT/SOCIAL WORK - NSDCPEFALRISK_GEN_ALL_CORE
For information on Fall & Injury Prevention, visit: [email or web address] OR  [email or web address] OR  https://www.cdc.gov/steadi/patient.html

## 2025-01-17 NOTE — DISCHARGE NOTE PROVIDER - NSDCPNSUBOBJ_GEN_ALL_CORE
VITALS:  T(F): 98.1 (01-17-25 @ 07:45), Max: 98.6 (01-16-25 @ 16:10)  HR: 71 (01-17-25 @ 07:45) (71 - 77)  BP: 117/50 (01-17-25 @ 07:45) (101/39 - 117/50)  RR: 17 (01-17-25 @ 07:45) (17 - 18)  SpO2: 96% (01-17-25 @ 07:45) (96% - 97%)    PHYSICAL EXAM:  Gen: NAD  HEENT:  pupils equal and reactive, EOMI, no oropharyngeal lesions, erythema, exudates, oral thrush  NECK:   supple, no carotid bruits, no palpable lymph nodes, no thyromegaly  CV:  +S1, +S2, regular, no murmurs or rubs  RESP:   lungs clear to auscultation bilaterally, no wheezing, rales, rhonchi, good air entry bilaterally  BREAST:  not examined  GI:  abdomen soft, non-tender, non-distended, normal BS, no bruits, no abdominal masses, no palpable masses  RECTAL:  not examined  :  not examined  MSK:   normal muscle tone, no atrophy, no rigidity, no contractions  EXT:  no clubbing, no cyanosis, + edema, no calf pain, swelling or erythema  VASCULAR:  pulses equal and symmetric in the upper and lower extremities  NEURO:  AAOX3, no focal neurological deficits, follows all commands, able to move extremities spontaneously  SKIN:  no ulcers, lesions or rashes    #Acute kidney injury with hyperkalemia/ hyponatremia/ metabolic acidosis  -Cr improving  -Bicarb improving  -K now wnl  -Na improving  -no hydronephrosis on imaging  -d/c bicarb fluids  -f/o renal recs  -passed TOV    #Weakness/ fall:    -ordered orthostatics, would not sit or stand for BP  -Secondary to renal failure/ dehydration.    -now agreeable to YASMIN    #Lymphedema b/l legs:    -Chronic.    -Dressing changes with ACE wraps QOD.   -Wound care consult.    -Completed recent doxy outpatient.    -Needs full skin assessment once dressings removed to r/o cellulitis/ infection.    -BCx NG  -Cont bactroban.      #Leukocytosis:    -resolved    #DM:    -Hold januvia.    -a1c 6.3  -no need for SSI, will d/c    #cirrhosis on imaging  -outpt f/u    #DVT ppx- SQL    Plan for YASMIN.  Of note, pt no longer sees Dr. Dean, sees Dr. Cheri Trujillo visiting doc.

## 2025-01-17 NOTE — DISCHARGE NOTE PROVIDER - CARE PROVIDER_API CALL
Dr. Trujillo,   Your visiting doc  Phone: (   )    -  Fax: (   )    -  Follow Up Time: 2 weeks    Carloz Mitchell  Foot and Ankle Surgery  [address], [address]  [place] [address]  Phone: [phone]  Fax: [phone]  Follow Up Time: 2 weeks

## 2025-01-17 NOTE — DISCHARGE NOTE PROVIDER - HOSPITAL COURSE
CC: fall  HPI and Hospital Course: 73 y/o female with PMHx of DM, HTN, lymphedema b/l lower legs, lung cancer, colon cancer who presented to  with CC of weakness and fall.   Found to have hyperkalemia/ renal failure.  All improved with IVF.     D/c to [facility].   I have spent 32 min on day of d/c coordinating care.  See progress note for problem based plan.

## 2025-01-21 ENCOUNTER — TRANSCRIPTION ENCOUNTER (OUTPATIENT)
Age: 75
End: 2025-01-21

## 2025-01-22 RX ORDER — NYSTATIN 100000 [USP'U]/G
1 CREAM TOPICAL
Refills: 0 | DISCHARGE
Start: 2025-01-22 | End: 2025-02-11

## 2025-01-23 DIAGNOSIS — E87.1 HYPO-OSMOLALITY AND HYPONATREMIA: ICD-10-CM

## 2025-01-23 DIAGNOSIS — I89.0 LYMPHEDEMA, NOT ELSEWHERE CLASSIFIED: ICD-10-CM

## 2025-01-23 DIAGNOSIS — E87.5 HYPERKALEMIA: ICD-10-CM

## 2025-01-23 DIAGNOSIS — Z88.1 ALLERGY STATUS TO OTHER ANTIBIOTIC AGENTS: ICD-10-CM

## 2025-01-23 DIAGNOSIS — D72.829 ELEVATED WHITE BLOOD CELL COUNT, UNSPECIFIED: ICD-10-CM

## 2025-01-23 DIAGNOSIS — E11.22 TYPE 2 DIABETES MELLITUS WITH DIABETIC CHRONIC KIDNEY DISEASE: ICD-10-CM

## 2025-01-23 DIAGNOSIS — E87.20 ACIDOSIS, UNSPECIFIED: ICD-10-CM

## 2025-01-23 DIAGNOSIS — Z79.84 LONG TERM (CURRENT) USE OF ORAL HYPOGLYCEMIC DRUGS: ICD-10-CM

## 2025-01-23 DIAGNOSIS — N18.30 CHRONIC KIDNEY DISEASE, STAGE 3 UNSPECIFIED: ICD-10-CM

## 2025-01-23 DIAGNOSIS — D69.6 THROMBOCYTOPENIA, UNSPECIFIED: ICD-10-CM

## 2025-01-23 DIAGNOSIS — L03.115 CELLULITIS OF RIGHT LOWER LIMB: ICD-10-CM

## 2025-01-23 DIAGNOSIS — Z85.118 PERSONAL HISTORY OF OTHER MALIGNANT NEOPLASM OF BRONCHUS AND LUNG: ICD-10-CM

## 2025-01-23 DIAGNOSIS — Z85.038 PERSONAL HISTORY OF OTHER MALIGNANT NEOPLASM OF LARGE INTESTINE: ICD-10-CM

## 2025-01-23 DIAGNOSIS — N17.9 ACUTE KIDNEY FAILURE, UNSPECIFIED: ICD-10-CM

## 2025-01-23 DIAGNOSIS — K74.60 UNSPECIFIED CIRRHOSIS OF LIVER: ICD-10-CM

## 2025-01-23 DIAGNOSIS — I12.9 HYPERTENSIVE CHRONIC KIDNEY DISEASE WITH STAGE 1 THROUGH STAGE 4 CHRONIC KIDNEY DISEASE, OR UNSPECIFIED CHRONIC KIDNEY DISEASE: ICD-10-CM

## 2025-01-28 RX ORDER — DEXTROMETHORPHAN HBR, GUAIFENESIN 200 MG/10ML
10 LIQUID ORAL
Refills: 0 | DISCHARGE
Start: 2025-01-28 | End: 2025-02-02

## 2025-02-04 RX ORDER — DOXYCYCLINE HYCLATE 100 MG
1 TABLET ORAL
Qty: 0 | Refills: 0 | DISCHARGE
Start: 2025-02-04 | End: 2025-02-11

## 2025-02-04 RX ORDER — FUROSEMIDE 10 MG/ML
1 INJECTION INTRAMUSCULAR; INTRAVENOUS
Refills: 0 | DISCHARGE
Start: 2025-02-04

## 2025-02-05 ENCOUNTER — TRANSCRIPTION ENCOUNTER (OUTPATIENT)
Age: 75
End: 2025-02-05

## 2025-02-05 RX ORDER — LACTOBACILLUS ACIDOPHILUS/PECT 75 MM-100
1 CAPSULE ORAL
Refills: 0 | DISCHARGE
Start: 2025-02-05 | End: 2025-02-11

## 2025-02-11 ENCOUNTER — INPATIENT (INPATIENT)
Facility: HOSPITAL | Age: 75
LOS: 6 days | Discharge: NURSING, REHAB OR LONG-TERM CARE | DRG: 603 | End: 2025-02-18
Attending: HOSPITALIST | Admitting: FAMILY MEDICINE
Payer: MEDICARE

## 2025-02-11 VITALS — WEIGHT: 234.79 LBS

## 2025-02-11 DIAGNOSIS — L03.119 CELLULITIS OF UNSPECIFIED PART OF LIMB: ICD-10-CM

## 2025-02-11 LAB
ABO RH CONFIRMATION: SIGNIFICANT CHANGE UP
ALBUMIN SERPL ELPH-MCNC: 2.6 G/DL — LOW (ref 3.3–5)
ALP SERPL-CCNC: 99 U/L — SIGNIFICANT CHANGE UP (ref 40–120)
ALT FLD-CCNC: 20 U/L — SIGNIFICANT CHANGE UP (ref 12–78)
ANION GAP SERPL CALC-SCNC: 5 MMOL/L — SIGNIFICANT CHANGE UP (ref 5–17)
APTT BLD: 23.1 SEC — LOW (ref 24.5–35.6)
AST SERPL-CCNC: 37 U/L — SIGNIFICANT CHANGE UP (ref 15–37)
BASOPHILS # BLD AUTO: 0.05 K/UL — SIGNIFICANT CHANGE UP (ref 0–0.2)
BASOPHILS NFR BLD AUTO: 1.1 % — SIGNIFICANT CHANGE UP (ref 0–2)
BILIRUB SERPL-MCNC: 1.1 MG/DL — SIGNIFICANT CHANGE UP (ref 0.2–1.2)
BLD GP AB SCN SERPL QL: SIGNIFICANT CHANGE UP
BUN SERPL-MCNC: 23 MG/DL — SIGNIFICANT CHANGE UP (ref 7–23)
CALCIUM SERPL-MCNC: 8.7 MG/DL — SIGNIFICANT CHANGE UP (ref 8.5–10.1)
CHLORIDE SERPL-SCNC: 110 MMOL/L — HIGH (ref 96–108)
CO2 SERPL-SCNC: 25 MMOL/L — SIGNIFICANT CHANGE UP (ref 22–31)
CREAT SERPL-MCNC: 1.03 MG/DL — SIGNIFICANT CHANGE UP (ref 0.5–1.3)
EGFR: 57 ML/MIN/1.73M2 — LOW
EOSINOPHIL # BLD AUTO: 0.21 K/UL — SIGNIFICANT CHANGE UP (ref 0–0.5)
EOSINOPHIL NFR BLD AUTO: 4.6 % — SIGNIFICANT CHANGE UP (ref 0–6)
GLUCOSE SERPL-MCNC: 115 MG/DL — HIGH (ref 70–99)
HCT VFR BLD CALC: 35.2 % — SIGNIFICANT CHANGE UP (ref 34.5–45)
HGB BLD-MCNC: 11.6 G/DL — SIGNIFICANT CHANGE UP (ref 11.5–15.5)
IMM GRANULOCYTES # BLD AUTO: 0.01 K/UL — SIGNIFICANT CHANGE UP (ref 0–0.07)
IMM GRANULOCYTES NFR BLD AUTO: 0.2 % — SIGNIFICANT CHANGE UP (ref 0–0.9)
INR BLD: 1.14 RATIO — SIGNIFICANT CHANGE UP (ref 0.85–1.16)
LACTATE SERPL-SCNC: 1.4 MMOL/L — SIGNIFICANT CHANGE UP (ref 0.7–2)
LYMPHOCYTES # BLD AUTO: 0.63 K/UL — LOW (ref 1–3.3)
LYMPHOCYTES NFR BLD AUTO: 13.8 % — SIGNIFICANT CHANGE UP (ref 13–44)
MCHC RBC-ENTMCNC: 26.9 PG — LOW (ref 27–34)
MCHC RBC-ENTMCNC: 33 G/DL — SIGNIFICANT CHANGE UP (ref 32–36)
MCV RBC AUTO: 81.7 FL — SIGNIFICANT CHANGE UP (ref 80–100)
MONOCYTES # BLD AUTO: 0.52 K/UL — SIGNIFICANT CHANGE UP (ref 0–0.9)
MONOCYTES NFR BLD AUTO: 11.4 % — SIGNIFICANT CHANGE UP (ref 2–14)
NEUTROPHILS # BLD AUTO: 3.16 K/UL — SIGNIFICANT CHANGE UP (ref 1.8–7.4)
NEUTROPHILS NFR BLD AUTO: 68.9 % — SIGNIFICANT CHANGE UP (ref 43–77)
NRBC # BLD AUTO: 0 K/UL — SIGNIFICANT CHANGE UP (ref 0–0)
NRBC # FLD: 0 K/UL — SIGNIFICANT CHANGE UP (ref 0–0)
NRBC BLD AUTO-RTO: 0 /100 WBCS — SIGNIFICANT CHANGE UP (ref 0–0)
NT-PROBNP SERPL-SCNC: 261 PG/ML — HIGH (ref 0–125)
PLATELET # BLD AUTO: 89 K/UL — LOW (ref 150–400)
PMV BLD: 11.5 FL — SIGNIFICANT CHANGE UP (ref 7–13)
POTASSIUM SERPL-MCNC: 3.7 MMOL/L — SIGNIFICANT CHANGE UP (ref 3.5–5.3)
POTASSIUM SERPL-SCNC: 3.7 MMOL/L — SIGNIFICANT CHANGE UP (ref 3.5–5.3)
PROT SERPL-MCNC: 6.3 GM/DL — SIGNIFICANT CHANGE UP (ref 6–8.3)
PROTHROM AB SERPL-ACNC: 13.1 SEC — SIGNIFICANT CHANGE UP (ref 9.9–13.4)
RBC # BLD: 4.31 M/UL — SIGNIFICANT CHANGE UP (ref 3.8–5.2)
RBC # FLD: 15.6 % — HIGH (ref 10.3–14.5)
SODIUM SERPL-SCNC: 140 MMOL/L — SIGNIFICANT CHANGE UP (ref 135–145)
WBC # BLD: 4.58 K/UL — SIGNIFICANT CHANGE UP (ref 3.8–10.5)
WBC # FLD AUTO: 4.58 K/UL — SIGNIFICANT CHANGE UP (ref 3.8–10.5)

## 2025-02-11 PROCEDURE — 84100 ASSAY OF PHOSPHORUS: CPT

## 2025-02-11 PROCEDURE — 36415 COLL VENOUS BLD VENIPUNCTURE: CPT

## 2025-02-11 PROCEDURE — 86140 C-REACTIVE PROTEIN: CPT

## 2025-02-11 PROCEDURE — 85027 COMPLETE CBC AUTOMATED: CPT

## 2025-02-11 PROCEDURE — 80048 BASIC METABOLIC PNL TOTAL CA: CPT

## 2025-02-11 PROCEDURE — 80053 COMPREHEN METABOLIC PANEL: CPT

## 2025-02-11 PROCEDURE — 82962 GLUCOSE BLOOD TEST: CPT

## 2025-02-11 PROCEDURE — 71045 X-RAY EXAM CHEST 1 VIEW: CPT | Mod: 26

## 2025-02-11 PROCEDURE — 93970 EXTREMITY STUDY: CPT | Mod: 26

## 2025-02-11 PROCEDURE — 85652 RBC SED RATE AUTOMATED: CPT

## 2025-02-11 PROCEDURE — 97116 GAIT TRAINING THERAPY: CPT | Mod: GP

## 2025-02-11 PROCEDURE — 80202 ASSAY OF VANCOMYCIN: CPT

## 2025-02-11 PROCEDURE — 97163 PT EVAL HIGH COMPLEX 45 MIN: CPT | Mod: GP

## 2025-02-11 PROCEDURE — 85730 THROMBOPLASTIN TIME PARTIAL: CPT

## 2025-02-11 PROCEDURE — 85610 PROTHROMBIN TIME: CPT

## 2025-02-11 PROCEDURE — 97530 THERAPEUTIC ACTIVITIES: CPT | Mod: GP

## 2025-02-11 PROCEDURE — 99285 EMERGENCY DEPT VISIT HI MDM: CPT

## 2025-02-11 PROCEDURE — 83735 ASSAY OF MAGNESIUM: CPT

## 2025-02-11 RX ORDER — ACETIC ACID
1 SOLUTION, NON-ORAL VAGINAL ONCE
Refills: 0 | Status: COMPLETED | OUTPATIENT
Start: 2025-02-11 | End: 2025-02-11

## 2025-02-11 RX ORDER — SENNA 187 MG
2 TABLET ORAL
Refills: 0 | DISCHARGE

## 2025-02-11 RX ORDER — ACETAMINOPHEN 500 MG/5ML
650 LIQUID (ML) ORAL EVERY 6 HOURS
Refills: 0 | Status: DISCONTINUED | OUTPATIENT
Start: 2025-02-11 | End: 2025-02-18

## 2025-02-11 RX ORDER — VANCOMYCIN HCL IN 5 % DEXTROSE 1.5G/250ML
1500 PLASTIC BAG, INJECTION (ML) INTRAVENOUS ONCE
Refills: 0 | Status: COMPLETED | OUTPATIENT
Start: 2025-02-11 | End: 2025-02-11

## 2025-02-11 RX ORDER — POLYETHYLENE GLYCOL 3350 17 G/17G
17 POWDER, FOR SOLUTION ORAL
Refills: 0 | DISCHARGE

## 2025-02-11 RX ORDER — TRAMADOL HYDROCHLORIDE 50 MG/1
1 TABLET, FILM COATED ORAL
Refills: 0 | DISCHARGE

## 2025-02-11 RX ORDER — PIPERACILLIN-TAZO-DEXTROSE,ISO 3.375G/5
3.38 IV SOLUTION, PIGGYBACK PREMIX FROZEN(ML) INTRAVENOUS ONCE
Refills: 0 | Status: COMPLETED | OUTPATIENT
Start: 2025-02-11 | End: 2025-02-11

## 2025-02-11 RX ORDER — MUPIROCIN CALCIUM 20 MG/G
1 CREAM TOPICAL ONCE
Refills: 0 | Status: COMPLETED | OUTPATIENT
Start: 2025-02-11 | End: 2025-02-11

## 2025-02-11 RX ORDER — TRAMADOL HYDROCHLORIDE 50 MG/1
25 TABLET, FILM COATED ORAL EVERY 4 HOURS
Refills: 0 | Status: DISCONTINUED | OUTPATIENT
Start: 2025-02-11 | End: 2025-02-18

## 2025-02-11 RX ORDER — TRAMADOL HYDROCHLORIDE 50 MG/1
25 TABLET, FILM COATED ORAL ONCE
Refills: 0 | Status: DISCONTINUED | OUTPATIENT
Start: 2025-02-11 | End: 2025-02-11

## 2025-02-11 RX ORDER — SITAGLIPTIN 100 MG/1
1 TABLET, FILM COATED ORAL
Refills: 0 | DISCHARGE

## 2025-02-11 RX ADMIN — TRAMADOL HYDROCHLORIDE 25 MILLIGRAM(S): 50 TABLET, FILM COATED ORAL at 21:05

## 2025-02-11 RX ADMIN — MUPIROCIN CALCIUM 1 APPLICATION(S): 20 CREAM TOPICAL at 17:51

## 2025-02-11 RX ADMIN — TRAMADOL HYDROCHLORIDE 25 MILLIGRAM(S): 50 TABLET, FILM COATED ORAL at 22:04

## 2025-02-11 RX ADMIN — TRAMADOL HYDROCHLORIDE 25 MILLIGRAM(S): 50 TABLET, FILM COATED ORAL at 14:46

## 2025-02-11 RX ADMIN — Medication 200 GRAM(S): at 15:36

## 2025-02-11 RX ADMIN — Medication 1 APPLICATION(S): at 17:52

## 2025-02-11 RX ADMIN — Medication 333.33 MILLIGRAM(S): at 18:43

## 2025-02-11 NOTE — PATIENT PROFILE ADULT - FUNCTIONAL ASSESSMENT - BASIC MOBILITY ASSESSMENT TYPE
Medication(s) to Refill:   Pending Prescriptions Disp Refills    AMLODIPINE BESYLATE 5 MG Oral Tab [Pharmacy Med Name: AMLODIPINE BESYLATE 5MG TABLETS] 90 tablet 0     Sig: TAKE 1 TABLET(5 MG) BY MOUTH EVERY DAY             Reason for Medication Refill melo Admission

## 2025-02-11 NOTE — ED PROVIDER NOTE - CLINICAL SUMMARY MEDICAL DECISION MAKING FREE TEXT BOX
Presentation most consistent with b/l LE pain s/p lymphedema. Plan for CXR, labs, b/l LE duplex US, pain control, podiatry consult, monitor and reassess Presentation most consistent with b/l LE pain s/p lymphedema. Plan for CXR, labs, b/l LE duplex US, pain control, podiatry consult, monitor and reassess, Chest x-ray shows no acute infiltrate.  Labs show no significant leukocytosis, lactic within normal limits, BNP within normal limits.  Bilateral lower extremity duplex negative for DVT.  Patient was seen evaluated by podiatry who recommends dressing changes and ID consult.  Patient was treated with IV Zosyn and vancomycin, ID consult placed.  Patient admitted to Dr. Nicolas hospitalist attending for bilateral lower extremity cellulitis and lymphedema

## 2025-02-11 NOTE — PATIENT PROFILE ADULT - HOW MUCH WEIGHT HAVE YOU LOST?
Additional Note: Reassured benign in nature. Include Location In Plan?: No Detail Level: Zone 2-13 lbs (1)

## 2025-02-11 NOTE — ED ADULT NURSE REASSESSMENT NOTE - NS ED NURSE REASSESS COMMENT FT1
pt report received from previous RN Susanna. pt contact made. pt resting comfortably with no complaints at this time.

## 2025-02-11 NOTE — ED ADULT TRIAGE NOTE - CHIEF COMPLAINT QUOTE
BIBA from [facility] rehab c/o bilateral leg pain and swelling. Pt was here in January treated for cellulitis and a fall and is on doxy.

## 2025-02-11 NOTE — ED PROVIDER NOTE - OBJECTIVE STATEMENT
73 y/o female with PMHx of DM, HTN, lymphedema b/l lower legs, lung cancer, colon cancer p/w b/l LE pain and redness. Pt states she is has had worsening symptoms x1wk. Pt has been taking Lasix 20mg BID and doxycycline without improvement. Follows with Dr. Mitchell however has not seen him recently as she is currently in [facility] for rehab. No SOB, CP, fever, chills, NVD, abdominal pain, hx PE or DVT. 75 y/o female with PMHx of DM, HTN, lymphedema b/l lower legs, lung cancer, colon cancer p/w b/l LE pain and redness. Pt states she is has had worsening symptoms x1wk. Pt has been taking Lasix 20mg BID, tramadol for pain and doxycycline without improvement. Follows with Dr. Mitchell however has not seen him recently as she is currently in [facility] for rehab. No SOB, CP, fever, chills, NVD, abdominal pain, hx PE or DVT.

## 2025-02-11 NOTE — PATIENT PROFILE ADULT - FUNCTIONAL ASSESSMENT - BASIC MOBILITY 6.
2-calculated by average/Not able to assess (calculate score using [facility] averaging method)

## 2025-02-11 NOTE — CONSULT NOTE ADULT - SUBJECTIVE AND OBJECTIVE BOX
Date of consult: 2/11/2025    HPI: 73 y/o female with PMHx of DM, HTN, lymphedema b/l lower legs, lung cancer, colon cancer p/w b/l LE pain and redness. Pt states she  has had worsening symptoms x1wk. Pt has been taking Lasix 20mg BID, tramadol for pain and doxycycline without improvement. Follows with Dr. Mitchell however has not seen him recently as she is currently in [facility] for rehab but sees wound care team in [facility] routinely. No SOB, CP, fever, chills, NVD, abdominal pain, hx PE or DVT.    PMH: No pertinent past medical history    Lymphatic edema    Hypertension    Type 2 diabetes mellitus    Lung cancer    Colon cancer    Acquired thrombocytopenia    H/O metabolic acidosis with normal anion gap    Moderate obesity      PSH:No significant past surgical history        Allergies:tramadol (Unknown)  Keflex (Anaphylaxis)  cephalexin (Unknown)      Labs:                          11.6   4.58  )-----------( 89       ( 11 Feb 2025 12:51 )             35.2     WBC Trend  4.58 Date (02-11 @ 12:51)      Chem  02-11    140  |  110[H]  |  23  ----------------------------<  115[H]  3.7   |  25  |  1.03    Ca    8.7      11 Feb 2025 12:51    TPro  6.3  /  Alb  2.6[L]  /  TBili  1.1  /  DBili  x   /  AST  37  /  ALT  20  /  AlkPhos  99  02-11    T(F): 97.9 (02-11-25 @ 12:10), Max: 97.9 (02-11-25 @ 12:10)  HR: 74 (02-11-25 @ 12:10) (74 - 74)  BP: 114/55 (02-11-25 @ 12:10) (114/55 - 114/55)  RR: --  SpO2: 99% (02-11-25 @ 12:10) (99% - 99%)  Wt(kg): --    REVIEW OF SYSTEMS:    CONSTITUTIONAL: No weakness, fevers or chills  EYES: No visual changes  RESPIRATORY: No cough, wheezing; No shortness of breath  CARDIOVASCULAR: No chest pain or palpitations  GASTROINTESTINAL: No abdominal or epigastric pain. No nausea, vomiting; No diarrhea or constipation.   GENITOURINARY: No dysuria, frequency or hematuria  NEUROLOGICAL: No numbness or weakness  SKIN: See physical examination.  All other review of systems is negative unless indicated above    Physical Exam:   Constitutional: NAD, alert;  Lower Extremity Focus  Derm:  Skin warm, dry and supple bilateral.  Significant generalized lichenification and verrucous changes to skin extending from below the knees to feet bilaterally, cobblestone like papules and nodules extending from below knee to feet bilaterally. Noted hemosiderin deposition to BLE. Noted erythema to bilateral lower legs.  Vascular: Dorsalis Pedis and Posterior Tibial pulses non palpable due to significant edema to BLE.    Neuro: Protective sensation intact to the level of the digits bilateral.  MSK: Muscle strength 4/5 all major muscle groups bilateral.

## 2025-02-11 NOTE — ED ADULT NURSE NOTE - OBJECTIVE STATEMENT
The patient is a 74y Female complaining of pain, lower leg. Pt endorsing BLE swelling and redness. Wounds are malodorous. 22 g IV inserted labs and cultures drawn

## 2025-02-11 NOTE — PHARMACOTHERAPY INTERVENTION NOTE - COMMENTS
Medication reconciliation completed.  Reviewed Medication list and confirmed med allergies with list from Care Facility "[facility]"; confirmed with Dr. First MedHx.

## 2025-02-11 NOTE — ED PROVIDER NOTE - PHYSICAL EXAMINATION
Constitutional: well appearing, NAD AAOx3  Eyes: EOMI, PERRL  Head: Normocephalic atraumatic  Mouth: no airway obstruction, posterior oropharynx clear without erythema or exudate  Neck: supple  Cardiac: regular rate and rhythm, no MRG  Resp: Lungs CTAB  GI: Abd s/nt/nd  Neuro: CN2-12 intact, strength 5/5x4, sensation grossly intact  Skin: No rashes  MSK: b/l lymphedema with redness, no warmth, no open wounds

## 2025-02-11 NOTE — ED ADULT NURSE NOTE - CHIEF COMPLAINT QUOTE
BIBA from [facility] rehab c/o bilateral leg pain and swelling. Pt was here in January treated for cellulitis and a fall and is on doxy.

## 2025-02-11 NOTE — PATIENT PROFILE ADULT - FALL HARM RISK - DEVICES
[FreeTextEntry1] : f/u, starting a new job, foot pain [de-identified] : She complains of right foot pain on the ball of her foot. It is throbbing pain for the last 2 weeks. She has no difficulty walking. no swelling. \par She lost her job several months ago. She recently was high at by [employer] for a pediatric office. She will need health forms completed.\par she has never had a positive PPD Walker

## 2025-02-11 NOTE — ED ADULT NURSE NOTE - NS ED NURSE LEVEL OF CONSCIOUSNESS AFFECT
Calm Erythromycin Pregnancy And Lactation Text: This medication is Pregnancy Category B and is considered safe during pregnancy. It is also excreted in breast milk.

## 2025-02-11 NOTE — CONSULT NOTE ADULT - ASSESSMENT
A: 74-year-old Female seen for the following:   -BLE Lymphedema   -BLE Elephantiasis nostras verrucosa  -DM2  -Difficulty with ambulation    P:   Chart reviewed and Patient evaluated;  Discussed diagnosis and treatment with patient. Discussed importance of daily foot examinations, proper shoe gear, importance of tight glycemic control.   Blood word pending; AVSS  Significant generalized verrucous changes to skin extending from bilateral below the knee to feet, cobblestone like nodules to BLE. Underlying elephantiasis nostras verrucosa caused by chronic BLE lymphedema; Edema to the kelly feet improved but edema to the kelly lower leg worsened since last admission  WC: acetic acid, bactroban (Muprocin) and dry compressive dressings to BLE  Elevate BLE  Antibiotics as per ID  No acute podiatric surgical intervention warranted at this time. Pt to continue with local wound care and antibiotics per ID  All additional care per ED appreciated  Patient demonstrated verbal understanding of all interventions and tolerated interventions well without any complications.       Wound care instructions to be performed three times a week for bilateral lower extremity:  1. Please remove all dressings   2. Use Acetic acid-soaked gauze and place throughout the right and left foot and in between digits for a 2-3 minutes  3. Apply Bactroban (Muprocin) to all the macerated areas in  midfoot and forefoot   4. Apply gauze, kerlix and ace.  Thank you.

## 2025-02-11 NOTE — PATIENT PROFILE ADULT - FALL HARM RISK - HARM RISK INTERVENTIONS
28-Jun-2018 22:30 Assistance with ambulation/Assistance OOB with selected safe patient handling equipment/Communicate Risk of Fall with Harm to all staff/Discuss with provider need for PT consult/Monitor gait and stability/Provide patient with walking aids - walker, cane, crutches/Reinforce activity limits and safety measures with patient and family/Tailored Fall Risk Interventions/Visual Cue: Yellow wristband and red socks/Bed in lowest position, wheels locked, appropriate side rails in place/Call bell, personal items and telephone in reach/Instruct patient to call for assistance before getting out of bed or chair/Non-slip footwear when patient is out of bed/[place]orter to call system/Physically safe environment - no spills, clutter or unnecessary equipment/Purposeful Proactive Rounding/Room/bathroom lighting operational, light cord in reach

## 2025-02-12 ENCOUNTER — NON-APPOINTMENT (OUTPATIENT)
Age: 75
End: 2025-02-12

## 2025-02-12 ENCOUNTER — TRANSCRIPTION ENCOUNTER (OUTPATIENT)
Age: 75
End: 2025-02-12

## 2025-02-12 LAB
ALBUMIN SERPL ELPH-MCNC: 2.2 G/DL — LOW (ref 3.3–5)
ALP SERPL-CCNC: 81 U/L — SIGNIFICANT CHANGE UP (ref 40–120)
ALT FLD-CCNC: 17 U/L — SIGNIFICANT CHANGE UP (ref 12–78)
ANION GAP SERPL CALC-SCNC: 6 MMOL/L — SIGNIFICANT CHANGE UP (ref 5–17)
APTT BLD: 33 SEC — SIGNIFICANT CHANGE UP (ref 24.5–35.6)
AST SERPL-CCNC: 23 U/L — SIGNIFICANT CHANGE UP (ref 15–37)
BILIRUB SERPL-MCNC: 1.2 MG/DL — SIGNIFICANT CHANGE UP (ref 0.2–1.2)
BUN SERPL-MCNC: 21 MG/DL — SIGNIFICANT CHANGE UP (ref 7–23)
CALCIUM SERPL-MCNC: 8.6 MG/DL — SIGNIFICANT CHANGE UP (ref 8.5–10.1)
CHLORIDE SERPL-SCNC: 112 MMOL/L — HIGH (ref 96–108)
CO2 SERPL-SCNC: 23 MMOL/L — SIGNIFICANT CHANGE UP (ref 22–31)
CREAT SERPL-MCNC: 0.99 MG/DL — SIGNIFICANT CHANGE UP (ref 0.5–1.3)
EGFR: 60 ML/MIN/1.73M2 — SIGNIFICANT CHANGE UP
GLUCOSE BLDC GLUCOMTR-MCNC: 101 MG/DL — HIGH (ref 70–99)
GLUCOSE BLDC GLUCOMTR-MCNC: 120 MG/DL — HIGH (ref 70–99)
GLUCOSE BLDC GLUCOMTR-MCNC: 130 MG/DL — HIGH (ref 70–99)
GLUCOSE BLDC GLUCOMTR-MCNC: 154 MG/DL — HIGH (ref 70–99)
GLUCOSE SERPL-MCNC: 99 MG/DL — SIGNIFICANT CHANGE UP (ref 70–99)
HCT VFR BLD CALC: 30.5 % — LOW (ref 34.5–45)
HGB BLD-MCNC: 10 G/DL — LOW (ref 11.5–15.5)
IMMATURE PLATELET FRACTION #: 1.1 K/UL — LOW (ref 4.7–11.1)
IMMATURE PLATELET FRACTION %: 1.6 % — SIGNIFICANT CHANGE UP (ref 1.6–4.9)
INR BLD: 1.21 RATIO — HIGH (ref 0.85–1.16)
MCHC RBC-ENTMCNC: 26.8 PG — LOW (ref 27–34)
MCHC RBC-ENTMCNC: 32.8 G/DL — SIGNIFICANT CHANGE UP (ref 32–36)
MCV RBC AUTO: 81.8 FL — SIGNIFICANT CHANGE UP (ref 80–100)
NRBC # BLD AUTO: 0 K/UL — SIGNIFICANT CHANGE UP (ref 0–0)
NRBC # FLD: 0 K/UL — SIGNIFICANT CHANGE UP (ref 0–0)
NRBC BLD AUTO-RTO: 0 /100 WBCS — SIGNIFICANT CHANGE UP (ref 0–0)
PLATELET # BLD AUTO: 67 K/UL — LOW (ref 150–400)
PMV BLD: 12 FL — SIGNIFICANT CHANGE UP (ref 7–13)
POTASSIUM SERPL-MCNC: 3.4 MMOL/L — LOW (ref 3.5–5.3)
POTASSIUM SERPL-SCNC: 3.4 MMOL/L — LOW (ref 3.5–5.3)
PROT SERPL-MCNC: 5.2 GM/DL — LOW (ref 6–8.3)
PROTHROM AB SERPL-ACNC: 14.2 SEC — HIGH (ref 9.9–13.4)
RBC # BLD: 3.73 M/UL — LOW (ref 3.8–5.2)
RBC # FLD: 15.4 % — HIGH (ref 10.3–14.5)
SODIUM SERPL-SCNC: 141 MMOL/L — SIGNIFICANT CHANGE UP (ref 135–145)
VANCOMYCIN TROUGH SERPL-MCNC: 29.1 UG/ML — CRITICAL HIGH (ref 10–20)
WBC # BLD: 3.39 K/UL — LOW (ref 3.8–10.5)
WBC # FLD AUTO: 3.39 K/UL — LOW (ref 3.8–10.5)

## 2025-02-12 PROCEDURE — 99223 1ST HOSP IP/OBS HIGH 75: CPT

## 2025-02-12 RX ORDER — INSULIN LISPRO 100 U/ML
INJECTION, SOLUTION INTRAVENOUS; SUBCUTANEOUS
Refills: 0 | Status: DISCONTINUED | OUTPATIENT
Start: 2025-02-12 | End: 2025-02-15

## 2025-02-12 RX ORDER — DEXTROSE 50 % IN WATER 50 %
25 SYRINGE (ML) INTRAVENOUS ONCE
Refills: 0 | Status: DISCONTINUED | OUTPATIENT
Start: 2025-02-12 | End: 2025-02-15

## 2025-02-12 RX ORDER — FUROSEMIDE 10 MG/ML
20 INJECTION INTRAMUSCULAR; INTRAVENOUS
Refills: 0 | Status: DISCONTINUED | OUTPATIENT
Start: 2025-02-12 | End: 2025-02-18

## 2025-02-12 RX ORDER — SODIUM CHLORIDE 9 G/1000ML
1000 INJECTION, SOLUTION INTRAVENOUS
Refills: 0 | Status: DISCONTINUED | OUTPATIENT
Start: 2025-02-12 | End: 2025-02-15

## 2025-02-12 RX ORDER — VANCOMYCIN HCL IN 5 % DEXTROSE 1.5G/250ML
1000 PLASTIC BAG, INJECTION (ML) INTRAVENOUS EVERY 12 HOURS
Refills: 0 | Status: DISCONTINUED | OUTPATIENT
Start: 2025-02-12 | End: 2025-02-14

## 2025-02-12 RX ORDER — PIPERACILLIN-TAZO-DEXTROSE,ISO 3.375G/5
3.38 IV SOLUTION, PIGGYBACK PREMIX FROZEN(ML) INTRAVENOUS EVERY 8 HOURS
Refills: 0 | Status: DISCONTINUED | OUTPATIENT
Start: 2025-02-12 | End: 2025-02-18

## 2025-02-12 RX ORDER — SENNA 187 MG
2 TABLET ORAL AT BEDTIME
Refills: 0 | Status: DISCONTINUED | OUTPATIENT
Start: 2025-02-12 | End: 2025-02-18

## 2025-02-12 RX ORDER — MUPIROCIN CALCIUM 20 MG/G
1 CREAM TOPICAL
Refills: 0 | Status: DISCONTINUED | OUTPATIENT
Start: 2025-02-12 | End: 2025-02-18

## 2025-02-12 RX ORDER — NYSTATIN 100000 [USP'U]/G
1 CREAM TOPICAL
Refills: 0 | Status: DISCONTINUED | OUTPATIENT
Start: 2025-02-12 | End: 2025-02-18

## 2025-02-12 RX ORDER — DEXTROSE 50 % IN WATER 50 %
15 SYRINGE (ML) INTRAVENOUS ONCE
Refills: 0 | Status: DISCONTINUED | OUTPATIENT
Start: 2025-02-12 | End: 2025-02-15

## 2025-02-12 RX ORDER — INSULIN LISPRO 100 U/ML
INJECTION, SOLUTION INTRAVENOUS; SUBCUTANEOUS AT BEDTIME
Refills: 0 | Status: DISCONTINUED | OUTPATIENT
Start: 2025-02-12 | End: 2025-02-15

## 2025-02-12 RX ORDER — LACTOBACILLUS ACIDOPHILUS/PECT 75 MM-100
1 CAPSULE ORAL DAILY
Refills: 0 | Status: DISCONTINUED | OUTPATIENT
Start: 2025-02-12 | End: 2025-02-18

## 2025-02-12 RX ORDER — GLUCAGON 3 MG/1
1 POWDER NASAL ONCE
Refills: 0 | Status: DISCONTINUED | OUTPATIENT
Start: 2025-02-12 | End: 2025-02-15

## 2025-02-12 RX ORDER — POLYETHYLENE GLYCOL 3350 17 G/17G
17 POWDER, FOR SOLUTION ORAL DAILY
Refills: 0 | Status: DISCONTINUED | OUTPATIENT
Start: 2025-02-12 | End: 2025-02-18

## 2025-02-12 RX ORDER — DEXTROSE 50 % IN WATER 50 %
12.5 SYRINGE (ML) INTRAVENOUS ONCE
Refills: 0 | Status: DISCONTINUED | OUTPATIENT
Start: 2025-02-12 | End: 2025-02-15

## 2025-02-12 RX ADMIN — Medication 25 GRAM(S): at 05:57

## 2025-02-12 RX ADMIN — Medication 1 TABLET(S): at 10:16

## 2025-02-12 RX ADMIN — Medication 25 GRAM(S): at 14:49

## 2025-02-12 RX ADMIN — Medication 250 MILLIGRAM(S): at 05:08

## 2025-02-12 RX ADMIN — TRAMADOL HYDROCHLORIDE 25 MILLIGRAM(S): 50 TABLET, FILM COATED ORAL at 06:39

## 2025-02-12 RX ADMIN — TRAMADOL HYDROCHLORIDE 25 MILLIGRAM(S): 50 TABLET, FILM COATED ORAL at 23:00

## 2025-02-12 RX ADMIN — Medication 25 GRAM(S): at 22:14

## 2025-02-12 RX ADMIN — Medication 2 TABLET(S): at 22:14

## 2025-02-12 RX ADMIN — Medication 250 MILLIGRAM(S): at 18:56

## 2025-02-12 RX ADMIN — TRAMADOL HYDROCHLORIDE 25 MILLIGRAM(S): 50 TABLET, FILM COATED ORAL at 18:14

## 2025-02-12 RX ADMIN — TRAMADOL HYDROCHLORIDE 25 MILLIGRAM(S): 50 TABLET, FILM COATED ORAL at 07:38

## 2025-02-12 RX ADMIN — Medication 40 MILLIEQUIVALENT(S): at 10:17

## 2025-02-12 RX ADMIN — TRAMADOL HYDROCHLORIDE 25 MILLIGRAM(S): 50 TABLET, FILM COATED ORAL at 17:16

## 2025-02-12 RX ADMIN — MUPIROCIN CALCIUM 1 APPLICATION(S): 20 CREAM TOPICAL at 10:16

## 2025-02-12 RX ADMIN — NYSTATIN 1 APPLICATION(S): 100000 CREAM TOPICAL at 10:16

## 2025-02-12 RX ADMIN — TRAMADOL HYDROCHLORIDE 25 MILLIGRAM(S): 50 TABLET, FILM COATED ORAL at 22:14

## 2025-02-12 NOTE — DIETITIAN NUTRITION RISK NOTIFICATION - TREATMENT: THE FOLLOWING DIET HAS BEEN RECOMMENDED
Diet, Regular:   Consistent Carbohydrate {Evening Snack} (CSTCHOSN)  DASH/TLC {Sodium & Cholesterol Restricted} (DASH) (02-12-25 @ 00:59) [Active]

## 2025-02-12 NOTE — DIETITIAN INITIAL EVALUATION ADULT - PERTINENT MEDS FT
MEDICATIONS  (STANDING):  dextrose 5%. 1000 milliLiter(s) (50 mL/Hr) IV Continuous <Continuous>  dextrose 5%. 1000 milliLiter(s) (100 mL/Hr) IV Continuous <Continuous>  dextrose 50% Injectable 25 Gram(s) IV Push once  dextrose 50% Injectable 12.5 Gram(s) IV Push once  dextrose 50% Injectable 25 Gram(s) IV Push once  furosemide    Tablet 20 milliGRAM(s) Oral two times a day  - May deplete K and Mg. Avoid natural licorice. May cause N/V, diarrhea, constipation   glucagon  Injectable 1 milliGRAM(s) IntraMuscular once  insulin lispro (ADMELOG) corrective regimen sliding scale   SubCutaneous three times a day before meals  insulin lispro (ADMELOG) corrective regimen sliding scale   SubCutaneous at bedtime  lactobacillus acidophilus 1 Tablet(s) Oral daily (Antidiarrhea) - May cause vomiting, diarrhea   mupirocin 2% Ointment 1 Application(s) Topical <User Schedule>  nystatin Powder 1 Application(s) Topical <User Schedule>   piperacillin/tazobactam IVPB.. 3.375 Gram(s) IV Intermittent every 8 hours  potassium chloride    Tablet ER 40 milliEquivalent(s) Oral once - (Penicillin) - May cause N/V, diarrhea   senna 2 Tablet(s) Oral at bedtime  - (laxative)  may cause diarrhea, N/V  vancomycin  IVPB 1000 milliGRAM(s) IV Intermittent every 12 hours  - (antibiotic) May cause diarrhea,     MEDICATIONS  (PRN):  acetaminophen     Tablet .. 650 milliGRAM(s) Oral every 6 hours PRN Mild Pain (1 - 3)  - May cause constipation, abdominal pain   dextrose Oral Gel 15 Gram(s) Oral once PRN Blood Glucose LESS THAN 70 milliGRAM(s)/deciliter  polyethylene glycol 3350 17 Gram(s) Oral daily PRN Constipation  - May cause nausea   traMADol 25 milliGRAM(s) Oral every 4 hours PRN Moderate Pain (4 - 6)  (Opoid) - Avoid SJW. May cause N/V, constipation, diarrhea    MEDICATIONS  (STANDING):  dextrose 5%. 1000 milliLiter(s) (50 mL/Hr) IV Continuous <Continuous>  dextrose 5%. 1000 milliLiter(s) (100 mL/Hr) IV Continuous <Continuous>  dextrose 50% Injectable 25 Gram(s) IV Push once  dextrose 50% Injectable 12.5 Gram(s) IV Push once  dextrose 50% Injectable 25 Gram(s) IV Push once  furosemide    Tablet 20 milliGRAM(s) Oral two times a day  - May deplete K and Mg. Avoid natural licorice. May cause N/V, diarrhea, constipation   glucagon  Injectable 1 milliGRAM(s) IntraMuscular once  insulin lispro (ADMELOG) corrective regimen sliding scale   SubCutaneous three times a day before meals  insulin lispro (ADMELOG) corrective regimen sliding scale   SubCutaneous at bedtime  lactobacillus acidophilus 1 Tablet(s) Oral daily (Antidiarrhea) - May cause vomiting, diarrhea   mupirocin 2% Ointment 1 Application(s) Topical <User Schedule>  nystatin Powder 1 Application(s) Topical <User Schedule>   piperacillin/tazobactam IVPB.. 3.375 Gram(s) IV Intermittent every 8 hours  potassium chloride    Tablet ER 40 milliEquivalent(s) Oral once - (Penicillin) - May cause N/V, diarrhea   senna 2 Tablet(s) Oral at bedtime  - (laxative)  may cause diarrhea, N/V  vancomycin  IVPB 1000 milliGRAM(s) IV Intermittent every 12 hours  - (antibiotic) May cause diarrhea,     MEDICATIONS  (PRN):  acetaminophen     Tablet .. 650 milliGRAM(s) Oral every 6 hours PRN Mild Pain (1 - 3)  - May cause constipation, abdominal pain   dextrose Oral Gel 15 Gram(s) Oral once PRN Blood Glucose LESS THAN 70 milliGRAM(s)/deciliter  polyethylene glycol 3350 17 Gram(s) Oral daily PRN Constipation  - May cause nausea   traMADol 25 milliGRAM(s) Oral every 4 hours PRN Moderate Pain (4 - 6)  (Opoid) - Avoid SJW. May cause N/V, constipation, diarrhea

## 2025-02-12 NOTE — H&P ADULT - NSHPOUTPATIENTPROVIDERS_GEN_ALL_CORE
PCP: Dr Elgin Dean PCP: Dr Elgin Dean and also a [facility] physician PCP: States she used to use Dr Elgin Dean, but now uses a [facility] homecare physician

## 2025-02-12 NOTE — PROGRESS NOTE ADULT - ASSESSMENT
#b/l lower ext lymphedema with superimposed b/l cellulitis   discussed with Dr. Salazar who has seen pt in the past - erythema and warmth is new and consistent with cellulitis   recommends several days of IV abx   c/w vanc and zosyn   wound care reccs per podiatry    #chronic thrombocytopenia   dating back years   continue to monitor   hem/onc as an outpatient     #DM   ISS    #H/o HTN / lung cancer / colon cancer   -C/w home meds and f/u outpatient for further management if conditions remain stable during hospitalization     #DVT ppx  -SCDs 2/2 thrombocytopenia    #b/l lower ext lymphedema with superimposed b/l cellulitis   discussed with Dr. Salazar who has seen pt in the past - erythema and warmth is new and consistent with cellulitis   recommends several days of IV abx   c/w vanc and zosyn   wound care reccs per podiatry  Doppler: no evidence of DVT in lower ext    #chronic thrombocytopenia   dating back years   continue to monitor   hem/onc as an outpatient     #DM   ISS    #H/o HTN / lung cancer / colon cancer   -C/w home meds and f/u outpatient for further management if conditions remain stable during hospitalization     #DVT ppx  -SCDs 2/2 thrombocytopenia  Back to [facility] after clinical improvement   #b/l lower ext lymphedema with superimposed b/l cellulitis   discussed with Dr. Salazar who has seen pt in the past - erythema and warmth is new and consistent with cellulitis   recommends several days of IV abx   c/w vanc and zosyn   wound care reccs per podiatry  Doppler: no evidence of DVT in lower ext    #hypokalemia   repleted    #chronic thrombocytopenia   dating back years   continue to monitor   hem/onc as an outpatient     #DM   ISS    #H/o HTN / lung cancer / colon cancer   -C/w home meds and f/u outpatient for further management if conditions remain stable during hospitalization     #DVT ppx  -SCDs 2/2 thrombocytopenia  Back to [facility] after clinical improvement

## 2025-02-12 NOTE — DIETITIAN INITIAL EVALUATION ADULT - ETIOLOGY-BASIS
Here for hormone therapy injection, no complaints at this time, Injection given as ordered, tolerated well, no report of pain prior to or after injection. Return to clinic as scheduled.     Site - RB    Testosterone  50 mg    Clinic Supplied Medication     Acute illness or injury

## 2025-02-12 NOTE — DIETITIAN INITIAL EVALUATION ADULT - ORAL INTAKE PTA/DIET HISTORY
[facility]; reports the food is terrible and , likely meeting < 75% of ENN due to worsening weakness and poor appetite. Pt reports her knowledge of her T2DM, but says she doesn't follow any specific diet for diabetes; as per shadow chart pt follows a consistent carbohydrate no salt added diet. ONS of Glucerna BID is provided at [facility]; has had an incident with Boost protein shake, reports given  shake from facility during meal time.  Lives at [facility] facility; reports the food is terrible and , likely meeting < 75% of ENN due to dislike of food. Pt reports her knowledge of her T2DM, but says she doesn't follow any specific diet for diabetes; as per shadow chart pt follows a consistent carbohydrate no salt added diet. ONS of Glucerna BID is provided at [facility]; has had an incident with Boost protein shake, reports given  shake from facility during meal time.

## 2025-02-12 NOTE — PROVIDER CONTACT NOTE (CRITICAL VALUE NOTIFICATION) - ACTION/TREATMENT ORDERED:
Awaiting orders. Awaiting orders. Repeat Vanco trough in the morning on 2/13/25 before the 0600 dose

## 2025-02-12 NOTE — DIETITIAN INITIAL EVALUATION ADULT - PERTINENT LABORATORY DATA
02-12    141  |  112[H]  |  21  ----------------------------<  99  3.4[L]   |  23  |  0.99    Ca    8.6      12 Feb 2025 07:01    TPro  5.2[L]  /  Alb  2.2[L]  /  TBili  1.2  /  DBili  x   /  AST  23  /  ALT  17  /  AlkPhos  81  02-12  POCT Blood Glucose.: 101 mg/dL (02-12-25 @ 07:38)  A1C with Estimated Average Glucose Result: 6.3 % (01-15-25 @ 06:56)  A1C with Estimated Average Glucose Result: 5.2 % (10-01-24 @ 07:09)  A1C with Estimated Average Glucose Result: 5.5 % (08-15-24 @ 07:55)    1) K low 2/2 ?low dietary intake   2) Chloride high 2/2 ?low dietary intake    3) TPro high 2/2 cellulitis    4) Alb low 2/2 DM, acute PI 02-12    141  |  112[H]  |  21  ----------------------------<  99  3.4[L]   |  23  |  0.99    Ca    8.6      12 Feb 2025 07:01    TPro  5.2[L]  /  Alb  2.2[L]  /  TBili  1.2  /  DBili  x   /  AST  23  /  ALT  17  /  AlkPhos  81  02-12  POCT Blood Glucose.: 101 mg/dL (02-12-25 @ 07:38)  A1C with Estimated Average Glucose Result: 6.3 % (01-15-25 @ 06:56)  A1C with Estimated Average Glucose Result: 5.2 % (10-01-24 @ 07:09)  A1C with Estimated Average Glucose Result: 5.5 % (08-15-24 @ 07:55)    1) K low 2/2 ?low dietary intake   2) Chloride high 2/2 ?low dietary intake   3) Alb low 2/2 DM, acute PI, edema

## 2025-02-12 NOTE — DIETITIAN INITIAL EVALUATION ADULT - NSFNSGIASSESSMENTFT_GEN_A_CORE
Last BM on 2/11 doc'd in chart  Not on a bowel regimen  Last BM on 2/11 doc'd in chart  Receiving senna daily and polyethylene glycol PRN

## 2025-02-12 NOTE — CONSULT NOTE ADULT - ASSESSMENT
73 y/o Female with h/o DM type 2, HTN, lymphedema, lung cancer, colon cancer, thrombocytopenia was admitted on 2/11 for LE pain and redness. Patient states that her LEs were becoming increasingly erythematous and uncomfortable, and she was become concerned for infection. States she did not receive appropriate wound care at [facility]. Denies fever / chills at NH. In ER she received zosyn and vancomycin IV.    #LE cellulitis  #B/l lower legs stasis dermatitis/ lymphedema  #Allergy to keflex, cephalexin  -obtain BC x 2  -nursing home resident and allergic to cephalosporines  -concern for infection with multiresistant organisms is raised. Prior cultures reviewed. An epidemiologic assessment was performed. The risk of the microorganism spread to family members, healthcare staff is low. Standard isolation in place at present time. Will reconsider isolation measures according to infection control policy, further culture results and other new information. The patient will be monitored closely, cultures collected as appropriate. Broad spectrum abx therapy was started.  -start vancomycin 750 mg IV q12h and zosyn 3.375 gm IV q8h  -reason for abx use and side effects reviewed with patient; monitor BMP and vancomycin trough levels   -monitor closely in tram of cephalosporines allergy history  -elevate legs  -old chart reviewed to assess prior cultures  -monitor temps  -f/u CBC  -supportive care  2. Other issues:   -care per medicine    d/w medicine team 73 y/o Female with h/o DM type 2, HTN, lymphedema, lung cancer, colon cancer, thrombocytopenia was admitted on 2/11 for LE pain and redness. Patient states that her LEs were becoming increasingly erythematous and uncomfortable, and she was become concerned for infection. States she did not receive appropriate wound care at [facility]. Denies fever / chills at NH. In ER she received zosyn and vancomycin IV.    #LE cellulitis  #B/l lower legs stasis dermatitis/ lymphedema  #Allergy to keflex, cephalexin  #Morbid obesity   -obtain BC x 2  -nursing home resident and allergic to cephalosporins  -concern for infection with multiresistant organisms is raised. Prior cultures reviewed. An epidemiologic assessment was performed. The risk of the microorganism spread to family members, healthcare staff is low. Standard isolation in place at present time. Will reconsider isolation measures according to infection control policy, further culture results and other new information. The patient will be monitored closely, cultures collected as appropriate. Broad spectrum abx therapy was started.  -start vancomycin 1000 mg IV q12h and zosyn 3.375 gm IV q8h  -reason for abx use and side effects reviewed with patient; monitor BMP and vancomycin trough levels   -monitor closely in tram of cephalosporins allergy history  -elevate legs  -old chart reviewed to assess prior cultures  -wound care evaluation  -monitor temps  -f/u CBC  -supportive care  2. Other issues:   -care per medicine    d/w medicine team

## 2025-02-12 NOTE — DIETITIAN INITIAL EVALUATION ADULT - NSFNSPHYEXAMSKINFT_GEN_A_CORE
Pressure Injury 1: Right:, big toe, Suspected deep tissue injury  Pressure Injury 2: Left:, buttocks, Suspected deep tissue injury  Pressure Injury 3: Right:, butt, Stage II   Pressure Injury 1: Right:, big toe, Suspected deep tissue injury  Pressure Injury 2: Left:, buttocks, Suspected deep tissue injury  Pressure Injury 3: Right:, butt, Stage II    Mickey Score 13.

## 2025-02-12 NOTE — DIETITIAN INITIAL EVALUATION ADULT - NAME AND PHONE
Amy Buchanan, Dietetic intern  Amy Buchanan, Dietetic intern   Maria Eugenia Jorgensen, MS, RDN, CDN, [facility] [phone]  Certified Nutrition

## 2025-02-12 NOTE — CONSULT NOTE ADULT - SUBJECTIVE AND OBJECTIVE BOX
Patient is a 74y old  Female who presents with a chief complaint of LE pain and redness     HPI:  75 y/o Female with h/o DM type 2, HTN, lymphedema, lung cancer, colon cancer, thrombocytopenia was admitted on 2/11 for LE pain and redness. Patient states that her LEs were becoming increasingly erythematous and uncomfortable, and she was become concerned for infection. States she did not receive appropriate wound care at [facility]. Denies fever / chills at NH. In ER she received zosyn and vancomycin IV.    PSH: hysterectomy, L shoulder surgery, surgery 2/2 colon cancer, lung surgery   PMH: as above    Meds: per reconciliation sheet, noted below  MEDICATIONS  (STANDING):  dextrose 5%. 1000 milliLiter(s) (50 mL/Hr) IV Continuous <Continuous>  dextrose 5%. 1000 milliLiter(s) (100 mL/Hr) IV Continuous <Continuous>  dextrose 50% Injectable 25 Gram(s) IV Push once  dextrose 50% Injectable 12.5 Gram(s) IV Push once  dextrose 50% Injectable 25 Gram(s) IV Push once  furosemide    Tablet 20 milliGRAM(s) Oral two times a day  glucagon  Injectable 1 milliGRAM(s) IntraMuscular once  insulin lispro (ADMELOG) corrective regimen sliding scale   SubCutaneous three times a day before meals  insulin lispro (ADMELOG) corrective regimen sliding scale   SubCutaneous at bedtime  lactobacillus acidophilus 1 Tablet(s) Oral daily  mupirocin 2% Ointment 1 Application(s) Topical <User Schedule>  nystatin Powder 1 Application(s) Topical <User Schedule>  piperacillin/tazobactam IVPB.. 3.375 Gram(s) IV Intermittent every 8 hours  senna 2 Tablet(s) Oral at bedtime  vancomycin  IVPB 1000 milliGRAM(s) IV Intermittent every 12 hours    MEDICATIONS  (PRN):  acetaminophen     Tablet .. 650 milliGRAM(s) Oral every 6 hours PRN Mild Pain (1 - 3)  dextrose Oral Gel 15 Gram(s) Oral once PRN Blood Glucose LESS THAN 70 milliGRAM(s)/deciliter  polyethylene glycol 3350 17 Gram(s) Oral daily PRN Constipation  traMADol 25 milliGRAM(s) Oral every 4 hours PRN Moderate Pain (4 - 6)    Allergies    tramadol (Unknown)  Keflex (Anaphylaxis)  cephalexin (Unknown)    Intolerances      Social: no smoking, no alcohol, no illegal drugs; no recent travel, no exposure to TB  FAMILY HISTORY:  No pertinent family history in first degree relatives      no history of premature cardiovascular disease in first degree relatives    ROS: the patient denies fever, no chills, no HA, no seizures, no dizziness, no sore throat, no nasal congestion, no blurry vision, no CP, no palpitations, no SOB, no cough, no abdominal pain, no diarrhea, no N/V, no dysuria, no leg pain, no claudication, no rash, no joint aches, no rectal pain or bleeding, no night sweats  All other systems reviewed and are negative    Vital Signs Last 24 Hrs  T(C): 36.7 (12 Feb 2025 08:50), Max: 36.8 (11 Feb 2025 23:54)  T(F): 98.1 (12 Feb 2025 08:50), Max: 98.2 (11 Feb 2025 23:54)  HR: 66 (12 Feb 2025 08:50) (66 - 77)  BP: 112/46 (12 Feb 2025 08:50) (110/53 - 132/64)  BP(mean): --  RR: 18 (12 Feb 2025 08:50) (18 - 19)  SpO2: 98% (12 Feb 2025 08:50) (97% - 100%)    Parameters below as of 12 Feb 2025 08:50  Patient On (Oxygen Delivery Method): room air    PE:    Constitutional:  No acute distress  HEENT: NC/AT, EOMI, PERRLA, conjunctivae clear; ears and nose atraumatic; pharynx benign  Neck: supple; thyroid not palpable  Back: no tenderness  Respiratory: respiratory effort normal; clear to auscultation  Cardiovascular: S1S2 regular, no murmurs  Abdomen: soft, not tender, not distended, positive BS; no liver or spleen organomegaly  Genitourinary: no suprapubic tenderness  Lymphatic: no LN palpable  Musculoskeletal: no muscle tenderness, no joint swelling or tenderness  Extremities: 2+ pedal edema, chronic skin changed  Lower leg erythema, edema, warmth and tenderness  Neurological/ Psychiatric: AxOx3, judgement and insight normal; moving all extremities  Skin: no rashes; no palpable lesions    Labs: all available labs reviewed                        10.0   3.39  )-----------( 67       ( 12 Feb 2025 07:01 )             30.5     02-12    141  |  112[H]  |  21  ----------------------------<  99  3.4[L]   |  23  |  0.99    Ca    8.6      12 Feb 2025 07:01    TPro  5.2[L]  /  Alb  2.2[L]  /  TBili  1.2  /  DBili  x   /  AST  23  /  ALT  17  /  AlkPhos  81  02-12     LIVER FUNCTIONS - ( 12 Feb 2025 07:01 )  Alb: 2.2 g/dL / Pro: 5.2 gm/dL / ALK PHOS: 81 U/L / ALT: 17 U/L / AST: 23 U/L / GGT: x           Radiology: all available radiological tests reviewed    Advanced directives addressed: full resuscitation Patient is a 74y old  Female who presents with a chief complaint of LE pain and redness     HPI:  75 y/o Female with h/o DM type 2, HTN, lymphedema, lung cancer, colon cancer, thrombocytopenia was admitted on 2/11 for LE pain and redness. Patient states that her LEs were becoming increasingly erythematous and uncomfortable, and she was become concerned for infection. States she did not receive appropriate wound care at [facility]. Denies fever / chills at NH. In ER she received zosyn and vancomycin IV.    PSH: hysterectomy, L shoulder surgery, surgery 2/2 colon cancer, lung surgery   PMH: as above    Meds: per reconciliation sheet, noted below  MEDICATIONS  (STANDING):  dextrose 5%. 1000 milliLiter(s) (50 mL/Hr) IV Continuous <Continuous>  dextrose 5%. 1000 milliLiter(s) (100 mL/Hr) IV Continuous <Continuous>  dextrose 50% Injectable 25 Gram(s) IV Push once  dextrose 50% Injectable 12.5 Gram(s) IV Push once  dextrose 50% Injectable 25 Gram(s) IV Push once  furosemide    Tablet 20 milliGRAM(s) Oral two times a day  glucagon  Injectable 1 milliGRAM(s) IntraMuscular once  insulin lispro (ADMELOG) corrective regimen sliding scale   SubCutaneous three times a day before meals  insulin lispro (ADMELOG) corrective regimen sliding scale   SubCutaneous at bedtime  lactobacillus acidophilus 1 Tablet(s) Oral daily  mupirocin 2% Ointment 1 Application(s) Topical <User Schedule>  nystatin Powder 1 Application(s) Topical <User Schedule>  piperacillin/tazobactam IVPB.. 3.375 Gram(s) IV Intermittent every 8 hours  senna 2 Tablet(s) Oral at bedtime  vancomycin  IVPB 1000 milliGRAM(s) IV Intermittent every 12 hours    MEDICATIONS  (PRN):  acetaminophen     Tablet .. 650 milliGRAM(s) Oral every 6 hours PRN Mild Pain (1 - 3)  dextrose Oral Gel 15 Gram(s) Oral once PRN Blood Glucose LESS THAN 70 milliGRAM(s)/deciliter  polyethylene glycol 3350 17 Gram(s) Oral daily PRN Constipation  traMADol 25 milliGRAM(s) Oral every 4 hours PRN Moderate Pain (4 - 6)    Allergies    tramadol (Unknown)  Keflex (Anaphylaxis)  cephalexin (Unknown)    Intolerances      Social: no smoking, no alcohol, no illegal drugs; no recent travel, no exposure to TB  FAMILY HISTORY:  No pertinent family history in first degree relatives      no history of premature cardiovascular disease in first degree relatives    ROS: the patient denies fever, no chills, no HA, no seizures, no dizziness, no sore throat, no nasal congestion, no blurry vision, no CP, no palpitations, no SOB, no cough, no abdominal pain, no diarrhea, no N/V, no dysuria, no leg pain, no claudication, has extensive lower legs rash, no joint aches, no rectal pain or bleeding, no night sweats  All other systems reviewed and are negative    Vital Signs Last 24 Hrs  T(C): 36.7 (12 Feb 2025 08:50), Max: 36.8 (11 Feb 2025 23:54)  T(F): 98.1 (12 Feb 2025 08:50), Max: 98.2 (11 Feb 2025 23:54)  HR: 66 (12 Feb 2025 08:50) (66 - 77)  BP: 112/46 (12 Feb 2025 08:50) (110/53 - 132/64)  BP(mean): --  RR: 18 (12 Feb 2025 08:50) (18 - 19)  SpO2: 98% (12 Feb 2025 08:50) (97% - 100%)    Parameters below as of 12 Feb 2025 08:50  Patient On (Oxygen Delivery Method): room air    PE:    Constitutional:  No acute distress  HEENT: NC/AT, EOMI, PERRLA, conjunctivae clear; ears and nose atraumatic; pharynx benign  Neck: supple; thyroid not palpable  Back: no tenderness  Respiratory: respiratory effort normal; clear to auscultation  Cardiovascular: S1S2 regular, no murmurs  Abdomen: soft, not tender, not distended, positive BS; no liver or spleen organomegaly  Genitourinary: no suprapubic tenderness  Lymphatic: no LN palpable  Musculoskeletal: no muscle tenderness, no joint swelling or tenderness  Extremities: 2+ pedal edema, chronic skin changed  B/l lower leg extensive erythema, edema, warmth and tenderness; multiple skin breaks on feet, ankles, calfs and shins; extending to b/l lower thighs  Neurological/ Psychiatric: AxOx3, judgement and insight normal; moving all extremities  Skin: no rashes; no palpable lesions    Labs: all available labs reviewed                        10.0   3.39  )-----------( 67       ( 12 Feb 2025 07:01 )             30.5     02-12    141  |  112[H]  |  21  ----------------------------<  99  3.4[L]   |  23  |  0.99    Ca    8.6      12 Feb 2025 07:01    TPro  5.2[L]  /  Alb  2.2[L]  /  TBili  1.2  /  DBili  x   /  AST  23  /  ALT  17  /  AlkPhos  81  02-12     LIVER FUNCTIONS - ( 12 Feb 2025 07:01 )  Alb: 2.2 g/dL / Pro: 5.2 gm/dL / ALK PHOS: 81 U/L / ALT: 17 U/L / AST: 23 U/L / GGT: x           Radiology: all available radiological tests reviewed    Advanced directives addressed: full resuscitation

## 2025-02-12 NOTE — DIETITIAN INITIAL EVALUATION ADULT - OTHER CALCULATIONS
EEN calculated from wt taken from shadow chart  Energy and fluid needs based on AdjBW of 156#  Protein needs based on IBW of 125#

## 2025-02-12 NOTE — DIETITIAN INITIAL EVALUATION ADULT - NS FNS DIET ORDER
Diet, Regular:   Consistent Carbohydrate {Evening Snack} (CSTCHOSN)  DASH/TLC {Sodium & Cholesterol Restricted} (DASH) (02-12-25 @ 00:59)   Diet, Regular:   Consistent Carbohydrate {Evening Snack} (CSTCHOSN)  DASH/TLC {Sodium & Cholesterol Restricted} (DASH) (02-12-25 @ 00:59)

## 2025-02-12 NOTE — H&P ADULT - HISTORY OF PRESENT ILLNESS
73 y/o F w/ PMH of DM, HTN, lymphadema, lung cancer, colon cancer, thrombocytopenia,  p/w LE pain and redness. Patient states that her LEs were becoming increaingly erythematous and uncomfortable, and she was becoming concerned for infection. States she did not receive appropriate wound care at [facility]. Denies fever / chills     PSH: hysterectomy, L shoulder surgery, surgery 2/2 colon cancer, lung surgery     Social Hx: Denies tobacco / etoh / drugs    Family Hx: Father - MI

## 2025-02-12 NOTE — DIETITIAN INITIAL EVALUATION ADULT - ADD RECOMMEND
1) C/w consistent carbohydrate w/ evening snacks diet   2) Will add Bravo BID (provides 90 kcal, 2.5 g of collagen, 7 g L- Arginine, 7 g L-Glutamine/1 packet)  3) Monitor bowel movements, if no BM for >3 days, consider implementing a bowel regimen   4) Encourage protein-rich foods, maximize food preferences   5) Monitor weekly wt to track/ trend changes   6) Monitor and optimize BG levels between 140-180 mg/dL by medical management   7) Confirm goals of care regarding nutrition support   RD will continue to monitor PO intake, labs, hydration and wt prn.  MILD raised toilet/needs device 1) C/w consistent carbohydrate w/ evening snacks diet   2) Will add Bravo BID (provides 90 kcal, 2.5 g of collagen, 7 g L- Arginine, 7 g L-Glutamine/1 packet)  3) Monitor bowel movements, if no BM for >3 days, consider implementing a bowel regimen   4) Encourage protein-rich foods, maximize food preferences   5) Please obtain daily weights   6) Monitor and optimize BG levels between 140-180 mg/dL by medical management   7) Consider adding thiamine 100 mg daily 2/2 poor PO intake/ malnutrition  8) Recommend to add MVI w/minerals, Vit C 500 mg BID, add Zinc Sulfate 220 mg x 10 days to promote wound healing.   9) Confirm goals of care regarding nutrition support   RD will continue to monitor PO intake, labs, hydration and wt prn.

## 2025-02-12 NOTE — DIETITIAN INITIAL EVALUATION ADULT - OTHER INFO
75 y/o F w/ PMH of DM, HTN, lymphedema, lung cancer, colon cancer, thrombocytopenia,  p/w LE pain and redness. Patient states that her LEs were becoming increasingly erythematous and uncomfortable, and she was becoming concerned for infection. States she did not receive appropriate wound care at [facility]. Previous admit in January treated for cellulitis and a fall. Admitted to  for bilateral leg pain and swelling.     Known to RD services and has not been dx'd w/ mal on any admission. Dietetic intern observed pt breakfast tray, pt consumed entire tray and states "the  food is amazing"; currently on a consistent carbohydrate diet w/ evening snack. Reports UBW ~265# and states she has lost ~25# over the past couple weeks due to poor food quality and worsening appetite while at [facility]. As per shadow chart; 247# at [facility]. Wt hx reviewed: 294# on 10/11/2023 (taken by RD; non-pitting L/R edema); 223# on 1/15/2025 (taken by RD). Bed scale wt take by RD on 2/12 at 273#- 4+ edema B/L leg doc'd in chart. Wt loss of 12#/ 4.85%, severe and clinically significant; however wt likely skewed d/t fluid gain. NFPE reveals mild muscle/ fat wasting; unable to observed LE d/t PI dressing. Will add Bravo BID (provides 90 kcal, 2.5 g of collagen, 7 g L- Arginine, 7 g L-Glutamine/1 packet) to promote wound healing. Bravo is clinically shown to support tissue building and collagen formation in the dietary management of wounds, which has been clinically shown support wound healing (including pressure injuries, diabetic foot ulcers, surgical incisions, and other acute/chronic wounds) by enhancing collagen formation in as little as 2 weeks. DNR/DNI; TF not addressed as per shadow chart. C/w consistent carbohydrate diet w/ evening snack. See additional recs below.  73 y/o F w/ PMH of DM, HTN, lymphedema, lung cancer, colon cancer, thrombocytopenia, p/w LE pain and redness. Patient states that her LEs were becoming increasingly erythematous and uncomfortable, and she was becoming concerned for infection. States she did not receive appropriate wound care at [facility]. Previous admit in January treated for cellulitis and a fall. Admitted to  for bilateral leg pain and swelling.     Known to RD services and has not been dx'd w/ mal on any admission. Dietetic intern observed pt breakfast tray, pt consumed entire tray and states "the  food is amazing"; currently on a consistent carbohydrate diet w/ evening snack. Reports UBW ~265# and states she has lost ~25# over the past couple weeks due to poor food quality and worsening appetite while at [facility]. As per shadow chart; 247# at [facility]. Wt hx reviewed: 294# on 10/11/2023 (taken by RD; non-pitting L/R edema); 223# on 1/15/2025 (taken by RD). Bed scale wt take by RD on 2/12 at 273#- 4+ edema B/L leg doc'd in chart. Wt loss of 12#/ 4.85%, severe and clinically significant; however wt likely skewed d/t fluid gain. NFPE reveals mild muscle/ fat wasting; unable to observed LE d/t PI dressing. Will add Bravo BID (provides 90 kcal, 2.5 g of collagen, 7 g L- Arginine, 7 g L-Glutamine/1 packet) to promote wound healing. Bravo is clinically shown to support tissue building and collagen formation in the dietary management of wounds, which has been clinically shown support wound healing (including pressure injuries, diabetic foot ulcers, surgical incisions, and other acute/chronic wounds) by enhancing collagen formation in as little as 2 weeks. DNR/DNI; TF not addressed as per shadow chart. C/w consistent carbohydrate diet w/ evening snack. See additional recs below.  73 y/o F w/ PMH of DM, HTN, lymphedema, lung cancer, colon cancer, thrombocytopenia, p/w LE pain and redness. Patient states that her LEs were becoming increasingly erythematous and uncomfortable, and she was becoming concerned for infection. States she did not receive appropriate wound care at [facility]. Previous admit in January treated for cellulitis and a fall. Admitted to  for bilateral leg pain and swelling.     Known to RD services and has not been dx'd w/ mal on any admission. Dietetic intern observed pt breakfast tray, pt consumed entire tray and states "the  food is amazing"; currently on a consistent carbohydrate diet w/ evening snack. Reports UBW ~265# and states she has lost ~25# over the past couple weeks due to poor food quality and worsening appetite while at [facility]. As per shadow chart; 247# at [facility]. Wt hx reviewed: 294# on 10/11/2023 (taken by RD; non-pitting L/R edema); 223# on 1/15/2025 (taken by RD). Bed scale wt take by RD on 2/12 at 273#- 4+ edema B/L leg doc'd in chart. Unable to accurately assess weight hx d/t lymphedema. NFPE reveals mild muscle/ fat wasting; unable to observed LE d/t PI dressing. Will add Bravo BID (provides 90 kcal, 2.5 g of collagen, 7 g L- Arginine, 7 g L-Glutamine/1 packet) to promote wound healing. Bravo is clinically shown to support tissue building and collagen formation in the dietary management of wounds, which has been clinically shown support wound healing (including pressure injuries, diabetic foot ulcers, surgical incisions, and other acute/chronic wounds) by enhancing collagen formation in as little as 2 weeks. DNR/DNI; TF not addressed as per shadow chart. C/w consistent carbohydrate diet w/ evening snack. See additional recs below.

## 2025-02-12 NOTE — DIETITIAN INITIAL EVALUATION ADULT - FEEDING SKILL
Tray set-up, open all containers; meal encouragement/independent/age appropriate assistance independent/age appropriate assistance

## 2025-02-12 NOTE — H&P ADULT - ASSESSMENT
75 y/o F w/ PMH of DM, HTN, lymphadema, lung cancer, colon cancer, thrombocytopenia,  p/w LE pain and redness    *LE lymphadema w/ possible superimposed cellulitis  -Vanco / Zosyn   -ID consult   -Lactate = 1.4   -F/u blood cx  -F/u podiatry   -U/S: Negative for DVT     *Thrombocytopenia  -F/u outpatient Heme for further evaluation     *DM2  -Humalog ISS    *H/o HTN / lung cancer / colon cancer   -C/w home meds and f/u outpatient for further management if conditions remain stable during hospitalization     *DVT ppx  -SCDs 2/2 thrombocytopenia     >75 mins required for admission      73 y/o F w/ PMH of DM, HTN, lymphadema, lung cancer, colon cancer, thrombocytopenia,  p/w LE pain and redness    *LE lymphadema w/ possible superimposed cellulitis  -Vanco / Zosyn   -ID consult   -Lactate = 1.4   -F/u blood cx  -F/u podiatry   -U/S: Negative for DVT     *Thrombocytopenia  -F/u outpatient Heme for further evaluation     *DM2  -Humalog ISS    *H/o HTN / lung cancer / colon cancer   -C/w home meds and f/u outpatient for further management if conditions remain stable during hospitalization     *DVT ppx  -SCDs 2/2 thrombocytopenia     >75 mins required for admission

## 2025-02-12 NOTE — DIETITIAN INITIAL EVALUATION ADULT - NSFNSGIIOFT_GEN_A_CORE
I&O's Detail    11 Feb 2025 07:01  -  12 Feb 2025 07:00  --------------------------------------------------------  IN:    IV PiggyBack: 850 mL  Total IN: 850 mL    OUT:  Total OUT: 0 mL    Total NET: 850 mL

## 2025-02-12 NOTE — DIETITIAN INITIAL EVALUATION ADULT - ETIOLOGY
r/t decreased ability to meet increased nutrient needs 2/2 multiple PI's, poor PO intake  r/t decreased ability to meet increased nutrient needs 2/2 poor PO intake, wound healing

## 2025-02-13 LAB
ANION GAP SERPL CALC-SCNC: 5 MMOL/L — SIGNIFICANT CHANGE UP (ref 5–17)
BUN SERPL-MCNC: 21 MG/DL — SIGNIFICANT CHANGE UP (ref 7–23)
CALCIUM SERPL-MCNC: 8.3 MG/DL — LOW (ref 8.5–10.1)
CHLORIDE SERPL-SCNC: 112 MMOL/L — HIGH (ref 96–108)
CO2 SERPL-SCNC: 24 MMOL/L — SIGNIFICANT CHANGE UP (ref 22–31)
CREAT SERPL-MCNC: 0.98 MG/DL — SIGNIFICANT CHANGE UP (ref 0.5–1.3)
EGFR: 61 ML/MIN/1.73M2 — SIGNIFICANT CHANGE UP
GLUCOSE BLDC GLUCOMTR-MCNC: 114 MG/DL — HIGH (ref 70–99)
GLUCOSE BLDC GLUCOMTR-MCNC: 121 MG/DL — HIGH (ref 70–99)
GLUCOSE BLDC GLUCOMTR-MCNC: 161 MG/DL — HIGH (ref 70–99)
GLUCOSE BLDC GLUCOMTR-MCNC: 168 MG/DL — HIGH (ref 70–99)
GLUCOSE SERPL-MCNC: 101 MG/DL — HIGH (ref 70–99)
HCT VFR BLD CALC: 30.8 % — LOW (ref 34.5–45)
HGB BLD-MCNC: 10.2 G/DL — LOW (ref 11.5–15.5)
IMMATURE PLATELET FRACTION #: 1.5 K/UL — LOW (ref 4.7–11.1)
IMMATURE PLATELET FRACTION %: 2.3 % — SIGNIFICANT CHANGE UP (ref 1.6–4.9)
MAGNESIUM SERPL-MCNC: 1.8 MG/DL — SIGNIFICANT CHANGE UP (ref 1.6–2.6)
MCHC RBC-ENTMCNC: 27.2 PG — SIGNIFICANT CHANGE UP (ref 27–34)
MCHC RBC-ENTMCNC: 33.1 G/DL — SIGNIFICANT CHANGE UP (ref 32–36)
MCV RBC AUTO: 82.1 FL — SIGNIFICANT CHANGE UP (ref 80–100)
NRBC # BLD AUTO: 0 K/UL — SIGNIFICANT CHANGE UP (ref 0–0)
NRBC # FLD: 0 K/UL — SIGNIFICANT CHANGE UP (ref 0–0)
NRBC BLD AUTO-RTO: 0 /100 WBCS — SIGNIFICANT CHANGE UP (ref 0–0)
PLATELET # BLD AUTO: 67 K/UL — LOW (ref 150–400)
PMV BLD: 11.6 FL — SIGNIFICANT CHANGE UP (ref 7–13)
POTASSIUM SERPL-MCNC: 3.9 MMOL/L — SIGNIFICANT CHANGE UP (ref 3.5–5.3)
POTASSIUM SERPL-SCNC: 3.9 MMOL/L — SIGNIFICANT CHANGE UP (ref 3.5–5.3)
RBC # BLD: 3.75 M/UL — LOW (ref 3.8–5.2)
RBC # FLD: 15.4 % — HIGH (ref 10.3–14.5)
SODIUM SERPL-SCNC: 141 MMOL/L — SIGNIFICANT CHANGE UP (ref 135–145)
VANCOMYCIN TROUGH SERPL-MCNC: 18.9 UG/ML — SIGNIFICANT CHANGE UP (ref 10–20)
VANCOMYCIN TROUGH SERPL-MCNC: 20.5 UG/ML — HIGH (ref 10–20)
WBC # BLD: 3.39 K/UL — LOW (ref 3.8–10.5)
WBC # FLD AUTO: 3.39 K/UL — LOW (ref 3.8–10.5)

## 2025-02-13 PROCEDURE — 99232 SBSQ HOSP IP/OBS MODERATE 35: CPT

## 2025-02-13 RX ORDER — ACETIC ACID
1 SOLUTION, NON-ORAL VAGINAL DAILY
Refills: 0 | Status: DISCONTINUED | OUTPATIENT
Start: 2025-02-13 | End: 2025-02-18

## 2025-02-13 RX ADMIN — Medication 1 TABLET(S): at 09:30

## 2025-02-13 RX ADMIN — TRAMADOL HYDROCHLORIDE 25 MILLIGRAM(S): 50 TABLET, FILM COATED ORAL at 09:30

## 2025-02-13 RX ADMIN — TRAMADOL HYDROCHLORIDE 25 MILLIGRAM(S): 50 TABLET, FILM COATED ORAL at 21:45

## 2025-02-13 RX ADMIN — TRAMADOL HYDROCHLORIDE 25 MILLIGRAM(S): 50 TABLET, FILM COATED ORAL at 10:00

## 2025-02-13 RX ADMIN — Medication 25 GRAM(S): at 06:11

## 2025-02-13 RX ADMIN — Medication 2 TABLET(S): at 21:37

## 2025-02-13 RX ADMIN — Medication 25 GRAM(S): at 14:33

## 2025-02-13 RX ADMIN — NYSTATIN 1 APPLICATION(S): 100000 CREAM TOPICAL at 09:32

## 2025-02-13 RX ADMIN — Medication 250 MILLIGRAM(S): at 17:01

## 2025-02-13 RX ADMIN — Medication 25 GRAM(S): at 21:37

## 2025-02-13 RX ADMIN — Medication 250 MILLIGRAM(S): at 06:10

## 2025-02-13 RX ADMIN — TRAMADOL HYDROCHLORIDE 25 MILLIGRAM(S): 50 TABLET, FILM COATED ORAL at 22:15

## 2025-02-13 RX ADMIN — INSULIN LISPRO 1: 100 INJECTION, SOLUTION INTRAVENOUS; SUBCUTANEOUS at 16:54

## 2025-02-13 NOTE — PROGRESS NOTE ADULT - ASSESSMENT
#b/l lower ext lymphedema with superimposed b/l cellulitis   discussed with Dr. Salazar who has seen pt in the past - erythema and warmth is new and consistent with cellulitis   recommends several days of IV abx   c/w vanc and zosyn   vanc trough normal this morning   wound care reccs per podiatry  Doppler: no evidence of DVT in lower ext    #chronic thrombocytopenia   dating back years   continue to monitor   hem/onc as an outpatient     #DM   ISS    #H/o HTN / lung cancer / colon cancer   -C/w home meds and f/u outpatient for further management if conditions remain stable during hospitalization     #DVT ppx  -SCDs 2/2 thrombocytopenia  Back to [facility] after clinical improvement

## 2025-02-13 NOTE — PROGRESS NOTE ADULT - ASSESSMENT
A: 74-year-old Female seen for the following:   -BLE Lymphedema   -BLE Elephantiasis nostras verrucosa  -DM2  -Difficulty with ambulation    P:   Chart reviewed and Patient evaluated;  No leukocytosis; AVSS  Significant generalized verrucous changes to skin extending from bilateral below the knee to feet, cobblestone like nodules to BLE. Underlying elephantiasis nostras verrucosa caused by chronic BLE lymphedema; Edema to the kelly feet improved compared to prev admission; edema to kelly lower leg improving  WC: acetic acid, bactroban (Muprocin) and dry compressive dressings to BLE  Elevate BLE  Antibiotics as per ID  No acute podiatric surgical intervention warranted at this time. Pt to continue with local wound care and antibiotics per ID  All additional care per med appreciated  Patient demonstrated verbal understanding of all interventions and tolerated interventions well without any complications.       Wound care instructions to be performed three times a week for bilateral lower extremity:  1. Please remove all dressings   2. Use Acetic acid-soaked gauze and place throughout the right and left foot and in between digits for a 2-3 minutes  3. Apply Bactroban (Muprocin) to all the macerated areas in  midfoot and forefoot   4. Apply gauze, kerlix and ace.  Thank you. A: 74-year-old Female seen for the following:   -BLE Lymphedema   -BLE Elephantiasis nostras verrucosa  -DM2  -Difficulty with ambulation    P:   Chart reviewed and Patient evaluated;  No leukocytosis; AVSS  Significant generalized verrucous changes to skin extending from bilateral below the knee to feet, cobblestone like nodules to BLE. Underlying elephantiasis nostras verrucosa caused by chronic BLE lymphedema  WC: acetic acid, bactroban (Muprocin) and dry compressive dressings to BLE  Elevate BLE  Antibiotics as per ID  No acute podiatric surgical intervention warranted at this time. Pt to continue with local wound care and antibiotics per ID  All additional care per med appreciated  Patient demonstrated verbal understanding of all interventions and tolerated interventions well without any complications.       Wound care instructions to be performed three times a week for bilateral lower extremity:  1. Please remove all dressings   2. Use Acetic acid-soaked gauze and place throughout the right and left foot and in between digits for a 2-3 minutes  3. Apply Bactroban (Muprocin) to all the macerated areas in  midfoot and forefoot   4. Apply gauze, kerlix and ace.  Thank you.

## 2025-02-13 NOTE — PHYSICAL THERAPY INITIAL EVALUATION ADULT - GENERAL OBSERVATIONS, REHAB EVAL
Pt. received semi supine in bed B/LLE's Acr wrapped + IV agreeable to PT. Bed mob with Min A, sit to stand with RW B/L surgical shoes 3-4 steps.

## 2025-02-13 NOTE — PROGRESS NOTE ADULT - ASSESSMENT
75 y/o Female with h/o DM type 2, HTN, lymphedema, lung cancer, colon cancer, thrombocytopenia was admitted on 2/11 for LE pain and redness. Patient states that her LEs were becoming increasingly erythematous and uncomfortable, and she was become concerned for infection. States she did not receive appropriate wound care at [facility]. Denies fever / chills at NH. In ER she received zosyn and vancomycin IV.    #LE cellulitis  #B/l lower legs stasis dermatitis/ lymphedema  #Allergy to keflex, cephalexin  #Morbid obesity   -BC x 2 noted  -nursing home resident and allergic to cephalosporins  -on vancomycin 1000 mg IV q12h and zosyn 3.375 gm IV q8h # 2  -tolerating abx well so far; no side effects noted  -obtain vancomycin trough level   -monitor closely in tram of cephalosporins allergy history  -elevate legs  -old chart reviewed to assess prior cultures  -podiatry evaluation appreciated   -continue abx coverage   -monitor temps  -f/u CBC  -supportive care  2. Other issues:   -care per medicine    d/w medicine team

## 2025-02-13 NOTE — PHYSICAL THERAPY INITIAL EVALUATION ADULT - PERTINENT HX OF CURRENT PROBLEM, REHAB EVAL
Pt is a 75yo English speaking female admitted from [facility] where she discharged to on 1/17/2025 from [facility]. s/p b/l lower ext lymphedema with superimposed b/l cellulitis. Doppler: no evidence of DVT in lower ext Pt is a 73yo English speaking female admitted from [facility] where she discharged to on 1/17/2025 from [facility]. s/p b/l lower ext lymphedema with superimposed b/l cellulitis. Doppler: no evidence of DVT in lower ext. No acute podiatric surgical intervention warranted at this time.

## 2025-02-13 NOTE — PHYSICAL THERAPY INITIAL EVALUATION ADULT - ADDITIONAL COMMENTS
Prior to [facility] pt was living alone in a first floor apartment with 3 steps outside, using a walker, wheelchair, grab bars, raised toilet seat, hospital bed; had a private hire A

## 2025-02-14 LAB
ANION GAP SERPL CALC-SCNC: 7 MMOL/L — SIGNIFICANT CHANGE UP (ref 5–17)
BUN SERPL-MCNC: 21 MG/DL — SIGNIFICANT CHANGE UP (ref 7–23)
CALCIUM SERPL-MCNC: 7.9 MG/DL — LOW (ref 8.5–10.1)
CHLORIDE SERPL-SCNC: 111 MMOL/L — HIGH (ref 96–108)
CO2 SERPL-SCNC: 24 MMOL/L — SIGNIFICANT CHANGE UP (ref 22–31)
CREAT SERPL-MCNC: 0.89 MG/DL — SIGNIFICANT CHANGE UP (ref 0.5–1.3)
CRP SERPL-MCNC: 4.6 MG/ML — SIGNIFICANT CHANGE UP (ref 0–5)
EGFR: 68 ML/MIN/1.73M2 — SIGNIFICANT CHANGE UP
ERYTHROCYTE [SEDIMENTATION RATE] IN BLOOD: 15 MM/HR — SIGNIFICANT CHANGE UP (ref 0–20)
GLUCOSE BLDC GLUCOMTR-MCNC: 122 MG/DL — HIGH (ref 70–99)
GLUCOSE BLDC GLUCOMTR-MCNC: 134 MG/DL — HIGH (ref 70–99)
GLUCOSE BLDC GLUCOMTR-MCNC: 141 MG/DL — HIGH (ref 70–99)
GLUCOSE BLDC GLUCOMTR-MCNC: 89 MG/DL — SIGNIFICANT CHANGE UP (ref 70–99)
GLUCOSE SERPL-MCNC: 95 MG/DL — SIGNIFICANT CHANGE UP (ref 70–99)
HCT VFR BLD CALC: 29.9 % — LOW (ref 34.5–45)
HGB BLD-MCNC: 9.8 G/DL — LOW (ref 11.5–15.5)
IMMATURE PLATELET FRACTION #: 1.4 K/UL — LOW (ref 4.7–11.1)
IMMATURE PLATELET FRACTION %: 2.2 % — SIGNIFICANT CHANGE UP (ref 1.6–4.9)
MCHC RBC-ENTMCNC: 27.1 PG — SIGNIFICANT CHANGE UP (ref 27–34)
MCHC RBC-ENTMCNC: 32.8 G/DL — SIGNIFICANT CHANGE UP (ref 32–36)
MCV RBC AUTO: 82.8 FL — SIGNIFICANT CHANGE UP (ref 80–100)
NRBC # BLD AUTO: 0 K/UL — SIGNIFICANT CHANGE UP (ref 0–0)
NRBC # FLD: 0 K/UL — SIGNIFICANT CHANGE UP (ref 0–0)
NRBC BLD AUTO-RTO: 0 /100 WBCS — SIGNIFICANT CHANGE UP (ref 0–0)
PLATELET # BLD AUTO: 62 K/UL — LOW (ref 150–400)
PMV BLD: 12.3 FL — SIGNIFICANT CHANGE UP (ref 7–13)
POTASSIUM SERPL-MCNC: 3.5 MMOL/L — SIGNIFICANT CHANGE UP (ref 3.5–5.3)
POTASSIUM SERPL-SCNC: 3.5 MMOL/L — SIGNIFICANT CHANGE UP (ref 3.5–5.3)
RBC # BLD: 3.61 M/UL — LOW (ref 3.8–5.2)
RBC # FLD: 15.3 % — HIGH (ref 10.3–14.5)
SODIUM SERPL-SCNC: 142 MMOL/L — SIGNIFICANT CHANGE UP (ref 135–145)
VANCOMYCIN TROUGH SERPL-MCNC: 21.8 UG/ML — HIGH (ref 10–20)
WBC # BLD: 3.08 K/UL — LOW (ref 3.8–10.5)
WBC # FLD AUTO: 3.08 K/UL — LOW (ref 3.8–10.5)

## 2025-02-14 PROCEDURE — 99232 SBSQ HOSP IP/OBS MODERATE 35: CPT

## 2025-02-14 RX ORDER — VANCOMYCIN HCL IN 5 % DEXTROSE 1.5G/250ML
750 PLASTIC BAG, INJECTION (ML) INTRAVENOUS EVERY 12 HOURS
Refills: 0 | Status: DISCONTINUED | OUTPATIENT
Start: 2025-02-14 | End: 2025-02-18

## 2025-02-14 RX ADMIN — TRAMADOL HYDROCHLORIDE 25 MILLIGRAM(S): 50 TABLET, FILM COATED ORAL at 08:45

## 2025-02-14 RX ADMIN — NYSTATIN 1 APPLICATION(S): 100000 CREAM TOPICAL at 08:18

## 2025-02-14 RX ADMIN — TRAMADOL HYDROCHLORIDE 25 MILLIGRAM(S): 50 TABLET, FILM COATED ORAL at 08:18

## 2025-02-14 RX ADMIN — Medication 1 TABLET(S): at 08:18

## 2025-02-14 RX ADMIN — TRAMADOL HYDROCHLORIDE 25 MILLIGRAM(S): 50 TABLET, FILM COATED ORAL at 23:55

## 2025-02-14 RX ADMIN — Medication 2 TABLET(S): at 21:23

## 2025-02-14 RX ADMIN — Medication 1 APPLICATION(S): at 08:20

## 2025-02-14 RX ADMIN — FUROSEMIDE 20 MILLIGRAM(S): 10 INJECTION INTRAMUSCULAR; INTRAVENOUS at 13:59

## 2025-02-14 RX ADMIN — POLYETHYLENE GLYCOL 3350 17 GRAM(S): 17 POWDER, FOR SOLUTION ORAL at 08:18

## 2025-02-14 RX ADMIN — Medication 25 GRAM(S): at 05:58

## 2025-02-14 RX ADMIN — Medication 25 GRAM(S): at 13:59

## 2025-02-14 RX ADMIN — TRAMADOL HYDROCHLORIDE 25 MILLIGRAM(S): 50 TABLET, FILM COATED ORAL at 21:29

## 2025-02-14 RX ADMIN — Medication 250 MILLIGRAM(S): at 21:23

## 2025-02-14 RX ADMIN — MUPIROCIN CALCIUM 1 APPLICATION(S): 20 CREAM TOPICAL at 08:20

## 2025-02-14 RX ADMIN — Medication 25 GRAM(S): at 23:33

## 2025-02-14 NOTE — PROGRESS NOTE ADULT - ASSESSMENT
75 y/o Female with h/o DM type 2, HTN, lymphedema, lung cancer, colon cancer, thrombocytopenia was admitted on 2/11 for LE pain and redness. Patient states that her LEs were becoming increasingly erythematous and uncomfortable, and she was become concerned for infection. States she did not receive appropriate wound care at [facility]. Denies fever / chills at NH. In ER she received zosyn and vancomycin IV.    #LE cellulitis  #B/l lower legs stasis dermatitis/ lymphedema  #Allergy to keflex, cephalexin  #Morbid obesity   -BC x 2 noted  -nursing home resident and allergic to cephalosporins  -on vancomycin 1000 mg IV q12h and zosyn 3.375 gm IV q8h # 3  -tolerating abx well so far; no side effects noted  -vancomycin trough level is high - hold vancomycin dose this AM and restart vancomycin at 750 mg IV q12h in PM  -monitor closely in tram of cephalosporins allergy history  -elevate legs  -podiatry evaluation appreciated   -continue abx coverage   -monitor temps  -f/u CBC  -supportive care  2. Other issues:   -care per medicine    d/w medicine team

## 2025-02-14 NOTE — PROGRESS NOTE ADULT - ASSESSMENT
#b/l lower ext lymphedema with superimposed b/l cellulitis   cellulitis likely related to diabetes  discussed with Dr. Salazar who has seen pt in the past - erythema and warmth is new and consistent with cellulitis   recommends several days of IV abx   c/w vanc and zosyn   vanc trough normal this morning   wound care reccs per podiatry  Doppler: no evidence of DVT in lower ext  2/14: pt seen by Dr. Mitchell from wound care - dressing changed - improvement in erythema noted however will need continued IV abx  vanc adjusted to 750 from 1000    #chronic thrombocytopenia   dating back years   continue to monitor   hem/onc as an outpatient     #DM   ISS    #H/o HTN / lung cancer / colon cancer   -C/w home meds and f/u outpatient for further management if conditions remain stable during hospitalization     #DVT ppx  -SCDs 2/2 thrombocytopenia  Back to [facility] after clinical improvement

## 2025-02-14 NOTE — CDI QUERY NOTE - NSCDIOTHERTXTBX_GEN_ALL_CORE_HH
Clinical documentation and/or evidence of the patient’s presentation, evaluation, and medical management, as evidenced below, may support a cause and effect link that is not documented in the medical record.  In order to ensure accurate coding and accuracy of the clinical record, the documentation in this patient’s medical record requires additional clarification.      If you think the supporting documentation and/or clinical evidence supports a cause and effect link, please include more specific documentation associated with these findings in your progress note and/or discharge summary.      Please clarify if there is a relationship between the diabetes and cellulitis such as:    •	cellulitis secondary to the diabetes, present on admission.  •	cellulitis unrelated to diabetes  (specify diagnosis or procedure)   •	Other (specify)      Supporting documentation and/or clinical evidence:       Hospitalist progress note on 2/13/2025:  #b/l lower ext lymphedema with superimposed b/l cellulitis   discussed with Dr. Salazar who has seen pt in the past - erythema and warmth is new and consistent with cellulitis   recommends several days of IV abx   c/w vanc and zosyn     #DM   ISS

## 2025-02-15 PROCEDURE — 99232 SBSQ HOSP IP/OBS MODERATE 35: CPT

## 2025-02-15 RX ADMIN — Medication 1 TABLET(S): at 09:09

## 2025-02-15 RX ADMIN — Medication 250 MILLIGRAM(S): at 21:04

## 2025-02-15 RX ADMIN — TRAMADOL HYDROCHLORIDE 25 MILLIGRAM(S): 50 TABLET, FILM COATED ORAL at 06:14

## 2025-02-15 RX ADMIN — Medication 250 MILLIGRAM(S): at 09:09

## 2025-02-15 RX ADMIN — FUROSEMIDE 20 MILLIGRAM(S): 10 INJECTION INTRAMUSCULAR; INTRAVENOUS at 13:43

## 2025-02-15 RX ADMIN — Medication 25 GRAM(S): at 05:31

## 2025-02-15 RX ADMIN — Medication 25 GRAM(S): at 23:03

## 2025-02-15 RX ADMIN — NYSTATIN 1 APPLICATION(S): 100000 CREAM TOPICAL at 09:09

## 2025-02-15 RX ADMIN — FUROSEMIDE 20 MILLIGRAM(S): 10 INJECTION INTRAMUSCULAR; INTRAVENOUS at 06:14

## 2025-02-15 RX ADMIN — TRAMADOL HYDROCHLORIDE 25 MILLIGRAM(S): 50 TABLET, FILM COATED ORAL at 23:07

## 2025-02-15 RX ADMIN — TRAMADOL HYDROCHLORIDE 25 MILLIGRAM(S): 50 TABLET, FILM COATED ORAL at 06:34

## 2025-02-15 RX ADMIN — TRAMADOL HYDROCHLORIDE 25 MILLIGRAM(S): 50 TABLET, FILM COATED ORAL at 13:43

## 2025-02-15 RX ADMIN — TRAMADOL HYDROCHLORIDE 25 MILLIGRAM(S): 50 TABLET, FILM COATED ORAL at 14:15

## 2025-02-15 RX ADMIN — Medication 2 TABLET(S): at 22:09

## 2025-02-15 RX ADMIN — Medication 25 GRAM(S): at 13:44

## 2025-02-15 NOTE — PROGRESS NOTE ADULT - ASSESSMENT
A: 74-year-old Female seen for the following:   -BLE Lymphedema   -BLE Elephantiasis nostras verrucosa  -DM2  -Difficulty with ambulation    P:   Chart reviewed and Patient evaluated;  No leukocytosis; AVSS  Significant generalized verrucous changes to skin extending from bilateral below the knee to feet, cobblestone like nodules to BLE. Underlying elephantiasis nostras verrucosa caused by chronic BLE lymphedema  WC: acetic acid, bactroban (Muprocin) and dry compressive dressings to BLE  Elevate BLE  Antibiotics as per ID  No acute podiatric surgical intervention warranted at this time. Pt to continue with local wound care and antibiotics per ID  All additional care per med appreciated  Patient demonstrated verbal understanding of all interventions and tolerated interventions well without any complications.       Wound care instructions to be performed three times a week for bilateral lower extremity:  1. Please remove all dressings   2. Use Acetic acid-soaked gauze and place throughout the right and left foot and in between digits for a 2-3 minutes  3. Apply Bactroban (Muprocin) to all the macerated areas in  midfoot and forefoot   4. Apply gauze, kerlix and ace.  Thank you.

## 2025-02-15 NOTE — PROGRESS NOTE ADULT - ASSESSMENT
73 y/o Female with h/o DM type 2, HTN, lymphedema, lung cancer, colon cancer, thrombocytopenia was admitted on 2/11 for LE pain and redness. Patient states that her LEs were becoming increasingly erythematous and uncomfortable, and she was become concerned for infection. States she did not receive appropriate wound care at [facility]. Denies fever / chills at NH. In ER she received zosyn and vancomycin IV.    #LE cellulitis  #B/l lower legs stasis dermatitis/ lymphedema  #Allergy to keflex, cephalexin  #Morbid obesity   -BC x 2 noted  -nursing home resident and allergic to cephalosporins  -on vancomycin 7500 mg IV q12h and zosyn 3.375 gm IV q8h # 3  -tolerating abx well so far; no side effects noted  -vancomycin dose was adjusted  -monitor closely in tram of cephalosporins allergy history  -repeat vancomycin level before 4th dose  -elevate legs  -podiatry evaluation appreciated   -local wound care  -continue abx coverage   -monitor temps  -f/u CBC  -supportive care  2. Other issues:   -care per medicine    d/w medicine team

## 2025-02-15 NOTE — PROGRESS NOTE ADULT - ASSESSMENT
#b/l lower ext lymphedema with superimposed b/l cellulitis   cellulitis likely related to diabetes  discussed with Dr. Salazar who has seen pt in the past - erythema and warmth is new and consistent with cellulitis   recommends several days of IV abx   c/w vanc and zosyn   vanc trough normal this morning   wound care reccs per podiatry  Doppler: no evidence of DVT in lower ext  2/14: pt seen by Dr. Mitchell from wound care - dressing changed - improvement in erythema noted however will need continued IV abx  vanc adjusted to 750 from 1000  2/15: dressing changed by podiatry    #chronic thrombocytopenia   dating back years   continue to monitor   hem/onc as an outpatient     #DM   ISS    #H/o HTN / lung cancer / colon cancer   -C/w home meds and f/u outpatient for further management if conditions remain stable during hospitalization     #DVT ppx  -SCDs 2/2 thrombocytopenia  Back to [facility] after clinical improvement  pt wants to go to a [facility] - brother exploring different options

## 2025-02-16 LAB
CULTURE RESULTS: SIGNIFICANT CHANGE UP
CULTURE RESULTS: SIGNIFICANT CHANGE UP
SPECIMEN SOURCE: SIGNIFICANT CHANGE UP
SPECIMEN SOURCE: SIGNIFICANT CHANGE UP
VANCOMYCIN TROUGH SERPL-MCNC: 19.4 UG/ML — SIGNIFICANT CHANGE UP (ref 10–20)

## 2025-02-16 PROCEDURE — 99232 SBSQ HOSP IP/OBS MODERATE 35: CPT

## 2025-02-16 RX ADMIN — TRAMADOL HYDROCHLORIDE 25 MILLIGRAM(S): 50 TABLET, FILM COATED ORAL at 05:56

## 2025-02-16 RX ADMIN — FUROSEMIDE 20 MILLIGRAM(S): 10 INJECTION INTRAMUSCULAR; INTRAVENOUS at 13:46

## 2025-02-16 RX ADMIN — TRAMADOL HYDROCHLORIDE 25 MILLIGRAM(S): 50 TABLET, FILM COATED ORAL at 14:00

## 2025-02-16 RX ADMIN — Medication 250 MILLIGRAM(S): at 21:02

## 2025-02-16 RX ADMIN — NYSTATIN 1 APPLICATION(S): 100000 CREAM TOPICAL at 10:10

## 2025-02-16 RX ADMIN — TRAMADOL HYDROCHLORIDE 25 MILLIGRAM(S): 50 TABLET, FILM COATED ORAL at 22:48

## 2025-02-16 RX ADMIN — TRAMADOL HYDROCHLORIDE 25 MILLIGRAM(S): 50 TABLET, FILM COATED ORAL at 13:19

## 2025-02-16 RX ADMIN — Medication 2 TABLET(S): at 22:48

## 2025-02-16 RX ADMIN — Medication 1 TABLET(S): at 10:09

## 2025-02-16 RX ADMIN — Medication 25 GRAM(S): at 05:56

## 2025-02-16 RX ADMIN — Medication 250 MILLIGRAM(S): at 10:09

## 2025-02-16 RX ADMIN — TRAMADOL HYDROCHLORIDE 25 MILLIGRAM(S): 50 TABLET, FILM COATED ORAL at 06:33

## 2025-02-16 RX ADMIN — TRAMADOL HYDROCHLORIDE 25 MILLIGRAM(S): 50 TABLET, FILM COATED ORAL at 23:45

## 2025-02-16 RX ADMIN — TRAMADOL HYDROCHLORIDE 25 MILLIGRAM(S): 50 TABLET, FILM COATED ORAL at 00:02

## 2025-02-16 RX ADMIN — FUROSEMIDE 20 MILLIGRAM(S): 10 INJECTION INTRAMUSCULAR; INTRAVENOUS at 05:56

## 2025-02-16 RX ADMIN — Medication 25 GRAM(S): at 13:20

## 2025-02-16 NOTE — PROGRESS NOTE ADULT - ASSESSMENT
75 y/o Female with h/o DM type 2, HTN, lymphedema, lung cancer, colon cancer, thrombocytopenia was admitted on 2/11 for LE pain and redness. Patient states that her LEs were becoming increasingly erythematous and uncomfortable, and she was become concerned for infection. States she did not receive appropriate wound care at [facility]. Denies fever / chills at NH. In ER she received zosyn and vancomycin IV.    #LE cellulitis  #B/l lower legs stasis dermatitis/ lymphedema  #Allergy to keflex, cephalexin  #Morbid obesity   -BC x 2 noted  -legs are improving slowly  -nursing home resident and allergic to cephalosporins  -on vancomycin 750 mg IV q12h and zosyn 3.375 gm IV q8h # 4  -tolerating abx well so far; no side effects noted  -vancomycin dose was adjusted  -monitor closely in tram of cephalosporins allergy history  -repeat vancomycin level is therapeutic  -elevate legs  -podiatry evaluation appreciated   -local wound care  -continue abx coverage   -monitor temps  -f/u CBC  -supportive care  2. Other issues:   -care per medicine    d/w medicine team

## 2025-02-16 NOTE — PROGRESS NOTE ADULT - ASSESSMENT
#b/l lower ext lymphedema with superimposed b/l cellulitis   cellulitis likely related to diabetes  discussed with Dr. Salazar who has seen pt in the past - erythema and warmth is new and consistent with cellulitis   recommends several days of IV abx   c/w vanc and zosyn   vanc adjusted  wound care reccs per podiatry  Doppler: no evidence of DVT in lower ext  2/14: pt seen by Dr. Mitchell from wound care - dressing changed - improvement in erythema noted however will need continued IV abx  vanc adjusted to 750 from 1000  2/15: dressing changed by podiatry  02/16: erythema still present but slowly improving    #chronic thrombocytopenia   dating back years   continue to monitor   hem/onc as an outpatient     #DM   ISS    #H/o HTN / lung cancer / colon cancer   -C/w home meds and f/u outpatient for further management if conditions remain stable during hospitalization     #DVT ppx  -SCDs 2/2 thrombocytopenia  Back to [facility] after clinical improvement  pt wants to go to a [facility] - brother exploring different options

## 2025-02-17 LAB
ANION GAP SERPL CALC-SCNC: 5 MMOL/L — SIGNIFICANT CHANGE UP (ref 5–17)
BUN SERPL-MCNC: 17 MG/DL — SIGNIFICANT CHANGE UP (ref 7–23)
CALCIUM SERPL-MCNC: 8 MG/DL — LOW (ref 8.5–10.1)
CHLORIDE SERPL-SCNC: 110 MMOL/L — HIGH (ref 96–108)
CO2 SERPL-SCNC: 26 MMOL/L — SIGNIFICANT CHANGE UP (ref 22–31)
CREAT SERPL-MCNC: 0.86 MG/DL — SIGNIFICANT CHANGE UP (ref 0.5–1.3)
EGFR: 71 ML/MIN/1.73M2 — SIGNIFICANT CHANGE UP
GLUCOSE SERPL-MCNC: 92 MG/DL — SIGNIFICANT CHANGE UP (ref 70–99)
HCT VFR BLD CALC: 30.3 % — LOW (ref 34.5–45)
HGB BLD-MCNC: 10.2 G/DL — LOW (ref 11.5–15.5)
IMMATURE PLATELET FRACTION #: 1.7 K/UL — LOW (ref 4.7–11.1)
IMMATURE PLATELET FRACTION %: 2.6 % — SIGNIFICANT CHANGE UP (ref 1.6–4.9)
MAGNESIUM SERPL-MCNC: 1.7 MG/DL — SIGNIFICANT CHANGE UP (ref 1.6–2.6)
MCHC RBC-ENTMCNC: 27.5 PG — SIGNIFICANT CHANGE UP (ref 27–34)
MCHC RBC-ENTMCNC: 33.7 G/DL — SIGNIFICANT CHANGE UP (ref 32–36)
MCV RBC AUTO: 81.7 FL — SIGNIFICANT CHANGE UP (ref 80–100)
NRBC # BLD AUTO: 0 K/UL — SIGNIFICANT CHANGE UP (ref 0–0)
NRBC # FLD: 0 K/UL — SIGNIFICANT CHANGE UP (ref 0–0)
NRBC BLD AUTO-RTO: 0 /100 WBCS — SIGNIFICANT CHANGE UP (ref 0–0)
PHOSPHATE SERPL-MCNC: 3.1 MG/DL — SIGNIFICANT CHANGE UP (ref 2.5–4.5)
PLATELET # BLD AUTO: 64 K/UL — LOW (ref 150–400)
PMV BLD: 12.7 FL — SIGNIFICANT CHANGE UP (ref 7–13)
POTASSIUM SERPL-MCNC: 2.9 MMOL/L — CRITICAL LOW (ref 3.5–5.3)
POTASSIUM SERPL-SCNC: 2.9 MMOL/L — CRITICAL LOW (ref 3.5–5.3)
RBC # BLD: 3.71 M/UL — LOW (ref 3.8–5.2)
RBC # FLD: 15.7 % — HIGH (ref 10.3–14.5)
SODIUM SERPL-SCNC: 141 MMOL/L — SIGNIFICANT CHANGE UP (ref 135–145)
WBC # BLD: 4.41 K/UL — SIGNIFICANT CHANGE UP (ref 3.8–10.5)
WBC # FLD AUTO: 4.41 K/UL — SIGNIFICANT CHANGE UP (ref 3.8–10.5)

## 2025-02-17 PROCEDURE — 99232 SBSQ HOSP IP/OBS MODERATE 35: CPT

## 2025-02-17 RX ORDER — MAGNESIUM SULFATE 500 MG/ML
1 SYRINGE (ML) INJECTION ONCE
Refills: 0 | Status: COMPLETED | OUTPATIENT
Start: 2025-02-17 | End: 2025-02-17

## 2025-02-17 RX ADMIN — Medication 40 MILLIEQUIVALENT(S): at 09:57

## 2025-02-17 RX ADMIN — Medication 250 MILLIGRAM(S): at 23:38

## 2025-02-17 RX ADMIN — Medication 25 GRAM(S): at 06:57

## 2025-02-17 RX ADMIN — FUROSEMIDE 20 MILLIGRAM(S): 10 INJECTION INTRAMUSCULAR; INTRAVENOUS at 13:47

## 2025-02-17 RX ADMIN — Medication 40 MILLIEQUIVALENT(S): at 18:18

## 2025-02-17 RX ADMIN — TRAMADOL HYDROCHLORIDE 25 MILLIGRAM(S): 50 TABLET, FILM COATED ORAL at 13:47

## 2025-02-17 RX ADMIN — Medication 250 MILLIGRAM(S): at 12:16

## 2025-02-17 RX ADMIN — FUROSEMIDE 20 MILLIGRAM(S): 10 INJECTION INTRAMUSCULAR; INTRAVENOUS at 06:03

## 2025-02-17 RX ADMIN — Medication 25 GRAM(S): at 13:58

## 2025-02-17 RX ADMIN — MUPIROCIN CALCIUM 1 APPLICATION(S): 20 CREAM TOPICAL at 10:06

## 2025-02-17 RX ADMIN — Medication 25 GRAM(S): at 21:30

## 2025-02-17 RX ADMIN — TRAMADOL HYDROCHLORIDE 25 MILLIGRAM(S): 50 TABLET, FILM COATED ORAL at 14:30

## 2025-02-17 RX ADMIN — Medication 1 TABLET(S): at 09:57

## 2025-02-17 RX ADMIN — Medication 100 GRAM(S): at 11:30

## 2025-02-17 RX ADMIN — TRAMADOL HYDROCHLORIDE 25 MILLIGRAM(S): 50 TABLET, FILM COATED ORAL at 22:20

## 2025-02-17 RX ADMIN — Medication 100 MILLIEQUIVALENT(S): at 11:27

## 2025-02-17 RX ADMIN — TRAMADOL HYDROCHLORIDE 25 MILLIGRAM(S): 50 TABLET, FILM COATED ORAL at 21:31

## 2025-02-17 RX ADMIN — Medication 40 MILLIEQUIVALENT(S): at 13:58

## 2025-02-17 RX ADMIN — TRAMADOL HYDROCHLORIDE 25 MILLIGRAM(S): 50 TABLET, FILM COATED ORAL at 06:03

## 2025-02-17 RX ADMIN — Medication 25 GRAM(S): at 00:07

## 2025-02-17 RX ADMIN — NYSTATIN 1 APPLICATION(S): 100000 CREAM TOPICAL at 09:58

## 2025-02-17 NOTE — PROGRESS NOTE ADULT - ASSESSMENT
75 y/o Female with h/o DM type 2, HTN, lymphedema, lung cancer, colon cancer, thrombocytopenia was admitted on 2/11 for LE pain and redness. Patient states that her LEs were becoming increasingly erythematous and uncomfortable, and she was become concerned for infection. States she did not receive appropriate wound care at [facility]. Denies fever / chills at NH. In ER she received zosyn and vancomycin IV.    #LE cellulitis  #B/l lower legs stasis dermatitis/ lymphedema  #Allergy to keflex, cephalexin  #Morbid obesity   -BC x 2 noted  -legs are improving slowly  -nursing home resident and allergic to cephalosporins  -on vancomycin 750 mg IV q12h and zosyn 3.375 gm IV q8h # 5  -tolerating abx well so far; no side effects noted  -monitor closely in tram of cephalosporins allergy history  -repeat vancomycin level is therapeutic  -elevate legs  -podiatry evaluation appreciated   -local wound care  -continue abx coverage   -plan to change to PO abx in AM if continues to improve  -monitor temps  -f/u CBC  -supportive care  2. Other issues:   -care per medicine    d/w medicine team    Clinical team may change from intravenous to oral antibiotics when the following criteria are met:   1. Patient is clinically improving/stable       a)	Improved signs and symptoms of infection from initial presentation       b)	Afebrile for 24 hours       c)	Leukocytosis trending towards normal range   2. Patient is tolerating oral intake   3. Initial/repeat blood cultures are negative    When above criteria met may change iv antibiotics to doxy PO

## 2025-02-17 NOTE — PROGRESS NOTE ADULT - ASSESSMENT
#b/l lower ext lymphedema with superimposed b/l cellulitis   cellulitis likely related to diabetes  discussed with Dr. Salazar who has seen pt in the past - erythema and warmth is new and consistent with cellulitis   recommends several days of IV abx   c/w vanc and zosyn   vanc adjusted  wound care reccs per podiatry  Doppler: no evidence of DVT in lower ext  2/14: pt seen by Dr. Mitchell from wound care - dressing changed - improvement in erythema noted however will need continued IV abx  vanc adjusted to 750 from 1000  2/15: dressing changed by podiatry  02/16: erythema still present but slowly improving  0217: stable; plan for change to po doxy tomorrow     #chronic thrombocytopenia   dating back years   continue to monitor   hem/onc as an outpatient     #DM   ISS    #H/o HTN / lung cancer / colon cancer   -C/w home meds and f/u outpatient for further management if conditions remain stable during hospitalization     #DVT ppx  -SCDs 2/2 thrombocytopenia  Back to [facility] after clinical improvement  Per brother - back to [facility] tomorrow if [facility] accepting and has bed   #b/l lower ext lymphedema with superimposed b/l cellulitis   cellulitis likely related to diabetes  discussed with Dr. Salazar who has seen pt in the past - erythema and warmth is new and consistent with cellulitis   recommends several days of IV abx   c/w vanc and zosyn   vanc adjusted  wound care reccs per podiatry  Doppler: no evidence of DVT in lower ext  2/14: pt seen by Dr. Mitchell from wound care - dressing changed - improvement in erythema noted however will need continued IV abx  vanc adjusted to 750 from 1000  2/15: dressing changed by podiatry  02/16: erythema still present but slowly improving  0217: stable; plan for change to po doxy tomorrow     #chronic thrombocytopenia   dating back years   continue to monitor   hem/onc as an outpatient     #hypokalemia   repleted     #DM   ISS    #H/o HTN / lung cancer / colon cancer   -C/w home meds and f/u outpatient for further management if conditions remain stable during hospitalization     #DVT ppx  -SCDs 2/2 thrombocytopenia  Back to [facility] after clinical improvement  Per brother - back to [facility] tomorrow if [facility] accepting and has bed

## 2025-02-17 NOTE — PROGRESS NOTE ADULT - TIME BILLING
Time spent  coordinating the patient's care. This includes reviewing documentation pertinent to this admission, results and imaging. Further tests, medications, and procedures have been ordered as indicated. Laboratory results and the plan of care were communicated to the patient. Discussed care plan with consultants including ID. Supporting documentation was completed and added to the patient's chart.
Time spent  coordinating the patient's care. This includes reviewing documentation pertinent to this admission, results and imaging. Further tests, medications, and procedures have been ordered as indicated. Laboratory results and the plan of care were communicated to the patient. Discussed care plan with consultants including . Supporting documentation was completed and added to the patient's chart.
Time spent  coordinating the patient's care. This includes reviewing documentation pertinent to this admission, results and imaging. Further tests, medications, and procedures have been ordered as indicated. Laboratory results and the plan of care were communicated to the patient. Discussed care plan with consultants including ID. Supporting documentation was completed and added to the patient's chart.

## 2025-02-18 ENCOUNTER — TRANSCRIPTION ENCOUNTER (OUTPATIENT)
Age: 75
End: 2025-02-18

## 2025-02-18 VITALS
OXYGEN SATURATION: 97 % | SYSTOLIC BLOOD PRESSURE: 132 MMHG | TEMPERATURE: 99 F | DIASTOLIC BLOOD PRESSURE: 55 MMHG | RESPIRATION RATE: 18 BRPM | HEART RATE: 74 BPM

## 2025-02-18 LAB
ANION GAP SERPL CALC-SCNC: 4 MMOL/L — LOW (ref 5–17)
BUN SERPL-MCNC: 15 MG/DL — SIGNIFICANT CHANGE UP (ref 7–23)
CALCIUM SERPL-MCNC: 8.5 MG/DL — SIGNIFICANT CHANGE UP (ref 8.5–10.1)
CHLORIDE SERPL-SCNC: 110 MMOL/L — HIGH (ref 96–108)
CO2 SERPL-SCNC: 28 MMOL/L — SIGNIFICANT CHANGE UP (ref 22–31)
CREAT SERPL-MCNC: 0.87 MG/DL — SIGNIFICANT CHANGE UP (ref 0.5–1.3)
EGFR: 70 ML/MIN/1.73M2 — SIGNIFICANT CHANGE UP
GLUCOSE SERPL-MCNC: 95 MG/DL — SIGNIFICANT CHANGE UP (ref 70–99)
MAGNESIUM SERPL-MCNC: 1.6 MG/DL — SIGNIFICANT CHANGE UP (ref 1.6–2.6)
POTASSIUM SERPL-MCNC: 3.6 MMOL/L — SIGNIFICANT CHANGE UP (ref 3.5–5.3)
POTASSIUM SERPL-SCNC: 3.6 MMOL/L — SIGNIFICANT CHANGE UP (ref 3.5–5.3)
SODIUM SERPL-SCNC: 142 MMOL/L — SIGNIFICANT CHANGE UP (ref 135–145)

## 2025-02-18 PROCEDURE — 99239 HOSP IP/OBS DSCHRG MGMT >30: CPT

## 2025-02-18 PROCEDURE — 99221 1ST HOSP IP/OBS SF/LOW 40: CPT

## 2025-02-18 RX ORDER — DOXYCYCLINE HYCLATE 100 MG
1 TABLET ORAL
Qty: 20 | Refills: 0
Start: 2025-02-18 | End: 2025-02-27

## 2025-02-18 RX ORDER — HEPARIN SODIUM,PORCINE/NS/PF 20/20 ML
300 SYRINGE (ML) INTRAVENOUS ONCE
Refills: 0 | Status: COMPLETED | OUTPATIENT
Start: 2025-02-18 | End: 2025-02-18

## 2025-02-18 RX ADMIN — TRAMADOL HYDROCHLORIDE 25 MILLIGRAM(S): 50 TABLET, FILM COATED ORAL at 18:38

## 2025-02-18 RX ADMIN — TRAMADOL HYDROCHLORIDE 25 MILLIGRAM(S): 50 TABLET, FILM COATED ORAL at 05:50

## 2025-02-18 RX ADMIN — NYSTATIN 1 APPLICATION(S): 100000 CREAM TOPICAL at 09:05

## 2025-02-18 RX ADMIN — TRAMADOL HYDROCHLORIDE 25 MILLIGRAM(S): 50 TABLET, FILM COATED ORAL at 14:30

## 2025-02-18 RX ADMIN — Medication 250 MILLIGRAM(S): at 09:02

## 2025-02-18 RX ADMIN — FUROSEMIDE 20 MILLIGRAM(S): 10 INJECTION INTRAMUSCULAR; INTRAVENOUS at 05:51

## 2025-02-18 RX ADMIN — Medication 25 GRAM(S): at 05:51

## 2025-02-18 RX ADMIN — FUROSEMIDE 20 MILLIGRAM(S): 10 INJECTION INTRAMUSCULAR; INTRAVENOUS at 14:28

## 2025-02-18 RX ADMIN — Medication 300 UNIT(S): at 16:22

## 2025-02-18 RX ADMIN — Medication 1 TABLET(S): at 09:02

## 2025-02-18 RX ADMIN — TRAMADOL HYDROCHLORIDE 25 MILLIGRAM(S): 50 TABLET, FILM COATED ORAL at 06:45

## 2025-02-18 NOTE — DISCHARGE NOTE PROVIDER - NSDCCPCAREPLAN_GEN_ALL_CORE_FT
PRINCIPAL DISCHARGE DIAGNOSIS  Diagnosis: Cellulitis of lower extremity  Assessment and Plan of Treatment: s/p 7 days IV vanc and zosyn. please complete doxy 100mg po bid x 10 days .   Wound care instructions to be performed every other day for bilateral lower extremity:  1. Please remove all dressings   2. Use Acetic acid-soaked gauze and place throughout the right and left foot in between digits for a 2-3 minutes, as well as to bilateral lower legs.   3. Apply Bactroban (Muprocin) to all the macerated areas in midfoot and forefoot   4. Apply gauze, kerlix and ace all the way up to bilateral knees

## 2025-02-18 NOTE — BH CONSULTATION LIAISON ASSESSMENT NOTE - OTHER
[facility]                       [address] [id number]    PATIENT NAME:       CHIRAG GREEN   YOB: 1992  CSN:                [id number]   MRN:                [id number]  ADMIT DATE:         05/31/2022 06:09:00  PHYSICIAN:          Netta Engel MD                            PROGRESS NOTE    DATE:  06/01/2022 00:00:00    MATERNAL-FETAL MEDICINE PROGRESS NOTE:  I discussed the case and reviewed the   case with Ms. Can, our registered nurse practitioner and I agree and I have   reviewed also the note and the plan of care.  I agree with the note and I   concur with the plan of care.      Since FIDEL is still less than 5 centimeters and since she had some decelerations   earlier in the morning, I had recommended to give the second dose of Celestone   earlier than her scheduled dose.  Would recommend delivery tomorrow or any time   earlier for non-reassuring fetal heart rate or for any change in the   maternal/fetal status.  We will keep her on continuous monitoring until   delivery.      Thank you for the opportunity to be involved in her care.        ______________________________  Netta Engel MD    ZAH/AQS  DD:  06/01/2022  Time:  01:29PM  DT:  06/01/2022  Time:  01:50PM  Job #:  [id number]      PROGRESS NOTE       Social In bed

## 2025-02-18 NOTE — PROGRESS NOTE ADULT - ASSESSMENT
73 y/o Female with h/o DM type 2, HTN, lymphedema, lung cancer, colon cancer, thrombocytopenia was admitted on 2/11 for LE pain and redness. Patient states that her LEs were becoming increasingly erythematous and uncomfortable, and she was become concerned for infection. States she did not receive appropriate wound care at [facility]. Denies fever / chills at NH. In ER she received zosyn and vancomycin IV.    #LE cellulitis  #B/l lower legs stasis dermatitis/ lymphedema  #Allergy to keflex, cephalexin  #Morbid obesity   -BC x 2 noted  -legs are improving slowly  -nursing home resident and allergic to cephalosporins  -on vancomycin 750 mg IV q12h and zosyn 3.375 gm IV q8h # 6  -tolerating abx well so far; no side effects noted  -monitor closely in tram of cephalosporins allergy history  -repeat vancomycin level is therapeutic  -elevate legs  -podiatry evaluation appreciated   -local wound care  -change abx to doxy 100 mg PO q12h for 10 more days  -monitor temps  -f/u CBC  -supportive care  2. Other issues:   -care per medicine    d/w medicine team    Clinical team may change from intravenous to oral antibiotics when the following criteria are met:   1. Patient is clinically improving/stable       a)	Improved signs and symptoms of infection from initial presentation       b)	Afebrile for 24 hours       c)	Leukocytosis trending towards normal range   2. Patient is tolerating oral intake   3. Initial/repeat blood cultures are negative    When above criteria met may change iv antibiotics to doxy PO as above

## 2025-02-18 NOTE — PROGRESS NOTE ADULT - NUTRITIONAL ASSESSMENT
This patient has been assessed with a concern for Malnutrition and has been determined to have a diagnosis/diagnoses of Moderate protein-calorie malnutrition and Morbid obesity (BMI > 40).    This patient is being managed with:   Diet Regular-  Consistent Carbohydrate {Evening Snack} (CSTCHOSN)  DASH/TLC {Sodium & Cholesterol Restricted} (DASH)  Entered: Feb 12 2025 12:58AM  

## 2025-02-18 NOTE — BH CONSULTATION LIAISON ASSESSMENT NOTE - HPI (INCLUDE ILLNESS QUALITY, SEVERITY, DURATION, TIMING, CONTEXT, MODIFYING FACTORS, ASSOCIATED SIGNS AND SYMPTOMS)
Patient a 73 y/o retired , domiciled alone, never , no children, no past psychiatric hx, no prior SI/SA, no drug abuse hx, multiple medical issues  DM, HTN, B/L LE Lymphedema, lung cancer, colon cancer, thrombocytopenia,  p/w LE pain and redness. Patient states that her LEs were becoming increasingly erythematous and uncomfortable, and she was becoming concerned for infection.    Patient in bed AAOX3, endorses that she is blessed, has been tested, and wants to talk with somebody so she gets there gracefully. She lived alone, was a school-teacher for 42 years, never , feels lonely now as she is not able to walk due to LE edema and not able to function the y she used to. She denied any S/H/I/P, has fair sleep/appetite, no prior Psychiatric Intervention. She is concerned because of her medical issues and was wondering how much she can take she wanted to talk wit somebody to help diffuse her situation. She has A 24/7 through [employer] and she pays the difference, she has somebody to help with laundry, cooking etc. She is bound for [facility]  and was advised that she once she finishes [facility] to let them know about [facility] appointment for Therapy. She endorses that's he used to volunteer for [facility] and agreed with the plan.

## 2025-02-18 NOTE — PROGRESS NOTE ADULT - REASON FOR ADMISSION
LE pain and redness

## 2025-02-18 NOTE — DISCHARGE NOTE PROVIDER - NSDCPNSUBOBJ_GEN_ALL_CORE
HOSPITALIST ATTENDING PROGRESS NOTE    Chart and meds reviewed.  Patient seen and examined.    CC: cellulitis    Subjective: no acute events    All other systems reviewed and found to be negative with the exception of what has been described above.    MEDICATIONS  (STANDING):  acetic acid 0.25% Topical Irrigation 1 Application(s) Topical daily  furosemide    Tablet 20 milliGRAM(s) Oral two times a day  lactobacillus acidophilus 1 Tablet(s) Oral daily  mupirocin 2% Ointment 1 Application(s) Topical <User Schedule>  nystatin Powder 1 Application(s) Topical <User Schedule>  piperacillin/tazobactam IVPB.. 3.375 Gram(s) IV Intermittent every 8 hours  senna 2 Tablet(s) Oral at bedtime  vancomycin  IVPB 750 milliGRAM(s) IV Intermittent every 12 hours    MEDICATIONS  (PRN):  acetaminophen     Tablet .. 650 milliGRAM(s) Oral every 6 hours PRN Mild Pain (1 - 3)  polyethylene glycol 3350 17 Gram(s) Oral daily PRN Constipation  traMADol 25 milliGRAM(s) Oral every 4 hours PRN Moderate Pain (4 - 6)      PHYSICAL EXAM:  Gen: NAD  CV:  +S1, +S2, regular, no murmurs or rubs  RESP:   lungs clear to auscultation bilaterally, no wheezing, rales, rhonchi, good air entry bilaterally  GI:  abdomen soft, non-tender, non-distended, normal BS, no bruits, no abdominal masses, no palpable masses  :  not examined  MSK:   normal muscle tone, no atrophy, no rigidity, no contractions  EXT: very large b/l lower ext; with chronic stasis dermatitis and superimposed erythema - improved from admission  NEURO:  AAOX3, no focal neurological deficits, follows all commands, able to move extremities spontaneously    LABS:                            10.2   4.41  )-----------( 64       ( 17 Feb 2025 06:23 )             30.3     02-18    142  |  110[H]  |  15  ----------------------------<  95  3.6   |  28  |  0.87    Ca    8.5      18 Feb 2025 06:36  Phos  3.1     02-17  Mg     1.6     02-18              Urinalysis Basic - ( 18 Feb 2025 06:36 )    Color: x / Appearance: x / SG: x / pH: x  Gluc: 95 mg/dL / Ketone: x  / Bili: x / Urobili: x   Blood: x / Protein: x / Nitrite: x   Leuk Esterase: x / RBC: x / WBC x   Sq Epi: x / Non Sq Epi: x / Bacteria: x

## 2025-02-18 NOTE — PROGRESS NOTE ADULT - PROVIDER SPECIALTY LIST ADULT
Hospitalist
Hospitalist
Infectious Disease
Infectious Disease
Podiatry
Infectious Disease
Podiatry
Infectious Disease
Podiatry
Hospitalist

## 2025-02-18 NOTE — DISCHARGE NOTE PROVIDER - CARE PROVIDER_API CALL
Carloz Mitchell  Foot and Ankle Surgery  [address], [address]  [place] [address]  Phone: [phone]  Fax: [phone]  Follow Up Time: 1 week

## 2025-02-18 NOTE — DISCHARGE NOTE PROVIDER - DETAILS OF MALNUTRITION DIAGNOSIS/DIAGNOSES
This patient has been assessed with a concern for Malnutrition and was treated during this hospitalization for the following Nutrition diagnosis/diagnoses:     -  02/12/2025: Moderate protein-calorie malnutrition   -  02/12/2025: Morbid obesity (BMI > 40)

## 2025-02-18 NOTE — PROGRESS NOTE ADULT - SUBJECTIVE AND OBJECTIVE BOX
HOSPITALIST ATTENDING PROGRESS NOTE    Chart and meds reviewed.  Patient seen and examined.    CC:LE pain and redness    Subjective: no acute complaints; afebrile    All other systems reviewed and found to be negative with the exception of what has been described above.    MEDICATIONS  (STANDING):  acetic acid 0.25% Topical Irrigation 1 Application(s) Topical daily  furosemide    Tablet 20 milliGRAM(s) Oral two times a day  lactobacillus acidophilus 1 Tablet(s) Oral daily  mupirocin 2% Ointment 1 Application(s) Topical <User Schedule>  nystatin Powder 1 Application(s) Topical <User Schedule>  piperacillin/tazobactam IVPB.. 3.375 Gram(s) IV Intermittent every 8 hours  senna 2 Tablet(s) Oral at bedtime  vancomycin  IVPB 750 milliGRAM(s) IV Intermittent every 12 hours    MEDICATIONS  (PRN):  acetaminophen     Tablet .. 650 milliGRAM(s) Oral every 6 hours PRN Mild Pain (1 - 3)  polyethylene glycol 3350 17 Gram(s) Oral daily PRN Constipation  traMADol 25 milliGRAM(s) Oral every 4 hours PRN Moderate Pain (4 - 6)      PHYSICAL EXAM:  Gen: NAD  CV:  +S1, +S2, regular, no murmurs or rubs  RESP:   lungs clear to auscultation bilaterally, no wheezing, rales, rhonchi, good air entry bilaterally  GI:  abdomen soft, non-tender, non-distended, normal BS, no bruits, no abdominal masses, no palpable masses  :  not examined  MSK:   normal muscle tone, no atrophy, no rigidity, no contractions  EXT: very large b/l lower ext; with chronic stasis dermatitis and superimposed erythema - improved from admission  NEURO:  AAOX3, no focal neurological deficits, follows all commands, able to move extremities spontaneously  
2/13: pt seen by podiatry today with attending present. NAD, resting bedside, states NORBERTO LE discomfort improving.     PMH: No pertinent past medical history    Lymphatic edema    Hypertension    Type 2 diabetes mellitus    Lung cancer    Colon cancer    Acquired thrombocytopenia    H/O metabolic acidosis with normal anion gap    Moderate obesity      PSH:No significant past surgical history        Allergies:tramadol (Unknown)  Keflex (Anaphylaxis)  cephalexin (Unknown)      Labs:                        10.2   3.39  )-----------( 67       ( 13 Feb 2025 05:47 )             30.8   02-13    141  |  112[H]  |  21  ----------------------------<  101[H]  3.9   |  24  |  0.98    Ca    8.3[L]      13 Feb 2025 05:47  Mg     1.8     02-13    TPro  5.2[L]  /  Alb  2.2[L]  /  TBili  1.2  /  DBili  x   /  AST  23  /  ALT  17  /  AlkPhos  81  02-12  Vital Signs Last 24 Hrs  T(C): 36.1 (13 Feb 2025 08:04), Max: 36.9 (13 Feb 2025 00:56)  T(F): 97 (13 Feb 2025 08:04), Max: 98.4 (13 Feb 2025 00:56)  HR: 70 (13 Feb 2025 08:04) (63 - 70)  BP: 104/56 (13 Feb 2025 08:04) (104/56 - 120/50)  BP(mean): --  RR: 16 (13 Feb 2025 08:04) (16 - 18)  SpO2: 98% (13 Feb 2025 08:04) (97% - 100%)    Parameters below as of 13 Feb 2025 08:04  Patient On (Oxygen Delivery Method): room air      REVIEW OF SYSTEMS:    CONSTITUTIONAL: No weakness, fevers or chills  EYES: No visual changes  RESPIRATORY: No cough, wheezing; No shortness of breath  CARDIOVASCULAR: No chest pain or palpitations  GASTROINTESTINAL: No abdominal or epigastric pain. No nausea, vomiting; No diarrhea or constipation.   GENITOURINARY: No dysuria, frequency or hematuria  NEUROLOGICAL: No numbness or weakness  SKIN: See physical examination.  All other review of systems is negative unless indicated above    Physical Exam:   Constitutional: NAD, alert;  Lower Extremity Focus  Derm:  Skin warm, dry and supple bilateral.  Significant generalized lichenification and verrucous changes to skin extending from below the knees to feet bilaterally, cobblestone like papules and nodules extending from below knee to feet bilaterally. Noted hemosiderin deposition to BLE. Noted erythema to bilateral lower legs.  Vascular: Dorsalis Pedis and Posterior Tibial pulses non palpable due to significant edema to BLE.    Neuro: Protective sensation intact to the level of the digits bilateral.  MSK: Muscle strength 4/5 all major muscle groups bilateral.     < from: US Duplex Venous Lower Ext Complete, Bilateral (02.11.25 @ 13:33) >  FINDINGS:    RIGHT:  Normal compressibility of the RIGHT common femoral, femoral and popliteal   veins.  Doppler examination shows normal spontaneous and phasic flow.  Limited visualization of the RIGHT calf veins. No RIGHT calf vein   thrombosis detected.    LEFT:  Normal compressibility of the LEFT common femoral, femoral and popliteal   veins.  Doppler examination shows normal spontaneous and phasic flow.  No LEFT calf vein thrombosis is detected.    IMPRESSION:  No evidence of deep venous thrombosis in either lower extremity.    < end of copied text >    
2/18: pt seen by podiatry today. Pt was resting comfortably bedside. Pt reports NORBERTO LE discomfort improving; swelling getting better.       PMH: No pertinent past medical history    Lymphatic edema    Hypertension    Type 2 diabetes mellitus    Lung cancer    Colon cancer    Acquired thrombocytopenia    H/O metabolic acidosis with normal anion gap    Moderate obesity      PSH:No significant past surgical history        Allergies: tramadol (Unknown)  Keflex (Anaphylaxis)  cephalexin (Unknown)      Labs:                           10.2   4.41  )-----------( 64       ( 17 Feb 2025 06:23 )             30.3                 02-18    142  |  110[H]  |  15  ----------------------------<  95  3.6   |  28  |  0.87    Ca    8.5      18 Feb 2025 06:36  Phos  3.1     02-17  Mg     1.6     02-18    Vital Signs Last 24 Hrs  T(C): 36.6 (18 Feb 2025 07:50), Max: 36.9 (17 Feb 2025 23:58)  T(F): 97.9 (18 Feb 2025 07:50), Max: 98.4 (17 Feb 2025 23:58)  HR: 70 (18 Feb 2025 07:50) (65 - 73)  BP: 131/51 (18 Feb 2025 07:50) (116/52 - 131/51)  BP(mean): --  RR: 18 (18 Feb 2025 07:50) (18 - 18)  SpO2: 94% (18 Feb 2025 07:50) (94% - 96%)    Parameters below as of 18 Feb 2025 07:50  Patient On (Oxygen Delivery Method): room air        REVIEW OF SYSTEMS:    CONSTITUTIONAL: No weakness, fevers or chills  EYES: No visual changes  RESPIRATORY: No cough, wheezing; No shortness of breath  CARDIOVASCULAR: No chest pain or palpitations  GASTROINTESTINAL: No abdominal or epigastric pain. No nausea, vomiting; No diarrhea or constipation.   GENITOURINARY: No dysuria, frequency or hematuria  NEUROLOGICAL: No numbness or weakness  SKIN: See physical examination.  All other review of systems is negative unless indicated above    Physical Exam:   Constitutional: NAD, alert;  Lower Extremity Focus  Derm:  Skin warm, dry and supple bilateral.  Significant generalized lichenification and verrucous changes to skin extending from below the knees to feet bilaterally, cobblestone like papules and nodules extending from below knee to feet bilaterally. Noted hemosiderin deposition to BLE. Noted erythema to bilateral lower legs.  Vascular: Dorsalis Pedis and Posterior Tibial pulses non palpable due to significant edema to BLE.    Neuro: Protective sensation intact to the level of the digits bilateral.  MSK: Muscle strength 4/5 all major muscle groups bilateral.     < from: US Duplex Venous Lower Ext Complete, Bilateral (02.11.25 @ 13:33) >  FINDINGS:    RIGHT:  Normal compressibility of the RIGHT common femoral, femoral and popliteal   veins.  Doppler examination shows normal spontaneous and phasic flow.  Limited visualization of the RIGHT calf veins. No RIGHT calf vein   thrombosis detected.    LEFT:  Normal compressibility of the LEFT common femoral, femoral and popliteal   veins.  Doppler examination shows normal spontaneous and phasic flow.  No LEFT calf vein thrombosis is detected.    IMPRESSION:  No evidence of deep venous thrombosis in either lower extremity.    < end of copied text >    
Date of service: 02-13-25 @ 12:25    Lying in bed in NAD  Alert and verbal  Has lower legs pain  Legs feel less tender  No fever    ROS: no fever or chills; denies dizziness, no HA, no SOB or cough, no abdominal pain, no diarrhea or constipation; no dysuria, no rashes    MEDICATIONS  (STANDING):  dextrose 5%. 1000 milliLiter(s) (50 mL/Hr) IV Continuous <Continuous>  dextrose 5%. 1000 milliLiter(s) (100 mL/Hr) IV Continuous <Continuous>  dextrose 50% Injectable 25 Gram(s) IV Push once  dextrose 50% Injectable 12.5 Gram(s) IV Push once  dextrose 50% Injectable 25 Gram(s) IV Push once  furosemide    Tablet 20 milliGRAM(s) Oral two times a day  glucagon  Injectable 1 milliGRAM(s) IntraMuscular once  insulin lispro (ADMELOG) corrective regimen sliding scale   SubCutaneous three times a day before meals  insulin lispro (ADMELOG) corrective regimen sliding scale   SubCutaneous at bedtime  lactobacillus acidophilus 1 Tablet(s) Oral daily  mupirocin 2% Ointment 1 Application(s) Topical <User Schedule>  nystatin Powder 1 Application(s) Topical <User Schedule>  piperacillin/tazobactam IVPB.. 3.375 Gram(s) IV Intermittent every 8 hours  senna 2 Tablet(s) Oral at bedtime  vancomycin  IVPB 1000 milliGRAM(s) IV Intermittent every 12 hours    Vital Signs Last 24 Hrs  T(C): 36.1 (13 Feb 2025 08:04), Max: 36.9 (13 Feb 2025 00:56)  T(F): 97 (13 Feb 2025 08:04), Max: 98.4 (13 Feb 2025 00:56)  HR: 70 (13 Feb 2025 08:04) (63 - 70)  BP: 104/56 (13 Feb 2025 08:04) (104/56 - 120/50)  BP(mean): --  RR: 16 (13 Feb 2025 08:04) (16 - 18)  SpO2: 98% (13 Feb 2025 08:04) (97% - 100%)    Parameters below as of 13 Feb 2025 08:04  Patient On (Oxygen Delivery Method): room air     Physical exam:    Constitutional:  No acute distress  HEENT: NC/AT, EOMI, PERRLA, conjunctivae clear; ears and nose atraumatic; pharynx benign  Neck: supple; thyroid not palpable  Back: no tenderness  Respiratory: respiratory effort normal; clear to auscultation  Cardiovascular: S1S2 regular, no murmurs  Abdomen: soft, not tender, not distended, positive BS; no liver or spleen organomegaly  Genitourinary: no suprapubic tenderness  Lymphatic: no LN palpable  Musculoskeletal: no muscle tenderness, no joint swelling or tenderness  Extremities: 2+ pedal edema, chronic skin changes  B/l lower leg extensive erythema, edema, warmth and tenderness; multiple skin breaks on feet, ankles, calfs and shins; extending to b/l lower thighs  Neurological/ Psychiatric: AxOx3, judgement and insight normal; moving all extremities  Skin: no rashes; no palpable lesions    Labs: all available labs reviewed                        10.0   3.39  )-----------( 67       ( 12 Feb 2025 07:01 )             30.5     02-12    141  |  112[H]  |  21  ----------------------------<  99  3.4[L]   |  23  |  0.99    Ca    8.6      12 Feb 2025 07:01    TPro  5.2[L]  /  Alb  2.2[L]  /  TBili  1.2  /  DBili  x   /  AST  23  /  ALT  17  /  AlkPhos  81  02-12     LIVER FUNCTIONS - ( 12 Feb 2025 07:01 )  Alb: 2.2 g/dL / Pro: 5.2 gm/dL / ALK PHOS: 81 U/L / ALT: 17 U/L / AST: 23 U/L / GGT: x           Culture - Blood (collected 11 Feb 2025 12:51)  Source: .Blood BLOOD  Preliminary Report (12 Feb 2025 20:01):    No growth at 24 hours    Culture - Blood (collected 11 Feb 2025 12:51)  Source: .Blood BLOOD  Preliminary Report (12 Feb 2025 22:01):    No growth at 24 hours    Radiology: all available radiological tests reviewed    Advanced directives addressed: full resuscitation
Date of service: 02-14-25 @ 12:17    Lying in bed in NAD  Lower legs feel les swollen  Has lower legs tenderness  No fever  Reported with vancomycin trough level high    ROS: no fever or chills; denies dizziness, no HA, no SOB or cough, no abdominal pain, no diarrhea or constipation; no dysuria, no rashes    MEDICATIONS  (STANDING):  acetic acid 0.25% Topical Irrigation 1 Application(s) Topical daily  dextrose 5%. 1000 milliLiter(s) (100 mL/Hr) IV Continuous <Continuous>  dextrose 5%. 1000 milliLiter(s) (50 mL/Hr) IV Continuous <Continuous>  dextrose 50% Injectable 25 Gram(s) IV Push once  dextrose 50% Injectable 12.5 Gram(s) IV Push once  dextrose 50% Injectable 25 Gram(s) IV Push once  furosemide    Tablet 20 milliGRAM(s) Oral two times a day  glucagon  Injectable 1 milliGRAM(s) IntraMuscular once  insulin lispro (ADMELOG) corrective regimen sliding scale   SubCutaneous three times a day before meals  insulin lispro (ADMELOG) corrective regimen sliding scale   SubCutaneous at bedtime  lactobacillus acidophilus 1 Tablet(s) Oral daily  mupirocin 2% Ointment 1 Application(s) Topical <User Schedule>  nystatin Powder 1 Application(s) Topical <User Schedule>  piperacillin/tazobactam IVPB.. 3.375 Gram(s) IV Intermittent every 8 hours  senna 2 Tablet(s) Oral at bedtime  vancomycin  IVPB 750 milliGRAM(s) IV Intermittent every 12 hours    Vital Signs Last 24 Hrs  T(C): 36.6 (14 Feb 2025 08:17), Max: 36.9 (13 Feb 2025 15:41)  T(F): 97.9 (14 Feb 2025 08:17), Max: 98.4 (13 Feb 2025 15:41)  HR: 66 (14 Feb 2025 08:17) (61 - 67)  BP: 123/50 (14 Feb 2025 08:17) (107/83 - 123/50)  BP(mean): --  RR: 18 (14 Feb 2025 08:17) (17 - 18)  SpO2: 98% (14 Feb 2025 08:17) (97% - 99%)    Parameters below as of 14 Feb 2025 08:17  Patient On (Oxygen Delivery Method): room air     Physical exam:    Constitutional:  No acute distress  HEENT: NC/AT, EOMI, PERRLA, conjunctivae clear; ears and nose atraumatic; pharynx benign  Neck: supple; thyroid not palpable  Back: no tenderness  Respiratory: respiratory effort normal; clear to auscultation  Cardiovascular: S1S2 regular, no murmurs  Abdomen: soft, not tender, not distended, positive BS; no liver or spleen organomegaly  Genitourinary: no suprapubic tenderness  Lymphatic: no LN palpable  Musculoskeletal: no muscle tenderness, no joint swelling or tenderness  Extremities: 2+ pedal edema, chronic skin changes  B/l lower leg extensive erythema, edema, warmth and tenderness; multiple skin breaks on feet, ankles, calfs and shins; extending to b/l lower thighs - improving  Neurological/ Psychiatric: AxOx3, judgement and insight normal; moving all extremities  Skin: no rashes; no palpable lesions    Labs: reviewed                        9.8    3.08  )-----------( 62       ( 14 Feb 2025 04:42 )             29.9     02-14    142  |  111[H]  |  21  ----------------------------<  95  3.5   |  24  |  0.89    Ca    7.9[L]      14 Feb 2025 04:42  Mg     1.8     02-13    Vancomycin Level, Trough: 21.8 ug/mL (02-14 @ 04:42)  Vancomycin Level, Trough: 20.5 ug/mL (02-13 @ 17:03)  Vancomycin Level, Trough: 18.9 ug/mL (02-13 @ 05:47)  Vancomycin Level, Trough: 29.1 ug/mL (02-12 @ 19:23)    C-Reactive Protein: 4.6 mg/mL (02-14-25 @ 04:42)                        10.0   3.39  )-----------( 67       ( 12 Feb 2025 07:01 )             30.5     02-12    141  |  112[H]  |  21  ----------------------------<  99  3.4[L]   |  23  |  0.99    Ca    8.6      12 Feb 2025 07:01    TPro  5.2[L]  /  Alb  2.2[L]  /  TBili  1.2  /  DBili  x   /  AST  23  /  ALT  17  /  AlkPhos  81  02-12     LIVER FUNCTIONS - ( 12 Feb 2025 07:01 )  Alb: 2.2 g/dL / Pro: 5.2 gm/dL / ALK PHOS: 81 U/L / ALT: 17 U/L / AST: 23 U/L / GGT: x           Culture - Blood (collected 11 Feb 2025 12:51)  Source: .Blood BLOOD  Preliminary Report (12 Feb 2025 20:01):    No growth at 24 hours    Culture - Blood (collected 11 Feb 2025 12:51)  Source: .Blood BLOOD  Preliminary Report (12 Feb 2025 22:01):    No growth at 24 hours    Radiology: all available radiological tests reviewed    Advanced directives addressed: full resuscitation
Date of service: 02-17-25 @ 14:27    Lying in bed in NAD  Lower legs erythema is improving  IV placed using her power port due to poor access  Low grade fever    ROS: no fever or chills; denies dizziness, no HA, no SOB or cough, no abdominal pain, no diarrhea or constipation; no dysuria, no rashes    MEDICATIONS  (STANDING):  acetic acid 0.25% Topical Irrigation 1 Application(s) Topical daily  furosemide    Tablet 20 milliGRAM(s) Oral two times a day  lactobacillus acidophilus 1 Tablet(s) Oral daily  mupirocin 2% Ointment 1 Application(s) Topical <User Schedule>  nystatin Powder 1 Application(s) Topical <User Schedule>  piperacillin/tazobactam IVPB.. 3.375 Gram(s) IV Intermittent every 8 hours  potassium chloride    Tablet ER 40 milliEquivalent(s) Oral every 4 hours  senna 2 Tablet(s) Oral at bedtime  vancomycin  IVPB 750 milliGRAM(s) IV Intermittent every 12 hours    Vital Signs Last 24 Hrs  T(C): 36.7 (17 Feb 2025 07:29), Max: 37.2 (17 Feb 2025 00:01)  T(F): 98.1 (17 Feb 2025 07:29), Max: 99 (17 Feb 2025 00:01)  HR: 65 (17 Feb 2025 13:47) (65 - 71)  BP: 117/69 (17 Feb 2025 13:47) (107/43 - 120/58)  BP(mean): --  RR: 17 (17 Feb 2025 07:29) (16 - 18)  SpO2: 97% (17 Feb 2025 07:29) (96% - 97%)    Parameters below as of 17 Feb 2025 07:29  Patient On (Oxygen Delivery Method): room air     Physical exam:    Constitutional:  No acute distress  HEENT: NC/AT, EOMI, PERRLA, conjunctivae clear; ears and nose atraumatic; pharynx benign  Neck: supple; thyroid not palpable  Back: no tenderness  Respiratory: respiratory effort normal; clear to auscultation  Cardiovascular: S1S2 regular, no murmurs  Abdomen: soft, not tender, not distended, positive BS; no liver or spleen organomegaly  Genitourinary: no suprapubic tenderness  Lymphatic: no LN palpable  Musculoskeletal: no muscle tenderness, no joint swelling or tenderness  Extremities: 2+ pedal edema, chronic skin changes  B/l lower leg erythema, edema, warmth and tenderness; multiple skin breaks on feet, ankles, calfs and shins; extending to b/l lower thighs - much improved  Neurological/ Psychiatric: AxOx3, moving all extremities  Skin: no rashes; no palpable lesions    Labs: reviewed                        10.2   4.41  )-----------( 64       ( 17 Feb 2025 06:23 )             30.3     02-17    141  |  110[H]  |  17  ----------------------------<  92  2.9[LL]   |  26  |  0.86    Ca    8.0[L]      17 Feb 2025 06:23  Phos  3.1     02-17  Mg     1.7     02-17    Vancomycin Level, Trough: 19.4 ug/mL (02-16 @ 09:29)  C-Reactive Protein: 4.6 mg/mL (02-14-25 @ 04:42)                          9.8    3.08  )-----------( 62       ( 14 Feb 2025 04:42 )             29.9     02-14    142  |  111[H]  |  21  ----------------------------<  95  3.5   |  24  |  0.89    Ca    7.9[L]      14 Feb 2025 04:42    Vancomycin Level, Trough: 21.8 ug/mL (02-14 @ 04:42)  Vancomycin Level, Trough: 20.5 ug/mL (02-13 @ 17:03)  C-Reactive Protein: 4.6 mg/mL (02-14-25 @ 04:42)                        9.8    3.08  )-----------( 62       ( 14 Feb 2025 04:42 )             29.9     02-14    142  |  111[H]  |  21  ----------------------------<  95  3.5   |  24  |  0.89    Ca    7.9[L]      14 Feb 2025 04:42  Mg     1.8     02-13    Vancomycin Level, Trough: 21.8 ug/mL (02-14 @ 04:42)  Vancomycin Level, Trough: 20.5 ug/mL (02-13 @ 17:03)  Vancomycin Level, Trough: 18.9 ug/mL (02-13 @ 05:47)  Vancomycin Level, Trough: 29.1 ug/mL (02-12 @ 19:23)    C-Reactive Protein: 4.6 mg/mL (02-14-25 @ 04:42)                        10.0   3.39  )-----------( 67       ( 12 Feb 2025 07:01 )             30.5     02-12    141  |  112[H]  |  21  ----------------------------<  99  3.4[L]   |  23  |  0.99    Ca    8.6      12 Feb 2025 07:01    TPro  5.2[L]  /  Alb  2.2[L]  /  TBili  1.2  /  DBili  x   /  AST  23  /  ALT  17  /  AlkPhos  81  02-12     LIVER FUNCTIONS - ( 12 Feb 2025 07:01 )  Alb: 2.2 g/dL / Pro: 5.2 gm/dL / ALK PHOS: 81 U/L / ALT: 17 U/L / AST: 23 U/L / GGT: x           Culture - Blood (collected 11 Feb 2025 12:51)  Source: .Blood BLOOD  Preliminary Report (12 Feb 2025 20:01):    No growth at 24 hours    Culture - Blood (collected 11 Feb 2025 12:51)  Source: .Blood BLOOD  Preliminary Report (12 Feb 2025 22:01):    No growth at 24 hours    Radiology: all available radiological tests reviewed    Advanced directives addressed: full resuscitation
HOSPITALIST ATTENDING PROGRESS NOTE    Chart and meds reviewed.  Patient seen and examined.    CC: cellulitis    Subjective: no acute complaints    All other systems reviewed and found to be negative with the exception of what has been described above.    MEDICATIONS  (STANDING):  dextrose 5%. 1000 milliLiter(s) (50 mL/Hr) IV Continuous <Continuous>  dextrose 5%. 1000 milliLiter(s) (100 mL/Hr) IV Continuous <Continuous>  dextrose 50% Injectable 25 Gram(s) IV Push once  dextrose 50% Injectable 12.5 Gram(s) IV Push once  dextrose 50% Injectable 25 Gram(s) IV Push once  furosemide    Tablet 20 milliGRAM(s) Oral two times a day  glucagon  Injectable 1 milliGRAM(s) IntraMuscular once  insulin lispro (ADMELOG) corrective regimen sliding scale   SubCutaneous three times a day before meals  insulin lispro (ADMELOG) corrective regimen sliding scale   SubCutaneous at bedtime  lactobacillus acidophilus 1 Tablet(s) Oral daily  mupirocin 2% Ointment 1 Application(s) Topical <User Schedule>  nystatin Powder 1 Application(s) Topical <User Schedule>  piperacillin/tazobactam IVPB.. 3.375 Gram(s) IV Intermittent every 8 hours  senna 2 Tablet(s) Oral at bedtime  vancomycin  IVPB 1000 milliGRAM(s) IV Intermittent every 12 hours    MEDICATIONS  (PRN):  acetaminophen     Tablet .. 650 milliGRAM(s) Oral every 6 hours PRN Mild Pain (1 - 3)  dextrose Oral Gel 15 Gram(s) Oral once PRN Blood Glucose LESS THAN 70 milliGRAM(s)/deciliter  polyethylene glycol 3350 17 Gram(s) Oral daily PRN Constipation  traMADol 25 milliGRAM(s) Oral every 4 hours PRN Moderate Pain (4 - 6)      PHYSICAL EXAM:  Gen: NAD  CV:  +S1, +S2, regular, no murmurs or rubs  RESP:   lungs clear to auscultation bilaterally, no wheezing, rales, rhonchi, good air entry bilaterally  GI:  abdomen soft, non-tender, non-distended, normal BS, no bruits, no abdominal masses, no palpable masses  :  not examined  MSK:   normal muscle tone, no atrophy, no rigidity, no contractions  EXT: very large b/l lower extremities with erythema and warmth b/l up to just below knees;  NEURO:  AAOX3, no focal neurological deficits, follows all commands, able to move extremities spontaneously    LABS:                            10.0   3.39  )-----------( 67       ( 12 Feb 2025 07:01 )             30.5     02-12    141  |  112[H]  |  21  ----------------------------<  99  3.4[L]   |  23  |  0.99    Ca    8.6      12 Feb 2025 07:01    TPro  5.2[L]  /  Alb  2.2[L]  /  TBili  1.2  /  DBili  x   /  AST  23  /  ALT  17  /  AlkPhos  81  02-12        LIVER FUNCTIONS - ( 12 Feb 2025 07:01 )  Alb: 2.2 g/dL / Pro: 5.2 gm/dL / ALK PHOS: 81 U/L / ALT: 17 U/L / AST: 23 U/L / GGT: x           PT/INR - ( 12 Feb 2025 07:01 )   PT: 14.2 sec;   INR: 1.21 ratio         PTT - ( 12 Feb 2025 07:01 )  PTT:33.0 sec  Urinalysis Basic - ( 12 Feb 2025 07:01 )    Color: x / Appearance: x / SG: x / pH: x  Gluc: 99 mg/dL / Ketone: x  / Bili: x / Urobili: x   Blood: x / Protein: x / Nitrite: x   Leuk Esterase: x / RBC: x / WBC x   Sq Epi: x / Non Sq Epi: x / Bacteria: x              CULTURES:      Additional results/Imaging, I have personally reviewed:    Telemetry, personally reviewed:
HOSPITALIST ATTENDING PROGRESS NOTE    Chart and meds reviewed.  Patient seen and examined.    CC: cellulitis    Subjective: no acute events; cellulitis improving; feels overwhelmed     All other systems reviewed and found to be negative with the exception of what has been described above.    MEDICATIONS  (STANDING):  acetic acid 0.25% Topical Irrigation 1 Application(s) Topical daily  furosemide    Tablet 20 milliGRAM(s) Oral two times a day  lactobacillus acidophilus 1 Tablet(s) Oral daily  mupirocin 2% Ointment 1 Application(s) Topical <User Schedule>  nystatin Powder 1 Application(s) Topical <User Schedule>  piperacillin/tazobactam IVPB.. 3.375 Gram(s) IV Intermittent every 8 hours  potassium chloride    Tablet ER 40 milliEquivalent(s) Oral every 4 hours  senna 2 Tablet(s) Oral at bedtime  vancomycin  IVPB 750 milliGRAM(s) IV Intermittent every 12 hours    MEDICATIONS  (PRN):  acetaminophen     Tablet .. 650 milliGRAM(s) Oral every 6 hours PRN Mild Pain (1 - 3)  polyethylene glycol 3350 17 Gram(s) Oral daily PRN Constipation  traMADol 25 milliGRAM(s) Oral every 4 hours PRN Moderate Pain (4 - 6)      PHYSICAL EXAM:  Gen: NAD  CV:  +S1, +S2, regular, no murmurs or rubs  RESP:   lungs clear to auscultation bilaterally, no wheezing, rales, rhonchi, good air entry bilaterally  GI:  abdomen soft, non-tender, non-distended, normal BS, no bruits, no abdominal masses, no palpable masses  :  not examined  MSK:   normal muscle tone, no atrophy, no rigidity, no contractions  EXT: very large b/l lower ext; with chronic stasis dermatitis and superimposed erythema - improved from admission  NEURO:  AAOX3, no focal neurological deficits, follows all commands, able to move extremities spontaneously    LABS:                            10.2   4.41  )-----------( 64       ( 17 Feb 2025 06:23 )             30.3     02-17    141  |  110[H]  |  17  ----------------------------<  92  2.9[LL]   |  26  |  0.86    Ca    8.0[L]      17 Feb 2025 06:23  Phos  3.1     02-17  Mg     1.7     02-17              Urinalysis Basic - ( 17 Feb 2025 06:23 )    Color: x / Appearance: x / SG: x / pH: x  Gluc: 92 mg/dL / Ketone: x  / Bili: x / Urobili: x   Blood: x / Protein: x / Nitrite: x   Leuk Esterase: x / RBC: x / WBC x   Sq Epi: x / Non Sq Epi: x / Bacteria: x        
Date of service: 02-18-25 @ 12:37    Lying in bed in NAD  Legs feel less swollen  Erythema resolving    ROS: no fever or chills; denies dizziness, no HA, no SOB or cough, no abdominal pain, no diarrhea or constipation; no dysuria, no legs pain, no rashes    MEDICATIONS  (STANDING):  acetic acid 0.25% Topical Irrigation 1 Application(s) Topical daily  furosemide    Tablet 20 milliGRAM(s) Oral two times a day  lactobacillus acidophilus 1 Tablet(s) Oral daily  mupirocin 2% Ointment 1 Application(s) Topical <User Schedule>  nystatin Powder 1 Application(s) Topical <User Schedule>  piperacillin/tazobactam IVPB.. 3.375 Gram(s) IV Intermittent every 8 hours  senna 2 Tablet(s) Oral at bedtime  vancomycin  IVPB 750 milliGRAM(s) IV Intermittent every 12 hours    Vital Signs Last 24 Hrs  T(C): 36.6 (18 Feb 2025 07:50), Max: 36.9 (17 Feb 2025 23:58)  T(F): 97.9 (18 Feb 2025 07:50), Max: 98.4 (17 Feb 2025 23:58)  HR: 70 (18 Feb 2025 07:50) (65 - 73)  BP: 131/51 (18 Feb 2025 07:50) (116/52 - 131/51)  BP(mean): --  RR: 18 (18 Feb 2025 07:50) (18 - 18)  SpO2: 94% (18 Feb 2025 07:50) (94% - 96%)    Parameters below as of 18 Feb 2025 07:50  Patient On (Oxygen Delivery Method): room air     Physical exam:    Constitutional:  No acute distress  HEENT: NC/AT, EOMI, PERRLA, conjunctivae clear; ears and nose atraumatic; pharynx benign  Neck: supple; thyroid not palpable  Back: no tenderness  Respiratory: respiratory effort normal; clear to auscultation  Cardiovascular: S1S2 regular, no murmurs  Abdomen: soft, not tender, not distended, positive BS; no liver or spleen organomegaly  Genitourinary: no suprapubic tenderness  Lymphatic: no LN palpable  Musculoskeletal: no muscle tenderness, no joint swelling or tenderness  Extremities: 2+ pedal edema, chronic skin changes  B/l lower leg erythema, edema, warmth and tenderness; multiple skin breaks on feet, ankles, calfs and shins; extending to b/l lower thighs - much improved  Neurological/ Psychiatric: AxOx3, moving all extremities  Skin: no rashes; no palpable lesions    Labs: reviewed                        10.2   4.41  )-----------( 64       ( 17 Feb 2025 06:23 )             30.3     02-17    141  |  110[H]  |  17  ----------------------------<  92  2.9[LL]   |  26  |  0.86    Ca    8.0[L]      17 Feb 2025 06:23  Phos  3.1     02-17  Mg     1.7     02-17    Vancomycin Level, Trough: 19.4 ug/mL (02-16 @ 09:29)  C-Reactive Protein: 4.6 mg/mL (02-14-25 @ 04:42)                          9.8    3.08  )-----------( 62       ( 14 Feb 2025 04:42 )             29.9     02-14    142  |  111[H]  |  21  ----------------------------<  95  3.5   |  24  |  0.89    Ca    7.9[L]      14 Feb 2025 04:42    Vancomycin Level, Trough: 21.8 ug/mL (02-14 @ 04:42)  Vancomycin Level, Trough: 20.5 ug/mL (02-13 @ 17:03)  C-Reactive Protein: 4.6 mg/mL (02-14-25 @ 04:42)                        9.8    3.08  )-----------( 62       ( 14 Feb 2025 04:42 )             29.9     02-14    142  |  111[H]  |  21  ----------------------------<  95  3.5   |  24  |  0.89    Ca    7.9[L]      14 Feb 2025 04:42  Mg     1.8     02-13    Vancomycin Level, Trough: 21.8 ug/mL (02-14 @ 04:42)  Vancomycin Level, Trough: 20.5 ug/mL (02-13 @ 17:03)  Vancomycin Level, Trough: 18.9 ug/mL (02-13 @ 05:47)  Vancomycin Level, Trough: 29.1 ug/mL (02-12 @ 19:23)    C-Reactive Protein: 4.6 mg/mL (02-14-25 @ 04:42)                        10.0   3.39  )-----------( 67       ( 12 Feb 2025 07:01 )             30.5     02-12    141  |  112[H]  |  21  ----------------------------<  99  3.4[L]   |  23  |  0.99    Ca    8.6      12 Feb 2025 07:01    TPro  5.2[L]  /  Alb  2.2[L]  /  TBili  1.2  /  DBili  x   /  AST  23  /  ALT  17  /  AlkPhos  81  02-12     LIVER FUNCTIONS - ( 12 Feb 2025 07:01 )  Alb: 2.2 g/dL / Pro: 5.2 gm/dL / ALK PHOS: 81 U/L / ALT: 17 U/L / AST: 23 U/L / GGT: x           Culture - Blood (collected 11 Feb 2025 12:51)  Source: .Blood BLOOD  Preliminary Report (12 Feb 2025 20:01):    No growth at 24 hours    Culture - Blood (collected 11 Feb 2025 12:51)  Source: .Blood BLOOD  Preliminary Report (12 Feb 2025 22:01):    No growth at 24 hours    Radiology: all available radiological tests reviewed    Advanced directives addressed: full resuscitation
2/15: pt seen by podiatry today. NAD, resting bedside, states NORBERTO LE discomfort improving; swelling getting better.     PMH: No pertinent past medical history    Lymphatic edema    Hypertension    Type 2 diabetes mellitus    Lung cancer    Colon cancer    Acquired thrombocytopenia    H/O metabolic acidosis with normal anion gap    Moderate obesity      PSH:No significant past surgical history        Allergies:tramadol (Unknown)  Keflex (Anaphylaxis)  cephalexin (Unknown)      Labs:                        9.8    3.08  )-----------( 62       ( 14 Feb 2025 04:42 )             29.9   02-14    142  |  111[H]  |  21  ----------------------------<  95  3.5   |  24  |  0.89    Ca    7.9[L]      14 Feb 2025 04:42    Vital Signs Last 24 Hrs  T(C): 37.1 (15 Feb 2025 08:14), Max: 37.1 (15 Feb 2025 08:14)  T(F): 98.8 (15 Feb 2025 08:14), Max: 98.8 (15 Feb 2025 08:14)  HR: 69 (15 Feb 2025 08:14) (65 - 74)  BP: 120/55 (15 Feb 2025 08:14) (116/49 - 139/50)  BP(mean): --  RR: 18 (15 Feb 2025 08:14) (17 - 18)  SpO2: 97% (15 Feb 2025 08:14) (95% - 100%)    Parameters below as of 15 Feb 2025 08:14  Patient On (Oxygen Delivery Method): room air        REVIEW OF SYSTEMS:    CONSTITUTIONAL: No weakness, fevers or chills  EYES: No visual changes  RESPIRATORY: No cough, wheezing; No shortness of breath  CARDIOVASCULAR: No chest pain or palpitations  GASTROINTESTINAL: No abdominal or epigastric pain. No nausea, vomiting; No diarrhea or constipation.   GENITOURINARY: No dysuria, frequency or hematuria  NEUROLOGICAL: No numbness or weakness  SKIN: See physical examination.  All other review of systems is negative unless indicated above    Physical Exam:   Constitutional: NAD, alert;  Lower Extremity Focus  Derm:  Skin warm, dry and supple bilateral.  Significant generalized lichenification and verrucous changes to skin extending from below the knees to feet bilaterally, cobblestone like papules and nodules extending from below knee to feet bilaterally. Noted hemosiderin deposition to BLE. Noted erythema to bilateral lower legs.  Vascular: Dorsalis Pedis and Posterior Tibial pulses non palpable due to significant edema to BLE.    Neuro: Protective sensation intact to the level of the digits bilateral.  MSK: Muscle strength 4/5 all major muscle groups bilateral.     < from: US Duplex Venous Lower Ext Complete, Bilateral (02.11.25 @ 13:33) >  FINDINGS:    RIGHT:  Normal compressibility of the RIGHT common femoral, femoral and popliteal   veins.  Doppler examination shows normal spontaneous and phasic flow.  Limited visualization of the RIGHT calf veins. No RIGHT calf vein   thrombosis detected.    LEFT:  Normal compressibility of the LEFT common femoral, femoral and popliteal   veins.  Doppler examination shows normal spontaneous and phasic flow.  No LEFT calf vein thrombosis is detected.    IMPRESSION:  No evidence of deep venous thrombosis in either lower extremity.    < end of copied text >    
Date of service: 02-15-25 @ 12:37    Lying in bed in NAD  Ha lower legs tenderness  No fever  Legs feel less swollen    ROS: no fever or chills; denies dizziness, no HA, no SOB or cough, no abdominal pain, no diarrhea or constipation; no dysuria, no legs pain, no rashes    MEDICATIONS  (STANDING):  acetic acid 0.25% Topical Irrigation 1 Application(s) Topical daily  dextrose 5%. 1000 milliLiter(s) (100 mL/Hr) IV Continuous <Continuous>  dextrose 5%. 1000 milliLiter(s) (50 mL/Hr) IV Continuous <Continuous>  dextrose 50% Injectable 25 Gram(s) IV Push once  dextrose 50% Injectable 12.5 Gram(s) IV Push once  dextrose 50% Injectable 25 Gram(s) IV Push once  furosemide    Tablet 20 milliGRAM(s) Oral two times a day  glucagon  Injectable 1 milliGRAM(s) IntraMuscular once  insulin lispro (ADMELOG) corrective regimen sliding scale   SubCutaneous three times a day before meals  insulin lispro (ADMELOG) corrective regimen sliding scale   SubCutaneous at bedtime  lactobacillus acidophilus 1 Tablet(s) Oral daily  mupirocin 2% Ointment 1 Application(s) Topical <User Schedule>  nystatin Powder 1 Application(s) Topical <User Schedule>  piperacillin/tazobactam IVPB.. 3.375 Gram(s) IV Intermittent every 8 hours  senna 2 Tablet(s) Oral at bedtime  vancomycin  IVPB 750 milliGRAM(s) IV Intermittent every 12 hours    Vital Signs Last 24 Hrs  T(C): 37.1 (15 Feb 2025 08:14), Max: 37.1 (15 Feb 2025 08:14)  T(F): 98.8 (15 Feb 2025 08:14), Max: 98.8 (15 Feb 2025 08:14)  HR: 69 (15 Feb 2025 08:14) (65 - 74)  BP: 120/55 (15 Feb 2025 08:14) (116/49 - 139/50)  BP(mean): --  RR: 18 (15 Feb 2025 08:14) (17 - 18)  SpO2: 97% (15 Feb 2025 08:14) (95% - 100%)    Parameters below as of 15 Feb 2025 08:14  Patient On (Oxygen Delivery Method): room air     Physical exam:    Constitutional:  No acute distress  HEENT: NC/AT, EOMI, PERRLA, conjunctivae clear; ears and nose atraumatic; pharynx benign  Neck: supple; thyroid not palpable  Back: no tenderness  Respiratory: respiratory effort normal; clear to auscultation  Cardiovascular: S1S2 regular, no murmurs  Abdomen: soft, not tender, not distended, positive BS; no liver or spleen organomegaly  Genitourinary: no suprapubic tenderness  Lymphatic: no LN palpable  Musculoskeletal: no muscle tenderness, no joint swelling or tenderness  Extremities: 2+ pedal edema, chronic skin changes  B/l lower leg extensive erythema, edema, warmth and tenderness; multiple skin breaks on feet, ankles, calfs and shins; extending to b/l lower thighs - improving  Neurological/ Psychiatric: AxOx3, judgement and insight normal; moving all extremities  Skin: no rashes; no palpable lesions    Labs: reviewed                        9.8    3.08  )-----------( 62       ( 14 Feb 2025 04:42 )             29.9     02-14    142  |  111[H]  |  21  ----------------------------<  95  3.5   |  24  |  0.89    Ca    7.9[L]      14 Feb 2025 04:42    Vancomycin Level, Trough: 21.8 ug/mL (02-14 @ 04:42)  Vancomycin Level, Trough: 20.5 ug/mL (02-13 @ 17:03)  C-Reactive Protein: 4.6 mg/mL (02-14-25 @ 04:42)                        9.8    3.08  )-----------( 62       ( 14 Feb 2025 04:42 )             29.9     02-14    142  |  111[H]  |  21  ----------------------------<  95  3.5   |  24  |  0.89    Ca    7.9[L]      14 Feb 2025 04:42  Mg     1.8     02-13    Vancomycin Level, Trough: 21.8 ug/mL (02-14 @ 04:42)  Vancomycin Level, Trough: 20.5 ug/mL (02-13 @ 17:03)  Vancomycin Level, Trough: 18.9 ug/mL (02-13 @ 05:47)  Vancomycin Level, Trough: 29.1 ug/mL (02-12 @ 19:23)    C-Reactive Protein: 4.6 mg/mL (02-14-25 @ 04:42)                        10.0   3.39  )-----------( 67       ( 12 Feb 2025 07:01 )             30.5     02-12    141  |  112[H]  |  21  ----------------------------<  99  3.4[L]   |  23  |  0.99    Ca    8.6      12 Feb 2025 07:01    TPro  5.2[L]  /  Alb  2.2[L]  /  TBili  1.2  /  DBili  x   /  AST  23  /  ALT  17  /  AlkPhos  81  02-12     LIVER FUNCTIONS - ( 12 Feb 2025 07:01 )  Alb: 2.2 g/dL / Pro: 5.2 gm/dL / ALK PHOS: 81 U/L / ALT: 17 U/L / AST: 23 U/L / GGT: x           Culture - Blood (collected 11 Feb 2025 12:51)  Source: .Blood BLOOD  Preliminary Report (12 Feb 2025 20:01):    No growth at 24 hours    Culture - Blood (collected 11 Feb 2025 12:51)  Source: .Blood BLOOD  Preliminary Report (12 Feb 2025 22:01):    No growth at 24 hours    Radiology: all available radiological tests reviewed    Advanced directives addressed: full resuscitation
Date of service: 02-16-25 @ 13:17    Lying in bed in NAD  Lower legs redness is improving  Legs feel tender    ROS: no fever or chills; denies dizziness, no HA, no SOB or cough, no abdominal pain, no diarrhea or constipation; no dysuria, no rashes    MEDICATIONS  (STANDING):  acetic acid 0.25% Topical Irrigation 1 Application(s) Topical daily  furosemide    Tablet 20 milliGRAM(s) Oral two times a day  lactobacillus acidophilus 1 Tablet(s) Oral daily  mupirocin 2% Ointment 1 Application(s) Topical <User Schedule>  nystatin Powder 1 Application(s) Topical <User Schedule>  piperacillin/tazobactam IVPB.. 3.375 Gram(s) IV Intermittent every 8 hours  senna 2 Tablet(s) Oral at bedtime  vancomycin  IVPB 750 milliGRAM(s) IV Intermittent every 12 hours    Vital Signs Last 24 Hrs  T(C): 36.3 (16 Feb 2025 08:43), Max: 37 (16 Feb 2025 00:12)  T(F): 97.3 (16 Feb 2025 08:43), Max: 98.6 (16 Feb 2025 00:12)  HR: 65 (16 Feb 2025 08:43) (65 - 67)  BP: 125/55 (16 Feb 2025 08:43) (100/59 - 125/55)  BP(mean): --  RR: 18 (16 Feb 2025 08:43) (17 - 18)  SpO2: 94% (16 Feb 2025 08:43) (94% - 97%)    Parameters below as of 16 Feb 2025 08:43  Patient On (Oxygen Delivery Method): room air     Physical exam:    Constitutional:  No acute distress  HEENT: NC/AT, EOMI, PERRLA, conjunctivae clear; ears and nose atraumatic; pharynx benign  Neck: supple; thyroid not palpable  Back: no tenderness  Respiratory: respiratory effort normal; clear to auscultation  Cardiovascular: S1S2 regular, no murmurs  Abdomen: soft, not tender, not distended, positive BS; no liver or spleen organomegaly  Genitourinary: no suprapubic tenderness  Lymphatic: no LN palpable  Musculoskeletal: no muscle tenderness, no joint swelling or tenderness  Extremities: 2+ pedal edema, chronic skin changes  B/l lower leg extensive erythema, edema, warmth and tenderness; multiple skin breaks on feet, ankles, calfs and shins; extending to b/l lower thighs - improving  Neurological/ Psychiatric: AxOx3, moving all extremities  Skin: no rashes; no palpable lesions    Labs: reviewed    Vancomycin Level, Trough: 19.4 ug/mL (02-16 @ 09:29)    C-Reactive Protein: 4.6 mg/mL (02-14-25 @ 04:42)                        9.8    3.08  )-----------( 62       ( 14 Feb 2025 04:42 )             29.9     02-14    142  |  111[H]  |  21  ----------------------------<  95  3.5   |  24  |  0.89    Ca    7.9[L]      14 Feb 2025 04:42    Vancomycin Level, Trough: 21.8 ug/mL (02-14 @ 04:42)  Vancomycin Level, Trough: 20.5 ug/mL (02-13 @ 17:03)  C-Reactive Protein: 4.6 mg/mL (02-14-25 @ 04:42)                        9.8    3.08  )-----------( 62       ( 14 Feb 2025 04:42 )             29.9     02-14    142  |  111[H]  |  21  ----------------------------<  95  3.5   |  24  |  0.89    Ca    7.9[L]      14 Feb 2025 04:42  Mg     1.8     02-13    Vancomycin Level, Trough: 21.8 ug/mL (02-14 @ 04:42)  Vancomycin Level, Trough: 20.5 ug/mL (02-13 @ 17:03)  Vancomycin Level, Trough: 18.9 ug/mL (02-13 @ 05:47)  Vancomycin Level, Trough: 29.1 ug/mL (02-12 @ 19:23)    C-Reactive Protein: 4.6 mg/mL (02-14-25 @ 04:42)                        10.0   3.39  )-----------( 67       ( 12 Feb 2025 07:01 )             30.5     02-12    141  |  112[H]  |  21  ----------------------------<  99  3.4[L]   |  23  |  0.99    Ca    8.6      12 Feb 2025 07:01    TPro  5.2[L]  /  Alb  2.2[L]  /  TBili  1.2  /  DBili  x   /  AST  23  /  ALT  17  /  AlkPhos  81  02-12     LIVER FUNCTIONS - ( 12 Feb 2025 07:01 )  Alb: 2.2 g/dL / Pro: 5.2 gm/dL / ALK PHOS: 81 U/L / ALT: 17 U/L / AST: 23 U/L / GGT: x           Culture - Blood (collected 11 Feb 2025 12:51)  Source: .Blood BLOOD  Preliminary Report (12 Feb 2025 20:01):    No growth at 24 hours    Culture - Blood (collected 11 Feb 2025 12:51)  Source: .Blood BLOOD  Preliminary Report (12 Feb 2025 22:01):    No growth at 24 hours    Radiology: all available radiological tests reviewed    Advanced directives addressed: full resuscitation
HOSPITALIST ATTENDING PROGRESS NOTE    Chart and meds reviewed.  Patient seen and examined.    CC: cellulitis    Subjective: no acute events. afebrile; agreeable to start lasix    All other systems reviewed and found to be negative with the exception of what has been described above.    MEDICATIONS  (STANDING):  acetic acid 0.25% Topical Irrigation 1 Application(s) Topical daily  dextrose 5%. 1000 milliLiter(s) (100 mL/Hr) IV Continuous <Continuous>  dextrose 5%. 1000 milliLiter(s) (50 mL/Hr) IV Continuous <Continuous>  dextrose 50% Injectable 25 Gram(s) IV Push once  dextrose 50% Injectable 12.5 Gram(s) IV Push once  dextrose 50% Injectable 25 Gram(s) IV Push once  furosemide    Tablet 20 milliGRAM(s) Oral two times a day  glucagon  Injectable 1 milliGRAM(s) IntraMuscular once  insulin lispro (ADMELOG) corrective regimen sliding scale   SubCutaneous three times a day before meals  insulin lispro (ADMELOG) corrective regimen sliding scale   SubCutaneous at bedtime  lactobacillus acidophilus 1 Tablet(s) Oral daily  mupirocin 2% Ointment 1 Application(s) Topical <User Schedule>  nystatin Powder 1 Application(s) Topical <User Schedule>  piperacillin/tazobactam IVPB.. 3.375 Gram(s) IV Intermittent every 8 hours  senna 2 Tablet(s) Oral at bedtime  vancomycin  IVPB 750 milliGRAM(s) IV Intermittent every 12 hours    MEDICATIONS  (PRN):  acetaminophen     Tablet .. 650 milliGRAM(s) Oral every 6 hours PRN Mild Pain (1 - 3)  dextrose Oral Gel 15 Gram(s) Oral once PRN Blood Glucose LESS THAN 70 milliGRAM(s)/deciliter  polyethylene glycol 3350 17 Gram(s) Oral daily PRN Constipation  traMADol 25 milliGRAM(s) Oral every 4 hours PRN Moderate Pain (4 - 6)      PHYSICAL EXAM:  Gen: NAD  CV:  +S1, +S2, regular, no murmurs or rubs  RESP:   lungs clear to auscultation bilaterally, no wheezing, rales, rhonchi, good air entry bilaterally  GI:  abdomen soft, non-tender, non-distended, normal BS, no bruits, no abdominal masses, no palpable masses  :  not examined  MSK:   normal muscle tone, no atrophy, no rigidity, no contractions  EXT:  no edema, no calf pain, swelling or erythema  NEURO:  AAOX3, no focal neurological deficits, follows all commands, able to move extremities spontaneously    LABS:                            9.8    3.08  )-----------( 62       ( 14 Feb 2025 04:42 )             29.9     02-14    142  |  111[H]  |  21  ----------------------------<  95  3.5   |  24  |  0.89    Ca    7.9[L]      14 Feb 2025 04:42  Mg     1.8     02-13              Urinalysis Basic - ( 14 Feb 2025 04:42 )    Color: x / Appearance: x / SG: x / pH: x  Gluc: 95 mg/dL / Ketone: x  / Bili: x / Urobili: x   Blood: x / Protein: x / Nitrite: x   Leuk Esterase: x / RBC: x / WBC x   Sq Epi: x / Non Sq Epi: x / Bacteria: x              CULTURES:  Culture Results:   No growth at 48 Hours (02-11-25 @ 12:51)  Culture Results:   No growth at 48 Hours (02-11-25 @ 12:51)    
HOSPITALIST ATTENDING PROGRESS NOTE    Chart and meds reviewed.  Patient seen and examined.    CC: cellulitis    Subjective: no acute complaints    All other systems reviewed and found to be negative with the exception of what has been described above.    MEDICATIONS  (STANDING):  acetic acid 0.25% Topical Irrigation 1 Application(s) Topical daily  furosemide    Tablet 20 milliGRAM(s) Oral two times a day  lactobacillus acidophilus 1 Tablet(s) Oral daily  mupirocin 2% Ointment 1 Application(s) Topical <User Schedule>  nystatin Powder 1 Application(s) Topical <User Schedule>  piperacillin/tazobactam IVPB.. 3.375 Gram(s) IV Intermittent every 8 hours  senna 2 Tablet(s) Oral at bedtime  vancomycin  IVPB 750 milliGRAM(s) IV Intermittent every 12 hours    MEDICATIONS  (PRN):  acetaminophen     Tablet .. 650 milliGRAM(s) Oral every 6 hours PRN Mild Pain (1 - 3)  polyethylene glycol 3350 17 Gram(s) Oral daily PRN Constipation  traMADol 25 milliGRAM(s) Oral every 4 hours PRN Moderate Pain (4 - 6)      PHYSICAL EXAM:  Gen: NAD  CV:  +S1, +S2, regular, no murmurs or rubs  RESP:   lungs clear to auscultation bilaterally, no wheezing, rales, rhonchi, good air entry bilaterally  GI:  abdomen soft, non-tender, non-distended, normal BS, no bruits, no abdominal masses, no palpable masses  :  not examined  MSK:   normal muscle tone, no atrophy, no rigidity, no contractions  EXT:  no edema, no calf pain, swelling or erythema  NEURO:  AAOX3, no focal neurological deficits, follows all commands, able to move extremities spontaneously    LABS:                            9.8    3.08  )-----------( 62       ( 14 Feb 2025 04:42 )             29.9     02-14    142  |  111[H]  |  21  ----------------------------<  95  3.5   |  24  |  0.89    Ca    7.9[L]      14 Feb 2025 04:42              Urinalysis Basic - ( 14 Feb 2025 04:42 )    Color: x / Appearance: x / SG: x / pH: x  Gluc: 95 mg/dL / Ketone: x  / Bili: x / Urobili: x   Blood: x / Protein: x / Nitrite: x   Leuk Esterase: x / RBC: x / WBC x   Sq Epi: x / Non Sq Epi: x / Bacteria: x              CULTURES:  Culture Results:   No growth at 72 Hours (02-11-25 @ 12:51)  Culture Results:   No growth at 72 Hours (02-11-25 @ 12:51)    
HOSPITALIST ATTENDING PROGRESS NOTE    Chart and meds reviewed.  Patient seen and examined.    CC: cellulitis    Subjective: no acute complaints - refusing lasix because she doesnt think she needs it    All other systems reviewed and found to be negative with the exception of what has been described above.    MEDICATIONS  (STANDING):  acetic acid 0.25% Topical Irrigation 1 Application(s) Topical daily  dextrose 5%. 1000 milliLiter(s) (100 mL/Hr) IV Continuous <Continuous>  dextrose 5%. 1000 milliLiter(s) (50 mL/Hr) IV Continuous <Continuous>  dextrose 50% Injectable 25 Gram(s) IV Push once  dextrose 50% Injectable 12.5 Gram(s) IV Push once  dextrose 50% Injectable 25 Gram(s) IV Push once  furosemide    Tablet 20 milliGRAM(s) Oral two times a day  glucagon  Injectable 1 milliGRAM(s) IntraMuscular once  insulin lispro (ADMELOG) corrective regimen sliding scale   SubCutaneous three times a day before meals  insulin lispro (ADMELOG) corrective regimen sliding scale   SubCutaneous at bedtime  lactobacillus acidophilus 1 Tablet(s) Oral daily  mupirocin 2% Ointment 1 Application(s) Topical <User Schedule>  nystatin Powder 1 Application(s) Topical <User Schedule>  piperacillin/tazobactam IVPB.. 3.375 Gram(s) IV Intermittent every 8 hours  senna 2 Tablet(s) Oral at bedtime  vancomycin  IVPB 1000 milliGRAM(s) IV Intermittent every 12 hours    MEDICATIONS  (PRN):  acetaminophen     Tablet .. 650 milliGRAM(s) Oral every 6 hours PRN Mild Pain (1 - 3)  dextrose Oral Gel 15 Gram(s) Oral once PRN Blood Glucose LESS THAN 70 milliGRAM(s)/deciliter  polyethylene glycol 3350 17 Gram(s) Oral daily PRN Constipation  traMADol 25 milliGRAM(s) Oral every 4 hours PRN Moderate Pain (4 - 6)      PHYSICAL EXAM:  Gen: NAD  CV:  +S1, +S2, regular, no murmurs or rubs  RESP:   lungs clear to auscultation bilaterally, no wheezing, rales, rhonchi, good air entry bilaterally  GI:  abdomen soft, non-tender, non-distended, normal BS, no bruits, no abdominal masses, no palpable masses  :  not examined  MSK:   normal muscle tone, no atrophy, no rigidity, no contractions  EXT: very large b/l lower extremities with erythema and warmth b/l up to just below knees;  NEURO:  AAOX3, no focal neurological deficits, follows all commands, able to move extremities spontaneously  LABS:                            10.2   3.39  )-----------( 67       ( 13 Feb 2025 05:47 )             30.8     02-13    141  |  112[H]  |  21  ----------------------------<  101[H]  3.9   |  24  |  0.98    Ca    8.3[L]      13 Feb 2025 05:47  Mg     1.8     02-13    TPro  5.2[L]  /  Alb  2.2[L]  /  TBili  1.2  /  DBili  x   /  AST  23  /  ALT  17  /  AlkPhos  81  02-12        LIVER FUNCTIONS - ( 12 Feb 2025 07:01 )  Alb: 2.2 g/dL / Pro: 5.2 gm/dL / ALK PHOS: 81 U/L / ALT: 17 U/L / AST: 23 U/L / GGT: x           PT/INR - ( 12 Feb 2025 07:01 )   PT: 14.2 sec;   INR: 1.21 ratio         PTT - ( 12 Feb 2025 07:01 )  PTT:33.0 sec  Urinalysis Basic - ( 13 Feb 2025 05:47 )    Color: x / Appearance: x / SG: x / pH: x  Gluc: 101 mg/dL / Ketone: x  / Bili: x / Urobili: x   Blood: x / Protein: x / Nitrite: x   Leuk Esterase: x / RBC: x / WBC x   Sq Epi: x / Non Sq Epi: x / Bacteria: x              CULTURES:  Culture Results:   No growth at 24 hours (02-11-25 @ 12:51)  Culture Results:   No growth at 24 hours (02-11-25 @ 12:51)

## 2025-02-18 NOTE — BH CONSULTATION LIAISON ASSESSMENT NOTE - SUMMARY
Patient a 75 y/o retired , domiciled alone, never , no children, no past psychiatric hx, no prior SI/SA, no drug abuse hx, multiple medical issues  DM, HTN, B/L LE Lymphedema, lung cancer, colon cancer, thrombocytopenia,  p/w LE pain and redness. Patient states that her LEs were becoming increasingly erythematous and uncomfortable, and she was becoming concerned for infection.    Patient in bed AAOX3, endorses that she is blessed, has been tested, and wants to talk with somebody so she gets there gracefully. She lived alone, was a school-teacher for 42 years, never , feels lonely now as she is not able to walk due to LE edema and not able to function the y she used to. She denied any S/H/I/P, has fair sleep/appetite, no prior Psychiatric Intervention. She is concerned because of her medical issues and was wondering how much she can take she wanted to talk wit somebody to help diffuse her situation. She has A 24/7 through [employer] and she pays the difference, she has somebody to help with laundry, cooking etc. She is bound for [facility] rehab  and was advised that she once she finishes [facility] rehab to let them know about [facility] appointment for Therapy. She endorses that's he used to volunteer for [facility] and agreed with the plan.    Plan: To go to rehab, [facility] near [facility] .           NO meds needed

## 2025-02-18 NOTE — DISCHARGE NOTE PROVIDER - NSDCMRMEDTOKEN_GEN_ALL_CORE_FT
acetaminophen 325 mg oral tablet: 2 tab(s) orally every 4 hours as needed for  mild pain  Aplisol 5 tuberculin units/0.1 mL intradermal solution: 5 unit(s) intradermally every 2 weeks ***complete***  doxycycline hyclate 100 mg oral capsule: 1 cap(s) orally 2 times a day x 10 days end date 2/29  furosemide 20 mg oral tablet: 1 tab(s) orally 2 times a day  Januvia 25 mg oral tablet: 1 tab(s) orally once a day  lactobacillus acidophilus oral tablet: 1 tab(s) orally once a day  mupirocin 2% topical ointment: Apply topically to affected area Monday, Wednesday, and Friday , apply to B/L Abdominal Folds for rash  nystatin 100,000 units/g topical powder: Apply topically to affected area once a day for 21 days,  apply to B/L Abdominal Folds  polyethylene glycol 3350 oral powder for reconstitution: 17 gram(s) orally once a day  senna (sennosides) 8.6 mg oral tablet: 2 tab(s) orally once a day (at bedtime)  traMADol 25 mg oral tablet: 1 tab(s) orally every 4 hours as needed for  moderate pain   acetaminophen 325 mg oral tablet: 2 tab(s) orally every 4 hours as needed for  mild pain  Aplisol 5 tuberculin units/0.1 mL intradermal solution: 5 unit(s) intradermally every 2 weeks ***complete***  doxycycline hyclate 100 mg oral capsule: 1 cap(s) orally 2 times a day end date 2/29  furosemide 20 mg oral tablet: 1 tab(s) orally 2 times a day  Januvia 25 mg oral tablet: 1 tab(s) orally once a day  lactobacillus acidophilus oral tablet: 1 tab(s) orally once a day  mupirocin 2% topical ointment: Apply topically to affected area Monday, Wednesday, and Friday , apply to B/L Abdominal Folds for rash  nystatin 100,000 units/g topical powder: Apply topically to affected area once a day for 21 days,  apply to B/L Abdominal Folds  polyethylene glycol 3350 oral powder for reconstitution: 17 gram(s) orally once a day  senna (sennosides) 8.6 mg oral tablet: 2 tab(s) orally once a day (at bedtime)  traMADol 25 mg oral tablet: 1 tab(s) orally every 4 hours as needed for  moderate pain

## 2025-02-18 NOTE — PROGRESS NOTE ADULT - ASSESSMENT
A: 74-year-old Female seen for the following:   -BLE Lymphedema   -BLE Elephantiasis nostras verrucosa  -DM2  -Difficulty with ambulation    P:   Chart reviewed and Patient evaluated;  No leukocytosis, WBC 4.41; VSS  Significant generalized verrucous changes to skin extending from bilateral below the knee to feet, cobblestone like nodules to BLE. Underlying elephantiasis nostras verrucosa caused by chronic BLE lymphedema  WC: acetic acid, bactroban (Muprocin) and dry compressive dressings to BLE  Elevate BLE  Antibiotics as per ID  No acute podiatric surgical intervention warranted at this time. Pt to continue with local wound care and antibiotics per ID  All additional care per med appreciated  Patient demonstrated verbal understanding of all interventions and tolerated interventions well without any complications.       Wound care instructions to be performed three times a week for bilateral lower extremity:  1. Please remove all dressings   2. Use Acetic acid-soaked gauze and place throughout the right and left foot and in between digits for a 2-3 minutes  3. Apply Bactroban (Muprocin) to all the macerated areas in  midfoot and forefoot   4. Apply gauze, kerlix and ace.  Thank you. A: 74-year-old Female seen for the following:   -BLE Lymphedema   -BLE Elephantiasis nostras verrucosa  -DM2  -Difficulty with ambulation    P:   Chart reviewed and Patient evaluated;  No leukocytosis, WBC 4.41; VSS  Significant generalized verrucous changes to skin extending from bilateral below the knee to feet, cobblestone like nodules to BLE. Underlying elephantiasis nostras verrucosa caused by chronic BLE lymphedema  WC: acetic acid, bactroban (Muprocin) and dry compressive dressings to BLE  Elevate BLE  Antibiotics as per ID  No acute podiatric surgical intervention warranted at this time. Pt to continue with local wound care and antibiotics per ID  All additional care per med appreciated  Patient demonstrated verbal understanding of all interventions and tolerated interventions well without any complications.       Wound care instructions to be performed every other day for bilateral lower extremity:  1. Please remove all dressings   2. Use Acetic acid-soaked gauze and place throughout the right and left foot in between digits for a 2-3 minutes, as well as to bilateral lower legs.   3. Apply Bactroban (Muprocin) to all the macerated areas in midfoot and forefoot   4. Apply gauze, kerlix and ace all the way up to bilateral knees  Thank you.

## 2025-02-18 NOTE — BH CONSULTATION LIAISON ASSESSMENT NOTE - CURRENT MEDICATION
MEDICATIONS  (STANDING):  acetic acid 0.25% Topical Irrigation 1 Application(s) Topical daily  furosemide    Tablet 20 milliGRAM(s) Oral two times a day  lactobacillus acidophilus 1 Tablet(s) Oral daily  mupirocin 2% Ointment 1 Application(s) Topical <User Schedule>  nystatin Powder 1 Application(s) Topical <User Schedule>  piperacillin/tazobactam IVPB.. 3.375 Gram(s) IV Intermittent every 8 hours  senna 2 Tablet(s) Oral at bedtime  vancomycin  IVPB 750 milliGRAM(s) IV Intermittent every 12 hours    MEDICATIONS  (PRN):  acetaminophen     Tablet .. 650 milliGRAM(s) Oral every 6 hours PRN Mild Pain (1 - 3)  polyethylene glycol 3350 17 Gram(s) Oral daily PRN Constipation  traMADol 25 milliGRAM(s) Oral every 4 hours PRN Moderate Pain (4 - 6)

## 2025-02-18 NOTE — DISCHARGE NOTE NURSING/CASE MANAGEMENT/SOCIAL WORK - FINANCIAL ASSISTANCE
[facility] provides services at a reduced cost to those who are determined to be eligible through [facility]’s financial assistance program. Information regarding [facility]’s financial assistance program can be found by going to [email or web address] or by calling [phone].

## 2025-02-18 NOTE — DISCHARGE NOTE PROVIDER - NSDCFUADDAPPT_GEN_ALL_CORE_FT
APPTS ARE READY TO BE MADE: [X] YES    Best Family or Patient Contact (if needed):    Additional Information about above appointments (if needed):    1:  2:  3:  APPTS ARE READY TO BE MADE: [X] YES    Best Family or Patient Contact (if needed):    Additional Information about above appointments (if needed):    1:  2:  3:     Patient is being discharged to [facility]. Caregiver will arrange follow up.

## 2025-02-18 NOTE — DISCHARGE NOTE NURSING/CASE MANAGEMENT/SOCIAL WORK - PATIENT PORTAL LINK FT
You can access the FollowMyHealth Patient Portal offered by [facility] by registering at the following website: [email or web address]. By joining [facility]’s FollowMyHealth portal, you will also be able to view your health information using other applications (apps) compatible with our system.

## 2025-02-18 NOTE — DISCHARGE NOTE PROVIDER - NSDCCAREPROVSEEN_GEN_ALL_CORE_FT
Dulce Roberts, Jennifer Mitchell, Carlzo Iyer, Calista Traore, Flavio Nicolas, Anjel Salazar, Lizandro Richardson, Zeynep Allison, Edgar

## 2025-02-18 NOTE — DISCHARGE NOTE PROVIDER - HOSPITAL COURSE
"73 y/o F w/ PMH of DM, HTN, lymphadema, lung cancer, colon cancer, thrombocytopenia,  p/w LE pain and redness. Patient states that her LEs were becoming increaingly erythematous and uncomfortable, and she was becoming concerned for infection. States she did not receive appropriate wound care at [facility]. Denies fever / chills"    Hospital Course: Pt treated for b/l lower extremity cellulitis with vanc and zosyn x 7 days while inpatient. blood cx NG. erythema clinically improved. wbc wnl. no fevers while admitted. per id complete 10 days of po doxy upon dc.    #b/l lower ext lymphedema with superimposed b/l cellulitis   cellulitis likely related to diabetes  discussed with Dr. Salazar who has seen pt in the past - erythema and warmth is new and consistent with cellulitis   recommends several days of IV abx   c/w vanc and zosyn   vanc adjusted  wound care reccs per podiatry  Doppler: no evidence of DVT in lower ext  2/14: pt seen by Dr. Mitchell from wound care - dressing changed - improvement in erythema noted however will need continued IV abx  vanc adjusted to 750 from 1000  2/15: dressing changed by podiatry  02/16: erythema still present but slowly improving  02/17: stable; plan for change to po doxy tomorrow   02/18: afebrile; ok for dc today ok po doxy    Wound care instructions to be performed every other day for bilateral lower extremity:  1. Please remove all dressings   2. Use Acetic acid-soaked gauze and place throughout the right and left foot in between digits for a 2-3 minutes, as well as to bilateral lower legs.   3. Apply Bactroban (Muprocin) to all the macerated areas in midfoot and forefoot   4. Apply gauze, kerlix and ace all the way up to bilateral knees    #chronic thrombocytopenia   dating back years   continue to monitor   hem/onc as an outpatient     #hypokalemia   repleted     #DM   ISS    #H/o HTN / lung cancer / colon cancer   -C/w home meds and f/u outpatient for further management if conditions remain stable during hospitalization     #DVT ppx  -SCDs 2/2 thrombocytopenia

## 2025-02-18 NOTE — BH CONSULTATION LIAISON ASSESSMENT NOTE - NSBHCHARTREVIEWVS_PSY_A_CORE FT
Vital Signs Last 24 Hrs  T(C): 36.6 (18 Feb 2025 07:50), Max: 36.9 (17 Feb 2025 23:58)  T(F): 97.9 (18 Feb 2025 07:50), Max: 98.4 (17 Feb 2025 23:58)  HR: 70 (18 Feb 2025 07:50) (69 - 73)  BP: 131/51 (18 Feb 2025 07:50) (116/52 - 131/51)  BP(mean): --  RR: 18 (18 Feb 2025 07:50) (18 - 18)  SpO2: 94% (18 Feb 2025 07:50) (94% - 96%)    Parameters below as of 18 Feb 2025 07:50  Patient On (Oxygen Delivery Method): room air

## 2025-02-19 ENCOUNTER — TRANSCRIPTION ENCOUNTER (OUTPATIENT)
Age: 75
End: 2025-02-19

## 2025-02-19 ENCOUNTER — NON-APPOINTMENT (OUTPATIENT)
Age: 75
End: 2025-02-19

## 2025-02-21 DIAGNOSIS — I89.0 LYMPHEDEMA, NOT ELSEWHERE CLASSIFIED: ICD-10-CM

## 2025-02-21 DIAGNOSIS — E11.628 TYPE 2 DIABETES MELLITUS WITH OTHER SKIN COMPLICATIONS: ICD-10-CM

## 2025-02-21 DIAGNOSIS — I10 ESSENTIAL (PRIMARY) HYPERTENSION: ICD-10-CM

## 2025-02-21 DIAGNOSIS — D69.6 THROMBOCYTOPENIA, UNSPECIFIED: ICD-10-CM

## 2025-02-21 DIAGNOSIS — F43.20 ADJUSTMENT DISORDER, UNSPECIFIED: ICD-10-CM

## 2025-02-21 DIAGNOSIS — L03.115 CELLULITIS OF RIGHT LOWER LIMB: ICD-10-CM

## 2025-02-21 DIAGNOSIS — Z79.84 LONG TERM (CURRENT) USE OF ORAL HYPOGLYCEMIC DRUGS: ICD-10-CM

## 2025-02-21 DIAGNOSIS — E66.01 MORBID (SEVERE) OBESITY DUE TO EXCESS CALORIES: ICD-10-CM

## 2025-02-21 DIAGNOSIS — Z88.1 ALLERGY STATUS TO OTHER ANTIBIOTIC AGENTS: ICD-10-CM

## 2025-02-21 DIAGNOSIS — E87.6 HYPOKALEMIA: ICD-10-CM

## 2025-02-21 DIAGNOSIS — E44.0 MODERATE PROTEIN-CALORIE MALNUTRITION: ICD-10-CM

## 2025-02-21 DIAGNOSIS — Z85.038 PERSONAL HISTORY OF OTHER MALIGNANT NEOPLASM OF LARGE INTESTINE: ICD-10-CM

## 2025-02-21 DIAGNOSIS — Z85.118 PERSONAL HISTORY OF OTHER MALIGNANT NEOPLASM OF BRONCHUS AND LUNG: ICD-10-CM

## 2025-02-21 DIAGNOSIS — I87.2 VENOUS INSUFFICIENCY (CHRONIC) (PERIPHERAL): ICD-10-CM

## 2025-02-21 DIAGNOSIS — L03.116 CELLULITIS OF LEFT LOWER LIMB: ICD-10-CM

## 2025-03-05 ENCOUNTER — TRANSCRIPTION ENCOUNTER (OUTPATIENT)
Age: 75
End: 2025-03-05

## 2025-03-12 ENCOUNTER — TRANSCRIPTION ENCOUNTER (OUTPATIENT)
Age: 75
End: 2025-03-12

## 2025-03-19 ENCOUNTER — TRANSCRIPTION ENCOUNTER (OUTPATIENT)
Age: 75
End: 2025-03-19

## 2025-03-28 ENCOUNTER — TRANSCRIPTION ENCOUNTER (OUTPATIENT)
Age: 75
End: 2025-03-28

## 2025-04-01 ENCOUNTER — NON-APPOINTMENT (OUTPATIENT)
Age: 75
End: 2025-04-01

## 2025-04-03 ENCOUNTER — APPOINTMENT (OUTPATIENT)
Dept: HOME HEALTH SERVICES | Facility: HOME HEALTH | Age: 75
End: 2025-04-03

## 2025-04-03 DIAGNOSIS — F32.A ANXIETY DISORDER, UNSPECIFIED: ICD-10-CM

## 2025-04-03 DIAGNOSIS — R18.8 UNSPECIFIED CIRRHOSIS OF LIVER: ICD-10-CM

## 2025-04-03 DIAGNOSIS — I10 ESSENTIAL (PRIMARY) HYPERTENSION: ICD-10-CM

## 2025-04-03 DIAGNOSIS — I89.0 LYMPHEDEMA, NOT ELSEWHERE CLASSIFIED: ICD-10-CM

## 2025-04-03 DIAGNOSIS — L03.119 CELLULITIS OF UNSPECIFIED PART OF LIMB: ICD-10-CM

## 2025-04-03 DIAGNOSIS — E11.9 TYPE 2 DIABETES MELLITUS W/OUT COMPLICATIONS: ICD-10-CM

## 2025-04-03 DIAGNOSIS — I87.2 VENOUS INSUFFICIENCY (CHRONIC) (PERIPHERAL): ICD-10-CM

## 2025-04-03 DIAGNOSIS — F41.9 ANXIETY DISORDER, UNSPECIFIED: ICD-10-CM

## 2025-04-03 DIAGNOSIS — E55.9 VITAMIN D DEFICIENCY, UNSPECIFIED: ICD-10-CM

## 2025-04-03 DIAGNOSIS — L30.8 OTHER SPECIFIED DERMATITIS: ICD-10-CM

## 2025-04-03 DIAGNOSIS — K74.60 UNSPECIFIED CIRRHOSIS OF LIVER: ICD-10-CM

## 2025-04-03 DIAGNOSIS — E66.01 MORBID (SEVERE) OBESITY DUE TO EXCESS CALORIES: ICD-10-CM

## 2025-04-03 PROCEDURE — G0439: CPT

## 2025-04-03 PROCEDURE — G0447 BEHAVIOR COUNSEL OBESITY 15M: CPT | Mod: 59

## 2025-04-03 PROCEDURE — 99350 HOME/RES VST EST HIGH MDM 60: CPT | Mod: 25

## 2025-04-09 ENCOUNTER — TRANSCRIPTION ENCOUNTER (OUTPATIENT)
Age: 75
End: 2025-04-09

## 2025-04-09 RX ORDER — POTASSIUM CHLORIDE 750 MG/1
10 TABLET, EXTENDED RELEASE ORAL DAILY
Qty: 90 | Refills: 3 | Status: ACTIVE | COMMUNITY
Start: 2025-04-09 | End: 1900-01-01

## 2025-04-09 RX ORDER — FUROSEMIDE 20 MG/1
20 TABLET ORAL
Qty: 90 | Refills: 3 | Status: ACTIVE | COMMUNITY
Start: 2025-04-09 | End: 1900-01-01

## 2025-04-11 ENCOUNTER — LABORATORY RESULT (OUTPATIENT)
Age: 75
End: 2025-04-11

## 2025-04-15 NOTE — DISCHARGE NOTE PROVIDER - NSDCMRMEDTOKEN_GEN_ALL_CORE_FT
Denies
acetaminophen 500 mg oral tablet: 2 tab(s) orally every 8 hours As needed Temp greater or equal to 38C (100.4F), Mild Pain (1 - 3)  insulin glargine 100 units/mL subcutaneous solution: 6 unit(s) subcutaneous once a day (at bedtime)  insulin lispro 100 units/mL injectable solution: 1 international unit(s) injectable every 6 hours sliding scale 150, 200, 250. 300, 350, 400 - 1,2,3,4,5,6 units  meropenem 1000 mg intravenous injection: 1000 milligram(s) intravenous every 8 hours  metoprolol succinate 25 mg oral tablet, extended release: 1 tab(s) orally once a day

## 2025-04-18 ENCOUNTER — NON-APPOINTMENT (OUTPATIENT)
Age: 75
End: 2025-04-18

## 2025-04-18 DIAGNOSIS — D64.9 ANEMIA, UNSPECIFIED: ICD-10-CM

## 2025-04-18 RX ORDER — FERROUS SULFATE 324(65)MG
324 (65 FE) TABLET, DELAYED RELEASE (ENTERIC COATED) ORAL DAILY
Qty: 30 | Refills: 3 | Status: ACTIVE | COMMUNITY
Start: 2025-04-18 | End: 1900-01-01

## 2025-04-22 NOTE — INPATIENT CERTIFICATION FOR MEDICARE PATIENTS - THE SEVERITY OF SIGNS/SYMPTOMS. (SEE ED/ADMIT DOCUMENTS)
[facility]  Hematology/Oncology Follow Up Note    Mariama Rodrigez   1974   [id number]       04/24/25    Reason for Visit:  Follow-up hx of Stage IA breast cancer s/p radiation therapy.   Resumption of tamoxifen   Chief Complaint   Patient presents with    Office Visit       History of Present Illness:   Mariama Rodrigez is a 50 year old female with history of chronic kidney disease, diastolic heart failure, and diabetes.      A diagnostic bilateral mammogram obtained on 10/22/2019 revealed an 8 mm taller than wide irregular lesion in the right breast at 10:00 posterior depth and determined to be at moderate suspicion for malignancy. Diffuse left breast skin thickening and non-masslike insinuating left breast hypoechoic findings. Small and mildly prominent lymph nodes in the left axilla. BIRADS 4B. The patient initially presented to the ED on 10/25/2019 with chief complaint of shortness of breath. CXR noted no evidence of focal consolidation, pleural effusion, or pneumothorax. An ultrasound of the bilateral lower extremities showed no evidence of acute venous thrombosis. A CT of the chest was unable to be obtained secondary to poor kidney function. A VQ scan was unable to be performed secondary to her morbid obesity. Upon evaluation, it was determined that the patient had acute chronic diastolic heart failure. She also presented with acute hypoxic and hypercapneic respiratory failure which was multifactorial, releated to her underlying KM, asthma, acute CHF, and other causes. Notably, her lower extremities were swollen but bilateral Doppler ultrasounds were negative for DVT. The patient had additionally presented with right breast erythema and dimpling, and left breast erythema and edema. The patient underwent ultrasound-guided needle core biopsy of right breast mass at 10:00 on 11/13/2019 with pathology consistent with invasive ductal carcinoma, grade I of III,  with associated microcalcifications, present in multiple cores, longest tumor core 0.7 cm. Ductal carcinoma in situ, nuclear grade 1 to 2, cribriform type with associated microcalcifications, minor component. ER+ (100%), VA+ (80%), Ki-67:10%.      The patient underwent right lumpectomy on 12/12/2019. Pathology was consistent with invasive ductal carcinoma, grade I of III, measuring 5.0 x 4.0 x 4.0 mm in greatest dimension. Ductal carcinoma in situ, solid and cribriform types, grades 1-2, minor component, associated with invasive tumor, with microcalcifications. Margins were free of invasive ductal carcinoma and DCIS, nearest margin 7.0 posterior, remaining margins > 7.0 mm. Benign fibrocystic changes with ductal epithelial hyperplasia, columnar hyperplasia, sclerosing adenosis, pseudoangiomatous stromal hyperplasia, cystic dilatation with elongation of ducts, periductal and stromal fibrosis with chronic inflammation and microcalcifications. One right axillary lymph node was biopsied and found to be negative for tumor. Found to be ER+ (100%), VA+ (80%), Ki-67 10%, and Her2 FISH non-amplified.      The patient presented in our clinic (01/15/2020). She reports that she was hospitalized for cellulitis in the left breast - this prompted her to undergo a mammogram. A lesion was found in the right breast, and a needle biopsy diagnosed the tumor. States she was hospitalized 3 times over the course of the past year. Past medical history includes hypertension, high cholesterol, hypothyroidism, anxiety, diastolic heart failure, and kidney disease. Ongoing issues regarding edema in the lower extremities - this has improved with use of diuretics. Tends to exacerbate with extended periods of immobility. States her PCP intends to keep her on long-term diuretics. Her family history includes a maternal grandmother who had breast cancer, maternal great-grandmother who had breast cancer, a mother who had melanoma in situ s/p surgical  excision and repair, and a father who had carcinoma in situ of sigmoid s/p surgical resection. She is currently perimenopausal - reports to be having irregular spotting. Denies any past use of oral contraceptives or estrogen products. Former smoker and has a history of alcohol abuse. Has been monitoring her sodium and carbohydrate intake. Currently utilizing a ventilator at night.      Diagnostic left mammogram on 3/11/2020 showed no evidence of malignancy in the left breast.     Diagnostic bilateral mammogram (3/11/2021): There is no mammographic evidence of malignancy. A 1 year screening mammogram is recommended.       Diagnostic bilateral mammogram w/ US (5/26/22): The 2.5 cm fat containing asymmetry in the right breast is consistent with a post-lumpectomy scar and is benign. There is no mammographic evidence of malignancy. There is no sonographic evidence of malignancy.    The area in the right breast is consistent with the lumpectomy cavity with fibrocystic change and is benign.    A 1 year screening mammogram is recommended.     CT A/P (12/21/22):  Multiple large pancreatic cysts/cystic lesions, which may represent pseudocyst.  Mild inflammatory stranding noted along the superior margin of the dominant cyst in the pancreatic body, which may represent changes of superimposed infection, in appropriate clinical setting.  Pancreatic cystic  neoplasm cannot be excluded.  Clinical correlation is recommended.  Nonobstructing nephrolithiasis.  Fatty liver infiltration. Stable hepatomegaly and splenomegaly     MRI Abdomen (12/22/22):  Large loculated septated cyst within the body of the pancreas, as well as unilocular cyst within the head of the pancreas, likely representing pancreatic pseudocysts.  Cystic pancreatic neoplasm cannot be excluded based on imaging.  Fatty liver infiltration and mild hepatomegaly.  Stable mild splenomegaly.     Mammography on 07/11/2023 is benign with no evidence of recurrence.          Labs on 08/08/2023 confirm she is post menopausal.      Panniculectomy on 08/21/2023 was held due to changes in kidney function and anemia     The patient had venous mapping 09/2023 of her LUE.  This did not show evidence of DVT in the left arm.     MRI lumbar spine without contrast on 10/22/2023 shows disc degeneration at L4-L5, as well arthropathy contributing to mild central stenosis. Severe left foraminal stenosis due to foraminal bulge and osteophyte.      MRI Abdomen with out contrast 11/15/2023 shows decreased pancreatic cystic lesions, resolution of previous duct dilation and stable Interval decreased size of pancreatic cyst/cystic lesions at  the pancreatic body.      The patient had epidural injection 11/20/2023     CT Chest w.o IV contrast on 12/11/2023 shows pancreatic cyst/cystic lesions without change, as seen on 11/15 MRI.      XR R shoulder 12/19/2023 is without evidence of fracture, dislocation or significant degeneration.      Labs show she is fully post-menopausal based on FSH.    MRI abdomen w/o Contrast on 12/07/24 shows interval decreased size of pancreatic pseudocysts, slight increased conspicuity of normal caliber duct adjacent to pseudocyst.    Iron studies on 12/13/24 show low iron levels, so she should continue oral iron supplement. Kidney numbers were also elevated.      IR dialysis circuit angiogram on 01/09/25 was successful in implementing left AV fistulogram with angioplasty of the anastomosis and perianastomotic outflow. Patient's outflow cephalic vein is occluded with flow from the fistula being drained through numerous collaterals.    CXR on 01/21/25 shows minimal atelectasis near the left costophrenic angle.     Biopsy of left lower lip mucosal lesion on 01/30/25 shows irritation fibroma (traumatic fibroma).    Colonoscopy on 02/03/25 found colon polyp, diverticulosis and hemorrhoids. Biopsy of polyp in ascending colon came back as tubular adenoma.     Date of Service April  24th, 2025:   Mariama returns for follow-up. She is currently listed for a kidney transplant at [facility]. Her fistula recently failed and she now has a tunneled line for dialysis.  Is getting Mircera through Dr. Villela, and hemoglobin has been stable.  She continues on tamoxifen and is tolerating well.  States that her hot flashes have been better since increase gabapentin to twice daily, and she inquires about weaning off of oxybutynin to limit her oral medication intake.  She has some dry mouth and uses sugar-free gum to manage this.  Denies any vaginal bleeding.  No pain or skin changes in bilateral breasts.    I offered patient a chaperone for a sensitive medical exam, which she declined.     Review of Systems   Constitutional:  Positive for diaphoresis. Negative for activity change, appetite change, chills and fever.   Genitourinary:  Negative for vaginal bleeding.   Musculoskeletal:  Positive for arthralgias.   Hematological:  Bruises/bleeds easily.                Objective:   Oncology Encounter Vitals [04/24/25 1223]   ONC OP Encounter Vitals Group      /76      Heart Rate 77      Resp 16      Temp 98.6 °F (37 °C)      Temp src       SpO2 97 %      Weight 198 lb 10.2 oz (90.1 kg)      Height 5' 3.66\" (1.617 m)      Pain Score  0      Pain Location       Pain Education?       BSA (Calculated - m2) - Tim & Tim 1.94      BSA (Calculated - sq m) 2.01      BMI (Calculated) 34.46      ECOG   ECOG Performance Status          Physical Exam  Constitutional:       General: She is not in acute distress.     Appearance: Normal appearance. She is not ill-appearing.   HENT:      Head: Normocephalic.      Nose: Nose normal.      Mouth/Throat:      Mouth: Mucous membranes are moist.   Eyes:      General: No scleral icterus.     Pupils: Pupils are equal, round, and reactive to light.   Cardiovascular:      Rate and Rhythm: Normal rate and regular rhythm.      Pulses: Normal pulses.      Heart sounds: Normal heart  sounds.      Comments: Tunneled line in place, covered with dressing  Pulmonary:      Effort: Pulmonary effort is normal. No respiratory distress.      Breath sounds: Normal breath sounds.   Abdominal:      General: There is no distension.      Palpations: Abdomen is soft.      Tenderness: There is no abdominal tenderness.   Musculoskeletal:         General: No swelling. Normal range of motion.      Cervical back: Normal range of motion.   Lymphadenopathy:      Cervical: No cervical adenopathy.   Skin:     General: Skin is warm and dry.   Neurological:      General: No focal deficit present.      Mental Status: She is alert and oriented to person, place, and time.      Cranial Nerves: No cranial nerve deficit.     Breast: Right breast with volume loss but no palpable masses.  Left breast with no palpable masses, skin changes or Adenopathy.            Lab Services on 04/24/2025   Component Date Value Ref Range Status    WBC 04/24/2025 4.6  4.2 - 11.0 K/mcL Final    RBC 04/24/2025 3.18 (L)  4.00 - 5.20 mil/mcL Final    HGB 04/24/2025 10.4 (L)  12.0 - 15.5 g/dL Final    HCT 04/24/2025 31.2 (L)  36.0 - 46.5 % Final    MCV 04/24/2025 98.1  78.0 - 100.0 fl Final    MCH 04/24/2025 32.7  26.0 - 34.0 pg Final    MCHC 04/24/2025 33.3  32.0 - 36.5 g/dL Final    RDW-CV 04/24/2025 13.4  11.0 - 15.0 % Final    RDW-SD 04/24/2025 48.7  39.0 - 50.0 fL Final    PLT 04/24/2025 103 (L)  140 - 450 K/mcL Final    Neutrophil, Percent 04/24/2025 47  % Final    Lymphocytes, Percent 04/24/2025 41  % Final    Mono, Percent 04/24/2025 9  % Final    Eosinophils, Percent 04/24/2025 3  % Final    Basophils, Percent 04/24/2025 0  % Final    Immature Granulocytes 04/24/2025 0  % Final    Absolute Neutrophils 04/24/2025 2.2  1.8 - 7.7 K/mcL Final    Absolute Lymphocytes 04/24/2025 1.9  1.0 - 4.8 K/mcL Final    Absolute Monocytes 04/24/2025 0.4  0.3 - 0.9 K/mcL Final    Absolute Eosinophils  04/24/2025 0.1  0.0 - 0.5 K/mcL Final    Absolute Basophils  04/24/2025 0.0  0.0 - 0.3 K/mcL Final    Absolute Immature Granulocytes 04/24/2025 0.0  0.0 - 0.2 K/mcL Final        Imaging studies were reviewed with the following pertinent positives: reviewed in HPI.    ASSESSMENT AND PLAN:          1. ER+/VT+/Her2- Stage I breast cancer s/p lumpectomy with pathology showing T1b invasive ductal carcinoma, grade I, and negative margins; tumor measured 5.0 x 4.0 x 4.0 mm. 0/1 lymph nodes negative for carcinoma. Given her overall health profile coupled with low-risk tumor feature, we did not send an Oncotype. Mammogram 3/11/2020 showed no evidence of malignancy.  2. Multiple comorbidities and hx of recurrent hospitalizations related to chronic kidney disease, diastolic heart failure. Pt with 105 lb weight loss over last few months due to fluid overload  3.  Anemia secondary to chronic kidney disease  4.  Postmenopausal vaginal bleeding  5. Anemia with chronic blood loss with Hgb of 7.0, following AV fistula and grafting now recovered  6.  Bleeding post procedure with no clear cause on labs - ? Platelet dysfunction due to renal failure      The patient continues on tamoxifen with planned end date January of 2030. As she will be immune suppressed from her kidney transplant, there is slight added risk to recurrence, so I would like to plan to continue tamoxifen for 10 years if she tolerates.  Toxicity:  Risk for DVT/PE: no signs of clot.  Risk for endometrial dysplasia: She underwent D&C with Dr Glass.  She should report any bleeding with Dr. Osullivan. Denies recent vaginal bleeding  Pelvic exam due annually  Hot flashes: improved with BID gabapentin, also on duloxetine and oxybutynin without as much effect and would like to come off oxybutynin. 5mg tabs provided today to wean off. She will let us know hot flashes worsen. much improved with oxybutynin and BID gabapentin.  Arthralgias/myalgias: continue to monitor  Dry eyes/dry mouth: endorses, manageable. continue to monitor.  1. The severity of signs/symptoms.(See ED/admit documents) Discussed sugar free gum/candies, biotene, water for dry mouth.  Vaginal dryness/increased vaginal secretions: denies, continue to monitor  Vision changes: continue to monitor.  Patient encouraged to have an eye exam with ophthalmology or optometry once per year.   CBC today with Hg 10.4. Recent Hgb stable between 9-10, continuing with Mircera dosing per Dr. Villela   Most recent MMG in February 6, 2025 was benign. Would like for her to continue routine high-risk screening with alternating MRI/MMG. MRI Breast in August 2025, order placed today  Recently had fistula failed, tunneled line in place for dialysis.  Listed for kidney transplant with [facility]. Her mother was approved as a live donor and her kidney was successfully harvested.  Dr. Villela is monitoring her on dialysis, and she should continue eating lean proteins for hyperglycemia.  Patient taking blood builder and added slow Fe every other day, but now with increased constipation. Due to SE, she may be able to decreased PO intake if iron levels are stable.     RTC with Dr. Schmidt in 3 months     Time spent preparing to see the patient, obtaining/reviewing separately obtained history, performing a medically appropriate examination/evaluation, counseling and educating the patient, ordering medications, tests or procedures,documenting clinical information in the electronic record, independently interpreting results and communicating results to the patient was 32 minutes.     Ashanti Valdez PA-C      Recent PHQ 2/9 Score    PHQ 2:  PHQ 2 Score Adult PHQ 2 Score Adult PHQ 2 Interpretation Little interest or pleasure in activity?   4/24/2025  12:30 PM 1 No further screening needed 0       PHQ 9:       Distress Score   [facility] [facility]      Distress Scale (0-10)

## 2025-05-13 ENCOUNTER — NON-APPOINTMENT (OUTPATIENT)
Age: 75
End: 2025-05-13

## 2025-05-13 ENCOUNTER — APPOINTMENT (OUTPATIENT)
Dept: HOME HEALTH SERVICES | Facility: HOME HEALTH | Age: 75
End: 2025-05-13

## 2025-05-15 VITALS
RESPIRATION RATE: 18 BRPM | TEMPERATURE: 97.4 F | SYSTOLIC BLOOD PRESSURE: 135 MMHG | HEART RATE: 77 BPM | DIASTOLIC BLOOD PRESSURE: 76 MMHG | OXYGEN SATURATION: 99 %

## 2025-05-19 ENCOUNTER — NON-APPOINTMENT (OUTPATIENT)
Age: 75
End: 2025-05-19

## 2025-05-21 LAB
ALBUMIN SERPL ELPH-MCNC: 3.5 G/DL
ALP BLD-CCNC: 101 U/L
ALT SERPL-CCNC: 16 U/L
ANION GAP SERPL CALC-SCNC: 17 MMOL/L
AST SERPL-CCNC: 28 U/L
BASOPHILS # BLD AUTO: 0.05 K/UL
BASOPHILS NFR BLD AUTO: 1.2 %
BILIRUB SERPL-MCNC: 0.6 MG/DL
BUN SERPL-MCNC: 32 MG/DL
CALCIUM SERPL-MCNC: 9 MG/DL
CHLORIDE SERPL-SCNC: 106 MMOL/L
CO2 SERPL-SCNC: 16 MMOL/L
CREAT SERPL-MCNC: 1.02 MG/DL
EGFRCR SERPLBLD CKD-EPI 2021: 58 ML/MIN/1.73M2
EOSINOPHIL # BLD AUTO: 0.17 K/UL
EOSINOPHIL NFR BLD AUTO: 4.2 %
GLUCOSE SERPL-MCNC: 188 MG/DL
HCT VFR BLD CALC: 34.9 %
HGB BLD-MCNC: 11 G/DL
IMM GRANULOCYTES NFR BLD AUTO: 0.2 %
LYMPHOCYTES # BLD AUTO: 0.57 K/UL
LYMPHOCYTES NFR BLD AUTO: 14.1 %
MAN DIFF?: NORMAL
MCHC RBC-ENTMCNC: 26.8 PG
MCHC RBC-ENTMCNC: 31.5 G/DL
MCV RBC AUTO: 85.1 FL
MONOCYTES # BLD AUTO: 0.44 K/UL
MONOCYTES NFR BLD AUTO: 10.9 %
NEUTROPHILS # BLD AUTO: 2.79 K/UL
NEUTROPHILS NFR BLD AUTO: 69.4 %
PLATELET # BLD AUTO: 90 K/UL
POTASSIUM SERPL-SCNC: 4.7 MMOL/L
PROT SERPL-MCNC: 6.3 G/DL
RBC # BLD: 4.1 M/UL
RBC # FLD: 15.2 %
SODIUM SERPL-SCNC: 139 MMOL/L
WBC # FLD AUTO: 4.03 K/UL

## 2025-05-29 ENCOUNTER — NON-APPOINTMENT (OUTPATIENT)
Age: 75
End: 2025-05-29

## 2025-06-14 ENCOUNTER — TRANSCRIPTION ENCOUNTER (OUTPATIENT)
Age: 75
End: 2025-06-14

## 2025-06-17 ENCOUNTER — APPOINTMENT (OUTPATIENT)
Dept: HOME HEALTH SERVICES | Facility: HOME HEALTH | Age: 75
End: 2025-06-17
Payer: MEDICARE

## 2025-06-17 VITALS
RESPIRATION RATE: 15 BRPM | OXYGEN SATURATION: 98 % | DIASTOLIC BLOOD PRESSURE: 70 MMHG | SYSTOLIC BLOOD PRESSURE: 124 MMHG | TEMPERATURE: 97.6 F | HEART RATE: 76 BPM

## 2025-06-17 PROCEDURE — 99349 HOME/RES VST EST MOD MDM 40: CPT

## 2025-06-18 ENCOUNTER — RX RENEWAL (OUTPATIENT)
Age: 75
End: 2025-06-18

## 2025-06-18 PROBLEM — Z74.09 IMPAIRED FUNCTIONAL MOBILITY, BALANCE, GAIT, AND ENDURANCE: Status: ACTIVE | Noted: 2025-06-18

## 2025-06-18 PROBLEM — Z79.899 ON POTASSIUM WASTING DIURETIC THERAPY: Status: ACTIVE | Noted: 2025-06-17

## 2025-06-18 PROBLEM — D69.6 THROMBOCYTOPENIA: Status: ACTIVE | Noted: 2025-06-18

## 2025-06-18 PROBLEM — M19.91 PRIMARY OSTEOARTHRITIS: Status: ACTIVE | Noted: 2025-06-18

## 2025-06-18 RX ORDER — POTASSIUM CHLORIDE 1500 MG/1
20 TABLET, EXTENDED RELEASE ORAL DAILY
Qty: 30 | Refills: 1 | Status: ACTIVE | COMMUNITY
Start: 2025-06-17 | End: 1900-01-01

## 2025-06-19 RX ORDER — TRAMADOL HYDROCHLORIDE 50 MG/1
50 TABLET, COATED ORAL TWICE DAILY
Qty: 28 | Refills: 0 | Status: ACTIVE | COMMUNITY
Start: 2025-06-18 | End: 1900-01-01

## 2025-06-27 ENCOUNTER — NON-APPOINTMENT (OUTPATIENT)
Age: 75
End: 2025-06-27

## 2025-06-27 LAB
ANION GAP SERPL CALC-SCNC: 14 MMOL/L
BUN SERPL-MCNC: 37 MG/DL
CALCIUM SERPL-MCNC: 9.6 MG/DL
CHLORIDE SERPL-SCNC: 104 MMOL/L
CO2 SERPL-SCNC: 19 MMOL/L
CREAT SERPL-MCNC: 1.42 MG/DL
EGFRCR SERPLBLD CKD-EPI 2021: 39 ML/MIN/1.73M2
GLUCOSE SERPL-MCNC: 143 MG/DL
POTASSIUM SERPL-SCNC: 5.6 MMOL/L
SODIUM SERPL-SCNC: 137 MMOL/L

## 2025-07-01 ENCOUNTER — NON-APPOINTMENT (OUTPATIENT)
Age: 75
End: 2025-07-01

## 2025-07-25 ENCOUNTER — RX RENEWAL (OUTPATIENT)
Age: 75
End: 2025-07-25

## 2025-08-01 ENCOUNTER — NON-APPOINTMENT (OUTPATIENT)
Age: 75
End: 2025-08-01

## 2025-08-01 RX ORDER — DOXYCYCLINE HYCLATE 100 MG
1 TABLET ORAL
Qty: 0 | Refills: 0 | DISCHARGE
Start: 2025-08-01 | End: 2025-08-10

## 2025-08-05 ENCOUNTER — NON-APPOINTMENT (OUTPATIENT)
Age: 75
End: 2025-08-05

## 2025-08-05 ENCOUNTER — INPATIENT (INPATIENT)
Facility: HOSPITAL | Age: 75
LOS: 5 days | Discharge: NURSING, REHAB OR LONG-TERM CARE | DRG: 602 | End: 2025-08-11
Attending: INTERNAL MEDICINE | Admitting: STUDENT IN AN ORGANIZED HEALTH CARE EDUCATION/TRAINING PROGRAM
Payer: MEDICARE

## 2025-08-05 VITALS
OXYGEN SATURATION: 100 % | TEMPERATURE: 98 F | SYSTOLIC BLOOD PRESSURE: 130 MMHG | RESPIRATION RATE: 17 BRPM | HEART RATE: 102 BPM | DIASTOLIC BLOOD PRESSURE: 46 MMHG

## 2025-08-05 DIAGNOSIS — L03.90 CELLULITIS, UNSPECIFIED: ICD-10-CM

## 2025-08-05 LAB
ALBUMIN SERPL ELPH-MCNC: 2.9 G/DL — LOW (ref 3.3–5)
ALP SERPL-CCNC: 102 U/L — SIGNIFICANT CHANGE UP (ref 40–120)
ALT FLD-CCNC: 43 U/L — SIGNIFICANT CHANGE UP (ref 12–78)
ANION GAP SERPL CALC-SCNC: 6 MMOL/L — SIGNIFICANT CHANGE UP (ref 5–17)
ANION GAP SERPL CALC-SCNC: 9 MMOL/L — SIGNIFICANT CHANGE UP (ref 5–17)
APPEARANCE UR: CLEAR — SIGNIFICANT CHANGE UP
APTT BLD: 31.9 SEC — SIGNIFICANT CHANGE UP (ref 26.1–36.8)
AST SERPL-CCNC: 48 U/L — HIGH (ref 15–37)
BACTERIA # UR AUTO: ABNORMAL /HPF
BASOPHILS # BLD AUTO: 0.08 K/UL — SIGNIFICANT CHANGE UP (ref 0–0.2)
BASOPHILS NFR BLD AUTO: 0.8 % — SIGNIFICANT CHANGE UP (ref 0–2)
BILIRUB SERPL-MCNC: 1.5 MG/DL — HIGH (ref 0.2–1.2)
BILIRUB UR-MCNC: NEGATIVE — SIGNIFICANT CHANGE UP
BUN SERPL-MCNC: 59 MG/DL — HIGH (ref 7–23)
BUN SERPL-MCNC: 68 MG/DL — HIGH (ref 7–23)
CALCIUM SERPL-MCNC: 10 MG/DL — SIGNIFICANT CHANGE UP (ref 8.5–10.1)
CALCIUM SERPL-MCNC: 9.1 MG/DL — SIGNIFICANT CHANGE UP (ref 8.5–10.1)
CAST: 1 /LPF — SIGNIFICANT CHANGE UP (ref 0–4)
CHLORIDE SERPL-SCNC: 101 MMOL/L — SIGNIFICANT CHANGE UP (ref 96–108)
CHLORIDE SERPL-SCNC: 107 MMOL/L — SIGNIFICANT CHANGE UP (ref 96–108)
CO2 SERPL-SCNC: 13 MMOL/L — LOW (ref 22–31)
CO2 SERPL-SCNC: 18 MMOL/L — LOW (ref 22–31)
COLOR SPEC: YELLOW — SIGNIFICANT CHANGE UP
CREAT SERPL-MCNC: 1.72 MG/DL — HIGH (ref 0.5–1.3)
CREAT SERPL-MCNC: 1.76 MG/DL — HIGH (ref 0.5–1.3)
DIFF PNL FLD: NEGATIVE — SIGNIFICANT CHANGE UP
EGFR: 30 ML/MIN/1.73M2 — LOW
EGFR: 30 ML/MIN/1.73M2 — LOW
EGFR: 31 ML/MIN/1.73M2 — LOW
EGFR: 31 ML/MIN/1.73M2 — LOW
EOSINOPHIL # BLD AUTO: 0.15 K/UL — SIGNIFICANT CHANGE UP (ref 0–0.5)
EOSINOPHIL NFR BLD AUTO: 1.4 % — SIGNIFICANT CHANGE UP (ref 0–6)
GLUCOSE BLDC GLUCOMTR-MCNC: 113 MG/DL — HIGH (ref 70–99)
GLUCOSE BLDC GLUCOMTR-MCNC: 116 MG/DL — HIGH (ref 70–99)
GLUCOSE BLDC GLUCOMTR-MCNC: 230 MG/DL — HIGH (ref 70–99)
GLUCOSE SERPL-MCNC: 123 MG/DL — HIGH (ref 70–99)
GLUCOSE SERPL-MCNC: 176 MG/DL — HIGH (ref 70–99)
GLUCOSE UR QL: NEGATIVE MG/DL — SIGNIFICANT CHANGE UP
HCT VFR BLD CALC: 40.4 % — SIGNIFICANT CHANGE UP (ref 34.5–45)
HGB BLD-MCNC: 13.7 G/DL — SIGNIFICANT CHANGE UP (ref 11.5–15.5)
IMM GRANULOCYTES # BLD AUTO: 0.04 K/UL — SIGNIFICANT CHANGE UP (ref 0–0.07)
IMM GRANULOCYTES NFR BLD AUTO: 0.4 % — SIGNIFICANT CHANGE UP (ref 0–0.9)
INR BLD: 1.11 RATIO — SIGNIFICANT CHANGE UP (ref 0.85–1.16)
KETONES UR QL: NEGATIVE MG/DL — SIGNIFICANT CHANGE UP
LACTATE SERPL-SCNC: 1.8 MMOL/L — SIGNIFICANT CHANGE UP (ref 0.7–2)
LEUKOCYTE ESTERASE UR-ACNC: ABNORMAL
LYMPHOCYTES # BLD AUTO: 0.85 K/UL — LOW (ref 1–3.3)
LYMPHOCYTES NFR BLD AUTO: 8 % — LOW (ref 13–44)
MCHC RBC-ENTMCNC: 27.4 PG — SIGNIFICANT CHANGE UP (ref 27–34)
MCHC RBC-ENTMCNC: 33.9 G/DL — SIGNIFICANT CHANGE UP (ref 32–36)
MCV RBC AUTO: 80.8 FL — SIGNIFICANT CHANGE UP (ref 80–100)
MONOCYTES # BLD AUTO: 1.12 K/UL — HIGH (ref 0–0.9)
MONOCYTES NFR BLD AUTO: 10.6 % — SIGNIFICANT CHANGE UP (ref 2–14)
NEUTROPHILS # BLD AUTO: 8.35 K/UL — HIGH (ref 1.8–7.4)
NEUTROPHILS NFR BLD AUTO: 78.8 % — HIGH (ref 43–77)
NITRITE UR-MCNC: POSITIVE
NRBC # BLD AUTO: 0 K/UL — SIGNIFICANT CHANGE UP (ref 0–0)
NRBC # FLD: 0 K/UL — SIGNIFICANT CHANGE UP (ref 0–0)
NRBC BLD AUTO-RTO: 0 /100 WBCS — SIGNIFICANT CHANGE UP (ref 0–0)
PH UR: 5 — SIGNIFICANT CHANGE UP (ref 5–8)
PLATELET # BLD AUTO: 218 K/UL — SIGNIFICANT CHANGE UP (ref 150–400)
PMV BLD: 10.3 FL — SIGNIFICANT CHANGE UP (ref 7–13)
POTASSIUM SERPL-MCNC: 5.6 MMOL/L — HIGH (ref 3.5–5.3)
POTASSIUM SERPL-MCNC: 6.9 MMOL/L — CRITICAL HIGH (ref 3.5–5.3)
POTASSIUM SERPL-MCNC: 7.1 MMOL/L — CRITICAL HIGH (ref 3.5–5.3)
POTASSIUM SERPL-SCNC: 5.6 MMOL/L — HIGH (ref 3.5–5.3)
POTASSIUM SERPL-SCNC: 6.9 MMOL/L — CRITICAL HIGH (ref 3.5–5.3)
POTASSIUM SERPL-SCNC: 7.1 MMOL/L — CRITICAL HIGH (ref 3.5–5.3)
PROT SERPL-MCNC: 7.3 GM/DL — SIGNIFICANT CHANGE UP (ref 6–8.3)
PROT UR-MCNC: NEGATIVE MG/DL — SIGNIFICANT CHANGE UP
PROTHROM AB SERPL-ACNC: 12.8 SEC — SIGNIFICANT CHANGE UP (ref 9.9–13.4)
RBC # BLD: 5 M/UL — SIGNIFICANT CHANGE UP (ref 3.8–5.2)
RBC # FLD: 16.3 % — HIGH (ref 10.3–14.5)
RBC CASTS # UR COMP ASSIST: 2 /HPF — SIGNIFICANT CHANGE UP (ref 0–4)
SODIUM SERPL-SCNC: 125 MMOL/L — LOW (ref 135–145)
SODIUM SERPL-SCNC: 129 MMOL/L — LOW (ref 135–145)
SP GR SPEC: 1.02 — SIGNIFICANT CHANGE UP (ref 1–1.03)
SQUAMOUS # UR AUTO: 1 /HPF — SIGNIFICANT CHANGE UP (ref 0–5)
TROPONIN I, HIGH SENSITIVITY RESULT: 36.79 NG/L — SIGNIFICANT CHANGE UP
UROBILINOGEN FLD QL: 0.2 MG/DL — SIGNIFICANT CHANGE UP (ref 0.2–1)
WBC # BLD: 10.59 K/UL — HIGH (ref 3.8–10.5)
WBC # FLD AUTO: 10.59 K/UL — HIGH (ref 3.8–10.5)
WBC UR QL: 3 /HPF — SIGNIFICANT CHANGE UP (ref 0–5)

## 2025-08-05 PROCEDURE — 85025 COMPLETE CBC W/AUTO DIFF WBC: CPT

## 2025-08-05 PROCEDURE — 81001 URINALYSIS AUTO W/SCOPE: CPT

## 2025-08-05 PROCEDURE — 85027 COMPLETE CBC AUTOMATED: CPT

## 2025-08-05 PROCEDURE — 80053 COMPREHEN METABOLIC PANEL: CPT

## 2025-08-05 PROCEDURE — 36415 COLL VENOUS BLD VENIPUNCTURE: CPT

## 2025-08-05 PROCEDURE — 84100 ASSAY OF PHOSPHORUS: CPT

## 2025-08-05 PROCEDURE — 83036 HEMOGLOBIN GLYCOSYLATED A1C: CPT

## 2025-08-05 PROCEDURE — 80048 BASIC METABOLIC PNL TOTAL CA: CPT

## 2025-08-05 PROCEDURE — 87086 URINE CULTURE/COLONY COUNT: CPT

## 2025-08-05 PROCEDURE — 82962 GLUCOSE BLOOD TEST: CPT

## 2025-08-05 PROCEDURE — 99291 CRITICAL CARE FIRST HOUR: CPT

## 2025-08-05 PROCEDURE — 71045 X-RAY EXAM CHEST 1 VIEW: CPT | Mod: 26

## 2025-08-05 PROCEDURE — 93010 ELECTROCARDIOGRAM REPORT: CPT

## 2025-08-05 PROCEDURE — 97162 PT EVAL MOD COMPLEX 30 MIN: CPT | Mod: GP

## 2025-08-05 PROCEDURE — 83735 ASSAY OF MAGNESIUM: CPT

## 2025-08-05 PROCEDURE — 97530 THERAPEUTIC ACTIVITIES: CPT | Mod: GP

## 2025-08-05 PROCEDURE — 84443 ASSAY THYROID STIM HORMONE: CPT

## 2025-08-05 PROCEDURE — 97116 GAIT TRAINING THERAPY: CPT | Mod: GP

## 2025-08-05 RX ORDER — PIPERACILLIN-TAZO-DEXTROSE,ISO 3.375G/5
3.38 IV SOLUTION, PIGGYBACK PREMIX FROZEN(ML) INTRAVENOUS EVERY 8 HOURS
Refills: 0 | Status: DISCONTINUED | OUTPATIENT
Start: 2025-08-05 | End: 2025-08-11

## 2025-08-05 RX ORDER — DEXTROSE 50 % IN WATER 50 %
50 SYRINGE (ML) INTRAVENOUS ONCE
Refills: 0 | Status: COMPLETED | OUTPATIENT
Start: 2025-08-05 | End: 2025-08-05

## 2025-08-05 RX ORDER — FUROSEMIDE 10 MG/ML
40 INJECTION INTRAMUSCULAR; INTRAVENOUS ONCE
Refills: 0 | Status: COMPLETED | OUTPATIENT
Start: 2025-08-05 | End: 2025-08-05

## 2025-08-05 RX ORDER — CALCIUM GLUCONATE 20 MG/ML
2 INJECTION, SOLUTION INTRAVENOUS ONCE
Refills: 0 | Status: COMPLETED | OUTPATIENT
Start: 2025-08-05 | End: 2025-08-05

## 2025-08-05 RX ORDER — SODIUM BICARBONATE 1 MEQ/ML
0.16 SYRINGE (ML) INTRAVENOUS
Qty: 150 | Refills: 0 | Status: DISCONTINUED | OUTPATIENT
Start: 2025-08-05 | End: 2025-08-06

## 2025-08-05 RX ORDER — TRAMADOL HYDROCHLORIDE 50 MG/1
1 TABLET, FILM COATED ORAL
Refills: 0 | DISCHARGE

## 2025-08-05 RX ORDER — SODIUM CHLORIDE 9 G/1000ML
1000 INJECTION, SOLUTION INTRAVENOUS
Refills: 0 | Status: DISCONTINUED | OUTPATIENT
Start: 2025-08-05 | End: 2025-08-11

## 2025-08-05 RX ORDER — VANCOMYCIN HCL IN 5 % DEXTROSE 1.5G/250ML
1000 PLASTIC BAG, INJECTION (ML) INTRAVENOUS ONCE
Refills: 0 | Status: COMPLETED | OUTPATIENT
Start: 2025-08-05 | End: 2025-08-05

## 2025-08-05 RX ORDER — DEXTROSE 50 % IN WATER 50 %
25 SYRINGE (ML) INTRAVENOUS ONCE
Refills: 0 | Status: COMPLETED | OUTPATIENT
Start: 2025-08-05 | End: 2025-08-05

## 2025-08-05 RX ORDER — ONDANSETRON HCL/PF 4 MG/2 ML
4 VIAL (ML) INJECTION ONCE
Refills: 0 | Status: COMPLETED | OUTPATIENT
Start: 2025-08-05 | End: 2025-08-05

## 2025-08-05 RX ORDER — DEXTROSE 50 % IN WATER 50 %
25 SYRINGE (ML) INTRAVENOUS ONCE
Refills: 0 | Status: DISCONTINUED | OUTPATIENT
Start: 2025-08-05 | End: 2025-08-11

## 2025-08-05 RX ORDER — ACETAMINOPHEN 500 MG/5ML
1000 LIQUID (ML) ORAL ONCE
Refills: 0 | Status: COMPLETED | OUTPATIENT
Start: 2025-08-05 | End: 2025-08-05

## 2025-08-05 RX ORDER — VANCOMYCIN HCL IN 5 % DEXTROSE 1.5G/250ML
1750 PLASTIC BAG, INJECTION (ML) INTRAVENOUS EVERY 24 HOURS
Refills: 0 | Status: DISCONTINUED | OUTPATIENT
Start: 2025-08-05 | End: 2025-08-06

## 2025-08-05 RX ORDER — ACETIC ACID
1 SOLUTION, NON-ORAL VAGINAL ONCE
Refills: 0 | Status: DISCONTINUED | OUTPATIENT
Start: 2025-08-05 | End: 2025-08-11

## 2025-08-05 RX ORDER — PIPERACILLIN-TAZO-DEXTROSE,ISO 3.375G/5
3.38 IV SOLUTION, PIGGYBACK PREMIX FROZEN(ML) INTRAVENOUS ONCE
Refills: 0 | Status: COMPLETED | OUTPATIENT
Start: 2025-08-05 | End: 2025-08-05

## 2025-08-05 RX ORDER — SODIUM ZIRCONIUM CYCLOSILICATE 5 G/5G
10 POWDER, FOR SUSPENSION ORAL ONCE
Refills: 0 | Status: COMPLETED | OUTPATIENT
Start: 2025-08-05 | End: 2025-08-05

## 2025-08-05 RX ORDER — DEXTROSE 50 % IN WATER 50 %
12.5 SYRINGE (ML) INTRAVENOUS ONCE
Refills: 0 | Status: DISCONTINUED | OUTPATIENT
Start: 2025-08-05 | End: 2025-08-11

## 2025-08-05 RX ADMIN — Medication 250 MILLIGRAM(S): at 15:41

## 2025-08-05 RX ADMIN — Medication 400 MILLIGRAM(S): at 22:38

## 2025-08-05 RX ADMIN — Medication 1000 MILLIGRAM(S): at 23:00

## 2025-08-05 RX ADMIN — Medication 25 GRAM(S): at 22:39

## 2025-08-05 RX ADMIN — Medication 5 UNIT(S): at 18:31

## 2025-08-05 RX ADMIN — Medication 25 GRAM(S): at 14:54

## 2025-08-05 RX ADMIN — Medication 1000 MILLILITER(S): at 13:09

## 2025-08-05 RX ADMIN — CALCIUM GLUCONATE 200 GRAM(S): 20 INJECTION, SOLUTION INTRAVENOUS at 15:01

## 2025-08-05 RX ADMIN — Medication 125 MEQ/KG/HR: at 20:34

## 2025-08-05 RX ADMIN — Medication 200 GRAM(S): at 13:18

## 2025-08-05 RX ADMIN — Medication 5 UNIT(S): at 14:54

## 2025-08-05 RX ADMIN — Medication 4 MILLIGRAM(S): at 13:10

## 2025-08-05 RX ADMIN — Medication 2 MILLIGRAM(S): at 13:10

## 2025-08-05 RX ADMIN — SODIUM ZIRCONIUM CYCLOSILICATE 10 GRAM(S): 5 POWDER, FOR SUSPENSION ORAL at 14:54

## 2025-08-05 RX ADMIN — Medication 50 MILLILITER(S): at 18:30

## 2025-08-05 RX ADMIN — FUROSEMIDE 40 MILLIGRAM(S): 10 INJECTION INTRAMUSCULAR; INTRAVENOUS at 17:33

## 2025-08-05 RX ADMIN — Medication 1000 MILLILITER(S): at 15:03

## 2025-08-05 RX ADMIN — Medication 400 MILLIGRAM(S): at 13:52

## 2025-08-05 RX ADMIN — Medication 125 MILLILITER(S): at 17:33

## 2025-08-06 ENCOUNTER — NON-APPOINTMENT (OUTPATIENT)
Age: 75
End: 2025-08-06

## 2025-08-06 ENCOUNTER — TRANSCRIPTION ENCOUNTER (OUTPATIENT)
Age: 75
End: 2025-08-06

## 2025-08-06 LAB
ALBUMIN SERPL ELPH-MCNC: 2.4 G/DL — LOW (ref 3.3–5)
ALP SERPL-CCNC: 82 U/L — SIGNIFICANT CHANGE UP (ref 40–120)
ALT FLD-CCNC: 35 U/L — SIGNIFICANT CHANGE UP (ref 12–78)
ANION GAP SERPL CALC-SCNC: 8 MMOL/L — SIGNIFICANT CHANGE UP (ref 5–17)
AST SERPL-CCNC: 33 U/L — SIGNIFICANT CHANGE UP (ref 15–37)
BASOPHILS # BLD AUTO: 0.07 K/UL — SIGNIFICANT CHANGE UP (ref 0–0.2)
BASOPHILS NFR BLD AUTO: 0.8 % — SIGNIFICANT CHANGE UP (ref 0–2)
BILIRUB SERPL-MCNC: 1.1 MG/DL — SIGNIFICANT CHANGE UP (ref 0.2–1.2)
BUN SERPL-MCNC: 59 MG/DL — HIGH (ref 7–23)
CALCIUM SERPL-MCNC: 8.9 MG/DL — SIGNIFICANT CHANGE UP (ref 8.5–10.1)
CHLORIDE SERPL-SCNC: 106 MMOL/L — SIGNIFICANT CHANGE UP (ref 96–108)
CO2 SERPL-SCNC: 21 MMOL/L — LOW (ref 22–31)
CREAT SERPL-MCNC: 1.8 MG/DL — HIGH (ref 0.5–1.3)
EGFR: 29 ML/MIN/1.73M2 — LOW
EGFR: 29 ML/MIN/1.73M2 — LOW
EOSINOPHIL # BLD AUTO: 0.2 K/UL — SIGNIFICANT CHANGE UP (ref 0–0.5)
EOSINOPHIL NFR BLD AUTO: 2.4 % — SIGNIFICANT CHANGE UP (ref 0–6)
GLUCOSE BLDC GLUCOMTR-MCNC: 108 MG/DL — HIGH (ref 70–99)
GLUCOSE BLDC GLUCOMTR-MCNC: 172 MG/DL — HIGH (ref 70–99)
GLUCOSE BLDC GLUCOMTR-MCNC: 205 MG/DL — HIGH (ref 70–99)
GLUCOSE SERPL-MCNC: 125 MG/DL — HIGH (ref 70–99)
HCT VFR BLD CALC: 34.6 % — SIGNIFICANT CHANGE UP (ref 34.5–45)
HGB BLD-MCNC: 11.9 G/DL — SIGNIFICANT CHANGE UP (ref 11.5–15.5)
IMM GRANULOCYTES # BLD AUTO: 0.02 K/UL — SIGNIFICANT CHANGE UP (ref 0–0.07)
IMM GRANULOCYTES NFR BLD AUTO: 0.2 % — SIGNIFICANT CHANGE UP (ref 0–0.9)
LYMPHOCYTES # BLD AUTO: 0.74 K/UL — LOW (ref 1–3.3)
LYMPHOCYTES NFR BLD AUTO: 9 % — LOW (ref 13–44)
MAGNESIUM SERPL-MCNC: 2.1 MG/DL — SIGNIFICANT CHANGE UP (ref 1.6–2.6)
MCHC RBC-ENTMCNC: 27.9 PG — SIGNIFICANT CHANGE UP (ref 27–34)
MCHC RBC-ENTMCNC: 34.4 G/DL — SIGNIFICANT CHANGE UP (ref 32–36)
MCV RBC AUTO: 81.2 FL — SIGNIFICANT CHANGE UP (ref 80–100)
MONOCYTES # BLD AUTO: 1.19 K/UL — HIGH (ref 0–0.9)
MONOCYTES NFR BLD AUTO: 14.4 % — HIGH (ref 2–14)
NEUTROPHILS # BLD AUTO: 6.04 K/UL — SIGNIFICANT CHANGE UP (ref 1.8–7.4)
NEUTROPHILS NFR BLD AUTO: 73.2 % — SIGNIFICANT CHANGE UP (ref 43–77)
NRBC # BLD AUTO: 0 K/UL — SIGNIFICANT CHANGE UP (ref 0–0)
NRBC # FLD: 0 K/UL — SIGNIFICANT CHANGE UP (ref 0–0)
NRBC BLD AUTO-RTO: 0 /100 WBCS — SIGNIFICANT CHANGE UP (ref 0–0)
PHOSPHATE SERPL-MCNC: 4.5 MG/DL — SIGNIFICANT CHANGE UP (ref 2.5–4.5)
PLATELET # BLD AUTO: 129 K/UL — LOW (ref 150–400)
PMV BLD: 9.5 FL — SIGNIFICANT CHANGE UP (ref 7–13)
POTASSIUM SERPL-MCNC: 4.9 MMOL/L — SIGNIFICANT CHANGE UP (ref 3.5–5.3)
POTASSIUM SERPL-SCNC: 4.9 MMOL/L — SIGNIFICANT CHANGE UP (ref 3.5–5.3)
PROT SERPL-MCNC: 5.9 GM/DL — LOW (ref 6–8.3)
RBC # BLD: 4.26 M/UL — SIGNIFICANT CHANGE UP (ref 3.8–5.2)
RBC # FLD: 16 % — HIGH (ref 10.3–14.5)
SODIUM SERPL-SCNC: 135 MMOL/L — SIGNIFICANT CHANGE UP (ref 135–145)
WBC # BLD: 8.26 K/UL — SIGNIFICANT CHANGE UP (ref 3.8–10.5)
WBC # FLD AUTO: 8.26 K/UL — SIGNIFICANT CHANGE UP (ref 3.8–10.5)

## 2025-08-06 PROCEDURE — 99232 SBSQ HOSP IP/OBS MODERATE 35: CPT

## 2025-08-06 PROCEDURE — 99233 SBSQ HOSP IP/OBS HIGH 50: CPT

## 2025-08-06 PROCEDURE — 99222 1ST HOSP IP/OBS MODERATE 55: CPT

## 2025-08-06 RX ORDER — INSULIN LISPRO 100 U/ML
INJECTION, SOLUTION INTRAVENOUS; SUBCUTANEOUS
Refills: 0 | Status: DISCONTINUED | OUTPATIENT
Start: 2025-08-06 | End: 2025-08-11

## 2025-08-06 RX ORDER — GLUCAGON 3 MG/1
1 POWDER NASAL ONCE
Refills: 0 | Status: DISCONTINUED | OUTPATIENT
Start: 2025-08-06 | End: 2025-08-11

## 2025-08-06 RX ORDER — DOXYCYCLINE HYCLATE 100 MG
100 TABLET ORAL EVERY 12 HOURS
Refills: 0 | Status: DISCONTINUED | OUTPATIENT
Start: 2025-08-06 | End: 2025-08-11

## 2025-08-06 RX ORDER — DEXTROSE 50 % IN WATER 50 %
15 SYRINGE (ML) INTRAVENOUS ONCE
Refills: 0 | Status: DISCONTINUED | OUTPATIENT
Start: 2025-08-06 | End: 2025-08-11

## 2025-08-06 RX ORDER — SODIUM CHLORIDE 9 G/1000ML
1000 INJECTION, SOLUTION INTRAVENOUS
Refills: 0 | Status: DISCONTINUED | OUTPATIENT
Start: 2025-08-06 | End: 2025-08-06

## 2025-08-06 RX ADMIN — INSULIN LISPRO 1: 100 INJECTION, SOLUTION INTRAVENOUS; SUBCUTANEOUS at 12:55

## 2025-08-06 RX ADMIN — Medication 4 MILLIGRAM(S): at 22:15

## 2025-08-06 RX ADMIN — Medication 25 GRAM(S): at 14:28

## 2025-08-06 RX ADMIN — Medication 100 MILLILITER(S): at 21:56

## 2025-08-06 RX ADMIN — Medication 4 MILLIGRAM(S): at 21:56

## 2025-08-06 RX ADMIN — Medication 100 MILLIGRAM(S): at 21:56

## 2025-08-06 RX ADMIN — Medication 100 MILLIGRAM(S): at 12:02

## 2025-08-06 RX ADMIN — Medication 25 GRAM(S): at 21:57

## 2025-08-06 RX ADMIN — Medication 4 MILLIGRAM(S): at 05:40

## 2025-08-06 RX ADMIN — Medication 25 GRAM(S): at 05:23

## 2025-08-06 RX ADMIN — Medication 125 MEQ/KG/HR: at 05:56

## 2025-08-06 RX ADMIN — Medication 4 MILLIGRAM(S): at 05:24

## 2025-08-06 RX ADMIN — SODIUM CHLORIDE 100 MILLILITER(S): 9 INJECTION, SOLUTION INTRAVENOUS at 12:02

## 2025-08-07 LAB
A1C WITH ESTIMATED AVERAGE GLUCOSE RESULT: 6.2 % — HIGH (ref 4–5.6)
ALBUMIN SERPL ELPH-MCNC: 2.1 G/DL — LOW (ref 3.3–5)
ALP SERPL-CCNC: 67 U/L — SIGNIFICANT CHANGE UP (ref 40–120)
ALT FLD-CCNC: 25 U/L — SIGNIFICANT CHANGE UP (ref 12–78)
ANION GAP SERPL CALC-SCNC: 7 MMOL/L — SIGNIFICANT CHANGE UP (ref 5–17)
AST SERPL-CCNC: 29 U/L — SIGNIFICANT CHANGE UP (ref 15–37)
BASOPHILS # BLD AUTO: 0.03 K/UL — SIGNIFICANT CHANGE UP (ref 0–0.2)
BASOPHILS NFR BLD AUTO: 0.6 % — SIGNIFICANT CHANGE UP (ref 0–2)
BILIRUB SERPL-MCNC: 1 MG/DL — SIGNIFICANT CHANGE UP (ref 0.2–1.2)
BUN SERPL-MCNC: 49 MG/DL — HIGH (ref 7–23)
CALCIUM SERPL-MCNC: 8.2 MG/DL — LOW (ref 8.5–10.1)
CHLORIDE SERPL-SCNC: 106 MMOL/L — SIGNIFICANT CHANGE UP (ref 96–108)
CO2 SERPL-SCNC: 22 MMOL/L — SIGNIFICANT CHANGE UP (ref 22–31)
CREAT SERPL-MCNC: 1.42 MG/DL — HIGH (ref 0.5–1.3)
CULTURE RESULTS: NO GROWTH — SIGNIFICANT CHANGE UP
EGFR: 39 ML/MIN/1.73M2 — LOW
EGFR: 39 ML/MIN/1.73M2 — LOW
EOSINOPHIL # BLD AUTO: 0.19 K/UL — SIGNIFICANT CHANGE UP (ref 0–0.5)
EOSINOPHIL NFR BLD AUTO: 3.6 % — SIGNIFICANT CHANGE UP (ref 0–6)
ESTIMATED AVERAGE GLUCOSE: 131 MG/DL — HIGH (ref 68–114)
GLUCOSE SERPL-MCNC: 91 MG/DL — SIGNIFICANT CHANGE UP (ref 70–99)
HCT VFR BLD CALC: 33 % — LOW (ref 34.5–45)
HGB BLD-MCNC: 10.9 G/DL — LOW (ref 11.5–15.5)
IMM GRANULOCYTES # BLD AUTO: 0.02 K/UL — SIGNIFICANT CHANGE UP (ref 0–0.07)
IMM GRANULOCYTES NFR BLD AUTO: 0.4 % — SIGNIFICANT CHANGE UP (ref 0–0.9)
LYMPHOCYTES # BLD AUTO: 0.73 K/UL — LOW (ref 1–3.3)
LYMPHOCYTES NFR BLD AUTO: 13.7 % — SIGNIFICANT CHANGE UP (ref 13–44)
MAGNESIUM SERPL-MCNC: 1.9 MG/DL — SIGNIFICANT CHANGE UP (ref 1.6–2.6)
MCHC RBC-ENTMCNC: 27.5 PG — SIGNIFICANT CHANGE UP (ref 27–34)
MCHC RBC-ENTMCNC: 33 G/DL — SIGNIFICANT CHANGE UP (ref 32–36)
MCV RBC AUTO: 83.3 FL — SIGNIFICANT CHANGE UP (ref 80–100)
MONOCYTES # BLD AUTO: 0.82 K/UL — SIGNIFICANT CHANGE UP (ref 0–0.9)
MONOCYTES NFR BLD AUTO: 15.4 % — HIGH (ref 2–14)
NEUTROPHILS # BLD AUTO: 3.55 K/UL — SIGNIFICANT CHANGE UP (ref 1.8–7.4)
NEUTROPHILS NFR BLD AUTO: 66.3 % — SIGNIFICANT CHANGE UP (ref 43–77)
NRBC # BLD AUTO: 0 K/UL — SIGNIFICANT CHANGE UP (ref 0–0)
NRBC # FLD: 0 K/UL — SIGNIFICANT CHANGE UP (ref 0–0)
NRBC BLD AUTO-RTO: 0 /100 WBCS — SIGNIFICANT CHANGE UP (ref 0–0)
PHOSPHATE SERPL-MCNC: 3.7 MG/DL — SIGNIFICANT CHANGE UP (ref 2.5–4.5)
PLATELET # BLD AUTO: 108 K/UL — LOW (ref 150–400)
PMV BLD: 10 FL — SIGNIFICANT CHANGE UP (ref 7–13)
POTASSIUM SERPL-MCNC: 3.9 MMOL/L — SIGNIFICANT CHANGE UP (ref 3.5–5.3)
POTASSIUM SERPL-SCNC: 3.9 MMOL/L — SIGNIFICANT CHANGE UP (ref 3.5–5.3)
PROT SERPL-MCNC: 5.2 GM/DL — LOW (ref 6–8.3)
RBC # BLD: 3.96 M/UL — SIGNIFICANT CHANGE UP (ref 3.8–5.2)
RBC # FLD: 15.7 % — HIGH (ref 10.3–14.5)
SODIUM SERPL-SCNC: 135 MMOL/L — SIGNIFICANT CHANGE UP (ref 135–145)
SPECIMEN SOURCE: SIGNIFICANT CHANGE UP
WBC # BLD: 5.34 K/UL — SIGNIFICANT CHANGE UP (ref 3.8–10.5)
WBC # FLD AUTO: 5.34 K/UL — SIGNIFICANT CHANGE UP (ref 3.8–10.5)

## 2025-08-07 PROCEDURE — 99232 SBSQ HOSP IP/OBS MODERATE 35: CPT

## 2025-08-07 RX ORDER — HYDROMORPHONE/SOD CHLOR,ISO/PF 2 MG/10 ML
1 SYRINGE (ML) INJECTION EVERY 6 HOURS
Refills: 0 | Status: DISCONTINUED | OUTPATIENT
Start: 2025-08-07 | End: 2025-08-07

## 2025-08-07 RX ORDER — ACETAMINOPHEN 500 MG/5ML
650 LIQUID (ML) ORAL EVERY 6 HOURS
Refills: 0 | Status: DISCONTINUED | OUTPATIENT
Start: 2025-08-07 | End: 2025-08-11

## 2025-08-07 RX ORDER — HYDROMORPHONE/SOD CHLOR,ISO/PF 2 MG/10 ML
0.5 SYRINGE (ML) INJECTION EVERY 6 HOURS
Refills: 0 | Status: DISCONTINUED | OUTPATIENT
Start: 2025-08-07 | End: 2025-08-11

## 2025-08-07 RX ADMIN — Medication 25 GRAM(S): at 05:47

## 2025-08-07 RX ADMIN — Medication 1 MILLIGRAM(S): at 09:24

## 2025-08-07 RX ADMIN — Medication 100 MILLILITER(S): at 09:24

## 2025-08-07 RX ADMIN — Medication 100 MILLIGRAM(S): at 09:24

## 2025-08-07 RX ADMIN — Medication 25 GRAM(S): at 21:51

## 2025-08-07 RX ADMIN — Medication 25 GRAM(S): at 13:53

## 2025-08-07 RX ADMIN — Medication 100 MILLIGRAM(S): at 21:50

## 2025-08-08 LAB
ANION GAP SERPL CALC-SCNC: 8 MMOL/L — SIGNIFICANT CHANGE UP (ref 5–17)
BUN SERPL-MCNC: 41 MG/DL — HIGH (ref 7–23)
CALCIUM SERPL-MCNC: 8.1 MG/DL — LOW (ref 8.5–10.1)
CHLORIDE SERPL-SCNC: 107 MMOL/L — SIGNIFICANT CHANGE UP (ref 96–108)
CO2 SERPL-SCNC: 23 MMOL/L — SIGNIFICANT CHANGE UP (ref 22–31)
CREAT SERPL-MCNC: 1.28 MG/DL — SIGNIFICANT CHANGE UP (ref 0.5–1.3)
EGFR: 44 ML/MIN/1.73M2 — LOW
EGFR: 44 ML/MIN/1.73M2 — LOW
GLUCOSE SERPL-MCNC: 97 MG/DL — SIGNIFICANT CHANGE UP (ref 70–99)
HCT VFR BLD CALC: 32.2 % — LOW (ref 34.5–45)
HGB BLD-MCNC: 10.5 G/DL — LOW (ref 11.5–15.5)
IMMATURE PLATELET FRACTION #: 1.6 K/UL — LOW (ref 4.7–11.1)
IMMATURE PLATELET FRACTION %: 1.8 % — SIGNIFICANT CHANGE UP (ref 1.6–4.9)
MCHC RBC-ENTMCNC: 27.5 PG — SIGNIFICANT CHANGE UP (ref 27–34)
MCHC RBC-ENTMCNC: 32.6 G/DL — SIGNIFICANT CHANGE UP (ref 32–36)
MCV RBC AUTO: 84.3 FL — SIGNIFICANT CHANGE UP (ref 80–100)
NRBC # BLD AUTO: 0 K/UL — SIGNIFICANT CHANGE UP (ref 0–0)
NRBC # FLD: 0 K/UL — SIGNIFICANT CHANGE UP (ref 0–0)
NRBC BLD AUTO-RTO: 0 /100 WBCS — SIGNIFICANT CHANGE UP (ref 0–0)
PLATELET # BLD AUTO: 89 K/UL — LOW (ref 150–400)
PMV BLD: 10.5 FL — SIGNIFICANT CHANGE UP (ref 7–13)
POTASSIUM SERPL-MCNC: 3.7 MMOL/L — SIGNIFICANT CHANGE UP (ref 3.5–5.3)
POTASSIUM SERPL-SCNC: 3.7 MMOL/L — SIGNIFICANT CHANGE UP (ref 3.5–5.3)
RBC # BLD: 3.82 M/UL — SIGNIFICANT CHANGE UP (ref 3.8–5.2)
RBC # FLD: 15.5 % — HIGH (ref 10.3–14.5)
SODIUM SERPL-SCNC: 138 MMOL/L — SIGNIFICANT CHANGE UP (ref 135–145)
WBC # BLD: 5.07 K/UL — SIGNIFICANT CHANGE UP (ref 3.8–10.5)
WBC # FLD AUTO: 5.07 K/UL — SIGNIFICANT CHANGE UP (ref 3.8–10.5)

## 2025-08-08 PROCEDURE — 99232 SBSQ HOSP IP/OBS MODERATE 35: CPT

## 2025-08-08 PROCEDURE — 99231 SBSQ HOSP IP/OBS SF/LOW 25: CPT

## 2025-08-08 RX ORDER — POLYETHYLENE GLYCOL 3350 17 G/17G
17 POWDER, FOR SOLUTION ORAL DAILY
Refills: 0 | Status: DISCONTINUED | OUTPATIENT
Start: 2025-08-08 | End: 2025-08-11

## 2025-08-08 RX ORDER — BISACODYL 5 MG
5 TABLET, DELAYED RELEASE (ENTERIC COATED) ORAL AT BEDTIME
Refills: 0 | Status: DISCONTINUED | OUTPATIENT
Start: 2025-08-08 | End: 2025-08-11

## 2025-08-08 RX ORDER — SENNA 187 MG
2 TABLET ORAL AT BEDTIME
Refills: 0 | Status: DISCONTINUED | OUTPATIENT
Start: 2025-08-08 | End: 2025-08-11

## 2025-08-08 RX ORDER — HYDROMORPHONE/SOD CHLOR,ISO/PF 2 MG/10 ML
0.5 SYRINGE (ML) INJECTION ONCE
Refills: 0 | Status: DISCONTINUED | OUTPATIENT
Start: 2025-08-08 | End: 2025-08-11

## 2025-08-08 RX ORDER — SENNA 187 MG
1 TABLET ORAL ONCE
Refills: 0 | Status: COMPLETED | OUTPATIENT
Start: 2025-08-08 | End: 2025-08-08

## 2025-08-08 RX ADMIN — POLYETHYLENE GLYCOL 3350 17 GRAM(S): 17 POWDER, FOR SOLUTION ORAL at 14:33

## 2025-08-08 RX ADMIN — Medication 2 TABLET(S): at 21:48

## 2025-08-08 RX ADMIN — Medication 25 GRAM(S): at 13:00

## 2025-08-08 RX ADMIN — Medication 100 MILLIGRAM(S): at 21:48

## 2025-08-08 RX ADMIN — Medication 5 MILLIGRAM(S): at 21:48

## 2025-08-08 RX ADMIN — Medication 100 MILLIGRAM(S): at 09:47

## 2025-08-08 RX ADMIN — Medication 0.5 MILLIGRAM(S): at 02:46

## 2025-08-08 RX ADMIN — Medication 1 TABLET(S): at 13:55

## 2025-08-08 RX ADMIN — Medication 25 GRAM(S): at 21:47

## 2025-08-08 RX ADMIN — Medication 0.5 MILLIGRAM(S): at 04:23

## 2025-08-08 RX ADMIN — Medication 25 GRAM(S): at 05:16

## 2025-08-09 LAB
ANION GAP SERPL CALC-SCNC: 7 MMOL/L — SIGNIFICANT CHANGE UP (ref 5–17)
BUN SERPL-MCNC: 36 MG/DL — HIGH (ref 7–23)
CALCIUM SERPL-MCNC: 8.1 MG/DL — LOW (ref 8.5–10.1)
CHLORIDE SERPL-SCNC: 109 MMOL/L — HIGH (ref 96–108)
CO2 SERPL-SCNC: 18 MMOL/L — LOW (ref 22–31)
CREAT SERPL-MCNC: 1.11 MG/DL — SIGNIFICANT CHANGE UP (ref 0.5–1.3)
EGFR: 52 ML/MIN/1.73M2 — LOW
EGFR: 52 ML/MIN/1.73M2 — LOW
GLUCOSE BLDC GLUCOMTR-MCNC: 166 MG/DL — HIGH (ref 70–99)
GLUCOSE SERPL-MCNC: 98 MG/DL — SIGNIFICANT CHANGE UP (ref 70–99)
HCT VFR BLD CALC: 35.5 % — SIGNIFICANT CHANGE UP (ref 34.5–45)
HCT VFR BLD CALC: SIGNIFICANT CHANGE UP (ref 34.5–45)
HGB BLD-MCNC: 11.5 G/DL — SIGNIFICANT CHANGE UP (ref 11.5–15.5)
HGB BLD-MCNC: SIGNIFICANT CHANGE UP (ref 11.5–15.5)
IMMATURE PLATELET FRACTION #: 1.7 K/UL — LOW (ref 4.7–11.1)
IMMATURE PLATELET FRACTION #: SIGNIFICANT CHANGE UP (ref 4.7–11.1)
IMMATURE PLATELET FRACTION %: 2 % — SIGNIFICANT CHANGE UP (ref 1.6–4.9)
IMMATURE PLATELET FRACTION %: SIGNIFICANT CHANGE UP (ref 1.6–4.9)
MCHC RBC-ENTMCNC: 27.6 PG — SIGNIFICANT CHANGE UP (ref 27–34)
MCHC RBC-ENTMCNC: 32.4 G/DL — SIGNIFICANT CHANGE UP (ref 32–36)
MCHC RBC-ENTMCNC: SIGNIFICANT CHANGE UP (ref 27–34)
MCHC RBC-ENTMCNC: SIGNIFICANT CHANGE UP (ref 32–36)
MCV RBC AUTO: 85.1 FL — SIGNIFICANT CHANGE UP (ref 80–100)
MCV RBC AUTO: SIGNIFICANT CHANGE UP (ref 80–100)
NRBC # BLD AUTO: 0 K/UL — SIGNIFICANT CHANGE UP (ref 0–0)
NRBC # BLD AUTO: SIGNIFICANT CHANGE UP (ref 0–0)
NRBC # FLD: 0 K/UL — SIGNIFICANT CHANGE UP (ref 0–0)
NRBC # FLD: SIGNIFICANT CHANGE UP (ref 0–0)
NRBC BLD AUTO-RTO: 0 /100 WBCS — SIGNIFICANT CHANGE UP (ref 0–0)
NRBC BLD AUTO-RTO: SIGNIFICANT CHANGE UP (ref 0–0)
PLATELET # BLD AUTO: 84 K/UL — LOW (ref 150–400)
PLATELET # BLD AUTO: SIGNIFICANT CHANGE UP K/UL (ref 150–400)
PMV BLD: 10.7 FL — SIGNIFICANT CHANGE UP (ref 7–13)
PMV BLD: SIGNIFICANT CHANGE UP FL (ref 7–13)
POTASSIUM SERPL-MCNC: 3.9 MMOL/L — SIGNIFICANT CHANGE UP (ref 3.5–5.3)
POTASSIUM SERPL-SCNC: 3.9 MMOL/L — SIGNIFICANT CHANGE UP (ref 3.5–5.3)
RBC # BLD: 4.17 M/UL — SIGNIFICANT CHANGE UP (ref 3.8–5.2)
RBC # BLD: SIGNIFICANT CHANGE UP (ref 3.8–5.2)
RBC # FLD: 15.4 % — HIGH (ref 10.3–14.5)
RBC # FLD: SIGNIFICANT CHANGE UP % (ref 10.3–14.5)
SODIUM SERPL-SCNC: 134 MMOL/L — LOW (ref 135–145)
WBC # BLD: 5.17 K/UL — SIGNIFICANT CHANGE UP (ref 3.8–10.5)
WBC # BLD: SIGNIFICANT CHANGE UP K/UL (ref 3.8–10.5)
WBC # FLD AUTO: 5.17 K/UL — SIGNIFICANT CHANGE UP (ref 3.8–10.5)
WBC # FLD AUTO: SIGNIFICANT CHANGE UP K/UL (ref 3.8–10.5)

## 2025-08-09 PROCEDURE — 99232 SBSQ HOSP IP/OBS MODERATE 35: CPT

## 2025-08-09 RX ORDER — BISACODYL 5 MG
10 TABLET, DELAYED RELEASE (ENTERIC COATED) ORAL DAILY
Refills: 0 | Status: DISCONTINUED | OUTPATIENT
Start: 2025-08-09 | End: 2025-08-11

## 2025-08-09 RX ADMIN — Medication 100 MILLIGRAM(S): at 21:54

## 2025-08-09 RX ADMIN — Medication 0.5 MILLIGRAM(S): at 00:04

## 2025-08-09 RX ADMIN — Medication 0.5 MILLIGRAM(S): at 00:19

## 2025-08-09 RX ADMIN — Medication 25 GRAM(S): at 14:17

## 2025-08-09 RX ADMIN — Medication 25 GRAM(S): at 21:49

## 2025-08-09 RX ADMIN — Medication 0.5 MILLIGRAM(S): at 09:40

## 2025-08-09 RX ADMIN — Medication 0.5 MILLIGRAM(S): at 16:03

## 2025-08-09 RX ADMIN — Medication 25 GRAM(S): at 05:03

## 2025-08-09 RX ADMIN — Medication 2 TABLET(S): at 21:49

## 2025-08-09 RX ADMIN — POLYETHYLENE GLYCOL 3350 17 GRAM(S): 17 POWDER, FOR SOLUTION ORAL at 09:25

## 2025-08-09 RX ADMIN — Medication 0.5 MILLIGRAM(S): at 23:57

## 2025-08-09 RX ADMIN — Medication 5 MILLIGRAM(S): at 21:50

## 2025-08-09 RX ADMIN — Medication 0.5 MILLIGRAM(S): at 09:25

## 2025-08-09 RX ADMIN — Medication 10 MILLIGRAM(S): at 14:55

## 2025-08-09 RX ADMIN — Medication 100 MILLIGRAM(S): at 09:25

## 2025-08-09 RX ADMIN — Medication 0.5 MILLIGRAM(S): at 16:19

## 2025-08-10 LAB
ANION GAP SERPL CALC-SCNC: 6 MMOL/L — SIGNIFICANT CHANGE UP (ref 5–17)
BUN SERPL-MCNC: 29 MG/DL — HIGH (ref 7–23)
CALCIUM SERPL-MCNC: 8.4 MG/DL — LOW (ref 8.5–10.1)
CHLORIDE SERPL-SCNC: 111 MMOL/L — HIGH (ref 96–108)
CO2 SERPL-SCNC: 19 MMOL/L — LOW (ref 22–31)
CREAT SERPL-MCNC: 1.04 MG/DL — SIGNIFICANT CHANGE UP (ref 0.5–1.3)
CULTURE RESULTS: SIGNIFICANT CHANGE UP
CULTURE RESULTS: SIGNIFICANT CHANGE UP
EGFR: 56 ML/MIN/1.73M2 — LOW
EGFR: 56 ML/MIN/1.73M2 — LOW
GLUCOSE BLDC GLUCOMTR-MCNC: 101 MG/DL — HIGH (ref 70–99)
GLUCOSE BLDC GLUCOMTR-MCNC: 102 MG/DL — HIGH (ref 70–99)
GLUCOSE SERPL-MCNC: 94 MG/DL — SIGNIFICANT CHANGE UP (ref 70–99)
HCT VFR BLD CALC: 36.7 % — SIGNIFICANT CHANGE UP (ref 34.5–45)
HGB BLD-MCNC: 11.8 G/DL — SIGNIFICANT CHANGE UP (ref 11.5–15.5)
IMMATURE PLATELET FRACTION #: 1.7 K/UL — LOW (ref 4.7–11.1)
IMMATURE PLATELET FRACTION %: 1.9 % — SIGNIFICANT CHANGE UP (ref 1.6–4.9)
MCHC RBC-ENTMCNC: 27.4 PG — SIGNIFICANT CHANGE UP (ref 27–34)
MCHC RBC-ENTMCNC: 32.2 G/DL — SIGNIFICANT CHANGE UP (ref 32–36)
MCV RBC AUTO: 85.3 FL — SIGNIFICANT CHANGE UP (ref 80–100)
NRBC # BLD AUTO: 0 K/UL — SIGNIFICANT CHANGE UP (ref 0–0)
NRBC # FLD: 0 K/UL — SIGNIFICANT CHANGE UP (ref 0–0)
NRBC BLD AUTO-RTO: 0 /100 WBCS — SIGNIFICANT CHANGE UP (ref 0–0)
PLATELET # BLD AUTO: 92 K/UL — LOW (ref 150–400)
PMV BLD: 10.6 FL — SIGNIFICANT CHANGE UP (ref 7–13)
POTASSIUM SERPL-MCNC: 4.1 MMOL/L — SIGNIFICANT CHANGE UP (ref 3.5–5.3)
POTASSIUM SERPL-SCNC: 4.1 MMOL/L — SIGNIFICANT CHANGE UP (ref 3.5–5.3)
RBC # BLD: 4.3 M/UL — SIGNIFICANT CHANGE UP (ref 3.8–5.2)
RBC # FLD: 15.3 % — HIGH (ref 10.3–14.5)
SODIUM SERPL-SCNC: 136 MMOL/L — SIGNIFICANT CHANGE UP (ref 135–145)
SPECIMEN SOURCE: SIGNIFICANT CHANGE UP
SPECIMEN SOURCE: SIGNIFICANT CHANGE UP
WBC # BLD: 6.22 K/UL — SIGNIFICANT CHANGE UP (ref 3.8–10.5)
WBC # FLD AUTO: 6.22 K/UL — SIGNIFICANT CHANGE UP (ref 3.8–10.5)

## 2025-08-10 PROCEDURE — 99232 SBSQ HOSP IP/OBS MODERATE 35: CPT

## 2025-08-10 RX ADMIN — Medication 10 MILLIGRAM(S): at 11:14

## 2025-08-10 RX ADMIN — Medication 5 MILLIGRAM(S): at 22:32

## 2025-08-10 RX ADMIN — Medication 100 MILLIGRAM(S): at 09:18

## 2025-08-10 RX ADMIN — POLYETHYLENE GLYCOL 3350 17 GRAM(S): 17 POWDER, FOR SOLUTION ORAL at 09:18

## 2025-08-10 RX ADMIN — Medication 100 MILLIGRAM(S): at 22:32

## 2025-08-10 RX ADMIN — Medication 0.5 MILLIGRAM(S): at 11:44

## 2025-08-10 RX ADMIN — Medication 0.5 MILLIGRAM(S): at 11:16

## 2025-08-10 RX ADMIN — Medication 0.5 MILLIGRAM(S): at 17:46

## 2025-08-10 RX ADMIN — Medication 25 GRAM(S): at 06:11

## 2025-08-10 RX ADMIN — Medication 2 TABLET(S): at 22:32

## 2025-08-10 RX ADMIN — Medication 25 GRAM(S): at 13:13

## 2025-08-10 RX ADMIN — Medication 25 GRAM(S): at 22:33

## 2025-08-11 ENCOUNTER — TRANSCRIPTION ENCOUNTER (OUTPATIENT)
Age: 75
End: 2025-08-11

## 2025-08-11 VITALS
RESPIRATION RATE: 18 BRPM | OXYGEN SATURATION: 98 % | TEMPERATURE: 98 F | SYSTOLIC BLOOD PRESSURE: 113 MMHG | HEART RATE: 81 BPM | DIASTOLIC BLOOD PRESSURE: 53 MMHG

## 2025-08-11 LAB
ALBUMIN SERPL ELPH-MCNC: 1.8 G/DL — LOW (ref 3.3–5)
ALP SERPL-CCNC: 66 U/L — SIGNIFICANT CHANGE UP (ref 40–120)
ALT FLD-CCNC: 22 U/L — SIGNIFICANT CHANGE UP (ref 12–78)
ANION GAP SERPL CALC-SCNC: 6 MMOL/L — SIGNIFICANT CHANGE UP (ref 5–17)
AST SERPL-CCNC: 25 U/L — SIGNIFICANT CHANGE UP (ref 15–37)
BILIRUB SERPL-MCNC: 0.8 MG/DL — SIGNIFICANT CHANGE UP (ref 0.2–1.2)
BUN SERPL-MCNC: 28 MG/DL — HIGH (ref 7–23)
CALCIUM SERPL-MCNC: 8.2 MG/DL — LOW (ref 8.5–10.1)
CHLORIDE SERPL-SCNC: 109 MMOL/L — HIGH (ref 96–108)
CO2 SERPL-SCNC: 23 MMOL/L — SIGNIFICANT CHANGE UP (ref 22–31)
CREAT SERPL-MCNC: 0.95 MG/DL — SIGNIFICANT CHANGE UP (ref 0.5–1.3)
EGFR: 63 ML/MIN/1.73M2 — SIGNIFICANT CHANGE UP
EGFR: 63 ML/MIN/1.73M2 — SIGNIFICANT CHANGE UP
GLUCOSE SERPL-MCNC: 90 MG/DL — SIGNIFICANT CHANGE UP (ref 70–99)
HCT VFR BLD CALC: 32.4 % — LOW (ref 34.5–45)
HGB BLD-MCNC: 10.5 G/DL — LOW (ref 11.5–15.5)
IMMATURE PLATELET FRACTION #: 1.8 K/UL — LOW (ref 4.7–11.1)
IMMATURE PLATELET FRACTION %: 2.3 % — SIGNIFICANT CHANGE UP (ref 1.6–4.9)
MCHC RBC-ENTMCNC: 27.3 PG — SIGNIFICANT CHANGE UP (ref 27–34)
MCHC RBC-ENTMCNC: 32.4 G/DL — SIGNIFICANT CHANGE UP (ref 32–36)
MCV RBC AUTO: 84.4 FL — SIGNIFICANT CHANGE UP (ref 80–100)
NRBC # BLD AUTO: 0 K/UL — SIGNIFICANT CHANGE UP (ref 0–0)
NRBC # FLD: 0 K/UL — SIGNIFICANT CHANGE UP (ref 0–0)
NRBC BLD AUTO-RTO: 0 /100 WBCS — SIGNIFICANT CHANGE UP (ref 0–0)
PLATELET # BLD AUTO: 79 K/UL — LOW (ref 150–400)
PMV BLD: 11.5 FL — SIGNIFICANT CHANGE UP (ref 7–13)
POTASSIUM SERPL-MCNC: 3.7 MMOL/L — SIGNIFICANT CHANGE UP (ref 3.5–5.3)
POTASSIUM SERPL-SCNC: 3.7 MMOL/L — SIGNIFICANT CHANGE UP (ref 3.5–5.3)
PROT SERPL-MCNC: 5.1 GM/DL — LOW (ref 6–8.3)
RBC # BLD: 3.84 M/UL — SIGNIFICANT CHANGE UP (ref 3.8–5.2)
RBC # FLD: 15.3 % — HIGH (ref 10.3–14.5)
SODIUM SERPL-SCNC: 138 MMOL/L — SIGNIFICANT CHANGE UP (ref 135–145)
TSH SERPL-MCNC: 1.85 UU/ML — SIGNIFICANT CHANGE UP (ref 0.34–4.82)
WBC # BLD: 5.31 K/UL — SIGNIFICANT CHANGE UP (ref 3.8–10.5)
WBC # FLD AUTO: 5.31 K/UL — SIGNIFICANT CHANGE UP (ref 3.8–10.5)

## 2025-08-11 PROCEDURE — 99239 HOSP IP/OBS DSCHRG MGMT >30: CPT

## 2025-08-11 RX ORDER — TRAMADOL HYDROCHLORIDE 50 MG/1
1 TABLET, FILM COATED ORAL
Refills: 0 | DISCHARGE

## 2025-08-11 RX ORDER — BISACODYL 5 MG
1 TABLET, DELAYED RELEASE (ENTERIC COATED) ORAL
Qty: 0 | Refills: 0 | DISCHARGE
Start: 2025-08-11

## 2025-08-11 RX ORDER — SENNA 187 MG
2 TABLET ORAL
Qty: 0 | Refills: 0 | DISCHARGE
Start: 2025-08-11

## 2025-08-11 RX ORDER — ACETAMINOPHEN 500 MG/5ML
2 LIQUID (ML) ORAL
Qty: 0 | Refills: 0 | DISCHARGE
Start: 2025-08-11

## 2025-08-11 RX ORDER — POLYETHYLENE GLYCOL 3350 17 G/17G
17 POWDER, FOR SOLUTION ORAL
Qty: 0 | Refills: 0 | DISCHARGE
Start: 2025-08-11

## 2025-08-11 RX ADMIN — Medication 650 MILLIGRAM(S): at 15:12

## 2025-08-11 RX ADMIN — Medication 100 MILLIGRAM(S): at 10:33

## 2025-08-11 RX ADMIN — Medication 25 GRAM(S): at 06:04

## 2025-08-11 RX ADMIN — Medication 10 MILLIGRAM(S): at 11:36

## 2025-08-11 RX ADMIN — Medication 0.5 MILLIGRAM(S): at 06:05

## 2025-08-11 RX ADMIN — POLYETHYLENE GLYCOL 3350 17 GRAM(S): 17 POWDER, FOR SOLUTION ORAL at 10:33

## 2025-08-11 RX ADMIN — Medication 650 MILLIGRAM(S): at 14:33

## 2025-08-12 ENCOUNTER — NON-APPOINTMENT (OUTPATIENT)
Age: 75
End: 2025-08-12

## 2025-08-18 DIAGNOSIS — E11.9 TYPE 2 DIABETES MELLITUS WITHOUT COMPLICATIONS: ICD-10-CM

## 2025-08-18 DIAGNOSIS — E87.5 HYPERKALEMIA: ICD-10-CM

## 2025-08-18 DIAGNOSIS — N18.2 CHRONIC KIDNEY DISEASE, STAGE 2 (MILD): ICD-10-CM

## 2025-08-18 DIAGNOSIS — L03.115 CELLULITIS OF RIGHT LOWER LIMB: ICD-10-CM

## 2025-08-18 DIAGNOSIS — Z66 DO NOT RESUSCITATE: ICD-10-CM

## 2025-08-18 DIAGNOSIS — Z79.899 OTHER LONG TERM (CURRENT) DRUG THERAPY: ICD-10-CM

## 2025-08-18 DIAGNOSIS — Z88.0 ALLERGY STATUS TO PENICILLIN: ICD-10-CM

## 2025-08-18 DIAGNOSIS — L03.116 CELLULITIS OF LEFT LOWER LIMB: ICD-10-CM

## 2025-08-18 DIAGNOSIS — D69.6 THROMBOCYTOPENIA, UNSPECIFIED: ICD-10-CM

## 2025-08-18 DIAGNOSIS — I10 ESSENTIAL (PRIMARY) HYPERTENSION: ICD-10-CM

## 2025-08-18 DIAGNOSIS — R33.8 OTHER RETENTION OF URINE: ICD-10-CM

## 2025-08-18 DIAGNOSIS — I89.0 LYMPHEDEMA, NOT ELSEWHERE CLASSIFIED: ICD-10-CM

## 2025-08-18 DIAGNOSIS — E87.1 HYPO-OSMOLALITY AND HYPONATREMIA: ICD-10-CM

## 2025-08-18 DIAGNOSIS — N17.0 ACUTE KIDNEY FAILURE WITH TUBULAR NECROSIS: ICD-10-CM

## 2025-08-22 ENCOUNTER — TRANSCRIPTION ENCOUNTER (OUTPATIENT)
Age: 75
End: 2025-08-22

## 2025-08-28 ENCOUNTER — TRANSCRIPTION ENCOUNTER (OUTPATIENT)
Age: 75
End: 2025-08-28

## 2025-09-04 ENCOUNTER — TRANSCRIPTION ENCOUNTER (OUTPATIENT)
Age: 75
End: 2025-09-04

## 2025-09-11 ENCOUNTER — NON-APPOINTMENT (OUTPATIENT)
Age: 75
End: 2025-09-11

## 2025-09-17 ENCOUNTER — TRANSCRIPTION ENCOUNTER (OUTPATIENT)
Age: 75
End: 2025-09-17